# Patient Record
Sex: MALE | Race: WHITE | NOT HISPANIC OR LATINO | Employment: OTHER | URBAN - METROPOLITAN AREA
[De-identification: names, ages, dates, MRNs, and addresses within clinical notes are randomized per-mention and may not be internally consistent; named-entity substitution may affect disease eponyms.]

---

## 2017-01-03 ENCOUNTER — TELEPHONE (OUTPATIENT)
Dept: ENDOCRINOLOGY | Age: 72
End: 2017-01-03

## 2017-01-03 NOTE — TELEPHONE ENCOUNTER
Pharmacy is calling in regards to Actos RX. They are stating that dose is too high for patient's conditions. He is taking Plavix and also has liver issues. Do you want to ok the refill for this medication?     Actos 45mg once a day?

## 2017-01-03 NOTE — TELEPHONE ENCOUNTER
On 10/03/2016 his laboratory evaluation shows perfectly normal liver functions with some renal impairment.  He should continue taking it.  I also do not know of any drug to drug interaction between Actos and Plavix.  Incidentally he may have missed and a lab appointment in late December.

## 2017-01-04 ENCOUNTER — OFFICE VISIT (OUTPATIENT)
Dept: INTERNAL MEDICINE | Facility: CLINIC | Age: 72
End: 2017-01-04

## 2017-01-04 VITALS
HEART RATE: 83 BPM | WEIGHT: 239 LBS | HEIGHT: 73 IN | TEMPERATURE: 98.9 F | DIASTOLIC BLOOD PRESSURE: 86 MMHG | BODY MASS INDEX: 31.68 KG/M2 | SYSTOLIC BLOOD PRESSURE: 150 MMHG | OXYGEN SATURATION: 97 %

## 2017-01-04 DIAGNOSIS — J06.9 ACUTE URI: Primary | ICD-10-CM

## 2017-01-04 PROCEDURE — 99213 OFFICE O/P EST LOW 20 MIN: CPT | Performed by: NURSE PRACTITIONER

## 2017-01-04 RX ORDER — GUAIFENESIN 600 MG/1
1200 TABLET, EXTENDED RELEASE ORAL 2 TIMES DAILY
Qty: 14 TABLET
Start: 2017-01-04 | End: 2017-01-11

## 2017-01-04 RX ORDER — VIT C/HESPERIDIN/BIOFLAVONOIDS 500-100 MG
50 TABLET ORAL DAILY
COMMUNITY

## 2017-01-04 RX ORDER — AZITHROMYCIN 250 MG/1
TABLET, FILM COATED ORAL
Qty: 6 TABLET | Refills: 0 | Status: SHIPPED | OUTPATIENT
Start: 2017-01-04 | End: 2017-02-13

## 2017-01-04 RX ORDER — MULTIVIT WITH MINERALS/LUTEIN
1000 TABLET ORAL DAILY
COMMUNITY

## 2017-01-04 NOTE — PROGRESS NOTES
Subjective   Earl Marc is a 71 y.o. male who presents due to respiratory symptoms.    URI    This is a new problem. The current episode started in the past 7 days. The problem has been gradually worsening. There has been no fever. Associated symptoms include congestion, coughing, rhinorrhea, sinus pain, sneezing and a sore throat. Pertinent negatives include no abdominal pain, chest pain, diarrhea, dysuria, ear pain, nausea, vomiting or wheezing. He has tried antihistamine for the symptoms. The treatment provided mild relief.   Sore Throat    Associated symptoms include congestion and coughing. Pertinent negatives include no abdominal pain, diarrhea, drooling, ear pain, shortness of breath, trouble swallowing or vomiting.        The following portions of the patient's history were reviewed and updated as appropriate: allergies, current medications, past social history and problem list.    Past Medical History   Diagnosis Date   • Abnormal cardiovascular stress test    • Asthma    • Chronic kidney disease    • Coronary artery disease    • Diabetes mellitus    • Dyslipidemia    • Erectile dysfunction    • Fatty liver disease, nonalcoholic    • Hyperlipidemia    • Hypertension    • Hypogonadism male    • Type 2 diabetes mellitus          Current Outpatient Prescriptions:   •  Alcohol Swabs ([employer] PREP) 70 % pads, Place 1 application on the skin Daily., Disp: 400 each, Rfl: 0  •  allopurinol (ZYLOPRIM) 100 MG tablet, Take one tablet daily, Disp: 90 tablet, Rfl: 0  •  AMLACTIN 12 % lotion, , Disp: , Rfl:   •  ascorbic acid (VITAMIN C) 1000 MG tablet, Take 1,000 mg by mouth Daily., Disp: , Rfl:   •  B-D UF III MINI PEN NEEDLES 31G X 5 MM misc, Use with Lantus Pen for injection of insulin, Disp: 100 each, Rfl: 1  •  clopidogrel (PLAVIX) 75 MG tablet, Take 75 mg by mouth Daily., Disp: , Rfl:   •  cyanocobalamin 1000 MCG/ML injection, Cyanocobalamin 1000 MCG/ML Injection Solution; Patient Sig: Cyanocobalamin 1000 MCG/ML  Injection Solution INJECT 1ML INTRAMUSCULARLY EVERY OTHER WEEK; 0; 17-Feb-2015; Active, Disp: , Rfl:   •  DEPO-TESTOSTERONE 200 MG/ML injection, Inject 1 mL into the shoulder, thigh, or buttocks 1 (One) Time Per Week.  , Disp: , Rfl:   •  diclofenac (VOLTAREN) 1 % gel gel, Apply 4 g topically 4 (four) times a day as needed., Disp: , Rfl:   •  fluticasone (FLONASE) 50 MCG/ACT nasal spray, 1 spray into each nostril 2 (two) times a day as needed., Disp: , Rfl:   •  fluticasone-salmeterol (ADVAIR) 500-50 MCG/DOSE DISKUS, Inhale 1 puff 2 (two) times a day as needed. Advair Diskus 500-50 MCG/DOSE Inhalation Aerosol Powder Breath Activated; Patient Sig: Advair Diskus 500-50 MCG/DOSE Inhalation Aerosol Powder Breath Activated INHALE PUFFS  PRN; 5; 27-Aug-2015; Active, Disp: , Rfl:   •  folic acid (FOLVITE) 800 MCG tablet, Take  by mouth daily., Disp: , Rfl:   •  glucose blood test strip, Precision Xtra Blood Glucose In Vitro Strip; Patient Sig: Precision Xtra Blood Glucose In Vitro Strip TEST 2  TIMES DAILY.; 200; 4; 11-Jul-2013; Active, Disp: , Rfl:   •  hydrocortisone 2.5 % ointment, Apply  topically 2 (two) times a day., Disp: , Rfl:   •  Insulin Glargine (LANTUS SOLOSTAR) 100 UNIT/ML injection pen, Inject 26 Units under the skin Daily., Disp: 36 mL, Rfl: 1  •  ketoconazole (NIZORAL) 2 % shampoo, , Disp: , Rfl:   •  loratadine (CLARITIN) 10 MG tablet, Take 10 mg by mouth daily., Disp: , Rfl:   •  MONOLET LANCETS misc, Use to test BG 2 times daily, Disp: 100 each, Rfl: 1  •  Multiple Vitamins-Minerals (MULTIMINERAL PLUS) tablet, Take  by mouth daily., Disp: , Rfl:   •  nitroglycerin (NITROSTAT) 0.4 MG SL tablet, Place 1 tablet under the tongue every 5 (five) minutes as needed for chest pain. Indications: Acute Angina Pectoris, Disp: 30 tablet, Rfl: 3  •  pantoprazole (PROTONIX) 40 MG EC tablet, Take 40 mg by mouth daily., Disp: , Rfl:   •  pioglitazone (ACTOS) 45 MG tablet, Take 1 tablet by mouth Daily., Disp: 90 tablet,  Rfl: 1  •  pregabalin (LYRICA) 50 MG capsule, Take 50 mg by mouth 3 (three) times a day., Disp: , Rfl:   •  PREVIDENT 5000 PLUS 1.1 % cream, , Disp: , Rfl:   •  Soap & Cleansers (CETAKLENZ) liquid, , Disp: , Rfl:   •  tamsulosin (FLOMAX) 0.4 MG capsule 24 hr capsule, Take 1 capsule by mouth every night., Disp: , Rfl:   •  TRADJENTA 5 MG tablet tablet, TAKE 1 TABLET DAILY, Disp: 90 tablet, Rfl: 2  •  VASCEPA 1 G capsule capsule, TAKE 4 CAPSULES IN THE EVENING, Disp: 360 capsule, Rfl: 2  •  vitamin D (ERGOCALCIFEROL) 24810 UNITS capsule capsule, TAKE 2 CAPSULES ONCE WEEKLY, Disp: 24 capsule, Rfl: 2  •  Zinc 30 MG tablet, Take  by mouth., Disp: , Rfl:   •  zolpidem (AMBIEN) 5 MG tablet, Take 5 mg by mouth at night as needed., Disp: , Rfl:   •  azithromycin (ZITHROMAX Z-NEFTALI) 250 MG tablet, Take 2 tablets the first day, then 1 tablet daily for 4 days., Disp: 6 tablet, Rfl: 0  •  guaiFENesin (MUCINEX) 600 MG 12 hr tablet, Take 2 tablets by mouth 2 (Two) Times a Day for 7 days., Disp: 14 tablet, Rfl:     Allergies   Allergen Reactions   • Other      Lotions- anything with palm oil or lanolin per pt (verified 6/15/16 1910)   • Prazosin        Review of Systems   Constitutional: Positive for fatigue. Negative for activity change, appetite change, chills, fever and unexpected weight change.   HENT: Positive for congestion, postnasal drip, rhinorrhea, sinus pressure, sneezing and sore throat. Negative for drooling, ear pain, facial swelling, hearing loss, mouth sores, nosebleeds, trouble swallowing and voice change.    Eyes: Negative for pain, discharge, itching and visual disturbance.   Respiratory: Positive for cough and chest tightness. Negative for choking, shortness of breath and wheezing.    Cardiovascular: Negative for chest pain and palpitations.   Gastrointestinal: Negative for abdominal pain, constipation, diarrhea, nausea and vomiting.   Endocrine: Negative for polyuria.   Genitourinary: Negative for dysuria and  "frequency.   Musculoskeletal: Negative for back pain and joint swelling.       Objective   Vitals:    01/04/17 1442   BP: 150/86   BP Location: Left arm   Patient Position: Sitting   Cuff Size: Adult   Pulse: 83   Temp: 98.9 °F (37.2 °C)   TempSrc: Oral   SpO2: 97%   Weight: 239 lb (108 kg)   Height: 73\" (185.4 cm)     Physical Exam   Constitutional: He appears well-developed and well-nourished. He is cooperative. He does not have a sickly appearance. He does not appear ill.   HENT:   Head: Normocephalic.   Right Ear: Hearing, tympanic membrane and external ear normal. No drainage, swelling or tenderness. Tympanic membrane is not injected, not erythematous and not bulging. No middle ear effusion.   Left Ear: Hearing, tympanic membrane and external ear normal. No drainage, swelling or tenderness. Tympanic membrane is not injected, not erythematous and not bulging.  No middle ear effusion.   Nose: Nose normal. No mucosal edema, rhinorrhea or sinus tenderness. Right sinus exhibits no maxillary sinus tenderness and no frontal sinus tenderness. Left sinus exhibits no maxillary sinus tenderness and no frontal sinus tenderness.   Mouth/Throat: Mucous membranes are normal. Posterior oropharyngeal erythema present.   Eyes: Conjunctivae and lids are normal. Right eye exhibits no discharge and no exudate. Left eye exhibits no discharge and no exudate.   Neck: Trachea normal and normal range of motion. Edema present.   Cardiovascular: Regular rhythm, normal heart sounds and normal pulses.    No murmur heard.  Pulmonary/Chest: Breath sounds normal. No respiratory distress. He has no decreased breath sounds. He has no wheezes. He has no rhonchi. He has no rales.   Lymphadenopathy:     He has no cervical adenopathy.   Neurological: He is alert.   Skin: Skin is warm, dry and intact.   Nursing note and vitals reviewed.      Assessment/Plan   Earl was seen today for uri and sore throat.    Diagnoses and all orders for this " visit:    Acute URI  Comments:  restart Advair & Flonase daily, continue Claritin daily  Orders:  -     guaiFENesin (MUCINEX) 600 MG 12 hr tablet; Take 2 tablets by mouth 2 (Two) Times a Day for 7 days.  -     azithromycin (ZITHROMAX Z-NEFTALI) 250 MG tablet; Take 2 tablets the first day, then 1 tablet daily for 4 days.

## 2017-01-04 NOTE — MR AVS SNAPSHOT
Earl Marc   1/4/2017 2:15 PM   Office Visit    Dept Phone:  [phone]   Encounter #:  [id number]    Provider:  JL Akhtar   Department:  [facility] INTERNAL MEDICINE                Your Full Care Plan              Today's Medication Changes          These changes are accurate as of: 1/4/17  3:05 PM.  If you have any questions, ask your nurse or doctor.               New Medication(s)Ordered:     azithromycin 250 MG tablet   Commonly known as:  ZITHROMAX Z-NEFTALI   Take 2 tablets the first day, then 1 tablet daily for 4 days.   Started by:  JL Akhtar       guaiFENesin 600 MG 12 hr tablet   Commonly known as:  MUCINEX   Take 2 tablets by mouth 2 (Two) Times a Day for 7 days.   Started by:  JL Akhtar         Stop taking medication(s)listed here:     fexofenadine 180 MG tablet   Commonly known as:  ALLEGRA   Stopped by:  JL Akhtar                Where to Get Your Medications      These medications were sent to [address] AT [facility] & ([place] [facility] - [phone]  - [phone] [place] [address]     Phone:  [phone]     azithromycin 250 MG tablet         Information about where to get these medications is not yet available     ! Ask your nurse or doctor about these medications     guaiFENesin 600 MG 12 hr tablet                  Your Updated Medication List          This list is accurate as of: 1/4/17  3:05 PM.  Always use your most recent med list.                allopurinol 100 MG tablet   Commonly known as:  ZYLOPRIM   Take one tablet daily       AMLACTIN 12 % lotion   Generic drug:  ammonium lactate       ascorbic acid 1000 MG tablet   Commonly known as:  VITAMIN C       azithromycin 250 MG tablet   Commonly known as:  ZITHROMAX Z-NEFTALI   Take 2 tablets the first day, then 1 tablet daily for 4 days.       B-D UF III MINI PEN NEEDLES 31G  X 5 MM misc   Generic drug:  Insulin Pen Needle   Use with Lantus Pen for injection of insulin       Cetaklenz liquid       clopidogrel 75 MG tablet   Commonly known as:  PLAVIX       CVS PREP 70 % pads   Place 1 application on the skin Daily.       cyanocobalamin 1000 MCG/ML injection       DEPO-TESTOSTERONE 200 MG/ML injection   Generic drug:  Testosterone Cypionate       diclofenac 1 % gel gel   Commonly known as:  VOLTAREN       fluticasone 50 MCG/ACT nasal spray   Commonly known as:  FLONASE       fluticasone-salmeterol 500-50 MCG/DOSE DISKUS   Commonly known as:  ADVAIR       folic acid 800 MCG tablet   Commonly known as:  FOLVITE       glucose blood test strip       guaiFENesin 600 MG 12 hr tablet   Commonly known as:  MUCINEX   Take 2 tablets by mouth 2 (Two) Times a Day for 7 days.       hydrocortisone 2.5 % ointment       Insulin Glargine 100 UNIT/ML injection pen   Commonly known as:  LANTUS SOLOSTAR   Inject 26 Units under the skin Daily.       ketoconazole 2 % shampoo   Commonly known as:  NIZORAL       loratadine 10 MG tablet   Commonly known as:  CLARITIN       MONOLET LANCETS misc   Use to test BG 2 times daily       MULTIMINERAL PLUS tablet       nitroglycerin 0.4 MG SL tablet   Commonly known as:  NITROSTAT   Place 1 tablet under the tongue every 5 (five) minutes as needed for chest pain. Indications: Acute Angina Pectoris       pantoprazole 40 MG EC tablet   Commonly known as:  PROTONIX       pioglitazone 45 MG tablet   Commonly known as:  ACTOS   Take 1 tablet by mouth Daily.       pregabalin 50 MG capsule   Commonly known as:  LYRICA       PREVIDENT 5000 PLUS 1.1 % cream   Generic drug:  Sodium Fluoride       tamsulosin 0.4 MG capsule 24 hr capsule   Commonly known as:  FLOMAX       TRADJENTA 5 MG tablet tablet   Generic drug:  linagliptin   TAKE 1 TABLET DAILY       VASCEPA 1 G capsule capsule   Generic drug:  icosapent ethyl   TAKE 4 CAPSULES IN THE EVENING       vitamin D 42001 UNITS capsule  capsule   Commonly known as:  ERGOCALCIFEROL   TAKE 2 CAPSULES ONCE WEEKLY       Zinc 30 MG tablet       zolpidem 5 MG tablet   Commonly known as:  AMBIEN               You Were Diagnosed With        Codes Comments    Acute URI    -  Primary ICD-10-CM: J06.9  ICD-9-CM: 465.9 restart Advair & Flonase daily, continue Claritin daily      Instructions     None    Patient Instructions History      Upcoming Appointments     Visit Type Date Time Department    ACUTE           1/4/2017  2:15 PM [facility]    [facility] 1/30/2017  9:30 AM [facility]    OFFICE VISIT 2/13/2017 10:40 AM [facility]    OFFICE VISIT 4/4/2017  9:00 AM [facility]      [employer] Signup     Our records indicate that you have an active [facility] [employer] account.    You can view your After Visit Summary by going to [employer] and logging in with your Yatedo username and password.  If you don't have a [employer] username and password but a parent or guardian has access to your record, the parent or guardian should login with their own [employer]atedo username and password and access your record to view the After Visit Summary.    If you have questions, you can email [email or web address] or call [phone] to talk to our [employer]ated[employer] staff.  Remember, [employer] is NOT to be used for urgent needs.  For medical emergencies, dial 911.               Other Info from Your Visit           Your Appointments     Jan 30, 2017  9:30 AM EST   [facility] with [facility], [facility]   [facility] ENDOCRINOLOGY (--)    [address] [facility]. [address] [place] [id number] [phone]            Feb 13, 2017 10:40 AM EST   Office Visit with Rivka Kincaid MD   [facility] ENDOCRINOLOGY (--)    [address] [facility] [place]. [address] [place] [id number] [phone]           Arrive 15 minutes prior to appointment.            Apr 04, 2017  9:00 AM EDT   Office Visit with Darci Washburn Jr., MD   [facility]  "[facility] MEDICAL [facility] INTERNAL MEDICINE (--)    [address]  [facility] [address] [phone]           Arrive 15 minutes prior to appointment.              Allergies     Other      Lotions- anything with palm oil or lanolin per pt (verified 6/15/16 1910)    Prazosin        Reason for Visit     URI x 3 days    Sore Throat           Vital Signs     Blood Pressure Pulse Temperature Height Weight Oxygen Saturation    150/86 (BP Location: Left arm, Patient Position: Sitting, Cuff Size: Adult) 83 98.9 °F (37.2 °C) (Oral) 73\" (185.4 cm) 239 lb (108 kg) 97%    Body Mass Index Smoking Status                31.53 kg/m2 Never Smoker          Problems and Diagnoses Noted     Acute upper respiratory infection    -  Primary        "

## 2017-01-05 ENCOUNTER — TELEPHONE (OUTPATIENT)
Dept: ENDOCRINOLOGY | Age: 72
End: 2017-01-05

## 2017-01-05 NOTE — TELEPHONE ENCOUNTER
Tradjenta is approved until  and the  number is [id number].   The pt is aware of this information as well.

## 2017-01-30 ENCOUNTER — LAB (OUTPATIENT)
Dept: ENDOCRINOLOGY | Age: 72
End: 2017-01-30

## 2017-01-30 DIAGNOSIS — E55.9 VITAMIN D DEFICIENCY: ICD-10-CM

## 2017-01-30 DIAGNOSIS — E55.9 VITAMIN D DEFICIENCY: Primary | ICD-10-CM

## 2017-01-31 LAB — 25(OH)D3+25(OH)D2 SERPL-MCNC: 53.6 NG/ML (ref 30–100)

## 2017-02-13 ENCOUNTER — OFFICE VISIT (OUTPATIENT)
Dept: ENDOCRINOLOGY | Age: 72
End: 2017-02-13

## 2017-02-13 VITALS
SYSTOLIC BLOOD PRESSURE: 136 MMHG | DIASTOLIC BLOOD PRESSURE: 74 MMHG | HEIGHT: 73 IN | RESPIRATION RATE: 16 BRPM | BODY MASS INDEX: 31.41 KG/M2 | WEIGHT: 237 LBS

## 2017-02-13 DIAGNOSIS — E55.9 VITAMIN D DEFICIENCY: ICD-10-CM

## 2017-02-13 DIAGNOSIS — F52.21 ERECTILE DYSFUNCTION OF NONORGANIC ORIGIN: ICD-10-CM

## 2017-02-13 DIAGNOSIS — N52.1 TYPE II DIABETES CIRCULATORY DISORDER CAUSING ERECTILE DYSFUNCTION (HCC): ICD-10-CM

## 2017-02-13 DIAGNOSIS — I10 ESSENTIAL HYPERTENSION: ICD-10-CM

## 2017-02-13 DIAGNOSIS — E79.0 HYPERURICEMIA: ICD-10-CM

## 2017-02-13 DIAGNOSIS — E29.1 HYPOGONADISM IN MALE: Primary | ICD-10-CM

## 2017-02-13 DIAGNOSIS — K76.0 STEATOSIS OF LIVER: ICD-10-CM

## 2017-02-13 DIAGNOSIS — E78.5 DYSLIPIDEMIA: ICD-10-CM

## 2017-02-13 DIAGNOSIS — E11.59 TYPE II DIABETES CIRCULATORY DISORDER CAUSING ERECTILE DYSFUNCTION (HCC): ICD-10-CM

## 2017-02-13 PROCEDURE — 99214 OFFICE O/P EST MOD 30 MIN: CPT | Performed by: INTERNAL MEDICINE

## 2017-02-13 RX ORDER — ICOSAPENT ETHYL 1000 MG/1
CAPSULE ORAL
Qty: 360 CAPSULE | Refills: 2 | Status: SHIPPED | OUTPATIENT
Start: 2017-02-13 | End: 2017-11-06 | Stop reason: SDUPTHER

## 2017-02-13 RX ORDER — ASPIRIN 325 MG
325 TABLET ORAL DAILY
COMMUNITY
End: 2017-04-04 | Stop reason: SDUPTHER

## 2017-02-13 NOTE — PROGRESS NOTES
"Subjective   Earl Marc is a 71 y.o. male seen for follow up for DM2, hyperlipidemia, HTN, lab review. Patient is checking BG twice a day. He states that his BG is averaging 134. 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 He was told by his kidney doctor that he should not allow his BG to drop below 100 due to kidney damage.     History of Present Illness this is a 71-year-old gentleman known patient with type II diabetes hypertension dyslipidemia as well as hypogonadism and erectile dysfunction and vitamin D deficiency.  In the past 6 months he has had no significant health problems for which to go to emergency room or hospital.  Visit Vitals   • /74   • Resp 16   • Ht 73\" (185.4 cm)   • Wt 237 lb (108 kg)   • BMI 31.27 kg/m2       Current Outpatient Prescriptions:   •  Alcohol Swabs ([facility] PREP) 70 % pads, Place 1 application on the skin Daily., Disp: 400 each, Rfl: 0  •  allopurinol (ZYLOPRIM) 100 MG tablet, Take one tablet daily, Disp: 90 tablet, Rfl: 0  •  AMLACTIN 12 % lotion, , Disp: , Rfl:   •  ascorbic acid (VITAMIN C) 1000 MG tablet, Take 1,000 mg by mouth Daily., Disp: , Rfl:   •  aspirin 325 MG tablet, Take 325 mg by mouth Daily., Disp: , Rfl:   •  B-D UF III MINI PEN NEEDLES 31G X 5 MM misc, Use with Lantus Pen for injection of insulin, Disp: 100 each, Rfl: 1  •  clopidogrel (PLAVIX) 75 MG tablet, Take 75 mg by mouth Daily., Disp: , Rfl:   •  cyanocobalamin 1000 MCG/ML injection, Cyanocobalamin 1000 MCG/ML Injection Solution; Patient Sig: Cyanocobalamin 1000 MCG/ML Injection Solution INJECT 1ML INTRAMUSCULARLY EVERY OTHER WEEK; 0; 17-Feb-2015; Active, Disp: , Rfl:   •  DEPO-TESTOSTERONE 200 MG/ML injection, Inject 1 mL into the shoulder, thigh, or buttocks 1 (One) Time Per Week.  , Disp: , Rfl:   •  diclofenac (VOLTAREN) 1 % gel gel, Apply 4 g topically 4 (four) times a day as needed., Disp: , Rfl: "   •  fluticasone (FLONASE) 50 MCG/ACT nasal spray, 1 spray into each nostril 2 (two) times a day as needed., Disp: , Rfl:   •  fluticasone-salmeterol (ADVAIR) 500-50 MCG/DOSE DISKUS, Inhale 1 puff 2 (two) times a day as needed. Advair Diskus 500-50 MCG/DOSE Inhalation Aerosol Powder Breath Activated; Patient Sig: Advair Diskus 500-50 MCG/DOSE Inhalation Aerosol Powder Breath Activated INHALE PUFFS  PRN; 5; 27-Aug-2015; Active, Disp: , Rfl:   •  folic acid (FOLVITE) 800 MCG tablet, Take  by mouth daily., Disp: , Rfl:   •  glucose blood test strip, Precision Xtra Blood Glucose In Vitro Strip; Patient Sig: Precision Xtra Blood Glucose In Vitro Strip TEST 2  TIMES DAILY, Disp: 200 each, Rfl: 0  •  hydrocortisone 2.5 % ointment, Apply  topically 2 (two) times a day., Disp: , Rfl:   •  Insulin Glargine (LANTUS SOLOSTAR) 100 UNIT/ML injection pen, Inject 26 Units under the skin Daily., Disp: 36 mL, Rfl: 1  •  ketoconazole (NIZORAL) 2 % shampoo, , Disp: , Rfl:   •  loratadine (CLARITIN) 10 MG tablet, Take 10 mg by mouth daily., Disp: , Rfl:   •  METOPROLOL TARTRATE PO, Take 25 mg by mouth Daily., Disp: , Rfl:   •  MONOLET LANCETS misc, Use to test BG 2 times daily, Disp: 100 each, Rfl: 1  •  Multiple Vitamins-Minerals (MULTIMINERAL PLUS) tablet, Take  by mouth daily., Disp: , Rfl:   •  nitroglycerin (NITROSTAT) 0.4 MG SL tablet, Place 1 tablet under the tongue every 5 (five) minutes as needed for chest pain. Indications: Acute Angina Pectoris, Disp: 30 tablet, Rfl: 3  •  pantoprazole (PROTONIX) 40 MG EC tablet, Take 40 mg by mouth daily., Disp: , Rfl:   •  pioglitazone (ACTOS) 45 MG tablet, Take 1 tablet by mouth Daily., Disp: 90 tablet, Rfl: 1  •  pregabalin (LYRICA) 50 MG capsule, Take 50 mg by mouth 3 (three) times a day., Disp: , Rfl:   •  PREVIDENT 5000 PLUS 1.1 % cream, , Disp: , Rfl:   •  Soap & Cleansers (CETAKLENZ) liquid, , Disp: , Rfl:   •  tamsulosin (FLOMAX) 0.4 MG capsule 24 hr capsule, Take 1 capsule by mouth  every night., Disp: , Rfl:   •  TRADJENTA 5 MG tablet tablet, TAKE 1 TABLET DAILY, Disp: 90 tablet, Rfl: 2  •  VASCEPA 1 G capsule capsule, TAKE 4 CAPSULES IN THE EVENING, Disp: 360 capsule, Rfl: 2  •  vitamin D (ERGOCALCIFEROL) 85375 UNITS capsule capsule, TAKE 2 CAPSULES ONCE WEEKLY, Disp: 24 capsule, Rfl: 2  •  Zinc 30 MG tablet, Take  by mouth., Disp: , Rfl:   •  zolpidem (AMBIEN) 5 MG tablet, Take 5 mg by mouth at night as needed., Disp: , Rfl:     Allergies   Allergen Reactions   • Other      Lotions- anything with palm oil or lanolin per pt (verified 6/15/16 1910)   • Prazosin            The following portions of the patient's history were reviewed and updated as appropriate: allergies, current medications, past family history, past medical history, past social history, past surgical history and problem list.    Review of Systems   Constitutional: Negative.    HENT: Negative.    Eyes: Negative.    Respiratory: Negative.    Cardiovascular: Negative.    Gastrointestinal: Negative.    Endocrine: Negative.    Genitourinary: Negative.    Musculoskeletal: Negative.    Skin: Negative.    Allergic/Immunologic: Negative.    Neurological: Negative.    Hematological: Negative.    Psychiatric/Behavioral: Negative.        Objective   Physical Exam   Constitutional: He is oriented to person, place, and time. He appears well-developed and well-nourished. No distress.   HENT:   Head: Normocephalic and atraumatic.   Right Ear: External ear normal.   Left Ear: External ear normal.   Nose: Nose normal.   Mouth/Throat: Oropharynx is clear and moist. No oropharyngeal exudate.   Eyes: Conjunctivae and EOM are normal. Pupils are equal, round, and reactive to light. Right eye exhibits no discharge. Left eye exhibits no discharge. No scleral icterus.   Neck: Normal range of motion. Neck supple. No JVD present. No tracheal deviation present. No thyromegaly present.   Cardiovascular: Normal rate, regular rhythm, normal heart sounds and  intact distal pulses.  Exam reveals no gallop and no friction rub.    No murmur heard.  Pulmonary/Chest: Effort normal and breath sounds normal. No stridor. No respiratory distress. He has no wheezes. He has no rales. He exhibits no tenderness.   Abdominal: Soft. Bowel sounds are normal. He exhibits no distension and no mass. There is no tenderness. There is no rebound and no guarding. No hernia.   Musculoskeletal: Normal range of motion. He exhibits no edema, tenderness or deformity.   Lymphadenopathy:     He has no cervical adenopathy.   Neurological: He is alert and oriented to person, place, and time. He has normal reflexes. He displays normal reflexes. No cranial nerve deficit. He exhibits normal muscle tone. Coordination normal.   Skin: Skin is warm and dry. No rash noted. He is not diaphoretic. No erythema. No pallor.   Psychiatric: He has a normal mood and affect. His behavior is normal. Judgment and thought content normal.   Nursing note and vitals reviewed.     Results for orders placed or performed in visit on 01/30/17   Vitamin D 25 Hydroxy   Result Value Ref Range    25 Hydroxy, Vitamin D 53.6 30.0 - 100.0 ng/mL     Lab Results   Component Value Date    GLUCOSE 122 (H) 06/14/2016    BUN 23 01/30/2017    CREATININE 1.62 (H) 01/30/2017    EGFRIFNONA 42 (L) 01/30/2017    EGFRIFAFRI 51 (L) 01/30/2017    BCR 14.2 01/30/2017    CO2 23.9 01/30/2017    CALCIUM 9.9 01/30/2017    PROTENTOTREF 7.5 01/30/2017    ALBUMIN 4.70 01/30/2017    LABIL2 1.7 01/30/2017    AST 20 01/30/2017    ALT 21 01/30/2017     Lab Results   Component Value Date    TSH 2.660 01/30/2017     Lab Results   Component Value Date    CHOL 117 03/29/2016    CHOL 127 03/27/2016    CHOL 127 03/27/2016     Lab Results   Component Value Date    TRIG 160 (H) 01/30/2017    TRIG 102 10/03/2016    TRIG 164 (H) 05/17/2016     Lab Results   Component Value Date    HDL 49 01/30/2017    HDL 44 10/03/2016    HDL 49 05/17/2016     Lab Results   Component  Value Date    LDLCALC 50 03/29/2016    LDLCALC 49 03/27/2016    LDLCALC 47 03/27/2016     Lab Results   Component Value Date     (H) 01/30/2017    LDL 83 10/03/2016     (H) 05/17/2016     No results found for: HDLLDLRATIO  No components found for: CHOLHDL  Lab Results   Component Value Date    HGBA1C 6.49 (H) 01/30/2017               Assessment/Plan   Diagnoses and all orders for this visit:    Hypogonadism in male  -     T4 & TSH ([facility]); Future  -     TestT+TestF+SHBG; Future  -     Uric Acid; Future  -     Vitamin D 25 Hydroxy; Future  -     Comprehensive Metabolic Panel; Future  -     C-Peptide; Future  -     Hemoglobin A1c; Future  -     Lipid Panel; Future  -     MicroAlbumin, Urine, Random; Future    Vitamin D deficiency  -     T4 & TSH ([facility]); Future  -     TestT+TestF+SHBG; Future  -     Uric Acid; Future  -     Vitamin D 25 Hydroxy; Future  -     Comprehensive Metabolic Panel; Future  -     C-Peptide; Future  -     Hemoglobin A1c; Future  -     Lipid Panel; Future  -     MicroAlbumin, Urine, Random; Future    Steatosis of liver  -     T4 & TSH ([facility]); Future  -     TestT+TestF+SHBG; Future  -     Uric Acid; Future  -     Vitamin D 25 Hydroxy; Future  -     Comprehensive Metabolic Panel; Future  -     C-Peptide; Future  -     Hemoglobin A1c; Future  -     Lipid Panel; Future  -     MicroAlbumin, Urine, Random; Future    Essential hypertension  -     T4 & TSH ([facility]); Future  -     TestT+TestF+SHBG; Future  -     Uric Acid; Future  -     Vitamin D 25 Hydroxy; Future  -     Comprehensive Metabolic Panel; Future  -     C-Peptide; Future  -     Hemoglobin A1c; Future  -     Lipid Panel; Future  -     MicroAlbumin, Urine, Random; Future    Hyperuricemia  -     T4 & TSH ([facility]); Future  -     TestT+TestF+SHBG; Future  -     Uric Acid; Future  -     Vitamin D 25 Hydroxy; Future  -     Comprehensive Metabolic Panel; Future  -     C-Peptide; Future  -     Hemoglobin A1c; Future  -     Lipid  Panel; Future  -     MicroAlbumin, Urine, Random; Future    Dyslipidemia  -     T4 & TSH ([facility]); Future  -     TestT+TestF+SHBG; Future  -     Uric Acid; Future  -     Vitamin D 25 Hydroxy; Future  -     Comprehensive Metabolic Panel; Future  -     C-Peptide; Future  -     Hemoglobin A1c; Future  -     Lipid Panel; Future  -     MicroAlbumin, Urine, Random; Future    Erectile dysfunction of nonorganic origin  -     T4 & TSH ([facility]); Future  -     TestT+TestF+SHBG; Future  -     Uric Acid; Future  -     Vitamin D 25 Hydroxy; Future  -     Comprehensive Metabolic Panel; Future  -     C-Peptide; Future  -     Hemoglobin A1c; Future  -     Lipid Panel; Future  -     MicroAlbumin, Urine, Random; Future    Type II diabetes circulatory disorder causing erectile dysfunction  -     T4 & TSH ([facility]); Future  -     TestT+TestF+SHBG; Future  -     Uric Acid; Future  -     Vitamin D 25 Hydroxy; Future  -     Comprehensive Metabolic Panel; Future  -     C-Peptide; Future  -     Hemoglobin A1c; Future  -     Lipid Panel; Future  -     MicroAlbumin, Urine, Random; Future    Other orders  -     Insulin Glargine (LANTUS SOLOSTAR) 100 UNIT/ML injection pen; Inject 26 Units under the skin Daily.  -     linagliptin (TRADJENTA) 5 MG tablet tablet; Take 1 tablet by mouth Daily.  -     icosapent ethyl (VASCEPA) 1 G capsule capsule; Capsules twice daily by mouth              his summary I saw and examined this 71-year-old gentleman for above-mentioned problems.  I reviewed his laboratory evaluation of 01/30/2017 and divided him with a hard copy of it.  He is clinically and metabolically stable and therefore we will go ahead and continue all his current medications.  His testosterone is low however this is being managed by his urologist.  He will see Ms. [place] Walker in 4 months with laboratory evaluation prior to each office visit.

## 2017-02-16 RX ORDER — ALLOPURINOL 100 MG/1
TABLET ORAL
Qty: 90 TABLET | Refills: 0 | Status: SHIPPED | OUTPATIENT
Start: 2017-02-16 | End: 2017-02-17 | Stop reason: SDUPTHER

## 2017-02-17 RX ORDER — ALLOPURINOL 100 MG/1
TABLET ORAL
Qty: 90 TABLET | OUTPATIENT
Start: 2017-02-17

## 2017-02-17 RX ORDER — ALLOPURINOL 100 MG/1
TABLET ORAL
Qty: 90 TABLET | Refills: 3 | Status: SHIPPED | OUTPATIENT
Start: 2017-02-17 | End: 2018-03-09 | Stop reason: SDUPTHER

## 2017-04-04 ENCOUNTER — OFFICE VISIT (OUTPATIENT)
Dept: INTERNAL MEDICINE | Facility: CLINIC | Age: 72
End: 2017-04-04

## 2017-04-04 VITALS
WEIGHT: 243 LBS | DIASTOLIC BLOOD PRESSURE: 74 MMHG | SYSTOLIC BLOOD PRESSURE: 140 MMHG | HEIGHT: 73 IN | HEART RATE: 78 BPM | BODY MASS INDEX: 32.2 KG/M2

## 2017-04-04 DIAGNOSIS — I25.10 CORONARY ARTERY DISEASE INVOLVING NATIVE CORONARY ARTERY OF NATIVE HEART WITHOUT ANGINA PECTORIS: Primary | ICD-10-CM

## 2017-04-04 DIAGNOSIS — E11.42 DIABETIC PERIPHERAL NEUROPATHY (HCC): ICD-10-CM

## 2017-04-04 DIAGNOSIS — E11.40 TYPE 2 DIABETES MELLITUS WITH DIABETIC NEUROPATHY, WITH LONG-TERM CURRENT USE OF INSULIN (HCC): ICD-10-CM

## 2017-04-04 DIAGNOSIS — Z79.4 TYPE 2 DIABETES MELLITUS WITH DIABETIC NEUROPATHY, WITH LONG-TERM CURRENT USE OF INSULIN (HCC): ICD-10-CM

## 2017-04-04 DIAGNOSIS — I10 ESSENTIAL HYPERTENSION: ICD-10-CM

## 2017-04-04 DIAGNOSIS — E78.5 DYSLIPIDEMIA: ICD-10-CM

## 2017-04-04 DIAGNOSIS — N28.9 RENAL INSUFFICIENCY: ICD-10-CM

## 2017-04-04 DIAGNOSIS — K21.9 GASTROESOPHAGEAL REFLUX DISEASE WITHOUT ESOPHAGITIS: ICD-10-CM

## 2017-04-04 DIAGNOSIS — E11.49 TYPE 2 DIABETES MELLITUS WITH OTHER NEUROLOGIC COMPLICATION, WITHOUT LONG-TERM CURRENT USE OF INSULIN (HCC): ICD-10-CM

## 2017-04-04 PROBLEM — E11.9 TYPE 2 DIABETES MELLITUS: Status: ACTIVE | Noted: 2017-04-04

## 2017-04-04 PROBLEM — E11.9 TYPE 2 DIABETES MELLITUS: Status: RESOLVED | Noted: 2017-04-04 | Resolved: 2017-04-04

## 2017-04-04 PROBLEM — N52.1 TYPE II DIABETES CIRCULATORY DISORDER CAUSING ERECTILE DYSFUNCTION: Status: RESOLVED | Noted: 2017-02-13 | Resolved: 2017-04-04

## 2017-04-04 PROBLEM — E11.59 TYPE II DIABETES CIRCULATORY DISORDER CAUSING ERECTILE DYSFUNCTION: Status: RESOLVED | Noted: 2017-02-13 | Resolved: 2017-04-04

## 2017-04-04 PROCEDURE — 99214 OFFICE O/P EST MOD 30 MIN: CPT | Performed by: INTERNAL MEDICINE

## 2017-04-04 RX ORDER — PREGABALIN 50 MG/1
50 CAPSULE ORAL 3 TIMES DAILY
Qty: 90 CAPSULE | Refills: 0 | Status: SHIPPED | OUTPATIENT
Start: 2017-04-04 | End: 2018-02-07

## 2017-04-04 RX ORDER — LATANOPROST 50 UG/ML
1 SOLUTION/ DROPS OPHTHALMIC NIGHTLY
COMMUNITY
Start: 2017-04-01

## 2017-04-04 RX ORDER — ASPIRIN 325 MG
325 TABLET ORAL DAILY
Qty: 90 TABLET | Refills: 3 | Status: SHIPPED | OUTPATIENT
Start: 2017-04-04 | End: 2017-07-05

## 2017-04-04 RX ORDER — ZOLPIDEM TARTRATE 5 MG/1
5 TABLET ORAL NIGHTLY PRN
Qty: 30 TABLET | Refills: 0 | Status: SHIPPED | OUTPATIENT
Start: 2017-04-04 | End: 2017-11-14 | Stop reason: SDUPTHER

## 2017-04-04 NOTE — PROGRESS NOTES
Subjective   Earl Marc is a 71 y.o. male.     History of Present Illness he is here today for follow-up of hypertension and coronary artery disease as well as hypercholesterolemia.  Generally he has done well.  He exercises 2 or 3 days per week doing both treadmill and rowing.  He does notice some reflux when doing the treadmill which is relieved by an acid.  He denies any other chest discomfort.  He denies any dyspnea on exertion or PND.  He has not had any dizziness or focal neurologic symptoms.  He does have chronic peripheral paresthesias for which he uses Lyrica with some relief.  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  His review systems is otherwise negative with exception of chronic and stable one per night nocturia.        Review of Systems   Constitutional: Negative for activity change, appetite change, fatigue and unexpected weight change.   HENT: Negative for trouble swallowing.    Respiratory: Negative for cough, chest tightness, shortness of breath and wheezing.    Cardiovascular: Negative for chest pain, palpitations and leg swelling.   Gastrointestinal: Negative for abdominal pain, anal bleeding, blood in stool, constipation, diarrhea, nausea and vomiting.   Genitourinary: Negative for difficulty urinating, dysuria, flank pain, frequency and hematuria.   Musculoskeletal: Negative for arthralgias, back pain and gait problem.   Neurological: Positive for numbness. Negative for dizziness, syncope, facial asymmetry, speech difficulty, weakness and headaches.   Psychiatric/Behavioral: Negative for confusion and dysphoric mood. The patient is not nervous/anxious.        Objective   Physical Exam   Constitutional: He is oriented to person, place, and time. He appears well-developed and well-nourished. He is active. He does not appear ill.   Eyes: Conjunctivae are normal.   Fundoscopic exam:       The right eye shows no AV nicking, no  exudate and no hemorrhage.        The left eye shows no AV nicking, no exudate and no hemorrhage.   Neck: Carotid bruit is not present. No thyroid mass and no thyromegaly present.   Cardiovascular: Normal rate, regular rhythm, S1 normal and S2 normal.  Exam reveals no S3 and no S4.    No murmur heard.  Pulses:       Posterior tibial pulses are 2+ on the right side, and 2+ on the left side.   Pulmonary/Chest: No tachypnea. No respiratory distress. He has no decreased breath sounds. He has no wheezes. He has no rhonchi. He has no rales.   Abdominal: Soft. Normal appearance and bowel sounds are normal. He exhibits no abdominal bruit and no mass. There is no hepatosplenomegaly.       Vascular Status -  His exam exhibits no right foot edema. His exam exhibits no left foot edema.  Neurological: He is alert and oriented to person, place, and time. Gait normal.   Reflex Scores:       Bicep reflexes are 2+ on the right side.  Psychiatric: He has a normal mood and affect. His speech is normal and behavior is normal. Judgment and thought content normal. Cognition and memory are normal.       Assessment/Plan January lab showed a normal PSA and TSH.  BUN 23 creatinine 1.62.  Hemoglobin A1c 6.4.  Total cholesterol 184 triglycerides 160 HDL cholesterol 49 LDL cholesterol 103    Assessment #1 coronary artery disease-quiescent and he continues to exercise regularly.  #2 chronic renal insufficiency-moderate and stable #3 GERD-symptoms well-controlled and limited #4 hypercholesterolemia-reasonable control #5 diabetes mellitus-continued good control #5 hypertension-borderline systolic today    Plan #1 no change medication.  Routine follow-up with me in 6 months.

## 2017-04-04 NOTE — TELEPHONE ENCOUNTER
Last OV: Today     Next OV: --------    Last Fill:   Zolpidem - 03/14/2017      Lyrica -     01/13/2017

## 2017-04-27 RX ORDER — ERGOCALCIFEROL 1.25 MG/1
CAPSULE ORAL
Qty: 24 CAPSULE | Refills: 1 | Status: SHIPPED | OUTPATIENT
Start: 2017-04-27 | End: 2017-06-26 | Stop reason: SDUPTHER

## 2017-05-10 RX ORDER — ICOSAPENT ETHYL 1000 MG/1
CAPSULE ORAL
Qty: 360 CAPSULE | Refills: 1 | Status: SHIPPED | OUTPATIENT
Start: 2017-05-10 | End: 2017-06-26 | Stop reason: SDUPTHER

## 2017-05-15 DIAGNOSIS — I10 ESSENTIAL HYPERTENSION: ICD-10-CM

## 2017-05-15 DIAGNOSIS — E79.0 HYPERURICEMIA: ICD-10-CM

## 2017-05-15 DIAGNOSIS — E55.9 VITAMIN D DEFICIENCY: ICD-10-CM

## 2017-05-15 DIAGNOSIS — E78.5 DYSLIPIDEMIA: ICD-10-CM

## 2017-05-15 DIAGNOSIS — E29.1 HYPOGONADISM IN MALE: ICD-10-CM

## 2017-05-15 DIAGNOSIS — E11.9 TYPE 2 DIABETES MELLITUS WITHOUT COMPLICATION, WITHOUT LONG-TERM CURRENT USE OF INSULIN (HCC): ICD-10-CM

## 2017-05-15 DIAGNOSIS — E11.42 DIABETIC PERIPHERAL NEUROPATHY (HCC): ICD-10-CM

## 2017-05-15 RX ORDER — LANCETS
EACH MISCELLANEOUS
Qty: 200 EACH | Refills: 1 | Status: SHIPPED | OUTPATIENT
Start: 2017-05-15 | End: 2018-10-08 | Stop reason: SDUPTHER

## 2017-05-15 RX ORDER — LORATADINE 10 MG
1 TABLET ORAL DAILY
Qty: 400 EACH | Refills: 1 | Status: SHIPPED | OUTPATIENT
Start: 2017-05-15 | End: 2018-10-08 | Stop reason: SDUPTHER

## 2017-05-22 DIAGNOSIS — E55.9 VITAMIN D DEFICIENCY: ICD-10-CM

## 2017-05-22 DIAGNOSIS — Z79.4 TYPE 2 DIABETES MELLITUS WITH DIABETIC NEUROPATHY, WITH LONG-TERM CURRENT USE OF INSULIN (HCC): ICD-10-CM

## 2017-05-22 DIAGNOSIS — E11.40 TYPE 2 DIABETES MELLITUS WITH DIABETIC NEUROPATHY, WITH LONG-TERM CURRENT USE OF INSULIN (HCC): ICD-10-CM

## 2017-05-22 DIAGNOSIS — E79.0 HYPERURICEMIA: ICD-10-CM

## 2017-05-22 DIAGNOSIS — E29.1 HYPOGONADISM IN MALE: Primary | ICD-10-CM

## 2017-05-22 DIAGNOSIS — E78.5 DYSLIPIDEMIA: ICD-10-CM

## 2017-05-22 DIAGNOSIS — N40.0 ENLARGED PROSTATE WITHOUT LOWER URINARY TRACT SYMPTOMS (LUTS): ICD-10-CM

## 2017-05-23 RX ORDER — PANTOPRAZOLE SODIUM 40 MG/1
40 TABLET, DELAYED RELEASE ORAL DAILY
Qty: 90 TABLET | Refills: 3 | Status: SHIPPED | OUTPATIENT
Start: 2017-05-23 | End: 2018-06-11

## 2017-05-30 DIAGNOSIS — E78.5 DYSLIPIDEMIA: ICD-10-CM

## 2017-05-30 DIAGNOSIS — I10 ESSENTIAL HYPERTENSION: ICD-10-CM

## 2017-05-30 DIAGNOSIS — E29.1 HYPOGONADISM IN MALE: ICD-10-CM

## 2017-05-30 DIAGNOSIS — E11.42 DIABETIC PERIPHERAL NEUROPATHY (HCC): ICD-10-CM

## 2017-05-30 DIAGNOSIS — E79.0 HYPERURICEMIA: ICD-10-CM

## 2017-05-30 DIAGNOSIS — E11.9 TYPE 2 DIABETES MELLITUS WITHOUT COMPLICATION, WITHOUT LONG-TERM CURRENT USE OF INSULIN (HCC): ICD-10-CM

## 2017-05-30 DIAGNOSIS — E55.9 VITAMIN D DEFICIENCY: ICD-10-CM

## 2017-05-30 RX ORDER — FLURBIPROFEN SODIUM 0.3 MG/ML
SOLUTION/ DROPS OPHTHALMIC
Qty: 100 EACH | Refills: 1 | Status: SHIPPED | OUTPATIENT
Start: 2017-05-30 | End: 2017-05-31 | Stop reason: SDUPTHER

## 2017-05-30 RX ORDER — FLURBIPROFEN SODIUM 0.3 MG/ML
SOLUTION/ DROPS OPHTHALMIC
Qty: 100 EACH | Refills: 1 | Status: SHIPPED | OUTPATIENT
Start: 2017-05-30 | End: 2017-05-30 | Stop reason: SDUPTHER

## 2017-05-31 DIAGNOSIS — I10 ESSENTIAL HYPERTENSION: ICD-10-CM

## 2017-05-31 DIAGNOSIS — E79.0 HYPERURICEMIA: ICD-10-CM

## 2017-05-31 DIAGNOSIS — E78.5 DYSLIPIDEMIA: ICD-10-CM

## 2017-05-31 DIAGNOSIS — E55.9 VITAMIN D DEFICIENCY: ICD-10-CM

## 2017-05-31 DIAGNOSIS — E11.42 DIABETIC PERIPHERAL NEUROPATHY (HCC): ICD-10-CM

## 2017-05-31 DIAGNOSIS — E11.9 TYPE 2 DIABETES MELLITUS WITHOUT COMPLICATION, WITHOUT LONG-TERM CURRENT USE OF INSULIN (HCC): ICD-10-CM

## 2017-05-31 DIAGNOSIS — E29.1 HYPOGONADISM IN MALE: ICD-10-CM

## 2017-05-31 RX ORDER — FLURBIPROFEN SODIUM 0.3 MG/ML
SOLUTION/ DROPS OPHTHALMIC
Qty: 100 EACH | Refills: 1 | Status: SHIPPED | OUTPATIENT
Start: 2017-05-31 | End: 2017-06-01 | Stop reason: SDUPTHER

## 2017-06-01 DIAGNOSIS — E79.0 HYPERURICEMIA: ICD-10-CM

## 2017-06-01 DIAGNOSIS — E55.9 VITAMIN D DEFICIENCY: ICD-10-CM

## 2017-06-01 DIAGNOSIS — E11.42 DIABETIC PERIPHERAL NEUROPATHY (HCC): ICD-10-CM

## 2017-06-01 DIAGNOSIS — I10 ESSENTIAL HYPERTENSION: ICD-10-CM

## 2017-06-01 DIAGNOSIS — E78.5 DYSLIPIDEMIA: ICD-10-CM

## 2017-06-01 DIAGNOSIS — E11.9 TYPE 2 DIABETES MELLITUS WITHOUT COMPLICATION, WITHOUT LONG-TERM CURRENT USE OF INSULIN (HCC): ICD-10-CM

## 2017-06-01 DIAGNOSIS — E29.1 HYPOGONADISM IN MALE: ICD-10-CM

## 2017-06-01 RX ORDER — FLURBIPROFEN SODIUM 0.3 MG/ML
SOLUTION/ DROPS OPHTHALMIC
Qty: 100 EACH | Refills: 5 | Status: SHIPPED | OUTPATIENT
Start: 2017-06-01 | End: 2018-07-13 | Stop reason: SDUPTHER

## 2017-06-07 ENCOUNTER — RESULTS ENCOUNTER (OUTPATIENT)
Dept: ENDOCRINOLOGY | Age: 72
End: 2017-06-07

## 2017-06-07 DIAGNOSIS — E79.0 HYPERURICEMIA: ICD-10-CM

## 2017-06-07 DIAGNOSIS — I10 ESSENTIAL HYPERTENSION: ICD-10-CM

## 2017-06-07 DIAGNOSIS — E55.9 VITAMIN D DEFICIENCY: ICD-10-CM

## 2017-06-07 DIAGNOSIS — E11.59 TYPE II DIABETES CIRCULATORY DISORDER CAUSING ERECTILE DYSFUNCTION (HCC): ICD-10-CM

## 2017-06-07 DIAGNOSIS — F52.21 ERECTILE DYSFUNCTION OF NONORGANIC ORIGIN: ICD-10-CM

## 2017-06-07 DIAGNOSIS — K76.0 STEATOSIS OF LIVER: ICD-10-CM

## 2017-06-07 DIAGNOSIS — E29.1 HYPOGONADISM IN MALE: ICD-10-CM

## 2017-06-07 DIAGNOSIS — E78.5 DYSLIPIDEMIA: ICD-10-CM

## 2017-06-07 DIAGNOSIS — N52.1 TYPE II DIABETES CIRCULATORY DISORDER CAUSING ERECTILE DYSFUNCTION (HCC): ICD-10-CM

## 2017-06-12 ENCOUNTER — LAB (OUTPATIENT)
Dept: ENDOCRINOLOGY | Age: 72
End: 2017-06-12

## 2017-06-12 DIAGNOSIS — E79.0 HYPERURICEMIA: ICD-10-CM

## 2017-06-12 DIAGNOSIS — E11.40 TYPE 2 DIABETES MELLITUS WITH DIABETIC NEUROPATHY, WITH LONG-TERM CURRENT USE OF INSULIN (HCC): ICD-10-CM

## 2017-06-12 DIAGNOSIS — E78.5 DYSLIPIDEMIA: ICD-10-CM

## 2017-06-12 DIAGNOSIS — Z79.4 TYPE 2 DIABETES MELLITUS WITH DIABETIC NEUROPATHY, WITH LONG-TERM CURRENT USE OF INSULIN (HCC): ICD-10-CM

## 2017-06-12 DIAGNOSIS — E55.9 VITAMIN D DEFICIENCY: ICD-10-CM

## 2017-06-12 DIAGNOSIS — N40.0 ENLARGED PROSTATE WITHOUT LOWER URINARY TRACT SYMPTOMS (LUTS): ICD-10-CM

## 2017-06-12 DIAGNOSIS — E29.1 HYPOGONADISM IN MALE: ICD-10-CM

## 2017-06-14 LAB
25(OH)D3+25(OH)D2 SERPL-MCNC: 80 NG/ML (ref 30–100)
ALBUMIN SERPL-MCNC: 4.2 G/DL (ref 3.5–4.8)
ALBUMIN/GLOB SERPL: 1.4 {RATIO} (ref 1.2–2.2)
ALP SERPL-CCNC: 56 IU/L (ref 39–117)
ALT SERPL-CCNC: 15 IU/L (ref 0–44)
AST SERPL-CCNC: 16 IU/L (ref 0–40)
BILIRUB SERPL-MCNC: 0.5 MG/DL (ref 0–1.2)
BUN SERPL-MCNC: 23 MG/DL (ref 8–27)
BUN/CREAT SERPL: 12 (ref 10–24)
C PEPTIDE SERPL-MCNC: 2.8 NG/ML (ref 1.1–4.4)
CALCIUM SERPL-MCNC: 9.6 MG/DL (ref 8.6–10.2)
CHLORIDE SERPL-SCNC: 101 MMOL/L (ref 96–106)
CHOLEST SERPL-MCNC: 150 MG/DL (ref 100–199)
CO2 SERPL-SCNC: 25 MMOL/L (ref 18–29)
CONV COMMENT: ABNORMAL
CREAT SERPL-MCNC: 1.87 MG/DL (ref 0.76–1.27)
GLOBULIN SER CALC-MCNC: 3.1 G/DL (ref 1.5–4.5)
GLUCOSE SERPL-MCNC: 163 MG/DL (ref 65–99)
HBA1C MFR BLD: 7.1 % (ref 4.8–5.6)
HCT VFR BLD AUTO: 47.6 % (ref 37.5–51)
HDLC SERPL-MCNC: 39 MG/DL
HGB BLD-MCNC: 15.9 G/DL (ref 12.6–17.7)
LDLC SERPL CALC-MCNC: 74 MG/DL (ref 0–99)
MICROALBUMIN UR-MCNC: 56.2 UG/ML
POTASSIUM SERPL-SCNC: 5 MMOL/L (ref 3.5–5.2)
PROT SERPL-MCNC: 7.3 G/DL (ref 6–8.5)
PSA SERPL-MCNC: 2.4 NG/ML (ref 0–4)
SHBG SERPL-SCNC: 30.3 NMOL/L (ref 19.3–76.4)
SODIUM SERPL-SCNC: 143 MMOL/L (ref 134–144)
TESTOST FREE SERPL-MCNC: 39.6 PG/ML (ref 6.6–18.1)
TESTOST SERPL-MCNC: >1500 NG/DL (ref 348–1197)
TRIGL SERPL-MCNC: 185 MG/DL (ref 0–149)
URATE SERPL-MCNC: 5.5 MG/DL (ref 3.7–8.6)
VLDLC SERPL CALC-MCNC: 37 MG/DL (ref 5–40)

## 2017-06-26 ENCOUNTER — OFFICE VISIT (OUTPATIENT)
Dept: ENDOCRINOLOGY | Age: 72
End: 2017-06-26

## 2017-06-26 VITALS
DIASTOLIC BLOOD PRESSURE: 72 MMHG | SYSTOLIC BLOOD PRESSURE: 128 MMHG | BODY MASS INDEX: 31.3 KG/M2 | WEIGHT: 236.2 LBS | HEIGHT: 73 IN

## 2017-06-26 DIAGNOSIS — E55.9 VITAMIN D DEFICIENCY: ICD-10-CM

## 2017-06-26 DIAGNOSIS — N28.9 RENAL INSUFFICIENCY: ICD-10-CM

## 2017-06-26 DIAGNOSIS — E11.40 TYPE 2 DIABETES MELLITUS WITH DIABETIC NEUROPATHY, WITH LONG-TERM CURRENT USE OF INSULIN (HCC): Primary | ICD-10-CM

## 2017-06-26 DIAGNOSIS — I10 ESSENTIAL HYPERTENSION: ICD-10-CM

## 2017-06-26 DIAGNOSIS — Z79.4 TYPE 2 DIABETES MELLITUS WITH DIABETIC NEUROPATHY, WITH LONG-TERM CURRENT USE OF INSULIN (HCC): Primary | ICD-10-CM

## 2017-06-26 DIAGNOSIS — E29.1 HYPOGONADISM IN MALE: ICD-10-CM

## 2017-06-26 DIAGNOSIS — E11.42 DIABETIC PERIPHERAL NEUROPATHY (HCC): ICD-10-CM

## 2017-06-26 DIAGNOSIS — E78.5 DYSLIPIDEMIA: ICD-10-CM

## 2017-06-26 PROCEDURE — 99214 OFFICE O/P EST MOD 30 MIN: CPT | Performed by: NURSE PRACTITIONER

## 2017-06-26 RX ORDER — PIOGLITAZONEHYDROCHLORIDE 45 MG/1
45 TABLET ORAL DAILY
Qty: 90 TABLET | Refills: 2 | Status: SHIPPED | OUTPATIENT
Start: 2017-06-26 | End: 2018-02-07 | Stop reason: SDUPTHER

## 2017-06-26 RX ORDER — ERGOCALCIFEROL 1.25 MG/1
50000 CAPSULE ORAL
Qty: 12 CAPSULE | Refills: 1
Start: 2017-06-26 | End: 2018-01-12 | Stop reason: SDUPTHER

## 2017-06-26 NOTE — PROGRESS NOTES
"Emma Marc is a 72 y.o. male is here today for follow-up.  Chief Complaint   Patient presents with   • Diabetes     recent labs, testing BG 1 time daily, pt did not bring meter   • Hypogonadism   • hyperuricemia   • dyslipidemia   • Vitamin D Deficiency     /72  Ht 73\" (185.4 cm)  Wt 236 lb 3.2 oz (107 kg)  BMI 31.16 kg/m2  Current Outpatient Prescriptions on File Prior to Visit   Medication Sig   • Alcohol Swabs ([facility] PREP) 70 % pads Place 1 application on the skin Daily.   • allopurinol (ZYLOPRIM) 100 MG tablet Take one tablet daily   • AMLACTIN 12 % lotion    • ascorbic acid (VITAMIN C) 1000 MG tablet Take 1,000 mg by mouth Daily.   • aspirin 325 MG tablet Take 1 tablet by mouth Daily.   • B-D UF III MINI PEN NEEDLES 31G X 5 MM misc Use once daily with Lantus Pen for injection of insulin   • cyanocobalamin 1000 MCG/ML injection Cyanocobalamin 1000 MCG/ML Injection Solution; Patient Sig: Cyanocobalamin 1000 MCG/ML Injection Solution INJECT 1ML INTRAMUSCULARLY EVERY OTHER WEEK; 0; 17-Feb-2015; Active   • DEPO-TESTOSTERONE 200 MG/ML injection Inject 1 mL into the shoulder, thigh, or buttocks 1 (One) Time Per Week.     • diclofenac (VOLTAREN) 1 % gel gel Apply 4 g topically 4 (four) times a day as needed.   • fluticasone (FLONASE) 50 MCG/ACT nasal spray 1 spray into each nostril 2 (two) times a day as needed.   • fluticasone-salmeterol (ADVAIR) 500-50 MCG/DOSE DISKUS Inhale 1 puff 2 (two) times a day as needed. Advair Diskus 500-50 MCG/DOSE Inhalation Aerosol Powder Breath Activated; Patient Sig: Advair Diskus 500-50 MCG/DOSE Inhalation Aerosol Powder Breath Activated INHALE PUFFS  PRN; 5; 27-Aug-2015; Active   • folic acid (FOLVITE) 800 MCG tablet Take  by mouth daily.   • glucose blood test strip Precision Xtra Blood Glucose In Vitro Strip; Patient Sig: Precision Xtra Blood Glucose In Vitro Strip TEST 2  TIMES DAILY   • hydrocortisone 2.5 % ointment Apply  topically 2 (two) times a day.   • " icosapent ethyl (VASCEPA) 1 G capsule capsule Capsules twice daily by mouth   • Insulin Glargine (LANTUS SOLOSTAR) 100 UNIT/ML injection pen Inject 26 Units under the skin Daily.   • ketoconazole (NIZORAL) 2 % shampoo    • latanoprost (XALATAN) 0.005 % ophthalmic solution    • linagliptin (TRADJENTA) 5 MG tablet tablet Take 1 tablet by mouth Daily.   • loratadine (CLARITIN) 10 MG tablet Take 10 mg by mouth daily.   • METOPROLOL TARTRATE PO Take 25 mg by mouth Daily.   • MONOLET LANCETS misc Use to test BG 2 times daily   • Multiple Vitamins-Minerals (MULTIMINERAL PLUS) tablet Take  by mouth daily.   • nitroglycerin (NITROSTAT) 0.4 MG SL tablet Place 1 tablet under the tongue every 5 (five) minutes as needed for chest pain. Indications: Acute Angina Pectoris   • pantoprazole (PROTONIX) 40 MG EC tablet Take 1 tablet by mouth Daily.   • pioglitazone (ACTOS) 45 MG tablet Take 1 tablet by mouth Daily.   • pregabalin (LYRICA) 50 MG capsule Take 1 capsule by mouth 3 (Three) Times a Day.   • PREVIDENT 5000 PLUS 1.1 % cream    • Soap & Cleansers (CETAKLENZ) liquid    • tamsulosin (FLOMAX) 0.4 MG capsule 24 hr capsule Take 1 capsule by mouth every night.   • vitamin D (ERGOCALCIFEROL) 77342 UNITS capsule capsule TAKE 2 CAPSULES ONCE WEEKLY   • Zinc 30 MG tablet Take  by mouth.   • zolpidem (AMBIEN) 5 MG tablet Take 1 tablet by mouth At Night As Needed for Sleep.   • [DISCONTINUED] VASCEPA 1 G capsule capsule TAKE 4 CAPSULES IN THE EVENING     No current facility-administered medications on file prior to visit.      Family History   Problem Relation Age of Onset   • Breast cancer Daughter      Social History   Substance Use Topics   • Smoking status: Never Smoker   • Smokeless tobacco: None   • Alcohol use No     Allergies   Allergen Reactions   • Other      Lotions- anything with palm oil or lanolin per pt (verified 6/15/16 1910)   • Prazosin          History of Present Illness  Encounter Diagnoses   Name Primary?   • Type 2  diabetes mellitus with diabetic neuropathy, with long-term current use of insulin Yes   • Hypogonadism in male    • Diabetic peripheral neuropathy    • Vitamin D deficiency    • Essential hypertension    • Renal insufficiency    • Hyperuricemia    • Dyslipidemia      This is a 72-year-old male patient here today for routine follow-up visit.  He is being seen for the above-mentioned problems.  He had recent labs which were reviewed and he was provided a copy.  His hemoglobin A1c has gone up slightly.  He does see a nephrologist Dr. Benton madden for his renal insufficiency.  He states that Dr. madden has told him that he needs to keep his blood sugars go less than 100 up to 200.  He does receive testosterone injections from his urologist Dr. Jermaine Newman.  He complains of some dizziness and also some swollen lymph nodes behind his ears bilaterally.  I was not able to palpate any lymph nodes at today's visit.  He will need to follow-up with his primary care regarding his dizziness.  He does have complaints of fatigue but states he has been doing a lot of traveling.  He is not sure if he snores at this time does not want to have a referral to sleep medicine.  He has neuropathy but states if he takes his Lyrica more than once daily and Lanoxin out.  He does have an appointment to see his foot doctor in one month.  The following portions of the patient's history were reviewed and updated as appropriate: allergies, current medications, past family history, past medical history, past social history, past surgical history and problem list.    Review of Systems   Constitutional: Positive for fatigue.   HENT: Negative for trouble swallowing.    Eyes: Negative for visual disturbance.   Respiratory: Negative for shortness of breath.    Cardiovascular: Negative for leg swelling.   Endocrine: Negative for polyphagia.   Skin: Negative for wound.   Neurological: Negative for numbness.       Objective   Physical Exam   Constitutional:  He is oriented to person, place, and time. He appears well-developed and well-nourished. No distress.   HENT:   Head: Normocephalic and atraumatic.   Right Ear: External ear normal.   Left Ear: External ear normal.   Nose: Nose normal.   Mouth/Throat: Oropharynx is clear and moist. No oropharyngeal exudate.   Eyes: Conjunctivae and EOM are normal. Pupils are equal, round, and reactive to light. Right eye exhibits no discharge. Left eye exhibits no discharge. No scleral icterus.   Neck: Normal range of motion. Neck supple. No JVD present. No tracheal deviation present. No thyromegaly present.   States he has been having some lymph adenopathy for the past 3 weeks however none was palpable today   Cardiovascular: Normal rate, regular rhythm, normal heart sounds and intact distal pulses.  Exam reveals no gallop and no friction rub.    No murmur heard.  Pulmonary/Chest: Effort normal and breath sounds normal. No stridor. No respiratory distress. He has no wheezes. He has no rales. He exhibits no tenderness.   Abdominal: Soft. Bowel sounds are normal. He exhibits no distension and no mass. There is no tenderness. There is no rebound and no guarding. No hernia.   Musculoskeletal: Normal range of motion. He exhibits no edema, tenderness or deformity.   Lymphadenopathy:     He has no cervical adenopathy.   Neurological: He is alert and oriented to person, place, and time. He has normal reflexes. He displays normal reflexes. No cranial nerve deficit. He exhibits normal muscle tone. Coordination normal.   Skin: Skin is warm and dry. No rash noted. He is not diaphoretic. No erythema. No pallor.   Psychiatric: He has a normal mood and affect. His behavior is normal. Judgment and thought content normal.   Nursing note and vitals reviewed.      Results for orders placed or performed in visit on 06/12/17   Vitamin D 25 Hydroxy   Result Value Ref Range    25 Hydroxy, Vitamin D 80.0 30.0 - 100.0 ng/mL   Hemoglobin A1c   Result Value  Ref Range    Hemoglobin A1C 7.1 (H) 4.8 - 5.6 %   C-Peptide   Result Value Ref Range    C-Peptide 2.8 1.1 - 4.4 ng/mL   PSA   Result Value Ref Range    PSA 2.4 0.0 - 4.0 ng/mL   Hemoglobin & Hematocrit, Blood   Result Value Ref Range    Hemoglobin 15.9 12.6 - 17.7 g/dL    Hematocrit 47.6 37.5 - 51.0 %   TestT+TestF+SHBG   Result Value Ref Range    Testosterone, Total >1500 (H) 348 - 1197 ng/dL    Comment Comment     Testosterone, Free 39.6 (H) 6.6 - 18.1 pg/mL    Sex Hormone Binding Globulin 30.3 19.3 - 76.4 nmol/L   Uric Acid   Result Value Ref Range    Uric Acid 5.5 3.7 - 8.6 mg/dL   Comprehensive Metabolic Panel   Result Value Ref Range    Glucose 163 (H) 65 - 99 mg/dL    BUN 23 8 - 27 mg/dL    Creatinine 1.87 (H) 0.76 - 1.27 mg/dL    eGFR Non African Am 35 (L) >59 mL/min/1.73    eGFR  Am 41 (L) >59 mL/min/1.73    BUN/Creatinine Ratio 12 10 - 24    Sodium 143 134 - 144 mmol/L    Potassium 5.0 3.5 - 5.2 mmol/L    Chloride 101 96 - 106 mmol/L    Total CO2 25 18 - 29 mmol/L    Calcium 9.6 8.6 - 10.2 mg/dL    Total Protein 7.3 6.0 - 8.5 g/dL    Albumin 4.2 3.5 - 4.8 g/dL    Globulin 3.1 1.5 - 4.5 g/dL    A/G Ratio 1.4 1.2 - 2.2    Total Bilirubin 0.5 0.0 - 1.2 mg/dL    Alkaline Phosphatase 56 39 - 117 IU/L    AST (SGOT) 16 0 - 40 IU/L    ALT (SGPT) 15 0 - 44 IU/L   Lipid Panel   Result Value Ref Range    Total Cholesterol 150 100 - 199 mg/dL    Triglycerides 185 (H) 0 - 149 mg/dL    HDL Cholesterol 39 (L) >39 mg/dL    VLDL Cholesterol 37 5 - 40 mg/dL    LDL Cholesterol  74 0 - 99 mg/dL   MicroAlbumin, Urine, Random   Result Value Ref Range    Microalbumin, Urine 56.2 Not Estab. ug/mL       Assessment/Plan   Encounter Diagnoses   Name Primary?   • Type 2 diabetes mellitus with diabetic neuropathy, with long-term current use of insulin Yes   • Hypogonadism in male    • Diabetic peripheral neuropathy    • Vitamin D deficiency    • Essential hypertension    • Renal insufficiency    • Dyslipidemia      In summary,  patient was seen and examined.  I decreased his vitamin D level to 50,000 units once weekly.  His blood pressure is in good range.  He does have renal insufficiency and I have asked that his lab results be faxed to Dr. Choe.  I will also have his labs faxed to his urologist.  His testosterone level was greater than 1500 and his testosterone is not managed out of this office.  A Eddy was reviewed which does not indicate any testosterone use.  His hemoglobin A1c has gone up slightly to 7.1.  He has had no hypoglycemic events.  He will return in 6 months to see Dr. Kincaid with labs prior.  I've encouraged him to contact the office should he have any questions or concerns prior to then.  His medications were refilled per his request that he was provided a written prescription for his actos.

## 2017-06-28 ENCOUNTER — OFFICE VISIT (OUTPATIENT)
Dept: INTERNAL MEDICINE | Facility: CLINIC | Age: 72
End: 2017-06-28

## 2017-06-28 VITALS
DIASTOLIC BLOOD PRESSURE: 70 MMHG | HEIGHT: 73 IN | BODY MASS INDEX: 31.14 KG/M2 | SYSTOLIC BLOOD PRESSURE: 122 MMHG | WEIGHT: 235 LBS | HEART RATE: 74 BPM

## 2017-06-28 DIAGNOSIS — K11.20 SIALADENITIS: Primary | ICD-10-CM

## 2017-06-28 PROCEDURE — 99213 OFFICE O/P EST LOW 20 MIN: CPT | Performed by: INTERNAL MEDICINE

## 2017-07-02 ENCOUNTER — HOSPITAL ENCOUNTER (OUTPATIENT)
Dept: CT IMAGING | Facility: HOSPITAL | Age: 72
Discharge: HOME | End: 2017-07-02
Attending: INTERNAL MEDICINE | Admitting: INTERNAL MEDICINE

## 2017-07-02 DIAGNOSIS — K11.20 SIALADENITIS: ICD-10-CM

## 2017-07-02 PROCEDURE — 70490 CT SOFT TISSUE NECK W/O DYE: CPT

## 2017-07-05 ENCOUNTER — OFFICE VISIT (OUTPATIENT)
Dept: CARDIOLOGY | Facility: CLINIC | Age: 72
End: 2017-07-05

## 2017-07-05 ENCOUNTER — TELEPHONE (OUTPATIENT)
Dept: INTERNAL MEDICINE | Facility: CLINIC | Age: 72
End: 2017-07-05

## 2017-07-05 VITALS
HEIGHT: 73 IN | HEART RATE: 67 BPM | DIASTOLIC BLOOD PRESSURE: 78 MMHG | BODY MASS INDEX: 31.28 KG/M2 | WEIGHT: 236 LBS | SYSTOLIC BLOOD PRESSURE: 136 MMHG

## 2017-07-05 DIAGNOSIS — I10 ESSENTIAL HYPERTENSION: ICD-10-CM

## 2017-07-05 DIAGNOSIS — I25.10 CORONARY ARTERY DISEASE INVOLVING NATIVE CORONARY ARTERY OF NATIVE HEART WITHOUT ANGINA PECTORIS: Primary | ICD-10-CM

## 2017-07-05 DIAGNOSIS — Z95.5 S/P DRUG ELUTING CORONARY STENT PLACEMENT: ICD-10-CM

## 2017-07-05 PROCEDURE — 93000 ELECTROCARDIOGRAM COMPLETE: CPT | Performed by: INTERNAL MEDICINE

## 2017-07-05 PROCEDURE — 99213 OFFICE O/P EST LOW 20 MIN: CPT | Performed by: INTERNAL MEDICINE

## 2017-07-05 RX ORDER — ERYTHROMYCIN 5 MG/G
OINTMENT OPHTHALMIC
COMMUNITY
Start: 2017-07-02 | End: 2017-10-23

## 2017-07-05 RX ORDER — ASPIRIN 81 MG/1
81 TABLET ORAL DAILY
COMMUNITY
End: 2019-04-24 | Stop reason: SDUPTHER

## 2017-07-05 RX ORDER — NITROGLYCERIN 0.4 MG/1
TABLET SUBLINGUAL
Qty: 100 TABLET | Refills: 11 | Status: SHIPPED | OUTPATIENT
Start: 2017-07-05 | End: 2017-10-23

## 2017-07-05 RX ORDER — AMOXICILLIN AND CLAVULANATE POTASSIUM 875; 125 MG/1; MG/1
1 TABLET, FILM COATED ORAL 2 TIMES DAILY
Qty: 20 TABLET | Refills: 0 | Status: SHIPPED | OUTPATIENT
Start: 2017-07-05 | End: 2017-10-23

## 2017-07-05 NOTE — TELEPHONE ENCOUNTER
----- Message from Darci Washburn Jr., MD sent at 7/5/2017  1:12 PM EDT -----  CT scan of the neck shows mildly prominent salivary and parotid glands.  This is likely an infectious etiology.  It is not penicillin allergic I would suggest Augmentin 875 mg twice a day for 10 days.  If swelling persists let me know.

## 2017-07-05 NOTE — TELEPHONE ENCOUNTER
----- Message from Shefali Murphy sent at 7/5/2017  2:36 PM EDT -----  Pt came in today for his CT results. I printed out report and gave to Pt. Pt stated his R jaw/behind his R ear is more swollen than at his OV 6/28/17. Pt stated his is in severe pn and his wife has to drive due to his dizziness. Pt stated Dr. Washburn stated he would give him an antibiotic if the CT showed any reason to do so. Please advise      [phone] ()    [facility] [address] AT [facility] [address] - [phone]  - [phone] (Phone)  [phone] (Fax)

## 2017-07-05 NOTE — PROGRESS NOTES
Subjective:       Earl Marc is a 72 y.o. male who here for follow up    CC  Follow-up after the angioplasty and a stent  HPI  72-year-old male with known history of coronary artery disease along with a history of the dyslipidemia and diabetes underwent angioplasty and a stent last year here for the follow-up denies any chest pains or tightness in chest no heaviness with a pressure sensation no syncopal near-syncopal episode     Problem List Items Addressed This Visit        Cardiovascular and Mediastinum    Hypertension    S/P drug eluting coronary stent placement    Coronary artery disease involving native coronary artery of native heart - Primary    Relevant Medications    nitroglycerin (NITROSTAT) 0.4 MG SL tablet    Other Relevant Orders    ECG 12 Lead        .    The following portions of the patient's history were reviewed and updated as appropriate: allergies, current medications, past family history, past medical history, past social history, past surgical history and problem list.    Past Medical History:   Diagnosis Date   • Abnormal cardiovascular stress test    • Asthma    • Chronic kidney disease    • Coronary artery disease    • Diabetes mellitus    • Dyslipidemia    • Erectile dysfunction    • Fatty liver disease, nonalcoholic    • Glaucoma     both eyes   • Hyperlipidemia    • Hypertension    • Hypogonadism male    • Type 2 diabetes mellitus     reports that he has never smoked. He does not have any smokeless tobacco history on file. He reports that he does not drink alcohol or use illicit drugs.  Family History   Problem Relation Age of Onset   • Breast cancer Daughter    • Heart attack Mother    • Hypertension Mother    • Heart attack Brother        Review of Systems  Constitutional: No wt loss, fever, fatigue  Gastrointestinal: No nausea, abdominal pain  Behavioral/Psych: No insomnia or anxiety   Cardiovascular No chest pains or tightness in chest  Objective:       Physical Exam              "Physical Exam  /78  Pulse 67  Ht 73\" (185.4 cm)  Wt 236 lb (107 kg)  BMI 31.14 kg/m2    General appearance: NAD, conversant   Eyes: anicteric sclerae, moist conjunctivae; no lid-lag; PERRLA   HENT: Atraumatic; oropharynx clear with moist mucous membranes and no mucosal ulcerations;  normal hard and soft palate   Neck: Trachea midline; FROM, supple, no thyromegaly or lymphadenopathy   Lungs: CTA, with normal respiratory effort and no intercostal retractions   CV: S1-S2 regular, no murmurs, no rub, no gallop   Abdomen: Soft, non-tender; no masses or HSM   Extremities: No peripheral edema or extremity lymphadenopathy  Skin: Normal temperature, turgor and texture; no rash, ulcers or subcutaneous nodules   Psych: Appropriate affect, alert and oriented to person, place and time           Cardiographics  @  ECG 12 Lead  Date/Time: 7/5/2017 2:23 PM  Performed by: JUAN CORDERO  Authorized by: JUAN CORDERO   Comparison: compared with previous ECG   Similar to previous ECG  Rhythm: sinus rhythm  Q waves: II and III  Clinical impression: abnormal ECG            Echocardiogram:        Current Outpatient Prescriptions:   •  Alcohol Swabs ([facility]S PREP) 70 % pads, Place 1 application on the skin Daily., Disp: 400 each, Rfl: 1  •  allopurinol (ZYLOPRIM) 100 MG tablet, Take one tablet daily, Disp: 90 tablet, Rfl: 3  •  AMLACTIN 12 % lotion, , Disp: , Rfl:   •  ascorbic acid (VITAMIN C) 1000 MG tablet, Take 1,000 mg by mouth Daily., Disp: , Rfl:   •  aspirin 81 MG EC tablet, Take 81 mg by mouth Daily., Disp: , Rfl:   •  B-D UF III MINI PEN NEEDLES 31G X 5 MM misc, Use once daily with Lantus Pen for injection of insulin, Disp: 100 each, Rfl: 5  •  cyanocobalamin 1000 MCG/ML injection, Cyanocobalamin 1000 MCG/ML Injection Solution; Patient Sig: Cyanocobalamin 1000 MCG/ML Injection Solution INJECT 1ML INTRAMUSCULARLY EVERY OTHER WEEK; 0; 17-Feb-2015; Active, Disp: , Rfl:   •  diclofenac (VOLTAREN) 1 % gel gel, " Apply 4 g topically 4 (four) times a day as needed., Disp: , Rfl:   •  fluticasone (FLONASE) 50 MCG/ACT nasal spray, 1 spray into each nostril 2 (two) times a day as needed., Disp: , Rfl:   •  fluticasone-salmeterol (ADVAIR) 500-50 MCG/DOSE DISKUS, Inhale 1 puff 2 (two) times a day as needed. Advair Diskus 500-50 MCG/DOSE Inhalation Aerosol Powder Breath Activated; Patient Sig: Advair Diskus 500-50 MCG/DOSE Inhalation Aerosol Powder Breath Activated INHALE PUFFS  PRN; 5; 27-Aug-2015; Active, Disp: , Rfl:   •  folic acid (FOLVITE) 800 MCG tablet, Take  by mouth daily., Disp: , Rfl:   •  glucose blood test strip, Precision Xtra Blood Glucose In Vitro Strip; Patient Sig: Precision Xtra Blood Glucose In Vitro Strip TEST 2  TIMES DAILY, Disp: 200 each, Rfl: 0  •  hydrocortisone 2.5 % ointment, Apply  topically 2 (two) times a day., Disp: , Rfl:   •  icosapent ethyl (VASCEPA) 1 G capsule capsule, Capsules twice daily by mouth, Disp: 360 capsule, Rfl: 2  •  Insulin Glargine (LANTUS SOLOSTAR) 100 UNIT/ML injection pen, Inject 26 Units under the skin Daily., Disp: 8 pen, Rfl: 1  •  ketoconazole (NIZORAL) 2 % shampoo, , Disp: , Rfl:   •  latanoprost (XALATAN) 0.005 % ophthalmic solution, , Disp: , Rfl:   •  linagliptin (TRADJENTA) 5 MG tablet tablet, Take 1 tablet by mouth Daily., Disp: 90 tablet, Rfl: 2  •  loratadine (CLARITIN) 10 MG tablet, Take 10 mg by mouth daily., Disp: , Rfl:   •  METOPROLOL TARTRATE PO, Take 25 mg by mouth Daily., Disp: , Rfl:   •  MONOLET LANCETS misc, Use to test BG 2 times daily, Disp: 200 each, Rfl: 1  •  Multiple Vitamins-Minerals (MULTIMINERAL PLUS) tablet, Take  by mouth daily., Disp: , Rfl:   •  nitroglycerin (NITROSTAT) 0.4 MG SL tablet, Place 1 tablet under the tongue every 5 (five) minutes as needed for chest pain. Indications: Acute Angina Pectoris, Disp: 30 tablet, Rfl: 3  •  pantoprazole (PROTONIX) 40 MG EC tablet, Take 1 tablet by mouth Daily., Disp: 90 tablet, Rfl: 3  •  pioglitazone  (ACTOS) 45 MG tablet, Take 1 tablet by mouth Daily., Disp: 90 tablet, Rfl: 2  •  pregabalin (LYRICA) 50 MG capsule, Take 1 capsule by mouth 3 (Three) Times a Day., Disp: 90 capsule, Rfl: 0  •  PREVIDENT 5000 PLUS 1.1 % cream, , Disp: , Rfl:   •  Soap & Cleansers (CETAKLENZ) liquid, , Disp: , Rfl:   •  tamsulosin (FLOMAX) 0.4 MG capsule 24 hr capsule, Take 1 capsule by mouth every night., Disp: , Rfl:   •  vitamin D (ERGOCALCIFEROL) 64662 UNITS capsule capsule, Take 1 capsule by mouth Every 7 (Seven) Days., Disp: 12 capsule, Rfl: 1  •  Zinc 30 MG tablet, Take  by mouth., Disp: , Rfl:   •  zolpidem (AMBIEN) 5 MG tablet, Take 1 tablet by mouth At Night As Needed for Sleep., Disp: 30 tablet, Rfl: 0  •  DEPO-TESTOSTERONE 200 MG/ML injection, Inject 1 mL into the shoulder, thigh, or buttocks 1 (One) Time Per Week.  , Disp: , Rfl:   •  erythromycin (ROMYCIN) 5 MG/GM ophthalmic ointment, , Disp: , Rfl:    Assessment:        Patient Active Problem List   Diagnosis   • Bell's palsy   • Persistent insomnia   • Osteoarthritis of cervical spine   • Diabetic peripheral neuropathy   • Dyslipidemia   • Erectile dysfunction of nonorganic origin   • Steatosis of liver   • Fibromyalgia   • Gastroesophageal reflux disease without esophagitis   • Hypertension   • Hypogonadism in male   • Renal insufficiency   • Vitamin D deficiency   • Benign non-nodular prostatic hyperplasia with lower urinary tract symptoms   • S/P drug eluting coronary stent placement   • Coronary artery disease involving native coronary artery of native heart   • Malignant neoplasm of skin   • Diabetes mellitus   • Sialadenitis     Interpretation Summary   · Findings consistent with a normal ECG stress test.  · Findings consistent with a normal ECG stress test.  · Findings consistent with a normal ECG stress test.  · Left ventricular ejection fraction is normal (Calculated EF = 60%).  · Myocardial perfusion imaging indicates a normal myocardial perfusion study with  no evidence of ischemia.  · Impressions are consistent with a low risk study.               Plan:            ICD-10-CM ICD-9-CM   1. Coronary artery disease involving native coronary artery of native heart without angina pectoris I25.10 414.01   2. S/P drug eluting coronary stent placement Z95.5 V45.82   3. Essential hypertension I10 401.9     1. Coronary artery disease involving native coronary artery of native heart without angina pectoris  Earl Marc with coronary artery disease has no angina pectoris    Risk reduction for the coronary artery disease, controlling the blood pressure, blood sugar management, cholesterol management, exercise, stress management, and proper compliance with medications and follow-up has been discussed    - ECG 12 Lead    2. S/P drug eluting coronary stent placement  No angina pectoris    3. Essential hypertension  Blood pressure well-controlled     Post pci stable    See us 1 yr    COUNSELING:    Earl Hernández was given to patient for the following topics: diagnostic results, risk factor reductions, impressions, risks and benefits of treatment options and importance of treatment compliance .       SMOKING COUNSELING:    Counseling given: Not Answered      EMR Dragon/Transcription disclaimer:   Much of this encounter note is an electronic transcription/translation of spoken language to printed text. The electronic translation of spoken language may permit erroneous, or at times, nonsensical words or phrases to be inadvertently transcribed; Although I have reviewed the note for such errors, some may still exist.

## 2017-07-13 ENCOUNTER — PATIENT MESSAGE (OUTPATIENT)
Dept: CARDIOLOGY | Facility: CLINIC | Age: 72
End: 2017-07-13

## 2017-08-07 ENCOUNTER — TELEPHONE (OUTPATIENT)
Dept: INTERNAL MEDICINE | Facility: CLINIC | Age: 72
End: 2017-08-07

## 2017-08-07 NOTE — TELEPHONE ENCOUNTER
----- Message from Jesica Morales sent at 8/7/2017 11:07 AM EDT -----  Pt is requesting a refill on METOPROLOL TARTRATE PO. He takes 25mg once daily. He would like it sent to the [facility] pharmacy in [facility] that's in his chart. He will be there on Wednesday to  other meds. His call back is [phone]. Thank you!

## 2017-10-23 ENCOUNTER — OFFICE VISIT (OUTPATIENT)
Dept: INTERNAL MEDICINE | Facility: CLINIC | Age: 72
End: 2017-10-23

## 2017-10-23 VITALS
BODY MASS INDEX: 31.14 KG/M2 | HEIGHT: 73 IN | SYSTOLIC BLOOD PRESSURE: 126 MMHG | DIASTOLIC BLOOD PRESSURE: 70 MMHG | WEIGHT: 235 LBS | HEART RATE: 74 BPM

## 2017-10-23 DIAGNOSIS — N40.1 BENIGN NON-NODULAR PROSTATIC HYPERPLASIA WITH LOWER URINARY TRACT SYMPTOMS: Primary | ICD-10-CM

## 2017-10-23 DIAGNOSIS — Z79.4 TYPE 2 DIABETES MELLITUS WITH DIABETIC NEUROPATHY, WITH LONG-TERM CURRENT USE OF INSULIN (HCC): ICD-10-CM

## 2017-10-23 DIAGNOSIS — E11.40 TYPE 2 DIABETES MELLITUS WITH DIABETIC NEUROPATHY, WITH LONG-TERM CURRENT USE OF INSULIN (HCC): ICD-10-CM

## 2017-10-23 DIAGNOSIS — I10 ESSENTIAL HYPERTENSION: ICD-10-CM

## 2017-10-23 DIAGNOSIS — E78.5 DYSLIPIDEMIA: ICD-10-CM

## 2017-10-23 DIAGNOSIS — N28.9 RENAL INSUFFICIENCY: ICD-10-CM

## 2017-10-23 DIAGNOSIS — I25.10 CORONARY ARTERY DISEASE INVOLVING NATIVE CORONARY ARTERY OF NATIVE HEART WITHOUT ANGINA PECTORIS: ICD-10-CM

## 2017-10-23 PROBLEM — K11.20 SIALADENITIS: Status: RESOLVED | Noted: 2017-06-28 | Resolved: 2017-10-23

## 2017-10-23 PROCEDURE — 99214 OFFICE O/P EST MOD 30 MIN: CPT | Performed by: INTERNAL MEDICINE

## 2017-10-23 RX ORDER — FEXOFENADINE HCL 180 MG/1
180 TABLET ORAL DAILY PRN
COMMUNITY
Start: 2017-08-24 | End: 2021-01-11

## 2017-10-23 NOTE — PROGRESS NOTES
Subjective   Earl Marc is a 72 y.o. male.     History of Present Illness he is here today for his yearly visit which includes follow-up of hypertension, hypercholesterolemia, diabetes mellitus, BPH, and chronic renal insufficiency.  Overall he has done well since his last visit.  He recently had eye surgery on both eyes with stent placement for glaucoma.  His vision is blurred but improving.  He does relate reflux perhaps twice per week usually occurring when he is on the treadmill.  It is relieved with TUMS.  His nephrologist switched him from a PPI to Pepcid for GERD.  He has not had any dysphagia or abdominal pain.  He denies any exercise related angina.  He denies any PND, VANESSA, swelling in the ankles, dizziness, or focal neurologic symptoms.  His review systems is otherwise negative.        Review of Systems   Constitutional: Negative for activity change, appetite change and fatigue.   HENT: Negative for trouble swallowing.    Respiratory: Negative for cough, chest tightness, shortness of breath and wheezing.    Cardiovascular: Positive for chest pain. Negative for palpitations and leg swelling.   Gastrointestinal: Negative for abdominal pain, anal bleeding, blood in stool, constipation, diarrhea, nausea and vomiting.   Genitourinary: Negative for difficulty urinating, dysuria, flank pain, frequency and hematuria.   Musculoskeletal: Negative for arthralgias, back pain and gait problem.   Neurological: Positive for numbness. Negative for dizziness, syncope, facial asymmetry, speech difficulty, weakness and headaches.   Psychiatric/Behavioral: Negative for confusion and dysphoric mood. The patient is not nervous/anxious.        Objective   Physical Exam   Constitutional: He is oriented to person, place, and time. Vital signs are normal. He appears well-developed and well-nourished. He is active. He does not appear ill.   Eyes: Conjunctivae are normal.   Fundoscopic exam:       The right eye shows no AV  nicking, no exudate and no hemorrhage.        The left eye shows no AV nicking, no exudate and no hemorrhage.   Neck: Carotid bruit is not present. No thyroid mass and no thyromegaly present.   Cardiovascular: Normal rate, regular rhythm, S1 normal and S2 normal.  Exam reveals no S3 and no S4.    No murmur heard.  Pulses:       Posterior tibial pulses are 2+ on the right side, and 2+ on the left side.   Pulmonary/Chest: No tachypnea. No respiratory distress. He has no decreased breath sounds. He has no wheezes. He has no rhonchi. He has no rales.   Abdominal: Soft. Normal appearance and bowel sounds are normal. He exhibits no abdominal bruit and no mass. There is no hepatosplenomegaly. There is no tenderness.       Vascular Status -  His exam exhibits no right foot edema. His exam exhibits no left foot edema.  Neurological: He is alert and oriented to person, place, and time. Gait normal.   Reflex Scores:       Bicep reflexes are 2+ on the right side.  Psychiatric: He has a normal mood and affect. His speech is normal and behavior is normal. Judgment and thought content normal. Cognition and memory are normal.       Assessment/Plan June lab showed hemoglobin A1c 7.1.  BUN 22 creatinine 1.87.  PSA 2.4.  Total cholesterol 150 triglycerides 185 HDL cholesterol 39 LDL cholesterol 74    Assessment #1 diabetes mellitus-fair control #2 coronary artery disease-quiescent #3 chronic renal failure-moderate but fairly stable #4 hypercholesterolemia-at goal.  He has already received flu vaccination for this season.    Plan #1 no change medication #2 fresh prescription for nitroglycerin sublingually has been written.  Routine follow-up with me in 6 months.

## 2017-10-25 ENCOUNTER — TELEPHONE (OUTPATIENT)
Dept: INTERNAL MEDICINE | Facility: CLINIC | Age: 72
End: 2017-10-25

## 2017-10-25 NOTE — TELEPHONE ENCOUNTER
----- Message from Kathy Pedraza sent at 10/25/2017 10:57 AM EDT -----  Contact: Tanvi [place]  Calling for clearification for  nitroglycerin (NITROSTAT) 0.4 MG SL tablet    [facility] [place] [facility] -  [place], [place] - [address] - [phone]  - [phone]    Pt is completely out.

## 2017-11-06 RX ORDER — ICOSAPENT ETHYL 1000 MG/1
CAPSULE ORAL
Qty: 360 CAPSULE | Refills: 1 | Status: SHIPPED | OUTPATIENT
Start: 2017-11-06 | End: 2018-02-07 | Stop reason: SDUPTHER

## 2017-11-14 ENCOUNTER — OFFICE VISIT (OUTPATIENT)
Dept: INTERNAL MEDICINE | Facility: CLINIC | Age: 72
End: 2017-11-14

## 2017-11-14 VITALS
HEIGHT: 73 IN | WEIGHT: 236 LBS | DIASTOLIC BLOOD PRESSURE: 76 MMHG | TEMPERATURE: 98.2 F | HEART RATE: 78 BPM | BODY MASS INDEX: 31.28 KG/M2 | SYSTOLIC BLOOD PRESSURE: 96 MMHG

## 2017-11-14 DIAGNOSIS — J20.8 ACUTE BRONCHITIS DUE TO OTHER SPECIFIED ORGANISMS: Primary | ICD-10-CM

## 2017-11-14 PROCEDURE — 99212 OFFICE O/P EST SF 10 MIN: CPT | Performed by: INTERNAL MEDICINE

## 2017-11-14 RX ORDER — ZOLPIDEM TARTRATE 5 MG/1
5 TABLET ORAL NIGHTLY PRN
Qty: 90 TABLET | Refills: 1 | Status: SHIPPED | OUTPATIENT
Start: 2017-11-14 | End: 2018-07-09 | Stop reason: SDUPTHER

## 2017-11-14 RX ORDER — AMOXICILLIN AND CLAVULANATE POTASSIUM 875; 125 MG/1; MG/1
1 TABLET, FILM COATED ORAL 2 TIMES DAILY
Qty: 20 TABLET | Refills: 0 | Status: SHIPPED | OUTPATIENT
Start: 2017-11-14 | End: 2017-12-20

## 2017-11-14 NOTE — PROGRESS NOTES
Subjective   Earl Marc is a 72 y.o. male.     History of Present Illness he is here today with a 4 day history of cough with green sputum production as well as sore throat, sinus pressure, and clear rhinorrhea.  His chest has been burning and he has felt somewhat short of breath.  He denies any wheezing, fever, sweats, chills.        Review of Systems   Constitutional: Negative for activity change, appetite change, chills, diaphoresis, fatigue and fever.   HENT: Positive for rhinorrhea, sinus pressure and sore throat. Negative for ear pain, postnasal drip, sneezing, trouble swallowing and voice change.    Respiratory: Positive for cough and shortness of breath. Negative for wheezing.    Neurological: Negative for dizziness and weakness.   Psychiatric/Behavioral: Negative for confusion.       Objective   Physical Exam   Constitutional: He is oriented to person, place, and time. He appears well-developed and well-nourished. He is active. He does not appear ill.   HENT:   Right Ear: External ear and ear canal normal.   Left Ear: External ear and ear canal normal.   Ears:    Mouth/Throat: No oropharyngeal exudate, posterior oropharyngeal edema or posterior oropharyngeal erythema.   Eyes: Conjunctivae are normal.   Cardiovascular: Normal rate, regular rhythm and S1 normal.  Exam reveals no S3 and no S4.    No murmur heard.  Pulmonary/Chest: No tachypnea. No respiratory distress. He has no decreased breath sounds. He has no wheezes. He has no rhonchi. He has no rales.   Abdominal: Soft. Normal appearance and bowel sounds are normal. He exhibits no abdominal bruit and no mass. There is no hepatosplenomegaly. There is no tenderness.       Vascular Status -  His exam exhibits no right foot edema. His exam exhibits no left foot edema.  Lymphadenopathy:     He has no cervical adenopathy.   Neurological: He is alert and oriented to person, place, and time. Gait normal.   Psychiatric: He has a normal mood and affect. His  speech is normal and behavior is normal. Judgment and thought content normal. Cognition and memory are normal.       Assessment/Plan assessment #1 acute bronchitis    Plan #1 Augmentin 875 mg by mouth twice a day for 10 days #2 Tussienex 1 teaspoon by mouth twice a day when necessary cough.  He will call if symptoms do not resolve.

## 2017-12-18 ENCOUNTER — TELEPHONE (OUTPATIENT)
Dept: INTERNAL MEDICINE | Facility: CLINIC | Age: 72
End: 2017-12-18

## 2017-12-18 RX ORDER — AZITHROMYCIN 250 MG/1
TABLET, FILM COATED ORAL
Qty: 6 TABLET | Refills: 0 | Status: SHIPPED | OUTPATIENT
Start: 2017-12-18 | End: 2018-02-26

## 2017-12-18 NOTE — TELEPHONE ENCOUNTER
----- Message from Carmela Martinez sent at 12/18/2017  8:10 AM EST -----  Contact: pt - Dr Washburn's pt - RE: Appt  Pt calling and would like to be seen for cough, congestion, poss feverish - flu-like symptoms. Pt scheduled appt for Wed @ 3:40, not sure if pt can wait til then. Could you please call pt to discuss if pt can be worked in before then? Please advise. Thanks      Pt [phone]

## 2017-12-18 NOTE — TELEPHONE ENCOUNTER
I called Mr. Marc to let him know a zpak was sent in.      He will keep Wednesday appointment and if he is feeling better, he will call and cancel it early Wednesday morning.

## 2017-12-20 ENCOUNTER — OFFICE VISIT (OUTPATIENT)
Dept: INTERNAL MEDICINE | Facility: CLINIC | Age: 72
End: 2017-12-20

## 2017-12-20 VITALS
BODY MASS INDEX: 30.88 KG/M2 | HEIGHT: 73 IN | HEART RATE: 90 BPM | WEIGHT: 233 LBS | SYSTOLIC BLOOD PRESSURE: 96 MMHG | DIASTOLIC BLOOD PRESSURE: 50 MMHG

## 2017-12-20 DIAGNOSIS — J20.8 ACUTE BRONCHITIS DUE TO OTHER SPECIFIED ORGANISMS: Primary | ICD-10-CM

## 2017-12-20 PROBLEM — F51.04 CHRONIC INSOMNIA: Status: ACTIVE | Noted: 2017-12-20

## 2017-12-20 PROCEDURE — 99212 OFFICE O/P EST SF 10 MIN: CPT | Performed by: INTERNAL MEDICINE

## 2017-12-20 RX ORDER — ZOLPIDEM TARTRATE 10 MG/1
10 TABLET ORAL NIGHTLY PRN
Status: CANCELLED | OUTPATIENT
Start: 2017-12-20

## 2017-12-20 NOTE — PROGRESS NOTES
Subjective   Earl Marc is a 72 y.o. male.     History of Present Illness he is here today with a 3-4 day history of cough with yellow sputum production as well as associated wheezing and mild dyspnea on exertion.  He also has had some dizziness.  He was started on Z-Venancio 5 day 3 days ago and his symptoms are improving.  He has been coughing a great deal and he now has chest pain on that basis.        Review of Systems   Constitutional: Positive for appetite change and fever. Negative for activity change, chills, diaphoresis and fatigue.   HENT: Negative for ear pain, postnasal drip, rhinorrhea, sinus pressure, sneezing, sore throat, trouble swallowing and voice change.    Respiratory: Positive for cough, shortness of breath and wheezing. Negative for chest tightness.    Cardiovascular: Positive for chest pain. Negative for palpitations and leg swelling.   Gastrointestinal: Negative for abdominal pain, diarrhea, nausea and vomiting.   Musculoskeletal: Negative for arthralgias and myalgias.   Neurological: Positive for dizziness. Negative for syncope, speech difficulty, weakness, numbness and headaches.       Objective   Physical Exam   Constitutional: He is oriented to person, place, and time. Vital signs are normal. He appears well-developed and well-nourished. He is active. He does not appear ill.   HENT:   Right Ear: Tympanic membrane, external ear and ear canal normal.   Left Ear: Tympanic membrane, external ear and ear canal normal.   Mouth/Throat: No oropharyngeal exudate, posterior oropharyngeal edema or posterior oropharyngeal erythema.   Eyes: Conjunctivae are normal.   Cardiovascular: Normal rate, regular rhythm, S1 normal and S2 normal.  Exam reveals no S3 and no S4.    No murmur heard.  Pulmonary/Chest: No tachypnea. No respiratory distress. He has no decreased breath sounds. He has no wheezes. He has no rhonchi. He has no rales.   Abdominal: Soft. Normal appearance and bowel sounds are normal. He  exhibits no abdominal bruit and no mass. There is no hepatosplenomegaly. There is no tenderness.       Vascular Status -  His exam exhibits no right foot edema. His exam exhibits no left foot edema.  Lymphadenopathy:     He has no cervical adenopathy.   Neurological: He is alert and oriented to person, place, and time. Gait normal.   Psychiatric: He has a normal mood and affect. His speech is normal and behavior is normal. Judgment and thought content normal. Cognition and memory are normal.       Assessment/Plan pressure by me 140/68.  O2 sat 98% at rest and 99% post ambulation.    Assessment #1 recurrent acute asthmatic bronchitis    Plan #1 complete Z-Venancio 5 day #2 continue to drink 8 glasses of fluids per day #3 to see an  Teaspoon twice a day when necessary.  He will call if symptoms do not continue to improve and resolve.

## 2017-12-28 RX ORDER — INSULIN GLARGINE 100 [IU]/ML
INJECTION, SOLUTION SUBCUTANEOUS
Qty: 15 ML | Refills: 0 | Status: SHIPPED | OUTPATIENT
Start: 2017-12-28 | End: 2018-08-16 | Stop reason: SDUPTHER

## 2018-01-15 RX ORDER — ERGOCALCIFEROL 1.25 MG/1
CAPSULE ORAL
Qty: 24 CAPSULE | Refills: 0 | Status: SHIPPED | OUTPATIENT
Start: 2018-01-15 | End: 2018-02-07 | Stop reason: SDUPTHER

## 2018-02-07 ENCOUNTER — OFFICE VISIT (OUTPATIENT)
Dept: ENDOCRINOLOGY | Age: 73
End: 2018-02-07

## 2018-02-07 VITALS
RESPIRATION RATE: 16 BRPM | HEIGHT: 73 IN | BODY MASS INDEX: 31.14 KG/M2 | DIASTOLIC BLOOD PRESSURE: 72 MMHG | SYSTOLIC BLOOD PRESSURE: 146 MMHG | WEIGHT: 235 LBS

## 2018-02-07 DIAGNOSIS — E55.9 VITAMIN D DEFICIENCY: ICD-10-CM

## 2018-02-07 DIAGNOSIS — I25.10 CORONARY ARTERY DISEASE INVOLVING NATIVE CORONARY ARTERY OF NATIVE HEART WITHOUT ANGINA PECTORIS: ICD-10-CM

## 2018-02-07 DIAGNOSIS — E11.9 TYPE 2 DIABETES MELLITUS WITHOUT COMPLICATION, WITHOUT LONG-TERM CURRENT USE OF INSULIN (HCC): ICD-10-CM

## 2018-02-07 DIAGNOSIS — E11.42 DIABETIC PERIPHERAL NEUROPATHY (HCC): ICD-10-CM

## 2018-02-07 DIAGNOSIS — K76.0 STEATOSIS OF LIVER: ICD-10-CM

## 2018-02-07 DIAGNOSIS — E79.0 HYPERURICEMIA: ICD-10-CM

## 2018-02-07 DIAGNOSIS — N40.1 BENIGN NON-NODULAR PROSTATIC HYPERPLASIA WITH LOWER URINARY TRACT SYMPTOMS: ICD-10-CM

## 2018-02-07 DIAGNOSIS — E29.1 HYPOGONADISM IN MALE: ICD-10-CM

## 2018-02-07 DIAGNOSIS — E78.5 DYSLIPIDEMIA: ICD-10-CM

## 2018-02-07 DIAGNOSIS — I10 ESSENTIAL HYPERTENSION: ICD-10-CM

## 2018-02-07 DIAGNOSIS — E11.40 TYPE 2 DIABETES MELLITUS WITH DIABETIC NEUROPATHY, WITH LONG-TERM CURRENT USE OF INSULIN (HCC): Primary | ICD-10-CM

## 2018-02-07 DIAGNOSIS — Z79.4 TYPE 2 DIABETES MELLITUS WITH DIABETIC NEUROPATHY, WITH LONG-TERM CURRENT USE OF INSULIN (HCC): Primary | ICD-10-CM

## 2018-02-07 PROCEDURE — 99214 OFFICE O/P EST MOD 30 MIN: CPT | Performed by: INTERNAL MEDICINE

## 2018-02-07 RX ORDER — ERGOCALCIFEROL 1.25 MG/1
50000 CAPSULE ORAL 2 TIMES WEEKLY
Qty: 26 CAPSULE | Refills: 3 | Status: SHIPPED | OUTPATIENT
Start: 2018-02-08 | End: 2018-10-26

## 2018-02-07 RX ORDER — MECOBAL/LEVOMEFOLAT CA/B6 PHOS 2-3-35 MG
TABLET ORAL
Qty: 180 TABLET | Refills: 3 | Status: SHIPPED | OUTPATIENT
Start: 2018-02-07 | End: 2018-02-19 | Stop reason: SDUPTHER

## 2018-02-07 RX ORDER — TESTOSTERONE CYPIONATE 200 MG/ML
200 INJECTION, SOLUTION INTRAMUSCULAR WEEKLY
Qty: 10 ML | Refills: 1 | Status: SHIPPED | OUTPATIENT
Start: 2018-02-07 | End: 2018-08-16 | Stop reason: SDUPTHER

## 2018-02-07 RX ORDER — PIOGLITAZONEHYDROCHLORIDE 45 MG/1
45 TABLET ORAL DAILY
Qty: 90 TABLET | Refills: 3 | Status: SHIPPED | OUTPATIENT
Start: 2018-02-07 | End: 2018-03-28 | Stop reason: SDUPTHER

## 2018-02-07 RX ORDER — ICOSAPENT ETHYL 1000 MG/1
2 CAPSULE ORAL 2 TIMES DAILY WITH MEALS
Qty: 360 CAPSULE | Refills: 1 | Status: SHIPPED | OUTPATIENT
Start: 2018-02-07 | End: 2018-08-16 | Stop reason: SDUPTHER

## 2018-02-07 RX ORDER — LINAGLIPTIN 5 MG/1
5 TABLET, FILM COATED ORAL DAILY
Qty: 90 TABLET | Refills: 3 | Status: SHIPPED | OUTPATIENT
Start: 2018-02-07 | End: 2019-09-19 | Stop reason: SDUPTHER

## 2018-02-07 NOTE — PROGRESS NOTES
"Subjective   Earl Marc is a 72 y.o. male seen for follow up for DM2, hyperlipidemia, HTN. No lab review. Patient is checking BG once a day. He states that his feet are staying cold and numbness on the bottom of his feet.   History of Present Illness this is a 72-year-old gentleman known patient with type II diabetes hypertension and dyslipidemia as well as vitamin D deficiency and hypogonadism as well as coronary and peripheral arterial disease.  Over the course of last 6 months he has had no significant health problems for which to go to the emergency room or hospital.    /72  Resp 16  Ht 185.4 cm (73\")  Wt 107 kg (235 lb)  BMI 31 kg/m2     Allergies   Allergen Reactions   • Other      Lotions- anything with palm oil or lanolin per pt (verified 6/15/16 1910)   • Prazosin        Current Outpatient Prescriptions:   •  Alcohol Swabs ([facility] PREP) 70 % pads, Place 1 application on the skin Daily., Disp: 400 each, Rfl: 1  •  allopurinol (ZYLOPRIM) 100 MG tablet, Take one tablet daily, Disp: 90 tablet, Rfl: 3  •  AMLACTIN 12 % lotion, , Disp: , Rfl:   •  ascorbic acid (VITAMIN C) 1000 MG tablet, Take 1,000 mg by mouth Daily., Disp: , Rfl:   •  aspirin 81 MG EC tablet, Take 81 mg by mouth Daily., Disp: , Rfl:   •  B-D UF III MINI PEN NEEDLES 31G X 5 MM misc, Use once daily with Lantus Pen for injection of insulin, Disp: 100 each, Rfl: 5  •  cyanocobalamin 1000 MCG/ML injection, Cyanocobalamin 1000 MCG/ML Injection Solution; Patient Sig: Cyanocobalamin 1000 MCG/ML Injection Solution INJECT 1ML INTRAMUSCULARLY EVERY OTHER WEEK; 0; 17-Feb-2015; Active, Disp: , Rfl:   •  DEPO-TESTOSTERONE 200 MG/ML injection, Inject 1 mL into the shoulder, thigh, or buttocks 1 (One) Time Per Week.  , Disp: , Rfl:   •  diclofenac (VOLTAREN) 1 % gel gel, Apply 4 g topically 4 (four) times a day as needed., Disp: , Rfl:   •  fexofenadine (ALLEGRA) 180 MG tablet, , Disp: , Rfl:   •  fluticasone (FLONASE) 50 MCG/ACT nasal spray, 1 spray " into each nostril 2 (two) times a day as needed., Disp: , Rfl:   •  folic acid (FOLVITE) 800 MCG tablet, Take  by mouth daily., Disp: , Rfl:   •  glucose blood test strip, Precision Xtra Blood Glucose In Vitro Strip; Patient Sig: Precision Xtra Blood Glucose In Vitro Strip TEST 2  TIMES DAILY, Disp: 200 each, Rfl: 0  •  hydrocortisone 2.5 % ointment, Apply  topically 2 (two) times a day., Disp: , Rfl:   •  ketoconazole (NIZORAL) 2 % shampoo, , Disp: , Rfl:   •  LANTUS SOLOSTAR 100 UNIT/ML injection pen, INJECT 28 UNITS UNDER THE SKIN DAILY, Disp: 15 mL, Rfl: 0  •  latanoprost (XALATAN) 0.005 % ophthalmic solution, , Disp: , Rfl:   •  linagliptin (TRADJENTA) 5 MG tablet tablet, Take 1 tablet by mouth Daily., Disp: 90 tablet, Rfl: 2  •  metoprolol tartrate (LOPRESSOR) 25 MG tablet, Take 1 tablet by mouth Daily., Disp: 90 tablet, Rfl: 3  •  MONOLET LANCETS misc, Use to test BG 2 times daily, Disp: 200 each, Rfl: 1  •  Multiple Vitamins-Minerals (MULTIMINERAL PLUS) tablet, Take  by mouth daily., Disp: , Rfl:   •  nitroglycerin (NITROSTAT) 0.4 MG SL tablet, Place 1 tablet under the tongue every 5 (five) minutes as needed for chest pain. Indications: Acute Angina Pectoris, Disp: 30 tablet, Rfl: 3  •  pantoprazole (PROTONIX) 40 MG EC tablet, Take 1 tablet by mouth Daily., Disp: 90 tablet, Rfl: 3  •  pioglitazone (ACTOS) 45 MG tablet, Take 1 tablet by mouth Daily., Disp: 90 tablet, Rfl: 2  •  pregabalin (LYRICA) 50 MG capsule, Take 1 capsule by mouth 3 (Three) Times a Day., Disp: 90 capsule, Rfl: 0  •  PREVIDENT 5000 PLUS 1.1 % cream, , Disp: , Rfl:   •  Soap & Cleansers (CETAKLENZ) liquid, , Disp: , Rfl:   •  tamsulosin (FLOMAX) 0.4 MG capsule 24 hr capsule, Take 1 capsule by mouth every night., Disp: , Rfl:   •  VASCEPA 1 g capsule capsule, TAKE 4 CAPSULES IN THE EVENING, Disp: 360 capsule, Rfl: 1  •  vitamin D (ERGOCALCIFEROL) 94526 units capsule capsule, TAKE 2 CAPSULES ONCE WEEKLY, Disp: 24 capsule, Rfl: 0  •  Zinc 30  MG tablet, Take  by mouth., Disp: , Rfl:   •  zolpidem (AMBIEN) 5 MG tablet, Take 1 tablet by mouth At Night As Needed for Sleep., Disp: 90 tablet, Rfl: 1  •  azithromycin (ZITHROMAX) 250 MG tablet, Take 2 tablets the first day, then 1 tablet daily for 4 days., Disp: 6 tablet, Rfl: 0  •  Insulin Glargine (LANTUS SOLOSTAR) 100 UNIT/ML injection pen, Inject 28 Units under the skin Daily., Disp: 15 mL, Rfl: 0    The following portions of the patient's history were reviewed and updated as appropriate: allergies, current medications, past family history, past medical history, past social history, past surgical history and problem list.    Review of Systems   Constitutional: Negative.    HENT: Negative.    Eyes: Negative.    Respiratory: Negative.    Cardiovascular: Negative.    Gastrointestinal: Negative.    Endocrine: Positive for cold intolerance.   Genitourinary: Negative.    Musculoskeletal: Negative.    Skin: Negative.    Allergic/Immunologic: Negative.    Neurological: Positive for numbness.   Hematological: Negative.    Psychiatric/Behavioral: Negative.        Objective   Physical Exam   Constitutional: He is oriented to person, place, and time. He appears well-developed and well-nourished. No distress.   HENT:   Head: Normocephalic and atraumatic.   Right Ear: External ear normal.   Left Ear: External ear normal.   Nose: Nose normal.   Mouth/Throat: Oropharynx is clear and moist. No oropharyngeal exudate.   Eyes: Conjunctivae and EOM are normal. Pupils are equal, round, and reactive to light. Right eye exhibits no discharge. Left eye exhibits no discharge. No scleral icterus.   Neck: Normal range of motion. Neck supple. No JVD present. No tracheal deviation present. No thyromegaly present.   Cardiovascular: Normal rate, regular rhythm, normal heart sounds and intact distal pulses.  Exam reveals no gallop and no friction rub.    No murmur heard.  Pulmonary/Chest: Effort normal and breath sounds normal. No stridor.  No respiratory distress. He has no wheezes. He has no rales. He exhibits no tenderness.   Abdominal: Soft. Bowel sounds are normal. He exhibits no distension and no mass. There is no tenderness. There is no rebound and no guarding. No hernia.   Musculoskeletal: Normal range of motion. He exhibits no edema, tenderness or deformity.   Lymphadenopathy:     He has no cervical adenopathy.   Neurological: He is alert and oriented to person, place, and time. He has normal reflexes. He displays normal reflexes. No cranial nerve deficit. He exhibits normal muscle tone. Coordination normal.   Skin: Skin is warm and dry. No rash noted. He is not diaphoretic. No erythema. No pallor.   Psychiatric: He has a normal mood and affect. His behavior is normal. Judgment and thought content normal.   Nursing note and vitals reviewed.        Assessment/Plan   Diagnoses and all orders for this visit:    Type 2 diabetes mellitus with diabetic neuropathy, with long-term current use of insulin  -     T3, Free  -     T4 & TSH ([facility])  -     T4, Free  -     TestT+TestF+SHBG  -     Uric Acid  -     Vitamin D 25 Hydroxy  -     Comprehensive Metabolic Panel  -     C-Peptide  -     Follicle Stimulating Hormone  -     Hemoglobin A1c  -     Lipid Panel  -     Luteinizing Hormone  -     MicroAlbumin, Urine, Random - Urine, Clean Catch  -     PSA DIAGNOSTIC  -     Hemoglobin & Hematocrit, Blood    Hypogonadism in male  -     T3, Free  -     T4 & TSH ([facility])  -     T4, Free  -     TestT+TestF+SHBG  -     Uric Acid  -     Vitamin D 25 Hydroxy  -     Comprehensive Metabolic Panel  -     C-Peptide  -     Follicle Stimulating Hormone  -     Hemoglobin A1c  -     Lipid Panel  -     Luteinizing Hormone  -     MicroAlbumin, Urine, Random - Urine, Clean Catch  -     PSA DIAGNOSTIC  -     Hemoglobin & Hematocrit, Blood  -     DEPO-TESTOSTERONE 200 MG/ML injection; Inject 1 mL into the shoulder, thigh, or buttocks 1 (One) Time Per Week.    Diabetic  peripheral neuropathy  -     T3, Free  -     T4 & TSH ([facility])  -     T4, Free  -     TestT+TestF+SHBG  -     Uric Acid  -     Vitamin D 25 Hydroxy  -     Comprehensive Metabolic Panel  -     C-Peptide  -     Follicle Stimulating Hormone  -     Hemoglobin A1c  -     Lipid Panel  -     Luteinizing Hormone  -     MicroAlbumin, Urine, Random - Urine, Clean Catch  -     PSA DIAGNOSTIC  -     Hemoglobin & Hematocrit, Blood  -     DEPO-TESTOSTERONE 200 MG/ML injection; Inject 1 mL into the shoulder, thigh, or buttocks 1 (One) Time Per Week.    Steatosis of liver  -     T3, Free  -     T4 & TSH ([facility])  -     T4, Free  -     TestT+TestF+SHBG  -     Uric Acid  -     Vitamin D 25 Hydroxy  -     Comprehensive Metabolic Panel  -     C-Peptide  -     Follicle Stimulating Hormone  -     Hemoglobin A1c  -     Lipid Panel  -     Luteinizing Hormone  -     MicroAlbumin, Urine, Random - Urine, Clean Catch  -     PSA DIAGNOSTIC  -     Hemoglobin & Hematocrit, Blood    Vitamin D deficiency  -     T3, Free  -     T4 & TSH ([facility])  -     T4, Free  -     TestT+TestF+SHBG  -     Uric Acid  -     Vitamin D 25 Hydroxy  -     Comprehensive Metabolic Panel  -     C-Peptide  -     Follicle Stimulating Hormone  -     Hemoglobin A1c  -     Lipid Panel  -     Luteinizing Hormone  -     MicroAlbumin, Urine, Random - Urine, Clean Catch  -     PSA DIAGNOSTIC  -     Hemoglobin & Hematocrit, Blood  -     DEPO-TESTOSTERONE 200 MG/ML injection; Inject 1 mL into the shoulder, thigh, or buttocks 1 (One) Time Per Week.    Essential hypertension  -     T3, Free  -     T4 & TSH ([facility])  -     T4, Free  -     TestT+TestF+SHBG  -     Uric Acid  -     Vitamin D 25 Hydroxy  -     Comprehensive Metabolic Panel  -     C-Peptide  -     Follicle Stimulating Hormone  -     Hemoglobin A1c  -     Lipid Panel  -     Luteinizing Hormone  -     MicroAlbumin, Urine, Random - Urine, Clean Catch  -     PSA DIAGNOSTIC  -     Hemoglobin & Hematocrit, Blood  -      DEPO-TESTOSTERONE 200 MG/ML injection; Inject 1 mL into the shoulder, thigh, or buttocks 1 (One) Time Per Week.    Coronary artery disease involving native coronary artery of native heart without angina pectoris  -     T3, Free  -     T4 & TSH ([facility])  -     T4, Free  -     TestT+TestF+SHBG  -     Uric Acid  -     Vitamin D 25 Hydroxy  -     Comprehensive Metabolic Panel  -     C-Peptide  -     Follicle Stimulating Hormone  -     Hemoglobin A1c  -     Lipid Panel  -     Luteinizing Hormone  -     MicroAlbumin, Urine, Random - Urine, Clean Catch  -     PSA DIAGNOSTIC  -     Hemoglobin & Hematocrit, Blood    Dyslipidemia  -     T3, Free  -     T4 & TSH ([facility])  -     T4, Free  -     TestT+TestF+SHBG  -     Uric Acid  -     Vitamin D 25 Hydroxy  -     Comprehensive Metabolic Panel  -     C-Peptide  -     Follicle Stimulating Hormone  -     Hemoglobin A1c  -     Lipid Panel  -     Luteinizing Hormone  -     MicroAlbumin, Urine, Random - Urine, Clean Catch  -     PSA DIAGNOSTIC  -     Hemoglobin & Hematocrit, Blood  -     DEPO-TESTOSTERONE 200 MG/ML injection; Inject 1 mL into the shoulder, thigh, or buttocks 1 (One) Time Per Week.    Benign non-nodular prostatic hyperplasia with lower urinary tract symptoms  -     T3, Free  -     T4 & TSH ([facility])  -     T4, Free  -     TestT+TestF+SHBG  -     Uric Acid  -     Vitamin D 25 Hydroxy  -     Comprehensive Metabolic Panel  -     C-Peptide  -     Follicle Stimulating Hormone  -     Hemoglobin A1c  -     Lipid Panel  -     Luteinizing Hormone  -     MicroAlbumin, Urine, Random - Urine, Clean Catch  -     PSA DIAGNOSTIC  -     Hemoglobin & Hematocrit, Blood    Type 2 diabetes mellitus without complication, without long-term current use of insulin  -     DEPO-TESTOSTERONE 200 MG/ML injection; Inject 1 mL into the shoulder, thigh, or buttocks 1 (One) Time Per Week.    Hyperuricemia  -     DEPO-TESTOSTERONE 200 MG/ML injection; Inject 1 mL into the shoulder, thigh, or  buttocks 1 (One) Time Per Week.    Other orders  -     L-Methylfolate-B6-B12 3-35-2 MG tablet; 1 tablet twice daily.  -     vitamin D (ERGOCALCIFEROL) 09399 units capsule capsule; Take 1 capsule by mouth 2 (Two) Times a Week.  -     icosapent ethyl (VASCEPA) 1 g capsule capsule; Take 2 g by mouth 2 (Two) Times a Day With Meals.  -     pioglitazone (ACTOS) 45 MG tablet; Take 1 tablet by mouth Daily.  -     metoprolol tartrate (LOPRESSOR) 25 MG tablet; Take 1 tablet by mouth Daily.  -     TRADJENTA 5 MG tablet tablet; Take 1 tablet by mouth Daily.  -     Insulin Glargine (LANTUS SOLOSTAR) 100 UNIT/ML injection pen; Inject 28 Units under the skin Daily.  -     glucose blood test strip; Precision Xtra Blood Glucose In Vitro Strip; Patient Sig: Precision Xtra Blood Glucose In Vitro Strip TEST 2  TIMES DAILY  -     LYRICA 100 MG capsule; Take 1 capsule by mouth 2 (Two) Times a Day.             in summary I saw and examined this 72-year-old gentleman for above-mentioned problems.  I reviewed his laboratory evaluation of 06/14/2017 and at this time we will go ahead and order a new set of laboratory evaluation and once the results come back we will go ahead and call for additional modification or new medications.  He will see Ms. Martita Walker in 4 months or sooner if needed with laboratory evaluation prior to that office visit.

## 2018-02-08 LAB
25(OH)D3+25(OH)D2 SERPL-MCNC: 59.1 NG/ML (ref 30–100)
ALBUMIN SERPL-MCNC: 4.5 G/DL (ref 3.5–5.2)
ALBUMIN/GLOB SERPL: 1.4 G/DL
ALP SERPL-CCNC: 65 U/L (ref 39–117)
ALT SERPL-CCNC: 22 U/L (ref 1–41)
AST SERPL-CCNC: 21 U/L (ref 1–40)
BILIRUB SERPL-MCNC: 0.5 MG/DL (ref 0.1–1.2)
BUN SERPL-MCNC: 19 MG/DL (ref 8–23)
BUN/CREAT SERPL: 12.8 (ref 7–25)
C PEPTIDE SERPL-MCNC: 5 NG/ML (ref 1.1–4.4)
CALCIUM SERPL-MCNC: 10.8 MG/DL (ref 8.6–10.5)
CHLORIDE SERPL-SCNC: 96 MMOL/L (ref 98–107)
CHOLEST SERPL-MCNC: 176 MG/DL (ref 0–200)
CO2 SERPL-SCNC: 28 MMOL/L (ref 22–29)
CREAT SERPL-MCNC: 1.49 MG/DL (ref 0.76–1.27)
FSH SERPL-ACNC: <0.2 MIU/ML (ref 1.5–12.4)
GFR SERPLBLD CREATININE-BSD FMLA CKD-EPI: 46 ML/MIN/1.73
GFR SERPLBLD CREATININE-BSD FMLA CKD-EPI: 56 ML/MIN/1.73
GLOBULIN SER CALC-MCNC: 3.2 GM/DL
GLUCOSE SERPL-MCNC: 188 MG/DL (ref 65–99)
HBA1C MFR BLD: 7.18 % (ref 4.8–5.6)
HCT VFR BLD AUTO: 50.5 % (ref 40.4–52.2)
HDLC SERPL-MCNC: 42 MG/DL (ref 40–60)
HGB BLD-MCNC: 17.1 G/DL (ref 13.7–17.6)
INTERPRETATION: NORMAL
LDLC SERPL CALC-MCNC: 89 MG/DL (ref 0–100)
LH SERPL-ACNC: 0.1 MIU/ML (ref 1.7–8.6)
Lab: NORMAL
POTASSIUM SERPL-SCNC: 4.7 MMOL/L (ref 3.5–5.2)
PROT SERPL-MCNC: 7.7 G/DL (ref 6–8.5)
PSA SERPL-MCNC: 3.14 NG/ML (ref 0–4)
SHBG SERPL-SCNC: 33.2 NMOL/L (ref 19.3–76.4)
SODIUM SERPL-SCNC: 139 MMOL/L (ref 136–145)
T3FREE SERPL-MCNC: 3.1 PG/ML (ref 2–4.4)
T4 FREE SERPL-MCNC: 1.03 NG/DL (ref 0.93–1.7)
T4 SERPL-MCNC: 6.5 MCG/DL (ref 4.5–11.7)
TESTOST FREE SERPL-MCNC: 48.7 PG/ML (ref 6.6–18.1)
TESTOST SERPL-MCNC: >1500 NG/DL (ref 264–916)
TRIGL SERPL-MCNC: 225 MG/DL (ref 0–150)
TSH SERPL DL<=0.005 MIU/L-ACNC: 2.83 MIU/ML (ref 0.27–4.2)
URATE SERPL-MCNC: 5.3 MG/DL (ref 3.4–7)
VLDLC SERPL CALC-MCNC: 45 MG/DL (ref 5–40)

## 2018-02-19 RX ORDER — MECOBAL/LEVOMEFOLAT CA/B6 PHOS 2-3-35 MG
TABLET ORAL
Qty: 180 TABLET | Refills: 3 | Status: SHIPPED | OUTPATIENT
Start: 2018-02-19 | End: 2018-08-16 | Stop reason: SDUPTHER

## 2018-02-26 ENCOUNTER — OFFICE VISIT (OUTPATIENT)
Dept: INTERNAL MEDICINE | Facility: CLINIC | Age: 73
End: 2018-02-26

## 2018-02-26 VITALS
HEIGHT: 73 IN | HEART RATE: 72 BPM | DIASTOLIC BLOOD PRESSURE: 70 MMHG | SYSTOLIC BLOOD PRESSURE: 162 MMHG | BODY MASS INDEX: 31.81 KG/M2 | WEIGHT: 240 LBS | OXYGEN SATURATION: 95 % | TEMPERATURE: 98.2 F

## 2018-02-26 DIAGNOSIS — K21.9 GASTROESOPHAGEAL REFLUX DISEASE WITHOUT ESOPHAGITIS: ICD-10-CM

## 2018-02-26 DIAGNOSIS — R42 DIZZINESS: Primary | ICD-10-CM

## 2018-02-26 DIAGNOSIS — J01.90 ACUTE SINUSITIS, RECURRENCE NOT SPECIFIED, UNSPECIFIED LOCATION: ICD-10-CM

## 2018-02-26 DIAGNOSIS — R07.9 CHEST PAIN, UNSPECIFIED TYPE: ICD-10-CM

## 2018-02-26 PROBLEM — H40.89 OTHER SPECIFIED GLAUCOMA: Status: ACTIVE | Noted: 2017-04-01

## 2018-02-26 LAB
BILIRUB UR QL STRIP: NEGATIVE
BUN SERPL-MCNC: 16 MG/DL (ref 8–23)
BUN/CREAT SERPL: 9.8 (ref 7–25)
CALCIUM SERPL-MCNC: 10.5 MG/DL (ref 8.6–10.5)
CHLORIDE SERPL-SCNC: 97 MMOL/L (ref 98–107)
CLARITY UR: CLEAR
CO2 SERPL-SCNC: 30.5 MMOL/L (ref 22–29)
COLOR UR: YELLOW
CREAT SERPL-MCNC: 1.64 MG/DL (ref 0.76–1.27)
DEPRECATED RDW RBC AUTO: 50 FL (ref 37–54)
ERYTHROCYTE [DISTWIDTH] IN BLOOD BY AUTOMATED COUNT: 16.2 % (ref 11.5–15)
GLUCOSE SERPL-MCNC: 117 MG/DL (ref 65–99)
GLUCOSE UR STRIP-MCNC: NEGATIVE MG/DL
HCT VFR BLD AUTO: 47.8 % (ref 40.1–51)
HGB BLD-MCNC: 16.2 G/DL (ref 13.7–17.5)
HGB UR QL STRIP.AUTO: NEGATIVE
KETONES UR QL STRIP: ABNORMAL
LEUKOCYTE ESTERASE UR QL STRIP.AUTO: NEGATIVE
MCH RBC QN AUTO: 29 PG (ref 26–34)
MCHC RBC AUTO-ENTMCNC: 33.9 G/DL (ref 31–37)
MCV RBC AUTO: 85.7 FL (ref 80–100)
NITRITE UR QL STRIP: NEGATIVE
PH UR STRIP.AUTO: 6 [PH] (ref 5–8)
PLATELET # BLD AUTO: 200 10*3/MM3 (ref 150–450)
PMV BLD AUTO: 10.5 FL (ref 6–12)
POTASSIUM SERPL-SCNC: 4.9 MMOL/L (ref 3.5–5.2)
PROT UR QL STRIP: NEGATIVE
RBC # BLD AUTO: 5.58 10*6/MM3 (ref 4.63–6.08)
SODIUM SERPL-SCNC: 140 MMOL/L (ref 136–145)
SP GR UR STRIP: 1.02 (ref 1–1.03)
UROBILINOGEN UR QL STRIP: ABNORMAL
WBC NRBC COR # BLD: 8.07 10*3/MM3 (ref 5–10)

## 2018-02-26 PROCEDURE — 85027 COMPLETE CBC AUTOMATED: CPT | Performed by: NURSE PRACTITIONER

## 2018-02-26 PROCEDURE — 81003 URINALYSIS AUTO W/O SCOPE: CPT | Performed by: NURSE PRACTITIONER

## 2018-02-26 PROCEDURE — 99214 OFFICE O/P EST MOD 30 MIN: CPT | Performed by: NURSE PRACTITIONER

## 2018-02-26 PROCEDURE — 93000 ELECTROCARDIOGRAM COMPLETE: CPT | Performed by: NURSE PRACTITIONER

## 2018-02-26 RX ORDER — MECLIZINE HYDROCHLORIDE 25 MG/1
25 TABLET ORAL 3 TIMES DAILY PRN
Qty: 15 TABLET | Refills: 0 | Status: SHIPPED | OUTPATIENT
Start: 2018-02-26 | End: 2018-04-23

## 2018-02-26 RX ORDER — AZITHROMYCIN 250 MG/1
250 TABLET, FILM COATED ORAL DAILY
Qty: 6 TABLET | Refills: 0 | Status: SHIPPED | OUTPATIENT
Start: 2018-02-26 | End: 2018-03-03

## 2018-02-26 NOTE — PATIENT INSTRUCTIONS
Food Choices for Gastroesophageal Reflux Disease, Adult  When you have gastroesophageal reflux disease (GERD), the foods you eat and your eating habits are very important. Choosing the right foods can help ease your discomfort.  What guidelines do I need to follow?  · Choose fruits, vegetables, whole grains, and low-fat dairy products.  · Choose low-fat meat, fish, and poultry.  · Limit fats such as oils, salad dressings, butter, nuts, and avocado.  · Keep a food diary. This helps you identify foods that cause symptoms.  · Avoid foods that cause symptoms. These may be different for everyone.  · Eat small meals often instead of 3 large meals a day.  · Eat your meals slowly, in a place where you are relaxed.  · Limit fried foods.  · Cook foods using methods other than frying.  · Avoid drinking alcohol.  · Avoid drinking large amounts of liquids with your meals.  · Avoid bending over or lying down until 2-3 hours after eating.  What foods are not recommended?  These are some foods and drinks that may make your symptoms worse:  Vegetables   Tomatoes. Tomato juice. Tomato and spaghetti sauce. Chili peppers. Onion and garlic. Horseradish.  Fruits   Oranges, grapefruit, and lemon (fruit and juice).  Meats   High-fat meats, fish, and poultry. This includes hot dogs, ribs, ham, sausage, salami, and madrigal.  Dairy   Whole milk and chocolate milk. Sour cream. Cream. Butter. Ice cream. Cream cheese.  Drinks   Coffee and tea. Bubbly (carbonated) drinks or energy drinks.  Condiments   Hot sauce. Barbecue sauce.  Sweets/Desserts   Chocolate and cocoa. Donuts. Peppermint and spearmint.  Fats and Oils   High-fat foods. This includes French fries and potato chips.  Other   Vinegar. Strong spices. This includes black pepper, white pepper, red pepper, cayenne, evangelista powder, cloves, ginger, and chili powder.  The items listed above may not be a complete list of foods and drinks to avoid. Contact your dietitian for more information.    This information is not intended to replace advice given to you by your health care provider. Make sure you discuss any questions you have with your health care provider.  Document Released: 06/18/2013 Document Revised: 05/25/2017 Document Reviewed: 10/22/2014  Elsevier Interactive Patient Education © 2017 [employer]

## 2018-02-26 NOTE — PROGRESS NOTES
Subjective   Earl Marc is a 72 y.o. male.     HPI Comments: His last eye exam about 3 months ago.     Dizziness   This is a new problem. The current episode started 1 to 4 weeks ago. The problem occurs intermittently. The problem has been gradually worsening. Associated symptoms include chest pain, headaches (r/t sinus/frontal ), nausea (chronic ) and vertigo. Pertinent negatives include no abdominal pain, change in bowel habit, chills, coughing, fever, urinary symptoms, vomiting or weakness. Exacerbated by: movement,  He has tried nothing for the symptoms.   Chest Pain    This is a new problem. The pain is present in the epigastric region. The quality of the pain is described as burning. The pain radiates to the epigastrium. Associated symptoms include dizziness, headaches (r/t sinus/frontal ), lower extremity edema (very little in feet ) and nausea (chronic ). Pertinent negatives include no abdominal pain, back pain, cough, fever, shortness of breath, vomiting or weakness. The pain is aggravated by nothing. Treatments tried: tums, pepcid  The treatment provided moderate relief.   His past medical history is significant for CAD and MI.   Headache    This is a new problem. The problem has been resolved. The pain is located in the left unilateral region. The pain does not radiate. The pain quality is not similar to prior headaches. The quality of the pain is described as aching. The pain is at a severity of 0/10. The patient is experiencing no pain. Associated symptoms include dizziness, eye pain, a loss of balance, nausea (chronic ) and sinus pressure. Pertinent negatives include no abdominal pain, back pain, coughing, ear pain, fever, phonophobia, photophobia, vomiting or weakness. His past medical history is significant for sinus disease.        The following portions of the patient's history were reviewed and updated as appropriate: allergies, current medications, past family history, past medical history,  past social history, past surgical history and problem list.    Review of Systems   Constitutional: Negative for chills and fever.   HENT: Positive for sinus pressure. Negative for ear pain.    Eyes: Positive for pain. Negative for photophobia.   Respiratory: Negative for cough and shortness of breath.    Cardiovascular: Positive for chest pain.   Gastrointestinal: Positive for nausea (chronic ). Negative for abdominal pain, change in bowel habit and vomiting.   Musculoskeletal: Negative for back pain.   Neurological: Positive for dizziness, vertigo, light-headedness, headaches (r/t sinus/frontal ) and loss of balance. Negative for speech difficulty and weakness.       Objective   Physical Exam   Constitutional: He appears well-developed and well-nourished. He is cooperative. He does not have a sickly appearance. He does not appear ill.   HENT:   Head: Normocephalic.   Right Ear: Hearing and external ear normal. No drainage, swelling or tenderness. No mastoid tenderness. Tympanic membrane is bulging. Tympanic membrane is not injected, not scarred and not erythematous. Tympanic membrane mobility is normal. No middle ear effusion. No decreased hearing is noted.   Left Ear: Hearing and external ear normal. No drainage, swelling or tenderness. No mastoid tenderness. Tympanic membrane is bulging. Tympanic membrane is not injected, not scarred and not erythematous. Tympanic membrane mobility is normal.  No middle ear effusion. No decreased hearing is noted.   Nose: No mucosal edema, rhinorrhea, sinus tenderness or nasal deformity. Right sinus exhibits no maxillary sinus tenderness and no frontal sinus tenderness. Left sinus exhibits maxillary sinus tenderness and frontal sinus tenderness.   Mouth/Throat: Uvula is midline, oropharynx is clear and moist and mucous membranes are normal. Normal dentition. No tonsillar exudate.   Eyes: Conjunctivae and lids are normal. Pupils are equal, round, and reactive to light. Right eye  exhibits no discharge and no exudate. Left eye exhibits no discharge and no exudate.   Neck: Trachea normal and normal range of motion. Carotid bruit is not present. No edema present. No thyroid mass and no thyromegaly present.   Cardiovascular: Regular rhythm, normal heart sounds and normal pulses.    No murmur heard.  Orthostatic readings:  Lyin/72, HR 76  Sittin/71, HR 71  Standin/63, HR 80  No pedal edema    Pulmonary/Chest: Breath sounds normal. No respiratory distress. He has no decreased breath sounds. He has no wheezes. He has no rhonchi. He has no rales.   Lymphadenopathy:        Head (right side): No submental, no submandibular, no tonsillar, no preauricular, no posterior auricular and no occipital adenopathy present.        Head (left side): No submental, no submandibular, no tonsillar, no preauricular, no posterior auricular and no occipital adenopathy present.     He has no cervical adenopathy.   Neurological: He is alert. A sensory deficit is present. No cranial nerve deficit.   Reflex Scores:       Brachioradialis reflexes are 2+ on the right side and 2+ on the left side.       Patellar reflexes are 2+ on the right side and 2+ on the left side.  Skin: Skin is warm, dry and intact. No cyanosis. Nails show no clubbing.       Assessment/Plan   Earl was seen today for dizziness, chest pain and headache.    Diagnoses and all orders for this visit:    Dizziness  Comments:  ? r/t sinus  Orders:  -     CBC (No Diff); Future  -     Basic Metabolic Panel; Future  -     Urinalysis With / Microscopic If Indicated - Urine, Clean Catch; Future  -     meclizine (ANTIVERT) 25 MG tablet; Take 1 tablet by mouth 3 (Three) Times a Day As Needed for dizziness or Nausea.  -     CBC (No Diff)  -     Basic Metabolic Panel  -     Urinalysis With / Microscopic If Indicated - Urine, Clean Catch    Chest pain, unspecified type  Comments:  ?r/t indigestion   Orders:  -     ECG 12 Lead    Acute sinusitis,  recurrence not specified, unspecified location  Comments:  may add saline, continue with mucinex and flonase ; drink plenty of fluids   Orders:  -     azithromycin (ZITHROMAX Z-NEFTALI) 250 MG tablet; Take 1 tablet by mouth Daily for 5 days. Take 2 tablets the first day, then 1 tablet daily for 4 days.    Gastroesophageal reflux disease without esophagitis  Comments:  recent switch to pepcid from protonix secondary to kidney function; discussed diet in detail      ECG 12 Lead  Date/Time: 2/26/2018 2:30 PM  Performed by: KARINA CORNELL  Authorized by: KARINA CORNELL   Comparison: compared with previous ECG from 7/5/2017  Similar to previous ECG  Rhythm: sinus rhythm  Rate: normal  Conduction: conduction normal  ST Segments: ST segments normal  T Waves: T waves normal  QRS axis: normal  Clinical impression: normal ECG          Patient instructed if symptoms persist or worsen to go to ER or make appt with cardiologist.

## 2018-02-27 ENCOUNTER — TELEPHONE (OUTPATIENT)
Dept: INTERNAL MEDICINE | Facility: CLINIC | Age: 73
End: 2018-02-27

## 2018-02-27 NOTE — TELEPHONE ENCOUNTER
I called patient to follow up with patient and her reports he is feeling much better. He took one dose of meclizine and that has helped tremendously. He will check his blood pressure. He has been watching his foods (avoid tomatoes). I reiterated if any chest pain or worsening of symptoms to go to ER or go to cardiologist.

## 2018-03-12 RX ORDER — ALLOPURINOL 100 MG/1
TABLET ORAL
Qty: 90 TABLET | Refills: 3 | Status: SHIPPED | OUTPATIENT
Start: 2018-03-12 | End: 2019-02-19 | Stop reason: SDUPTHER

## 2018-03-14 ENCOUNTER — OFFICE VISIT (OUTPATIENT)
Dept: INTERNAL MEDICINE | Facility: CLINIC | Age: 73
End: 2018-03-14

## 2018-03-14 VITALS
OXYGEN SATURATION: 98 % | HEART RATE: 72 BPM | HEIGHT: 73 IN | DIASTOLIC BLOOD PRESSURE: 68 MMHG | SYSTOLIC BLOOD PRESSURE: 136 MMHG | BODY MASS INDEX: 31.54 KG/M2 | WEIGHT: 238 LBS

## 2018-03-14 DIAGNOSIS — R42 VERTIGO: Primary | ICD-10-CM

## 2018-03-14 PROBLEM — J20.8 ACUTE BRONCHITIS DUE TO OTHER SPECIFIED ORGANISMS: Status: RESOLVED | Noted: 2017-11-14 | Resolved: 2018-03-14

## 2018-03-14 PROCEDURE — 99213 OFFICE O/P EST LOW 20 MIN: CPT | Performed by: INTERNAL MEDICINE

## 2018-03-28 RX ORDER — PIOGLITAZONEHYDROCHLORIDE 45 MG/1
45 TABLET ORAL DAILY
Qty: 90 TABLET | Refills: 3 | Status: SHIPPED | OUTPATIENT
Start: 2018-03-28 | End: 2019-02-19 | Stop reason: SDUPTHER

## 2018-03-28 RX ORDER — LINAGLIPTIN 5 MG/1
TABLET, FILM COATED ORAL
Qty: 90 TABLET | Refills: 2 | Status: SHIPPED | OUTPATIENT
Start: 2018-03-28 | End: 2018-04-23 | Stop reason: SDUPTHER

## 2018-04-23 ENCOUNTER — OFFICE VISIT (OUTPATIENT)
Dept: INTERNAL MEDICINE | Facility: CLINIC | Age: 73
End: 2018-04-23

## 2018-04-23 VITALS
HEIGHT: 73 IN | DIASTOLIC BLOOD PRESSURE: 80 MMHG | SYSTOLIC BLOOD PRESSURE: 144 MMHG | WEIGHT: 237 LBS | BODY MASS INDEX: 31.41 KG/M2

## 2018-04-23 DIAGNOSIS — I25.10 CORONARY ARTERY DISEASE INVOLVING NATIVE CORONARY ARTERY OF NATIVE HEART WITHOUT ANGINA PECTORIS: Primary | ICD-10-CM

## 2018-04-23 DIAGNOSIS — Z79.4 TYPE 2 DIABETES MELLITUS WITH DIABETIC NEUROPATHY, WITH LONG-TERM CURRENT USE OF INSULIN (HCC): ICD-10-CM

## 2018-04-23 DIAGNOSIS — I10 ESSENTIAL HYPERTENSION: ICD-10-CM

## 2018-04-23 DIAGNOSIS — E11.40 TYPE 2 DIABETES MELLITUS WITH DIABETIC NEUROPATHY, WITH LONG-TERM CURRENT USE OF INSULIN (HCC): ICD-10-CM

## 2018-04-23 DIAGNOSIS — N28.9 RENAL INSUFFICIENCY: ICD-10-CM

## 2018-04-23 DIAGNOSIS — E78.5 DYSLIPIDEMIA: ICD-10-CM

## 2018-04-23 PROCEDURE — 99213 OFFICE O/P EST LOW 20 MIN: CPT | Performed by: INTERNAL MEDICINE

## 2018-04-23 NOTE — PROGRESS NOTES
Subjective   Earl Marc is a 72 y.o. male.     History of Present Illness he is here today for follow-up of hypertension, diabetes mellitus, hyperlipidemia, and coronary artery disease.  Overall he has done well.  He did slip going down the stairs last week injuring his left scapula area.  He denies any dizziness or recurrent vertigo.  He has not had any syncope or focal neurologic episodes.  He denies any VANESSA, PND, chest pain, or swelling in the ankles.        Review of Systems   Constitutional: Negative for activity change, appetite change and fatigue.   Eyes: Negative for blurred vision.   Respiratory: Negative for cough, chest tightness, shortness of breath and wheezing.    Cardiovascular: Negative for chest pain, palpitations and leg swelling.   Gastrointestinal: Negative for abdominal pain.   Genitourinary: Negative for flank pain and hematuria.   Neurological: Negative for dizziness, tremors, syncope, speech difficulty, weakness, numbness and headaches.   Psychiatric/Behavioral: Negative for depressed mood. The patient is not nervous/anxious.        Objective   Physical Exam   Constitutional: He is oriented to person, place, and time. He appears well-developed and well-nourished. He is active. He does not appear ill.   Eyes: Conjunctivae are normal.   Neck: Carotid bruit is not present.   Cardiovascular: Normal rate, regular rhythm, S1 normal and S2 normal.  Exam reveals no S3 and no S4.    No murmur heard.  Pulmonary/Chest: No tachypnea. No respiratory distress. He has no decreased breath sounds. He has no wheezes. He has no rhonchi. He has no rales.   Abdominal: Soft. Normal appearance. He exhibits no abdominal bruit and no mass. There is no hepatosplenomegaly. There is no tenderness.     Vascular Status -  His right foot exhibits no edema. His left foot exhibits no edema.  Neurological: He is alert and oriented to person, place, and time. Gait normal.   Psychiatric: He has a normal mood and affect. His  speech is normal and behavior is normal. Judgment and thought content normal. Cognition and memory are normal.         Assessment/Plan February lab shows a normal urinalysis.  CBC normal.  Hemoglobin A1c 7.1.  BUN 16 creatinine 1.64.  Total cholesterol 176 triglycerides 225 HDL cholesterol 42 LDL cholesterol 89    Assessment #1 diabetes mellitus-fair control #2 hypertension-systolic up mildly today #3 coronary artery disease-quiescent #4 hypercholesterolemia-good control #5 chronic renal insufficiency-moderate and stable    Plan #1 no change medication.  Routine follow-up in 6 months.

## 2018-06-11 ENCOUNTER — OFFICE VISIT (OUTPATIENT)
Dept: INTERNAL MEDICINE | Facility: CLINIC | Age: 73
End: 2018-06-11

## 2018-06-11 VITALS
SYSTOLIC BLOOD PRESSURE: 150 MMHG | BODY MASS INDEX: 31.14 KG/M2 | HEIGHT: 73 IN | DIASTOLIC BLOOD PRESSURE: 78 MMHG | WEIGHT: 235 LBS

## 2018-06-11 DIAGNOSIS — M25.512 CHRONIC LEFT SHOULDER PAIN: ICD-10-CM

## 2018-06-11 DIAGNOSIS — G89.29 CHRONIC LEFT SHOULDER PAIN: ICD-10-CM

## 2018-06-11 DIAGNOSIS — R10.13 EPIGASTRIC PAIN: Primary | ICD-10-CM

## 2018-06-11 PROCEDURE — 99213 OFFICE O/P EST LOW 20 MIN: CPT | Performed by: INTERNAL MEDICINE

## 2018-06-11 RX ORDER — FAMOTIDINE 40 MG/1
40 TABLET, FILM COATED ORAL 2 TIMES DAILY
COMMUNITY
End: 2018-10-31 | Stop reason: ALTCHOICE

## 2018-06-11 RX ORDER — UREA 10 %
800 LOTION (ML) TOPICAL DAILY
COMMUNITY
End: 2021-06-16 | Stop reason: SDUPTHER

## 2018-06-11 NOTE — PROGRESS NOTES
"Emma Marc is a 73 y.o. male.     History of Present Illness he is here with a one month history of epigastric pain on a daily basis.  It occurs primarily in the late afternoon and early evening but it does not awaken him from sleep.  It is described as a \"knot\".  It may last for several hours.  There is mild nausea without vomiting.  He denies any dysphagia, change in bowel habit, rectal bleeding, or melanotic stool.  He denies any significant change in appetite or weight loss.  The pain is not  associated with exertion.  He does have some burping which helps.  The pain is not associated with eating or lack of eating.  He was placed on Pepcid 3 months ago and his proton pump inhibitor was discontinued by his nephrologist.        Review of Systems   Constitutional: Negative for activity change, appetite change, fatigue and unexpected weight loss.   HENT: Negative for trouble swallowing.    Respiratory: Negative for cough, chest tightness, shortness of breath and wheezing.    Cardiovascular: Negative for chest pain and leg swelling.   Gastrointestinal: Positive for abdominal pain and nausea. Negative for anal bleeding, blood in stool, constipation, vomiting and GERD.   Genitourinary: Negative for flank pain and hematuria.   Neurological: Negative for dizziness, weakness and headache.       Objective   Physical Exam   Constitutional: He is oriented to person, place, and time. He appears well-developed and well-nourished. He is active. He does not appear ill. No distress.   Eyes: Conjunctivae are normal.   Cardiovascular: Normal rate, regular rhythm, S1 normal and S2 normal.  Exam reveals no S3 and no S4.    No murmur heard.  Pulmonary/Chest: No tachypnea. No respiratory distress. He has no decreased breath sounds. He has no wheezes. He has no rhonchi. He has no rales.   Abdominal: Soft. Normal appearance and bowel sounds are normal. He exhibits no abdominal bruit and no mass. There is no " hepatosplenomegaly. There is tenderness in the epigastric area.     Vascular Status -  His right foot exhibits no edema. His left foot exhibits no edema.  Neurological: He is alert and oriented to person, place, and time. Gait normal.   Psychiatric: He has a normal mood and affect. His speech is normal and behavior is normal. Judgment and thought content normal. Cognition and memory are normal.         Assessment/Plan assessment #1 epigastric pain-question peptic although gallbladder disease of course is always a possibility.  This was discussed with the patient.    Plan #1 change Pepcid to Protonix 40 mg by mouth daily number to EGD #3 gallbladder ultrasound.

## 2018-06-18 RX ORDER — ERGOCALCIFEROL 1.25 MG/1
CAPSULE ORAL
Qty: 24 CAPSULE | Refills: 0 | Status: SHIPPED | OUTPATIENT
Start: 2018-06-18 | End: 2018-08-16 | Stop reason: SDUPTHER

## 2018-06-19 ENCOUNTER — HOSPITAL ENCOUNTER (OUTPATIENT)
Dept: ULTRASOUND IMAGING | Facility: HOSPITAL | Age: 73
Discharge: HOME | End: 2018-06-19
Attending: INTERNAL MEDICINE | Admitting: INTERNAL MEDICINE

## 2018-06-19 PROCEDURE — 76700 US EXAM ABDOM COMPLETE: CPT

## 2018-06-22 ENCOUNTER — LAB (OUTPATIENT)
Dept: LAB | Facility: HOSPITAL | Age: 73
End: 2018-06-22
Attending: OPHTHALMOLOGY

## 2018-06-22 ENCOUNTER — TRANSCRIBE ORDERS (OUTPATIENT)
Dept: LAB | Facility: HOSPITAL | Age: 73
End: 2018-06-22

## 2018-06-22 DIAGNOSIS — H05.249 CONSTANT EXOPHTHALMOS, UNSPECIFIED LATERALITY: Primary | ICD-10-CM

## 2018-06-22 DIAGNOSIS — H05.249 CONSTANT EXOPHTHALMOS, UNSPECIFIED LATERALITY: ICD-10-CM

## 2018-06-22 LAB
T-UPTAKE NFR SERPL: 1.11 TBI (ref 0.8–1.3)
T4 SERPL-MCNC: 5.82 MCG/DL (ref 4.5–11.7)
TSH SERPL DL<=0.05 MIU/L-ACNC: 2.3 MIU/ML (ref 0.27–4.2)

## 2018-06-22 PROCEDURE — 84436 ASSAY OF TOTAL THYROXINE: CPT

## 2018-06-22 PROCEDURE — 84443 ASSAY THYROID STIM HORMONE: CPT

## 2018-06-22 PROCEDURE — 84479 ASSAY OF THYROID (T3 OR T4): CPT

## 2018-06-22 PROCEDURE — 36415 COLL VENOUS BLD VENIPUNCTURE: CPT

## 2018-07-06 ENCOUNTER — TELEPHONE (OUTPATIENT)
Dept: INTERNAL MEDICINE | Facility: CLINIC | Age: 73
End: 2018-07-06

## 2018-07-06 NOTE — TELEPHONE ENCOUNTER
----- Message from Joan King sent at 7/6/2018 12:04 PM EDT -----  Contact: Patient  Pharmacy calling to verify dosage on zolpidem (AMBIEN) 5 MG tablet. Please advise    Pharmacy:[facility] [place], [place] - [address] - [phone]  - [phone]       Physican line:[phone]

## 2018-07-06 NOTE — TELEPHONE ENCOUNTER
Called pharmacy they stated they didn't need verification on medication they stated that he last picked Ambien up on 06/02/2018.

## 2018-07-09 RX ORDER — ZOLPIDEM TARTRATE 5 MG/1
5 TABLET ORAL NIGHTLY PRN
Qty: 90 TABLET | Refills: 1 | Status: SHIPPED | OUTPATIENT
Start: 2018-07-11 | End: 2019-02-19

## 2018-07-09 NOTE — TELEPHONE ENCOUNTER
----- Message from Joan King sent at 7/6/2018  1:30 PM EDT -----  Contact: Patient  Patient calling to see if Madd[facility]una will call in the 12.5mg Ambien? Please advise      Patient:[phone]

## 2018-07-09 NOTE — TELEPHONE ENCOUNTER
Called patient he stated he would like to come  RX to take it to his pharmacy in [place]. RX placed at the .

## 2018-07-13 ENCOUNTER — TELEPHONE (OUTPATIENT)
Dept: INTERNAL MEDICINE | Facility: CLINIC | Age: 73
End: 2018-07-13

## 2018-07-13 DIAGNOSIS — E79.0 HYPERURICEMIA: ICD-10-CM

## 2018-07-13 DIAGNOSIS — E55.9 VITAMIN D DEFICIENCY: ICD-10-CM

## 2018-07-13 DIAGNOSIS — E29.1 HYPOGONADISM IN MALE: ICD-10-CM

## 2018-07-13 DIAGNOSIS — E11.42 DIABETIC PERIPHERAL NEUROPATHY (HCC): ICD-10-CM

## 2018-07-13 DIAGNOSIS — E11.9 TYPE 2 DIABETES MELLITUS WITHOUT COMPLICATION, WITHOUT LONG-TERM CURRENT USE OF INSULIN (HCC): ICD-10-CM

## 2018-07-13 DIAGNOSIS — E78.5 DYSLIPIDEMIA: ICD-10-CM

## 2018-07-13 DIAGNOSIS — I10 ESSENTIAL HYPERTENSION: ICD-10-CM

## 2018-07-13 RX ORDER — FLURBIPROFEN SODIUM 0.3 MG/ML
SOLUTION/ DROPS OPHTHALMIC
Qty: 100 EACH | Refills: 0 | Status: SHIPPED | OUTPATIENT
Start: 2018-07-13 | End: 2018-08-16 | Stop reason: SDUPTHER

## 2018-07-13 NOTE — TELEPHONE ENCOUNTER
Called and informed pharmacist that patient brought the RX for Ambien 5 mg back to the office and Dr Washburn wrote him a new one for the Ambien 12.5 mg.

## 2018-07-13 NOTE — TELEPHONE ENCOUNTER
----- Message from Kathy Pedraza sent at 7/13/2018  1:15 PM EDT -----  Contact: [facility]  Pharmacy is calling to get clarification on or to change    RX:zolpidem (AMBIEN) 5 MG tablet    Pt has been on 10 mg every since December. They wanted to make sure the 5 mg was not an error.      Caller#[phone]

## 2018-07-18 DIAGNOSIS — E78.5 DYSLIPIDEMIA: ICD-10-CM

## 2018-07-18 DIAGNOSIS — E55.9 VITAMIN D DEFICIENCY: Primary | ICD-10-CM

## 2018-07-18 DIAGNOSIS — E29.1 HYPOGONADISM IN MALE: ICD-10-CM

## 2018-07-18 DIAGNOSIS — Z79.4 TYPE 2 DIABETES MELLITUS WITH DIABETIC NEUROPATHY, WITH LONG-TERM CURRENT USE OF INSULIN (HCC): ICD-10-CM

## 2018-07-18 DIAGNOSIS — E11.40 TYPE 2 DIABETES MELLITUS WITH DIABETIC NEUROPATHY, WITH LONG-TERM CURRENT USE OF INSULIN (HCC): ICD-10-CM

## 2018-07-25 ENCOUNTER — OFFICE (AMBULATORY)
Dept: URBAN - METROPOLITAN AREA CLINIC 75 | Facility: CLINIC | Age: 73
End: 2018-07-25

## 2018-07-25 VITALS
HEART RATE: 80 BPM | WEIGHT: 237 LBS | SYSTOLIC BLOOD PRESSURE: 180 MMHG | HEIGHT: 73 IN | DIASTOLIC BLOOD PRESSURE: 84 MMHG

## 2018-07-25 DIAGNOSIS — R11.0 NAUSEA: ICD-10-CM

## 2018-07-25 DIAGNOSIS — K21.9 GASTRO-ESOPHAGEAL REFLUX DISEASE WITHOUT ESOPHAGITIS: ICD-10-CM

## 2018-07-25 DIAGNOSIS — R10.13 EPIGASTRIC PAIN: ICD-10-CM

## 2018-07-25 DIAGNOSIS — K76.0 FATTY (CHANGE OF) LIVER, NOT ELSEWHERE CLASSIFIED: ICD-10-CM

## 2018-07-25 PROCEDURE — 99214 OFFICE O/P EST MOD 30 MIN: CPT

## 2018-07-25 RX ORDER — SUCRALFATE 1 G/1
4 TABLET ORAL
Qty: 120 | Refills: 6 | Status: COMPLETED
Start: 2018-07-25 | End: 2020-01-07

## 2018-07-26 ENCOUNTER — ON CAMPUS - OUTPATIENT (AMBULATORY)
Dept: URBAN - METROPOLITAN AREA HOSPITAL 108 | Facility: HOSPITAL | Age: 73
End: 2018-07-26

## 2018-07-26 DIAGNOSIS — R10.9 UNSPECIFIED ABDOMINAL PAIN: ICD-10-CM

## 2018-07-26 DIAGNOSIS — K21.9 GASTRO-ESOPHAGEAL REFLUX DISEASE WITHOUT ESOPHAGITIS: ICD-10-CM

## 2018-07-26 DIAGNOSIS — R11.0 NAUSEA: ICD-10-CM

## 2018-07-26 DIAGNOSIS — K29.70 GASTRITIS, UNSPECIFIED, WITHOUT BLEEDING: ICD-10-CM

## 2018-07-26 PROCEDURE — 43450 DILATE ESOPHAGUS 1/MULT PASS: CPT

## 2018-07-26 PROCEDURE — 43239 EGD BIOPSY SINGLE/MULTIPLE: CPT

## 2018-08-02 ENCOUNTER — LAB (OUTPATIENT)
Dept: ENDOCRINOLOGY | Age: 73
End: 2018-08-02

## 2018-08-02 DIAGNOSIS — E79.0 HYPERURICEMIA: ICD-10-CM

## 2018-08-02 DIAGNOSIS — Z79.4 TYPE 2 DIABETES MELLITUS WITH DIABETIC NEUROPATHY, WITH LONG-TERM CURRENT USE OF INSULIN (HCC): ICD-10-CM

## 2018-08-02 DIAGNOSIS — E78.5 DYSLIPIDEMIA: ICD-10-CM

## 2018-08-02 DIAGNOSIS — E55.9 VITAMIN D DEFICIENCY: ICD-10-CM

## 2018-08-02 DIAGNOSIS — K76.0 STEATOSIS OF LIVER: ICD-10-CM

## 2018-08-02 DIAGNOSIS — E29.1 HYPOGONADISM IN MALE: ICD-10-CM

## 2018-08-02 DIAGNOSIS — E11.59 TYPE II DIABETES CIRCULATORY DISORDER CAUSING ERECTILE DYSFUNCTION (HCC): ICD-10-CM

## 2018-08-02 DIAGNOSIS — N52.1 TYPE II DIABETES CIRCULATORY DISORDER CAUSING ERECTILE DYSFUNCTION (HCC): ICD-10-CM

## 2018-08-02 DIAGNOSIS — I10 ESSENTIAL HYPERTENSION: ICD-10-CM

## 2018-08-02 DIAGNOSIS — E11.40 TYPE 2 DIABETES MELLITUS WITH DIABETIC NEUROPATHY, WITH LONG-TERM CURRENT USE OF INSULIN (HCC): ICD-10-CM

## 2018-08-02 DIAGNOSIS — F52.21 ERECTILE DYSFUNCTION OF NONORGANIC ORIGIN: ICD-10-CM

## 2018-08-04 LAB
25(OH)D3+25(OH)D2 SERPL-MCNC: 54.9 NG/ML (ref 30–100)
ALBUMIN SERPL-MCNC: 4.9 G/DL (ref 3.5–5.2)
ALBUMIN/GLOB SERPL: 1.7 G/DL
ALP SERPL-CCNC: 59 U/L (ref 39–117)
ALT SERPL-CCNC: 22 U/L (ref 1–41)
AST SERPL-CCNC: 14 U/L (ref 1–40)
BILIRUB SERPL-MCNC: 0.7 MG/DL (ref 0.1–1.2)
BUN SERPL-MCNC: 16 MG/DL (ref 8–23)
BUN/CREAT SERPL: 9.4 (ref 7–25)
C PEPTIDE SERPL-MCNC: 2.8 NG/ML (ref 1.1–4.4)
CALCIUM SERPL-MCNC: 9.6 MG/DL (ref 8.6–10.5)
CHLORIDE SERPL-SCNC: 98 MMOL/L (ref 98–107)
CHOLEST SERPL-MCNC: 153 MG/DL (ref 0–200)
CO2 SERPL-SCNC: 28.4 MMOL/L (ref 22–29)
CREAT SERPL-MCNC: 1.71 MG/DL (ref 0.76–1.27)
GLOBULIN SER CALC-MCNC: 2.9 GM/DL
GLUCOSE SERPL-MCNC: 180 MG/DL (ref 65–99)
HBA1C MFR BLD: 7 % (ref 4.8–5.6)
HCT VFR BLD AUTO: 50.9 % (ref 40.4–52.2)
HDLC SERPL-MCNC: 39 MG/DL (ref 40–60)
HGB BLD-MCNC: 17.2 G/DL (ref 13.7–17.6)
INTERPRETATION: NORMAL
LDLC SERPL CALC-MCNC: 74 MG/DL (ref 0–100)
Lab: NORMAL
MICROALBUMIN UR-MCNC: 116 UG/ML
POTASSIUM SERPL-SCNC: 4.6 MMOL/L (ref 3.5–5.2)
PROT SERPL-MCNC: 7.8 G/DL (ref 6–8.5)
SHBG SERPL-SCNC: 28.4 NMOL/L (ref 19.3–76.4)
SODIUM SERPL-SCNC: 140 MMOL/L (ref 136–145)
T4 SERPL-MCNC: 6.27 MCG/DL (ref 4.5–11.7)
TESTOST FREE SERPL-MCNC: 17.8 PG/ML (ref 6.6–18.1)
TESTOST SERPL-MCNC: 557 NG/DL (ref 264–916)
TRIGL SERPL-MCNC: 200 MG/DL (ref 0–150)
TSH SERPL DL<=0.005 MIU/L-ACNC: 2.25 MIU/ML (ref 0.27–4.2)
URATE SERPL-MCNC: 4.7 MG/DL (ref 3.4–7)
VLDLC SERPL CALC-MCNC: 40 MG/DL (ref 5–40)

## 2018-08-16 ENCOUNTER — OFFICE VISIT (OUTPATIENT)
Dept: ENDOCRINOLOGY | Age: 73
End: 2018-08-16

## 2018-08-16 VITALS
DIASTOLIC BLOOD PRESSURE: 72 MMHG | RESPIRATION RATE: 16 BRPM | BODY MASS INDEX: 31.36 KG/M2 | HEIGHT: 73 IN | SYSTOLIC BLOOD PRESSURE: 144 MMHG | WEIGHT: 236.6 LBS

## 2018-08-16 DIAGNOSIS — N40.1 BENIGN NON-NODULAR PROSTATIC HYPERPLASIA WITH LOWER URINARY TRACT SYMPTOMS: ICD-10-CM

## 2018-08-16 DIAGNOSIS — E29.1 HYPOGONADISM IN MALE: ICD-10-CM

## 2018-08-16 DIAGNOSIS — E78.5 DYSLIPIDEMIA: ICD-10-CM

## 2018-08-16 DIAGNOSIS — I10 ESSENTIAL HYPERTENSION: ICD-10-CM

## 2018-08-16 DIAGNOSIS — I25.10 CORONARY ARTERY DISEASE INVOLVING NATIVE CORONARY ARTERY OF NATIVE HEART WITHOUT ANGINA PECTORIS: ICD-10-CM

## 2018-08-16 DIAGNOSIS — E11.9 TYPE 2 DIABETES MELLITUS WITHOUT COMPLICATION, WITHOUT LONG-TERM CURRENT USE OF INSULIN (HCC): ICD-10-CM

## 2018-08-16 DIAGNOSIS — E11.42 DIABETIC PERIPHERAL NEUROPATHY (HCC): ICD-10-CM

## 2018-08-16 DIAGNOSIS — E55.9 VITAMIN D DEFICIENCY: ICD-10-CM

## 2018-08-16 DIAGNOSIS — K76.0 STEATOSIS OF LIVER: ICD-10-CM

## 2018-08-16 DIAGNOSIS — E11.40 TYPE 2 DIABETES MELLITUS WITH DIABETIC NEUROPATHY, WITH LONG-TERM CURRENT USE OF INSULIN (HCC): Primary | ICD-10-CM

## 2018-08-16 DIAGNOSIS — Z79.4 TYPE 2 DIABETES MELLITUS WITH DIABETIC NEUROPATHY, WITH LONG-TERM CURRENT USE OF INSULIN (HCC): Primary | ICD-10-CM

## 2018-08-16 DIAGNOSIS — E79.0 HYPERURICEMIA: ICD-10-CM

## 2018-08-16 PROCEDURE — 99214 OFFICE O/P EST MOD 30 MIN: CPT | Performed by: INTERNAL MEDICINE

## 2018-08-16 RX ORDER — FLURBIPROFEN SODIUM 0.3 MG/ML
SOLUTION/ DROPS OPHTHALMIC
Qty: 100 EACH | Refills: 3 | Status: SHIPPED | OUTPATIENT
Start: 2018-08-16 | End: 2019-09-19 | Stop reason: SDUPTHER

## 2018-08-16 RX ORDER — ICOSAPENT ETHYL 1000 MG/1
2 CAPSULE ORAL 2 TIMES DAILY WITH MEALS
Qty: 360 CAPSULE | Refills: 1 | Status: SHIPPED | OUTPATIENT
Start: 2018-08-16 | End: 2019-03-20 | Stop reason: SDUPTHER

## 2018-08-16 RX ORDER — PANTOPRAZOLE SODIUM 40 MG/1
40 TABLET, DELAYED RELEASE ORAL DAILY
COMMUNITY
End: 2018-09-13

## 2018-08-16 RX ORDER — TESTOSTERONE CYPIONATE 200 MG/ML
200 INJECTION, SOLUTION INTRAMUSCULAR WEEKLY
Qty: 14 ML | Refills: 1 | Status: SHIPPED | OUTPATIENT
Start: 2018-08-16 | End: 2019-09-19 | Stop reason: SDUPTHER

## 2018-08-16 NOTE — PROGRESS NOTES
"Subjective   Earl Marc is a 73 y.o. male seen for follow up for DM2, hyperlipidemia, HTN, lab review. Patient needs a diabetic foot exam. He is having diabetic neuropathy pain in his feet. He denies any other problems or concerns. He is checking BG 2 times a day. No BG readings.   History of Present Illness this is a 73-year-old gentleman known patient with type II diabetes hypertension and dyslipidemia as well as diabetic neuropathy and coronary and peripheral vascular disease with hypogonadism and vitamin D deficiency.  Over the course of last 6 months she has had no significant health problems for which to go to the emergency room or hospital.    /72   Resp 16   Ht 185.4 cm (73\")   Wt 107 kg (236 lb 9.6 oz)   BMI 31.22 kg/m²      Allergies   Allergen Reactions   • Other      Lotions- anything with palm oil or lanolin per pt (verified 6/15/16 1910)   • Prazosin        Current Outpatient Prescriptions:   •  Alcohol Swabs ([facility] PREP) 70 % pads, Place 1 application on the skin Daily., Disp: 400 each, Rfl: 1  •  allopurinol (ZYLOPRIM) 100 MG tablet, TAKE 1 TABLET DAILY, Disp: 90 tablet, Rfl: 3  •  AMLACTIN 12 % lotion, Apply  topically As Needed., Disp: , Rfl:   •  ascorbic acid (VITAMIN C) 1000 MG tablet, Take 500 mg by mouth Daily., Disp: , Rfl:   •  aspirin 81 MG EC tablet, Take 81 mg by mouth Daily., Disp: , Rfl:   •  B-D UF III MINI PEN NEEDLES 31G X 5 MM misc, Use once daily with Lantus Pen for injection of insulin, Disp: 100 each, Rfl: 0  •  butenafine (LOTRIMIN ULTRA) 1 % cream, Apply  topically 2 (Two) Times a Day., Disp: , Rfl:   •  cyanocobalamin 1000 MCG/ML injection, Cyanocobalamin 1000 MCG/ML Injection Solution; Patient Sig: Cyanocobalamin 1000 MCG/ML Injection Solution INJECT 1ML INTRAMUSCULARLY EVERY OTHER WEEK; 0; 17-Feb-2015; Active, Disp: , Rfl:   •  DEPO-TESTOSTERONE 200 MG/ML injection, Inject 1 mL into the shoulder, thigh, or buttocks 1 (One) Time Per Week., Disp: 10 mL, Rfl: 1  •  " famotidine (PEPCID) 40 MG tablet, Take 40 mg by mouth 2 (Two) Times a Day., Disp: , Rfl:   •  fexofenadine (ALLEGRA) 180 MG tablet, Take 180 mg by mouth Daily As Needed., Disp: , Rfl:   •  fluticasone (FLONASE) 50 MCG/ACT nasal spray, 1 spray into each nostril 2 (two) times a day as needed., Disp: , Rfl:   •  folic acid (FOLVITE) 800 MCG tablet, Take 800 mcg by mouth Daily., Disp: , Rfl:   •  glucose blood test strip, Precision Xtra Blood Glucose In Vitro Strip; Patient Sig: Precision Xtra Blood Glucose In Vitro Strip TEST 2  TIMES DAILY, Disp: 200 each, Rfl: 3  •  hydrocortisone 2.5 % ointment, Apply  topically 2 (two) times a day., Disp: , Rfl:   •  icosapent ethyl (VASCEPA) 1 g capsule capsule, Take 2 g by mouth 2 (Two) Times a Day With Meals., Disp: 360 capsule, Rfl: 1  •  ketoconazole (NIZORAL) 2 % shampoo, , Disp: , Rfl:   •  L-Methylfolate-Algae-B12-B6 (METANX PO), Take  by mouth 2 (Two) Times a Day., Disp: , Rfl:   •  LANTUS SOLOSTAR 100 UNIT/ML injection pen, INJECT 28 UNITS UNDER THE SKIN DAILY, Disp: 15 mL, Rfl: 0  •  latanoprost (XALATAN) 0.005 % ophthalmic solution, , Disp: , Rfl:   •  LYRICA 100 MG capsule, Take 1 capsule by mouth 2 (Two) Times a Day. (Patient taking differently: Take 50 mg by mouth 3 (Three) Times a Day As Needed.), Disp: 180 capsule, Rfl: 1  •  metoprolol tartrate (LOPRESSOR) 25 MG tablet, Take 1 tablet by mouth Daily., Disp: 90 tablet, Rfl: 3  •  MONOLET LANCETS misc, Use to test BG 2 times daily, Disp: 200 each, Rfl: 1  •  Multiple Vitamins-Minerals (MULTIMINERAL PLUS) tablet, Take  by mouth daily., Disp: , Rfl:   •  nitroglycerin (NITROSTAT) 0.4 MG SL tablet, Place 1 tablet under the tongue every 5 (five) minutes as needed for chest pain. Indications: Acute Angina Pectoris, Disp: 30 tablet, Rfl: 3  •  pantoprazole (PROTONIX) 40 MG EC tablet, Take 40 mg by mouth Daily., Disp: , Rfl:   •  pioglitazone (ACTOS) 45 MG tablet, Take 1 tablet by mouth Daily., Disp: 90 tablet, Rfl: 3  •   PREVIDENT 5000 PLUS 1.1 % cream, , Disp: , Rfl:   •  Soap & Cleansers (CETAKLENZ) liquid, , Disp: , Rfl:   •  tamsulosin (FLOMAX) 0.4 MG capsule 24 hr capsule, Take 1 capsule by mouth every night., Disp: , Rfl:   •  TRADJENTA 5 MG tablet tablet, Take 1 tablet by mouth Daily., Disp: 90 tablet, Rfl: 3  •  vitamin D (ERGOCALCIFEROL) 75385 units capsule capsule, Take 1 capsule by mouth 2 (Two) Times a Week., Disp: 26 capsule, Rfl: 3  •  Zinc 30 MG tablet, Take 50 mg by mouth Daily., Disp: , Rfl:   •  zolpidem (AMBIEN) 5 MG tablet, Take 1 tablet by mouth At Night As Needed for Sleep., Disp: 90 tablet, Rfl: 1    The following portions of the patient's history were reviewed and updated as appropriate: allergies, current medications, past family history, past medical history, past social history, past surgical history and problem list.    Review of Systems   Constitutional: Negative.    HENT: Negative.    Eyes: Negative.    Respiratory: Negative.    Cardiovascular: Negative.    Gastrointestinal: Negative.    Endocrine: Negative.    Genitourinary: Negative.    Musculoskeletal: Negative.    Skin: Negative.    Allergic/Immunologic: Negative.    Neurological: Negative.    Hematological: Negative.    Psychiatric/Behavioral: Negative.        Objective   Physical Exam   Constitutional: He is oriented to person, place, and time. He appears well-developed and well-nourished. No distress.   HENT:   Head: Normocephalic and atraumatic.   Right Ear: External ear normal.   Left Ear: External ear normal.   Nose: Nose normal.   Mouth/Throat: Oropharynx is clear and moist. No oropharyngeal exudate.   Eyes: Pupils are equal, round, and reactive to light. Conjunctivae and EOM are normal. Right eye exhibits no discharge. Left eye exhibits no discharge. No scleral icterus.   Neck: Normal range of motion. Neck supple. No JVD present. No tracheal deviation present. No thyromegaly present.   Cardiovascular: Normal rate, regular rhythm, normal  heart sounds and intact distal pulses.  Exam reveals no gallop and no friction rub.    No murmur heard.  Pulmonary/Chest: Effort normal and breath sounds normal. No stridor. No respiratory distress. He has no wheezes. He has no rales. He exhibits no tenderness.   Abdominal: Soft. Bowel sounds are normal. He exhibits no distension and no mass. There is no tenderness. There is no rebound and no guarding. No hernia.   Musculoskeletal: Normal range of motion. He exhibits no edema, tenderness or deformity.   Lymphadenopathy:     He has no cervical adenopathy.   Neurological: He is alert and oriented to person, place, and time. He has normal reflexes. He displays normal reflexes. No cranial nerve deficit or sensory deficit. He exhibits normal muscle tone. Coordination normal.   Skin: Skin is warm and dry. No rash noted. He is not diaphoretic. No erythema. No pallor.   Psychiatric: He has a normal mood and affect. His behavior is normal. Judgment and thought content normal.   Nursing note and vitals reviewed.       Results for orders placed or performed in visit on 08/02/18   Comprehensive Metabolic Panel   Result Value Ref Range    Glucose 180 (H) 65 - 99 mg/dL    BUN 16 8 - 23 mg/dL    Creatinine 1.71 (H) 0.76 - 1.27 mg/dL    eGFR Non African Am 39 (L) >60 mL/min/1.73    eGFR  Am 48 (L) >60 mL/min/1.73    BUN/Creatinine Ratio 9.4 7.0 - 25.0    Sodium 140 136 - 145 mmol/L    Potassium 4.6 3.5 - 5.2 mmol/L    Chloride 98 98 - 107 mmol/L    Total CO2 28.4 22.0 - 29.0 mmol/L    Calcium 9.6 8.6 - 10.5 mg/dL    Total Protein 7.8 6.0 - 8.5 g/dL    Albumin 4.90 3.50 - 5.20 g/dL    Globulin 2.9 gm/dL    A/G Ratio 1.7 g/dL    Total Bilirubin 0.7 0.1 - 1.2 mg/dL    Alkaline Phosphatase 59 39 - 117 U/L    AST (SGOT) 14 1 - 40 U/L    ALT (SGPT) 22 1 - 41 U/L   C-Peptide   Result Value Ref Range    C-Peptide 2.8 1.1 - 4.4 ng/mL   Hemoglobin A1c   Result Value Ref Range    Hemoglobin A1C 7.00 (H) 4.80 - 5.60 %   Lipid Panel    Result Value Ref Range    Total Cholesterol 153 0 - 200 mg/dL    Triglycerides 200 (H) 0 - 150 mg/dL    HDL Cholesterol 39 (L) 40 - 60 mg/dL    VLDL Cholesterol 40 5 - 40 mg/dL    LDL Cholesterol  74 0 - 100 mg/dL   Uric Acid   Result Value Ref Range    Uric Acid 4.7 3.4 - 7.0 mg/dL   Vitamin D 25 Hydroxy   Result Value Ref Range    25 Hydroxy, Vitamin D 54.9 30.0 - 100.0 ng/ml   TestT+TestF+SHBG   Result Value Ref Range    Testosterone, Total 557 264 - 916 ng/dL    Testosterone, Free 17.8 6.6 - 18.1 pg/mL    Sex Hormone Binding Globulin 28.4 19.3 - 76.4 nmol/L   Hemoglobin & Hematocrit, Blood   Result Value Ref Range    Hemoglobin 17.2 13.7 - 17.6 g/dL    Hematocrit 50.9 40.4 - 52.2 %   T4 & TSH ([facility])   Result Value Ref Range    TSH 2.250 0.270 - 4.200 mIU/mL    T4, Total 6.27 4.50 - 11.70 mcg/dL   MicroAlbumin, Urine, Random   Result Value Ref Range    Microalbumin, Urine 116.0 Not Estab. ug/mL   Cardiovascular Risk Assessment   Result Value Ref Range    Interpretation Note    Diabetes Patient Education   Result Value Ref Range    PDF Image Not applicable          Assessment/Plan   Diagnoses and all orders for this visit:    Type 2 diabetes mellitus with diabetic neuropathy, with long-term current use of insulin (CMS/formerly [facility])  -     T4 & TSH ([facility]); Future  -     TestT+TestF+SHBG; Future  -     Uric Acid; Future  -     Vitamin D 25 Hydroxy; Future  -     Comprehensive Metabolic Panel; Future  -     C-Peptide; Future  -     Hemoglobin A1c; Future  -     Lipid Panel; Future  -     MicroAlbumin, Urine, Random - Urine, Clean Catch; Future  -     PSA DIAGNOSTIC; Future  -     Hemoglobin & Hematocrit, Blood; Future    Essential hypertension  -     T4 & TSH ([facility]); Future  -     TestT+TestF+SHBG; Future  -     Uric Acid; Future  -     Vitamin D 25 Hydroxy; Future  -     Comprehensive Metabolic Panel; Future  -     C-Peptide; Future  -     Hemoglobin A1c; Future  -     Lipid Panel; Future  -     MicroAlbumin,  Urine, Random - Urine, Clean Catch; Future  -     PSA DIAGNOSTIC; Future  -     Hemoglobin & Hematocrit, Blood; Future  -     DEPO-TESTOSTERONE 200 MG/ML injection; Inject 1 mL into the appropriate muscle as directed by prescriber 1 (One) Time Per Week.  -     B-D UF III MINI PEN NEEDLES 31G X 5 MM misc; Use once daily with Lantus Pen for injection of insulin    Coronary artery disease involving native coronary artery of native heart without angina pectoris  -     T4 & TSH ([facility]); Future  -     TestT+TestF+SHBG; Future  -     Uric Acid; Future  -     Vitamin D 25 Hydroxy; Future  -     Comprehensive Metabolic Panel; Future  -     C-Peptide; Future  -     Hemoglobin A1c; Future  -     Lipid Panel; Future  -     MicroAlbumin, Urine, Random - Urine, Clean Catch; Future  -     PSA DIAGNOSTIC; Future  -     Hemoglobin & Hematocrit, Blood; Future    Steatosis of liver  -     T4 & TSH ([facility]); Future  -     TestT+TestF+SHBG; Future  -     Uric Acid; Future  -     Vitamin D 25 Hydroxy; Future  -     Comprehensive Metabolic Panel; Future  -     C-Peptide; Future  -     Hemoglobin A1c; Future  -     Lipid Panel; Future  -     MicroAlbumin, Urine, Random - Urine, Clean Catch; Future  -     PSA DIAGNOSTIC; Future  -     Hemoglobin & Hematocrit, Blood; Future    Vitamin D deficiency  -     T4 & TSH ([facility]); Future  -     TestT+TestF+SHBG; Future  -     Uric Acid; Future  -     Vitamin D 25 Hydroxy; Future  -     Comprehensive Metabolic Panel; Future  -     C-Peptide; Future  -     Hemoglobin A1c; Future  -     Lipid Panel; Future  -     MicroAlbumin, Urine, Random - Urine, Clean Catch; Future  -     PSA DIAGNOSTIC; Future  -     Hemoglobin & Hematocrit, Blood; Future  -     DEPO-TESTOSTERONE 200 MG/ML injection; Inject 1 mL into the appropriate muscle as directed by prescriber 1 (One) Time Per Week.  -     B-D UF III MINI PEN NEEDLES 31G X 5 MM misc; Use once daily with Lantus Pen for injection of insulin    Hypogonadism  in male  -     T4 & TSH ([facility]); Future  -     TestT+TestF+SHBG; Future  -     Uric Acid; Future  -     Vitamin D 25 Hydroxy; Future  -     Comprehensive Metabolic Panel; Future  -     C-Peptide; Future  -     Hemoglobin A1c; Future  -     Lipid Panel; Future  -     MicroAlbumin, Urine, Random - Urine, Clean Catch; Future  -     PSA DIAGNOSTIC; Future  -     Hemoglobin & Hematocrit, Blood; Future  -     DEPO-TESTOSTERONE 200 MG/ML injection; Inject 1 mL into the appropriate muscle as directed by prescriber 1 (One) Time Per Week.  -     B-D UF III MINI PEN NEEDLES 31G X 5 MM misc; Use once daily with Lantus Pen for injection of insulin    Benign non-nodular prostatic hyperplasia with lower urinary tract symptoms  -     T4 & TSH ([facility]); Future  -     TestT+TestF+SHBG; Future  -     Uric Acid; Future  -     Vitamin D 25 Hydroxy; Future  -     Comprehensive Metabolic Panel; Future  -     C-Peptide; Future  -     Hemoglobin A1c; Future  -     Lipid Panel; Future  -     MicroAlbumin, Urine, Random - Urine, Clean Catch; Future  -     PSA DIAGNOSTIC; Future  -     Hemoglobin & Hematocrit, Blood; Future    Diabetic peripheral neuropathy (CMS/HCC)  -     DEPO-TESTOSTERONE 200 MG/ML injection; Inject 1 mL into the appropriate muscle as directed by prescriber 1 (One) Time Per Week.  -     B-D UF III MINI PEN NEEDLES 31G X 5 MM misc; Use once daily with Lantus Pen for injection of insulin    Dyslipidemia  -     DEPO-TESTOSTERONE 200 MG/ML injection; Inject 1 mL into the appropriate muscle as directed by prescriber 1 (One) Time Per Week.  -     B-D UF III MINI PEN NEEDLES 31G X 5 MM misc; Use once daily with Lantus Pen for injection of insulin    Type 2 diabetes mellitus without complication, without long-term current use of insulin (CMS/HCC)  -     DEPO-TESTOSTERONE 200 MG/ML injection; Inject 1 mL into the appropriate muscle as directed by prescriber 1 (One) Time Per Week.  -     B-D UF III MINI PEN NEEDLES 31G X 5 MM  misc; Use once daily with Lantus Pen for injection of insulin    Hyperuricemia  -     DEPO-TESTOSTERONE 200 MG/ML injection; Inject 1 mL into the appropriate muscle as directed by prescriber 1 (One) Time Per Week.  -     B-D UF III MINI PEN NEEDLES 31G X 5 MM misc; Use once daily with Lantus Pen for injection of insulin    Other orders  -     Insulin Glargine (LANTUS SOLOSTAR) 100 UNIT/ML injection pen; 28 units subcutaneously daily  -     icosapent ethyl (VASCEPA) 1 g capsule capsule; Take 2 g by mouth 2 (Two) Times a Day With Meals.  -     LYRICA 100 MG capsule; Take 1 capsule by mouth 2 (Two) Times a Day.               His summary I saw and examined this 73-year-old gentleman for above-mentioned problems.  I reviewed his laboratory evaluation of 08/02/2018 and provided him with a hard copy of it.  Overall he is clinically and metabolically stable and therefore we will go ahead and continue all his current prescriptions.  He will see Ms. Martita Walker in 6 months or sooner if needed with laboratory evaluation prior to each office visit.

## 2018-09-13 ENCOUNTER — OFFICE VISIT (OUTPATIENT)
Dept: FAMILY MEDICINE CLINIC | Facility: CLINIC | Age: 73
End: 2018-09-13

## 2018-09-13 VITALS
WEIGHT: 233 LBS | DIASTOLIC BLOOD PRESSURE: 55 MMHG | TEMPERATURE: 98.3 F | BODY MASS INDEX: 30.88 KG/M2 | SYSTOLIC BLOOD PRESSURE: 140 MMHG | OXYGEN SATURATION: 99 % | HEART RATE: 75 BPM | HEIGHT: 73 IN

## 2018-09-13 DIAGNOSIS — R10.13 EPIGASTRIC PAIN: ICD-10-CM

## 2018-09-13 DIAGNOSIS — R07.9 CHEST PAIN, UNSPECIFIED TYPE: Primary | ICD-10-CM

## 2018-09-13 DIAGNOSIS — K21.9 GASTROESOPHAGEAL REFLUX DISEASE WITHOUT ESOPHAGITIS: ICD-10-CM

## 2018-09-13 LAB
AMYLASE SERPL-CCNC: 75 U/L (ref 28–100)
ERYTHROCYTE [SEDIMENTATION RATE] IN BLOOD BY WESTERGREN METHOD: 3 MM/HR (ref 0–20)
LIPASE SERPL-CCNC: 21 U/L (ref 13–60)

## 2018-09-13 PROCEDURE — 99214 OFFICE O/P EST MOD 30 MIN: CPT | Performed by: INTERNAL MEDICINE

## 2018-09-13 RX ORDER — DEXLANSOPRAZOLE 60 MG/1
60 CAPSULE, DELAYED RELEASE ORAL DAILY
Qty: 10 CAPSULE | Refills: 0 | COMMUNITY
Start: 2018-09-13 | End: 2018-09-21

## 2018-09-13 NOTE — PROGRESS NOTES
Subjective chief complaint is chest and abdominal pain  Earl Marc is a 73 y.o. male.     History of Present Illness   Earl is here today for some chest and abdominal pain.  This is been going on for several months.  It seemed to start after he was taken off his pantoprazole by his kidney doctor and put on Pepcid.  The Pepcid really didn't seem to control his symptoms.  His symptoms that initially were worse at night when he laid recumbent.  It is associated with a lot of belching.  He did see Dr. Washburn who performed an EKG.  This was unchanged.  He has had a recent EGD.  His esophagus actually looked okay.  He did have some gastritis.  Has not seen his cardiologist.  Past history is remarkable for non-insulin-dependent diabetes mellitus, [place] artery disease, history of osteoarthritis and prior chest wall pain and chronic renal insufficiency.  He also has fibromyalgia.  An abdominal ultrasound did not visualize the pancreas well.  The liver was fatty.  The following portions of the patient's history were reviewed and updated as appropriate: allergies, current medications, past medical history and problem list.    Review of Systems   Respiratory: Negative for chest tightness and shortness of breath.    Cardiovascular:        Oddly some of this discomfort was made worse by exercising in particular walking on a treadmill bit rowing did not cause any discomfort.   Gastrointestinal: Negative for blood in stool.   Musculoskeletal: Positive for back pain and neck pain.       Objective   Physical Exam   Constitutional: He appears well-developed and well-nourished.   Cardiovascular: Normal rate, regular rhythm and normal heart sounds.  Exam reveals no friction rub.    No murmur heard.  Pulmonary/Chest: Effort normal and breath sounds normal. No respiratory distress. He has no wheezes. He has no rales. He exhibits tenderness.   As have tenderness on the costal sternal joints as well as the xiphisternal joint.    Abdominal: Soft. Bowel sounds are normal. He exhibits no distension. There is tenderness.   He has diffuse upper quadrant tenderness.   Nursing note and vitals reviewed.        Assessment/Plan   Earl was seen today for chest pain.    Diagnoses and all orders for this visit:    Chest pain, unspecified type    Gastroesophageal reflux disease without esophagitis  -     Amylase  -     Lipase  -     Sedimentation Rate    Epigastric pain  -     Amylase  -     Lipase  -     Sedimentation Rate    Other orders  -     dexlansoprazole (DEXILANT) 60 MG capsule; Take 1 capsule by mouth Daily for 30 days.      Earl is here today for establishment of care but also because of some epigastric and chest discomfort.  I cannot see that any pancreatic enzymes were checked.  I am going to check this.  I'm also going to check a sedimentation rate.  In the interim I'm going to have him try deck salon 60 mg daily.  He is going to call me next week.  If this does not seem to help his discomfort then I do not think his symptoms are going to be gastric in nature.  I did encourage him to continue to take the fate.  He has not been letting it dissolve in water before taking it.  He has been swallowing the tablet whole which has been creating some degree of discomfort.

## 2018-09-21 ENCOUNTER — TELEPHONE (OUTPATIENT)
Dept: FAMILY MEDICINE CLINIC | Facility: CLINIC | Age: 73
End: 2018-09-21

## 2018-09-21 RX ORDER — DEXLANSOPRAZOLE 60 MG/1
60 CAPSULE, DELAYED RELEASE ORAL DAILY
Qty: 30 CAPSULE | Refills: 1 | Status: SHIPPED | OUTPATIENT
Start: 2018-09-21 | End: 2018-11-20

## 2018-09-21 NOTE — TELEPHONE ENCOUNTER
----- Message from Katie Mccauley sent at 9/21/2018 10:01 AM EDT -----  PT THINKS MED IS STARTING TO WORK AND NEEDS A NEW RX  #DEXELANT  HE IS STILL GETTING CHEST PAIN.  PLEASE CALL [phone]  He does think the Dexilant is helping some.  He still having some of the chest pain which may be more chest wall pain.  I'm going to send this to his local pharmacy.

## 2018-10-08 DIAGNOSIS — E11.9 TYPE 2 DIABETES MELLITUS WITHOUT COMPLICATION, WITHOUT LONG-TERM CURRENT USE OF INSULIN (HCC): ICD-10-CM

## 2018-10-08 DIAGNOSIS — E79.0 HYPERURICEMIA: ICD-10-CM

## 2018-10-08 DIAGNOSIS — E78.5 DYSLIPIDEMIA: ICD-10-CM

## 2018-10-08 DIAGNOSIS — E11.42 DIABETIC PERIPHERAL NEUROPATHY (HCC): ICD-10-CM

## 2018-10-08 DIAGNOSIS — E29.1 HYPOGONADISM IN MALE: ICD-10-CM

## 2018-10-08 DIAGNOSIS — I10 ESSENTIAL HYPERTENSION: ICD-10-CM

## 2018-10-08 DIAGNOSIS — E55.9 VITAMIN D DEFICIENCY: ICD-10-CM

## 2018-10-08 RX ORDER — LORATADINE 10 MG
1 TABLET ORAL DAILY
Qty: 400 EACH | Refills: 1 | Status: SHIPPED | OUTPATIENT
Start: 2018-10-08 | End: 2019-02-19 | Stop reason: SDUPTHER

## 2018-10-08 RX ORDER — LANCETS
EACH MISCELLANEOUS
Qty: 200 EACH | Refills: 1 | Status: SHIPPED | OUTPATIENT
Start: 2018-10-08 | End: 2019-07-18 | Stop reason: SDUPTHER

## 2018-10-16 ENCOUNTER — APPOINTMENT (OUTPATIENT)
Dept: GENERAL RADIOLOGY | Facility: HOSPITAL | Age: 73
End: 2018-10-16

## 2018-10-16 ENCOUNTER — APPOINTMENT (OUTPATIENT)
Dept: CARDIOLOGY | Facility: HOSPITAL | Age: 73
End: 2018-10-16

## 2018-10-16 ENCOUNTER — HOSPITAL ENCOUNTER (OUTPATIENT)
Facility: HOSPITAL | Age: 73
Discharge: HOME | End: 2018-10-19
Attending: EMERGENCY MEDICINE | Admitting: INTERNAL MEDICINE

## 2018-10-16 DIAGNOSIS — R07.9 CHEST PAIN WITH HIGH RISK FOR CARDIAC ETIOLOGY: ICD-10-CM

## 2018-10-16 DIAGNOSIS — I20.8 OTHER FORMS OF ANGINA PECTORIS (HCC): ICD-10-CM

## 2018-10-16 DIAGNOSIS — R07.9 CHEST PAIN, UNSPECIFIED TYPE: Primary | ICD-10-CM

## 2018-10-16 LAB
ALBUMIN SERPL-MCNC: 4.5 G/DL (ref 3.5–5.2)
ALBUMIN/GLOB SERPL: 1.4 G/DL
ALP SERPL-CCNC: 64 U/L (ref 39–117)
ALT SERPL W P-5'-P-CCNC: 21 U/L (ref 1–41)
ANION GAP SERPL CALCULATED.3IONS-SCNC: 13.3 MMOL/L
AST SERPL-CCNC: 17 U/L (ref 1–40)
BASOPHILS # BLD AUTO: 0.04 10*3/MM3 (ref 0–0.2)
BASOPHILS NFR BLD AUTO: 0.6 % (ref 0–1.5)
BILIRUB SERPL-MCNC: 0.8 MG/DL (ref 0.1–1.2)
BILIRUB UR QL STRIP: NEGATIVE
BUN BLD-MCNC: 14 MG/DL (ref 8–23)
BUN/CREAT SERPL: 9.1 (ref 7–25)
CALCIUM SPEC-SCNC: 10.2 MG/DL (ref 8.6–10.5)
CHLORIDE SERPL-SCNC: 99 MMOL/L (ref 98–107)
CLARITY UR: CLEAR
CO2 SERPL-SCNC: 25.7 MMOL/L (ref 22–29)
COLOR UR: YELLOW
CREAT BLD-MCNC: 1.54 MG/DL (ref 0.76–1.27)
DEPRECATED RDW RBC AUTO: 46.8 FL (ref 37–54)
EOSINOPHIL # BLD AUTO: 0.15 10*3/MM3 (ref 0–0.7)
EOSINOPHIL NFR BLD AUTO: 2.3 % (ref 0.3–6.2)
ERYTHROCYTE [DISTWIDTH] IN BLOOD BY AUTOMATED COUNT: 14.6 % (ref 11.5–14.5)
GFR SERPL CREATININE-BSD FRML MDRD: 45 ML/MIN/1.73
GLOBULIN UR ELPH-MCNC: 3.3 GM/DL
GLUCOSE BLD-MCNC: 163 MG/DL (ref 65–99)
GLUCOSE BLDC GLUCOMTR-MCNC: 188 MG/DL (ref 70–130)
GLUCOSE UR STRIP-MCNC: NEGATIVE MG/DL
HCT VFR BLD AUTO: 49.6 % (ref 40.4–52.2)
HGB BLD-MCNC: 17 G/DL (ref 13.7–17.6)
HGB UR QL STRIP.AUTO: NEGATIVE
HOLD SPECIMEN: NORMAL
HOLD SPECIMEN: NORMAL
IMM GRANULOCYTES # BLD: 0.02 10*3/MM3 (ref 0–0.03)
IMM GRANULOCYTES NFR BLD: 0.3 % (ref 0–0.5)
KETONES UR QL STRIP: ABNORMAL
LEUKOCYTE ESTERASE UR QL STRIP.AUTO: NEGATIVE
LYMPHOCYTES # BLD AUTO: 1.94 10*3/MM3 (ref 0.9–4.8)
LYMPHOCYTES NFR BLD AUTO: 29.5 % (ref 19.6–45.3)
MCH RBC QN AUTO: 29.9 PG (ref 27–32.7)
MCHC RBC AUTO-ENTMCNC: 34.3 G/DL (ref 32.6–36.4)
MCV RBC AUTO: 87.2 FL (ref 79.8–96.2)
MONOCYTES # BLD AUTO: 0.43 10*3/MM3 (ref 0.2–1.2)
MONOCYTES NFR BLD AUTO: 6.5 % (ref 5–12)
NEUTROPHILS # BLD AUTO: 4.01 10*3/MM3 (ref 1.9–8.1)
NEUTROPHILS NFR BLD AUTO: 61.1 % (ref 42.7–76)
NITRITE UR QL STRIP: NEGATIVE
PH UR STRIP.AUTO: 6 [PH] (ref 5–8)
PLATELET # BLD AUTO: 187 10*3/MM3 (ref 140–500)
PMV BLD AUTO: 10.9 FL (ref 6–12)
POTASSIUM BLD-SCNC: 4.3 MMOL/L (ref 3.5–5.2)
PROT SERPL-MCNC: 7.8 G/DL (ref 6–8.5)
PROT UR QL STRIP: NEGATIVE
RBC # BLD AUTO: 5.69 10*6/MM3 (ref 4.6–6)
SODIUM BLD-SCNC: 138 MMOL/L (ref 136–145)
SP GR UR STRIP: 1.02 (ref 1–1.03)
TROPONIN T SERPL-MCNC: <0.01 NG/ML (ref 0–0.03)
TROPONIN T SERPL-MCNC: <0.01 NG/ML (ref 0–0.03)
UROBILINOGEN UR QL STRIP: ABNORMAL
WBC NRBC COR # BLD: 6.57 10*3/MM3 (ref 4.5–10.7)
WHOLE BLOOD HOLD SPECIMEN: NORMAL
WHOLE BLOOD HOLD SPECIMEN: NORMAL

## 2018-10-16 PROCEDURE — 84484 ASSAY OF TROPONIN QUANT: CPT | Performed by: NURSE PRACTITIONER

## 2018-10-16 PROCEDURE — 99219 PR INITIAL OBSERVATION CARE/DAY 50 MINUTES: CPT | Performed by: INTERNAL MEDICINE

## 2018-10-16 PROCEDURE — 93306 TTE W/DOPPLER COMPLETE: CPT | Performed by: INTERNAL MEDICINE

## 2018-10-16 PROCEDURE — G0378 HOSPITAL OBSERVATION PER HR: HCPCS

## 2018-10-16 PROCEDURE — 82962 GLUCOSE BLOOD TEST: CPT

## 2018-10-16 PROCEDURE — 93005 ELECTROCARDIOGRAM TRACING: CPT

## 2018-10-16 PROCEDURE — 93306 TTE W/DOPPLER COMPLETE: CPT

## 2018-10-16 PROCEDURE — 80053 COMPREHEN METABOLIC PANEL: CPT | Performed by: PHYSICIAN ASSISTANT

## 2018-10-16 PROCEDURE — 84484 ASSAY OF TROPONIN QUANT: CPT | Performed by: PHYSICIAN ASSISTANT

## 2018-10-16 PROCEDURE — 63710000001 INSULIN ASPART PER 5 UNITS: Performed by: HOSPITALIST

## 2018-10-16 PROCEDURE — 99284 EMERGENCY DEPT VISIT MOD MDM: CPT

## 2018-10-16 PROCEDURE — 93005 ELECTROCARDIOGRAM TRACING: CPT | Performed by: EMERGENCY MEDICINE

## 2018-10-16 PROCEDURE — 93010 ELECTROCARDIOGRAM REPORT: CPT | Performed by: INTERNAL MEDICINE

## 2018-10-16 PROCEDURE — 81003 URINALYSIS AUTO W/O SCOPE: CPT | Performed by: INTERNAL MEDICINE

## 2018-10-16 PROCEDURE — 71045 X-RAY EXAM CHEST 1 VIEW: CPT

## 2018-10-16 PROCEDURE — 85025 COMPLETE CBC W/AUTO DIFF WBC: CPT | Performed by: PHYSICIAN ASSISTANT

## 2018-10-16 RX ORDER — ISOSORBIDE MONONITRATE 30 MG/1
30 TABLET, EXTENDED RELEASE ORAL
Status: DISCONTINUED | OUTPATIENT
Start: 2018-10-16 | End: 2018-10-19 | Stop reason: HOSPADM

## 2018-10-16 RX ORDER — FAMOTIDINE 40 MG/1
40 TABLET, FILM COATED ORAL 2 TIMES DAILY
Status: DISCONTINUED | OUTPATIENT
Start: 2018-10-16 | End: 2018-10-16

## 2018-10-16 RX ORDER — PREGABALIN 50 MG/1
50 CAPSULE ORAL 3 TIMES DAILY
Status: ON HOLD | COMMUNITY
End: 2018-10-19

## 2018-10-16 RX ORDER — MELATONIN
1000 DAILY
Status: DISCONTINUED | OUTPATIENT
Start: 2018-10-16 | End: 2018-10-19 | Stop reason: HOSPADM

## 2018-10-16 RX ORDER — PANTOPRAZOLE SODIUM 40 MG/1
40 TABLET, DELAYED RELEASE ORAL
Status: DISCONTINUED | OUTPATIENT
Start: 2018-10-17 | End: 2018-10-17 | Stop reason: ALTCHOICE

## 2018-10-16 RX ORDER — PREGABALIN 50 MG/1
50 CAPSULE ORAL 3 TIMES DAILY PRN
Status: DISCONTINUED | OUTPATIENT
Start: 2018-10-16 | End: 2018-10-19 | Stop reason: HOSPADM

## 2018-10-16 RX ORDER — TAMSULOSIN HYDROCHLORIDE 0.4 MG/1
0.4 CAPSULE ORAL NIGHTLY
Status: DISCONTINUED | OUTPATIENT
Start: 2018-10-16 | End: 2018-10-19 | Stop reason: HOSPADM

## 2018-10-16 RX ORDER — ZOLPIDEM TARTRATE 5 MG/1
5 TABLET ORAL NIGHTLY PRN
Status: DISCONTINUED | OUTPATIENT
Start: 2018-10-16 | End: 2018-10-19 | Stop reason: HOSPADM

## 2018-10-16 RX ORDER — NITROGLYCERIN 0.4 MG/1
0.4 TABLET SUBLINGUAL
Status: DISCONTINUED | OUTPATIENT
Start: 2018-10-16 | End: 2018-10-19 | Stop reason: HOSPADM

## 2018-10-16 RX ORDER — PROMETHAZINE HYDROCHLORIDE 25 MG/1
25 TABLET ORAL EVERY 6 HOURS PRN
COMMUNITY
End: 2019-02-19

## 2018-10-16 RX ORDER — ALLOPURINOL 100 MG/1
100 TABLET ORAL DAILY
Status: DISCONTINUED | OUTPATIENT
Start: 2018-10-16 | End: 2018-10-19 | Stop reason: HOSPADM

## 2018-10-16 RX ORDER — FAMOTIDINE 20 MG/1
20 TABLET, FILM COATED ORAL 2 TIMES DAILY
Status: DISCONTINUED | OUTPATIENT
Start: 2018-10-16 | End: 2018-10-16

## 2018-10-16 RX ORDER — SODIUM CHLORIDE 0.9 % (FLUSH) 0.9 %
3 SYRINGE (ML) INJECTION EVERY 12 HOURS SCHEDULED
Status: DISCONTINUED | OUTPATIENT
Start: 2018-10-16 | End: 2018-10-16

## 2018-10-16 RX ORDER — ASPIRIN 81 MG/1
81 TABLET ORAL DAILY
Status: DISCONTINUED | OUTPATIENT
Start: 2018-10-16 | End: 2018-10-19 | Stop reason: HOSPADM

## 2018-10-16 RX ORDER — SIMETHICONE 80 MG
80 TABLET,CHEWABLE ORAL 4 TIMES DAILY PRN
Status: DISCONTINUED | OUTPATIENT
Start: 2018-10-16 | End: 2018-10-19 | Stop reason: HOSPADM

## 2018-10-16 RX ORDER — LATANOPROST 50 UG/ML
1 SOLUTION/ DROPS OPHTHALMIC NIGHTLY
Status: DISCONTINUED | OUTPATIENT
Start: 2018-10-16 | End: 2018-10-19 | Stop reason: HOSPADM

## 2018-10-16 RX ORDER — ZOLPIDEM TARTRATE 12.5 MG/1
12.5 TABLET, FILM COATED, EXTENDED RELEASE ORAL NIGHTLY PRN
COMMUNITY
End: 2018-10-19 | Stop reason: HOSPADM

## 2018-10-16 RX ORDER — SODIUM CHLORIDE 0.9 % (FLUSH) 0.9 %
3-10 SYRINGE (ML) INJECTION AS NEEDED
Status: DISCONTINUED | OUTPATIENT
Start: 2018-10-16 | End: 2018-10-19 | Stop reason: HOSPADM

## 2018-10-16 RX ADMIN — INSULIN ASPART 2 UNITS: 100 INJECTION, SOLUTION INTRAVENOUS; SUBCUTANEOUS at 21:50

## 2018-10-16 RX ADMIN — LATANOPROST 1 DROP: 50 SOLUTION OPHTHALMIC at 21:53

## 2018-10-16 RX ADMIN — TAMSULOSIN HYDROCHLORIDE 0.4 MG: 0.4 CAPSULE ORAL at 18:32

## 2018-10-16 RX ADMIN — ISOSORBIDE MONONITRATE 30 MG: 30 TABLET ORAL at 18:32

## 2018-10-16 RX ADMIN — ZOLPIDEM TARTRATE 5 MG: 5 TABLET ORAL at 22:51

## 2018-10-16 RX ADMIN — ASPIRIN 81 MG: 81 TABLET, DELAYED RELEASE ORAL at 21:50

## 2018-10-16 RX ADMIN — PREGABALIN 50 MG: 50 CAPSULE ORAL at 22:51

## 2018-10-16 NOTE — CONSULTS
Internal medicine consult    Referring physician  Dr. MILLS    Primary care physician      Chief complaint  Chest pain    Reason for consult  Follow medical problems    History of present illness 73-year-old white male with known coronary artery disease status post multiple distended 2016 other medical problem diabetes mellitus hypertension hyperlipidemia asthma gastroesophageal reflux disease presented to [facility] emergency room with left-sided chest discomfort with shortness of breath nausea for last 3-4 months which is getting worst.  Patient denies any fever chills cough night sweats weight loss or weight gain.  Patient evaluated in ER found to have unstable angina admitted for management.  I'm asked to follow the patient for medical problems.  At the time of interview he still has some chest discomfort but no shortness of breath or associated symptoms.  Patient stated that pain became worse with exertion.    PAST MEDICAL HISTORY  • Abnormal cardiovascular stress test     • Acid reflux     • Arthritis     • Asthma     • Cancer (CMS/HCC)     • Chronic kidney disease     • Coronary artery disease     • Diabetes mellitus (CMS/HCC)     • Diabetic neuropathy associated with type 2 diabetes mellitus (CMS/HCC)     • Dyslipidemia     • Erectile dysfunction     • Fatty liver disease, nonalcoholic     • Gastritis     • Glaucoma     • Hyperlipidemia     • Hypertension     • Hypogonadism male     • Type 2 diabetes mellitus (CMS/HCC)        PAST SURGICAL HISTORY  Surgical History         Past Surgical History:   Procedure Laterality Date   • CARDIAC CATHETERIZATION N/A 3/25/2016     Procedure: Left Heart Cath;  Surgeon: Maria E Gilbert MD;  Location: [facility] INVASIVE LOCATION;  Service:    • CARDIAC CATHETERIZATION N/A 3/25/2016     Procedure: Coronary angiography;  Surgeon: Maria E Gilbert MD;  Location: [facility] INVASIVE LOCATION;  Service:    • CARDIAC CATHETERIZATION N/A 3/28/2016      Procedure: Coronary angiography;  Surgeon: Maria E Gilbert MD;  Location:  [facility] CATH INVASIVE LOCATION;  Service:    • CARDIAC CATHETERIZATION N/A 3/28/2016     Procedure: Stent MOISE coronary;  Surgeon: Maria E Gilbert MD;  Location:  [facility] CATH INVASIVE LOCATION;  Service:    • CAROTID STENT       • CORONARY ANGIOPLASTY       • NECK EXPLORATION N/A           FAMILY HISTORY        Family History   Problem Relation Age of Onset   • Breast cancer Daughter     • Heart attack Mother     • Hypertension Mother     • Heart disease Mother     • Thyroid disease Mother     • Lupus Mother     • Heart attack Brother     • Heart disease Brother     • Hyperlipidemia Brother     • Cancer Brother     • Depression Father     • Stroke Maternal Grandmother        SOCIAL HISTORY  Social History   Social History            Social History   • Marital status:        Spouse name: Ivette   • Number of children: 1   • Years of education: N/A           Occupational History   • Retired              Social History Main Topics   • Smoking status: Never Smoker   • Smokeless tobacco: Never Used   • Alcohol use No   • Drug use: No   • Sexual activity: Yes       Partners: Female           Other Topics Concern   • Not on file          Social History Narrative   • No narrative on file         ALLERGIES  Other and Prazosin   Home medications reviewed     REVIEW OF SYSTEMS  Review of Systems   Constitutional: Negative for chills and diaphoresis.   HENT: Negative for congestion, rhinorrhea and sore throat.    Eyes: Negative for pain.   Respiratory: Negative for cough and shortness of breath.    Cardiovascular: Positive for chest pain. Negative for palpitations and leg swelling.   Gastrointestinal: Negative for abdominal pain, diarrhea, nausea and vomiting.   Genitourinary: Negative for difficulty urinating, dysuria, flank pain and frequency.   Musculoskeletal: Negative for myalgias, neck pain and neck stiffness.   Skin: Negative  "for rash.   Neurological: Negative for dizziness, speech difficulty, weakness, light-headedness, numbness and headaches.   Psychiatric/Behavioral: Negative.    All other systems reviewed and are negative.     PHYSICAL EXAM  Blood pressure 173/86, pulse 72, temperature 98.7 °F (37.1 °C), resp. rate 16, height 185.4 cm (73\"), weight 99.8 kg (220 lb), SpO2 97 %.    Constitutional: He is oriented to person, place, and time. No distress.   Head: Normocephalic.   Mouth/Throat: Mucous membranes are normal.   Eyes: Pupils are equal, round, and reactive to light. EOM are normal.   Neck: Normal range of motion. Neck supple.   Cardiovascular: Normal rate, regular rhythm and normal heart sounds.    Pulmonary/Chest: Effort normal and breath sounds normal. No respiratory distress. He has no decreased breath sounds. He has no wheezes. He has no rhonchi. He has no rales. He exhibits tenderness (of the anterior chest wall).   Abdominal: Soft. There is no tenderness. There is no rebound and no guarding.   Musculoskeletal: Normal range of motion.   Neurological: He is alert and oriented to person, place, and time. He has normal sensation.   Skin: Skin is warm and dry.   Psychiatric: Mood and affect normal.     LAB RESULTS  Lab Results (last 24 hours)     Procedure Component Value Units Date/Time    Troponin [[id number]]  (Normal) Collected:  10/16/18 1600    Specimen:  Blood Updated:  10/16/18 1633     Troponin T <0.010 ng/mL     Narrative:       Troponin T Reference Ranges:  Less than 0.03 ng/mL:    Negative for AMI  0.03 to 0.09 ng/mL:      Indeterminant for AMI  Greater than 0.09 ng/mL: Positive for AMI    [place] Draw [[id number]] Collected:  10/16/18 1048    Specimen:  Blood Updated:  10/16/18 1201    Narrative:       The following orders were created for panel order [place] Draw.  Procedure                               Abnormality         Status                     ---------                               -----------         " ------                     Light Blue Top[[id number]]                                   Final result               Green Top (Gel)[[id number]]                                  Final result               Lavender Top[[id number]]                                     Final result               Gold Top - SST[[id number]]                                   Final result                 Please view results for these tests on the individual orders.    Light Blue Top [[id number]] Collected:  10/16/18 1048    Specimen:  Blood Updated:  10/16/18 1201     Extra Tube hold for add-on     Comment: Auto resulted       Green Top (Gel) [[id number]] Collected:  10/16/18 1048    Specimen:  Blood Updated:  10/16/18 1201     Extra Tube Hold for add-ons.     Comment: Auto resulted.       Lavender Top [[id number]] Collected:  10/16/18 1048    Specimen:  Blood Updated:  10/16/18 1201     Extra Tube hold for add-on     Comment: Auto resulted       Gold Top - SST [[id number]] Collected:  10/16/18 1048    Specimen:  Blood Updated:  10/16/18 1201     Extra Tube Hold for add-ons.     Comment: Auto resulted.       Troponin [[id number]]  (Normal) Collected:  10/16/18 1048    Specimen:  Blood Updated:  10/16/18 1132     Troponin T <0.010 ng/mL     Narrative:       Troponin T Reference Ranges:  Less than 0.03 ng/mL:    Negative for AMI  0.03 to 0.09 ng/mL:      Indeterminant for AMI  Greater than 0.09 ng/mL: Positive for AMI    Comprehensive Metabolic Panel [[id number]]  (Abnormal) Collected:  10/16/18 1048    Specimen:  Blood Updated:  10/16/18 1131     Glucose 163 (H) mg/dL      BUN 14 mg/dL      Creatinine 1.54 (H) mg/dL      Sodium 138 mmol/L      Potassium 4.3 mmol/L      Chloride 99 mmol/L      CO2 25.7 mmol/L      Calcium 10.2 mg/dL      Total Protein 7.8 g/dL      Albumin 4.50 g/dL      ALT (SGPT) 21 U/L      AST (SGOT) 17 U/L      Alkaline Phosphatase 64 U/L      Total Bilirubin 0.8 mg/dL      eGFR Non African Amer 45 (L) mL/min/1.73      Globulin 3.3  gm/dL      A/G Ratio 1.4 g/dL      BUN/Creatinine Ratio 9.1     Anion Gap 13.3 mmol/L     Narrative:       The MDRD GFR formula is only valid for adults with stable renal function between ages 18 and 70.    CBC & Differential [[id number]] Collected:  10/16/18 1048    Specimen:  Blood Updated:  10/16/18 1125    Narrative:       The following orders were created for panel order CBC & Differential.  Procedure                               Abnormality         Status                     ---------                               -----------         ------                     CBC Auto Differential[[id number]]        Abnormal            Final result                 Please view results for these tests on the individual orders.    CBC Auto Differential [[id number]]  (Abnormal) Collected:  10/16/18 1048    Specimen:  Blood Updated:  10/16/18 1125     WBC 6.57 10*3/mm3      RBC 5.69 10*6/mm3      Hemoglobin 17.0 g/dL      Hematocrit 49.6 %      MCV 87.2 fL      MCH 29.9 pg      MCHC 34.3 g/dL      RDW 14.6 (H) %      RDW-SD 46.8 fl      MPV 10.9 fL      Platelets 187 10*3/mm3      Neutrophil % 61.1 %      Lymphocyte % 29.5 %      Monocyte % 6.5 %      Eosinophil % 2.3 %      Basophil % 0.6 %      Immature Grans % 0.3 %      Neutrophils, Absolute 4.01 10*3/mm3      Lymphocytes, Absolute 1.94 10*3/mm3      Monocytes, Absolute 0.43 10*3/mm3      Eosinophils, Absolute 0.15 10*3/mm3      Basophils, Absolute 0.04 10*3/mm3      Immature Grans, Absolute 0.02 10*3/mm3         Imaging Results (last 24 hours)     Procedure Component Value Units Date/Time    XR Chest 1 View [[id number]] Collected:  10/16/18 1212     Updated:  10/16/18 1216    Narrative:       Portable chest radiograph     HISTORY: Chest pain     TECHNIQUE: Single AP portable radiograph of the chest     COMPARISON: Chest radiograph 6/14/2016     FINDINGS:  The lungs are clear. No pleural effusion or pneumothorax is seen. The  heart is normal in size.       Impression:       1.   No findings of acute cardiopulmonary pathology.     This report was finalized on 10/16/2018 12:13 PM by Dr. Yehuda Chapman M.D.         EKG:                             Rhythm/Rate: NSR rate 74  P waves and AL: Normal P waves  QRS, axis: Normal QRS   ST and T waves: T wave inversion in aVL       Current Facility-Administered Medications:   •  allopurinol (ZYLOPRIM) tablet 100 mg, 100 mg, Oral, Daily, Dhruv Hankins MD  •  aspirin EC tablet 81 mg, 81 mg, Oral, Daily, Lidia Thayer APRN  •  cholecalciferol (VITAMIN D3) tablet 1,000 Units, 1,000 Units, Oral, Daily, Dhruv Hankins MD  •  [START ON 10/17/2018] enoxaparin (LOVENOX) syringe 30 mg, 30 mg, Subcutaneous, Q24H, Dhruv Hankins MD  •  insulin aspart (novoLOG) injection 0-7 Units, 0-7 Units, Subcutaneous, 4x Daily With Meals & Nightly, Dhruv Hankins MD  •  latanoprost (XALATAN) 0.005 % ophthalmic solution 1 drop, 1 drop, Both Eyes, Nightly, Dhruv Hankins MD  •  [START ON 10/17/2018] metoprolol tartrate (LOPRESSOR) tablet 25 mg, 25 mg, Oral, Q12H, Dhruv Hankins MD  •  nitroglycerin (NITROSTAT) SL tablet 0.4 mg, 0.4 mg, Sublingual, Q5 Min PRN, Lidia Thayer APRN  •  [START ON 10/17/2018] pantoprazole (PROTONIX) EC tablet 40 mg, 40 mg, Oral, Q AM, Dhruv Hankins MD  •  sodium chloride 0.9 % flush 3-10 mL, 3-10 mL, Intravenous, PRN, Lidia Thayer APRN  •  tamsulosin (FLOMAX) 24 hr capsule 0.4 mg, 0.4 mg, Oral, Nightly, Dhruv Hankins MD     ASSESSMENT  Chest pain with known coronary artery disease consistent with unstable angina  Diabetes mellitus  Hypertension  Hyperlipidemia  Gastroesophageal reflux disease  Chronic kidney disease stage III    PLAN  Agree with current care  Serial cardiac enzymes and EKG  Check 2-D echo  Continue home medication and adjust the doses  Accu-Chek with sliding scale insulin  Stress ulcer DVT prophylaxis  Supportive care  Check hemoglobin A1c and TSH lipid profile  Repeat labs in the morning  Will follow with Dr. MILLS  further recommendation according to hospital course    CANDICE ENRIQUEZ MD

## 2018-10-16 NOTE — PLAN OF CARE
Problem: Patient Care Overview  Goal: Plan of Care Review  Outcome: Ongoing (interventions implemented as appropriate)   10/16/18 5860   Coping/Psychosocial   Plan of Care Reviewed With patient   Plan of Care Review   Progress improving   OTHER   Outcome Summary Pt admitted through the ED with complaints of chest pain. Pt with no complaints since arrival to floor. Plan is for a stress test in the AM, NPO at midnight. VSS. Will continue to monitor.        Problem: Cardiac: ACS (Acute Coronary Syndrome) (Adult)  Goal: Signs and Symptoms of Listed Potential Problems Will be Absent, Minimized or Managed (Cardiac: ACS)  Outcome: Ongoing (interventions implemented as appropriate)

## 2018-10-16 NOTE — H&P
"[facility]  History & Physical Note                                                                                    Patient Identification:  Earl Marc:   73 y.o.  male  1945  [id number]            Chief Complaint   Patient presents with   • Chest Pain       Date of Admission:10/16/18    Admitting Physician: Dr. Gilbert    Reason for Admission:Chest pain, unspecified type [R07.9], Chief Complaint   Patient presents with   • Chest Pain       History of Present Illness:     Earl Marc is a 74 yo male with h/o CAD, HTN, HLD, chronic renal insufficiency, hypercholesterolemia, and DM.   He had a cardiac catheterization in 2016 with stent placement    He for which pt underwent coronary angioplasty with stent placement in 2016. Pt also has h/o chronic renal disease for which pt is currently being followed by a nephrologist. Pt presents to the ED c/o intermittent episodes of \"pounding\" sternal chest pain onset about 3-4 months ago (\"like someone is pounding a sledgehammer to my chest\"). No radiation. Pt reports that his chest pain worsens with exertion and inspiration. Pt's chest pain improves with rest. Pt states that over the past several days, pt's episodes of chest pain have become more frequent and even minimal exertion will worsen his chest pain.     Pt has taken antacids and nitro without without any relief.       Pt denies nausea, vomiting, diaphoresis, dyspnea, palpitations, abdominal pain, BLE edema, bilateral/unilateral calf pain, and dizziness/lightheadedness. Pt was seen for this at his nephrologist's office earlier today and was referred to the ER for further evaluation. There are no other complaints at this time.         Pain Location: Sternum    Cardiac Risk Factors: DM, CAD, HTN    Past Medical History:  Past Medical History:   Diagnosis Date   • Abnormal cardiovascular stress test    • Acid reflux    • Arthritis    • Asthma    • Cancer (CMS/HCC)     skin    • Chronic " kidney disease    • Coronary artery disease    • Diabetes mellitus (CMS/HCC)    • Diabetic neuropathy associated with type 2 diabetes mellitus (CMS/HCC)    • Dyslipidemia    • Erectile dysfunction    • Fatty liver disease, nonalcoholic    • Gastritis    • Glaucoma     both eyes   • Hyperlipidemia    • Hypertension    • Hypogonadism male    • Type 2 diabetes mellitus (CMS/HCC)     at least 3 stable    Past Surgical History:  Past Surgical History:   Procedure Laterality Date   • CARDIAC CATHETERIZATION N/A 3/25/2016    Procedure: Left Heart Cath;  Surgeon: Maria E Gilbert MD;  Location[facility] CATH [facility];  Service:    • CARDIAC CATHETERIZATION N/A 3/25/2016    Procedure: Coronary angiography;  Surgeon: Maria E Gilbert MD;  Location:  [facility] CATH INVASIVE LOCATION;  Service:    • CARDIAC CATHETERIZATION N/A 3/28/2016    Procedure: Coronary angiography;  Surgeon: Maria E Gilbert MD;  Location:  [facility] CATH INVASIVE LOCATION;  Service:    • CARDIAC CATHETERIZATION N/A 3/28/2016    Procedure: Stent MOISE coronary;  Surgeon: Maria E Gilbert MD;  [facility];  Service:    • CAROTID STENT     • CORONARY ANGIOPLASTY     • NECK EXPLORATION N/A         Social History:   Social History   Substance Use Topics   • Smoking status: Never Smoker   • Smokeless tobacco: Never Used   • Alcohol use No        Family History:  Family History   Problem Relation Age of Onset   • Breast cancer Daughter    • Heart attack Mother    • Hypertension Mother    • Heart disease Mother    • Thyroid disease Mother    • Lupus Mother    • Heart attack Brother    • Heart disease Brother    • Hyperlipidemia Brother    • Cancer Brother    • Depression Father    • Stroke Maternal Grandmother           Allergies:  Allergies   Allergen Reactions   • Other      Lotions- anything with palm oil or lanolin per pt (verified 6/15/16 1910)   • Prazosin        Home Meds:  No current facility-administered  medications on file prior to encounter.      Current Outpatient Prescriptions on File Prior to Encounter   Medication Sig Dispense Refill   • Alcohol Swabs ([employer] PREP) 70 % pads Place 1 application on the skin as directed by provider Daily. 400 each 1   • allopurinol (ZYLOPRIM) 100 MG tablet TAKE 1 TABLET DAILY 90 tablet 3   • ascorbic acid (VITAMIN C) 1000 MG tablet Take 500 mg by mouth Daily.     • B-D UF III MINI PEN NEEDLES 31G X 5 MM misc Use once daily with Lantus Pen for injection of insulin 100 each 3   • butenafine (LOTRIMIN ULTRA) 1 % cream Apply  topically 2 (Two) Times a Day.     • cyanocobalamin 1000 MCG/ML injection Cyanocobalamin 1000 MCG/ML Injection Solution; Patient Sig: Cyanocobalamin 1000 MCG/ML Injection Solution INJECT 1ML INTRAMUSCULARLY EVERY OTHER WEEK; 0; 17-Feb-2015; Active     • DEPO-TESTOSTERONE 200 MG/ML injection Inject 1 mL into the appropriate muscle as directed by prescriber 1 (One) Time Per Week. 14 mL 1   • dexlansoprazole (DEXILANT) 60 MG capsule Take 1 capsule by mouth Daily for 60 doses. 30 capsule 1   • famotidine (PEPCID) 40 MG tablet Take 40 mg by mouth 2 (Two) Times a Day.     • fexofenadine (ALLEGRA) 180 MG tablet Take 180 mg by mouth Daily As Needed.     • fluticasone (FLONASE) 50 MCG/ACT nasal spray 1 spray into each nostril 2 (two) times a day as needed.     • folic acid (FOLVITE) 800 MCG tablet Take 800 mcg by mouth Daily.     • glucose blood test strip Precision Xtra Blood Glucose In Vitro Strip; Patient Sig: Precision Xtra Blood Glucose In Vitro Strip TEST 2  TIMES DAILY 200 each 3   • hydrocortisone 2.5 % ointment Apply  topically 2 (two) times a day.     • icosapent ethyl (VASCEPA) 1 g capsule capsule Take 2 g by mouth 2 (Two) Times a Day With Meals. 360 capsule 1   • Insulin Glargine (LANTUS SOLOSTAR) 100 UNIT/ML injection pen 28 units subcutaneously daily 15 pen 3   • ketoconazole (NIZORAL) 2 % shampoo      • L-Methylfolate-Algae-B12-B6 (METANX PO) Take  by mouth  2 (Two) Times a Day.     • latanoprost (XALATAN) 0.005 % ophthalmic solution      • LYRICA 100 MG capsule Take 1 capsule by mouth 2 (Two) Times a Day. 180 capsule 1   • metoprolol tartrate (LOPRESSOR) 25 MG tablet Take 1 tablet by mouth Daily. 90 tablet 3   • MONOLET LANCETS misc Use to test BG 2 times daily 200 each 1   • Multiple Vitamins-Minerals (MULTIMINERAL PLUS) tablet Take  by mouth daily.     • nitroglycerin (NITROSTAT) 0.4 MG SL tablet Place 1 tablet under the tongue every 5 (five) minutes as needed for chest pain. Indications: Acute Angina Pectoris 30 tablet 3   • pioglitazone (ACTOS) 45 MG tablet Take 1 tablet by mouth Daily. 90 tablet 3   • PREVIDENT 5000 PLUS 1.1 % cream      • Soap & Cleansers (CETAKLENZ) liquid      • tamsulosin (FLOMAX) 0.4 MG capsule 24 hr capsule Take 1 capsule by mouth every night.     • TRADJENTA 5 MG tablet tablet Take 1 tablet by mouth Daily. 90 tablet 3   • vitamin D (ERGOCALCIFEROL) 59561 units capsule capsule Take 1 capsule by mouth 2 (Two) Times a Week. 26 capsule 3   • Zinc 30 MG tablet Take 50 mg by mouth Daily.     • zolpidem (AMBIEN) 5 MG tablet Take 1 tablet by mouth At Night As Needed for Sleep. 90 tablet 1   • AMLACTIN 12 % lotion Apply  topically As Needed.     • aspirin 81 MG EC tablet Take 81 mg by mouth Daily.           Scheduled Meds:    aspirin 81 mg Daily   enoxaparin 40 mg Q24H   famotidine 40 mg BID   metoprolol tartrate 25 mg Daily   sodium chloride 3 mL Q12H           INTAKE AND OUTPUT:  No intake or output data in the 24 hours ending 10/16/18 1520        Review of Systems:  Constitutional: No weakness,fatigue, fever, chills   Eyes: No eye pain, vision changes   ENT/oropharynx: No difficulty swallowing, no epistaxis, no changes in hearing   Cardiovascular: Significant chest pain    Respiratory: No shortness of breath, dyspnea on exertion, cough, wheezing hemoptysis   Gastrointestinal: No abdominal pain, nausea, vomiting, diarrhea, bloody stools    Genitourinary: No hematuria, dysuria   Neurological: No headache, numbness, tingling, one-sided weakness    Musculoskeletal: No cramps, joint pain   Integument: No rash, edema       Constitutional:  Temp:  [98.7 °F (37.1 °C)] 98.7 °F (37.1 °C)  Heart Rate:  [67-85] 67  Resp:  [16] 16  BP: (168-175)/() 173/86    Physical Exam:  General:  Appears in no acute distress  Eyes: PERTL,  HEENT:  No JVD. Thyroid not visibly enlarged. No mucosal pallor or cyanosis  Respiratory: Respirations regular and unlabored at rest. BBS with good air entry in all fields. No crackles, rubs or wheezes auscultated  Cardiovascular: S1S2 Regular rate and rhythm. No murmur, rubs or gallop. No carotid bruits. DP/PT pulses. No lower extremity pitting edema  Gastrointestinal: Abdomen soft, flat, non tender. Bowel sounds present. No hepatosplenomegaly. No ascites  Musculoskeletal: RAJAN x4. No abnormal movements  Extremities: No digital clubbing or cyanosis  Skin: Color pink. Skin warm and dry to touch. No rashes  No xanthoma  Neuro: AAO x3 CN II-XII grossly intact                           Cardiographics    ECG: 10/16/18        Comparison EK18          Telemetry:      ECHO:        Stress test:      Interpretation Summary     · Findings consistent with a normal ECG stress test.  · Findings consistent with a normal ECG stress test.  · Findings consistent with a normal ECG stress test.  · Left ventricular ejection fraction is normal (Calculated EF = 60%).  · Myocardial perfusion imaging indicates a normal myocardial perfusion study with no evidence of ischemia.  · Impressions are consistent with a low risk study.            Lab Review:    Results from last 7 days  Lab Units 10/16/18  1048   TROPONIN T ng/mL <0.010           Results from last 7 days  Lab Units 10/16/18  1048   SODIUM mmol/L 138   POTASSIUM mmol/L 4.3   BUN mg/dL 14   CREATININE mg/dL 1.54*   CALCIUM mg/dL 10.2     @LABRCNTbnp@    Results from last 7 days  Lab Units  "10/16/18  1048   WBC 10*3/mm3 6.57   HEMOGLOBIN g/dL 17.0   HEMATOCRIT % 49.6   PLATELETS 10*3/mm3 187             CXR:       Assessment / Plan:  1.  Unstable angina       We'll repeat EKGs as well as cardiac enzymes           Echo, stress test    2.  GERD        May be the cause of chest pain also will continue Carafate    3.  DM    4.  Hypertension         Add Norvasc 5 mg one once a day    5.  Mild renal insufficiency          Nephrology consult          Maria E Gilbert MD  10/16/2018  3:20 PM    EMR Dragon/Transcription:   \"Dictated utilizing Dragon dictation\".     "

## 2018-10-16 NOTE — ED PROVIDER NOTES
" EMERGENCY DEPARTMENT ENCOUNTER    Room Number:  32/32  PCP: Avery Velazquez MD   Cardiologist: Dr. Gilbert  Historian: Patient, Family      HPI  Chief Complaint: Chest Pain  Context: Earl Marc is a 73 y.o. male with h/o CAD for which pt underwent coronary angioplasty with stent placement in 2016. Pt also has h/o chronic renal disease for which pt is currently being followed by a nephrologist. Pt presents to the ED c/o intermittent episodes of \"pounding\" sternal chest pain onset about 3-4 months ago (\"like someone is pounding a sledgehammer to my chest\"). No radiation. Pt reports that his chest pain worsens with exertion and inspiration. Pt's chest pain improves with rest. Pt states that over the past several days, pt's episodes of chest pain have become more frequent and even minimal exertion will worsen his chest pain. Pt has taken antacids without significant sx relief. Pt denies nausea, vomiting, diaphoresis, dyspnea, palpitations, abdominal pain, BLE edema, bilateral/unilateral calf pain, and dizziness/lightheadedness. Pt was seen for this at his nephrologist's office earlier today and was referred to the ER for further evaluation. There are no other complaints at this time.       Pain Location: Sternum  Radiation: None  Character: \"pounding\"  Duration: Onset about 3-4 months ago  Severity: Moderate  Progression: Worsening  Aggravating Factors: Exertion, inspiration  Alleviating Factors: Rest        MEDICAL RECORD REVIEW    Pt underwent a cardiac catheterization with stent placement in 2016 performed by Dr. Gilbert, cardiologist.     Pt's creatinine was 1.71 about 2 months ago.         PAST MEDICAL HISTORY  Active Ambulatory Problems     Diagnosis Date Noted   • Bell's palsy 03/10/2016   • Persistent insomnia 03/10/2016   • Osteoarthritis of cervical spine 03/10/2016   • Diabetic peripheral neuropathy (CMS/HCC) 03/10/2016   • Dyslipidemia 03/10/2016   • Steatosis of liver 03/10/2016   • " Fibromyalgia 03/10/2016   • Gastroesophageal reflux disease without esophagitis 03/10/2016   • Hypertension 03/10/2016   • Hypogonadism in male 03/10/2016   • Renal insufficiency 03/10/2016   • Vitamin D deficiency 03/10/2016   • Benign non-nodular prostatic hyperplasia with lower urinary tract symptoms 03/10/2016   • S/P drug eluting coronary stent placement 04/13/2016   • Coronary artery disease involving native coronary artery of native heart 06/21/2016   • Malignant neoplasm of skin 10/03/2016   • Diabetes mellitus (CMS/[facility]) 04/04/2017   • Chronic insomnia 12/20/2017   • Other specified glaucoma 04/01/2017   • Vertigo 03/14/2018   • Epigastric pain 06/11/2018     Resolved Ambulatory Problems     Diagnosis Date Noted   • Hyperuricemia 03/10/2016   • Erectile dysfunction of nonorganic origin 03/10/2016   • Type 2 diabetes mellitus without complication (CMS/[facility]) 03/10/2016   • Precordial pain 03/10/2016   • Abnormal nuclear stress test 03/25/2016   • Coronary artery disease involving native coronary artery with angina pectoris (CMS/HCC) 03/27/2016   • S/P coronary angioplasty 04/05/2016   • Chest pain 06/14/2016   • Dizziness 06/21/2016   • Lateral epicondylitis 12/19/2012   • Long-term insulin use in type 2 diabetes (CMS/HCC) 10/04/2016   • Type II diabetes circulatory disorder causing erectile dysfunction (CMS/HCC) 02/13/2017   • Type 2 diabetes mellitus (CMS/[facility]) 04/04/2017   • Sialadenitis 06/28/2017   • Acute bronchitis due to other specified organisms 11/14/2017     Past Medical History:   Diagnosis Date   • Abnormal cardiovascular stress test    • Acid reflux    • Arthritis    • Asthma    • Cancer (CMS/[facility])    • Chronic kidney disease    • Coronary artery disease    • Diabetes mellitus (CMS/[facility])    • Diabetic neuropathy associated with type 2 diabetes mellitus (CMS/[facility])    • Dyslipidemia    • Erectile dysfunction    • Fatty liver disease, nonalcoholic    • Gastritis    • Glaucoma    • Hyperlipidemia    •  Hypertension    • Hypogonadism male    • Type 2 diabetes mellitus (CMS/[facility])          PAST SURGICAL HISTORY  Past Surgical History:   Procedure Laterality Date   • CARDIAC CATHETERIZATION N/A 3/25/2016    Procedure: Left Heart Cath;  Surgeon: Maria E Gilbert MD;  Location: [facility] CATH INVASIVE LOCATION;  Service:    • CARDIAC CATHETERIZATION N/A 3/25/2016    Procedure: Coronary angiography;  Surgeon: Maria E Gilbert MD;  Location:  [facility] CATH INVASIVE LOCATION;  Service:    • CARDIAC CATHETERIZATION N/A 3/28/2016    Procedure: Coronary angiography;  Surgeon: aMria E Gilbert MD;  Location:  [facility] CATH INVASIVE LOCATION;  Service:    • CARDIAC CATHETERIZATION N/A 3/28/2016    Procedure: Stent MOISE coronary;  Surgeon: Maria E Gilbert MD;  [facility];  Service:    • CAROTID STENT     • CORONARY ANGIOPLASTY     • NECK EXPLORATION N/A          FAMILY HISTORY  Family History   Problem Relation Age of Onset   • Breast cancer Daughter    • Heart attack Mother    • Hypertension Mother    • Heart disease Mother    • Thyroid disease Mother    • Lupus Mother    • Heart attack Brother    • Heart disease Brother    • Hyperlipidemia Brother    • Cancer Brother    • Depression Father    • Stroke Maternal Grandmother          SOCIAL HISTORY  Social History     Social History   • Marital status:      Spouse name: Ivette   • Number of children: 1   • Years of education: N/A     Occupational History   • Retired      Social History Main Topics   • Smoking status: Never Smoker   • Smokeless tobacco: Never Used   • Alcohol use No   • Drug use: No   • Sexual activity: Yes     Partners: Female     Other Topics Concern   • Not on file     Social History Narrative   • No narrative on file         ALLERGIES  Other and Prazosin        REVIEW OF SYSTEMS  Review of Systems   Constitutional: Negative for chills and diaphoresis.   HENT: Negative for congestion, rhinorrhea and sore throat.     Eyes: Negative for pain.   Respiratory: Negative for cough and shortness of breath.    Cardiovascular: Positive for chest pain. Negative for palpitations and leg swelling.   Gastrointestinal: Negative for abdominal pain, diarrhea, nausea and vomiting.   Genitourinary: Negative for difficulty urinating, dysuria, flank pain and frequency.   Musculoskeletal: Negative for myalgias, neck pain and neck stiffness.   Skin: Negative for rash.   Neurological: Negative for dizziness, speech difficulty, weakness, light-headedness, numbness and headaches.   Psychiatric/Behavioral: Negative.    All other systems reviewed and are negative.           PHYSICAL EXAM  ED Triage Vitals   Temp Heart Rate Resp BP SpO2   10/16/18 1039 10/16/18 1038 10/16/18 1038 10/16/18 1046 10/16/18 1038   98.7 °F (37.1 °C) 85 16 (!) 175/107 97 %      Temp src Heart Rate Source Patient Position BP Location FiO2 (%)   -- -- 10/16/18 1046 -- --     Sitting         Physical Exam   Constitutional: He is oriented to person, place, and time. No distress.   HENT:   Head: Normocephalic.   Mouth/Throat: Mucous membranes are normal.   Eyes: Pupils are equal, round, and reactive to light. EOM are normal.   Neck: Normal range of motion. Neck supple.   Cardiovascular: Normal rate, regular rhythm and normal heart sounds.    Pulmonary/Chest: Effort normal and breath sounds normal. No respiratory distress. He has no decreased breath sounds. He has no wheezes. He has no rhonchi. He has no rales. He exhibits tenderness (of the anterior chest wall).   Abdominal: Soft. There is no tenderness. There is no rebound and no guarding.   Musculoskeletal: Normal range of motion.   Neurological: He is alert and oriented to person, place, and time. He has normal sensation.   Skin: Skin is warm and dry.   Psychiatric: Mood and affect normal.   Nursing note and vitals reviewed.          LAB RESULTS  Recent Results (from the past 24 hour(s))   Light Blue Top    Collection Time:  10/16/18 10:48 AM   Result Value Ref Range    Extra Tube hold for add-on    Green Top (Gel)    Collection Time: 10/16/18 10:48 AM   Result Value Ref Range    Extra Tube Hold for add-ons.    Lavender Top    Collection Time: 10/16/18 10:48 AM   Result Value Ref Range    Extra Tube hold for add-on    Gold Top - SST    Collection Time: 10/16/18 10:48 AM   Result Value Ref Range    Extra Tube Hold for add-ons.    Comprehensive Metabolic Panel    Collection Time: 10/16/18 10:48 AM   Result Value Ref Range    Glucose 163 (H) 65 - 99 mg/dL    BUN 14 8 - 23 mg/dL    Creatinine 1.54 (H) 0.76 - 1.27 mg/dL    Sodium 138 136 - 145 mmol/L    Potassium 4.3 3.5 - 5.2 mmol/L    Chloride 99 98 - 107 mmol/L    CO2 25.7 22.0 - 29.0 mmol/L    Calcium 10.2 8.6 - 10.5 mg/dL    Total Protein 7.8 6.0 - 8.5 g/dL    Albumin 4.50 3.50 - 5.20 g/dL    ALT (SGPT) 21 1 - 41 U/L    AST (SGOT) 17 1 - 40 U/L    Alkaline Phosphatase 64 39 - 117 U/L    Total Bilirubin 0.8 0.1 - 1.2 mg/dL    eGFR Non African Amer 45 (L) >60 mL/min/1.73    Globulin 3.3 gm/dL    A/G Ratio 1.4 g/dL    BUN/Creatinine Ratio 9.1 7.0 - 25.0    Anion Gap 13.3 mmol/L   Troponin    Collection Time: 10/16/18 10:48 AM   Result Value Ref Range    Troponin T <0.010 0.000 - 0.030 ng/mL   CBC Auto Differential    Collection Time: 10/16/18 10:48 AM   Result Value Ref Range    WBC 6.57 4.50 - 10.70 10*3/mm3    RBC 5.69 4.60 - 6.00 10*6/mm3    Hemoglobin 17.0 13.7 - 17.6 g/dL    Hematocrit 49.6 40.4 - 52.2 %    MCV 87.2 79.8 - 96.2 fL    MCH 29.9 27.0 - 32.7 pg    MCHC 34.3 32.6 - 36.4 g/dL    RDW 14.6 (H) 11.5 - 14.5 %    RDW-SD 46.8 37.0 - 54.0 fl    MPV 10.9 6.0 - 12.0 fL    Platelets 187 140 - 500 10*3/mm3    Neutrophil % 61.1 42.7 - 76.0 %    Lymphocyte % 29.5 19.6 - 45.3 %    Monocyte % 6.5 5.0 - 12.0 %    Eosinophil % 2.3 0.3 - 6.2 %    Basophil % 0.6 0.0 - 1.5 %    Immature Grans % 0.3 0.0 - 0.5 %    Neutrophils, Absolute 4.01 1.90 - 8.10 10*3/mm3    Lymphocytes, Absolute 1.94  0.90 - 4.80 10*3/mm3    Monocytes, Absolute 0.43 0.20 - 1.20 10*3/mm3    Eosinophils, Absolute 0.15 0.00 - 0.70 10*3/mm3    Basophils, Absolute 0.04 0.00 - 0.20 10*3/mm3    Immature Grans, Absolute 0.02 0.00 - 0.03 10*3/mm3       Ordered the above labs and reviewed the results.        RADIOLOGY  XR Chest 1 View   Final Result   1.  No findings of acute cardiopulmonary pathology.       This report was finalized on 10/16/2018 12:13 PM by Dr. Yehuda Chapman M.D.               Ordered the above noted radiological studies. Reviewed by me in PACS.        PROCEDURES  Procedures      EKG:          EKG time: 10:42 AM  Rhythm/Rate: NSR rate 74  P waves and MD: Normal P waves  QRS, axis: Normal QRS   ST and T waves: T wave inversion in aVL     Interpreted Contemporaneously by me, independently viewed  changed compared to prior 06/15/16 (T wave inversion is new)        MEDICATIONS GIVEN IN ER  Medications - No data to display          PROGRESS AND CONSULTS  ED Course as of Oct 16 1336   Tue Oct 16, 2018   1055 Intermittent CP  [EE]   1233 12:33 PM  Patient with chest pain.  Has been having this for a while.  Worse with exertion.  Better with rest.  Was seen by nephrologist who sent here.  Frequency and intensity is worsening.  Has tried GI treatments without relief.  ECG is different.  Discussed with Dr. Gilbert who will admit.  [SL]      ED Course User Index  [EE] Russel Sousa PA  [SL] Frank Jean MD     12:04 PM:  Placed call to Dr. Gilbert, cardiologist, to discuss further course of care.    12:07 PM:  Discussed case with Dr. Gilbert, cardiologist. He will admit pt to a telemetry bed for further cardiac evaluation/management. Decision time to admit: Now.      12:30 PM:  Rechecked pt. Informed pt that his troponin is negative. However, pt's EKG performed in the ER today has changed compared to an EKG performed in 06/2016. Pt's chest pain is exertional and has been progressively worsening.  Furthermore, pt has had stent placement in the past and has h/o diabetes, HTN, and hyperlipidemia. I have discussed the case with Dr. Gilbert, cardiologist, and he has admitted pt to the hospital for further cardiac evaluation and management. Pt agrees with plan.          MEDICAL DECISION MAKING      MDM  Number of Diagnoses or Management Options     Amount and/or Complexity of Data Reviewed  Clinical lab tests: reviewed and ordered (Troponin is negative.)  Tests in the radiology section of CPT®: ordered and reviewed (CXR:  No findings of acute cardiopulmonary pathology.)  Tests in the medicine section of CPT®: ordered and reviewed (EKG interpreted.   )  Decide to obtain previous medical records or to obtain history from someone other than the patient: yes  Discuss the patient with other providers: yes (Discussed the case with Dr. Gilbert, cardiologist.  )    Patient Progress  Patient progress: stable             DIAGNOSIS  Final diagnoses:   Chest pain, unspecified type           DISPOSITION  Pt admitted to telemetry.    ADMISSION    Discussed treatment plan and reason for admission with pt/family and admitting physician.  Pt/family voiced understanding of the plan for admission for further testing/treatment as needed.         Latest Documented Vital Signs:  1:36 PM  Latest vital signs   BP- 168/82 HR- 85 Temp- 98.7 °F (37.1 °C) O2 sat- 97%    --  Documentation assistance provided by harpreet José for Dr. Ted MD.  Information recorded by the scribe was done at my direction and has been verified and validated by me.         Eve José  10/16/18 8728       Frank Jean MD  10/16/18 0045

## 2018-10-16 NOTE — PROGRESS NOTES
Discharge Planning Assessment  [facility]     Patient Name: Earl Marc  MRN: [id number]  Today's Date: 10/16/2018    Admit Date: 10/16/2018          Discharge Needs Assessment     Row Name 10/16/18 1314       Living Environment    Lives With spouse    Name(s) of Who Lives With Patient Ivette    Current Living Arrangements home/apartment/condo    Duration at Residence 16 years    Potentially Unsafe Housing Conditions --   12 steps inside hoe; has fallen X2; considering selling home and moving to one-floor dwelling    Primary Care Provided by self    Provides Primary Care For no one    Family Caregiver if Needed child(tim), adult;spouse;grandchild(tim), adult    Quality of Family Relationships supportive    Able to Return to Prior Arrangements yes    Living Arrangement Comments No stept to get into home; 12 steps iside home-see comment above       Resource/Environmental Concerns    Resource/Environmental Concerns --   See comment above re steps    Transportation Concerns car, none       Transition Planning    Patient/Family Anticipates Transition to home with family    Patient/Family Anticipated Services at Transition none    Transportation Anticipated car, drives self       Discharge Needs Assessment    Readmission Within the Last 30 Days no previous admission in last 30 days    Concerns to be Addressed no discharge needs identified    Equipment Currently Used at Home grab bar    Anticipated Changes Related to Illness none    Equipment Needed After Discharge none    Offered/Gave Vendor List no            Discharge Plan     Row Name 10/16/18 1151       Plan    Plan Anticipates return home w/ wife after d/c- see comments re steps in home- no anticipated d/c needs at present time    Patient/Family in Agreement with Plan yes        Destination     No service coordination in this encounter.      Durable Medical Equipment     No service coordination in this encounter.      Dialysis/Infusion     No service  coordination in this encounter.      Home Medical Care     No service coordination in this encounter.      Social Care     No service coordination in this encounter.                Demographic Summary    No documentation.           Functional Status    No documentation.           Psychosocial    No documentation.           Abuse/Neglect    No documentation.           Legal    No documentation.           Substance Abuse    No documentation.           Patient Forms    No documentation.         Deanna Ledezma RN

## 2018-10-17 ENCOUNTER — APPOINTMENT (OUTPATIENT)
Dept: NUCLEAR MEDICINE | Facility: HOSPITAL | Age: 73
End: 2018-10-17

## 2018-10-17 PROBLEM — I20.89 OTHER FORMS OF ANGINA PECTORIS: Status: ACTIVE | Noted: 2018-10-16

## 2018-10-17 PROBLEM — I20.8 OTHER FORMS OF ANGINA PECTORIS (HCC): Status: ACTIVE | Noted: 2018-10-16

## 2018-10-17 LAB
ALBUMIN SERPL-MCNC: 3.9 G/DL (ref 3.5–5.2)
ALBUMIN/GLOB SERPL: 1.3 G/DL
ALP SERPL-CCNC: 54 U/L (ref 39–117)
ALT SERPL W P-5'-P-CCNC: 18 U/L (ref 1–41)
ANION GAP SERPL CALCULATED.3IONS-SCNC: 12 MMOL/L
AST SERPL-CCNC: 16 U/L (ref 1–40)
BASOPHILS # BLD AUTO: 0.04 10*3/MM3 (ref 0–0.2)
BASOPHILS NFR BLD AUTO: 0.5 % (ref 0–1.5)
BH CV ECHO MEAS - ACS: 1.9 CM
BH CV ECHO MEAS - AO MAX PG (FULL): 1.4 MMHG
BH CV ECHO MEAS - AO MAX PG: 7 MMHG
BH CV ECHO MEAS - AO MEAN PG (FULL): 1 MMHG
BH CV ECHO MEAS - AO MEAN PG: 4 MMHG
BH CV ECHO MEAS - AO ROOT AREA (BSA CORRECTED): 1.3
BH CV ECHO MEAS - AO ROOT AREA: 7.1 CM^2
BH CV ECHO MEAS - AO ROOT DIAM: 3 CM
BH CV ECHO MEAS - AO V2 MAX: 132 CM/SEC
BH CV ECHO MEAS - AO V2 MEAN: 93 CM/SEC
BH CV ECHO MEAS - AO V2 VTI: 30.8 CM
BH CV ECHO MEAS - AVA(I,A): 2.9 CM^2
BH CV ECHO MEAS - AVA(I,D): 2.9 CM^2
BH CV ECHO MEAS - AVA(V,A): 3.1 CM^2
BH CV ECHO MEAS - AVA(V,D): 3.1 CM^2
BH CV ECHO MEAS - BSA(HAYCOCK): 2.3 M^2
BH CV ECHO MEAS - BSA: 2.2 M^2
BH CV ECHO MEAS - BZI_BMI: 29 KILOGRAMS/M^2
BH CV ECHO MEAS - BZI_METRIC_HEIGHT: 185.4 CM
BH CV ECHO MEAS - BZI_METRIC_WEIGHT: 99.8 KG
BH CV ECHO MEAS - EDV(CUBED): 125 ML
BH CV ECHO MEAS - EDV(MOD-SP2): 85 ML
BH CV ECHO MEAS - EDV(MOD-SP4): 106 ML
BH CV ECHO MEAS - EDV(TEICH): 118.2 ML
BH CV ECHO MEAS - EF(CUBED): 80.5 %
BH CV ECHO MEAS - EF(MOD-BP): 67 %
BH CV ECHO MEAS - EF(MOD-SP2): 70.6 %
BH CV ECHO MEAS - EF(MOD-SP4): 65.1 %
BH CV ECHO MEAS - EF(TEICH): 72.8 %
BH CV ECHO MEAS - ESV(CUBED): 24.4 ML
BH CV ECHO MEAS - ESV(MOD-SP2): 25 ML
BH CV ECHO MEAS - ESV(MOD-SP4): 37 ML
BH CV ECHO MEAS - ESV(TEICH): 32.2 ML
BH CV ECHO MEAS - FS: 42 %
BH CV ECHO MEAS - IVS/LVPW: 1
BH CV ECHO MEAS - IVSD: 1 CM
BH CV ECHO MEAS - LAT PEAK E' VEL: 8.5 CM/SEC
BH CV ECHO MEAS - LV DIASTOLIC VOL/BSA (35-75): 47.3 ML/M^2
BH CV ECHO MEAS - LV MASS(C)D: 182 GRAMS
BH CV ECHO MEAS - LV MASS(C)DI: 81.2 GRAMS/M^2
BH CV ECHO MEAS - LV MAX PG: 5.6 MMHG
BH CV ECHO MEAS - LV MEAN PG: 3 MMHG
BH CV ECHO MEAS - LV SYSTOLIC VOL/BSA (12-30): 16.5 ML/M^2
BH CV ECHO MEAS - LV V1 MAX: 118 CM/SEC
BH CV ECHO MEAS - LV V1 MEAN: 88.9 CM/SEC
BH CV ECHO MEAS - LV V1 VTI: 25.7 CM
BH CV ECHO MEAS - LVIDD: 5 CM
BH CV ECHO MEAS - LVIDS: 2.9 CM
BH CV ECHO MEAS - LVLD AP2: 8.5 CM
BH CV ECHO MEAS - LVLD AP4: 9.1 CM
BH CV ECHO MEAS - LVLS AP2: 6.6 CM
BH CV ECHO MEAS - LVLS AP4: 7.7 CM
BH CV ECHO MEAS - LVOT AREA (M): 3.5 CM^2
BH CV ECHO MEAS - LVOT AREA: 3.5 CM^2
BH CV ECHO MEAS - LVOT DIAM: 2.1 CM
BH CV ECHO MEAS - LVPWD: 1 CM
BH CV ECHO MEAS - MED PEAK E' VEL: 5.9 CM/SEC
BH CV ECHO MEAS - MV A DUR: 0.19 SEC
BH CV ECHO MEAS - MV A MAX VEL: 111 CM/SEC
BH CV ECHO MEAS - MV DEC SLOPE: 215 CM/SEC^2
BH CV ECHO MEAS - MV DEC TIME: 0.28 SEC
BH CV ECHO MEAS - MV E MAX VEL: 73.7 CM/SEC
BH CV ECHO MEAS - MV E/A: 0.66
BH CV ECHO MEAS - MV MAX PG: 4.8 MMHG
BH CV ECHO MEAS - MV MEAN PG: 2 MMHG
BH CV ECHO MEAS - MV P1/2T MAX VEL: 90.9 CM/SEC
BH CV ECHO MEAS - MV P1/2T: 123.8 MSEC
BH CV ECHO MEAS - MV V2 MAX: 109 CM/SEC
BH CV ECHO MEAS - MV V2 MEAN: 66.5 CM/SEC
BH CV ECHO MEAS - MV V2 VTI: 34.6 CM
BH CV ECHO MEAS - MVA P1/2T LCG: 2.4 CM^2
BH CV ECHO MEAS - MVA(P1/2T): 1.8 CM^2
BH CV ECHO MEAS - MVA(VTI): 2.6 CM^2
BH CV ECHO MEAS - PA ACC TIME: 0.07 SEC
BH CV ECHO MEAS - PA MAX PG (FULL): 9.7 MMHG
BH CV ECHO MEAS - PA MAX PG: 13.7 MMHG
BH CV ECHO MEAS - PA PR(ACCEL): 47.5 MMHG
BH CV ECHO MEAS - PA V2 MAX: 185 CM/SEC
BH CV ECHO MEAS - PULM A REVS DUR: 0.14 SEC
BH CV ECHO MEAS - PULM A REVS VEL: 27.9 CM/SEC
BH CV ECHO MEAS - PULM DIAS VEL: 41.2 CM/SEC
BH CV ECHO MEAS - PULM S/D: 1.8
BH CV ECHO MEAS - PULM SYS VEL: 72.2 CM/SEC
BH CV ECHO MEAS - PVA(V,A): 1.9 CM^2
BH CV ECHO MEAS - PVA(V,D): 1.9 CM^2
BH CV ECHO MEAS - QP/QS: 0.76
BH CV ECHO MEAS - RV MAX PG: 4 MMHG
BH CV ECHO MEAS - RV MEAN PG: 2 MMHG
BH CV ECHO MEAS - RV V1 MAX: 99.8 CM/SEC
BH CV ECHO MEAS - RV V1 MEAN: 72.1 CM/SEC
BH CV ECHO MEAS - RV V1 VTI: 19.5 CM
BH CV ECHO MEAS - RVOT AREA: 3.5 CM^2
BH CV ECHO MEAS - RVOT DIAM: 2.1 CM
BH CV ECHO MEAS - SI(AO): 97.2 ML/M^2
BH CV ECHO MEAS - SI(CUBED): 44.9 ML/M^2
BH CV ECHO MEAS - SI(LVOT): 39.7 ML/M^2
BH CV ECHO MEAS - SI(MOD-SP2): 26.8 ML/M^2
BH CV ECHO MEAS - SI(MOD-SP4): 30.8 ML/M^2
BH CV ECHO MEAS - SI(TEICH): 38.4 ML/M^2
BH CV ECHO MEAS - SV(AO): 217.7 ML
BH CV ECHO MEAS - SV(CUBED): 100.6 ML
BH CV ECHO MEAS - SV(LVOT): 89 ML
BH CV ECHO MEAS - SV(MOD-SP2): 60 ML
BH CV ECHO MEAS - SV(MOD-SP4): 69 ML
BH CV ECHO MEAS - SV(RVOT): 67.5 ML
BH CV ECHO MEAS - SV(TEICH): 86 ML
BH CV ECHO MEAS - TAPSE (>1.6): 2.4 CM2
BH CV ECHO MEASUREMENTS AVERAGE E/E' RATIO: 10.24
BH CV STRESS COMMENTS STAGE 1: NORMAL
BH CV STRESS DOSE REGADENOSON STAGE 1: 0.4
BH CV STRESS DURATION MIN STAGE 1: 0
BH CV STRESS DURATION SEC STAGE 1: 10
BH CV STRESS PROTOCOL 1: NORMAL
BH CV STRESS RECOVERY BP: NORMAL MMHG
BH CV STRESS RECOVERY HR: 92 BPM
BH CV STRESS STAGE 1: 1
BH CV XLRA - RV BASE: 3.1 CM
BH CV XLRA - RV LENGTH: 5.9 CM
BH CV XLRA - RV MID: 2.4 CM
BH CV XLRA - TDI S': 10.1 CM/SEC
BILIRUB SERPL-MCNC: 0.7 MG/DL (ref 0.1–1.2)
BUN BLD-MCNC: 15 MG/DL (ref 8–23)
BUN/CREAT SERPL: 10.6 (ref 7–25)
CALCIUM SPEC-SCNC: 9.4 MG/DL (ref 8.6–10.5)
CHLORIDE SERPL-SCNC: 102 MMOL/L (ref 98–107)
CHOLEST SERPL-MCNC: 118 MG/DL (ref 0–200)
CO2 SERPL-SCNC: 25 MMOL/L (ref 22–29)
CREAT BLD-MCNC: 1.41 MG/DL (ref 0.76–1.27)
DEPRECATED RDW RBC AUTO: 46.8 FL (ref 37–54)
EOSINOPHIL # BLD AUTO: 0.2 10*3/MM3 (ref 0–0.7)
EOSINOPHIL NFR BLD AUTO: 2.6 % (ref 0.3–6.2)
ERYTHROCYTE [DISTWIDTH] IN BLOOD BY AUTOMATED COUNT: 14.4 % (ref 11.5–14.5)
GFR SERPL CREATININE-BSD FRML MDRD: 49 ML/MIN/1.73
GLOBULIN UR ELPH-MCNC: 2.9 GM/DL
GLUCOSE BLD-MCNC: 120 MG/DL (ref 65–99)
GLUCOSE BLDC GLUCOMTR-MCNC: 115 MG/DL (ref 70–130)
GLUCOSE BLDC GLUCOMTR-MCNC: 124 MG/DL (ref 70–130)
GLUCOSE BLDC GLUCOMTR-MCNC: 142 MG/DL (ref 70–130)
GLUCOSE BLDC GLUCOMTR-MCNC: 98 MG/DL (ref 70–130)
HBA1C MFR BLD: 6.4 % (ref 4.8–5.6)
HCT VFR BLD AUTO: 48.9 % (ref 40.4–52.2)
HDLC SERPL-MCNC: 33 MG/DL (ref 40–60)
HGB BLD-MCNC: 15.6 G/DL (ref 13.7–17.6)
IMM GRANULOCYTES # BLD: 0 10*3/MM3 (ref 0–0.03)
IMM GRANULOCYTES NFR BLD: 0 % (ref 0–0.5)
LDLC SERPL CALC-MCNC: 58 MG/DL (ref 0–100)
LDLC/HDLC SERPL: 1.76 {RATIO}
LEFT ATRIUM VOLUME INDEX: 24 ML/M2
LV EF NUC BP: 49 %
LYMPHOCYTES # BLD AUTO: 2.7 10*3/MM3 (ref 0.9–4.8)
LYMPHOCYTES NFR BLD AUTO: 34.7 % (ref 19.6–45.3)
MAXIMAL PREDICTED HEART RATE: 147 BPM
MAXIMAL PREDICTED HEART RATE: 147 BPM
MCH RBC QN AUTO: 28.5 PG (ref 27–32.7)
MCHC RBC AUTO-ENTMCNC: 31.9 G/DL (ref 32.6–36.4)
MCV RBC AUTO: 89.2 FL (ref 79.8–96.2)
MONOCYTES # BLD AUTO: 0.56 10*3/MM3 (ref 0.2–1.2)
MONOCYTES NFR BLD AUTO: 7.2 % (ref 5–12)
NEUTROPHILS # BLD AUTO: 4.28 10*3/MM3 (ref 1.9–8.1)
NEUTROPHILS NFR BLD AUTO: 55 % (ref 42.7–76)
NT-PROBNP SERPL-MCNC: 767.9 PG/ML (ref 5–900)
PERCENT MAX PREDICTED HR: 69.39 %
PLATELET # BLD AUTO: 174 10*3/MM3 (ref 140–500)
PMV BLD AUTO: 10.5 FL (ref 6–12)
POTASSIUM BLD-SCNC: 3.8 MMOL/L (ref 3.5–5.2)
PROT SERPL-MCNC: 6.8 G/DL (ref 6–8.5)
RBC # BLD AUTO: 5.48 10*6/MM3 (ref 4.6–6)
SODIUM BLD-SCNC: 139 MMOL/L (ref 136–145)
STRESS BASELINE BP: NORMAL MMHG
STRESS BASELINE HR: 69 BPM
STRESS PERCENT HR: 82 %
STRESS POST PEAK BP: NORMAL MMHG
STRESS POST PEAK HR: 102 BPM
STRESS TARGET HR: 125 BPM
STRESS TARGET HR: 125 BPM
TRIGL SERPL-MCNC: 135 MG/DL (ref 0–150)
TROPONIN T SERPL-MCNC: <0.01 NG/ML (ref 0–0.03)
TSH SERPL DL<=0.05 MIU/L-ACNC: 3.26 MIU/ML (ref 0.27–4.2)
VLDLC SERPL-MCNC: 27 MG/DL (ref 5–40)
WBC NRBC COR # BLD: 7.78 10*3/MM3 (ref 4.5–10.7)

## 2018-10-17 PROCEDURE — 82962 GLUCOSE BLOOD TEST: CPT

## 2018-10-17 PROCEDURE — 93017 CV STRESS TEST TRACING ONLY: CPT

## 2018-10-17 PROCEDURE — A9500 TC99M SESTAMIBI: HCPCS | Performed by: INTERNAL MEDICINE

## 2018-10-17 PROCEDURE — 93018 CV STRESS TEST I&R ONLY: CPT | Performed by: INTERNAL MEDICINE

## 2018-10-17 PROCEDURE — 80061 LIPID PANEL: CPT | Performed by: HOSPITALIST

## 2018-10-17 PROCEDURE — 25010000002 ENOXAPARIN PER 10 MG: Performed by: HOSPITALIST

## 2018-10-17 PROCEDURE — 85025 COMPLETE CBC W/AUTO DIFF WBC: CPT | Performed by: HOSPITALIST

## 2018-10-17 PROCEDURE — 83036 HEMOGLOBIN GLYCOSYLATED A1C: CPT | Performed by: HOSPITALIST

## 2018-10-17 PROCEDURE — 83880 ASSAY OF NATRIURETIC PEPTIDE: CPT | Performed by: HOSPITALIST

## 2018-10-17 PROCEDURE — 25010000002 REGADENOSON 0.4 MG/5ML SOLUTION: Performed by: INTERNAL MEDICINE

## 2018-10-17 PROCEDURE — 84484 ASSAY OF TROPONIN QUANT: CPT | Performed by: INTERNAL MEDICINE

## 2018-10-17 PROCEDURE — 80053 COMPREHEN METABOLIC PANEL: CPT | Performed by: HOSPITALIST

## 2018-10-17 PROCEDURE — 0 TECHNETIUM SESTAMIBI: Performed by: INTERNAL MEDICINE

## 2018-10-17 PROCEDURE — G0378 HOSPITAL OBSERVATION PER HR: HCPCS

## 2018-10-17 PROCEDURE — 78452 HT MUSCLE IMAGE SPECT MULT: CPT | Performed by: INTERNAL MEDICINE

## 2018-10-17 PROCEDURE — 96372 THER/PROPH/DIAG INJ SC/IM: CPT

## 2018-10-17 PROCEDURE — 99225 PR SBSQ OBSERVATION CARE/DAY 25 MINUTES: CPT | Performed by: INTERNAL MEDICINE

## 2018-10-17 PROCEDURE — 84443 ASSAY THYROID STIM HORMONE: CPT | Performed by: HOSPITALIST

## 2018-10-17 PROCEDURE — 78452 HT MUSCLE IMAGE SPECT MULT: CPT

## 2018-10-17 RX ORDER — AMLODIPINE BESYLATE 2.5 MG/1
2.5 TABLET ORAL
Status: DISCONTINUED | OUTPATIENT
Start: 2018-10-17 | End: 2018-10-19 | Stop reason: HOSPADM

## 2018-10-17 RX ORDER — DEXLANSOPRAZOLE 60 MG/1
60 CAPSULE, DELAYED RELEASE ORAL
Status: DISCONTINUED | OUTPATIENT
Start: 2018-10-18 | End: 2018-10-19 | Stop reason: HOSPADM

## 2018-10-17 RX ORDER — SODIUM CHLORIDE 9 MG/ML
75 INJECTION, SOLUTION INTRAVENOUS CONTINUOUS
Status: DISCONTINUED | OUTPATIENT
Start: 2018-10-18 | End: 2018-10-19 | Stop reason: HOSPADM

## 2018-10-17 RX ORDER — ACETYLCYSTEINE 200 MG/ML
600 SOLUTION ORAL; RESPIRATORY (INHALATION) EVERY 12 HOURS SCHEDULED
Status: COMPLETED | OUTPATIENT
Start: 2018-10-17 | End: 2018-10-19

## 2018-10-17 RX ADMIN — LATANOPROST 1 DROP: 50 SOLUTION OPHTHALMIC at 20:58

## 2018-10-17 RX ADMIN — METOPROLOL TARTRATE 25 MG: 25 TABLET ORAL at 11:59

## 2018-10-17 RX ADMIN — ALLOPURINOL 100 MG: 100 TABLET ORAL at 11:58

## 2018-10-17 RX ADMIN — VITAMIN D, TAB 1000IU (100/BT) 1000 UNITS: 25 TAB at 11:58

## 2018-10-17 RX ADMIN — TECHNETIUM TC 99M SESTAMIBI 1 DOSE: 1 INJECTION INTRAVENOUS at 10:15

## 2018-10-17 RX ADMIN — REGADENOSON 0.4 MG: 0.08 INJECTION, SOLUTION INTRAVENOUS at 10:15

## 2018-10-17 RX ADMIN — AMLODIPINE BESYLATE 2.5 MG: 2.5 TABLET ORAL at 11:59

## 2018-10-17 RX ADMIN — ASPIRIN 81 MG: 81 TABLET, DELAYED RELEASE ORAL at 11:58

## 2018-10-17 RX ADMIN — ACETYLCYSTEINE 600 MG: 200 SOLUTION ORAL; RESPIRATORY (INHALATION) at 21:00

## 2018-10-17 RX ADMIN — ISOSORBIDE MONONITRATE 30 MG: 30 TABLET ORAL at 11:58

## 2018-10-17 RX ADMIN — TECHNETIUM TC 99M SESTAMIBI 1 DOSE: 1 INJECTION INTRAVENOUS at 08:30

## 2018-10-17 RX ADMIN — ZOLPIDEM TARTRATE 5 MG: 5 TABLET ORAL at 22:06

## 2018-10-17 RX ADMIN — SIMETHICONE CHEW TAB 80 MG 80 MG: 80 TABLET ORAL at 21:17

## 2018-10-17 RX ADMIN — PREGABALIN 50 MG: 50 CAPSULE ORAL at 21:17

## 2018-10-17 RX ADMIN — METOPROLOL TARTRATE 25 MG: 25 TABLET ORAL at 21:17

## 2018-10-17 RX ADMIN — ENOXAPARIN SODIUM 30 MG: 30 INJECTION SUBCUTANEOUS at 16:23

## 2018-10-17 RX ADMIN — TAMSULOSIN HYDROCHLORIDE 0.4 MG: 0.4 CAPSULE ORAL at 21:17

## 2018-10-17 RX ADMIN — PANTOPRAZOLE SODIUM 40 MG: 40 TABLET, DELAYED RELEASE ORAL at 06:58

## 2018-10-17 NOTE — CONSULTS
Patient Care Team:  Avery Velazquez MD as PCP - General (Internal Medicine)  Benton Choe MD as PCP - Claims Attributed    Chief complaint:   Chief Complaint   Patient presents with   • Chest Pain       Subjective     History of Present Illness   Mr. Marc is a very pleasant 73 year old white male with past medical history significant for CKD3, hypertension, hyperlipidemia and coronary artery disease currently followed by Dr. Gilmore who was admitted overnight with chest pain. He reports that for the last few days he has had intermittent chest pain with exertion that resolves with rest. Unrelieved with nitroglycerin. He does have a history of CAD and is s/p stent in 2016.  We are consulted to manage patient's CKD.    Review of Systems     · Constitutional: denies change in weight, change in appettite, malaise, fatigue, or fever  · Heent: denies difficulty hearing, vision changes, dysphagia, rhinorrhea, nasal congestion, or sore throat  · Pulmonary: denies dyspnea, cough, wheezing  · Cardiovascular: denies lower extremity edema, chest pain, VANESSA, heart palpitations, orthopnea, or PND  · Gastrointestinal: denies nausea, vomiting, abdominal pain, melena, hematochezia, constipation, or diarrhea  · Genitourinary: denies dysura, hematuria, urinary urgency, urinary frequency, or noctura  · Musculoskeletal: denies arthralgias, denies myalgias  · Neuro: denies headache, LOC, dizziness, tremor  · Endocrine: denies cold intolerance, denies heat intolerance  · Heme: denies bruising, denies bleeding, denies history of blood clots  · Psych: denies anxiety, denies depression, denies suicidal ideation  · Skin: denies wounds, lesions, or rashes      Past Medical History:   Diagnosis Date   • Abnormal cardiovascular stress test    • Acid reflux    • Arthritis    • Asthma    • Cancer (CMS/[facility])     skin    • Chronic kidney disease    • Coronary artery disease    • Diabetes mellitus (CMS/[facility])    • Diabetic neuropathy  associated with type 2 diabetes mellitus (CMS/HCC)    • Dyslipidemia    • Erectile dysfunction    • Fatty liver disease, nonalcoholic    • Gastritis    • Glaucoma     both eyes   • Hyperlipidemia    • Hypertension    • Hypogonadism male    • Type 2 diabetes mellitus (CMS/HCC)    ,   Past Surgical History:   Procedure Laterality Date   • CARDIAC CATHETERIZATION N/A 3/25/2016    Procedure: Left Heart Cath;  Surgeon: Maria E Gilbert MD;  Location:  [facility] CATH INVASIVE LOCATION;  Service:    • CARDIAC CATHETERIZATION N/A 3/25/2016    Procedure: Coronary angiography;  Surgeon: Maria E Gilbert MD;  Location:  [facility] CATH INVASIVE LOCATION;  Service:    • CARDIAC CATHETERIZATION N/A 3/28/2016    Procedure: Coronary angiography;  Surgeon: Maria E Gilbert MD;  Location:  [facility] CATH INVASIVE LOCATION;  Service:    • CARDIAC CATHETERIZATION N/A 3/28/2016    Procedure: Stent MOISE coronary;  Surgeon: Maria E Gilbert MD;  Location:  [facility] CATH INVASIVE LOCATION;  Service:    • CAROTID STENT     • CORONARY ANGIOPLASTY     • NECK EXPLORATION N/A    ,   Family History   Problem Relation Age of Onset   • Breast cancer Daughter    • Heart attack Mother    • Hypertension Mother    • Heart disease Mother    • Thyroid disease Mother    • Lupus Mother    • Heart attack Brother    • Heart disease Brother    • Hyperlipidemia Brother    • Cancer Brother    • Depression Father    • Stroke Maternal Grandmother    ,   Social History   Substance Use Topics   • Smoking status: Never Smoker   • Smokeless tobacco: Never Used   • Alcohol use No   , Scheduled Meds:    allopurinol 100 mg Oral Daily   amLODIPine 2.5 mg Oral Q24H   aspirin 81 mg Oral Daily   cholecalciferol 1,000 Units Oral Daily   enoxaparin 30 mg Subcutaneous Q24H   insulin aspart 0-7 Units Subcutaneous 4x Daily With Meals & Nightly   isosorbide mononitrate 30 mg Oral Q24H   latanoprost 1 drop Both Eyes Nightly   metoprolol tartrate 25 mg Oral Q12H   pantoprazole  40 mg Oral Q AM   tamsulosin 0.4 mg Oral Nightly   , Continuous Infusions:   , PRN Meds:  nitroglycerin  •  pregabalin  •  simethicone  •  sodium chloride  •  zolpidem and Allergies:  Other and Prazosin    Objective     Vital Signs  Temp:  [97.6 °F (36.4 °C)-98.2 °F (36.8 °C)] 97.6 °F (36.4 °C)  Heart Rate:  [64-79] 64  Resp:  [14-18] 14  BP: (120-173)/(61-86) 141/67    No intake/output data recorded.  I/O last 3 completed shifts:  In: 330 [P.O.:330]  Out: 150 [Urine:150]    Physical Exam     GEN:NAD, well nourished  HEENT:NCAT, PERRL, EOMI, OP clear, MMM  NECK:supple, no JVD, no carotid bruits  CVA:RRR s1, s2 no m/r/g  CHEST:CTA B no wheezes or rhonchi  ABD:soft, nontender, nondistended +bs  EXT:no c/c/e 2+PP B  NEURO:CN II-XII grossly nonfocal, no evidence of asterixes or tremor  PSYCH: appropriate mood and affect  :deferred  SKIN: warm, dry, intact, no lesions or rashes    Results Review:    I reviewed the patient's new clinical results.    WBC WBC   Date Value Ref Range Status   10/17/2018 7.78 4.50 - 10.70 10*3/mm3 Final   10/16/2018 6.57 4.50 - 10.70 10*3/mm3 Final      HGB Hemoglobin   Date Value Ref Range Status   10/17/2018 15.6 13.7 - 17.6 g/dL Final   10/16/2018 17.0 13.7 - 17.6 g/dL Final      HCT Hematocrit   Date Value Ref Range Status   10/17/2018 48.9 40.4 - 52.2 % Final   10/16/2018 49.6 40.4 - 52.2 % Final      Platlets No results found for: LABPLAT   MCV MCV   Date Value Ref Range Status   10/17/2018 89.2 79.8 - 96.2 fL Final   10/16/2018 87.2 79.8 - 96.2 fL Final          Sodium Sodium   Date Value Ref Range Status   10/17/2018 139 136 - 145 mmol/L Final   10/16/2018 138 136 - 145 mmol/L Final      Potassium Potassium   Date Value Ref Range Status   10/17/2018 3.8 3.5 - 5.2 mmol/L Final   10/16/2018 4.3 3.5 - 5.2 mmol/L Final      Chloride Chloride   Date Value Ref Range Status   10/17/2018 102 98 - 107 mmol/L Final   10/16/2018 99 98 - 107 mmol/L Final      CO2 CO2   Date Value Ref Range  Status   10/17/2018 25.0 22.0 - 29.0 mmol/L Final   10/16/2018 25.7 22.0 - 29.0 mmol/L Final      BUN BUN   Date Value Ref Range Status   10/17/2018 15 8 - 23 mg/dL Final   10/16/2018 14 8 - 23 mg/dL Final      Creatinine Creatinine   Date Value Ref Range Status   10/17/2018 1.41 (H) 0.76 - 1.27 mg/dL Final   10/16/2018 1.54 (H) 0.76 - 1.27 mg/dL Final      Calcium Calcium   Date Value Ref Range Status   10/17/2018 9.4 8.6 - 10.5 mg/dL Final   10/16/2018 10.2 8.6 - 10.5 mg/dL Final      PO4 No results found for: CAPO4   Albumin Albumin   Date Value Ref Range Status   10/17/2018 3.90 3.50 - 5.20 g/dL Final   10/16/2018 4.50 3.50 - 5.20 g/dL Final      Magnesium No results found for: MG   Uric Acid No results found for: URICACID         Assessment/Plan       Chest pain      Assessment & Plan     CKD3--renal function at baseline. Secondary to DM. D/w patient the risk of contrast induced nephropathy should he require cardiac catheterization.  He is aware of the risks and is willing to proceed if warranted per cardiology.  Would recommend hydration with IVF if cardiac cath is indicated.  Unstable angina--Chest pain--concern for unstable angina. Nuclear stress done earlier today. Per cardiology. Also on carafate for possible GI involvement. Hypertension--bp controlled. Amlodipine noted. Continue current management  CAD-s/p stent 2016  DM II--w/ complications.      Thank you for allowing us to participate in Mr. Marc's care.  We will follow along with you.              I discussed the patients findings and my recommendations with patient    Tonie Barrios MD  10/17/18  11:10 AM    Time: More than 50% of time spent in counseling and coordination of care:  Total face-to-face/floor time 50 min.  Time spent in counseling 15 min. Counseling included the following topics: treatment plan

## 2018-10-17 NOTE — PROGRESS NOTES
Discharge Planning Assessment  [facility]     Patient Name: Earl Marc  MRN: [id number]  Today's Date: 10/17/2018    Admit Date: 10/16/2018          Discharge Needs Assessment     Row Name 10/17/18 1510       Living Environment    Lives With spouse    Name(s) of Who Lives With Patient --   Ivette Marc [phone]    Current Living Arrangements home/apartment/condo    Primary Care Provided by self    Provides Primary Care For no one    Family Caregiver if Needed spouse    Quality of Family Relationships helpful;involved    Able to Return to Prior Arrangements yes       Resource/Environmental Concerns    Resource/Environmental Concerns none    Transportation Concerns car, none       Transition Planning    Patient/Family Anticipates Transition to home with family    Patient/Family Anticipated Services at Transition none    Transportation Anticipated family or friend will provide       Discharge Needs Assessment    Concerns to be Addressed no discharge needs identified    Equipment Currently Used at Home glucometer    Anticipated Changes Related to Illness none    Equipment Needed After Discharge none    Offered/Gave Vendor List yes    Discharge Coordination/Progress Home with spouse, no needs. -IZABELLA Greer            Discharge Plan     Row Name 10/17/18 3442       Plan    Plan Home with spouse, no needs.     Patient/Family in Agreement with Plan yes    Plan Comments CCP met with patient and spouse Ivette Marc [phone] at bedside. Explained role of CCP, verified face sheet, and discussed d/c planning needs. Verified [facility] Notice. Left HH/SNF list. Patient lives in a 2 story home with no steps to enter. Master bath equipped with hand rails and grab bars. DME only includes glucometer. No previous SNF or HH services. No issues with medication management or transportation. Spouse to provide transportation upon d/c. At this time, patient and spouse deny need for any services or equipment from CCP.  -IZABELLA Gtz        Destination     No service coordination in this encounter.      Durable Medical Equipment     No service coordination in this encounter.      Dialysis/Infusion     No service coordination in this encounter.      Home Medical Care     No service coordination in this encounter.      Social Care     No service coordination in this encounter.                Demographic Summary     Row Name 10/17/18 1509       General Information    Admission Type observation    Required Notices Provided Observation Status Notice    Referral Source physician    Reason for Consult discharge planning    Preferred Language English     Used During This Interaction no            Functional Status     Row Name 10/17/18 1510       Functional Status    Usual Activity Tolerance good    Current Activity Tolerance good       Functional Status, IADL    Medications independent    Meal Preparation independent    Housekeeping independent    Laundry independent    Shopping independent       Mental Status    General Appearance WDL WDL       Mental Status Summary    Recent Changes in Mental Status/Cognitive Functioning no changes            Psychosocial    No documentation.           Abuse/Neglect    No documentation.           Legal    No documentation.           Substance Abuse    No documentation.           Patient Forms    No documentation.         IZABELLA Greer

## 2018-10-17 NOTE — PLAN OF CARE
Problem: Patient Care Overview  Goal: Plan of Care Review  Outcome: Ongoing (interventions implemented as appropriate)   10/17/18 0126   Coping/Psychosocial   Plan of Care Reviewed With patient   Plan of Care Review   Progress no change   OTHER   Outcome Summary VSS. No c/o CP this shift. NPO for ST and echo in the AM. Will continue to monitor.

## 2018-10-17 NOTE — PROGRESS NOTES
"[facility]  Cardiology Progress Note    Patient Identification:  Name: Earl Marc  Age: 73 y.o.  Sex: male  :  1945  MRN: [id number]                 Follow Up / Chief Complaint: chest pain    Interval History: Earl Marc is a 74 yo male with h/o CAD, HTN, HLD, chronic renal insufficiency, hypercholesterolemia, and DM.   He had a cardiac catheterization in  with stent placement.     Cardiac Risk Factors: DM, CAD, HTN     Subjective: on and off again epigastric and LSB pain during the night.  No associated with any other symptoms.  Family at bedside.  LSB pain feels like \" hammer\".  Currently LSB pain 1/10.   Pain at home was very predictable, would come on with moderate intensity exercise/ activity and he would stop and rest and would improve.  Was associated with increased fatigue.       Objective:    Temp:  [97.6 °F (36.4 °C)-98.7 °F (37.1 °C)] 97.6 °F (36.4 °C)  Heart Rate:  [64-85] 64  Resp:  [14-18] 14  BP: (120-175)/() 141/67   SpO2:  [94 %-97 %] 94 %    trops neg x 3    Past Medical History:  Past Medical History:   Diagnosis Date   • Abnormal cardiovascular stress test    • Acid reflux    • Arthritis    • Asthma    • Cancer (CMS/HCC)     skin    • Chronic kidney disease    • Coronary artery disease    • Diabetes mellitus (CMS/HCC)    • Diabetic neuropathy associated with type 2 diabetes mellitus (CMS/HCC)    • Dyslipidemia    • Erectile dysfunction    • Fatty liver disease, nonalcoholic    • Gastritis    • Glaucoma     both eyes   • Hyperlipidemia    • Hypertension    • Hypogonadism male    • Type 2 diabetes mellitus (CMS/HCC)      Past Surgical History:  Past Surgical History:   Procedure Laterality Date   • CARDIAC CATHETERIZATION N/A 3/25/2016    Procedure: Left Heart Cath;  Surgeon: Maria E Gilbert MD;  Location: [facility] INVASIVE LOCATION;  Service:    • CARDIAC CATHETERIZATION N/A 3/25/2016    Procedure: Coronary angiography;  Surgeon: Maria E" MD Vladimir;  Location:  [facility];  Service:    • CARDIAC CATHETERIZATION N/A 3/28/2016    Procedure: Coronary angiography;  Surgeon: Maria E Gilbert MD;  Location:  [facility] INV[facility] LOCATION;  Service:    • CARDIAC CATHETERIZATION N/A 3/28/2016    Procedure: Stent MOISE coronary;  Surgeon: Maria E Gilbert MD;  Location:  [facility] IN[facility] LOCATION;  Service:    • CAROTID STENT     • CORONARY ANGIOPLASTY     • NECK EXPLORATION N/A         Social History:   Social History   Substance Use Topics   • Smoking status: Never Smoker   • Smokeless tobacco: Never Used   • Alcohol use No      Family History:  Family History   Problem Relation Age of Onset   • Breast cancer Daughter    • Heart attack Mother    • Hypertension Mother    • Heart disease Mother    • Thyroid disease Mother    • Lupus Mother    • Heart attack Brother    • Heart disease Brother    • Hyperlipidemia Brother    • Cancer Brother    • Depression Father    • Stroke Maternal Grandmother           Allergies:  Allergies   Allergen Reactions   • Other      Lotions- anything with palm oil or lanolin per pt (verified 6/15/16 1910)   • Prazosin      Scheduled Meds:    allopurinol 100 mg Daily   amLODIPine 2.5 mg Q24H   aspirin 81 mg Daily   cholecalciferol 1,000 Units Daily   enoxaparin 30 mg Q24H   insulin aspart 0-7 Units 4x Daily With Meals & Nightly   isosorbide mononitrate 30 mg Q24H   latanoprost 1 drop Nightly   metoprolol tartrate 25 mg Q12H   pantoprazole 40 mg Q AM   tamsulosin 0.4 mg Nightly           INTAKE AND OUTPUT:    Intake/Output Summary (Last 24 hours) at 10/17/18 0836  Last data filed at 10/16/18 2013   Gross per 24 hour   Intake              330 ml   Output              150 ml   Net              180 ml       Review of Systems:   GI: denies n/v; + epigastric pain  Cardiac: chest pain as per HPI  Pulmonary:    Constitutional:  Temp:  [97.6 °F (36.4 °C)-98.7 °F (37.1 °C)] 97.6 °F (36.4 °C)  Heart Rate:   [64-85] 64  Resp:  [14-18] 14  BP: (120-175)/() 141/67   SpO2:  [94 %-97 %] 94 %      Physical Exam:  General:  Appears in no acute distress  Eyes: PERTL,  HEENT:  No JVD. Thyroid not visibly enlarged. No mucosal pallor or cyanosis  Respiratory: Respirations regular and unlabored at rest. BBS with good air entry in all fields. No crackles, rubs or wheezes auscultated  Cardiovascular: S1S2 Regular rate and rhythm. No murmur, rub or gallop. DP/PT pulses 2+    . No pretibial pitting edema  Gastrointestinal: Abdomen soft, flat, tender to Epigastric area. Bowel sounds present. No hepatosplenomegaly. No ascites  Musculoskeletal: RAJAN x4. No abnormal movements  Extremities: No digital clubbing or cyanosis  Skin: Color pink. Skin warm and dry to touch. No rashes    Neuro: AAO x3 CN II-XII grossly intact  Psych: Mood and affect normal, pleasant and cooperative    Cardiographics  Telemetry:     SR with 1st degree AVB          ECG:     10/16/18           Comparison EK18               ECHO:  Pending echo 10/16/18    Interpretation Summary 6/15/18    · SALINE TEST NEG FOR PFO  · All left ventricular wall segments contract normally.  · Left ventricular diastolic dysfunction (grade I) consistent with impaired relaxation.  · Mild mitral valve regurgitation is present  · Left Ventricle: Calculated EF = 57%  · There is no evidence of pericardial effusion.           Stress test:       Interpretation Summary 6/15/16     · Findings consistent with a normal ECG stress test.  · Findings consistent with a normal ECG stress test.  · Findings consistent with a normal ECG stress test.  · Left ventricular ejection fraction is normal (Calculated EF = 60%).  · Myocardial perfusion imaging indicates a normal myocardial perfusion study with no evidence of ischemia.  · Impressions are consistent with a low risk study.            Study Result 18    COMPLETE ABDOMINAL SONOGRAM     HISTORY: 73-year-old male with a history of  "epigastric pain.      IMPRESSION:  1. Findings suggestive of mild hepatic steatosis.  2. Mild atheromatous calcification throughout the abdominal aorta.  3. 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           Lab Review     Results from last 7 days  Lab Units 10/17/18  0333 10/16/18  1600 10/16/18  1048   TROPONIN T ng/mL <0.010 <0.010 <0.010           Results from last 7 days  Lab Units 10/17/18  0333   SODIUM mmol/L 139   POTASSIUM mmol/L 3.8   BUN mg/dL 15   CREATININE mg/dL 1.41*   CALCIUM mg/dL 9.4       10/17/18 767 proBNP      Results from last 7 days  Lab Units 10/17/18  0333 10/16/18  1048   WBC 10*3/mm3 7.78 6.57   HEMOGLOBIN g/dL 15.6 17.0   HEMATOCRIT % 48.9 49.6   PLATELETS 10*3/mm3 174 187                 Estimated Creatinine Clearance: 58 mL/min (A) (by C-G formula based on SCr of 1.41 mg/dL (H)).    Assessment / Plan:  1.  Unstable angina: Typical and atypical features.  Trop neg x 3.  12 lead EKG showing new inferior and anterior Q waves.  Echo performed 10/16.  Pending finalized report.   Continue asa,  Imdur,  and metoprolol.  Amlodipine added.   Pain is somewhat reproducible on exam but he was having pain predicatable pain with moderate exertion as outpt.   Nuc stress test planned for today.  Multiple risk factors for CAD        2. GERD:          May be the cause of chest pain; is pepcid and pantoprazole.      3. DM: per Internal Medicine. a1c 6.40 (7.00)     4. Hypertension: amlodipine added.              5.  Mild renal insufficiency:  10/17 Creatinine 1.41 (1.54)            Nephrology consulted 10/16. Await their recommendations.         Await echo report  and nuc stress testing.      )10/17/2018  Maria E Gilbert MD      EMR Dragon/Transcription:   \"Dictated utilizing Dragon dictation\".   "

## 2018-10-17 NOTE — PROGRESS NOTES
"[facility]  Cardiology Progress Note    Patient Identification:  Name: Earl Marc  Age: 73 y.o.  Sex: male  :  1945  MRN: [id number]                 Follow Up / Chief Complaint: chest pain    Interval History: Earl Marc is a 74 yo male with h/o CAD, HTN, HLD, chronic renal insufficiency, hypercholesterolemia, and DM.   He had a cardiac catheterization in  with stent placement.     Cardiac Risk Factors: DM, CAD, HTN     Subjective: on and off again epigastric and LSB pain during the night.  No associated with any other symptoms.  Family at bedside.  LSB pain feels like \" hammer\".  Currently LSB pain 1/10.   Pain at home was very predictable, would come on with moderate intensity exercise/ activity and he would stop and rest and would improve.  Was associated with increased fatigue.       Objective:    Temp:  [97.6 °F (36.4 °C)-98.7 °F (37.1 °C)] 97.6 °F (36.4 °C)  Heart Rate:  [64-85] 64  Resp:  [14-18] 14  BP: (120-175)/() 141/67   SpO2:  [94 %-97 %] 94 %    trops neg x 3    Past Medical History:  Past Medical History:   Diagnosis Date   • Abnormal cardiovascular stress test    • Acid reflux    • Arthritis    • Asthma    • Cancer (CMS/HCC)     skin    • Chronic kidney disease    • Coronary artery disease    • Diabetes mellitus (CMS/HCC)    • Diabetic neuropathy associated with type 2 diabetes mellitus (CMS/HCC)    • Dyslipidemia    • Erectile dysfunction    • Fatty liver disease, nonalcoholic    • Gastritis    • Glaucoma     both eyes   • Hyperlipidemia    • Hypertension    • Hypogonadism male    • Type 2 diabetes mellitus (CMS/HCC)      Past Surgical History:  Past Surgical History:   Procedure Laterality Date   • CARDIAC CATHETERIZATION N/A 3/25/2016    Procedure: Left Heart Cath;  Surgeon: Maria E Gilbert MD;  Location: [facility] INVASIVE LOCATION;  Service:    • CARDIAC CATHETERIZATION N/A 3/25/2016    Procedure: Coronary angiography;  Surgeon: Maria E" MD Vladimir;  Location:  [facility] INV[facility] LOCATION;  Service:    • CARDIAC CATHETERIZATION N/A 3/28/2016    Procedure: Coronary angiography;  Surgeon: Maria E Gilbert MD;  Location:  [facility] INVASIVE LOCATION;  Service:    • CARDIAC CATHETERIZATION N/A 3/28/2016    Procedure: Stent MOISE coronary;  Surgeon: Maria E Gilbert MD;  Location:  [facility] INVASIVE LOCATION;  Service:    • CAROTID STENT     • CORONARY ANGIOPLASTY     • NECK EXPLORATION N/A         Social History:   Social History   Substance Use Topics   • Smoking status: Never Smoker   • Smokeless tobacco: Never Used   • Alcohol use No      Family History:  Family History   Problem Relation Age of Onset   • Breast cancer Daughter    • Heart attack Mother    • Hypertension Mother    • Heart disease Mother    • Thyroid disease Mother    • Lupus Mother    • Heart attack Brother    • Heart disease Brother    • Hyperlipidemia Brother    • Cancer Brother    • Depression Father    • Stroke Maternal Grandmother           Allergies:  Allergies   Allergen Reactions   • Other      Lotions- anything with palm oil or lanolin per pt (verified 6/15/16 1910)   • Prazosin      Scheduled Meds:    allopurinol 100 mg Daily   aspirin 81 mg Daily   cholecalciferol 1,000 Units Daily   enoxaparin 30 mg Q24H   insulin aspart 0-7 Units 4x Daily With Meals & Nightly   isosorbide mononitrate 30 mg Q24H   latanoprost 1 drop Nightly   metoprolol tartrate 25 mg Q12H   pantoprazole 40 mg Q AM   tamsulosin 0.4 mg Nightly   [COMPLETED] technetium sestamibi 1 dose Once in imaging           INTAKE AND OUTPUT:    Intake/Output Summary (Last 24 hours) at 10/17/18 0806  Last data filed at 10/16/18 2013   Gross per 24 hour   Intake              330 ml   Output              150 ml   Net              180 ml       Review of Systems:   GI: denies n/v; + epigastric pain  Cardiac: chest pain as per HPI  Pulmonary:    Constitutional:  Temp:  [97.6 °F (36.4 °C)-98.7 °F (37.1 °C)]  97.6 °F (36.4 °C)  Heart Rate:  [64-85] 64  Resp:  [14-18] 14  BP: (120-175)/() 141/67   SpO2:  [94 %-97 %] 94 %      Physical Exam:  General:  Appears in no acute distress  Eyes: PERTL,  HEENT:  No JVD. Thyroid not visibly enlarged. No mucosal pallor or cyanosis  Respiratory: Respirations regular and unlabored at rest. BBS with good air entry in all fields. No crackles, rubs or wheezes auscultated  Cardiovascular: S1S2 Regular rate and rhythm. No murmur, rub or gallop. DP/PT pulses 2+    . No pretibial pitting edema  Gastrointestinal: Abdomen soft, flat, tender to Epigastric area. Bowel sounds present. No hepatosplenomegaly. No ascites  Musculoskeletal: RAJAN x4. No abnormal movements  Extremities: No digital clubbing or cyanosis  Skin: Color pink. Skin warm and dry to touch. No rashes    Neuro: AAO x3 CN II-XII grossly intact  Psych: Mood and affect normal, pleasant and cooperative    Cardiographics  Telemetry:     SR with 1st degree AVB          ECG:     10/16/18           Comparison EK18               ECHO:  Pending echo 10/16/18    Interpretation Summary 6/15/18    · SALINE TEST NEG FOR PFO  · All left ventricular wall segments contract normally.  · Left ventricular diastolic dysfunction (grade I) consistent with impaired relaxation.  · Mild mitral valve regurgitation is present  · Left Ventricle: Calculated EF = 57%  · There is no evidence of pericardial effusion.           Stress test:       Interpretation Summary 6/15/16     · Findings consistent with a normal ECG stress test.  · Findings consistent with a normal ECG stress test.  · Findings consistent with a normal ECG stress test.  · Left ventricular ejection fraction is normal (Calculated EF = 60%).  · Myocardial perfusion imaging indicates a normal myocardial perfusion study with no evidence of ischemia.  · Impressions are consistent with a low risk study.            Study Result 18    COMPLETE ABDOMINAL SONOGRAM     HISTORY:  "73-year-old male with a history of epigastric pain.      IMPRESSION:  1. Findings suggestive of mild hepatic steatosis.  2. Mild atheromatous calcification throughout the abdominal aorta.  3. 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           Lab Review     Results from last 7 days  Lab Units 10/17/18  0333 10/16/18  1600 10/16/18  1048   TROPONIN T ng/mL <0.010 <0.010 <0.010           Results from last 7 days  Lab Units 10/17/18  0333   SODIUM mmol/L 139   POTASSIUM mmol/L 3.8   BUN mg/dL 15   CREATININE mg/dL 1.41*   CALCIUM mg/dL 9.4       10/17/18 767 proBNP      Results from last 7 days  Lab Units 10/17/18  0333 10/16/18  1048   WBC 10*3/mm3 7.78 6.57   HEMOGLOBIN g/dL 15.6 17.0   HEMATOCRIT % 48.9 49.6   PLATELETS 10*3/mm3 174 187                 Estimated Creatinine Clearance: 58 mL/min (A) (by C-G formula based on SCr of 1.41 mg/dL (H)).    Assessment / Plan:  1.  Unstable angina: Typical and atypical features.  Trop neg x 3.  12 lead EKG showing new inferior and anterior Q waves.  Echo performed 10/16.  Pending finalized report.   Continue asa,  Imdur,  and metoprolol.  Amlodipine added.   Pain is somewhat reproducible on exam but he was having pain predicatable pain with moderate exertion as outpt.   Nuc stress test planned for today.  Multiple risk factors for CAD        2. GERD:          May be the cause of chest pain; is pepcid and pantoprazole.      3. DM: per Internal Medicine. a1c 6.40 (7.00)     4. Hypertension: amlodipine added.              5.  Mild renal insufficiency:  10/17 Creatinine 1.41 (1.54)            Nephrology consulted 10/16. Await their recommendations.         Await echo report  and nuc stress testing.      )10/17/2018  JL Echavarria/Transcription:   \"Dictated utilizing Dragon dictation\".   "

## 2018-10-17 NOTE — PROGRESS NOTES
"Daily progress note    Chief complaint  Doing better  No more chest pain    History of present illness 73-year-old white male with known coronary artery disease status post multiple distended 2016 other medical problem diabetes mellitus hypertension hyperlipidemia asthma gastroesophageal reflux disease presented to [facility] emergency room with left-sided chest discomfort with shortness of breath nausea for last 3-4 months which is getting worst.  Patient denies any fever chills cough night sweats weight loss or weight gain.  Patient evaluated in ER found to have unstable angina admitted for management.  I'm asked to follow the patient for medical problems.  At the time of interview he still has some chest discomfort but no shortness of breath or associated symptoms.  Patient stated that pain became worse with exertion.     REVIEW OF SYSTEMS  Review of Systems   Constitutional: Negative for chills and diaphoresis.   HENT: Negative for congestion, rhinorrhea and sore throat.    Eyes: Negative for pain.   Respiratory: Negative for cough and shortness of breath.    Cardiovascular: Positive for chest pain. Negative for palpitations and leg swelling.   Gastrointestinal: Negative for abdominal pain, diarrhea, nausea and vomiting.   Genitourinary: Negative for difficulty urinating, dysuria, flank pain and frequency.   Musculoskeletal: Negative for myalgias, neck pain and neck stiffness.   Skin: Negative for rash.   Neurological: Negative for dizziness, speech difficulty, weakness, light-headedness, numbness and headaches.   Psychiatric/Behavioral: Negative.    All other systems reviewed and are negative.     PHYSICAL EXAM  Blood pressure 128/60, pulse 64, temperature 97.5 °F (36.4 °C), temperature source Oral, resp. rate 16, height 185.4 cm (73\"), weight 99.8 kg (220 lb), SpO2 94 %.    Constitutional: He is oriented to person, place, and time. No distress.   Head: Normocephalic.   Mouth/Throat: Mucous membranes " are normal.   Eyes: Pupils are equal, round, and reactive to light. EOM are normal.   Neck: Normal range of motion. Neck supple.   Cardiovascular: Normal rate, regular rhythm and normal heart sounds.    Pulmonary/Chest: Effort normal and breath sounds normal. No respiratory distress. He has no decreased breath sounds. He has no wheezes. He has no rhonchi. He has no rales. He exhibits tenderness (of the anterior chest wall).   Abdominal: Soft. There is no tenderness. There is no rebound and no guarding.   Musculoskeletal: Normal range of motion.   Neurological: He is alert and oriented to person, place, and time. He has normal sensation.   Skin: Skin is warm and dry.   Psychiatric: Mood and affect normal.     LAB RESULTS  Lab Results (last 24 hours)     Procedure Component Value Units Date/Time    POC Glucose Once [[id number]]  (Normal) Collected:  10/17/18 1107    Specimen:  Blood Updated:  10/17/18 1109     Glucose 124 mg/dL     Narrative:       Meter: [id number] : [id number] Naga MCKEON    POC Glucose Once [[id number]]  (Normal) Collected:  10/17/18 0648    Specimen:  Blood Updated:  10/17/18 0650     Glucose 98 mg/dL     Narrative:       Meter: [id number] : [id number] Waldo MCKEON    Troponin [[id number]]  (Normal) Collected:  10/17/18 0333    Specimen:  Blood Updated:  10/17/18 0635     Troponin T <0.010 ng/mL     Narrative:       Troponin T Reference Ranges:  Less than 0.03 ng/mL:    Negative for AMI  0.03 to 0.09 ng/mL:      Indeterminant for AMI  Greater than 0.09 ng/mL: Positive for AMI    BNP [[id number]]  (Normal) Collected:  10/17/18 0333    Specimen:  Blood Updated:  10/17/18 0457     proBNP 767.9 pg/mL     Narrative:       Among patients with dyspnea, NT-proBNP is highly sensitive for the detection of acute congestive heart failure. In addition NT-proBNP of <300 pg/ml effectively rules out acute congestive heart failure with 99% negative predictive value.    TSH [[id number]]  (Normal)  Collected:  10/17/18 0333    Specimen:  Blood Updated:  10/17/18 0457     TSH 3.260 mIU/mL     Comprehensive Metabolic Panel [[id number]]  (Abnormal) Collected:  10/17/18 0333    Specimen:  Blood Updated:  10/17/18 0451     Glucose 120 (H) mg/dL      BUN 15 mg/dL      Creatinine 1.41 (H) mg/dL      Sodium 139 mmol/L      Potassium 3.8 mmol/L      Chloride 102 mmol/L      CO2 25.0 mmol/L      Calcium 9.4 mg/dL      Total Protein 6.8 g/dL      Albumin 3.90 g/dL      ALT (SGPT) 18 U/L      AST (SGOT) 16 U/L      Alkaline Phosphatase 54 U/L      Total Bilirubin 0.7 mg/dL      eGFR Non African Amer 49 (L) mL/min/1.73      Globulin 2.9 gm/dL      A/G Ratio 1.3 g/dL      BUN/Creatinine Ratio 10.6     Anion Gap 12.0 mmol/L     Narrative:       The MDRD GFR formula is only valid for adults with stable renal function between ages 18 and 70.    Lipid Panel [[id number]]  (Abnormal) Collected:  10/17/18 0333    Specimen:  Blood Updated:  10/17/18 0451     Total Cholesterol 118 mg/dL      Triglycerides 135 mg/dL      HDL Cholesterol 33 (L) mg/dL      LDL Cholesterol  58 mg/dL      VLDL Cholesterol 27 mg/dL      LDL/HDL Ratio 1.76    Narrative:       Cholesterol Reference Ranges  (U.S. Department of Health and Human Services ATP III Classifications)    Desirable          <200 mg/dL  Borderline High    200-239 mg/dL  High Risk          >240 mg/dL      Triglyceride Reference Ranges  (U.S. Department of Health and Human Services ATP III Classifications)    Normal           <150 mg/dL  Borderline High  150-199 mg/dL  High             200-499 mg/dL  Very High        >500 mg/dL    HDL Reference Ranges  (U.S. Department of Health and Human Services ATP III Classifcations)    Low     <40 mg/dl (major risk factor for CHD)  High    >60 mg/dl ('negative' risk factor for CHD)        LDL Reference Ranges  (U.S. Department of Health and Human Services ATP III Classifcations)    Optimal          <100 mg/dL  Near Optimal     100-129  mg/dL  Borderline High  130-159 mg/dL  High             160-189 mg/dL  Very High        >189 mg/dL    Hemoglobin A1c [[id number]]  (Abnormal) Collected:  10/17/18 0333    Specimen:  Blood Updated:  10/17/18 0427     Hemoglobin A1C 6.40 (H) %     Narrative:       Hemoglobin A1C Ranges:    Increased Risk for Diabetes  5.7% to 6.4%  Diabetes                     >= 6.5%  Diabetic Goal                < 7.0%    CBC & Differential [[id number]] Collected:  10/17/18 0333    Specimen:  Blood Updated:  10/17/18 0418    Narrative:       The following orders were created for panel order CBC & Differential.  Procedure                               Abnormality         Status                     ---------                               -----------         ------                     CBC Auto Differential[[id number]]        Abnormal            Final result                 Please view results for these tests on the individual orders.    CBC Auto Differential [[id number]]  (Abnormal) Collected:  10/17/18 0333    Specimen:  Blood Updated:  10/17/18 0418     WBC 7.78 10*3/mm3      RBC 5.48 10*6/mm3      Hemoglobin 15.6 g/dL      Hematocrit 48.9 %      MCV 89.2 fL      MCH 28.5 pg      MCHC 31.9 (L) g/dL      RDW 14.4 %      RDW-SD 46.8 fl      MPV 10.5 fL      Platelets 174 10*3/mm3      Neutrophil % 55.0 %      Lymphocyte % 34.7 %      Monocyte % 7.2 %      Eosinophil % 2.6 %      Basophil % 0.5 %      Immature Grans % 0.0 %      Neutrophils, Absolute 4.28 10*3/mm3      Lymphocytes, Absolute 2.70 10*3/mm3      Monocytes, Absolute 0.56 10*3/mm3      Eosinophils, Absolute 0.20 10*3/mm3      Basophils, Absolute 0.04 10*3/mm3      Immature Grans, Absolute 0.00 10*3/mm3     Urinalysis With Culture If Indicated - Urine, Clean Catch [[id number]]  (Abnormal) Collected:  10/16/18 2013    Specimen:  Urine from Urine, Clean Catch Updated:  10/16/18 2032     Color, UA Yellow     Appearance, UA Clear     pH, UA 6.0     Specific Ruth, UA 1.018      Glucose, UA Negative     Ketones, UA Trace (A)     Bilirubin, UA Negative     Blood, UA Negative     Protein, UA Negative     Leuk Esterase, UA Negative     Nitrite, UA Negative     Urobilinogen, UA 0.2 E.U./dL    Narrative:       Urine microscopic not indicated.    POC Glucose Once [[id number]]  (Abnormal) Collected:  10/16/18 1951    Specimen:  Blood Updated:  10/16/18 1953     Glucose 188 (H) mg/dL     Narrative:       Meter: [id number] : [id number] Peak Starr tech    Troponin [[id number]]  (Normal) Collected:  10/16/18 1600    Specimen:  Blood Updated:  10/16/18 1633     Troponin T <0.010 ng/mL     Narrative:       Troponin T Reference Ranges:  Less than 0.03 ng/mL:    Negative for AMI  0.03 to 0.09 ng/mL:      Indeterminant for AMI  Greater than 0.09 ng/mL: Positive for AMI        Imaging Results (last 24 hours)     ** No results found for the last 24 hours. **      EKG:                             Rhythm/Rate: NSR rate 74  P waves and OK: Normal P waves  QRS, axis: Normal QRS   ST and T waves: T wave inversion in aVL       Current Facility-Administered Medications:   •  allopurinol (ZYLOPRIM) tablet 100 mg, 100 mg, Oral, Daily, Dhruv Hankins MD, 100 mg at 10/17/18 1158  •  amLODIPine (NORVASC) tablet 2.5 mg, 2.5 mg, Oral, Q24H, Antonette Murillo APRN, 2.5 mg at 10/17/18 1159  •  aspirin EC tablet 81 mg, 81 mg, Oral, Daily, Lidia Thayer APRN, 81 mg at 10/17/18 1158  •  cholecalciferol (VITAMIN D3) tablet 1,000 Units, 1,000 Units, Oral, Daily, Dhruv Hankins MD, 1,000 Units at 10/17/18 1158  •  [START ON 10/18/2018] dexlansoprazole (DEXILANT) capsule 60 mg, 60 mg, Oral, Q AM, Antonette Murillo APRN  •  enoxaparin (LOVENOX) syringe 30 mg, 30 mg, Subcutaneous, Q24H, Dhruv Hankins MD  •  insulin aspart (novoLOG) injection 0-7 Units, 0-7 Units, Subcutaneous, 4x Daily With Meals & Nightly, Dhruv Hankins MD, 2 Units at 10/16/18 2150  •  isosorbide mononitrate (IMDUR) 24 hr tablet 30 mg, 30 mg, Oral, Q24H,  Candice Hankins MD, 30 mg at 10/17/18 1158  •  latanoprost (XALATAN) 0.005 % ophthalmic solution 1 drop, 1 drop, Both Eyes, Nightly, Candice Hankins MD, 1 drop at 10/16/18 2153  •  metoprolol tartrate (LOPRESSOR) tablet 25 mg, 25 mg, Oral, Q12H, Candice Hankins MD, 25 mg at 10/17/18 1159  •  nitroglycerin (NITROSTAT) SL tablet 0.4 mg, 0.4 mg, Sublingual, Q5 Min PRN, Lidia Thayer APRN  •  pregabalin (LYRICA) capsule 50 mg, 50 mg, Oral, TID PRN, Maria E Gilbert MD, 50 mg at 10/16/18 2251  •  simethicone (MYLICON) chewable tablet 80 mg, 80 mg, Oral, 4x Daily PRN, Maria E Gilbert MD  •  sodium chloride 0.9 % flush 3-10 mL, 3-10 mL, Intravenous, PRN, Lidia Thayer APRN  •  tamsulosin (FLOMAX) 24 hr capsule 0.4 mg, 0.4 mg, Oral, Nightly, Candice Hankins MD, 0.4 mg at 10/16/18 1832  •  zolpidem (AMBIEN) tablet 5 mg, 5 mg, Oral, Nightly PRN, Maria E Gilbert MD, 5 mg at 10/16/18 2251     ASSESSMENT  Chest pain with known coronary artery disease consistent with unstable angina  Diabetes mellitus  Hypertension  Hyperlipidemia  Gastroesophageal reflux disease  Chronic kidney disease stage III    PLAN  CPM  Serial cardiac enzymes and EKG  Check 2-D echo and stress Cardiolite  Continue home medication and adjust the doses  Accu-Chek with sliding scale insulin  Stress ulcer DVT prophylaxis  Supportive care  Will follow with Dr. MILLS further recommendation according to hospital course    CANDICE HANKINS MD

## 2018-10-18 LAB
ANION GAP SERPL CALCULATED.3IONS-SCNC: 12.8 MMOL/L
BASOPHILS # BLD AUTO: 0.03 10*3/MM3 (ref 0–0.2)
BASOPHILS NFR BLD AUTO: 0.3 % (ref 0–1.5)
BUN BLD-MCNC: 17 MG/DL (ref 8–23)
BUN/CREAT SERPL: 11 (ref 7–25)
CALCIUM SPEC-SCNC: 9.3 MG/DL (ref 8.6–10.5)
CHLORIDE SERPL-SCNC: 100 MMOL/L (ref 98–107)
CHOLEST SERPL-MCNC: 118 MG/DL (ref 0–200)
CO2 SERPL-SCNC: 26.2 MMOL/L (ref 22–29)
CREAT BLD-MCNC: 1.54 MG/DL (ref 0.76–1.27)
DEPRECATED RDW RBC AUTO: 47 FL (ref 37–54)
EOSINOPHIL # BLD AUTO: 0.22 10*3/MM3 (ref 0–0.7)
EOSINOPHIL NFR BLD AUTO: 2.4 % (ref 0.3–6.2)
ERYTHROCYTE [DISTWIDTH] IN BLOOD BY AUTOMATED COUNT: 14.5 % (ref 11.5–14.5)
GFR SERPL CREATININE-BSD FRML MDRD: 45 ML/MIN/1.73
GLUCOSE BLD-MCNC: 122 MG/DL (ref 65–99)
GLUCOSE BLDC GLUCOMTR-MCNC: 116 MG/DL (ref 70–130)
GLUCOSE BLDC GLUCOMTR-MCNC: 119 MG/DL (ref 70–130)
GLUCOSE BLDC GLUCOMTR-MCNC: 138 MG/DL (ref 70–130)
GLUCOSE BLDC GLUCOMTR-MCNC: 201 MG/DL (ref 70–130)
HCT VFR BLD AUTO: 48.8 % (ref 40.4–52.2)
HDLC SERPL-MCNC: 32 MG/DL (ref 40–60)
HGB BLD-MCNC: 15.4 G/DL (ref 13.7–17.6)
IMM GRANULOCYTES # BLD: 0.02 10*3/MM3 (ref 0–0.03)
IMM GRANULOCYTES NFR BLD: 0.2 % (ref 0–0.5)
INR PPP: 1.12 (ref 0.9–1.1)
LDLC SERPL CALC-MCNC: 34 MG/DL (ref 0–100)
LDLC/HDLC SERPL: 1.06 {RATIO}
LYMPHOCYTES # BLD AUTO: 2.46 10*3/MM3 (ref 0.9–4.8)
LYMPHOCYTES NFR BLD AUTO: 27 % (ref 19.6–45.3)
MCH RBC QN AUTO: 28.4 PG (ref 27–32.7)
MCHC RBC AUTO-ENTMCNC: 31.6 G/DL (ref 32.6–36.4)
MCV RBC AUTO: 89.9 FL (ref 79.8–96.2)
MONOCYTES # BLD AUTO: 0.68 10*3/MM3 (ref 0.2–1.2)
MONOCYTES NFR BLD AUTO: 7.5 % (ref 5–12)
NEUTROPHILS # BLD AUTO: 5.69 10*3/MM3 (ref 1.9–8.1)
NEUTROPHILS NFR BLD AUTO: 62.6 % (ref 42.7–76)
PLATELET # BLD AUTO: 166 10*3/MM3 (ref 140–500)
PMV BLD AUTO: 10.8 FL (ref 6–12)
POTASSIUM BLD-SCNC: 4.4 MMOL/L (ref 3.5–5.2)
PROTHROMBIN TIME: 14.2 SECONDS (ref 11.7–14.2)
RBC # BLD AUTO: 5.43 10*6/MM3 (ref 4.6–6)
SODIUM BLD-SCNC: 139 MMOL/L (ref 136–145)
TRIGL SERPL-MCNC: 260 MG/DL (ref 0–150)
VLDLC SERPL-MCNC: 52 MG/DL (ref 5–40)
WBC NRBC COR # BLD: 9.1 10*3/MM3 (ref 4.5–10.7)

## 2018-10-18 PROCEDURE — 25010000002 HEPARIN (PORCINE) PER 1000 UNITS: Performed by: INTERNAL MEDICINE

## 2018-10-18 PROCEDURE — 93010 ELECTROCARDIOGRAM REPORT: CPT | Performed by: INTERNAL MEDICINE

## 2018-10-18 PROCEDURE — C1769 GUIDE WIRE: HCPCS | Performed by: INTERNAL MEDICINE

## 2018-10-18 PROCEDURE — C9600 PERC DRUG-EL COR STENT SING: HCPCS | Performed by: INTERNAL MEDICINE

## 2018-10-18 PROCEDURE — C1874 STENT, COATED/COV W/DEL SYS: HCPCS | Performed by: INTERNAL MEDICINE

## 2018-10-18 PROCEDURE — C1894 INTRO/SHEATH, NON-LASER: HCPCS | Performed by: INTERNAL MEDICINE

## 2018-10-18 PROCEDURE — 93005 ELECTROCARDIOGRAM TRACING: CPT | Performed by: NURSE PRACTITIONER

## 2018-10-18 PROCEDURE — A9270 NON-COVERED ITEM OR SERVICE: HCPCS | Performed by: INTERNAL MEDICINE

## 2018-10-18 PROCEDURE — 92921 PR PRQ TRLUML CORONARY ANGIOPLASTY ADDL BRANCH: CPT | Performed by: INTERNAL MEDICINE

## 2018-10-18 PROCEDURE — 92928 PRQ TCAT PLMT NTRAC ST 1 LES: CPT | Performed by: INTERNAL MEDICINE

## 2018-10-18 PROCEDURE — 63710000001 ACETAMINOPHEN 325 MG TABLET: Performed by: INTERNAL MEDICINE

## 2018-10-18 PROCEDURE — 99153 MOD SED SAME PHYS/QHP EA: CPT | Performed by: INTERNAL MEDICINE

## 2018-10-18 PROCEDURE — A9270 NON-COVERED ITEM OR SERVICE: HCPCS | Performed by: HOSPITALIST

## 2018-10-18 PROCEDURE — 96361 HYDRATE IV INFUSION ADD-ON: CPT

## 2018-10-18 PROCEDURE — G0378 HOSPITAL OBSERVATION PER HR: HCPCS

## 2018-10-18 PROCEDURE — 85610 PROTHROMBIN TIME: CPT | Performed by: NURSE PRACTITIONER

## 2018-10-18 PROCEDURE — 85025 COMPLETE CBC W/AUTO DIFF WBC: CPT | Performed by: HOSPITALIST

## 2018-10-18 PROCEDURE — 93458 L HRT ARTERY/VENTRICLE ANGIO: CPT | Performed by: INTERNAL MEDICINE

## 2018-10-18 PROCEDURE — 63710000001 ASPIRIN 81 MG CHEWABLE TABLET: Performed by: INTERNAL MEDICINE

## 2018-10-18 PROCEDURE — 99225 PR SBSQ OBSERVATION CARE/DAY 25 MINUTES: CPT | Performed by: INTERNAL MEDICINE

## 2018-10-18 PROCEDURE — 63710000001 TAMSULOSIN 0.4 MG CAPSULE: Performed by: HOSPITALIST

## 2018-10-18 PROCEDURE — 63710000001 TICAGRELOR 90 MG TABLET: Performed by: INTERNAL MEDICINE

## 2018-10-18 PROCEDURE — 0 IOPAMIDOL PER 1 ML: Performed by: INTERNAL MEDICINE

## 2018-10-18 PROCEDURE — 25010000002 FENTANYL CITRATE (PF) 100 MCG/2ML SOLUTION: Performed by: INTERNAL MEDICINE

## 2018-10-18 PROCEDURE — 63710000001 ZOLPIDEM 5 MG TABLET: Performed by: INTERNAL MEDICINE

## 2018-10-18 PROCEDURE — 80048 BASIC METABOLIC PNL TOTAL CA: CPT | Performed by: NURSE PRACTITIONER

## 2018-10-18 PROCEDURE — 82962 GLUCOSE BLOOD TEST: CPT

## 2018-10-18 PROCEDURE — 96360 HYDRATION IV INFUSION INIT: CPT

## 2018-10-18 PROCEDURE — 25010000002 MIDAZOLAM PER 1 MG: Performed by: INTERNAL MEDICINE

## 2018-10-18 PROCEDURE — 80061 LIPID PANEL: CPT | Performed by: NURSE PRACTITIONER

## 2018-10-18 PROCEDURE — C1887 CATHETER, GUIDING: HCPCS | Performed by: INTERNAL MEDICINE

## 2018-10-18 PROCEDURE — 63710000001 METOPROLOL TARTRATE 25 MG TABLET: Performed by: HOSPITALIST

## 2018-10-18 PROCEDURE — C1725 CATH, TRANSLUMIN NON-LASER: HCPCS | Performed by: INTERNAL MEDICINE

## 2018-10-18 PROCEDURE — 99152 MOD SED SAME PHYS/QHP 5/>YRS: CPT | Performed by: INTERNAL MEDICINE

## 2018-10-18 PROCEDURE — 63710000001 HYDROCODONE-ACETAMINOPHEN 5-325 MG TABLET: Performed by: INTERNAL MEDICINE

## 2018-10-18 PROCEDURE — 85347 COAGULATION TIME ACTIVATED: CPT

## 2018-10-18 PROCEDURE — 92921: CPT | Performed by: INTERNAL MEDICINE

## 2018-10-18 DEVICE — XIENCE SIERRA™ EVEROLIMUS ELUTING CORONARY STENT SYSTEM 3.00 MM X 23 MM / RAPID-EXCHANGE
Type: IMPLANTABLE DEVICE | Status: FUNCTIONAL
Brand: XIENCE SIERRA™

## 2018-10-18 RX ORDER — FENTANYL CITRATE 50 UG/ML
INJECTION, SOLUTION INTRAMUSCULAR; INTRAVENOUS AS NEEDED
Status: DISCONTINUED | OUTPATIENT
Start: 2018-10-18 | End: 2018-10-18 | Stop reason: HOSPADM

## 2018-10-18 RX ORDER — HEPARIN SODIUM 1000 [USP'U]/ML
INJECTION, SOLUTION INTRAVENOUS; SUBCUTANEOUS AS NEEDED
Status: DISCONTINUED | OUTPATIENT
Start: 2018-10-18 | End: 2018-10-18 | Stop reason: HOSPADM

## 2018-10-18 RX ORDER — SODIUM CHLORIDE 9 MG/ML
INJECTION, SOLUTION INTRAVENOUS CONTINUOUS PRN
Status: COMPLETED | OUTPATIENT
Start: 2018-10-18 | End: 2018-10-18

## 2018-10-18 RX ORDER — HYDROCODONE BITARTRATE AND ACETAMINOPHEN 5; 325 MG/1; MG/1
1 TABLET ORAL EVERY 4 HOURS PRN
Status: DISCONTINUED | OUTPATIENT
Start: 2018-10-18 | End: 2018-10-19 | Stop reason: HOSPADM

## 2018-10-18 RX ORDER — ASPIRIN 81 MG/1
TABLET, CHEWABLE ORAL AS NEEDED
Status: DISCONTINUED | OUTPATIENT
Start: 2018-10-18 | End: 2018-10-18 | Stop reason: HOSPADM

## 2018-10-18 RX ORDER — LIDOCAINE HYDROCHLORIDE 20 MG/ML
INJECTION, SOLUTION INFILTRATION; PERINEURAL AS NEEDED
Status: DISCONTINUED | OUTPATIENT
Start: 2018-10-18 | End: 2018-10-18 | Stop reason: HOSPADM

## 2018-10-18 RX ORDER — ACETAMINOPHEN 325 MG/1
650 TABLET ORAL EVERY 6 HOURS PRN
Status: DISCONTINUED | OUTPATIENT
Start: 2018-10-18 | End: 2018-10-19 | Stop reason: HOSPADM

## 2018-10-18 RX ORDER — SODIUM CHLORIDE 9 MG/ML
100 INJECTION, SOLUTION INTRAVENOUS CONTINUOUS
Status: DISCONTINUED | OUTPATIENT
Start: 2018-10-18 | End: 2018-10-19 | Stop reason: HOSPADM

## 2018-10-18 RX ORDER — MIDAZOLAM HYDROCHLORIDE 1 MG/ML
INJECTION INTRAMUSCULAR; INTRAVENOUS AS NEEDED
Status: DISCONTINUED | OUTPATIENT
Start: 2018-10-18 | End: 2018-10-18 | Stop reason: HOSPADM

## 2018-10-18 RX ADMIN — ACETYLCYSTEINE 600 MG: 200 SOLUTION ORAL; RESPIRATORY (INHALATION) at 21:24

## 2018-10-18 RX ADMIN — TAMSULOSIN HYDROCHLORIDE 0.4 MG: 0.4 CAPSULE ORAL at 21:24

## 2018-10-18 RX ADMIN — ISOSORBIDE MONONITRATE 30 MG: 30 TABLET ORAL at 08:28

## 2018-10-18 RX ADMIN — SODIUM CHLORIDE 100 ML/HR: 9 INJECTION, SOLUTION INTRAVENOUS at 14:14

## 2018-10-18 RX ADMIN — METOPROLOL TARTRATE 25 MG: 25 TABLET ORAL at 08:28

## 2018-10-18 RX ADMIN — VITAMIN D, TAB 1000IU (100/BT) 1000 UNITS: 25 TAB at 08:28

## 2018-10-18 RX ADMIN — AMLODIPINE BESYLATE 2.5 MG: 2.5 TABLET ORAL at 08:28

## 2018-10-18 RX ADMIN — ZOLPIDEM TARTRATE 5 MG: 5 TABLET ORAL at 21:33

## 2018-10-18 RX ADMIN — ASPIRIN 81 MG: 81 TABLET, DELAYED RELEASE ORAL at 08:28

## 2018-10-18 RX ADMIN — SODIUM CHLORIDE 75 ML/HR: 9 INJECTION, SOLUTION INTRAVENOUS at 02:31

## 2018-10-18 RX ADMIN — ACETYLCYSTEINE 600 MG: 200 SOLUTION ORAL; RESPIRATORY (INHALATION) at 08:28

## 2018-10-18 RX ADMIN — DEXLANSOPRAZOLE 60 MG: 60 CAPSULE, DELAYED RELEASE ORAL at 05:25

## 2018-10-18 RX ADMIN — HYDROCODONE BITARTRATE AND ACETAMINOPHEN 1 TABLET: 5; 325 TABLET ORAL at 21:23

## 2018-10-18 RX ADMIN — TICAGRELOR 90 MG: 90 TABLET ORAL at 21:24

## 2018-10-18 RX ADMIN — METOPROLOL TARTRATE 25 MG: 25 TABLET ORAL at 21:24

## 2018-10-18 RX ADMIN — ACETAMINOPHEN 650 MG: 325 TABLET, FILM COATED ORAL at 18:48

## 2018-10-18 RX ADMIN — ALLOPURINOL 100 MG: 100 TABLET ORAL at 08:28

## 2018-10-18 NOTE — PROGRESS NOTES
[facility]  Cardiology Progress Note    Patient Identification:  Name: Earl Marc  Age: 73 y.o.  Sex: male  :  1945  MRN: [id number]                 Follow Up / Chief Complaint: chest pain    Interval History: Earl Marc is a 72 yo male with h/o CAD, HTN, HLD, chronic renal insufficiency, hypercholesterolemia, and DM.   He had a cardiac catheterization in  with stent placement.  Nuc results reviewed with pt 10/17 showing ischemia perfusion defect.  Reviewed echo from 10/17 with pt showing new RWMA HK in apical septal and mid inferoseptal walls.  EF 67%.  Plans for LHC and possible PCI today at 1400. IV fluids and mucomyst have been ordered by nephrology.     Cardiac Risk Factors: DM, CAD, HTN     Subjective: on and off again epigastric and LSB pain during the night.  No associated with any other symptoms.     Objective:    Temp:  [97.5 °F (36.4 °C)-97.9 °F (36.6 °C)] 97.9 °F (36.6 °C)  Heart Rate:  [56-69] 69  Resp:  [16-20] 20  BP: (111-136)/(53-67) 134/67   SpO2:  [94 %-98 %] 97 %        trops neg x 3    Past Medical History:  Past Medical History:   Diagnosis Date   • Abnormal cardiovascular stress test    • Acid reflux    • Arthritis    • Asthma    • Cancer (CMS/HCC)     skin    • Chronic kidney disease    • Coronary artery disease    • Diabetes mellitus (CMS/HCC)    • Diabetic neuropathy associated with type 2 diabetes mellitus (CMS/HCC)    • Dyslipidemia    • Erectile dysfunction    • Fatty liver disease, nonalcoholic    • Gastritis    • Glaucoma     both eyes   • Hyperlipidemia    • Hypertension    • Hypogonadism male    • Type 2 diabetes mellitus (CMS/HCC)      Past Surgical History:  Past Surgical History:   Procedure Laterality Date   • CARDIAC CATHETERIZATION N/A 3/25/2016    Procedure: Left Heart Cath;  Surgeon: Maria E Gilbert MD;  Location: [facility] INVASIVE LOCATION;  Service:    • CARDIAC CATHETERIZATION N/A 3/25/2016    Procedure: Coronary angiography;   Surgeon: Maria E Gilbert MD;  Location:  [facility] CATH INVASIVE LOCATION;  Service:    • CARDIAC CATHETERIZATION N/A 3/28/2016    Procedure: Coronary angiography;  Surgeon: Maria E Gilbert MD;  Location:  [facility]ATH INVASIVE LOCATION;  Service:    • CARDIAC CATHETERIZATION N/A 3/28/2016    Procedure: Stent MOISE coronary;  Surgeon: Maria E Gilbert MD;  Location:  [facility] INVASIVE LOCATION;  Service:    • CAROTID STENT     • CORONARY ANGIOPLASTY     • NECK EXPLORATION N/A         Social History:   Social History   Substance Use Topics   • Smoking status: Never Smoker   • Smokeless tobacco: Never Used   • Alcohol use No      Family History:  Family History   Problem Relation Age of Onset   • Breast cancer Daughter    • Heart attack Mother    • Hypertension Mother    • Heart disease Mother    • Thyroid disease Mother    • Lupus Mother    • Heart attack Brother    • Heart disease Brother    • Hyperlipidemia Brother    • Cancer Brother    • Depression Father    • Stroke Maternal Grandmother           Allergies:  Allergies   Allergen Reactions   • Other      Lotions- anything with palm oil or lanolin per pt (verified 6/15/16 1910)   • Prazosin      Scheduled Meds:    acetylcysteine 600 mg Q12H   allopurinol 100 mg Daily   amLODIPine 2.5 mg Q24H   aspirin 81 mg Daily   cholecalciferol 1,000 Units Daily   dexlansoprazole 60 mg Q AM   enoxaparin 30 mg Q24H   insulin aspart 0-7 Units 4x Daily With Meals & Nightly   isosorbide mononitrate 30 mg Q24H   latanoprost 1 drop Nightly   metoprolol tartrate 25 mg Q12H   tamsulosin 0.4 mg Nightly           INTAKE AND OUTPUT:    Intake/Output Summary (Last 24 hours) at 10/18/18 1022  Last data filed at 10/18/18 0900   Gross per 24 hour   Intake             1080 ml   Output                0 ml   Net             1080 ml       Review of Systems:   GI: denies n/v; + epigastric pain  Cardiac: chest pain as per HPI  Pulmonary:    Constitutional:  Temp:  [97.5 °F (36.4 °C)-97.9  °F (36.6 °C)] 97.9 °F (36.6 °C)  Heart Rate:  [56-69] 69  Resp:  [16-20] 20  BP: (111-136)/(53-67) 134/67   SpO2:  [94 %-98 %] 97 %      Physical Exam:  General:  Appears in no acute distress  Eyes: PERTL,  HEENT:  No JVD. Thyroid not visibly enlarged. No mucosal pallor or cyanosis  Respiratory: Respirations regular and unlabored at rest. BBS with good air entry in all fields. No crackles, rubs or wheezes auscultated  Cardiovascular: S1S2 Regular rate and rhythm. No murmur, rub or gallop. DP/PT pulses 2+    . No pretibial pitting edema  Gastrointestinal: Abdomen soft, flat, tender to Epigastric area. Bowel sounds present. No hepatosplenomegaly. No ascites  Musculoskeletal: RAJAN x4. No abnormal movements  Extremities: No digital clubbing or cyanosis  Skin: Color pink. Skin warm and dry to touch. No rashes    Neuro: AAO x3 CN II-XII grossly intact  Psych: Mood and affect normal, pleasant and cooperative    Cardiographics  Telemetry:     SR with 1st degree AVB          ECG:     10/18/18          10/16/18           Comparison EK18               ECHO:      Interpretation Summary 10/16/18    · The left ventricular cavity is borderline dilated.  · The following left ventricular wall segments are hypokinetic: apical septal and mid inferoseptal.  · Left atrial cavity size is borderline dilated.  · Calculated EF = 67%  · There is no evidence of pericardial effusion.          Interpretation Summary 6/15/18    · SALINE TEST NEG FOR PFO  · All left ventricular wall segments contract normally.  · Left ventricular diastolic dysfunction (grade I) consistent with impaired relaxation.  · Mild mitral valve regurgitation is present  · Left Ventricle: Calculated EF = 57%  · There is no evidence of pericardial effusion.           Stress test:        Interpretation Summary 10/17/18    · Findings consistent with a normal ECG stress test.  · Left ventricular ejection fraction is borderline normal (Calculated EF =  49%).  · Myocardial perfusion imaging indicates a medium-sized, moderately severe area of ischemia located in the anterior wall and septal wall.  · Impressions are consistent with a high risk study.             Interpretation Summary 6/15/16     · Findings consistent with a normal ECG stress test.  · Findings consistent with a normal ECG stress test.  · Findings consistent with a normal ECG stress test.  · Left ventricular ejection fraction is normal (Calculated EF = 60%).  · Myocardial perfusion imaging indicates a normal myocardial perfusion study with no evidence of ischemia.  · Impressions are consistent with a low risk study.            Study Result 6/19/18    COMPLETE ABDOMINAL SONOGRAM     HISTORY: 73-year-old male with a history of epigastric pain.      IMPRESSION:  1. Findings suggestive of mild hepatic steatosis.  2. Mild atheromatous calcification throughout the abdominal aorta.  3. 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           Lab Review     Results from last 7 days  Lab Units 10/17/18  0333 10/16/18  1600 10/16/18  1048   TROPONIN T ng/mL <0.010 <0.010 <0.010           Results from last 7 days  Lab Units 10/18/18  0420   SODIUM mmol/L 139   POTASSIUM mmol/L 4.4   BUN mg/dL 17   CREATININE mg/dL 1.54*   CALCIUM mg/dL 9.3       10/17/18 767 proBNP      Results from last 7 days  Lab Units 10/18/18  0420 10/17/18  0333 10/16/18  1048   WBC 10*3/mm3 9.10 7.78 6.57   HEMOGLOBIN g/dL 15.4 15.6 17.0   HEMATOCRIT % 48.8 48.9 49.6   PLATELETS 10*3/mm3 166 174 187       Results from last 7 days  Lab Units 10/18/18  0420   INR  1.12*             Estimated Creatinine Clearance: 53.1 mL/min (A) (by C-G formula based on SCr of 1.54 mg/dL (H)).    Assessment / Plan:  1.  Unstable angina: Typical and atypical features.  Trop neg x 3.  12 lead EKG showing new inferior and anterior Q waves.  Echo performed 10/16 - showing new RWMA and normal EF.  Nuc stress showing perfusion  "defect - medium sized moderately severe area of ischemia in anterior and septal wall        This was reviewed with Pt and wife 10/17 with recommendations for LHC and possible PCI.  Diagnostic heart catheterization/PCI/stent procedure risks (including but not limited to: allergy,arrhythmia,bleeding,CVA,MI,renal dysfunction,emergent CABG and death) and options have been explained to patient and spouse. All questions were answered. Patient and wife voices understanding and agree to proceed with treatment plan.      Continue asa,  Imdur,  and metoprolol.  Amlodipine added 10/17.         2. GERD:    is pepcid and pantoprazole.      3. DM: per Internal Medicine. a1c 6.40 (7.00)     4. Hypertension: amlodipine added on 10/17            5.  Mild renal insufficiency:  10/18 Creatinine 1.54 (1.41 <--1.54)            Nephrology consulted 10/16 appreciate their assistance.  He is getting pre cath renal prep.             )10/18/2018  Maria E Gilbert MD      EMR Dragon/Transcription:   \"Dictated utilizing Dragon dictation\".   "

## 2018-10-18 NOTE — CONSULTS
"Met with patient, discussed benefits of cardiac rehab. Provided phase II information packet, which includes; general information about cardiac rehab, [facility] handout and [facility] letter article entitled “Cardiac Rehab is often the Best Medicine for Recovery\", stresses the importance of cardiac rehab after a heart event.   I provided the contact information for cardiac rehab here at [facility] and encouraged him to call when discharged.  Stated attending the program here before and expressed interest in attending again.     "

## 2018-10-18 NOTE — PROGRESS NOTES
" LOS: 0 days   Patient Care Team:  Avery Velazquez MD as PCP - General (Internal Medicine)  Benton Choe MD as PCP - Claims Attributed    Chief Complaint: chest pain    Subjective     History of Present Illness    Subjective  Doing well this afternoon.  S/p cardiac cath w/ angioplasty and stent to LAD. No urinary complaints. Denies cp or soa.  History taken from: patient    Objective     Vital Sign Min/Max for last 24 hours  Temp  Min: 97.5 °F (36.4 °C)  Max: 97.9 °F (36.6 °C)   BP  Min: 111/53  Max: 136/58   Pulse  Min: 56  Max: 69   Resp  Min: 16  Max: 20   SpO2  Min: 94 %  Max: 98 %   Flow (L/min)  Min: 3  Max: 3   No Data Recorded     Flowsheet Rows      First Filed Value   Admission Height  185.4 cm (73\") Documented at 10/16/2018 1045   Admission Weight  101 kg (223 lb) Documented at 10/16/2018 1154          I/O this shift:  In: 800 [P.O.:600; I.V.:200]  Out: -   I/O last 3 completed shifts:  In: 480 [P.O.:480]  Out: 150 [Urine:150]    Objective     Physical Exam      GEN:NAD, well nourished  HEENT:NCAT, PERRL, EOMI, OP clear, MMM  NECK:supple, no JVD, no carotid bruits  CVA:RRR s1, s2 no m/r/g  CHEST:CTA B no wheezes or rhonchi  ABD:soft, nontender, nondistended +bs  EXT:no c/c/e 2+PP B  NEURO:CN II-XII grossly nonfocal, no evidence of asterixes or tremor  PSYCH: appropriate mood and affect  :deferred  SKIN: warm, dry, intact, no lesions or rashes      Results Review:     I reviewed the patient's new clinical results.    WBC WBC   Date Value Ref Range Status   10/18/2018 9.10 4.50 - 10.70 10*3/mm3 Final   10/17/2018 7.78 4.50 - 10.70 10*3/mm3 Final   10/16/2018 6.57 4.50 - 10.70 10*3/mm3 Final      HGB Hemoglobin   Date Value Ref Range Status   10/18/2018 15.4 13.7 - 17.6 g/dL Final   10/17/2018 15.6 13.7 - 17.6 g/dL Final   10/16/2018 17.0 13.7 - 17.6 g/dL Final      HCT Hematocrit   Date Value Ref Range Status   10/18/2018 48.8 40.4 - 52.2 % Final   10/17/2018 48.9 40.4 - 52.2 % Final "   10/16/2018 49.6 40.4 - 52.2 % Final      Platlets No results found for: LABPLAT   MCV MCV   Date Value Ref Range Status   10/18/2018 89.9 79.8 - 96.2 fL Final   10/17/2018 89.2 79.8 - 96.2 fL Final   10/16/2018 87.2 79.8 - 96.2 fL Final          Sodium Sodium   Date Value Ref Range Status   10/18/2018 139 136 - 145 mmol/L Final   10/17/2018 139 136 - 145 mmol/L Final   10/16/2018 138 136 - 145 mmol/L Final      Potassium Potassium   Date Value Ref Range Status   10/18/2018 4.4 3.5 - 5.2 mmol/L Final   10/17/2018 3.8 3.5 - 5.2 mmol/L Final   10/16/2018 4.3 3.5 - 5.2 mmol/L Final      Chloride Chloride   Date Value Ref Range Status   10/18/2018 100 98 - 107 mmol/L Final   10/17/2018 102 98 - 107 mmol/L Final   10/16/2018 99 98 - 107 mmol/L Final      CO2 CO2   Date Value Ref Range Status   10/18/2018 26.2 22.0 - 29.0 mmol/L Final   10/17/2018 25.0 22.0 - 29.0 mmol/L Final   10/16/2018 25.7 22.0 - 29.0 mmol/L Final      BUN BUN   Date Value Ref Range Status   10/18/2018 17 8 - 23 mg/dL Final   10/17/2018 15 8 - 23 mg/dL Final   10/16/2018 14 8 - 23 mg/dL Final      Creatinine Creatinine   Date Value Ref Range Status   10/18/2018 1.54 (H) 0.76 - 1.27 mg/dL Final   10/17/2018 1.41 (H) 0.76 - 1.27 mg/dL Final   10/16/2018 1.54 (H) 0.76 - 1.27 mg/dL Final      Calcium Calcium   Date Value Ref Range Status   10/18/2018 9.3 8.6 - 10.5 mg/dL Final   10/17/2018 9.4 8.6 - 10.5 mg/dL Final   10/16/2018 10.2 8.6 - 10.5 mg/dL Final      PO4 No results found for: CAPO4   Albumin Albumin   Date Value Ref Range Status   10/17/2018 3.90 3.50 - 5.20 g/dL Final   10/16/2018 4.50 3.50 - 5.20 g/dL Final      Magnesium No results found for: MG   Uric Acid No results found for: URICACID     Medication Review:     acetylcysteine 600 mg Oral Q12H   allopurinol 100 mg Oral Daily   amLODIPine 2.5 mg Oral Q24H   aspirin 81 mg Oral Daily   cholecalciferol 1,000 Units Oral Daily   dexlansoprazole 60 mg Oral Q AM   enoxaparin 30 mg Subcutaneous  Q24H   insulin aspart 0-7 Units Subcutaneous 4x Daily With Meals & Nightly   isosorbide mononitrate 30 mg Oral Q24H   latanoprost 1 drop Both Eyes Nightly   metoprolol tartrate 25 mg Oral Q12H   tamsulosin 0.4 mg Oral Nightly   ticagrelor 90 mg Oral BID     Assessment/Plan       Chest pain    Other forms of angina pectoris (CMS/HCC)      Assessment & Plan   CKD3--renal function at baseline. Mucomyst x4 doses. Continue NS IVF @100cc/hr until current liter finished then discontinue. BMP in am to monitor for SANCHEZ.  Unstable angina--s/p cardiac cath w/ stent to LAD. CAD-s/p stent 2016  DM II--w/ complications. SSI.  HTN--bp at goal. Continue current treatment.  BPH-on flomax.      Tonie Barrios MD  10/18/18  2:31 PM

## 2018-10-18 NOTE — PLAN OF CARE
Problem: Patient Care Overview  Goal: Plan of Care Review  Outcome: Ongoing (interventions implemented as appropriate)   10/18/18 3550   Coping/Psychosocial   Plan of Care Reviewed With patient   Plan of Care Review   Progress no change   OTHER   Outcome Summary VSS. No c/o CP this shift. NPO for heart cath at 1400 today. IVF's and Mucomyst therapy started. Consents on chart for cath. Will continue to monitor.

## 2018-10-18 NOTE — PROGRESS NOTES
"Daily progress note    Chief complaint  Doing better  No new complaints    History of present illness 73-year-old white male with known coronary artery disease status post multiple distended 2016 other medical problem diabetes mellitus hypertension hyperlipidemia asthma gastroesophageal reflux disease presented to [facility] emergency room with left-sided chest discomfort with shortness of breath nausea for last 3-4 months which is getting worst.  Patient denies any fever chills cough night sweats weight loss or weight gain.  Patient evaluated in ER found to have unstable angina admitted for management.  I'm asked to follow the patient for medical problems.  At the time of interview he still has some chest discomfort but no shortness of breath or associated symptoms.  Patient stated that pain became worse with exertion.     REVIEW OF SYSTEMS  Review of Systems   Constitutional: Negative for chills and diaphoresis.   HENT: Negative for congestion, rhinorrhea and sore throat.    Eyes: Negative for pain.   Respiratory: Negative for cough and shortness of breath.    Cardiovascular: Positive for chest pain. Negative for palpitations and leg swelling.   Gastrointestinal: Negative for abdominal pain, diarrhea, nausea and vomiting.   Genitourinary: Negative for difficulty urinating, dysuria, flank pain and frequency.   Musculoskeletal: Negative for myalgias, neck pain and neck stiffness.   Skin: Negative for rash.   Neurological: Negative for dizziness, speech difficulty, weakness, light-headedness, numbness and headaches.   Psychiatric/Behavioral: Negative.    All other systems reviewed and are negative.     PHYSICAL EXAM  Blood pressure 134/67, pulse 69, temperature 97.9 °F (36.6 °C), temperature source Oral, resp. rate 16, height 185.4 cm (73\"), weight 99.8 kg (220 lb), SpO2 97 %.    Constitutional: He is oriented to person, place, and time. No distress.   Head: Normocephalic.   Mouth/Throat: Mucous membranes " are normal.   Eyes: Pupils are equal, round, and reactive to light. EOM are normal.   Neck: Normal range of motion. Neck supple.   Cardiovascular: Normal rate, regular rhythm and normal heart sounds.    Pulmonary/Chest: Effort normal and breath sounds normal. No respiratory distress. He has no decreased breath sounds. He has no wheezes. He has no rhonchi. He has no rales. He exhibits tenderness (of the anterior chest wall).   Abdominal: Soft. There is no tenderness. There is no rebound and no guarding.   Musculoskeletal: Normal range of motion.   Neurological: He is alert and oriented to person, place, and time. He has normal sensation.   Skin: Skin is warm and dry.   Psychiatric: Mood and affect normal.     LAB RESULTS  Lab Results (last 24 hours)     Procedure Component Value Units Date/Time    POC Glucose Once [[id number]]  (Abnormal) Collected:  10/18/18 1011    Specimen:  Blood Updated:  10/18/18 1015     Glucose 138 (H) mg/dL     Narrative:       Meter: [id number] : [id number] [facility]    POC Glucose Once [[id number]]  (Normal) Collected:  10/18/18 0623    Specimen:  Blood Updated:  10/18/18 0624     Glucose 119 mg/dL     Narrative:       Meter: [id number] : [id number] [facility]    Basic Metabolic Panel [[id number]]  (Abnormal) Collected:  10/18/18 0420    Specimen:  Blood Updated:  10/18/18 0602     Glucose 122 (H) mg/dL      BUN 17 mg/dL      Creatinine 1.54 (H) mg/dL      Sodium 139 mmol/L      Potassium 4.4 mmol/L      Chloride 100 mmol/L      CO2 26.2 mmol/L      Calcium 9.3 mg/dL      eGFR Non African Amer 45 (L) mL/min/1.73      BUN/Creatinine Ratio 11.0     Anion Gap 12.8 mmol/L     Narrative:       The MDRD GFR formula is only valid for adults with stable renal function between ages 18 and 70.    Lipid Panel [[id number]]  (Abnormal) Collected:  10/18/18 0420    Specimen:  Blood Updated:  10/18/18 0602     Total Cholesterol 118 mg/dL      Triglycerides 260 (H) mg/dL      HDL  Cholesterol 32 (L) mg/dL      LDL Cholesterol  34 mg/dL      VLDL Cholesterol 52 (H) mg/dL      LDL/HDL Ratio 1.06    Narrative:       Cholesterol Reference Ranges  (U.S. Department of Health and Human Services ATP III Classifications)    Desirable          <200 mg/dL  Borderline High    200-239 mg/dL  High Risk          >240 mg/dL      Triglyceride Reference Ranges  (U.S. Department of Health and Human Services ATP III Classifications)    Normal           <150 mg/dL  Borderline High  150-199 mg/dL  High             200-499 mg/dL  Very High        >500 mg/dL    HDL Reference Ranges  (U.S. Department of Health and Human Services ATP III Classifcations)    Low     <40 mg/dl (major risk factor for CHD)  High    >60 mg/dl ('negative' risk factor for CHD)        LDL Reference Ranges  (U.S. Department of Health and Human Services ATP III Classifcations)    Optimal          <100 mg/dL  Near Optimal     100-129 mg/dL  Borderline High  130-159 mg/dL  High             160-189 mg/dL  Very High        >189 mg/dL    CBC & Differential [[id number]] Collected:  10/18/18 0420    Specimen:  Blood Updated:  10/18/18 0532    Narrative:       The following orders were created for panel order CBC & Differential.  Procedure                               Abnormality         Status                     ---------                               -----------         ------                     CBC Auto Differential[[id number]]        Abnormal            Final result                 Please view results for these tests on the individual orders.    CBC Auto Differential [[id number]]  (Abnormal) Collected:  10/18/18 0420    Specimen:  Blood Updated:  10/18/18 0532     WBC 9.10 10*3/mm3      RBC 5.43 10*6/mm3      Hemoglobin 15.4 g/dL      Hematocrit 48.8 %      MCV 89.9 fL      MCH 28.4 pg      MCHC 31.6 (L) g/dL      RDW 14.5 %      RDW-SD 47.0 fl      MPV 10.8 fL      Platelets 166 10*3/mm3      Neutrophil % 62.6 %      Lymphocyte % 27.0 %       Monocyte % 7.5 %      Eosinophil % 2.4 %      Basophil % 0.3 %      Immature Grans % 0.2 %      Neutrophils, Absolute 5.69 10*3/mm3      Lymphocytes, Absolute 2.46 10*3/mm3      Monocytes, Absolute 0.68 10*3/mm3      Eosinophils, Absolute 0.22 10*3/mm3      Basophils, Absolute 0.03 10*3/mm3      Immature Grans, Absolute 0.02 10*3/mm3     Protime-INR [[id number]]  (Abnormal) Collected:  10/18/18 0420    Specimen:  Blood Updated:  10/18/18 0530     Protime 14.2 Seconds      INR 1.12 (H)    POC Glucose Once [[id number]]  (Normal) Collected:  10/17/18 1937    Specimen:  Blood Updated:  10/17/18 1939     Glucose 115 mg/dL     Narrative:       Meter: [id number] : [id number] [email or web address]    POC Glucose Once [[id number]]  (Abnormal) Collected:  10/17/18 1615    Specimen:  Blood Updated:  10/17/18 1618     Glucose 142 (H) mg/dL     Narrative:       Meter: [id number] : [id number] [employer]        Imaging Results (last 24 hours)     ** No results found for the last 24 hours. **      EKG:                             Rhythm/Rate: NSR rate 74  P waves and [place]: Normal P waves  QRS, axis: Normal QRS   ST and T waves: T wave inversion in aVL       Current Facility-Administered Medications:   •  acetylcysteine (MUCOMYST) 20 % solution 600 mg, 600 mg, Oral, Q12H, Tonie Barrios MD, 600 mg at 10/18/18 0828  •  allopurinol (ZYLOPRIM) tablet 100 mg, 100 mg, Oral, Daily, Dhruv Hankins MD, 100 mg at 10/18/18 0828  •  amLODIPine (NORVASC) tablet 2.5 mg, 2.5 mg, Oral, Q24H, Antonette Murillo APRN, 2.5 mg at 10/18/18 0828  •  aspirin EC tablet 81 mg, 81 mg, Oral, Daily, Lidia Thayer APRN, 81 mg at 10/18/18 0828  •  cholecalciferol (VITAMIN D3) tablet 1,000 Units, 1,000 Units, Oral, Daily, Dhruv Hankins MD, 1,000 Units at 10/18/18 0828  •  dexlansoprazole (DEXILANT) capsule 60 mg, 60 mg, Oral, Q AM, Antonette Murillo APRN, 60 mg at 10/18/18 0525  •  enoxaparin (LOVENOX) syringe 30 mg, 30 mg, Subcutaneous,  Q24H, Dhruv Hankins MD, 30 mg at 10/17/18 1623  •  insulin aspart (novoLOG) injection 0-7 Units, 0-7 Units, Subcutaneous, 4x Daily With Meals & Nightly, Dhruv Hankins MD, 2 Units at 10/16/18 2150  •  isosorbide mononitrate (IMDUR) 24 hr tablet 30 mg, 30 mg, Oral, Q24H, Dhruv Hankins MD, 30 mg at 10/18/18 0828  •  latanoprost (XALATAN) 0.005 % ophthalmic solution 1 drop, 1 drop, Both Eyes, Nightly, Dhruv Hankins MD, 1 drop at 10/17/18 2058  •  metoprolol tartrate (LOPRESSOR) tablet 25 mg, 25 mg, Oral, Q12H, Dhruv Hankins MD, 25 mg at 10/18/18 0828  •  nitroglycerin (NITROSTAT) SL tablet 0.4 mg, 0.4 mg, Sublingual, Q5 Min PRN, Lidia Thayer APRN  •  pregabalin (LYRICA) capsule 50 mg, 50 mg, Oral, TID PRN, Maria E Gilbert MD, 50 mg at 10/17/18 2117  •  simethicone (MYLICON) chewable tablet 80 mg, 80 mg, Oral, 4x Daily PRN, Maria E Gilbert MD, 80 mg at 10/17/18 2117  •  sodium chloride 0.9 % flush 3-10 mL, 3-10 mL, Intravenous, PRN, Lidia Thayer APRN  •  sodium chloride 0.9 % infusion, 75 mL/hr, Intravenous, Continuous, Tonie Barrios MD, Last Rate: 75 mL/hr at 10/18/18 1033, 75 mL/hr at 10/18/18 1033  •  sodium chloride 0.9 % infusion, 100 mL/hr, Intravenous, Continuous, Maria E Gilbert MD, Last Rate: 100 mL/hr at 10/18/18 1414, 100 mL/hr at 10/18/18 1414  •  tamsulosin (FLOMAX) 24 hr capsule 0.4 mg, 0.4 mg, Oral, Nightly, Dhruv Hankins MD, 0.4 mg at 10/17/18 2117  •  ticagrelor (BRILINTA) tablet 90 mg, 90 mg, Oral, BID, Maria E Gilbert MD  •  zolpidem (AMBIEN) tablet 5 mg, 5 mg, Oral, Nightly PRN, Maria E Gilbert MD, 5 mg at 10/17/18 2206     ASSESSMENT  Chest pain with known coronary artery disease and abnormal stress Cardiolite  Diabetes mellitus  Hypertension  Hyperlipidemia  Gastroesophageal reflux disease  Chronic kidney disease stage III    PLAN  CPM  Cardiac catheterization today  Continue home medication and adjust the doses  Accu-Chek with sliding  scale insulin  Stress ulcer DVT prophylaxis  Supportive care  Will follow with Dr. MILLS further recommendation according to hospital course    CANDICE ENRIQUEZ MD

## 2018-10-19 VITALS
WEIGHT: 220 LBS | OXYGEN SATURATION: 97 % | SYSTOLIC BLOOD PRESSURE: 140 MMHG | DIASTOLIC BLOOD PRESSURE: 70 MMHG | RESPIRATION RATE: 16 BRPM | BODY MASS INDEX: 29.16 KG/M2 | TEMPERATURE: 98 F | HEART RATE: 67 BPM | HEIGHT: 73 IN

## 2018-10-19 PROBLEM — I20.8 OTHER FORMS OF ANGINA PECTORIS: Status: RESOLVED | Noted: 2018-10-16 | Resolved: 2018-10-19

## 2018-10-19 PROBLEM — R07.9 CHEST PAIN: Status: RESOLVED | Noted: 2018-10-16 | Resolved: 2018-10-19

## 2018-10-19 PROBLEM — I20.89 OTHER FORMS OF ANGINA PECTORIS: Status: RESOLVED | Noted: 2018-10-16 | Resolved: 2018-10-19

## 2018-10-19 LAB
ACT BLD: 483 SECONDS (ref 82–152)
ANION GAP SERPL CALCULATED.3IONS-SCNC: 13.5 MMOL/L
BUN BLD-MCNC: 15 MG/DL (ref 8–23)
BUN/CREAT SERPL: 11 (ref 7–25)
CALCIUM SPEC-SCNC: 9 MG/DL (ref 8.6–10.5)
CHLORIDE SERPL-SCNC: 102 MMOL/L (ref 98–107)
CHOLEST SERPL-MCNC: 111 MG/DL (ref 0–200)
CO2 SERPL-SCNC: 22.5 MMOL/L (ref 22–29)
CREAT BLD-MCNC: 1.36 MG/DL (ref 0.76–1.27)
DEPRECATED RDW RBC AUTO: 46.3 FL (ref 37–54)
ERYTHROCYTE [DISTWIDTH] IN BLOOD BY AUTOMATED COUNT: 14.4 % (ref 11.5–14.5)
GFR SERPL CREATININE-BSD FRML MDRD: 51 ML/MIN/1.73
GLUCOSE BLD-MCNC: 126 MG/DL (ref 65–99)
GLUCOSE BLDC GLUCOMTR-MCNC: 127 MG/DL (ref 70–130)
HCT VFR BLD AUTO: 49.5 % (ref 40.4–52.2)
HDLC SERPL-MCNC: 38 MG/DL (ref 40–60)
HGB BLD-MCNC: 15.8 G/DL (ref 13.7–17.6)
LDLC SERPL CALC-MCNC: 46 MG/DL (ref 0–100)
LDLC/HDLC SERPL: 1.2 {RATIO}
MCH RBC QN AUTO: 28.4 PG (ref 27–32.7)
MCHC RBC AUTO-ENTMCNC: 31.9 G/DL (ref 32.6–36.4)
MCV RBC AUTO: 89 FL (ref 79.8–96.2)
PLATELET # BLD AUTO: 154 10*3/MM3 (ref 140–500)
PMV BLD AUTO: 10.3 FL (ref 6–12)
POTASSIUM BLD-SCNC: 3.8 MMOL/L (ref 3.5–5.2)
RBC # BLD AUTO: 5.56 10*6/MM3 (ref 4.6–6)
SODIUM BLD-SCNC: 138 MMOL/L (ref 136–145)
TRIGL SERPL-MCNC: 137 MG/DL (ref 0–150)
VLDLC SERPL-MCNC: 27.4 MG/DL (ref 5–40)
WBC NRBC COR # BLD: 7.61 10*3/MM3 (ref 4.5–10.7)

## 2018-10-19 PROCEDURE — G0378 HOSPITAL OBSERVATION PER HR: HCPCS

## 2018-10-19 PROCEDURE — 93010 ELECTROCARDIOGRAM REPORT: CPT | Performed by: INTERNAL MEDICINE

## 2018-10-19 PROCEDURE — 80061 LIPID PANEL: CPT | Performed by: INTERNAL MEDICINE

## 2018-10-19 PROCEDURE — 63710000001 ALLOPURINOL 100 MG TABLET: Performed by: HOSPITALIST

## 2018-10-19 PROCEDURE — A9270 NON-COVERED ITEM OR SERVICE: HCPCS | Performed by: HOSPITALIST

## 2018-10-19 PROCEDURE — A9270 NON-COVERED ITEM OR SERVICE: HCPCS | Performed by: NURSE PRACTITIONER

## 2018-10-19 PROCEDURE — A9270 NON-COVERED ITEM OR SERVICE: HCPCS | Performed by: INTERNAL MEDICINE

## 2018-10-19 PROCEDURE — 63710000001 TICAGRELOR 90 MG TABLET: Performed by: INTERNAL MEDICINE

## 2018-10-19 PROCEDURE — 63710000001 ISOSORBIDE MONONITRATE 30 MG TABLET SUSTAINED-RELEASE 24 HOUR: Performed by: HOSPITALIST

## 2018-10-19 PROCEDURE — 85027 COMPLETE CBC AUTOMATED: CPT | Performed by: INTERNAL MEDICINE

## 2018-10-19 PROCEDURE — 82962 GLUCOSE BLOOD TEST: CPT

## 2018-10-19 PROCEDURE — 99217 PR OBSERVATION CARE DISCHARGE MANAGEMENT: CPT | Performed by: INTERNAL MEDICINE

## 2018-10-19 PROCEDURE — 63710000001 CHOLECALCIFEROL 1000 UNITS TABLET: Performed by: HOSPITALIST

## 2018-10-19 PROCEDURE — 80048 BASIC METABOLIC PNL TOTAL CA: CPT | Performed by: INTERNAL MEDICINE

## 2018-10-19 PROCEDURE — 63710000001 METOPROLOL TARTRATE 25 MG TABLET: Performed by: HOSPITALIST

## 2018-10-19 PROCEDURE — 63710000001 ASPIRIN 81 MG TABLET DELAYED-RELEASE: Performed by: NURSE PRACTITIONER

## 2018-10-19 PROCEDURE — 63710000001 AMLODIPINE 2.5 MG TABLET: Performed by: NURSE PRACTITIONER

## 2018-10-19 PROCEDURE — 93005 ELECTROCARDIOGRAM TRACING: CPT | Performed by: INTERNAL MEDICINE

## 2018-10-19 RX ORDER — ACETAMINOPHEN 325 MG/1
650 TABLET ORAL EVERY 6 HOURS PRN
Qty: 30 TABLET | Refills: 0
Start: 2018-10-19 | End: 2022-09-30

## 2018-10-19 RX ORDER — ATORVASTATIN CALCIUM 20 MG/1
20 TABLET, FILM COATED ORAL NIGHTLY
Status: DISCONTINUED | OUTPATIENT
Start: 2018-10-19 | End: 2018-10-19 | Stop reason: HOSPADM

## 2018-10-19 RX ORDER — ATORVASTATIN CALCIUM 20 MG/1
20 TABLET, FILM COATED ORAL NIGHTLY
Qty: 90 TABLET | Refills: 1 | Status: SHIPPED | OUTPATIENT
Start: 2018-10-19 | End: 2018-10-31 | Stop reason: SDUPTHER

## 2018-10-19 RX ORDER — PREGABALIN 50 MG/1
50 CAPSULE ORAL 3 TIMES DAILY PRN
Qty: 90 CAPSULE | Refills: 1
Start: 2018-10-19 | End: 2019-03-27 | Stop reason: SDUPTHER

## 2018-10-19 RX ORDER — NITROGLYCERIN 0.4 MG/1
0.4 TABLET SUBLINGUAL
Qty: 30 TABLET | Refills: 3 | Status: SHIPPED | OUTPATIENT
Start: 2018-10-19 | End: 2021-06-16 | Stop reason: SDUPTHER

## 2018-10-19 RX ORDER — ISOSORBIDE MONONITRATE 30 MG/1
30 TABLET, EXTENDED RELEASE ORAL
Qty: 90 TABLET | Refills: 1 | Status: SHIPPED | OUTPATIENT
Start: 2018-10-20 | End: 2018-10-31 | Stop reason: ALTCHOICE

## 2018-10-19 RX ORDER — AMLODIPINE BESYLATE 2.5 MG/1
2.5 TABLET ORAL
Qty: 30 TABLET | Refills: 1 | Status: SHIPPED | OUTPATIENT
Start: 2018-10-20 | End: 2018-10-31 | Stop reason: SDUPTHER

## 2018-10-19 RX ADMIN — AMLODIPINE BESYLATE 2.5 MG: 2.5 TABLET ORAL at 08:42

## 2018-10-19 RX ADMIN — ISOSORBIDE MONONITRATE 30 MG: 30 TABLET ORAL at 08:42

## 2018-10-19 RX ADMIN — ALLOPURINOL 100 MG: 100 TABLET ORAL at 08:42

## 2018-10-19 RX ADMIN — VITAMIN D, TAB 1000IU (100/BT) 1000 UNITS: 25 TAB at 08:42

## 2018-10-19 RX ADMIN — ACETYLCYSTEINE 600 MG: 200 SOLUTION ORAL; RESPIRATORY (INHALATION) at 08:42

## 2018-10-19 RX ADMIN — METOPROLOL TARTRATE 25 MG: 25 TABLET ORAL at 08:42

## 2018-10-19 RX ADMIN — TICAGRELOR 90 MG: 90 TABLET ORAL at 08:42

## 2018-10-19 RX ADMIN — ASPIRIN 81 MG: 81 TABLET, DELAYED RELEASE ORAL at 08:42

## 2018-10-19 NOTE — PLAN OF CARE
Problem: Patient Care Overview  Goal: Plan of Care Review  Outcome: Ongoing (interventions implemented as appropriate)   10/19/18 8672   Plan of Care Review   Progress improving   OTHER   Outcome Summary Pt is S/P cath with stent via right radial, site soft, good pulses, pt c/o severe pain to site last night, improved with ice placement and 1 time dose of pain medication, denies pain, discomfort or soa this morning, VSS     Goal: Discharge Needs Assessment  Outcome: Ongoing (interventions implemented as appropriate)      Problem: Cardiac: ACS (Acute Coronary Syndrome) (Adult)  Goal: Signs and Symptoms of Listed Potential Problems Will be Absent, Minimized or Managed (Cardiac: ACS)  Outcome: Ongoing (interventions implemented as appropriate)

## 2018-10-19 NOTE — PROGRESS NOTES
"Daily progress note    Chief complaint  Doing better  No new complaints  Status post cardiac catheterization with angioplasty and stent placement    History of present illness 73-year-old white male with known coronary artery disease status post multiple distended 2016 other medical problem diabetes mellitus hypertension hyperlipidemia asthma gastroesophageal reflux disease presented to [facility] emergency room with left-sided chest discomfort with shortness of breath nausea for last 3-4 months which is getting worst.  Patient denies any fever chills cough night sweats weight loss or weight gain.  Patient evaluated in ER found to have unstable angina admitted for management.  I'm asked to follow the patient for medical problems.  At the time of interview he still has some chest discomfort but no shortness of breath or associated symptoms.  Patient stated that pain became worse with exertion.     REVIEW OF SYSTEMS  Review of Systems   Constitutional: Negative for chills and diaphoresis.   HENT: Negative for congestion, rhinorrhea and sore throat.    Eyes: Negative for pain.   Respiratory: Negative for cough and shortness of breath.    Cardiovascular: Positive for chest pain. Negative for palpitations and leg swelling.   Gastrointestinal: Negative for abdominal pain, diarrhea, nausea and vomiting.   Genitourinary: Negative for difficulty urinating, dysuria, flank pain and frequency.   Musculoskeletal: Negative for myalgias, neck pain and neck stiffness.   Skin: Negative for rash.   Neurological: Negative for dizziness, speech difficulty, weakness, light-headedness, numbness and headaches.   Psychiatric/Behavioral: Negative.    All other systems reviewed and are negative.     PHYSICAL EXAM  Blood pressure 140/70, pulse 67, temperature 98 °F (36.7 °C), temperature source Oral, resp. rate 16, height 185.4 cm (73\"), weight 99.8 kg (220 lb), SpO2 97 %.    Constitutional: He is oriented to person, place, and " time. No distress.   Head: Normocephalic.   Mouth/Throat: Mucous membranes are normal.   Eyes: Pupils are equal, round, and reactive to light. EOM are normal.   Neck: Normal range of motion. Neck supple.   Cardiovascular: Normal rate, regular rhythm and normal heart sounds.    Pulmonary/Chest: Effort normal and breath sounds normal. No respiratory distress. He has no decreased breath sounds. He has no wheezes. He has no rhonchi. He has no rales. He exhibits tenderness (of the anterior chest wall).   Abdominal: Soft. There is no tenderness. There is no rebound and no guarding.   Musculoskeletal: Normal range of motion.   Neurological: He is alert and oriented to person, place, and time. He has normal sensation.   Skin: Skin is warm and dry.   Psychiatric: Mood and affect normal.     LAB RESULTS  Lab Results (last 24 hours)     Procedure Component Value Units Date/Time    POC Activated Clotting Time [[id number]]  (Abnormal) Collected:  10/18/18 1354    Specimen:  Blood Updated:  10/19/18 0646     Activated Clotting Time  483 (H) Seconds      Comment: Serial Number: [id number]:  [id number]       POC Glucose Once [[id number]]  (Normal) Collected:  10/19/18 0533    Specimen:  Blood Updated:  10/19/18 0535     Glucose 127 mg/dL     Narrative:       Meter: [id number] : [id number] Genny MCKEON    Basic Metabolic Panel [[id number]]  (Abnormal) Collected:  10/19/18 0408    Specimen:  Blood Updated:  10/19/18 0515     Glucose 126 (H) mg/dL      BUN 15 mg/dL      Creatinine 1.36 (H) mg/dL      Sodium 138 mmol/L      Potassium 3.8 mmol/L      Chloride 102 mmol/L      CO2 22.5 mmol/L      Calcium 9.0 mg/dL      eGFR Non African Amer 51 (L) mL/min/1.73      BUN/Creatinine Ratio 11.0     Anion Gap 13.5 mmol/L     Narrative:       The MDRD GFR formula is only valid for adults with stable renal function between ages 18 and 70.    Lipid Panel [[id number]]  (Abnormal) Collected:  10/19/18 0408    Specimen:  Blood Updated:  10/19/18  0515     Total Cholesterol 111 mg/dL      Triglycerides 137 mg/dL      HDL Cholesterol 38 (L) mg/dL      LDL Cholesterol  46 mg/dL      VLDL Cholesterol 27.4 mg/dL      LDL/HDL Ratio 1.20    Narrative:       Cholesterol Reference Ranges  (U.S. Department of Health and Human Services ATP III Classifications)    Desirable          <200 mg/dL  Borderline High    200-239 mg/dL  High Risk          >240 mg/dL      Triglyceride Reference Ranges  (U.S. Department of Health and Human Services ATP III Classifications)    Normal           <150 mg/dL  Borderline High  150-199 mg/dL  High             200-499 mg/dL  Very High        >500 mg/dL    HDL Reference Ranges  (U.S. Department of Health and Human Services ATP III Classifcations)    Low     <40 mg/dl (major risk factor for CHD)  High    >60 mg/dl ('negative' risk factor for CHD)        LDL Reference Ranges  (U.S. Department of Health and Human Services ATP III Classifcations)    Optimal          <100 mg/dL  Near Optimal     100-129 mg/dL  Borderline High  130-159 mg/dL  High             160-189 mg/dL  Very High        >189 mg/dL    CBC (No Diff) [[id number]]  (Abnormal) Collected:  10/19/18 0408    Specimen:  Blood Updated:  10/19/18 0453     WBC 7.61 10*3/mm3      RBC 5.56 10*6/mm3      Hemoglobin 15.8 g/dL      Hematocrit 49.5 %      MCV 89.0 fL      MCH 28.4 pg      MCHC 31.9 (L) g/dL      RDW 14.4 %      RDW-SD 46.3 fl      MPV 10.3 fL      Platelets 154 10*3/mm3     POC Glucose Once [[id number]]  (Normal) Collected:  10/18/18 2021    Specimen:  Blood Updated:  10/18/18 2022     Glucose 116 mg/dL     Narrative:       Meter: [id number] : [id number] Genny MCKEON    POC Glucose Once [[id number]]  (Abnormal) Collected:  10/18/18 1623    Specimen:  Blood Updated:  10/18/18 1626     Glucose 201 (H) mg/dL     Narrative:       Meter: [id number] : [id number] Naga MCKEON        Imaging Results (last 24 hours)     ** No results found for the last 24 hours. **      EKG:                              Rhythm/Rate: NSR rate 74  P waves and UT: Normal P waves  QRS, axis: Normal QRS   ST and T waves: T wave inversion in aVL       Current Facility-Administered Medications:   •  acetaminophen (TYLENOL) tablet 650 mg, 650 mg, Oral, Q6H PRN, Maria E Gilbert MD, 650 mg at 10/18/18 1848  •  allopurinol (ZYLOPRIM) tablet 100 mg, 100 mg, Oral, Daily, Dhruv Hankins MD, 100 mg at 10/19/18 0842  •  amLODIPine (NORVASC) tablet 2.5 mg, 2.5 mg, Oral, Q24H, Antonette Murillo APRN, 2.5 mg at 10/19/18 0842  •  aspirin EC tablet 81 mg, 81 mg, Oral, Daily, Lidia Thayer APRN, 81 mg at 10/19/18 0842  •  atorvastatin (LIPITOR) tablet 20 mg, 20 mg, Oral, Nightly, Antonette Murillo APRN  •  cholecalciferol (VITAMIN D3) tablet 1,000 Units, 1,000 Units, Oral, Daily, Dhruv Hankins MD, 1,000 Units at 10/19/18 0842  •  dexlansoprazole (DEXILANT) capsule 60 mg, 60 mg, Oral, Q AM, Antonette Murillo APRN, 60 mg at 10/18/18 0525  •  enoxaparin (LOVENOX) syringe 30 mg, 30 mg, Subcutaneous, Q24H, Dhruv Hankins MD, 30 mg at 10/17/18 1623  •  HYDROcodone-acetaminophen (NORCO) 5-325 MG per tablet 1 tablet, 1 tablet, Oral, Q4H PRN, Maria E Gilbert MD, 1 tablet at 10/18/18 2123  •  insulin aspart (novoLOG) injection 0-7 Units, 0-7 Units, Subcutaneous, 4x Daily With Meals & Nightly, Dhruv Hankins MD, 2 Units at 10/16/18 2150  •  isosorbide mononitrate (IMDUR) 24 hr tablet 30 mg, 30 mg, Oral, Q24H, Dhruv Hankins MD, 30 mg at 10/19/18 0842  •  latanoprost (XALATAN) 0.005 % ophthalmic solution 1 drop, 1 drop, Both Eyes, Nightly, Dhruv Hankins MD, 1 drop at 10/17/18 2058  •  metoprolol tartrate (LOPRESSOR) tablet 25 mg, 25 mg, Oral, Q12H, Dhruv Hankins MD, 25 mg at 10/19/18 0842  •  nitroglycerin (NITROSTAT) SL tablet 0.4 mg, 0.4 mg, Sublingual, Q5 Min PRN, Lidia Thayer APRN  •  pregabalin (LYRICA) capsule 50 mg, 50 mg, Oral, TID PRN, Maria E Gilbert MD, 50 mg at 10/17/18 6977  •  simethicone  (MYLICON) chewable tablet 80 mg, 80 mg, Oral, 4x Daily PRN, Maria E Gilbert MD, 80 mg at 10/17/18 2117  •  sodium chloride 0.9 % flush 3-10 mL, 3-10 mL, Intravenous, PRN, Lidia Thayer APRN  •  sodium chloride 0.9 % infusion, 75 mL/hr, Intravenous, Continuous, Tonie Barrios MD, Stopped at 10/18/18 1418  •  sodium chloride 0.9 % infusion, 100 mL/hr, Intravenous, Continuous, Maria E Gilbert MD, Stopped at 10/18/18 2030  •  tamsulosin (FLOMAX) 24 hr capsule 0.4 mg, 0.4 mg, Oral, Nightly, Dhruv Hankins MD, 0.4 mg at 10/18/18 2124  •  ticagrelor (BRILINTA) tablet 90 mg, 90 mg, Oral, BID, Maria E Gilbert MD, 90 mg at 10/19/18 0842  •  zolpidem (AMBIEN) tablet 5 mg, 5 mg, Oral, Nightly PRN, Maria E Gilbert MD, 5 mg at 10/18/18 2133    Current Outpatient Prescriptions:   •  Alcohol Swabs ([employer] PREP) 70 % pads, Place 1 application on the skin as directed by provider Daily., Disp: 400 each, Rfl: 1  •  allopurinol (ZYLOPRIM) 100 MG tablet, TAKE 1 TABLET DAILY, Disp: 90 tablet, Rfl: 3  •  ascorbic acid (VITAMIN C) 1000 MG tablet, Take 500 mg by mouth Daily., Disp: , Rfl:   •  B-D UF III MINI PEN NEEDLES 31G X 5 MM misc, Use once daily with Lantus Pen for injection of insulin, Disp: 100 each, Rfl: 3  •  butenafine (LOTRIMIN ULTRA) 1 % cream, Apply  topically 2 (Two) Times a Day., Disp: , Rfl:   •  cyanocobalamin 1000 MCG/ML injection, Cyanocobalamin 1000 MCG/ML Injection Solution; Patient Sig: Cyanocobalamin 1000 MCG/ML Injection Solution INJECT 1ML INTRAMUSCULARLY EVERY OTHER WEEK; 0; 17-Feb-2015; Active, Disp: , Rfl:   •  DEPO-TESTOSTERONE 200 MG/ML injection, Inject 1 mL into the appropriate muscle as directed by prescriber 1 (One) Time Per Week., Disp: 14 mL, Rfl: 1  •  dexlansoprazole (DEXILANT) 60 MG capsule, Take 1 capsule by mouth Daily for 60 doses., Disp: 30 capsule, Rfl: 1  •  famotidine (PEPCID) 40 MG tablet, Take 40 mg by mouth 2 (Two) Times a Day., Disp: , Rfl:   •   fexofenadine (ALLEGRA) 180 MG tablet, Take 180 mg by mouth Daily As Needed., Disp: , Rfl:   •  fluticasone (FLONASE) 50 MCG/ACT nasal spray, 1 spray into each nostril 2 (two) times a day as needed., Disp: , Rfl:   •  folic acid (FOLVITE) 800 MCG tablet, Take 800 mcg by mouth Daily., Disp: , Rfl:   •  glucose blood test strip, Precision Xtra Blood Glucose In Vitro Strip; Patient Sig: Precision Xtra Blood Glucose In Vitro Strip TEST 2  TIMES DAILY, Disp: 200 each, Rfl: 3  •  hydrocortisone 2.5 % ointment, Apply  topically 2 (two) times a day., Disp: , Rfl:   •  icosapent ethyl (VASCEPA) 1 g capsule capsule, Take 2 g by mouth 2 (Two) Times a Day With Meals., Disp: 360 capsule, Rfl: 1  •  Insulin Glargine (LANTUS SOLOSTAR) 100 UNIT/ML injection pen, 28 units subcutaneously daily, Disp: 15 pen, Rfl: 3  •  ketoconazole (NIZORAL) 2 % shampoo, , Disp: , Rfl:   •  L-Methylfolate-Algae-B12-B6 (METANX PO), Take  by mouth 2 (Two) Times a Day., Disp: , Rfl:   •  latanoprost (XALATAN) 0.005 % ophthalmic solution, , Disp: , Rfl:   •  metoprolol tartrate (LOPRESSOR) 25 MG tablet, Take 1 tablet by mouth Daily., Disp: 90 tablet, Rfl: 3  •  MONOLET LANCETS misc, Use to test BG 2 times daily, Disp: 200 each, Rfl: 1  •  Multiple Vitamins-Minerals (MULTIMINERAL PLUS) tablet, Take  by mouth daily., Disp: , Rfl:   •  pioglitazone (ACTOS) 45 MG tablet, Take 1 tablet by mouth Daily., Disp: 90 tablet, Rfl: 3  •  PREVIDENT 5000 PLUS 1.1 % cream, , Disp: , Rfl:   •  promethazine (PHENERGAN) 25 MG tablet, Take 25 mg by mouth Every 6 (Six) Hours As Needed for Nausea or Vomiting., Disp: , Rfl:   •  Soap & Cleansers (CETAKLENZ) liquid, , Disp: , Rfl:   •  tamsulosin (FLOMAX) 0.4 MG capsule 24 hr capsule, Take 1 capsule by mouth every night., Disp: , Rfl:   •  TRADJENTA 5 MG tablet tablet, Take 1 tablet by mouth Daily., Disp: 90 tablet, Rfl: 3  •  vitamin D (ERGOCALCIFEROL) 76055 units capsule capsule, Take 1 capsule by mouth 2 (Two) Times a Week.,  Disp: 26 capsule, Rfl: 3  •  Zinc 30 MG tablet, Take 50 mg by mouth Daily., Disp: , Rfl:   •  zolpidem (AMBIEN) 5 MG tablet, Take 1 tablet by mouth At Night As Needed for Sleep., Disp: 90 tablet, Rfl: 1  •  acetaminophen (TYLENOL) 325 MG tablet, Take 2 tablets by mouth Every 6 (Six) Hours As Needed for Mild Pain ., Disp: 30 tablet, Rfl: 0  •  AMLACTIN 12 % lotion, Apply  topically As Needed., Disp: , Rfl:   •  [START ON 10/20/2018] amLODIPine (NORVASC) 2.5 MG tablet, Take 1 tablet by mouth Daily., Disp: 30 tablet, Rfl: 1  •  aspirin 81 MG EC tablet, Take 81 mg by mouth Daily., Disp: , Rfl:   •  atorvastatin (LIPITOR) 20 MG tablet, Take 1 tablet by mouth Every Night., Disp: 90 tablet, Rfl: 1  •  [START ON 10/20/2018] isosorbide mononitrate (IMDUR) 30 MG 24 hr tablet, Take 1 tablet by mouth Daily., Disp: 90 tablet, Rfl: 1  •  nitroglycerin (NITROSTAT) 0.4 MG SL tablet, Place 1 tablet under the tongue Every 5 (Five) Minutes As Needed for Chest Pain., Disp: 30 tablet, Rfl: 3  •  pregabalin (LYRICA) 50 MG capsule, Take 1 capsule by mouth 3 (Three) Times a Day As Needed (neuropathic pain)., Disp: 90 capsule, Rfl: 1  •  ticagrelor (BRILINTA) 90 MG tablet tablet, Take 1 tablet by mouth 2 (Two) Times a Day., Disp: 60 tablet, Rfl: 11     ASSESSMENT  CAD status post cardiac catheterization with angioplasty and stent placement  Diabetes mellitus  Hypertension  Hyperlipidemia  Gastroesophageal reflux disease  Chronic kidney disease stage III    PLAN  Okay to discharge on current medications  Follow-up with primary doctor in 1 week    CANDICE ENRIQUEZ MD

## 2018-10-19 NOTE — PROGRESS NOTES
" LOS: 0 days   Patient Care Team:  Avery Velazquez MD as PCP - General (Internal Medicine)  Benton Choe MD as PCP - Claims Attributed    Chief Complaint: chest pain    Subjective     History of Present Illness    Subjective:  Symptoms:  He reports chest pain.      Doing well this afternoon.  No urinary complaints. Ambulating. Plans to d/c.  History taken from: patient    Objective     Vital Sign Min/Max for last 24 hours  Temp  Min: 97.7 °F (36.5 °C)  Max: 98.2 °F (36.8 °C)   BP  Min: 126/55  Max: 167/79   Pulse  Min: 62  Max: 76   Resp  Min: 16  Max: 20   SpO2  Min: 94 %  Max: 97 %   Flow (L/min)  Min: 3  Max: 3   No Data Recorded     Flowsheet Rows      First Filed Value   Admission Height  185.4 cm (73\") Documented at 10/16/2018 1045   Admission Weight  101 kg (223 lb) Documented at 10/16/2018 1154          I/O this shift:  In: 120 [P.O.:120]  Out: -   I/O last 3 completed shifts:  In: 1280 [P.O.:1080; I.V.:200]  Out: -     Objective:  Vital signs: (most recent): Blood pressure 140/70, pulse 67, temperature 98 °F (36.7 °C), temperature source Oral, resp. rate 16, height 185.4 cm (73\"), weight 99.8 kg (220 lb), SpO2 97 %.               Physical Exam      GEN:NAD, well nourished  HEENT:NCAT, PERRL, EOMI, OP clear, MMM  NECK:supple, no JVD, no carotid bruits  CVA:RRR s1, s2 no m/r/g  CHEST:CTA B no wheezes or rhonchi  ABD:soft, nontender, nondistended +bs  EXT:no c/c/e 2+PP B  NEURO:CN II-XII grossly nonfocal, no evidence of asterixes or tremor  PSYCH: appropriate mood and affect  :deferred  SKIN: warm, dry, intact, no lesions or rashes      Results Review:     I reviewed the patient's new clinical results.    WBC WBC   Date Value Ref Range Status   10/19/2018 7.61 4.50 - 10.70 10*3/mm3 Final   10/18/2018 9.10 4.50 - 10.70 10*3/mm3 Final   10/17/2018 7.78 4.50 - 10.70 10*3/mm3 Final      HGB Hemoglobin   Date Value Ref Range Status   10/19/2018 15.8 13.7 - 17.6 g/dL Final   10/18/2018 15.4 13.7 - 17.6 " g/dL Final   10/17/2018 15.6 13.7 - 17.6 g/dL Final      HCT Hematocrit   Date Value Ref Range Status   10/19/2018 49.5 40.4 - 52.2 % Final   10/18/2018 48.8 40.4 - 52.2 % Final   10/17/2018 48.9 40.4 - 52.2 % Final      Platlets No results found for: LABPLAT   MCV MCV   Date Value Ref Range Status   10/19/2018 89.0 79.8 - 96.2 fL Final   10/18/2018 89.9 79.8 - 96.2 fL Final   10/17/2018 89.2 79.8 - 96.2 fL Final          Sodium Sodium   Date Value Ref Range Status   10/19/2018 138 136 - 145 mmol/L Final   10/18/2018 139 136 - 145 mmol/L Final   10/17/2018 139 136 - 145 mmol/L Final      Potassium Potassium   Date Value Ref Range Status   10/19/2018 3.8 3.5 - 5.2 mmol/L Final   10/18/2018 4.4 3.5 - 5.2 mmol/L Final   10/17/2018 3.8 3.5 - 5.2 mmol/L Final      Chloride Chloride   Date Value Ref Range Status   10/19/2018 102 98 - 107 mmol/L Final   10/18/2018 100 98 - 107 mmol/L Final   10/17/2018 102 98 - 107 mmol/L Final      CO2 CO2   Date Value Ref Range Status   10/19/2018 22.5 22.0 - 29.0 mmol/L Final   10/18/2018 26.2 22.0 - 29.0 mmol/L Final   10/17/2018 25.0 22.0 - 29.0 mmol/L Final      BUN BUN   Date Value Ref Range Status   10/19/2018 15 8 - 23 mg/dL Final   10/18/2018 17 8 - 23 mg/dL Final   10/17/2018 15 8 - 23 mg/dL Final      Creatinine Creatinine   Date Value Ref Range Status   10/19/2018 1.36 (H) 0.76 - 1.27 mg/dL Final   10/18/2018 1.54 (H) 0.76 - 1.27 mg/dL Final   10/17/2018 1.41 (H) 0.76 - 1.27 mg/dL Final      Calcium Calcium   Date Value Ref Range Status   10/19/2018 9.0 8.6 - 10.5 mg/dL Final   10/18/2018 9.3 8.6 - 10.5 mg/dL Final   10/17/2018 9.4 8.6 - 10.5 mg/dL Final      PO4 No results found for: CAPO4   Albumin Albumin   Date Value Ref Range Status   10/17/2018 3.90 3.50 - 5.20 g/dL Final      Magnesium No results found for: MG   Uric Acid No results found for: URICACID     Medication Review:     allopurinol 100 mg Oral Daily   amLODIPine 2.5 mg Oral Q24H   aspirin 81 mg Oral Daily    atorvastatin 20 mg Oral Nightly   cholecalciferol 1,000 Units Oral Daily   dexlansoprazole 60 mg Oral Q AM   enoxaparin 30 mg Subcutaneous Q24H   insulin aspart 0-7 Units Subcutaneous 4x Daily With Meals & Nightly   isosorbide mononitrate 30 mg Oral Q24H   latanoprost 1 drop Both Eyes Nightly   metoprolol tartrate 25 mg Oral Q12H   tamsulosin 0.4 mg Oral Nightly   ticagrelor 90 mg Oral BID     Assessment/Plan       * No active hospital problems. *      Assessment & Plan   CKD3--renal function at baseline. Stable post cardiac cath.  Unstable angina--s/p cardiac cath w/ stent to LAD. CAD-s/p stent 2016  DM II--w/ complications. SSI.  HTN--bp at goal. Continue current treatment.  BPH-on flomax.    Plan  Okay w/ d/c from renal standpoint on home meds        Tonie Barrios MD  10/19/18  11:28 AM

## 2018-10-19 NOTE — DISCHARGE SUMMARY
"[facility]  Physician Discharge Summary    Patient Identification:  Name: Earl Marc  Age: 73 y.o.  Sex: male  :  1945  MRN: [id number]    Admit date: 10/16/2018    Discharge date and time:  No discharge date for patient encounter.       Admitting Physician: Maria E Gilbert MD     Discharge Provider: JL De Dios    Discharge Diagnoses:   Patient Active Problem List   Diagnosis   • Bell's palsy   • Persistent insomnia   • Osteoarthritis of cervical spine   • Diabetic peripheral neuropathy (CMS/HCC)   • Dyslipidemia   • Steatosis of liver   • Fibromyalgia   • Gastroesophageal reflux disease without esophagitis   • Hypertension   • Hypogonadism in male   • Renal insufficiency   • Vitamin D deficiency   • Benign non-nodular prostatic hyperplasia with lower urinary tract symptoms   • S/P drug eluting coronary stent placement   • Coronary artery disease involving native coronary artery of native heart   • Malignant neoplasm of skin   • Diabetes mellitus (CMS/HCC)   • Chronic insomnia   • Other specified glaucoma   • Vertigo   • Epigastric pain       Discharged Condition: good    Hospital Course:     Earl Marc is a 72 yo male with h/o CAD, HTN, HLD, chronic renal insufficiency, hypercholesterolemia, and DM.   He had a cardiac catheterization in 2016 with stent placement.  Pt also has h/o chronic renal disease for which pt is currently being followed by a nephrologist Dr. Choe and was sent over to [facility] after describing intermittent chest pain while in Dr. Choe's office.  He was referred to ER. Pt presented to [facility] ED 10/16/18 with c/o intermittent episodes of \"pounding\" sternal chest pain onset about 3-4 months ago (\"like someone is pounding a sledgehammer to my chest\"). No radiation. Pt reported that his chest pain worsened with exertion and inspiration. Pt's chest pain worsened with moderate activity and improved with rest. Pt stated that over the past several days, pt's episodes " of chest pain have become more frequent and even minimal exertion will worsen his chest pain. He had been taking antacids and nitro without any relief.      Initial work up in the ER revealed revealed no no acute ST or T wave changes noted.  SR with inferior and anterior Infarct old.  The anterior infarct pattern was new from previous EKG.  Troponins were negative x 2.   Myocardial perfusion testing was performed on 10/17/18 showing medium sized, moderately severe area of ischemia located in the anterior and septal walls.  Echo showed new regional wall motion changes in the with hypokinesis apical septal and mid inferoseptal with EF 67%.  Coronary angiogram was recommended and after risk and benefits were discussed patient was agreeable and underwent cardiac cath on 10/18/18.   Creatine was elevated during stay and nephrology was consulted for pre cath renal clearance.  Nephrology initiated renal prep on 10/17 prior to cath on 10/18.   Coronary angiogram via right radial approach showed proximal LAD 99% stenosis which was reduced to 0% with a 3 x 23 mm xience stent and the diagonal branch was angioplastied.  The Circumflex and OM stents as well as the RCA stents were widely patent.        Patient did well post cath hemodynamically.  Creatinine was improved following cath at 1.36.  He had been up walking around without any exertional chest pain.  He still reported some musculoskeletal pain to left sternal border reproducible with palpation or deep inspiration but the severe pain he was having was resolved.         It was felt he was stable from CV standpoint for discharge and both Dr. Hankins and Nephrology agreed on discharge from there standpoint. Please see discharge teaching provided to him and his wife as outlined below.  All questions and concerns were addressed.  He was initiated on imdur and amlodipine to help maximize antianginals as well improve blood pressure controll.  He tolerated these well so will remain  in place at discharge.  We are unable to us ACE or ARBs for BP control due to chronic kidney disease.             Consults:   IP CONSULT TO CARDIOLOGY  IP CONSULT TO INTERNAL MEDICINE  IP CONSULT TO NEPHROLOGY  CARDIAC REHAB EVALUATION AND ENROLLMENT    Discharge Exam:  General: No acute distress   Skin: Warm and dry, no diaphoresis noted   EYES: PERTL   HEENT: external ear and nose normal; oral mucosa moist   Neck: Supple; no carotid bruits; no JVD   Heart: S1S2 regular rate and rhythm; no murmurs; no gallop or rub appreciated   Pulmonary: Respirations regular, unlabored at rest, bilateral breath sounds have good air entry throughout all lung fields; no crackles, rubs or wheezes auscultated.     GI: Soft, non-tender, non-distended, positive bowel sounds  No hepatosplenomegaly   Extremities: Bilateral lower extremities have no pre-tibial pitting edema; DP/PT pulses are palpable 2+; Right radial cath site opsite dressing D/I. Removed to reveal mild ecchymosis and soft tissue swelling proximal to cath site. No bruit or thrill. Radial and ulnar pulses 2+, cap refill normal, hands warm   Neurological: Alert and oriented x 3; no neuro deficits                      LABS:    Results from last 7 days  Lab Units 10/17/18  0333 10/16/18  1600 10/16/18  1048   TROPONIN T ng/mL <0.010 <0.010 <0.010           Results from last 7 days  Lab Units 10/19/18  0408   SODIUM mmol/L 138   POTASSIUM mmol/L 3.8   BUN mg/dL 15   CREATININE mg/dL 1.36*   CALCIUM mg/dL 9.0     proBNP 767.9 on 10/17/18      Results from last 7 days  Lab Units 10/19/18  0408 10/18/18  0420 10/17/18  0333   WBC 10*3/mm3 7.61 9.10 7.78   HEMOGLOBIN g/dL 15.8 15.4 15.6   HEMATOCRIT % 49.5 48.8 48.9   PLATELETS 10*3/mm3 154 166 174       Results from last 7 days  Lab Units 10/18/18  0420   INR  1.12*       Results from last 7 days  Lab Units 10/19/18  0408   CHOLESTEROL mg/dL 111   TRIGLYCERIDES mg/dL 137   HDL CHOL mg/dL 38*   LDL CHOL mg/dL 46       Echo  Interpretation Summary 10/16/18    · The left ventricular cavity is borderline dilated.  · The following left ventricular wall segments are hypokinetic: apical septal and mid inferoseptal.  · Left atrial cavity size is borderline dilated.  · Calculated EF = 67%  · There is no evidence of pericardial effusion.       Myocardial perfusion scan 10/17/18    · Findings consistent with a normal ECG stress test.  · Left ventricular ejection fraction is borderline normal (Calculated EF = 49%).  · Myocardial perfusion imaging indicates a medium-sized, moderately severe area of ischemia located in the anterior wall and septal wall.  · Impressions are consistent with a high risk study.           Cardiac Cath summary 10/18/18       · Successful right and left coronary angiogram and LV pressures  · Successful angioplasty and a stent to the bifurcation lesion of the proximal LAD 99% reduced to 0% with 3.0/23 xience stent dilated to 3.2 and the diagonal branch dilated with a 2.5/15 trek balloon  · Successful and plasty of the ostial diagonal branch  · Normal left main  · Proximal LAD 99% at the level of the first diagonal branch bifurcation lesion reduced to 0% with 3.0/23, xience dilated to 3.2, diagonal branch dilated with 2.5/15 trek balloon  · Circumflex artery codominant previous stent and OM looks widely patent proximal circumflex 30-40% stenosis  · Right coronary artery codominant with a stent widely patent with minimum irregularity  · Normal LVEDP         Disposition:  home    Discharge Medications:      Discharge Medications      New Medications      Instructions Start Date   acetaminophen 325 MG tablet  Commonly known as:  TYLENOL   650 mg, Oral, Every 6 Hours PRN      amLODIPine 2.5 MG tablet  Commonly known as:  NORVASC   2.5 mg, Oral, Every 24 Hours Scheduled      atorvastatin 20 MG tablet  Commonly known as:  LIPITOR   20 mg, Oral, Nightly      isosorbide mononitrate 30 MG 24 hr tablet  Commonly known as:  IMDUR   30  mg, Oral, Every 24 Hours Scheduled      ticagrelor 90 MG tablet tablet  Commonly known as:  BRILINTA   90 mg, Oral, 2 Times Daily         Changes to Medications      Instructions Start Date   pregabalin 50 MG capsule  Commonly known as:  LYRICA  What changed:  · when to take this  · reasons to take this  · Another medication with the same name was removed. Continue taking this medication, and follow the directions you see here.   50 mg, Oral, 3 Times Daily PRN      zolpidem 5 MG tablet  Commonly known as:  AMBIEN  What changed:  Another medication with the same name was removed. Continue taking this medication, and follow the directions you see here.   5 mg, Oral, Nightly PRN         Continue These Medications      Instructions Start Date   allopurinol 100 MG tablet  Commonly known as:  ZYLOPRIM   TAKE 1 TABLET DAILY      AMLACTIN 12 % lotion  Generic drug:  ammonium lactate   Topical, As Needed      ascorbic acid 1000 MG tablet  Commonly known as:  VITAMIN C   500 mg, Oral, Daily      aspirin 81 MG EC tablet   81 mg, Oral, Daily      B-D UF III MINI PEN NEEDLES 31G X 5 MM misc  Generic drug:  Insulin Pen Needle   Use once daily with Lantus Pen for injection of insulin      butenafine 1 % cream  Commonly known as:  LOTRIMIN ULTRA   Topical, 2 Times Daily      Cetaklenz liquid   No dose, route, or frequency recorded.      [employer] PREP 70 % pads   1 application, Transdermal, Daily      cyanocobalamin 1000 MCG/ML injection   Cyanocobalamin 1000 MCG/ML Injection Solution; Patient Sig: Cyanocobalamin 1000 MCG/ML Injection Solution INJECT 1ML INTRAMUSCULARLY EVERY OTHER WEEK; 0; 17-Feb-2015; Active      DEPO-TESTOSTERONE 200 MG/ML injection  Generic drug:  Testosterone Cypionate   200 mg, Intramuscular, Weekly      dexlansoprazole 60 MG capsule  Commonly known as:  DEXILANT   60 mg, Oral, Daily      famotidine 40 MG tablet  Commonly known as:  PEPCID   40 mg, Oral, 2 Times Daily      fexofenadine 180 MG tablet  Commonly known  as:  ALLEGRA   180 mg, Oral, Daily PRN      fluticasone 50 MCG/ACT nasal spray  Commonly known as:  FLONASE   1 spray, Nasal, 2 Times Daily PRN      folic acid 800 MCG tablet  Commonly known as:  FOLVITE   800 mcg, Oral, Daily      glucose blood test strip   Precision Xtra Blood Glucose In Vitro Strip; Patient Sig: Precision Xtra Blood Glucose In Vitro Strip TEST 2  TIMES DAILY      hydrocortisone 2.5 % ointment   Topical, 2 Times Daily      icosapent ethyl 1 g capsule capsule  Commonly known as:  VASCEPA   2 g, Oral, 2 Times Daily With Meals      Insulin Glargine 100 UNIT/ML injection pen  Commonly known as:  LANTUS SOLOSTAR   28 units subcutaneously daily      ketoconazole 2 % shampoo  Commonly known as:  NIZORAL   No dose, route, or frequency recorded.      latanoprost 0.005 % ophthalmic solution  Commonly known as:  XALATAN   No dose, route, or frequency recorded.      METANX PO   Oral, 2 Times Daily      metoprolol tartrate 25 MG tablet  Commonly known as:  LOPRESSOR   25 mg, Oral, Daily      MONOLET LANCETS misc   Use to test BG 2 times daily      MULTIMINERAL PLUS tablet   Oral, Daily      nitroglycerin 0.4 MG SL tablet  Commonly known as:  NITROSTAT   0.4 mg, Sublingual, Every 5 Minutes PRN      pioglitazone 45 MG tablet  Commonly known as:  ACTOS   45 mg, Oral, Daily      PREVIDENT 5000 PLUS 1.1 % cream  Generic drug:  Sodium Fluoride   No dose, route, or frequency recorded.      promethazine 25 MG tablet  Commonly known as:  PHENERGAN   25 mg, Oral, Every 6 Hours PRN      tamsulosin 0.4 MG capsule 24 hr capsule  Commonly known as:  FLOMAX   1 capsule, Oral, Nightly      TRADJENTA 5 MG tablet tablet  Generic drug:  linagliptin   5 mg, Oral, Daily      vitamin D 23817 units capsule capsule  Commonly known as:  ERGOCALCIFEROL   50,000 Units, Oral, 2 Times Weekly      Zinc 30 MG tablet   50 mg, Oral, Daily         Stop These Medications    diclofenac 3 % gel gel  Commonly known as:  VOLTAREN           Your  Care at Home     No Dressing Needed       Your medications have changed     START taking:    acetaminophen (TYLENOL)    amLODIPine (NORVASC)    atorvastatin (LIPITOR)    isosorbide mononitrate (IMDUR)    ticagrelor (BRILINTA)      CHANGE how you take:    pregabalin (LYRICA)    zolpidem (AMBIEN)      STOP taking:    diclofenac 3 % gel gel (VOLTAREN)   Review your updated medication list below.        Activity instructions     Discharge Activity Restrictions    1) No driving for 3 days and no longer taking narcotics.   2) May shower / sponge bathe today.  Do not submerge right hand in water for 7- 10 days.  3) Do not lift / push / pull more then 1 lbs for 3 days.      Diet instructions     Diet: Consistent Carbohydrate, Cardiac; Thin    Discharge Diet:     Consistent Carbohydrate  Cardiac   Fluid Consistency:     Thin      Other instructions     Call MD With Problems / Concerns    Instructions:     If you have chest pain unrelieved by up to 3 nitros under the tongue call 911.     If you have any bleeding from right wrist cath site hold pressure for 30 minutes without interruption.  If bleeding does not stop hold pressure and go to ER.     If you have any issues with right arm swelling, decreased sensation, coolness in hand, or decreased function of right hand go to nearest ER.  Mild bruising is normal.   Discharge Instructions    Routine post cardiac catheterization/PCI discharge home care instructions:    1. No submerging procedure site below water for 7-10 days.  2. No lifting objects greater than 1 lbs for 3 days.  3. If groin site used, avoid climbing several flights of stairs or sitting for longer than 2 hours at a time for the next 24 hours.   4. Monitor puncture site for bleeding and/or knots;. If bleeding should occur at the groin site: lie flat, apply pressure and return to the ER. If bleeding should occur at the wrist site, apply pressure and return to the ER.  5.  You may apply a DRY Band-Aid over the  puncture site if needed. Do not apply any lotions, salves or ointments to site.  6. No driving for 3 days.  7. Return to ER for recurrent symptoms.  8. No smoking.  9. Take all medications as prescribed.    DO not stop your aspirin 81 mg daily and your brilinta (ticagrelor) twice daily.  You will be instructed on when or if you may should change how your take these medications by Dr. Gilbert in follow up.   If you stop these two antiplatelets without cardiology approval this could lead to stent closure, heart attack, and death.   If you have any invasive surgeries/procedure upcoming you must have clearance from Dr. Gilbert to stop your aspirin or brilinta.      DO not take more than 100 mg of aspirin daily while on brilinta (tritagrelor) as this may decrease the effectiveness of the brilinta and lead to stent closure.  Many over the counter medications have aspirin in them so please discuss with you pharmacist before starting any new over the counter medications.                Follow up with Avery Velazquez MD in 1 week(s)  [address] [facility] [address]   [phone]    Oct31 [facility] Follow Up with Maria E Gilbert MD   Wednesday Oct 31, 2018 2:45 PM  [facility]   [address]  [place] [address]   [phone]           Follow up with Maria E Gilbert MD   Wednesday Oct 31, 2018   [address] [place] [address] [address] for follow up appointment suite at 245 pm.      [address]   [place] [id number] [phone]    [id number]   Tuesday Feb 5, 2019 9:50 AM  [facility]   [address]  [facility] [address] [phone]    [id number] Office Visit with JL Darling   Tuesday Feb 19, 2019 11:00 AM   Arrive 15 minutes prior to appointment.  If you are in need of a language or hearing  please call the Department.           Signed:  Maria E Gilbert  "MD  10/19/2018  12:08 PM      EMR Dragon/Transcription:   \"Dictated utilizing Dragon dictation\".   "

## 2018-10-20 ENCOUNTER — READMISSION MANAGEMENT (OUTPATIENT)
Dept: CALL CENTER | Facility: HOSPITAL | Age: 73
End: 2018-10-20

## 2018-10-20 NOTE — OUTREACH NOTE
Prep Survey      Responses   Facility patient discharged from?  [facility]   Is patient eligible?  Yes   Discharge diagnosis  chest pain,   cardiac cath with stent placement   Does the patient have one of the following disease processes/diagnoses(primary or secondary)?  Other   Does the patient have Home health ordered?  No   Is there a DME ordered?  No   Prep survey completed?  Yes          Charla Lima RN

## 2018-10-21 ENCOUNTER — APPOINTMENT (OUTPATIENT)
Dept: GENERAL RADIOLOGY | Facility: HOSPITAL | Age: 73
End: 2018-10-21

## 2018-10-21 ENCOUNTER — HOSPITAL ENCOUNTER (EMERGENCY)
Facility: HOSPITAL | Age: 73
Discharge: HOME | End: 2018-10-21
Attending: EMERGENCY MEDICINE | Admitting: EMERGENCY MEDICINE

## 2018-10-21 VITALS
OXYGEN SATURATION: 96 % | HEIGHT: 74 IN | SYSTOLIC BLOOD PRESSURE: 161 MMHG | BODY MASS INDEX: 29.03 KG/M2 | HEART RATE: 66 BPM | RESPIRATION RATE: 16 BRPM | DIASTOLIC BLOOD PRESSURE: 83 MMHG | WEIGHT: 226.2 LBS | TEMPERATURE: 98.2 F

## 2018-10-21 DIAGNOSIS — R07.9 CHRONIC CHEST PAIN: ICD-10-CM

## 2018-10-21 DIAGNOSIS — R06.02 SHORTNESS OF BREATH: ICD-10-CM

## 2018-10-21 DIAGNOSIS — G89.29 CHRONIC CHEST PAIN: ICD-10-CM

## 2018-10-21 DIAGNOSIS — R20.2 PARESTHESIA OF LEFT ARM: Primary | ICD-10-CM

## 2018-10-21 LAB
ALBUMIN SERPL-MCNC: 4.4 G/DL (ref 3.5–5.2)
ALBUMIN/GLOB SERPL: 1.4 G/DL
ALP SERPL-CCNC: 66 U/L (ref 39–117)
ALT SERPL W P-5'-P-CCNC: 21 U/L (ref 1–41)
ANION GAP SERPL CALCULATED.3IONS-SCNC: 11.4 MMOL/L
AST SERPL-CCNC: 18 U/L (ref 1–40)
BASOPHILS # BLD AUTO: 0.02 10*3/MM3 (ref 0–0.2)
BASOPHILS NFR BLD AUTO: 0.3 % (ref 0–1.5)
BILIRUB SERPL-MCNC: 0.6 MG/DL (ref 0.1–1.2)
BUN BLD-MCNC: 13 MG/DL (ref 8–23)
BUN/CREAT SERPL: 7.7 (ref 7–25)
CALCIUM SPEC-SCNC: 9.7 MG/DL (ref 8.6–10.5)
CHLORIDE SERPL-SCNC: 98 MMOL/L (ref 98–107)
CO2 SERPL-SCNC: 26.6 MMOL/L (ref 22–29)
CREAT BLD-MCNC: 1.68 MG/DL (ref 0.76–1.27)
DEPRECATED RDW RBC AUTO: 46.3 FL (ref 37–54)
EOSINOPHIL # BLD AUTO: 0.2 10*3/MM3 (ref 0–0.7)
EOSINOPHIL NFR BLD AUTO: 2.7 % (ref 0.3–6.2)
ERYTHROCYTE [DISTWIDTH] IN BLOOD BY AUTOMATED COUNT: 14.2 % (ref 11.5–14.5)
GFR SERPL CREATININE-BSD FRML MDRD: 40 ML/MIN/1.73
GLOBULIN UR ELPH-MCNC: 3.2 GM/DL
GLUCOSE BLD-MCNC: 169 MG/DL (ref 65–99)
HCT VFR BLD AUTO: 49 % (ref 40.4–52.2)
HGB BLD-MCNC: 15.9 G/DL (ref 13.7–17.6)
IMM GRANULOCYTES # BLD: 0.02 10*3/MM3 (ref 0–0.03)
IMM GRANULOCYTES NFR BLD: 0.3 % (ref 0–0.5)
LYMPHOCYTES # BLD AUTO: 2.16 10*3/MM3 (ref 0.9–4.8)
LYMPHOCYTES NFR BLD AUTO: 29.1 % (ref 19.6–45.3)
MCH RBC QN AUTO: 28.9 PG (ref 27–32.7)
MCHC RBC AUTO-ENTMCNC: 32.4 G/DL (ref 32.6–36.4)
MCV RBC AUTO: 88.9 FL (ref 79.8–96.2)
MONOCYTES # BLD AUTO: 0.61 10*3/MM3 (ref 0.2–1.2)
MONOCYTES NFR BLD AUTO: 8.2 % (ref 5–12)
NEUTROPHILS # BLD AUTO: 4.42 10*3/MM3 (ref 1.9–8.1)
NEUTROPHILS NFR BLD AUTO: 59.4 % (ref 42.7–76)
NT-PROBNP SERPL-MCNC: 631.5 PG/ML (ref 5–900)
PLATELET # BLD AUTO: 192 10*3/MM3 (ref 140–500)
PMV BLD AUTO: 10.2 FL (ref 6–12)
POTASSIUM BLD-SCNC: 4.3 MMOL/L (ref 3.5–5.2)
PROT SERPL-MCNC: 7.6 G/DL (ref 6–8.5)
RBC # BLD AUTO: 5.51 10*6/MM3 (ref 4.6–6)
SODIUM BLD-SCNC: 136 MMOL/L (ref 136–145)
TROPONIN T SERPL-MCNC: <0.01 NG/ML (ref 0–0.03)
WBC NRBC COR # BLD: 7.43 10*3/MM3 (ref 4.5–10.7)

## 2018-10-21 PROCEDURE — 71046 X-RAY EXAM CHEST 2 VIEWS: CPT

## 2018-10-21 PROCEDURE — 84484 ASSAY OF TROPONIN QUANT: CPT | Performed by: EMERGENCY MEDICINE

## 2018-10-21 PROCEDURE — 99284 EMERGENCY DEPT VISIT MOD MDM: CPT

## 2018-10-21 PROCEDURE — 93010 ELECTROCARDIOGRAM REPORT: CPT | Performed by: INTERNAL MEDICINE

## 2018-10-21 PROCEDURE — 83880 ASSAY OF NATRIURETIC PEPTIDE: CPT | Performed by: EMERGENCY MEDICINE

## 2018-10-21 PROCEDURE — 93005 ELECTROCARDIOGRAM TRACING: CPT

## 2018-10-21 PROCEDURE — 85025 COMPLETE CBC W/AUTO DIFF WBC: CPT | Performed by: EMERGENCY MEDICINE

## 2018-10-21 PROCEDURE — 80053 COMPREHEN METABOLIC PANEL: CPT | Performed by: EMERGENCY MEDICINE

## 2018-10-21 PROCEDURE — 93005 ELECTROCARDIOGRAM TRACING: CPT | Performed by: EMERGENCY MEDICINE

## 2018-10-21 RX ORDER — SODIUM CHLORIDE 0.9 % (FLUSH) 0.9 %
10 SYRINGE (ML) INJECTION AS NEEDED
Status: DISCONTINUED | OUTPATIENT
Start: 2018-10-21 | End: 2018-10-22 | Stop reason: HOSPADM

## 2018-10-22 NOTE — ED NOTES
Patient provided discharge instructions at this time.  Respirations are even and unlabored, chest rise and fall is equal in expansion.  All patients questions answered prior to departure from the ED.  Pt ambulated from ED with steady gait, capillary refill within normal limit, speech normal.       Michelle Simmons RN  10/21/18 [id number]

## 2018-10-22 NOTE — ED NOTES
Pt c/o L sided numbness in his neck and arm that started around 2130 today while watching TV. Also c/o CP and SOB at the time.  still has some numbness in his hands and neck now.  was just discharged on Friday after having a cardiac stent placed. No facial droop, slurred speech, weakness, or other neuro deficits noted. States SOB improving.    VSS, NAD, A&O x4. Respirations even and unlabored. Skin pink, warm, and dry. Denies any other acute complaints. Call light in reach. Will continue to monitor. MD to treat.    Pt reports he fell on his L shoulder twice 2 mo ago and has had some tingling in his shoulder since then but today was worse.  was seen after the fall and  gave him cortisone shots and a cream.     Nohemi Shell, RN  10/21/18 [id number]       Nohemi Shell, RN  10/21/18 [id number]

## 2018-10-22 NOTE — ED PROVIDER NOTES
EMERGENCY DEPARTMENT ENCOUNTER    CHIEF COMPLAINT  Chief Complaint: Numbness and Tingling in L arm   History given by: Pt  History limited by: none  Room Number: 16/16  PMD: Avery Velazquez MD      HPI:  Pt is a 73 y.o. male who presents complaining of numbness and tingling in L arm into fingertips that began about 45 minutes ago while pt was watching TV. Pt states all sxs gone except tingling in fingertips. Pt confirms SOA, intermittent CP going on for months. Pt denies palpitations, leg pain. Pt has hx of stents placed and recently had one placed. Pt states he takes Aspirin everyday.     Duration:  45 minutes ago   Onset: sudden  Timing: constant   Location: L arm and hand  Radiation: into fingertips  Quality: numbness  Intensity/Severity: moderate  Progression: unchanged  Associated Symptoms: SOA  Aggravating Factors: none  Alleviating Factors: none  Previous Episodes: none stated  Treatment before arrival: none stated     PAST MEDICAL HISTORY  Active Ambulatory Problems     Diagnosis Date Noted   • Bell's palsy 03/10/2016   • Persistent insomnia 03/10/2016   • Osteoarthritis of cervical spine 03/10/2016   • Diabetic peripheral neuropathy (CMS/HCC) 03/10/2016   • Dyslipidemia 03/10/2016   • Steatosis of liver 03/10/2016   • Fibromyalgia 03/10/2016   • Gastroesophageal reflux disease without esophagitis 03/10/2016   • Hypertension 03/10/2016   • Hypogonadism in male 03/10/2016   • Renal insufficiency 03/10/2016   • Vitamin D deficiency 03/10/2016   • Benign non-nodular prostatic hyperplasia with lower urinary tract symptoms 03/10/2016   • S/P drug eluting coronary stent placement 04/13/2016   • Coronary artery disease involving native coronary artery of native heart 06/21/2016   • Malignant neoplasm of skin 10/03/2016   • Diabetes mellitus (CMS/HCC) 04/04/2017   • Chronic insomnia 12/20/2017   • Other specified glaucoma 04/01/2017   • Vertigo 03/14/2018   • Epigastric pain 06/11/2018     Resolved  Ambulatory Problems     Diagnosis Date Noted   • Hyperuricemia 03/10/2016   • Erectile dysfunction of nonorganic origin 03/10/2016   • Type 2 diabetes mellitus without complication (CMS/[facility]) 03/10/2016   • Precordial pain 03/10/2016   • Abnormal nuclear stress test 03/25/2016   • Coronary artery disease involving native coronary artery with angina pectoris (CMS/[facility]) 03/27/2016   • S/P coronary angioplasty 04/05/2016   • Chest pain 06/14/2016   • Dizziness 06/21/2016   • Lateral epicondylitis 12/19/2012   • Long-term insulin use in type 2 diabetes (CMS/[facility]) 10/04/2016   • Type II diabetes circulatory disorder causing erectile dysfunction (CMS/[facility]) 02/13/2017   • Type 2 diabetes mellitus (CMS/[facility]) 04/04/2017   • Sialadenitis 06/28/2017   • Acute bronchitis due to other specified organisms 11/14/2017   • Chest pain 10/16/2018   • Other forms of angina pectoris (CMS/[facility]) 10/16/2018     Past Medical History:   Diagnosis Date   • Abnormal cardiovascular stress test    • Acid reflux    • Arthritis    • Asthma    • Cancer (CMS/[facility])    • Chronic kidney disease    • Coronary artery disease    • Diabetes mellitus (CMS/[facility])    • Diabetic neuropathy associated with type 2 diabetes mellitus (CMS/[facility])    • Dyslipidemia    • Erectile dysfunction    • Fatty liver disease, nonalcoholic    • Gastritis    • Glaucoma    • Hyperlipidemia    • Hypertension    • Hypogonadism male    • Type 2 diabetes mellitus (CMS/[facility])        PAST SURGICAL HISTORY  Past Surgical History:   Procedure Laterality Date   • CARDIAC CATHETERIZATION N/A 3/25/2016    Procedure: Left Heart Cath;  Surgeon: Maria E Gilbert MD;  Location: [facility] INVASIVE LOCATION;  Service:    • CARDIAC CATHETERIZATION N/A 3/25/2016    Procedure: Coronary angiography;  Surgeon: Maria E Gilbert MD;  Location: [facility] CATH INVASIVE LOCATION;  Service:    • CARDIAC CATHETERIZATION N/A 3/28/2016    Procedure: Coronary angiography;  Surgeon: Maria E Gilbert MD;   Location:  [facility] CATH INVASIVE LOCATION;  Service:    • CARDIAC CATHETERIZATION N/A 3/28/2016    Procedure: Stent MOISE coronary;  Surgeon: Maria E Gilbert MD;  [facility];  Service:    • CARDIAC CATHETERIZATION N/A 10/18/2018    Procedure: Coronary angiography  no LV gram d/t CKD;  Surgeon: Maria E Gilbert MD;  [facility];  Service: Cardiology   • CARDIAC CATHETERIZATION N/A 10/18/2018    Procedure: Left Heart Cath;  Surgeon: Maria E Gilbert MD;  [facility];  Service: Cardiology   • CARDIAC CATHETERIZATION N/A 10/18/2018    Procedure: Stent MOISE coronary;  Surgeon: Maria E Gilbert MD;  [facility];  Service: Cardiology   • CAROTID STENT     • CORONARY ANGIOPLASTY     • NECK EXPLORATION N/A    • NY RT/LT HEART CATHETERS N/A 10/18/2018    Procedure: Percutaneous Coronary Intervention;  Surgeon: Maria E Gilbert MD;  Location: [facility] CATH INVASIVE LOCATION;  Service: Cardiology       FAMILY HISTORY  Family History   Problem Relation Age of Onset   • Breast cancer Daughter    • Heart attack Mother    • Hypertension Mother    • Heart disease Mother    • Thyroid disease Mother    • Lupus Mother    • Heart attack Brother    • Heart disease Brother    • Hyperlipidemia Brother    • Cancer Brother    • Depression Father    • Stroke Maternal Grandmother        SOCIAL HISTORY  Social History     Social History   • Marital status:      Spouse name: Ivette   • Number of children: 1   • Years of education: N/A     Occupational History   • Retired      Social History Main Topics   • Smoking status: Never Smoker   • Smokeless tobacco: Never Used   • Alcohol use No   • Drug use: No   • Sexual activity: Yes     Partners: Female     Other Topics Concern   • Not on file     Social History Narrative   • No narrative on file       ALLERGIES  Other and Prazosin    REVIEW OF  SYSTEMS  Review of Systems   Constitutional: Negative for activity change, appetite change and fever.   HENT: Negative for congestion and sore throat.    Eyes: Negative.    Respiratory: Positive for shortness of breath. Negative for cough.    Cardiovascular: Positive for chest pain (intermittent going on for months). Negative for palpitations and leg swelling (or leg pain).   Gastrointestinal: Negative for abdominal pain, diarrhea and vomiting.   Endocrine: Negative.    Genitourinary: Negative for decreased urine volume and dysuria.   Musculoskeletal: Negative for neck pain.   Skin: Negative for rash and wound.   Allergic/Immunologic: Negative.    Neurological: Positive for numbness (and tingling in L arm into L hand fingertipc). Negative for weakness and headaches.   Hematological: Negative.    Psychiatric/Behavioral: Negative.    All other systems reviewed and are negative.      PHYSICAL EXAM  ED Triage Vitals   Temp Heart Rate Resp BP SpO2   10/21/18 2135 10/21/18 2135 10/21/18 2135 10/21/18 2135 10/21/18 2136   98.2 °F (36.8 °C) 70 18 170/68 100 %      Temp src Heart Rate Source Patient Position BP Location FiO2 (%)   10/21/18 2135 10/21/18 2135 -- -- --   Tympanic Monitor          Physical Exam   Constitutional: He is oriented to person, place, and time. No distress.       HENT:   Head: Normocephalic and atraumatic.   Eyes: Pupils are equal, round, and reactive to light. EOM are normal.   Neck: Normal range of motion. Neck supple.   Cardiovascular: Normal rate, regular rhythm and normal heart sounds.    Pulses:       Radial pulses are 2+ on the right side, and 2+ on the left side.   Pulmonary/Chest: Effort normal and breath sounds normal. No respiratory distress.   Abdominal: Soft. There is no tenderness. There is no rebound and no guarding.   Musculoskeletal: Normal range of motion. He exhibits no edema (no pedal edema) or tenderness (no calf ).   Neurological: He is alert and oriented to person, place, and  time. He has normal sensation and normal strength.   NL strength and sensation in all extremities, NL speech.    Skin: Skin is warm and dry.   Psychiatric: Mood and affect normal.   Nursing note and vitals reviewed.      LAB RESULTS  Lab Results (last 24 hours)     Procedure Component Value Units Date/Time    CBC & Differential [[id number]] Collected:  10/21/18 2201    Specimen:  Blood Updated:  10/21/18 2216    Narrative:       The following orders were created for panel order CBC & Differential.  Procedure                               Abnormality         Status                     ---------                               -----------         ------                     CBC Auto Differential[[id number]]        Abnormal            Final result                 Please view results for these tests on the individual orders.    Comprehensive Metabolic Panel [[id number]]  (Abnormal) Collected:  10/21/18 2201    Specimen:  Blood Updated:  10/21/18 2239     Glucose 169 (H) mg/dL      BUN 13 mg/dL      Creatinine 1.68 (H) mg/dL      Sodium 136 mmol/L      Potassium 4.3 mmol/L      Chloride 98 mmol/L      CO2 26.6 mmol/L      Calcium 9.7 mg/dL      Total Protein 7.6 g/dL      Albumin 4.40 g/dL      ALT (SGPT) 21 U/L      AST (SGOT) 18 U/L      Alkaline Phosphatase 66 U/L      Total Bilirubin 0.6 mg/dL      eGFR Non African Amer 40 (L) mL/min/1.73      Globulin 3.2 gm/dL      A/G Ratio 1.4 g/dL      BUN/Creatinine Ratio 7.7     Anion Gap 11.4 mmol/L     Narrative:       The MDRD GFR formula is only valid for adults with stable renal function between ages 18 and 70.    BNP [[id number]]  (Normal) Collected:  10/21/18 2201    Specimen:  Blood Updated:  10/21/18 2237     proBNP 631.5 pg/mL     Narrative:       Among patients with dyspnea, NT-proBNP is highly sensitive for the detection of acute congestive heart failure. In addition NT-proBNP of <300 pg/ml effectively rules out acute congestive heart failure with 99% negative predictive  value.    Troponin [[id number]]  (Normal) Collected:  10/21/18 2201    Specimen:  Blood Updated:  10/21/18 2239     Troponin T <0.010 ng/mL     Narrative:       Troponin T Reference Ranges:  Less than 0.03 ng/mL:    Negative for AMI  0.03 to 0.09 ng/mL:      Indeterminant for AMI  Greater than 0.09 ng/mL: Positive for AMI    CBC Auto Differential [[id number]]  (Abnormal) Collected:  10/21/18 2201    Specimen:  Blood Updated:  10/21/18 2216     WBC 7.43 10*3/mm3      RBC 5.51 10*6/mm3      Hemoglobin 15.9 g/dL      Hematocrit 49.0 %      MCV 88.9 fL      MCH 28.9 pg      MCHC 32.4 (L) g/dL      RDW 14.2 %      RDW-SD 46.3 fl      MPV 10.2 fL      Platelets 192 10*3/mm3      Neutrophil % 59.4 %      Lymphocyte % 29.1 %      Monocyte % 8.2 %      Eosinophil % 2.7 %      Basophil % 0.3 %      Immature Grans % 0.3 %      Neutrophils, Absolute 4.42 10*3/mm3      Lymphocytes, Absolute 2.16 10*3/mm3      Monocytes, Absolute 0.61 10*3/mm3      Eosinophils, Absolute 0.20 10*3/mm3      Basophils, Absolute 0.02 10*3/mm3      Immature Grans, Absolute 0.02 10*3/mm3           I ordered the above labs and reviewed the results    RADIOLOGY  XR Chest 2 View   Final Result   Stable mild cardiomegaly. No acute findings.       This report was finalized on 10/21/2018 10:31 PM by Dr. Eleonora Schaffer M.D.               I ordered the above noted radiological studies. Interpreted by radiologist. Reviewed by me in PACS.       PROCEDURES  Procedures    EKG          EKG time: 2143  Rhythm/Rate: Atrial flutter (artifact present), 68  P waves and MD: Irregular, varying TOYA  QRS, axis: LAD, anterior Q waves   ST and T waves: minimal ST elevation anteriorly, no ST depression      Interpreted Contemporaneously by me, independently viewed and is changed compared to prior 10/19/18.       EKG          EKG time: 2200  Rhythm/Rate: NSR, 67  P waves and MD: NL, 1st degree AV block  QRS, axis: LAD, anterior Q waves   ST and T waves:  minimal ST elevation  anteriorly, no ST depression    Interpreted Contemporaneously by me, independently viewed and is changed compared to prior to one done earlier today at 2143.         PROGRESS AND CONSULTS  ED Course as of Oct 21 2325   Sun Oct 21, 2018   2245 1.36 two days ago Creatinine: (!) 1.68 [WH]      ED Course User Index  [WH] Alfredito Scanlon MD       2137  EKG ordered.     2156  CXR, BNP, Troponin, EKG, and labs ordered.     2246  Call placed to Cardiology.     2254  Rechecked pt. Pt is resting comfortably. Notified pt of unremarkable Troponin level. Pt states that he had a fall on his L shoulder a couple months ago and has occasional numbness in that area but never tingling until today. Pt states he has had surgery on neck in the past. Discussed the plan place call to Cardiology for consult. Pt understands and agrees with the plan, all questions answered.    2311  Discussed pt's case with Dr. Gilbert (Cardiology) who stated pt can be discharged.    2312  Rechecked pt. Pt is resting comfortably. Notified pt of discussion with Dr. Gilbert and that this has nothing to do with his heart. Discussed the plan to discharge pt home. Pt understands and agrees with the plan, all questions answered.          MEDICAL DECISION MAKING  Results were reviewed/discussed with the patient and they were also made aware of online access. Pt also made aware that some labs, such as cultures, will not be resulted during ER visit and follow up with PMD is necessary.     MDM  Number of Diagnoses or Management Options  Chronic chest pain:   Paresthesia of left arm:   Shortness of breath:   Diagnosis management comments: Patient had a normal neuro exam.  Initial EKG appeared to be atrial flutter but artifact was present in the EKG was performed on the patient was shaking.  Repeat EKG done a few minutes later showed a normal sinus rhythm.  Troponin was negative.  Patient had a cardiac stent placed 3 days ago.  Patient states he has chronic  intermittent chest pain but the pain tonight was no different than what he has had previously.  Patient appeared comfortable in the ER.  Case was discussed with Dr. Gilbert and the patient will be discharged.       Amount and/or Complexity of Data Reviewed  Clinical lab tests: ordered and reviewed (Troponin= <0.010, Creatinine= 1.68 )  Tests in the radiology section of CPT®: ordered and reviewed (CXR- shows stable mild cardiomegaly, but nothing acute. )  Tests in the medicine section of CPT®: ordered and reviewed (See EKG Procedure note. )  Decide to obtain previous medical records or to obtain history from someone other than the patient: yes  Review and summarize past medical records: yes (Pt had cardiac cath done 3 days ago with stent placed in proximal LAD by Dr. Gilbert. Also, showed patent stent in RCA. )  Discuss the patient with other providers: yes (Dr. Gilbert (Cardiology))  Independent visualization of images, tracings, or specimens: yes           DIAGNOSIS  Final diagnoses:   Paresthesia of left arm   Shortness of breath   Chronic chest pain       DISPOSITION  DISCHARGE    Patient discharged in stable condition.    Reviewed implications of results, diagnosis, meds, responsibility to follow up, warning signs and symptoms of possible worsening, potential complications and reasons to return to ER.    Patient/Family voiced understanding of above instructions.    Discussed plan for discharge, as there is no emergent indication for admission. Patient referred to primary care provider for BP management due to today's BP. Pt/family is agreeable and understands need for follow up and repeat testing.  Pt is aware that discharge does not mean that nothing is wrong but it indicates no emergency is present that requires admission and they must continue care with follow-up as given below or physician of their choice.     FOLLOW-UP  Maria E Gilbert MD  [address] [facility]  [address]  [phone]    Go to   As scheduled         Medication List      No changes were made to your prescriptions during this visit.             Latest Documented Vital Signs:  As of 11:25 PM  BP- 161/83 HR- 66 Temp- 98.2 °F (36.8 °C) (Tympanic) O2 sat- 96%    --  Documentation assistance provided by harpreet Williamson for Dr. Scanlon. Information recorded by the scribe was done at my direction and has been verified and validated by me.     Claudia Michaud  10/21/18 3385       Alfredito Scanlon MD  10/21/18 [id number]

## 2018-10-22 NOTE — DISCHARGE INSTRUCTIONS
Return to emergency department for difficulty breathing, worsening pain, weakness in your arms, or other concern.  Follow-up with your cardiologist as scheduled.

## 2018-10-23 ENCOUNTER — READMISSION MANAGEMENT (OUTPATIENT)
Dept: CALL CENTER | Facility: HOSPITAL | Age: 73
End: 2018-10-23

## 2018-10-23 NOTE — OUTREACH NOTE
Medical Week 1 Survey      Responses   Facility patient discharged from?  [place]   Does the patient have one of the following disease processes/diagnoses(primary or secondary)?  Other   Is there a successful TCM telephone encounter documented?  No   Week 1 attempt successful?  Yes   Call start time  1349   Call end time  1355   Discharge diagnosis  chest pain,   cardiac cath with stent placement   Meds reviewed with patient/caregiver?  Yes   Is the patient having any side effects they believe may be caused by any medication additions or changes?  No   Does the patient have all medications ordered at discharge?  Yes   Is the patient taking all medications as directed (includes completed medication regime)?  Yes   Does the patient have a primary care provider?   Yes   Does the patient have an appointment with their PCP within 7 days of discharge?  Yes   Has the patient kept scheduled appointments due by today?  Yes   Has home health visited the patient within 72 hours of discharge?  N/A   Psychosocial issues?  No   Did the patient receive a copy of their discharge instructions?  Yes   Nursing interventions  Reviewed instructions with patient   What is the patient's perception of their health status since discharge?  Improving   Is the patient/caregiver able to teach back signs and symptoms related to disease process for when to call PCP?  Yes   Is the patient/caregiver able to teach back signs and symptoms related to disease process for when to call 911?  Yes   Is the patient/caregiver able to teach back the hierarchy of who to call/visit for symptoms/problems? PCP, Specialist, Home health nurse, Urgent Care, ED, 911  Yes   Additional teach back comments  He is still tired but he expects and is staying active.   Week 1 call completed?  Yes          Ana Huerta RN

## 2018-10-26 ENCOUNTER — OFFICE VISIT (OUTPATIENT)
Dept: FAMILY MEDICINE CLINIC | Facility: CLINIC | Age: 73
End: 2018-10-26

## 2018-10-26 VITALS
BODY MASS INDEX: 28.62 KG/M2 | TEMPERATURE: 97.9 F | HEIGHT: 74 IN | WEIGHT: 223 LBS | OXYGEN SATURATION: 99 % | HEART RATE: 66 BPM | SYSTOLIC BLOOD PRESSURE: 120 MMHG | DIASTOLIC BLOOD PRESSURE: 64 MMHG

## 2018-10-26 DIAGNOSIS — R20.0 LEFT ARM NUMBNESS: ICD-10-CM

## 2018-10-26 DIAGNOSIS — I25.10 CORONARY ARTERY DISEASE INVOLVING NATIVE CORONARY ARTERY OF NATIVE HEART WITHOUT ANGINA PECTORIS: Primary | ICD-10-CM

## 2018-10-26 DIAGNOSIS — M50.90 CERVICAL DISC DISEASE: ICD-10-CM

## 2018-10-26 PROCEDURE — 99213 OFFICE O/P EST LOW 20 MIN: CPT | Performed by: INTERNAL MEDICINE

## 2018-10-26 RX ORDER — FINASTERIDE 5 MG/1
5 TABLET, FILM COATED ORAL DAILY
COMMUNITY
End: 2023-03-28

## 2018-10-26 NOTE — PROGRESS NOTES
Subjective chief complaint is follow-up after a stent  Earl Marc is a 73 y.o. male.     History of Present Illness   Is here today for follow-up.  He had a stent placed on the 16th after an emergency room visit.  He was basically doing well for several days.  He had a return visit to the emergency room on the 21st for left arm numbness.  His workup was unremarkable at that time.  He does have some known cervical disc disease.  He has not had an MRI scan recently.  Since his last visit his reflux symptoms have resolved.  Current outpatient and discharge medications have been reconciled for the patient.  Reviewed by: Avery Velazquez MD    The following portions of the patient's history were reviewed and updated as appropriate: allergies, current medications, past medical history and problem list.    Review of Systems   Respiratory: Negative for chest tightness and shortness of breath.    Gastrointestinal: Positive for abdominal pain.   Neurological: Positive for numbness. Negative for weakness.       Objective   Physical Exam   Neck:   He has some limited lateral rotation as well as abduction of the neck.   Cardiovascular: Normal rate, regular rhythm and normal heart sounds.    Pulmonary/Chest: Effort normal and breath sounds normal. No respiratory distress. He has no wheezes. He has no rales.   Abdominal: Soft. Bowel sounds are normal. He exhibits no distension. There is tenderness.   He has some fairly diffuse tenderness in the mid epigastrium and across the lower abdomen.   Musculoskeletal: He exhibits no edema.   Left shoulder has good range of motion.  There is no significant weakness of the arm.   Nursing note and vitals reviewed.        Assessment/Plan   Earl was seen today for follow-up.    Diagnoses and all orders for this visit:    Coronary artery disease involving native coronary artery of native heart without angina pectoris    Left arm numbness  -     MRI Cervical Spine Without Contrast;  Future    Cervical disc disease  -     MRI Cervical Spine Without Contrast; Future      Earl is here today for follow-up.  He has been experiencing some left arm numbness.  I do not think this is cardiac it certainly did not appear to be a stroke when he went to the emergency room.  I suspect this is going to be cervical disc disease.  We are going to order an MRI scan of the cervical spine.

## 2018-10-31 ENCOUNTER — OFFICE VISIT (OUTPATIENT)
Dept: CARDIOLOGY | Age: 73
End: 2018-10-31

## 2018-10-31 VITALS
HEIGHT: 74 IN | HEART RATE: 65 BPM | DIASTOLIC BLOOD PRESSURE: 71 MMHG | WEIGHT: 224 LBS | SYSTOLIC BLOOD PRESSURE: 155 MMHG | BODY MASS INDEX: 28.75 KG/M2

## 2018-10-31 DIAGNOSIS — I25.10 CORONARY ARTERY DISEASE INVOLVING NATIVE CORONARY ARTERY OF NATIVE HEART WITHOUT ANGINA PECTORIS: ICD-10-CM

## 2018-10-31 DIAGNOSIS — I10 PRIMARY HYPERTENSION: Primary | ICD-10-CM

## 2018-10-31 DIAGNOSIS — I10 ESSENTIAL HYPERTENSION: ICD-10-CM

## 2018-10-31 DIAGNOSIS — E78.5 DYSLIPIDEMIA: ICD-10-CM

## 2018-10-31 PROCEDURE — 99213 OFFICE O/P EST LOW 20 MIN: CPT | Performed by: INTERNAL MEDICINE

## 2018-10-31 RX ORDER — AMLODIPINE BESYLATE 2.5 MG/1
2.5 TABLET ORAL
Qty: 90 TABLET | Refills: 3 | OUTPATIENT
Start: 2018-10-31 | End: 2018-10-31 | Stop reason: SDUPTHER

## 2018-10-31 RX ORDER — AMLODIPINE BESYLATE 2.5 MG/1
2.5 TABLET ORAL
Qty: 90 TABLET | Refills: 3 | OUTPATIENT
Start: 2018-10-31 | End: 2018-11-01 | Stop reason: SDUPTHER

## 2018-10-31 RX ORDER — ATORVASTATIN CALCIUM 20 MG/1
20 TABLET, FILM COATED ORAL NIGHTLY
Qty: 90 TABLET | Refills: 3 | OUTPATIENT
Start: 2018-10-31 | End: 2018-10-31 | Stop reason: SDUPTHER

## 2018-10-31 RX ORDER — ATORVASTATIN CALCIUM 20 MG/1
20 TABLET, FILM COATED ORAL NIGHTLY
Qty: 90 TABLET | Refills: 3 | OUTPATIENT
Start: 2018-10-31 | End: 2018-11-01 | Stop reason: SDUPTHER

## 2018-11-01 ENCOUNTER — TELEPHONE (OUTPATIENT)
Dept: CARDIAC REHAB | Facility: HOSPITAL | Age: 73
End: 2018-11-01

## 2018-11-01 ENCOUNTER — READMISSION MANAGEMENT (OUTPATIENT)
Dept: CALL CENTER | Facility: HOSPITAL | Age: 73
End: 2018-11-01

## 2018-11-01 RX ORDER — AMLODIPINE BESYLATE 2.5 MG/1
2.5 TABLET ORAL
Qty: 90 TABLET | Refills: 3 | OUTPATIENT
Start: 2018-11-01 | End: 2018-11-01 | Stop reason: SDUPTHER

## 2018-11-01 RX ORDER — ATORVASTATIN CALCIUM 20 MG/1
20 TABLET, FILM COATED ORAL NIGHTLY
Qty: 90 TABLET | Refills: 3 | OUTPATIENT
Start: 2018-11-01 | End: 2018-11-01 | Stop reason: SDUPTHER

## 2018-11-01 RX ORDER — ATORVASTATIN CALCIUM 20 MG/1
20 TABLET, FILM COATED ORAL NIGHTLY
Qty: 90 TABLET | Refills: 3 | Status: SHIPPED | OUTPATIENT
Start: 2018-11-01 | End: 2020-01-10 | Stop reason: SDUPTHER

## 2018-11-01 RX ORDER — AMLODIPINE BESYLATE 2.5 MG/1
2.5 TABLET ORAL
Qty: 90 TABLET | Refills: 3 | Status: SHIPPED | OUTPATIENT
Start: 2018-11-01 | End: 2019-09-19 | Stop reason: SDUPTHER

## 2018-11-01 NOTE — OUTREACH NOTE
Medical Week 2 Survey      Responses   Facility patient discharged from?  [place]   Does the patient have one of the following disease processes/diagnoses(primary or secondary)?  Other   Week 2 attempt successful?  No   Unsuccessful attempts  Attempt 1 [Reached spouse. She states that she wants us to try calling his number again. Advised her we will try tomorrow.]          Carlos Perry RN

## 2018-11-01 NOTE — TELEPHONE ENCOUNTER
Amlodipine, atorvastatin, and brilinta printed and faxed to [facility]

## 2018-11-02 ENCOUNTER — READMISSION MANAGEMENT (OUTPATIENT)
Dept: CALL CENTER | Facility: HOSPITAL | Age: 73
End: 2018-11-02

## 2018-11-02 NOTE — OUTREACH NOTE
Medical Week 2 Survey      Responses   Facility patient discharged from?  [place]   Does the patient have one of the following disease processes/diagnoses(primary or secondary)?  Other   Week 2 attempt successful?  Yes   Call start time  1617   Discharge diagnosis  chest pain,   cardiac cath with stent placement   Call end time  1620   Is patient permission given to speak with other caregiver?  Yes   List who call center can speak with  [facility] reviewed with patient/caregiver?  Yes   Is the patient having any side effects they believe may be caused by any medication additions or changes?  No   Does the patient have all medications ordered at discharge?  Yes   Is the patient taking all medications as directed (includes completed medication regime)?  Yes   Does the patient have a primary care provider?   Yes   Does the patient have an appointment with their PCP within 7 days of discharge?  Yes   Has the patient kept scheduled appointments due by today?  Yes   Did the patient receive a copy of their discharge instructions?  Yes   Nursing interventions  Referred to HIM, Educated on MyChart   What is the patient's perception of their health status since discharge?  Improving   Is the patient/caregiver able to teach back signs and symptoms related to disease process for when to call 911?  Yes   Is the patient/caregiver able to teach back the hierarchy of who to call/visit for symptoms/problems? PCP, Specialist, Home health nurse, Urgent Care, ED, 911  Yes   Additional teach back comments  He is still tired but he expects and is staying active.   Week 2 Call Completed?  Yes   Graduated  Yes [He feels he has everything he needs,Advised that if he needs assist to call.]   Did the patient feel the follow up calls were helpful during their recovery period?  Yes   Was the number of calls appropriate?  Yes   Does the patient have an Advance Directive or Living Will?  Yes   Is the patient/caregiver familiar with Advance  Care Planning?  No   Would the patient like more information on Advance Care Planning?  No          Heidi Cornelius RN

## 2018-11-05 ENCOUNTER — TRANSCRIBE ORDERS (OUTPATIENT)
Dept: CARDIAC REHAB | Facility: HOSPITAL | Age: 73
End: 2018-11-05

## 2018-11-05 ENCOUNTER — OFFICE VISIT (OUTPATIENT)
Dept: CARDIAC REHAB | Facility: HOSPITAL | Age: 73
End: 2018-11-05

## 2018-11-05 VITALS — WEIGHT: 221.8 LBS | HEIGHT: 73 IN | BODY MASS INDEX: 29.4 KG/M2

## 2018-11-05 DIAGNOSIS — Z95.5 STATUS POST INSERTION OF DRUG ELUTING CORONARY ARTERY STENT: Primary | ICD-10-CM

## 2018-11-05 PROCEDURE — 93797 PHYS/QHP OP CAR RHAB WO ECG: CPT

## 2018-11-05 NOTE — PROGRESS NOTES
Cardiac Rehab Initial Assessment      Name: Earl Marc  :1945 Allergies:Other and Prazosin   MRN: [id number] 73 y.o. Physician: Avery Velazquez MD   Primary Diagnosis:    Diagnosis Plan   1. Status post insertion of drug eluting coronary artery stent      Event Date: 10/18/18 Specialist: Dr. Gilbert   Secondary Diagnosis:  Risk Stratification:Moderate Risk Note Author: Yareli Deluna RN     Cardiovascular History: Comments had 2 previous stents 2016 attended [facility] after that event     EXERCISE AT HOME since stent   no  na  N/A    EF: 67%      Source: 10/16/18 ECHO          Ambulatory Status:Independent  Ambulatory Fall Risk Assessed on Initial Visit: yes 6 Minute Walk Pre- Cardiac Rehab:  Distance:1150ft      RPE:3  Max. HR: 90       SPO2:96    MET: 2.7  MPH: 2.1             RPD: 3  Resting BP: 136/62 LA, 132/62 RA    Peak BP: 154/60  Recovery BP: 124/60  Comments: Tolerated well.  Was getting tired toward end of the walk.      NUTRITION  Lipids:yes If yes, labs as follows;  Total: No components found for: CHOLESTEROL  HDL:   HDL Cholesterol   Date Value Ref Range Status   10/19/2018 38 (L) 40 - 60 mg/dL Final    Lipids continued:  LDL:  LDL Cholesterol    Date Value Ref Range Status   10/19/2018 46 0 - 100 mg/dL Final     Triglyceride: No components found for: TRIGLYCERIDE   Weight Management:                 Weight: 221.8  Height: 73                      BMI: Body mass index is 29.26 kg/m².  Waist Circumference: 43.5  inches   Alcohol Use: defer Diabetes:Yes,  Monitors BS at home- yes, Frequency: 1 time daily, Random BS: 157    Last HGBA1C with date if applicable:No components found for: A1C         SOCIAL HISTORY  Social History     Social History   • Marital status:      Spouse name: Ivette   • Number of children: 1     Occupational History   • Retired      Social History Main Topics   • Smoking status: Never Smoker   • Smokeless tobacco: Never Used   • Alcohol use No   •  Drug use: No   • Sexual activity: Yes     Partners: Female     Other Topics Concern   • Not on file       Educational Level (choose one that applies) post college graduate work or degree Learning Barriers:Ready to Learn    Family Support:yes    Living Arrangement: lives with their spouse    Risk Factors: Stress  Yes, Clinical Depression  No, Heredity  Yes If Yes mother fatal MI at 55 brother has had 2 MI's first at 69 paternal grandmothe CVA's a second brother had MI , Hyperlipidemia  No, Diabetes  Yes If Yes: Do you check blood glucose daily  Yes Today's glucose level 157, Exercise prior to event  Yes If Yes: Activity going to gym TM 25-30 mins, rowing machine 12 mins rest of time upper body and legs using weights, Minutes per day60, Days per week 2-3, Obesity  No and 0     Tobacco Adjunct: No  never      Comorbidities: Liver disease, Pulmonary disease, Renal disease, Diabetes Mellitus and fatty liver, Asthma, Stage III kidney disease, CAD   Glaucoma  GERD     PSYCHOSOCIAL  Clinical Depression: no    Stress: yes     Assess presence or absence of depression using a valid screening tool: yes      PHYSICAL ASSESSMENT  Influenza vaccine: yes  Pneumococcal vaccine: yes          Angina: yes    Describe angina scale of 0 - 4: 1 = light pain only     Today are you having incisional pain? N/A. If, Yes, Scale: na    Today are you having any other pain? No. If, Yes, Scale: none    States has pain in left eye that is constant. Vision is reduced in left eye Diagnosed with Hypertension:yes    Heart Sounds: regular rhythm without murmur     Lung Sounds: normal air entry, lungs clear to auscultation         Assessment: has had some ankle edema at the end of the day.    Alert and oriented.  Orthopedic Problems:osteoarthritis of cervical spine  Peripheral neuropathy  Fibromyalgia    Are you being hurt, hit, or frightened by anyone at home or in your life? no    Are you being neglected by a caregiver? N/A Shoulder flexibility/Range  of motion: Below average left shoulder injury and needs MRI.  Dr. Gilbert recommended waiting 90 days will have study done 1/21/19.  Can lift overhead but shoulder is sore    Recommended arm activity: Any    Chair sit and reach within: 16 inches   Leg flexibility: Below average    Leg Strength/Balance/Five times sit to stand: 10 seconds.     Chose one: Average    Recommended stretching: Standing    Assessment: Good balance demonstrated.     Family attends [place]: no Time of arrival: 1325  Time of departure:      Patient Goals: Plans to go back to exercise routine.  Treadmill, weights,  Rowing machine.  Wants to go back to flower gardening.  Planting weeding.         11/5/2018  2:42 PM  Yareli Deluna RN

## 2018-11-06 ENCOUNTER — APPOINTMENT (OUTPATIENT)
Dept: MRI IMAGING | Facility: HOSPITAL | Age: 73
End: 2018-11-06
Attending: INTERNAL MEDICINE

## 2018-11-07 ENCOUNTER — TREATMENT (OUTPATIENT)
Dept: CARDIAC REHAB | Facility: HOSPITAL | Age: 73
End: 2018-11-07

## 2018-11-07 DIAGNOSIS — Z95.5 STATUS POST INSERTION OF DRUG ELUTING CORONARY ARTERY STENT: Primary | ICD-10-CM

## 2018-11-07 PROCEDURE — 93798 PHYS/QHP OP CAR RHAB W/ECG: CPT

## 2018-11-08 ENCOUNTER — HOSPITAL ENCOUNTER (OUTPATIENT)
Dept: CARDIOLOGY | Facility: HOSPITAL | Age: 73
Discharge: HOME | End: 2018-11-08
Attending: INTERNAL MEDICINE | Admitting: INTERNAL MEDICINE

## 2018-11-08 VITALS
WEIGHT: 221 LBS | HEART RATE: 67 BPM | BODY MASS INDEX: 29.29 KG/M2 | DIASTOLIC BLOOD PRESSURE: 62 MMHG | HEIGHT: 73 IN | SYSTOLIC BLOOD PRESSURE: 146 MMHG

## 2018-11-08 PROCEDURE — 93016 CV STRESS TEST SUPVJ ONLY: CPT | Performed by: INTERNAL MEDICINE

## 2018-11-08 PROCEDURE — 93018 CV STRESS TEST I&R ONLY: CPT | Performed by: INTERNAL MEDICINE

## 2018-11-08 PROCEDURE — 93017 CV STRESS TEST TRACING ONLY: CPT

## 2018-11-09 ENCOUNTER — TREATMENT (OUTPATIENT)
Dept: CARDIAC REHAB | Facility: HOSPITAL | Age: 73
End: 2018-11-09

## 2018-11-09 DIAGNOSIS — Z95.5 STATUS POST INSERTION OF DRUG ELUTING CORONARY ARTERY STENT: Primary | ICD-10-CM

## 2018-11-09 PROCEDURE — 93798 PHYS/QHP OP CAR RHAB W/ECG: CPT

## 2018-11-12 ENCOUNTER — TREATMENT (OUTPATIENT)
Dept: CARDIAC REHAB | Facility: HOSPITAL | Age: 73
End: 2018-11-12

## 2018-11-12 DIAGNOSIS — Z95.5 STATUS POST INSERTION OF DRUG ELUTING CORONARY ARTERY STENT: Primary | ICD-10-CM

## 2018-11-12 LAB
BH CV STRESS BP STAGE 1: NORMAL
BH CV STRESS BP STAGE 2: NORMAL
BH CV STRESS DURATION MIN STAGE 1: 3
BH CV STRESS DURATION MIN STAGE 2: 3
BH CV STRESS DURATION MIN STAGE 3: 1
BH CV STRESS DURATION SEC STAGE 1: 0
BH CV STRESS DURATION SEC STAGE 2: 0
BH CV STRESS DURATION SEC STAGE 3: 33
BH CV STRESS GRADE STAGE 1: 10
BH CV STRESS GRADE STAGE 2: 12
BH CV STRESS GRADE STAGE 3: 14
BH CV STRESS HR STAGE 1: 90
BH CV STRESS HR STAGE 2: 107
BH CV STRESS HR STAGE 3: 111
BH CV STRESS METS STAGE 1: 4.6
BH CV STRESS METS STAGE 2: 7.1
BH CV STRESS METS STAGE 3: 8.4
BH CV STRESS PROTOCOL 1: NORMAL
BH CV STRESS RECOVERY BP: NORMAL MMHG
BH CV STRESS RECOVERY HR: 86 BPM
BH CV STRESS SPEED STAGE 1: 1.7
BH CV STRESS SPEED STAGE 2: 2.5
BH CV STRESS SPEED STAGE 3: 3.4
BH CV STRESS STAGE 1: 1
BH CV STRESS STAGE 2: 2
BH CV STRESS STAGE 3: 3
MAXIMAL PREDICTED HEART RATE: 147 BPM
PERCENT MAX PREDICTED HR: 76.87 %
STRESS BASELINE BP: NORMAL MMHG
STRESS BASELINE HR: 67 BPM
STRESS PERCENT HR: 90 %
STRESS POST ESTIMATED WORKLOAD: 8.6 METS
STRESS POST EXERCISE DUR MIN: 7 MIN
STRESS POST EXERCISE DUR SEC: 33 SEC
STRESS POST PEAK BP: NORMAL MMHG
STRESS POST PEAK HR: 113 BPM
STRESS TARGET HR: 125 BPM

## 2018-11-12 PROCEDURE — 93798 PHYS/QHP OP CAR RHAB W/ECG: CPT

## 2018-11-14 ENCOUNTER — TREATMENT (OUTPATIENT)
Dept: CARDIAC REHAB | Facility: HOSPITAL | Age: 73
End: 2018-11-14

## 2018-11-14 DIAGNOSIS — Z95.5 STATUS POST INSERTION OF DRUG ELUTING CORONARY ARTERY STENT: Primary | ICD-10-CM

## 2018-11-14 PROCEDURE — 93798 PHYS/QHP OP CAR RHAB W/ECG: CPT

## 2018-11-16 ENCOUNTER — TREATMENT (OUTPATIENT)
Dept: CARDIAC REHAB | Facility: HOSPITAL | Age: 73
End: 2018-11-16

## 2018-11-16 DIAGNOSIS — Z95.5 STATUS POST INSERTION OF DRUG ELUTING CORONARY ARTERY STENT: Primary | ICD-10-CM

## 2018-11-16 PROCEDURE — 93798 PHYS/QHP OP CAR RHAB W/ECG: CPT

## 2018-11-19 ENCOUNTER — TREATMENT (OUTPATIENT)
Dept: CARDIAC REHAB | Facility: HOSPITAL | Age: 73
End: 2018-11-19

## 2018-11-19 DIAGNOSIS — Z95.5 STATUS POST INSERTION OF DRUG ELUTING CORONARY ARTERY STENT: Primary | ICD-10-CM

## 2018-11-19 PROCEDURE — 93798 PHYS/QHP OP CAR RHAB W/ECG: CPT

## 2018-11-21 ENCOUNTER — TREATMENT (OUTPATIENT)
Dept: CARDIAC REHAB | Facility: HOSPITAL | Age: 73
End: 2018-11-21

## 2018-11-21 DIAGNOSIS — Z95.5 STATUS POST INSERTION OF DRUG ELUTING CORONARY ARTERY STENT: Primary | ICD-10-CM

## 2018-11-21 PROCEDURE — 93798 PHYS/QHP OP CAR RHAB W/ECG: CPT

## 2018-11-26 ENCOUNTER — TREATMENT (OUTPATIENT)
Dept: CARDIAC REHAB | Facility: HOSPITAL | Age: 73
End: 2018-11-26

## 2018-11-26 DIAGNOSIS — Z95.5 STATUS POST INSERTION OF DRUG ELUTING CORONARY ARTERY STENT: Primary | ICD-10-CM

## 2018-11-26 PROCEDURE — 93798 PHYS/QHP OP CAR RHAB W/ECG: CPT

## 2018-11-28 ENCOUNTER — TREATMENT (OUTPATIENT)
Dept: CARDIAC REHAB | Facility: HOSPITAL | Age: 73
End: 2018-11-28

## 2018-11-28 DIAGNOSIS — Z95.5 STATUS POST INSERTION OF DRUG ELUTING CORONARY ARTERY STENT: Primary | ICD-10-CM

## 2018-11-28 PROCEDURE — 93798 PHYS/QHP OP CAR RHAB W/ECG: CPT

## 2018-11-30 ENCOUNTER — TREATMENT (OUTPATIENT)
Dept: CARDIAC REHAB | Facility: HOSPITAL | Age: 73
End: 2018-11-30

## 2018-11-30 DIAGNOSIS — Z95.5 STATUS POST INSERTION OF DRUG ELUTING CORONARY ARTERY STENT: Primary | ICD-10-CM

## 2018-11-30 PROCEDURE — 93798 PHYS/QHP OP CAR RHAB W/ECG: CPT

## 2018-12-03 ENCOUNTER — TREATMENT (OUTPATIENT)
Dept: CARDIAC REHAB | Facility: HOSPITAL | Age: 73
End: 2018-12-03

## 2018-12-03 DIAGNOSIS — Z95.5 STATUS POST INSERTION OF DRUG ELUTING CORONARY ARTERY STENT: Primary | ICD-10-CM

## 2018-12-03 PROCEDURE — 93798 PHYS/QHP OP CAR RHAB W/ECG: CPT

## 2018-12-05 ENCOUNTER — TREATMENT (OUTPATIENT)
Dept: CARDIAC REHAB | Facility: HOSPITAL | Age: 73
End: 2018-12-05

## 2018-12-05 DIAGNOSIS — Z95.5 STATUS POST INSERTION OF DRUG ELUTING CORONARY ARTERY STENT: Primary | ICD-10-CM

## 2018-12-05 PROCEDURE — 93798 PHYS/QHP OP CAR RHAB W/ECG: CPT

## 2018-12-07 ENCOUNTER — TREATMENT (OUTPATIENT)
Dept: CARDIAC REHAB | Facility: HOSPITAL | Age: 73
End: 2018-12-07

## 2018-12-07 ENCOUNTER — TELEPHONE (OUTPATIENT)
Dept: ENDOCRINOLOGY | Age: 73
End: 2018-12-07

## 2018-12-07 DIAGNOSIS — Z95.5 STATUS POST INSERTION OF DRUG ELUTING CORONARY ARTERY STENT: Primary | ICD-10-CM

## 2018-12-07 PROCEDURE — 93798 PHYS/QHP OP CAR RHAB W/ECG: CPT

## 2018-12-07 NOTE — TELEPHONE ENCOUNTER
----- Message from JL Darling sent at 12/5/2018  1:50 PM EST -----  Go ahead and decrease to 25 units   ----- Message -----  From: Maribell Pagan MA  Sent: 12/5/2018   1:05 PM  To: JL Darling    I spoke to the patient and he stated that both of his kidney doctors said that with the stage 3 kidney disease, his BG shouldn't be below 100. He asked if he should decrease the amount of insulin he is taking. He stated that this morning his BG was 107 so he did 26 units. Please advise.     ----- Message -----  From: Martita Walker APRN  Sent: 12/4/2018   5:16 PM  To: Maribell Pagan MA    I have never heard this. Is he talking about blood pressure and not blood sugars. As long as his bs is not going too low he is fine from an endocrine standpoint.   ----- Message -----  From: Maribell Pagan MA  Sent: 12/4/2018   4:51 PM  To: JL Darling        ----- Message -----  From: Shefali Lima MA  Sent: 12/3/2018  10:02 AM  To: Maribell Pagan MA    Pt's going to cardiac rehab three times weekly and his BG has been ranging from 79 to 90 during that time. He states that with having stage 3 kidney failure he is not supposed to go below 100. He is currently taking 28 units of toujeo and he is wondering if that is too much. Please call back [phone]        Spoke to the patient and informed him that Martita said it would be okay for him to decrease insulin to 25 units daily. The patient expressed understanding.

## 2018-12-10 ENCOUNTER — TREATMENT (OUTPATIENT)
Dept: CARDIAC REHAB | Facility: HOSPITAL | Age: 73
End: 2018-12-10

## 2018-12-10 DIAGNOSIS — Z95.5 STATUS POST INSERTION OF DRUG ELUTING CORONARY ARTERY STENT: Primary | ICD-10-CM

## 2018-12-10 PROCEDURE — 93798 PHYS/QHP OP CAR RHAB W/ECG: CPT

## 2018-12-12 ENCOUNTER — TREATMENT (OUTPATIENT)
Dept: CARDIAC REHAB | Facility: HOSPITAL | Age: 73
End: 2018-12-12

## 2018-12-12 DIAGNOSIS — Z95.5 STATUS POST INSERTION OF DRUG ELUTING CORONARY ARTERY STENT: Primary | ICD-10-CM

## 2018-12-12 PROCEDURE — 93798 PHYS/QHP OP CAR RHAB W/ECG: CPT

## 2018-12-14 ENCOUNTER — TREATMENT (OUTPATIENT)
Dept: CARDIAC REHAB | Facility: HOSPITAL | Age: 73
End: 2018-12-14

## 2018-12-14 ENCOUNTER — OFFICE (AMBULATORY)
Dept: URBAN - METROPOLITAN AREA CLINIC 75 | Facility: CLINIC | Age: 73
End: 2018-12-14

## 2018-12-14 VITALS
WEIGHT: 219 LBS | SYSTOLIC BLOOD PRESSURE: 170 MMHG | HEIGHT: 73 IN | RESPIRATION RATE: 15 BRPM | DIASTOLIC BLOOD PRESSURE: 90 MMHG | HEART RATE: 66 BPM

## 2018-12-14 DIAGNOSIS — K21.9 GASTRO-ESOPHAGEAL REFLUX DISEASE WITHOUT ESOPHAGITIS: ICD-10-CM

## 2018-12-14 DIAGNOSIS — D12.3 BENIGN NEOPLASM OF TRANSVERSE COLON: ICD-10-CM

## 2018-12-14 DIAGNOSIS — K76.0 FATTY (CHANGE OF) LIVER, NOT ELSEWHERE CLASSIFIED: ICD-10-CM

## 2018-12-14 DIAGNOSIS — D12.2 BENIGN NEOPLASM OF ASCENDING COLON: ICD-10-CM

## 2018-12-14 DIAGNOSIS — Z86.010 PERSONAL HISTORY OF COLONIC POLYPS: ICD-10-CM

## 2018-12-14 DIAGNOSIS — Z95.5 STATUS POST INSERTION OF DRUG ELUTING CORONARY ARTERY STENT: Primary | ICD-10-CM

## 2018-12-14 DIAGNOSIS — R10.13 EPIGASTRIC PAIN: ICD-10-CM

## 2018-12-14 PROCEDURE — 99214 OFFICE O/P EST MOD 30 MIN: CPT

## 2018-12-14 PROCEDURE — 93798 PHYS/QHP OP CAR RHAB W/ECG: CPT

## 2018-12-17 ENCOUNTER — TREATMENT (OUTPATIENT)
Dept: CARDIAC REHAB | Facility: HOSPITAL | Age: 73
End: 2018-12-17

## 2018-12-17 DIAGNOSIS — Z95.5 STATUS POST INSERTION OF DRUG ELUTING CORONARY ARTERY STENT: Primary | ICD-10-CM

## 2018-12-17 PROCEDURE — 93798 PHYS/QHP OP CAR RHAB W/ECG: CPT

## 2018-12-20 ENCOUNTER — OFFICE VISIT (OUTPATIENT)
Dept: FAMILY MEDICINE CLINIC | Facility: CLINIC | Age: 73
End: 2018-12-20

## 2018-12-20 VITALS
WEIGHT: 220 LBS | HEART RATE: 61 BPM | DIASTOLIC BLOOD PRESSURE: 70 MMHG | BODY MASS INDEX: 29.16 KG/M2 | OXYGEN SATURATION: 99 % | SYSTOLIC BLOOD PRESSURE: 120 MMHG | HEIGHT: 73 IN | TEMPERATURE: 97.9 F

## 2018-12-20 DIAGNOSIS — R09.81 NASAL CONGESTION: ICD-10-CM

## 2018-12-20 DIAGNOSIS — J40 BRONCHITIS: Primary | ICD-10-CM

## 2018-12-20 PROCEDURE — 99214 OFFICE O/P EST MOD 30 MIN: CPT | Performed by: INTERNAL MEDICINE

## 2018-12-20 RX ORDER — DOXYCYCLINE HYCLATE 100 MG/1
100 CAPSULE ORAL 2 TIMES DAILY
Qty: 14 CAPSULE | Refills: 0 | Status: SHIPPED | OUTPATIENT
Start: 2018-12-20 | End: 2018-12-20 | Stop reason: SDUPTHER

## 2018-12-20 RX ORDER — BENZONATATE 100 MG/1
100 CAPSULE ORAL 3 TIMES DAILY PRN
Qty: 30 CAPSULE | Refills: 1 | Status: SHIPPED | OUTPATIENT
Start: 2018-12-20 | End: 2018-12-20 | Stop reason: SDUPTHER

## 2018-12-20 RX ORDER — DOXYCYCLINE HYCLATE 100 MG/1
100 CAPSULE ORAL 2 TIMES DAILY
Qty: 14 CAPSULE | Refills: 0 | Status: SHIPPED | OUTPATIENT
Start: 2018-12-20 | End: 2019-02-19

## 2018-12-20 RX ORDER — BENZONATATE 100 MG/1
100 CAPSULE ORAL 3 TIMES DAILY PRN
Qty: 30 CAPSULE | Refills: 1 | Status: SHIPPED | OUTPATIENT
Start: 2018-12-20 | End: 2019-04-24

## 2018-12-20 NOTE — PROGRESS NOTES
Subjective chief complaint is cough and congestion  Earl Marc is a 73 y.o. male.     History of Present Illness   Is here today for complaints of moderate cough and congestion.  This originally began as a sore throat approximately 3 days ago.  He would wake up with a sore throat and then would get better throughout the day.  However he then developed some sinus and nasal congestion.  Following that it seemed to descend into his chest.  He now has a significant cough productive of colored phlegm.  He also feels like there is a burning sensation in his chest.  He is not having fever or chills.  He has had some myalgias and arthralgias.  He is already taking some Flonase and Allegra and it is not helping.  He also use some Coricidin which did not help.Past medical history is remarkable for diabetes, coronary artery disease, and renal insufficiency.      The following portions of the patient's history were reviewed and updated as appropriate: allergies, current medications, past medical history and problem list.    Review of Systems   Constitutional: Negative for chills and fever.   HENT: Positive for congestion and sore throat.    Respiratory: Positive for cough and chest tightness.         He is not having any pleuritic chest pain       Objective   Physical Exam   Constitutional: He appears well-developed and well-nourished.   HENT:   Panic membranes are normal.  There is bilateral nasal congestion and erythema.  Oral pharynx is clear.   Cardiovascular: Normal rate, regular rhythm and normal heart sounds.   Pulmonary/Chest: Effort normal and breath sounds normal. No stridor. No respiratory distress. He has no wheezes.   Lymphadenopathy:     He has no cervical adenopathy.   Nursing note and vitals reviewed.        Assessment/Plan   Earl was seen today for cough and nasal congestion.    Diagnoses and all orders for this visit:    Bronchitis    Nasal congestion    Other orders  -     Discontinue: benzonatate  (TESSALON PERLES) 100 MG capsule; Take 1 capsule by mouth 3 (Three) Times a Day As Needed for Cough.  -     Discontinue: doxycycline (VIBRAMYCIN) 100 MG capsule; Take 1 capsule by mouth 2 (Two) Times a Day.  -     doxycycline (VIBRAMYCIN) 100 MG capsule; Take 1 capsule by mouth 2 (Two) Times a Day.  -     benzonatate (TESSALON PERLES) 100 MG capsule; Take 1 capsule by mouth 3 (Three) Times a Day As Needed for Cough.      Earl is here today for respiratory symptoms which I believe is going to be a tracheal bronchitis.  I am going to have him continue using his Flonase nasal spray and Allegra.  I have prescribed some Tessalon Perles and some doxycycline.

## 2018-12-31 ENCOUNTER — TREATMENT (OUTPATIENT)
Dept: CARDIAC REHAB | Facility: HOSPITAL | Age: 73
End: 2018-12-31

## 2018-12-31 DIAGNOSIS — Z95.5 STATUS POST INSERTION OF DRUG ELUTING CORONARY ARTERY STENT: Primary | ICD-10-CM

## 2018-12-31 PROCEDURE — 93798 PHYS/QHP OP CAR RHAB W/ECG: CPT

## 2019-01-02 ENCOUNTER — TREATMENT (OUTPATIENT)
Dept: CARDIAC REHAB | Facility: HOSPITAL | Age: 74
End: 2019-01-02

## 2019-01-02 DIAGNOSIS — Z95.5 STATUS POST INSERTION OF DRUG ELUTING CORONARY ARTERY STENT: Primary | ICD-10-CM

## 2019-01-02 PROCEDURE — 93798 PHYS/QHP OP CAR RHAB W/ECG: CPT

## 2019-01-04 ENCOUNTER — TREATMENT (OUTPATIENT)
Dept: CARDIAC REHAB | Facility: HOSPITAL | Age: 74
End: 2019-01-04

## 2019-01-04 DIAGNOSIS — Z95.5 STATUS POST INSERTION OF DRUG ELUTING CORONARY ARTERY STENT: Primary | ICD-10-CM

## 2019-01-04 PROCEDURE — 93798 PHYS/QHP OP CAR RHAB W/ECG: CPT

## 2019-01-04 RX ORDER — LINAGLIPTIN 5 MG/1
TABLET, FILM COATED ORAL
Qty: 90 TABLET | Refills: 2 | Status: SHIPPED | OUTPATIENT
Start: 2019-01-04 | End: 2019-02-19 | Stop reason: SDUPTHER

## 2019-01-07 ENCOUNTER — TREATMENT (OUTPATIENT)
Dept: CARDIAC REHAB | Facility: HOSPITAL | Age: 74
End: 2019-01-07

## 2019-01-07 DIAGNOSIS — Z95.5 STATUS POST INSERTION OF DRUG ELUTING CORONARY ARTERY STENT: Primary | ICD-10-CM

## 2019-01-07 PROCEDURE — 93798 PHYS/QHP OP CAR RHAB W/ECG: CPT

## 2019-01-09 ENCOUNTER — TREATMENT (OUTPATIENT)
Dept: CARDIAC REHAB | Facility: HOSPITAL | Age: 74
End: 2019-01-09

## 2019-01-09 DIAGNOSIS — Z95.5 STATUS POST INSERTION OF DRUG ELUTING CORONARY ARTERY STENT: Primary | ICD-10-CM

## 2019-01-09 PROCEDURE — 93798 PHYS/QHP OP CAR RHAB W/ECG: CPT

## 2019-01-11 ENCOUNTER — TELEPHONE (OUTPATIENT)
Dept: CARDIAC REHAB | Facility: HOSPITAL | Age: 74
End: 2019-01-11

## 2019-01-11 ENCOUNTER — TREATMENT (OUTPATIENT)
Dept: CARDIAC REHAB | Facility: HOSPITAL | Age: 74
End: 2019-01-11

## 2019-01-11 DIAGNOSIS — Z95.5 STATUS POST INSERTION OF DRUG ELUTING CORONARY ARTERY STENT: Primary | ICD-10-CM

## 2019-01-11 PROCEDURE — 93798 PHYS/QHP OP CAR RHAB W/ECG: CPT

## 2019-01-14 ENCOUNTER — TREATMENT (OUTPATIENT)
Dept: CARDIAC REHAB | Facility: HOSPITAL | Age: 74
End: 2019-01-14

## 2019-01-14 DIAGNOSIS — Z95.5 STATUS POST INSERTION OF DRUG ELUTING CORONARY ARTERY STENT: Primary | ICD-10-CM

## 2019-01-14 PROCEDURE — 93798 PHYS/QHP OP CAR RHAB W/ECG: CPT

## 2019-01-16 ENCOUNTER — APPOINTMENT (OUTPATIENT)
Dept: CARDIAC REHAB | Facility: HOSPITAL | Age: 74
End: 2019-01-16

## 2019-01-18 ENCOUNTER — APPOINTMENT (OUTPATIENT)
Dept: CARDIAC REHAB | Facility: HOSPITAL | Age: 74
End: 2019-01-18

## 2019-01-21 ENCOUNTER — APPOINTMENT (OUTPATIENT)
Dept: CARDIAC REHAB | Facility: HOSPITAL | Age: 74
End: 2019-01-21

## 2019-01-21 ENCOUNTER — HOSPITAL ENCOUNTER (OUTPATIENT)
Dept: MRI IMAGING | Facility: HOSPITAL | Age: 74
Discharge: HOME | End: 2019-01-21
Attending: INTERNAL MEDICINE | Admitting: INTERNAL MEDICINE

## 2019-01-21 DIAGNOSIS — M50.90 CERVICAL DISC DISEASE: ICD-10-CM

## 2019-01-21 DIAGNOSIS — R20.0 LEFT ARM NUMBNESS: ICD-10-CM

## 2019-01-21 PROCEDURE — 72141 MRI NECK SPINE W/O DYE: CPT

## 2019-01-23 ENCOUNTER — APPOINTMENT (OUTPATIENT)
Dept: CARDIAC REHAB | Facility: HOSPITAL | Age: 74
End: 2019-01-23

## 2019-01-25 ENCOUNTER — APPOINTMENT (OUTPATIENT)
Dept: CARDIAC REHAB | Facility: HOSPITAL | Age: 74
End: 2019-01-25

## 2019-01-28 ENCOUNTER — APPOINTMENT (OUTPATIENT)
Dept: CARDIAC REHAB | Facility: HOSPITAL | Age: 74
End: 2019-01-28

## 2019-01-29 DIAGNOSIS — E55.9 VITAMIN D DEFICIENCY: Primary | ICD-10-CM

## 2019-01-29 DIAGNOSIS — Z79.4 TYPE 2 DIABETES MELLITUS WITH DIABETIC NEUROPATHY, WITH LONG-TERM CURRENT USE OF INSULIN (HCC): ICD-10-CM

## 2019-01-29 DIAGNOSIS — E29.1 HYPOGONADISM IN MALE: ICD-10-CM

## 2019-01-29 DIAGNOSIS — E78.5 DYSLIPIDEMIA: ICD-10-CM

## 2019-01-29 DIAGNOSIS — E11.40 TYPE 2 DIABETES MELLITUS WITH DIABETIC NEUROPATHY, WITH LONG-TERM CURRENT USE OF INSULIN (HCC): ICD-10-CM

## 2019-01-29 DIAGNOSIS — N40.1 BENIGN NON-NODULAR PROSTATIC HYPERPLASIA WITH LOWER URINARY TRACT SYMPTOMS: ICD-10-CM

## 2019-01-30 ENCOUNTER — APPOINTMENT (OUTPATIENT)
Dept: CARDIAC REHAB | Facility: HOSPITAL | Age: 74
End: 2019-01-30

## 2019-02-01 ENCOUNTER — APPOINTMENT (OUTPATIENT)
Dept: CARDIAC REHAB | Facility: HOSPITAL | Age: 74
End: 2019-02-01

## 2019-02-05 ENCOUNTER — LAB (OUTPATIENT)
Dept: ENDOCRINOLOGY | Age: 74
End: 2019-02-05

## 2019-02-05 DIAGNOSIS — E11.40 TYPE 2 DIABETES MELLITUS WITH DIABETIC NEUROPATHY, WITH LONG-TERM CURRENT USE OF INSULIN (HCC): ICD-10-CM

## 2019-02-05 DIAGNOSIS — E29.1 HYPOGONADISM IN MALE: ICD-10-CM

## 2019-02-05 DIAGNOSIS — E55.9 VITAMIN D DEFICIENCY: ICD-10-CM

## 2019-02-05 DIAGNOSIS — N40.1 BENIGN NON-NODULAR PROSTATIC HYPERPLASIA WITH LOWER URINARY TRACT SYMPTOMS: ICD-10-CM

## 2019-02-05 DIAGNOSIS — E78.5 DYSLIPIDEMIA: ICD-10-CM

## 2019-02-05 DIAGNOSIS — Z79.4 TYPE 2 DIABETES MELLITUS WITH DIABETIC NEUROPATHY, WITH LONG-TERM CURRENT USE OF INSULIN (HCC): ICD-10-CM

## 2019-02-06 ENCOUNTER — RESULTS ENCOUNTER (OUTPATIENT)
Dept: ENDOCRINOLOGY | Age: 74
End: 2019-02-06

## 2019-02-06 DIAGNOSIS — K76.0 STEATOSIS OF LIVER: ICD-10-CM

## 2019-02-06 DIAGNOSIS — I10 ESSENTIAL HYPERTENSION: ICD-10-CM

## 2019-02-06 DIAGNOSIS — I25.10 CORONARY ARTERY DISEASE INVOLVING NATIVE CORONARY ARTERY OF NATIVE HEART WITHOUT ANGINA PECTORIS: ICD-10-CM

## 2019-02-06 DIAGNOSIS — Z79.4 TYPE 2 DIABETES MELLITUS WITH DIABETIC NEUROPATHY, WITH LONG-TERM CURRENT USE OF INSULIN (HCC): ICD-10-CM

## 2019-02-06 DIAGNOSIS — N40.1 BENIGN NON-NODULAR PROSTATIC HYPERPLASIA WITH LOWER URINARY TRACT SYMPTOMS: ICD-10-CM

## 2019-02-06 DIAGNOSIS — E11.40 TYPE 2 DIABETES MELLITUS WITH DIABETIC NEUROPATHY, WITH LONG-TERM CURRENT USE OF INSULIN (HCC): ICD-10-CM

## 2019-02-06 DIAGNOSIS — E29.1 HYPOGONADISM IN MALE: ICD-10-CM

## 2019-02-06 DIAGNOSIS — E55.9 VITAMIN D DEFICIENCY: ICD-10-CM

## 2019-02-07 LAB
25(OH)D3+25(OH)D2 SERPL-MCNC: 43.6 NG/ML (ref 30–100)
ALBUMIN SERPL-MCNC: 4.3 G/DL (ref 3.5–5.2)
ALBUMIN/GLOB SERPL: 1.7 G/DL
ALP SERPL-CCNC: 45 U/L (ref 39–117)
ALT SERPL-CCNC: 15 U/L (ref 1–41)
AST SERPL-CCNC: 15 U/L (ref 1–40)
BILIRUB SERPL-MCNC: 0.7 MG/DL (ref 0.1–1.2)
BUN SERPL-MCNC: 17 MG/DL (ref 8–23)
BUN/CREAT SERPL: 10.9 (ref 7–25)
C PEPTIDE SERPL-MCNC: 5.3 NG/ML (ref 1.1–4.4)
CALCIUM SERPL-MCNC: 9.7 MG/DL (ref 8.6–10.5)
CHLORIDE SERPL-SCNC: 102 MMOL/L (ref 98–107)
CHOLEST SERPL-MCNC: 77 MG/DL (ref 0–200)
CO2 SERPL-SCNC: 27.2 MMOL/L (ref 22–29)
CREAT SERPL-MCNC: 1.56 MG/DL (ref 0.76–1.27)
GLOBULIN SER CALC-MCNC: 2.6 GM/DL
GLUCOSE SERPL-MCNC: 141 MG/DL (ref 65–99)
HBA1C MFR BLD: 5.9 % (ref 4.8–5.6)
HCT VFR BLD AUTO: 47 % (ref 40.4–52.2)
HDLC SERPL-MCNC: 45 MG/DL (ref 40–60)
HGB BLD-MCNC: 15.6 G/DL (ref 13.7–17.6)
INTERPRETATION: NORMAL
LDLC SERPL CALC-MCNC: 17 MG/DL (ref 0–100)
Lab: NORMAL
POTASSIUM SERPL-SCNC: 4.7 MMOL/L (ref 3.5–5.2)
PROT SERPL-MCNC: 6.9 G/DL (ref 6–8.5)
PSA SERPL-MCNC: 2.27 NG/ML (ref 0–4)
SHBG SERPL-SCNC: 43.3 NMOL/L (ref 19.3–76.4)
SODIUM SERPL-SCNC: 140 MMOL/L (ref 136–145)
TESTOST FREE SERPL-MCNC: 12.1 PG/ML (ref 6.6–18.1)
TESTOST SERPL-MCNC: 729 NG/DL (ref 264–916)
TRIGL SERPL-MCNC: 76 MG/DL (ref 0–150)
VLDLC SERPL CALC-MCNC: 15.2 MG/DL (ref 5–40)

## 2019-02-19 ENCOUNTER — OFFICE VISIT (OUTPATIENT)
Dept: ENDOCRINOLOGY | Age: 74
End: 2019-02-19

## 2019-02-19 VITALS
WEIGHT: 215 LBS | DIASTOLIC BLOOD PRESSURE: 82 MMHG | SYSTOLIC BLOOD PRESSURE: 142 MMHG | BODY MASS INDEX: 28.49 KG/M2 | HEIGHT: 73 IN

## 2019-02-19 DIAGNOSIS — E79.0 HYPERURICEMIA: ICD-10-CM

## 2019-02-19 DIAGNOSIS — E29.1 HYPOGONADISM IN MALE: ICD-10-CM

## 2019-02-19 DIAGNOSIS — E78.5 DYSLIPIDEMIA: ICD-10-CM

## 2019-02-19 DIAGNOSIS — E11.42 DIABETIC PERIPHERAL NEUROPATHY (HCC): ICD-10-CM

## 2019-02-19 DIAGNOSIS — E11.9 TYPE 2 DIABETES MELLITUS WITHOUT COMPLICATION, WITHOUT LONG-TERM CURRENT USE OF INSULIN (HCC): ICD-10-CM

## 2019-02-19 DIAGNOSIS — Z79.4 TYPE 2 DIABETES MELLITUS WITH DIABETIC NEUROPATHY, WITH LONG-TERM CURRENT USE OF INSULIN (HCC): Primary | ICD-10-CM

## 2019-02-19 DIAGNOSIS — I10 ESSENTIAL HYPERTENSION: ICD-10-CM

## 2019-02-19 DIAGNOSIS — E55.9 VITAMIN D DEFICIENCY: ICD-10-CM

## 2019-02-19 DIAGNOSIS — E11.40 TYPE 2 DIABETES MELLITUS WITH DIABETIC NEUROPATHY, WITH LONG-TERM CURRENT USE OF INSULIN (HCC): Primary | ICD-10-CM

## 2019-02-19 PROCEDURE — 99214 OFFICE O/P EST MOD 30 MIN: CPT | Performed by: NURSE PRACTITIONER

## 2019-02-19 RX ORDER — LORATADINE 10 MG
1 TABLET ORAL DAILY
Qty: 400 EACH | Refills: 1 | Status: SHIPPED | OUTPATIENT
Start: 2019-02-19 | End: 2020-04-17 | Stop reason: SDUPTHER

## 2019-02-19 RX ORDER — PIOGLITAZONEHYDROCHLORIDE 45 MG/1
45 TABLET ORAL DAILY
Qty: 90 TABLET | Refills: 3 | Status: SHIPPED | OUTPATIENT
Start: 2019-02-19 | End: 2019-09-19 | Stop reason: SDUPTHER

## 2019-02-19 RX ORDER — ALLOPURINOL 100 MG/1
100 TABLET ORAL DAILY
Qty: 90 TABLET | Refills: 3 | Status: SHIPPED | OUTPATIENT
Start: 2019-02-19 | End: 2019-09-19 | Stop reason: SDUPTHER

## 2019-02-19 RX ORDER — ICOSAPENT ETHYL 1000 MG/1
CAPSULE ORAL
Qty: 360 CAPSULE | Refills: 1 | OUTPATIENT
Start: 2019-02-19

## 2019-02-19 NOTE — PROGRESS NOTES
"Subjective   Earl Marc is a 73 y.o. male is here today for follow-up.  Chief Complaint   Patient presents with   • Diabetes     recent labs, testing BG 1 time daily, pt brought meter   • Hyperlipidemia     pt needs written rx's    • Hypertension   • Hypogonadism   • Vitamin D Deficiency     /82   Ht 185.4 cm (73\")   Wt 97.5 kg (215 lb)   BMI 28.37 kg/m²   Current Outpatient Medications on File Prior to Visit   Medication Sig   • acetaminophen (TYLENOL) 325 MG tablet Take 2 tablets by mouth Every 6 (Six) Hours As Needed for Mild Pain .   • Alcohol Swabs ([facility] PREP) 70 % pads Place 1 application on the skin as directed by provider Daily.   • allopurinol (ZYLOPRIM) 100 MG tablet TAKE 1 TABLET DAILY   • AMLACTIN 12 % lotion Apply  topically As Needed.   • amLODIPine (NORVASC) 2.5 MG tablet Take 1 tablet by mouth Daily.   • ascorbic acid (VITAMIN C) 1000 MG tablet Take 500 mg by mouth Daily.   • aspirin 81 MG EC tablet Take 81 mg by mouth Daily.   • atorvastatin (LIPITOR) 20 MG tablet Take 1 tablet by mouth Every Night.   • B-D UF III MINI PEN NEEDLES 31G X 5 MM misc Use once daily with Lantus Pen for injection of insulin   • benzonatate (TESSALON PERLES) 100 MG capsule Take 1 capsule by mouth 3 (Three) Times a Day As Needed for Cough.   • butenafine (LOTRIMIN ULTRA) 1 % cream Apply  topically 2 (Two) Times a Day.   • cyanocobalamin 1000 MCG/ML injection Cyanocobalamin 1000 MCG/ML Injection Solution; Patient Sig: Cyanocobalamin 1000 MCG/ML Injection Solution INJECT 1ML INTRAMUSCULARLY EVERY OTHER WEEK; 0; 17-Feb-2015; Active   • DEPO-TESTOSTERONE 200 MG/ML injection Inject 1 mL into the appropriate muscle as directed by prescriber 1 (One) Time Per Week.   • fexofenadine (ALLEGRA) 180 MG tablet Take 180 mg by mouth Daily As Needed.   • finasteride (PROSCAR) 5 MG tablet Take 5 mg by mouth Daily.   • fluticasone (FLONASE) 50 MCG/ACT nasal spray 1 spray into each nostril 2 (two) times a day as needed.   • folic " acid (FOLVITE) 800 MCG tablet Take 800 mcg by mouth Daily.   • glucose blood test strip Precision Xtra Blood Glucose In Vitro Strip; Patient Sig: Precision Xtra Blood Glucose In Vitro Strip TEST 2  TIMES DAILY   • hydrocortisone 2.5 % ointment Apply  topically 2 (two) times a day.   • icosapent ethyl (VASCEPA) 1 g capsule capsule Take 2 g by mouth 2 (Two) Times a Day With Meals.   • Insulin Glargine (LANTUS SOLOSTAR) 100 UNIT/ML injection pen 28 units subcutaneously daily (Patient taking differently: Inject 20 Units under the skin into the appropriate area as directed Daily. 28 units subcutaneously daily)   • ketoconazole (NIZORAL) 2 % shampoo    • latanoprost (XALATAN) 0.005 % ophthalmic solution    • metoprolol tartrate (LOPRESSOR) 25 MG tablet Take 1 tablet by mouth Daily.   • MONOLET LANCETS misc Use to test BG 2 times daily   • nitroglycerin (NITROSTAT) 0.4 MG SL tablet Place 1 tablet under the tongue Every 5 (Five) Minutes As Needed for Chest Pain.   • pioglitazone (ACTOS) 45 MG tablet Take 1 tablet by mouth Daily.   • pregabalin (LYRICA) 50 MG capsule Take 1 capsule by mouth 3 (Three) Times a Day As Needed (neuropathic pain).   • PREVIDENT 5000 PLUS 1.1 % cream    • Soap & Cleansers (CETAKLENZ) liquid    • tamsulosin (FLOMAX) 0.4 MG capsule 24 hr capsule Take 1 capsule by mouth every night.   • ticagrelor (BRILINTA) 90 MG tablet tablet Take 1 tablet by mouth 2 (Two) Times a Day.   • TRADJENTA 5 MG tablet tablet Take 1 tablet by mouth Daily.   • Zinc 30 MG tablet Take 50 mg by mouth Daily.   • L-Methylfolate-Algae-B12-B6 (METANX PO) Take  by mouth 2 (Two) Times a Day.   • magnesium oxide (MAGOX) 400 (241.3 Mg) MG tablet tablet Take 400 mg by mouth Daily.   • [DISCONTINUED] doxycycline (VIBRAMYCIN) 100 MG capsule Take 1 capsule by mouth 2 (Two) Times a Day.   • [DISCONTINUED] promethazine (PHENERGAN) 25 MG tablet Take 25 mg by mouth Every 6 (Six) Hours As Needed for Nausea or Vomiting.   • [DISCONTINUED]  TRADJENTA 5 MG tablet tablet TAKE 1 TABLET DAILY   • [DISCONTINUED] zolpidem (AMBIEN) 5 MG tablet Take 1 tablet by mouth At Night As Needed for Sleep.     No current facility-administered medications on file prior to visit.      Family History   Problem Relation Age of Onset   • Breast cancer Daughter    • Heart attack Mother    • Hypertension Mother    • Heart disease Mother    • Thyroid disease Mother    • Lupus Mother    • Heart attack Brother    • Heart disease Brother    • Hyperlipidemia Brother    • Cancer Brother    • Depression Father    • Stroke Maternal Grandmother      Social History     Tobacco Use   • Smoking status: Never Smoker   • Smokeless tobacco: Never Used   Substance Use Topics   • Alcohol use: No   • Drug use: No     Allergies   Allergen Reactions   • Other      Lotions- anything with palm oil or lanolin per pt (verified 6/15/16 1910)   • Prazosin          History of Present Illness  Encounter Diagnoses   Name Primary?   • Essential hypertension    • Vitamin D deficiency    • Hypogonadism in male    • Dyslipidemia    • Type 2 diabetes mellitus with diabetic neuropathy, with long-term current use of insulin (CMS/[facility]) Yes     73-year-old male patient here today for routine follow-up visit.  He has been seen for the above-mentioned problems.  He had recent labs which were reviewed he was provided a copy.  He has a history of renal insufficiency  and is followed by a nephrologist.  He had a heart attack in October and is recovering slowly.  He is in cardiac rehab.  He is exercising more and as result his hemoglobin A1c has come down to 5.8.  His nephrologist is concerned of his blood sugars drop below 100.  His insulin was decreased from 28-20.  We discussed coming off insulin if needed due to his activity level and improve glycemic control.  His medication list was reviewed and updated.  He is requesting written prescriptions on some of his medications.  The following portions of the patient's  history were reviewed and updated as appropriate: allergies, current medications, past family history, past medical history, past social history, past surgical history and problem list.    Review of Systems   Constitutional: Negative for fatigue.   HENT: Negative for trouble swallowing.    Eyes: Negative for visual disturbance.   Cardiovascular: Negative for leg swelling.   Endocrine: Negative for polyuria.   Skin: Negative for wound.   Neurological: Negative for numbness.       Objective   Physical Exam   Constitutional: He is oriented to person, place, and time. He appears well-developed and well-nourished. No distress.   HENT:   Head: Normocephalic and atraumatic.   Right Ear: External ear normal.   Left Ear: External ear normal.   Nose: Nose normal.   Mouth/Throat: Oropharynx is clear and moist. No oropharyngeal exudate.   Eyes: Conjunctivae and EOM are normal. Pupils are equal, round, and reactive to light. Right eye exhibits no discharge. Left eye exhibits no discharge. No scleral icterus.   Neck: Normal range of motion. Neck supple. No JVD present. No tracheal deviation present. No thyromegaly present.   Cardiovascular: Normal rate, regular rhythm, normal heart sounds and intact distal pulses. Exam reveals no gallop and no friction rub.   No murmur heard.  Pulmonary/Chest: Effort normal and breath sounds normal. No stridor. No respiratory distress. He has no wheezes. He has no rales. He exhibits no tenderness.   Abdominal: Soft. Bowel sounds are normal. He exhibits no distension and no mass. There is no tenderness. There is no rebound and no guarding. No hernia.   Musculoskeletal: Normal range of motion. He exhibits no edema, tenderness or deformity.   Lymphadenopathy:     He has no cervical adenopathy.   Neurological: He is alert and oriented to person, place, and time. He has normal reflexes. He displays normal reflexes. No cranial nerve deficit. He exhibits normal muscle tone. Coordination normal.   Skin:  Skin is warm and dry. No rash noted. He is not diaphoretic. No erythema. No pallor.   Psychiatric: He has a normal mood and affect. His behavior is normal. Judgment and thought content normal.   Nursing note and vitals reviewed.    Results for orders placed or performed in visit on 02/05/19   Hemoglobin & Hematocrit, Blood   Result Value Ref Range    Hemoglobin 15.6 13.7 - 17.6 g/dL    Hematocrit 47.0 40.4 - 52.2 %   TestT+TestF+SHBG   Result Value Ref Range    Testosterone, Total 729 264 - 916 ng/dL    Testosterone, Free 12.1 6.6 - 18.1 pg/mL    Sex Hormone Binding Globulin 43.3 19.3 - 76.4 nmol/L   PSA DIAGNOSTIC   Result Value Ref Range    PSA 2.270 0.000 - 4.000 ng/mL   C-Peptide   Result Value Ref Range    C-Peptide 5.3 (H) 1.1 - 4.4 ng/mL   Hemoglobin A1c   Result Value Ref Range    Hemoglobin A1C 5.90 (H) 4.80 - 5.60 %   Vitamin D 25 Hydroxy   Result Value Ref Range    25 Hydroxy, Vitamin D 43.6 30.0 - 100.0 ng/ml   Lipid Panel   Result Value Ref Range    Total Cholesterol 77 0 - 200 mg/dL    Triglycerides 76 0 - 150 mg/dL    HDL Cholesterol 45 40 - 60 mg/dL    VLDL Cholesterol 15.2 5 - 40 mg/dL    LDL Cholesterol  17 0 - 100 mg/dL   Comprehensive Metabolic Panel   Result Value Ref Range    Glucose 141 (H) 65 - 99 mg/dL    BUN 17 8 - 23 mg/dL    Creatinine 1.56 (H) 0.76 - 1.27 mg/dL    eGFR Non African Am 44 (L) >60 mL/min/1.73    eGFR African Am 53 (L) >60 mL/min/1.73    BUN/Creatinine Ratio 10.9 7.0 - 25.0    Sodium 140 136 - 145 mmol/L    Potassium 4.7 3.5 - 5.2 mmol/L    Chloride 102 98 - 107 mmol/L    Total CO2 27.2 22.0 - 29.0 mmol/L    Calcium 9.7 8.6 - 10.5 mg/dL    Total Protein 6.9 6.0 - 8.5 g/dL    Albumin 4.30 3.50 - 5.20 g/dL    Globulin 2.6 gm/dL    A/G Ratio 1.7 g/dL    Total Bilirubin 0.7 0.1 - 1.2 mg/dL    Alkaline Phosphatase 45 39 - 117 U/L    AST (SGOT) 15 1 - 40 U/L    ALT (SGPT) 15 1 - 41 U/L   Cardiovascular Risk Assessment   Result Value Ref Range    Interpretation Note    Diabetes  Patient Education   Result Value Ref Range    PDF Image Not applicable        Assessment/Plan   Problems Addressed this Visit        Cardiovascular and Mediastinum    Hypertension       Digestive    Vitamin D deficiency       Endocrine    Hypogonadism in male    Diabetes mellitus (CMS/HCC) - Primary       Other    Dyslipidemia        In summary, patient was seen and examined.  His blood sugars are in satisfactory range.  He has had no hypoglycemic events.  He has had blood sugar in the 90 range and has been informed by his nephrologist he needs to keep his blood sugars greater than 100.  Metabolically he is stable.  His labs were reviewed and he was provided a copy.  His thyroid hormones were in good range and his cholesterol is well controlled.  He is in cardiac rehab following a heart attack.  He has been more active while attending rehab.  We discussed decreasing or taking him off his insulin if needed based on blood sugars.  His hemoglobin A1c is 5.8.  He will follow-up in 6 months with Dr. Kincaid with labs prior.  I have encouraged him to reach out to the office should any questions or concerns prior to then

## 2019-03-20 RX ORDER — ICOSAPENT ETHYL 1000 MG/1
2 CAPSULE ORAL 2 TIMES DAILY WITH MEALS
Qty: 360 CAPSULE | Refills: 1 | Status: SHIPPED | OUTPATIENT
Start: 2019-03-20 | End: 2019-09-19 | Stop reason: SDUPTHER

## 2019-03-27 RX ORDER — PREGABALIN 50 MG/1
50 CAPSULE ORAL 3 TIMES DAILY PRN
Qty: 90 CAPSULE | Refills: 0 | Status: SHIPPED | OUTPATIENT
Start: 2019-03-27 | End: 2019-05-17 | Stop reason: SDUPTHER

## 2019-04-24 ENCOUNTER — OFFICE VISIT (OUTPATIENT)
Dept: CARDIOLOGY | Age: 74
End: 2019-04-24

## 2019-04-24 VITALS
HEART RATE: 67 BPM | SYSTOLIC BLOOD PRESSURE: 139 MMHG | BODY MASS INDEX: 27.7 KG/M2 | DIASTOLIC BLOOD PRESSURE: 70 MMHG | WEIGHT: 209 LBS | HEIGHT: 73 IN

## 2019-04-24 DIAGNOSIS — I25.10 CORONARY ARTERY DISEASE INVOLVING NATIVE CORONARY ARTERY OF NATIVE HEART WITHOUT ANGINA PECTORIS: Primary | ICD-10-CM

## 2019-04-24 DIAGNOSIS — I10 ESSENTIAL HYPERTENSION: ICD-10-CM

## 2019-04-24 PROCEDURE — 99213 OFFICE O/P EST LOW 20 MIN: CPT | Performed by: INTERNAL MEDICINE

## 2019-04-24 RX ORDER — ASPIRIN 81 MG/1
81 TABLET ORAL DAILY
Qty: 90 TABLET | Refills: 3 | Status: SHIPPED | OUTPATIENT
Start: 2019-04-24 | End: 2022-08-08 | Stop reason: SDUPTHER

## 2019-05-17 RX ORDER — PREGABALIN 50 MG/1
50 CAPSULE ORAL 3 TIMES DAILY PRN
Qty: 90 CAPSULE | Refills: 0 | Status: SHIPPED | OUTPATIENT
Start: 2019-05-17 | End: 2019-06-19 | Stop reason: SDUPTHER

## 2019-06-19 RX ORDER — PREGABALIN 50 MG/1
50 CAPSULE ORAL 3 TIMES DAILY PRN
Qty: 90 CAPSULE | Refills: 0 | Status: SHIPPED | OUTPATIENT
Start: 2019-06-19 | End: 2019-06-25 | Stop reason: SDUPTHER

## 2019-06-25 RX ORDER — PREGABALIN 50 MG/1
50 CAPSULE ORAL 3 TIMES DAILY PRN
Qty: 90 CAPSULE | Refills: 2 | Status: SHIPPED | OUTPATIENT
Start: 2019-06-25 | End: 2019-09-19 | Stop reason: SDUPTHER

## 2019-06-26 ENCOUNTER — TELEPHONE (OUTPATIENT)
Dept: ENDOCRINOLOGY | Age: 74
End: 2019-06-26

## 2019-06-26 NOTE — TELEPHONE ENCOUNTER
lvm for pt letting him know that I had an rx ready for him.     ----- Message from Jackie S King sent at 6/24/2019  3:22 PM EDT -----  Contact: patient  Patient said he has faxed a letter twice to our office requesting Lyrica script, 50mg, 1 tab and said he can take up to 3 tabs a day,   and he  called [facility] today  to see if they have received a script and they  told him no they have not received.      He said he has neuropathy in his feet and he has been taking 2 a day. He said he had been taking 1 a day.    [facility] told him Lyrica is a controlled substance and asked for a paper script. He said [facility] likes to give him 90 day supply on everything but this being a controlled substance it will be 30 day supply.     He asked if he can come to our office tomorrow or Wednesday and  the script. He said he is going to [place] on Thursday.    Pt - [phone]

## 2019-07-18 RX ORDER — LANCETS
EACH MISCELLANEOUS
Qty: 200 EACH | Refills: 1 | Status: SHIPPED | OUTPATIENT
Start: 2019-07-18 | End: 2019-09-19 | Stop reason: SDUPTHER

## 2019-08-08 ENCOUNTER — OFFICE VISIT (OUTPATIENT)
Dept: FAMILY MEDICINE CLINIC | Facility: CLINIC | Age: 74
End: 2019-08-08

## 2019-08-08 VITALS
TEMPERATURE: 97.8 F | WEIGHT: 213.4 LBS | HEIGHT: 73 IN | HEART RATE: 51 BPM | DIASTOLIC BLOOD PRESSURE: 80 MMHG | OXYGEN SATURATION: 97 % | BODY MASS INDEX: 28.28 KG/M2 | SYSTOLIC BLOOD PRESSURE: 130 MMHG

## 2019-08-08 DIAGNOSIS — R20.2 NUMBNESS AND TINGLING SENSATION OF SKIN: Primary | ICD-10-CM

## 2019-08-08 DIAGNOSIS — R20.0 NUMBNESS AND TINGLING SENSATION OF SKIN: Primary | ICD-10-CM

## 2019-08-08 DIAGNOSIS — M26.623 BILATERAL TEMPOROMANDIBULAR JOINT PAIN: ICD-10-CM

## 2019-08-08 PROCEDURE — 99213 OFFICE O/P EST LOW 20 MIN: CPT | Performed by: INTERNAL MEDICINE

## 2019-08-08 NOTE — PROGRESS NOTES
Subjective Complaint is numbness and discomfort on the left side of his head  Earl Marc is a 74 y.o. male.     History of Present Illness   Earl is here today for complaints of a numb somewhat painful feeling on the left side of his head.  This seems worse when he shampoos his head or rubs his hand across his scalp in this area.  He is not having any other focal neurologic signs.  This is been going on for several weeks.  He is not having any recent visual disturbance.  He does have chronic problems with his neck.  He has had prior cervical fusions.  The following portions of the patient's history were reviewed and updated as appropriate: allergies, current medications, past family history, past medical history, past social history, past surgical history and problem list.    Review of Systems   Neurological: Positive for numbness. Negative for facial asymmetry, speech difficulty, weakness and confusion.       Objective   Physical Exam   Constitutional: He is oriented to person, place, and time. He appears well-developed and well-nourished.   HENT:   Tympanic membranes are normal.  There is minimal nasal congestion.  Temporomandibular joints are tender.   Cardiovascular: Normal rate and regular rhythm.   Pulmonary/Chest: Effort normal.   Neurological: He is alert and oriented to person, place, and time. No cranial nerve deficit.   Nursing note and vitals reviewed.        Assessment/Plan   Earl was seen today for headache and med refill.    Diagnoses and all orders for this visit:    Numbness and tingling sensation of skin    Bilateral temporomandibular joint pain    Other orders  -     metoprolol tartrate (LOPRESSOR) 25 MG tablet; Take 1 tablet by mouth Daily.    Earl is here today for some numbness and tingling on the left side of his scalp.  I suspect this is going to be a focal compressive neuropathy rather than anything central.  He is already taking some Lyrica and therefore I really do not have much  to offer for this.

## 2019-09-07 LAB
25(OH)D3+25(OH)D2 SERPL-MCNC: 48.7 NG/ML (ref 30–100)
ALBUMIN SERPL-MCNC: 4.4 G/DL (ref 3.5–5.2)
ALBUMIN/GLOB SERPL: 1.6 G/DL
ALP SERPL-CCNC: 51 U/L (ref 39–117)
ALT SERPL-CCNC: 16 U/L (ref 1–41)
AST SERPL-CCNC: 19 U/L (ref 1–40)
BILIRUB SERPL-MCNC: 1 MG/DL (ref 0.2–1.2)
BUN SERPL-MCNC: 16 MG/DL (ref 8–23)
BUN/CREAT SERPL: 12 (ref 7–25)
C PEPTIDE SERPL-MCNC: 2 NG/ML (ref 1.1–4.4)
CALCIUM SERPL-MCNC: 9.7 MG/DL (ref 8.6–10.5)
CHLORIDE SERPL-SCNC: 102 MMOL/L (ref 98–107)
CHOLEST SERPL-MCNC: 81 MG/DL (ref 0–200)
CO2 SERPL-SCNC: 26.3 MMOL/L (ref 22–29)
CREAT SERPL-MCNC: 1.33 MG/DL (ref 0.76–1.27)
GLOBULIN SER CALC-MCNC: 2.7 GM/DL
GLUCOSE SERPL-MCNC: 155 MG/DL (ref 65–99)
HBA1C MFR BLD: 6.9 % (ref 4.8–5.6)
HCT VFR BLD AUTO: 50.6 % (ref 37.5–51)
HDLC SERPL-MCNC: 51 MG/DL (ref 40–60)
HGB BLD-MCNC: 16.5 G/DL (ref 13–17.7)
INTERPRETATION: NORMAL
LDLC SERPL CALC-MCNC: 9 MG/DL (ref 0–100)
Lab: NORMAL
MICROALBUMIN UR-MCNC: 53.8 UG/ML
POTASSIUM SERPL-SCNC: 4.1 MMOL/L (ref 3.5–5.2)
PROT SERPL-MCNC: 7.1 G/DL (ref 6–8.5)
PSA SERPL-MCNC: 3.04 NG/ML (ref 0–4)
SHBG SERPL-SCNC: 41 NMOL/L (ref 19.3–76.4)
SODIUM SERPL-SCNC: 142 MMOL/L (ref 136–145)
T4 SERPL-MCNC: 5.54 MCG/DL (ref 4.5–11.7)
TESTOST FREE SERPL-MCNC: 14.6 PG/ML (ref 6.6–18.1)
TESTOST SERPL-MCNC: 926 NG/DL (ref 264–916)
TRIGL SERPL-MCNC: 106 MG/DL (ref 0–150)
TSH SERPL DL<=0.005 MIU/L-ACNC: 2.66 UIU/ML (ref 0.27–4.2)
URATE SERPL-MCNC: 5 MG/DL (ref 3.4–7)
VLDLC SERPL CALC-MCNC: 21.2 MG/DL

## 2019-09-19 ENCOUNTER — OFFICE VISIT (OUTPATIENT)
Dept: ENDOCRINOLOGY | Age: 74
End: 2019-09-19

## 2019-09-19 VITALS
HEIGHT: 73 IN | WEIGHT: 216.6 LBS | HEART RATE: 66 BPM | BODY MASS INDEX: 28.71 KG/M2 | SYSTOLIC BLOOD PRESSURE: 134 MMHG | DIASTOLIC BLOOD PRESSURE: 62 MMHG

## 2019-09-19 DIAGNOSIS — K76.0 STEATOSIS OF LIVER: ICD-10-CM

## 2019-09-19 DIAGNOSIS — E11.9 TYPE 2 DIABETES MELLITUS WITHOUT COMPLICATION, WITHOUT LONG-TERM CURRENT USE OF INSULIN (HCC): ICD-10-CM

## 2019-09-19 DIAGNOSIS — E78.5 DYSLIPIDEMIA: ICD-10-CM

## 2019-09-19 DIAGNOSIS — N40.1 BENIGN NON-NODULAR PROSTATIC HYPERPLASIA WITH LOWER URINARY TRACT SYMPTOMS: ICD-10-CM

## 2019-09-19 DIAGNOSIS — E11.42 DIABETIC PERIPHERAL NEUROPATHY (HCC): ICD-10-CM

## 2019-09-19 DIAGNOSIS — E55.9 VITAMIN D DEFICIENCY: ICD-10-CM

## 2019-09-19 DIAGNOSIS — E11.40 TYPE 2 DIABETES MELLITUS WITH DIABETIC NEUROPATHY, WITH LONG-TERM CURRENT USE OF INSULIN (HCC): Primary | ICD-10-CM

## 2019-09-19 DIAGNOSIS — E79.0 HYPERURICEMIA: ICD-10-CM

## 2019-09-19 DIAGNOSIS — E29.1 HYPOGONADISM IN MALE: ICD-10-CM

## 2019-09-19 DIAGNOSIS — I25.10 CORONARY ARTERY DISEASE INVOLVING NATIVE CORONARY ARTERY OF NATIVE HEART WITHOUT ANGINA PECTORIS: ICD-10-CM

## 2019-09-19 DIAGNOSIS — Z79.4 TYPE 2 DIABETES MELLITUS WITH DIABETIC NEUROPATHY, WITH LONG-TERM CURRENT USE OF INSULIN (HCC): Primary | ICD-10-CM

## 2019-09-19 DIAGNOSIS — I10 ESSENTIAL HYPERTENSION: ICD-10-CM

## 2019-09-19 PROCEDURE — 99214 OFFICE O/P EST MOD 30 MIN: CPT | Performed by: INTERNAL MEDICINE

## 2019-09-19 RX ORDER — INSULIN GLARGINE 100 [IU]/ML
INJECTION, SOLUTION SUBCUTANEOUS
Qty: 15 PEN | Refills: 3 | Status: SHIPPED | OUTPATIENT
Start: 2019-09-19 | End: 2020-08-31 | Stop reason: SDUPTHER

## 2019-09-19 RX ORDER — TICAGRELOR 90 MG/1
TABLET ORAL
COMMUNITY
Start: 2019-08-07 | End: 2021-05-25

## 2019-09-19 RX ORDER — PREGABALIN 50 MG/1
50 CAPSULE ORAL 3 TIMES DAILY PRN
Qty: 90 CAPSULE | Refills: 2 | Status: SHIPPED | OUTPATIENT
Start: 2019-09-19 | End: 2020-03-05 | Stop reason: SDUPTHER

## 2019-09-19 RX ORDER — LINAGLIPTIN 5 MG/1
5 TABLET, FILM COATED ORAL DAILY
Qty: 90 TABLET | Refills: 3 | Status: SHIPPED | OUTPATIENT
Start: 2019-09-19 | End: 2019-09-26 | Stop reason: SDUPTHER

## 2019-09-19 RX ORDER — SILDENAFIL CITRATE 100 MG
TABLET ORAL
COMMUNITY
Start: 2019-08-07 | End: 2019-09-19 | Stop reason: SDUPTHER

## 2019-09-19 RX ORDER — AMLODIPINE BESYLATE 2.5 MG/1
2.5 TABLET ORAL
Qty: 90 TABLET | Refills: 3 | Status: SHIPPED | OUTPATIENT
Start: 2019-09-19 | End: 2021-07-22

## 2019-09-19 RX ORDER — SILDENAFIL CITRATE 100 MG
100 TABLET ORAL AS NEEDED
Qty: 24 TABLET | Refills: 3 | Status: SHIPPED | OUTPATIENT
Start: 2019-09-19

## 2019-09-19 RX ORDER — ICOSAPENT ETHYL 1000 MG/1
2 CAPSULE ORAL 2 TIMES DAILY WITH MEALS
Qty: 360 CAPSULE | Refills: 3 | Status: SHIPPED | OUTPATIENT
Start: 2019-09-19 | End: 2020-08-05 | Stop reason: SDUPTHER

## 2019-09-19 RX ORDER — FLURBIPROFEN SODIUM 0.3 MG/ML
SOLUTION/ DROPS OPHTHALMIC
Qty: 100 EACH | Refills: 3 | Status: SHIPPED | OUTPATIENT
Start: 2019-09-19 | End: 2019-12-06 | Stop reason: SDUPTHER

## 2019-09-19 RX ORDER — PIOGLITAZONEHYDROCHLORIDE 45 MG/1
45 TABLET ORAL DAILY
Qty: 90 TABLET | Refills: 3 | Status: SHIPPED | OUTPATIENT
Start: 2019-09-19 | End: 2020-04-15 | Stop reason: SDUPTHER

## 2019-09-19 RX ORDER — ALLOPURINOL 100 MG/1
100 TABLET ORAL DAILY
Qty: 90 TABLET | Refills: 3 | Status: SHIPPED | OUTPATIENT
Start: 2019-09-19 | End: 2020-03-05 | Stop reason: SDUPTHER

## 2019-09-19 RX ORDER — LANCETS
EACH MISCELLANEOUS
Qty: 200 EACH | Refills: 3 | Status: SHIPPED | OUTPATIENT
Start: 2019-09-19 | End: 2020-08-05 | Stop reason: SDUPTHER

## 2019-09-19 RX ORDER — TESTOSTERONE CYPIONATE 200 MG/ML
200 INJECTION, SOLUTION INTRAMUSCULAR WEEKLY
Qty: 14 ML | Refills: 1 | Status: SHIPPED | OUTPATIENT
Start: 2019-09-19 | End: 2020-08-05 | Stop reason: SDUPTHER

## 2019-09-27 RX ORDER — LINAGLIPTIN 5 MG/1
TABLET, FILM COATED ORAL
Qty: 90 TABLET | Refills: 4 | Status: SHIPPED | OUTPATIENT
Start: 2019-09-27 | End: 2020-08-05 | Stop reason: SDUPTHER

## 2019-10-09 ENCOUNTER — TELEPHONE (OUTPATIENT)
Dept: CARDIOLOGY | Facility: CLINIC | Age: 74
End: 2019-10-09

## 2019-10-09 NOTE — TELEPHONE ENCOUNTER
----- Message from Lillie William sent at 10/9/2019 10:08 AM EDT -----  Regarding: Cardiac Clearance  Contact: [phone]  Needs Cardiac Clearance for Left Shoulder Surgery.  Stops Brilinta on 10/18        Will discuss with Dr Gilbert.      Pt will need plain stress test prior to clear      S/w pt he will call me Monday and schedule do to he hurt his hip and will try see how it feels before scheduling

## 2019-10-14 DIAGNOSIS — Z01.818 PREOPERATIVE EXAMINATION, UNSPECIFIED: ICD-10-CM

## 2019-10-14 DIAGNOSIS — I10 ESSENTIAL HYPERTENSION: ICD-10-CM

## 2019-10-14 DIAGNOSIS — I25.10 CORONARY ARTERY DISEASE INVOLVING NATIVE CORONARY ARTERY OF NATIVE HEART WITHOUT ANGINA PECTORIS: Primary | ICD-10-CM

## 2019-10-17 ENCOUNTER — HOSPITAL ENCOUNTER (OUTPATIENT)
Dept: CARDIOLOGY | Facility: HOSPITAL | Age: 74
Discharge: HOME | End: 2019-10-17
Admitting: INTERNAL MEDICINE

## 2019-10-17 VITALS — HEART RATE: 71 BPM | DIASTOLIC BLOOD PRESSURE: 84 MMHG | SYSTOLIC BLOOD PRESSURE: 164 MMHG

## 2019-10-17 LAB
BH CV STRESS BP STAGE 1: NORMAL
BH CV STRESS BP STAGE 2: NORMAL
BH CV STRESS BP STAGE 3: NORMAL
BH CV STRESS DURATION MIN STAGE 1: 3
BH CV STRESS DURATION MIN STAGE 2: 3
BH CV STRESS DURATION MIN STAGE 3: 3
BH CV STRESS DURATION SEC STAGE 1: 0
BH CV STRESS DURATION SEC STAGE 2: 0
BH CV STRESS DURATION SEC STAGE 3: 0
BH CV STRESS GRADE STAGE 1: 10
BH CV STRESS GRADE STAGE 2: 12
BH CV STRESS GRADE STAGE 3: 14
BH CV STRESS HR STAGE 1: 91
BH CV STRESS HR STAGE 2: 109
BH CV STRESS HR STAGE 3: 128
BH CV STRESS METS STAGE 1: 4.6
BH CV STRESS METS STAGE 2: 7.1
BH CV STRESS METS STAGE 3: 10.2
BH CV STRESS PROTOCOL 1: NORMAL
BH CV STRESS RECOVERY BP: NORMAL MMHG
BH CV STRESS RECOVERY HR: 90 BPM
BH CV STRESS SPEED STAGE 1: 1.7
BH CV STRESS SPEED STAGE 2: 2.5
BH CV STRESS SPEED STAGE 3: 3.4
BH CV STRESS STAGE 1: 1
BH CV STRESS STAGE 2: 2
BH CV STRESS STAGE 3: 3
MAXIMAL PREDICTED HEART RATE: 146 BPM
PERCENT MAX PREDICTED HR: 89.04 %
STRESS BASELINE BP: NORMAL MMHG
STRESS BASELINE HR: 69 BPM
STRESS PERCENT HR: 105 %
STRESS POST ESTIMATED WORKLOAD: 10.3 METS
STRESS POST EXERCISE DUR MIN: 9 MIN
STRESS POST EXERCISE DUR SEC: 0 SEC
STRESS POST PEAK BP: NORMAL MMHG
STRESS POST PEAK HR: 130 BPM
STRESS TARGET HR: 124 BPM

## 2019-10-17 PROCEDURE — 93018 CV STRESS TEST I&R ONLY: CPT | Performed by: INTERNAL MEDICINE

## 2019-10-17 PROCEDURE — 93016 CV STRESS TEST SUPVJ ONLY: CPT | Performed by: INTERNAL MEDICINE

## 2019-10-17 PROCEDURE — 93017 CV STRESS TEST TRACING ONLY: CPT

## 2019-10-30 ENCOUNTER — OFFICE VISIT (OUTPATIENT)
Dept: FAMILY MEDICINE CLINIC | Facility: CLINIC | Age: 74
End: 2019-10-30

## 2019-10-30 VITALS
TEMPERATURE: 98.1 F | DIASTOLIC BLOOD PRESSURE: 64 MMHG | WEIGHT: 222.2 LBS | HEART RATE: 63 BPM | BODY MASS INDEX: 29.45 KG/M2 | OXYGEN SATURATION: 98 % | SYSTOLIC BLOOD PRESSURE: 136 MMHG | HEIGHT: 73 IN

## 2019-10-30 DIAGNOSIS — I10 ESSENTIAL HYPERTENSION: Primary | ICD-10-CM

## 2019-10-30 DIAGNOSIS — K21.9 GASTROESOPHAGEAL REFLUX DISEASE, ESOPHAGITIS PRESENCE NOT SPECIFIED: ICD-10-CM

## 2019-10-30 DIAGNOSIS — E11.40 TYPE 2 DIABETES MELLITUS WITH DIABETIC NEUROPATHY, WITH LONG-TERM CURRENT USE OF INSULIN (HCC): ICD-10-CM

## 2019-10-30 DIAGNOSIS — Z79.4 TYPE 2 DIABETES MELLITUS WITH DIABETIC NEUROPATHY, WITH LONG-TERM CURRENT USE OF INSULIN (HCC): ICD-10-CM

## 2019-10-30 PROCEDURE — 99213 OFFICE O/P EST LOW 20 MIN: CPT | Performed by: INTERNAL MEDICINE

## 2019-10-30 RX ORDER — DEXLANSOPRAZOLE 60 MG/1
60 CAPSULE, DELAYED RELEASE ORAL DAILY
Qty: 90 CAPSULE | Refills: 1 | Status: SHIPPED | OUTPATIENT
Start: 2019-10-30 | End: 2019-11-29

## 2019-10-30 NOTE — PROGRESS NOTES
Subjective Plane is medication refill  Earl Marc is a 74 y.o. male.     History of Present Illness   Earl is here today for medication refills.  He does have hypertension.  He is on both amlodipine and metoprolol.  His blood pressure seems to be doing well with this.  He also has some gastroesophageal reflux disease.  Previously was on some Dexilant.  He has been off of it for some time and his reflux is worsening.  Actually seems to be worse with exercise.  However he did have an exercise treadmill test which was unremarkable.  The following portions of the patient's history were reviewed and updated as appropriate: allergies, current medications, past family history, past medical history, past social history, past surgical history and problem list.    Review of Systems   Respiratory: Negative for chest tightness and shortness of breath.    Cardiovascular: Negative for chest pain.   Gastrointestinal: Positive for GERD and indigestion.       Objective   Physical Exam   Constitutional: He appears well-developed and well-nourished.   Eyes: No scleral icterus.   Neck: Neck supple. No JVD present. No tracheal deviation present. No thyromegaly present.   Cardiovascular: Normal rate, regular rhythm, normal heart sounds and intact distal pulses. Exam reveals no gallop and no friction rub.   No murmur heard.  Pulmonary/Chest: Effort normal and breath sounds normal. No respiratory distress. He has no wheezes. He has no rales.   Abdominal: Soft. Bowel sounds are normal. He exhibits no distension and no mass. There is no tenderness. There is no rebound and no guarding.   Lymphadenopathy:     He has no cervical adenopathy.   Neurological: He has normal reflexes.   Nursing note and vitals reviewed.        Assessment/Plan   Earl was seen today for med refill.    Diagnoses and all orders for this visit:    Essential hypertension    Type 2 diabetes mellitus with diabetic neuropathy, with long-term current use of insulin  (CMS/[facility])    Gastroesophageal reflux disease, esophagitis presence not specified    Other orders  -     dexlansoprazole (DEXILANT) 60 MG capsule; Take 1 capsule by mouth Daily for 30 days.  -     hydrocortisone 2.5 % ointment; Apply  topically to the appropriate area as directed 2 (Two) Times a Day.    Earl is here today for follow-up.  His blood pressure is doing fine.  I did review laboratories from his endocrinologist regarding his diabetes and his A1c is below 7.  We are going to get him back on the Dexilant for recurrent gastroesophageal reflux symptoms.

## 2019-12-06 DIAGNOSIS — E55.9 VITAMIN D DEFICIENCY: ICD-10-CM

## 2019-12-06 DIAGNOSIS — E11.9 TYPE 2 DIABETES MELLITUS WITHOUT COMPLICATION, WITHOUT LONG-TERM CURRENT USE OF INSULIN (HCC): ICD-10-CM

## 2019-12-06 DIAGNOSIS — E78.5 DYSLIPIDEMIA: ICD-10-CM

## 2019-12-06 DIAGNOSIS — E11.42 DIABETIC PERIPHERAL NEUROPATHY (HCC): ICD-10-CM

## 2019-12-06 DIAGNOSIS — E79.0 HYPERURICEMIA: ICD-10-CM

## 2019-12-06 DIAGNOSIS — E29.1 HYPOGONADISM IN MALE: ICD-10-CM

## 2019-12-06 DIAGNOSIS — I10 ESSENTIAL HYPERTENSION: ICD-10-CM

## 2019-12-06 RX ORDER — FLURBIPROFEN SODIUM 0.3 MG/ML
SOLUTION/ DROPS OPHTHALMIC
Qty: 100 EACH | Refills: 3 | Status: SHIPPED | OUTPATIENT
Start: 2019-12-06 | End: 2019-12-23 | Stop reason: SDUPTHER

## 2019-12-23 ENCOUNTER — TELEPHONE (OUTPATIENT)
Dept: ENDOCRINOLOGY | Age: 74
End: 2019-12-23

## 2019-12-23 DIAGNOSIS — E11.42 DIABETIC PERIPHERAL NEUROPATHY (HCC): ICD-10-CM

## 2019-12-23 DIAGNOSIS — E78.5 DYSLIPIDEMIA: ICD-10-CM

## 2019-12-23 DIAGNOSIS — E79.0 HYPERURICEMIA: ICD-10-CM

## 2019-12-23 DIAGNOSIS — E55.9 VITAMIN D DEFICIENCY: ICD-10-CM

## 2019-12-23 DIAGNOSIS — E29.1 HYPOGONADISM IN MALE: ICD-10-CM

## 2019-12-23 DIAGNOSIS — E11.9 TYPE 2 DIABETES MELLITUS WITHOUT COMPLICATION, WITHOUT LONG-TERM CURRENT USE OF INSULIN (HCC): ICD-10-CM

## 2019-12-23 DIAGNOSIS — I10 ESSENTIAL HYPERTENSION: ICD-10-CM

## 2019-12-23 RX ORDER — FLURBIPROFEN SODIUM 0.3 MG/ML
SOLUTION/ DROPS OPHTHALMIC
Qty: 100 EACH | Refills: 3 | Status: SHIPPED | OUTPATIENT
Start: 2019-12-23 | End: 2019-12-23 | Stop reason: SDUPTHER

## 2019-12-23 RX ORDER — FLURBIPROFEN SODIUM 0.3 MG/ML
SOLUTION/ DROPS OPHTHALMIC
Qty: 100 EACH | Refills: 3 | Status: SHIPPED | OUTPATIENT
Start: 2019-12-23 | End: 2021-01-04 | Stop reason: SDUPTHER

## 2019-12-23 NOTE — TELEPHONE ENCOUNTER
PT NEEDSA REFILL ON PEN NEEDLES 31 GX3 5MM 100 USE ONCE A DAY WITH LANTUS PEN INJECTION.       SEND TO [facility] ON [address] [id number]

## 2020-01-10 VITALS
SYSTOLIC BLOOD PRESSURE: 138 MMHG | OXYGEN SATURATION: 100 % | SYSTOLIC BLOOD PRESSURE: 131 MMHG | OXYGEN SATURATION: 98 % | SYSTOLIC BLOOD PRESSURE: 180 MMHG | SYSTOLIC BLOOD PRESSURE: 144 MMHG | RESPIRATION RATE: 10 BRPM | OXYGEN SATURATION: 92 % | DIASTOLIC BLOOD PRESSURE: 56 MMHG | HEART RATE: 86 BPM | SYSTOLIC BLOOD PRESSURE: 154 MMHG | HEIGHT: 73 IN | WEIGHT: 217 LBS | HEART RATE: 77 BPM | DIASTOLIC BLOOD PRESSURE: 59 MMHG | DIASTOLIC BLOOD PRESSURE: 66 MMHG | DIASTOLIC BLOOD PRESSURE: 72 MMHG | RESPIRATION RATE: 13 BRPM | SYSTOLIC BLOOD PRESSURE: 189 MMHG | DIASTOLIC BLOOD PRESSURE: 86 MMHG | HEART RATE: 73 BPM | HEART RATE: 84 BPM | TEMPERATURE: 96.6 F | DIASTOLIC BLOOD PRESSURE: 54 MMHG | RESPIRATION RATE: 11 BRPM | DIASTOLIC BLOOD PRESSURE: 74 MMHG | HEART RATE: 69 BPM | DIASTOLIC BLOOD PRESSURE: 73 MMHG | SYSTOLIC BLOOD PRESSURE: 117 MMHG | SYSTOLIC BLOOD PRESSURE: 140 MMHG | TEMPERATURE: 96 F | HEART RATE: 68 BPM | RESPIRATION RATE: 18 BRPM | OXYGEN SATURATION: 97 % | OXYGEN SATURATION: 99 % | SYSTOLIC BLOOD PRESSURE: 146 MMHG | DIASTOLIC BLOOD PRESSURE: 53 MMHG | SYSTOLIC BLOOD PRESSURE: 108 MMHG | DIASTOLIC BLOOD PRESSURE: 92 MMHG | DIASTOLIC BLOOD PRESSURE: 52 MMHG | HEART RATE: 67 BPM | RESPIRATION RATE: 16 BRPM | DIASTOLIC BLOOD PRESSURE: 62 MMHG | HEART RATE: 80 BPM | SYSTOLIC BLOOD PRESSURE: 130 MMHG

## 2020-01-10 RX ORDER — ATORVASTATIN CALCIUM 20 MG/1
20 TABLET, FILM COATED ORAL NIGHTLY
Qty: 90 TABLET | Refills: 3 | Status: SHIPPED | OUTPATIENT
Start: 2020-01-10 | End: 2020-01-20 | Stop reason: SDUPTHER

## 2020-01-10 RX ORDER — ATORVASTATIN CALCIUM 20 MG/1
20 TABLET, FILM COATED ORAL NIGHTLY
Qty: 90 TABLET | Refills: 3 | Status: SHIPPED | OUTPATIENT
Start: 2020-01-10 | End: 2020-01-10 | Stop reason: SDUPTHER

## 2020-01-14 ENCOUNTER — OFFICE (AMBULATORY)
Dept: URBAN - METROPOLITAN AREA PATHOLOGY 4 | Facility: PATHOLOGY | Age: 75
End: 2020-01-14

## 2020-01-14 ENCOUNTER — AMBULATORY SURGICAL CENTER (AMBULATORY)
Dept: URBAN - METROPOLITAN AREA SURGERY 17 | Facility: SURGERY | Age: 75
End: 2020-01-14

## 2020-01-14 DIAGNOSIS — Z86.010 PERSONAL HISTORY OF COLONIC POLYPS: ICD-10-CM

## 2020-01-14 DIAGNOSIS — D12.4 BENIGN NEOPLASM OF DESCENDING COLON: ICD-10-CM

## 2020-01-14 DIAGNOSIS — D12.3 BENIGN NEOPLASM OF TRANSVERSE COLON: ICD-10-CM

## 2020-01-14 DIAGNOSIS — K63.5 POLYP OF COLON: ICD-10-CM

## 2020-01-14 DIAGNOSIS — D12.0 BENIGN NEOPLASM OF CECUM: ICD-10-CM

## 2020-01-14 DIAGNOSIS — D12.2 BENIGN NEOPLASM OF ASCENDING COLON: ICD-10-CM

## 2020-01-14 LAB
GI HISTOLOGY: A. UNSPECIFIED: (no result)
GI HISTOLOGY: B. UNSPECIFIED: (no result)
GI HISTOLOGY: C. UNSPECIFIED: (no result)
GI HISTOLOGY: D. UNSPECIFIED: (no result)
GI HISTOLOGY: PDF REPORT: (no result)

## 2020-01-14 PROCEDURE — 45385 COLONOSCOPY W/LESION REMOVAL: CPT | Mod: PT | Performed by: INTERNAL MEDICINE

## 2020-01-14 PROCEDURE — 88305 TISSUE EXAM BY PATHOLOGIST: CPT | Performed by: INTERNAL MEDICINE

## 2020-01-14 NOTE — SERVICEHPINOTES
Dov indicates at the present time that overall he is doing well. In October apparently suffered a mild heart attack. He had stents placed previously and had a couple more stents. Seems to be doing well. He denies shortness of breath with exertion. He is in rehab. He says he has been told that he should not get a followup colonoscopy for at least a year. He does have a history of adenomatous polyps. We have cancel his recent colonoscopy. He is not really having any significant problems except for occasionally a little bit of constipation. He does try to eat a lot of fruit. Apparently he has not been vomiting. He's been a diabetic for about 15 years. Denies any current chest pain. No shortness of breath. Does complain of a lot of intestinal gas. He presents today for a colonoscopy due to hx. of polyps.

## 2020-01-20 RX ORDER — ATORVASTATIN CALCIUM 20 MG/1
20 TABLET, FILM COATED ORAL NIGHTLY
Qty: 90 TABLET | Refills: 3 | Status: SHIPPED | OUTPATIENT
Start: 2020-01-20 | End: 2020-08-05 | Stop reason: SDUPTHER

## 2020-03-03 NOTE — PROGRESS NOTES
Assessment/Plan:  As per our discussion today your sugar is improving however A1c is still above goal at 7 4%  You confirm you have been making additional dietary changes including avoidance of sweet juices and drinks  You also report you are now scheduled with [facility] weight management program to start the process for healthy meaningful weight loss  You are aware that the assistance in weight loss will help your blood pressure, diabetes, as well as your breathing and swelling in your legs  As per review you will be due to see your cardiologist at [facility] in April, please contact his office to schedule your 6 month follow-up  Please continue your lisinopril, carvedilol and torsemide as well as your atorvastatin for cholesterol daily  Continue CPAP every night as noted you have improvement in breathing and sleep since you have gotten your machine back  You confirm you are scheduled for the hearing test on March 18th, you are aware that the ear nose and throat doctor will contact you with your results and advise on follow-up  Script provided for follow-up labs as dated in 6 months  Referral for GI to schedule your colonoscopy also provided today please call the office to schedule this appointment  As reviewed this referral was provided previously but secondary to complications with leg wounds was not scheduled  Diabetic foot and eye exams completed in the office today and we will contact you with the eye exam results  For your dry feet I would recommend Eucerin every day to your legs and feet, in the summer time you may switch to Aquaphor as this is lighter during the warmer months  As reviewed the wounds in your legs have resolved after treatment with wound management and swelling is significantly improved with wearing your compression socks on a daily basis  Addendum:  Results of diabetic eye exam made available  Bilateral negative for diabetic retinopathy    Patient Subjective   Earl Marc is a 72 y.o. male.     History of Present Illness he is here today with a 2 or 3 week history of vertigo.  It has improved somewhat.  He had the onset of periorbital pressure and nasal and sinus congestion about a month ago.  Subsequently he developed vertigo which was accentuated by head position.  There has been some associated nausea without vomiting.  He denies any diaphoresis.  Some headache but that has resolved.  He has chronic tinnitus.  He denies any sudden change in his hearing.  There has been no diaphoresis.  He denies any syncope although he did fall initially when getting out of bed at presentation.  He denies any diplopia, blurred vision, dysphagia, dysarthria, paresthesias, or focal paresis.  He has been using meclizine at night but not in the daytime due to sedation.  He is on Allegra chronically for allergies.        Review of Systems   Constitutional: Negative for activity change, appetite change, chills, diaphoresis, fatigue and fever.   HENT: Negative for trouble swallowing.    Respiratory: Negative for cough, chest tightness, shortness of breath and wheezing.    Cardiovascular: Negative for chest pain, palpitations and leg swelling.   Gastrointestinal: Negative for abdominal pain, diarrhea, nausea and vomiting.   Genitourinary: Negative for flank pain and hematuria.   Neurological: Positive for dizziness. Negative for syncope, facial asymmetry, speech difficulty, weakness, numbness and headaches.       Objective   Physical Exam   Constitutional: He is oriented to person, place, and time. Vital signs are normal. He appears well-developed and well-nourished. He is active. He does not appear ill. No distress.   HENT:   Right Ear: Tympanic membrane, external ear and ear canal normal.   Left Ear: Tympanic membrane, external ear and ear canal normal.   Mouth/Throat: No oropharyngeal exudate, posterior oropharyngeal edema or posterior oropharyngeal erythema.   Eyes:  Conjunctivae and EOM are normal. Pupils are equal, round, and reactive to light.   Fundoscopic exam:       The right eye shows no AV nicking, no exudate and no hemorrhage.        The left eye shows no AV nicking, no exudate and no hemorrhage.   Neck: Carotid bruit is not present.   Cardiovascular: Normal rate, regular rhythm, S1 normal and S2 normal.  Exam reveals no S3 and no S4.    Pulmonary/Chest: No tachypnea. No respiratory distress. He has no decreased breath sounds. He has no wheezes. He has no rhonchi. He has no rales.   Abdominal: Soft. Normal appearance and bowel sounds are normal. He exhibits no abdominal bruit and no mass. There is no hepatosplenomegaly. There is no tenderness.     Vascular Status -  His right foot exhibits normal right foot edema. His left foot exhibits normal left foot edema.  Neurological: He is alert and oriented to person, place, and time. He has normal strength. No cranial nerve deficit. He displays a negative Romberg sign. Gait normal.   Reflex Scores:       Bicep reflexes are 2+ on the right side and 2+ on the left side.  Psychiatric: He has a normal mood and affect. His speech is normal and behavior is normal. Judgment and thought content normal. Cognition and memory are normal.         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    Plan #1 he is to change position slowly #2 continue Allegra 1 daily #3 use meclizine 12.5 mg twice a day 25 mg daily at bedtime.  If after 10 days symptoms have not resolved he will call and ENT referral will be made.              contacted with results and advised on diabetic eye exam yearly  No problem-specific Assessment & Plan notes found for this encounter  Diagnoses and all orders for this visit:    Type 2 diabetes mellitus without complication, without long-term current use of insulin (HCC)  -     gabapentin (NEURONTIN) 300 mg capsule; Take 1 capsule (300 mg total) by mouth 3 (three) times a day  -     Comprehensive metabolic panel; Future  -     Hemoglobin A1C; Future  -     Microalbumin / creatinine urine ratio; Future  -     IRIS Diabetic eye exam    Benign essential hypertension    Obstructive sleep apnea    Hypercholesterolemia  -     Lipid Panel with Direct LDL reflex; Future    Class 3 severe obesity due to excess calories with serious comorbidity and body mass index (BMI) of 45 0 to 49 9 in [facility])    Bilateral lower extremity edema    Periodic limb movement sleep disorder  -     gabapentin (NEURONTIN) 300 mg capsule; Take 1 capsule (300 mg total) by mouth 3 (three) times a day    Colon cancer screening  -     Ambulatory referral to Gastroenterology; Future          Subjective:      Patient ID: Rupinder Henriquez  is a 62 y o  male  Pt here for follow up chronic medical conditions    Confirms has been back on c-pap for past 3 months and notes significant improvement in sleep and energy  States saw foot Dr 2-3 months ago however this was through the wound center for his lower extremity edema  All ulcers and wounds are now healed  Last foot evaluation November 2019 when his nails were trimmed  Nails are starting to get a bit longer but do not need to be trimmed yet  They continue to be  thickened and brittle  Patient however not a candidate for antifungal tablets as he needs to remain on his cholesterol medication      States wounds on legs are much better no ulcers    States hearing test is sched for 3/18 after seeing ENT for tinnitus R ear on/ off only    Continues with his [facility] cardiologist [place] 10/2019 to sched 6 months    Is also scheduled with weight management through [facility]     As noted previously patient had presented to the office in 2018 with significant bilateral lower extremity edema, concern for heart failure and therefore patient was evaluated in hospital   Patient by choice went to [facility] and has since followed up with cardiologist   It was determined after comprehensive testing that patient did not have any heart failure, echocardiogram and ejection fraction normal   It was determine his bilateral lower extremity swelling and edema was secondary to sedation as well as his morbid obesity  Since that time patient has been maintained on torsemide through his cardiologist   Patient also has been encouraged by his cardiologist in myself to follow-up with weight management to assist him in healthy meaningful weight loss  Patient to date had not schedule this appointment but reports to me today that he is now scheduled  Secondary to the significant edema patient developed chronic ulcers bilateral lower extremities  Patient attended wound management and after treatment with compression and Unna boots and now wearing compression socks wounds have completely healed and edema is significantly improved  Lab review shows that A1c has also improved but not back at goal yet  Previously 8 4% labs this time 7 4%  Patient states he has been making additional dietary changes and aware at previous testing he had been drinking a lot of sweet in fruit juice  Patient remains motivated to continue to improve diet and as noted will get assistance through weight management  Patient is due for diabetic foot and eye exams today  While patient's foot exam is intact for sensation and vibratory he does admit sometimes he has a burning pain on the bottom of his feet but does not have this on a daily basis  Patient has noted some relief with gabapentin    Gabapentin has currently been titrated up to 300 mg 3 times daily  Patient is aware this can be increased and may review other medications if no improvement  Also reviewed with patient that he was referred previously but has yet to schedule his appointment with the gastroenterologist for his colon cancer screening  New order provided today and patient advised on importance of scheduling this screening test             The following portions of the patient's history were reviewed and updated as appropriate: allergies, current medications, past family history, past medical history, past social history, past surgical history and problem list     Review of Systems   Constitutional: Negative for appetite change, chills, fatigue and fever  HENT: Positive for tinnitus  Negative for congestion and hearing loss  Eyes: Negative  Respiratory: Positive for shortness of breath (Patient denies shortness of breath at rest, overall improvement since using CPAP but does get shortness of breath going stairs however denies chest pain  )  Negative for cough and chest tightness  Cardiovascular: Positive for leg swelling  Negative for chest pain and palpitations  Gastrointestinal: Negative  Negative for abdominal distention, abdominal pain and constipation  Genitourinary: Positive for frequency (Patient on diuretic)  Negative for dysuria  Musculoskeletal: Positive for back pain (Patient has had on off chronic low back pain, at this time minimal and not prevent activities of daily living )  Skin: Positive for color change  Negative for rash and wound  Neurological: Positive for numbness (Patient still gets some numbness and tingling in his feet however as noted sensation to touch and vibration remains intact  )  Psychiatric/Behavioral: Negative            Objective:      /74 (BP Location: Left arm, Patient Position: Sitting, Cuff Size: Large)   Pulse 78   Temp 97 7 °F (36 5 °C) (Oral)   Ht 5' 5" (1 651 m)   Wt 130 kg (286 lb 2 5 oz) SpO2 95%   BMI 47 62 kg/m²          Physical Exam   Constitutional: He appears well-nourished  No distress  HENT:   Head: Normocephalic  Eyes: Pupils are equal, round, and reactive to light  Conjunctivae are normal    Neck: No JVD present  No thyromegaly present  Short, thick neck  Cardiovascular: Normal rate, regular rhythm and normal heart sounds  Pulses are no weak pulses  No murmur heard  Pulses:       Dorsalis pedis pulses are 2+ on the right side, and 2+ on the left side  Pulmonary/Chest: Effort normal and breath sounds normal  He has no wheezes  Abdominal: Soft  Bowel sounds are normal  There is no tenderness  Exam is limited by body habitus  Musculoskeletal: He exhibits edema  Feet:    Patient continues to have bilateral lower extremity edema 1+ however as noted today significant improvement  Patient has been wearing his compression socks on a daily basis  Feet:   Right Foot:   Skin Integrity: Positive for dry skin  Negative for ulcer, skin breakdown or erythema  Left Foot:   Skin Integrity: Positive for dry skin  Negative for ulcer, skin breakdown or erythema  Neurological: He is alert  Skin: Skin is warm and dry  Patient continues to have hyper pigmentation bilateral lower extremities shin and calf secondary to venous stasis as well as previous open wounds secondary to his severe edema  Patient completed full course of therapy with wound management and no longer has open or draining wounds on lower extremities  Psychiatric: He has a normal mood and affect  His behavior is normal  Thought content normal    Nursing note and vitals reviewed  PHQ-9 Depression Screening    PHQ-9:    Frequency of the following problems over the past two weeks:       Little interest or pleasure in doing things:  0 - not at all  Feeling down, depressed, or hopeless:  0 - not at all  PHQ-2 Score:  0         Patient's shoes and socks removed  Right Foot/Ankle   Right Foot Inspection  Skin Exam: skin intact and dry skin no erythema, no maceration and no ulcer                            Sensory   Vibration: intact    Monofilament testing: intact  Vascular    The right DP pulse is 2+  Left Foot/Ankle  Left Foot Inspection  Skin Exam: skin intact and dry skinno erythema, no maceration and no ulcer                                         Sensory   Vibration: intact    Monofilament: intact  Vascular    The left DP pulse is 2+  Assign Risk Category:  No deformity present; No loss of protective sensation;  No weak pulses       Risk: 0

## 2020-03-05 ENCOUNTER — RESULTS ENCOUNTER (OUTPATIENT)
Dept: ENDOCRINOLOGY | Age: 75
End: 2020-03-05

## 2020-03-05 DIAGNOSIS — E11.42 DIABETIC PERIPHERAL NEUROPATHY (HCC): ICD-10-CM

## 2020-03-05 DIAGNOSIS — Z79.4 TYPE 2 DIABETES MELLITUS WITH DIABETIC NEUROPATHY, WITH LONG-TERM CURRENT USE OF INSULIN (HCC): ICD-10-CM

## 2020-03-05 DIAGNOSIS — E79.0 HYPERURICEMIA: ICD-10-CM

## 2020-03-05 DIAGNOSIS — N40.1 BENIGN NON-NODULAR PROSTATIC HYPERPLASIA WITH LOWER URINARY TRACT SYMPTOMS: ICD-10-CM

## 2020-03-05 DIAGNOSIS — E78.5 DYSLIPIDEMIA: ICD-10-CM

## 2020-03-05 DIAGNOSIS — K76.0 STEATOSIS OF LIVER: ICD-10-CM

## 2020-03-05 DIAGNOSIS — I25.10 CORONARY ARTERY DISEASE INVOLVING NATIVE CORONARY ARTERY OF NATIVE HEART WITHOUT ANGINA PECTORIS: ICD-10-CM

## 2020-03-05 DIAGNOSIS — I10 ESSENTIAL HYPERTENSION: ICD-10-CM

## 2020-03-05 DIAGNOSIS — E11.40 TYPE 2 DIABETES MELLITUS WITH DIABETIC NEUROPATHY, WITH LONG-TERM CURRENT USE OF INSULIN (HCC): ICD-10-CM

## 2020-03-05 DIAGNOSIS — E11.9 TYPE 2 DIABETES MELLITUS WITHOUT COMPLICATION, WITHOUT LONG-TERM CURRENT USE OF INSULIN (HCC): ICD-10-CM

## 2020-03-05 DIAGNOSIS — E55.9 VITAMIN D DEFICIENCY: ICD-10-CM

## 2020-03-05 DIAGNOSIS — E29.1 HYPOGONADISM IN MALE: ICD-10-CM

## 2020-03-05 RX ORDER — PREGABALIN 50 MG/1
50 CAPSULE ORAL 3 TIMES DAILY PRN
Qty: 90 CAPSULE | Refills: 0 | Status: SHIPPED | OUTPATIENT
Start: 2020-03-05 | End: 2020-04-17 | Stop reason: SDUPTHER

## 2020-03-05 RX ORDER — ALLOPURINOL 100 MG/1
100 TABLET ORAL DAILY
Qty: 90 TABLET | Refills: 3 | Status: SHIPPED | OUTPATIENT
Start: 2020-03-05 | End: 2020-08-05 | Stop reason: SDUPTHER

## 2020-03-07 LAB
25(OH)D3+25(OH)D2 SERPL-MCNC: 36.2 NG/ML (ref 30–100)
ALBUMIN SERPL-MCNC: 4.4 G/DL (ref 3.5–5.2)
ALBUMIN/GLOB SERPL: 1.6 G/DL
ALP SERPL-CCNC: 58 U/L (ref 39–117)
ALT SERPL-CCNC: 14 U/L (ref 1–41)
AST SERPL-CCNC: 15 U/L (ref 1–40)
BILIRUB SERPL-MCNC: 0.6 MG/DL (ref 0.2–1.2)
BUN SERPL-MCNC: 20 MG/DL (ref 8–23)
BUN/CREAT SERPL: 11.8 (ref 7–25)
C PEPTIDE SERPL-MCNC: 3.7 NG/ML (ref 1.1–4.4)
CALCIUM SERPL-MCNC: 9.4 MG/DL (ref 8.6–10.5)
CHLORIDE SERPL-SCNC: 100 MMOL/L (ref 98–107)
CHOLEST SERPL-MCNC: 90 MG/DL (ref 100–199)
CO2 SERPL-SCNC: 25.9 MMOL/L (ref 22–29)
CREAT SERPL-MCNC: 1.69 MG/DL (ref 0.76–1.27)
GLOBULIN SER CALC-MCNC: 2.8 GM/DL
GLUCOSE SERPL-MCNC: 237 MG/DL (ref 65–99)
HBA1C MFR BLD: 6.8 % (ref 4.8–5.6)
HCT VFR BLD AUTO: 46.7 % (ref 37.5–51)
HDL SERPL-SCNC: 27.4 UMOL/L
HDLC SERPL-MCNC: 34 MG/DL
HGB BLD-MCNC: 15.3 G/DL (ref 13–17.7)
LDL SERPL QN: 20 NM
LDL SERPL-SCNC: <300 NMOL/L
LDL SMALL SERPL-SCNC: 172 NMOL/L
LDLC SERPL CALC-MCNC: 29 MG/DL (ref 0–99)
MICROALBUMIN UR-MCNC: 26 UG/ML
POTASSIUM SERPL-SCNC: 4.9 MMOL/L (ref 3.5–5.2)
PROT SERPL-MCNC: 7.2 G/DL (ref 6–8.5)
PSA SERPL-MCNC: 2.12 NG/ML (ref 0–4)
SHBG SERPL-SCNC: 39 NMOL/L (ref 19.3–76.4)
SODIUM SERPL-SCNC: 138 MMOL/L (ref 136–145)
T4 SERPL-MCNC: 6.17 MCG/DL (ref 4.5–11.7)
TESTOST FREE SERPL-MCNC: 33.4 PG/ML (ref 6.6–18.1)
TESTOST SERPL-MCNC: >1500 NG/DL (ref 264–916)
TRIGL SERPL-MCNC: 136 MG/DL (ref 0–149)
TSH SERPL DL<=0.005 MIU/L-ACNC: 2.74 UIU/ML (ref 0.27–4.2)
URATE SERPL-MCNC: 5.3 MG/DL (ref 3.4–7)

## 2020-04-15 RX ORDER — PIOGLITAZONEHYDROCHLORIDE 45 MG/1
45 TABLET ORAL DAILY
Qty: 90 TABLET | Refills: 3 | Status: SHIPPED | OUTPATIENT
Start: 2020-04-15 | End: 2020-04-17 | Stop reason: SDUPTHER

## 2020-04-17 DIAGNOSIS — I10 ESSENTIAL HYPERTENSION: ICD-10-CM

## 2020-04-17 DIAGNOSIS — E79.0 HYPERURICEMIA: ICD-10-CM

## 2020-04-17 DIAGNOSIS — E29.1 HYPOGONADISM IN MALE: ICD-10-CM

## 2020-04-17 DIAGNOSIS — E55.9 VITAMIN D DEFICIENCY: ICD-10-CM

## 2020-04-17 DIAGNOSIS — E11.9 TYPE 2 DIABETES MELLITUS WITHOUT COMPLICATION, WITHOUT LONG-TERM CURRENT USE OF INSULIN (HCC): ICD-10-CM

## 2020-04-17 DIAGNOSIS — E11.42 DIABETIC PERIPHERAL NEUROPATHY (HCC): ICD-10-CM

## 2020-04-17 DIAGNOSIS — E78.5 DYSLIPIDEMIA: ICD-10-CM

## 2020-04-17 RX ORDER — PIOGLITAZONEHYDROCHLORIDE 45 MG/1
45 TABLET ORAL DAILY
Qty: 90 TABLET | Refills: 3 | Status: SHIPPED | OUTPATIENT
Start: 2020-04-17 | End: 2020-08-05 | Stop reason: SDUPTHER

## 2020-04-17 RX ORDER — PREGABALIN 50 MG/1
50 CAPSULE ORAL 3 TIMES DAILY PRN
Qty: 90 CAPSULE | Refills: 0 | Status: SHIPPED | OUTPATIENT
Start: 2020-04-17 | End: 2020-04-27 | Stop reason: SDUPTHER

## 2020-04-17 RX ORDER — LORATADINE 10 MG
1 TABLET ORAL DAILY
Qty: 400 EACH | Refills: 1 | Status: SHIPPED | OUTPATIENT
Start: 2020-04-17 | End: 2020-04-17 | Stop reason: SDUPTHER

## 2020-04-17 RX ORDER — PIOGLITAZONEHYDROCHLORIDE 45 MG/1
45 TABLET ORAL DAILY
Qty: 90 TABLET | Refills: 3 | Status: SHIPPED | OUTPATIENT
Start: 2020-04-17 | End: 2020-04-17 | Stop reason: SDUPTHER

## 2020-04-17 RX ORDER — LORATADINE 10 MG
1 TABLET ORAL DAILY
Qty: 400 EACH | Refills: 3 | Status: SHIPPED | OUTPATIENT
Start: 2020-04-17 | End: 2021-04-07 | Stop reason: SDUPTHER

## 2020-04-27 DIAGNOSIS — E11.42 DIABETIC PERIPHERAL NEUROPATHY (HCC): ICD-10-CM

## 2020-04-27 RX ORDER — PREGABALIN 50 MG/1
50 CAPSULE ORAL 3 TIMES DAILY PRN
Qty: 270 CAPSULE | Refills: 0 | Status: SHIPPED | OUTPATIENT
Start: 2020-04-27 | End: 2020-08-24 | Stop reason: SDUPTHER

## 2020-04-27 NOTE — TELEPHONE ENCOUNTER
Last ov appt 9/19/19  3/19/20 cancelled due to epidemic )  No future appts      Last mariann ran 4/27/20  Last filled 3/6/20 for 30 day supply

## 2020-04-29 ENCOUNTER — TELEMEDICINE (OUTPATIENT)
Dept: FAMILY MEDICINE CLINIC | Facility: CLINIC | Age: 75
End: 2020-04-29

## 2020-04-29 VITALS — SYSTOLIC BLOOD PRESSURE: 158 MMHG | DIASTOLIC BLOOD PRESSURE: 81 MMHG | HEART RATE: 65 BPM

## 2020-04-29 DIAGNOSIS — I10 ESSENTIAL HYPERTENSION: Primary | ICD-10-CM

## 2020-04-29 DIAGNOSIS — E11.40 TYPE 2 DIABETES MELLITUS WITH DIABETIC NEUROPATHY, WITH LONG-TERM CURRENT USE OF INSULIN (HCC): ICD-10-CM

## 2020-04-29 DIAGNOSIS — Z79.4 TYPE 2 DIABETES MELLITUS WITH DIABETIC NEUROPATHY, WITH LONG-TERM CURRENT USE OF INSULIN (HCC): ICD-10-CM

## 2020-04-29 DIAGNOSIS — E78.5 DYSLIPIDEMIA: ICD-10-CM

## 2020-04-29 PROCEDURE — 99214 OFFICE O/P EST MOD 30 MIN: CPT | Performed by: INTERNAL MEDICINE

## 2020-04-29 NOTE — PROGRESS NOTES
Subjective Chief complaint is follow-up on blood pressure  Earl Marc is a 74 y.o. male. This was an audio and video enabled telemedicine encounter.    History of Present Illness   15 minutes were spent on this encounter.  Earl is following up on his blood pressure.  He had some elevation of his blood pressure when he was at his endocrinologist office.  The endocrinologist increased his amlodipine to 5 mg twice daily.  Despite this his blood pressure still seems to be running high.  The patient had faxed over a list of blood pressures totaling a number of 79 or 80 readings.  Of those only 28 readings were within a normal range.  Also appears that his heart rate was a little bit more elevated than it usually is.  He is only on metoprolol once a day.  This is a short acting metoprolol and really probably should be a twice a day medicine.  He is not having any headaches.  He always is somewhat dizzy.  He is not having any chest pain or shortness of breath.  He does have some swelling in his feet by the end of the day but this goes down overnight.  I did review some lab work from March 5.  His hemoglobin A1c was 6.8.  His diabetes appears to be well controlled.  His lipid panel also looked okay.  The following portions of the patient's history were reviewed and updated as appropriate: allergies, current medications, past family history, past medical history, past social history, past surgical history and problem list.    Review of Systems   Respiratory: Negative for chest tightness and shortness of breath.    Cardiovascular: Positive for leg swelling. Negative for chest pain and palpitations.   Neurological: Positive for light-headedness.       Objective   Physical Exam   Constitutional: He is oriented to person, place, and time. He appears well-developed and well-nourished.   Pulmonary/Chest: Effort normal.   Respirations seem normal over the phone   Neurological: He is alert and oriented to person, place, and  time.   There is no facial asymmetry   Psychiatric: He has a normal mood and affect.   Nursing note and vitals reviewed.        Assessment/Plan   Earl was seen today for hypertension.    Diagnoses and all orders for this visit:    Essential hypertension    Type 2 diabetes mellitus with diabetic neuropathy, with long-term current use of insulin (CMS/[facility])    Dyslipidemia    Other orders  -     metoprolol tartrate (LOPRESSOR) 25 MG tablet; Take 1 tablet by mouth 2 (Two) Times a Day.      Earl is following up on his blood pressure.  I did review a number of readings.  It appears that he is having more bad readings than good.  I am going to increase his metoprolol to 25 mg twice daily and a new prescription has been sent.  He is going to call me in 2 or 3 weeks and let me know how his pressure is doing.

## 2020-05-18 ENCOUNTER — OFFICE VISIT (OUTPATIENT)
Dept: FAMILY MEDICINE CLINIC | Facility: CLINIC | Age: 75
End: 2020-05-18

## 2020-05-18 VITALS
HEART RATE: 59 BPM | WEIGHT: 234 LBS | TEMPERATURE: 98.7 F | BODY MASS INDEX: 31.01 KG/M2 | SYSTOLIC BLOOD PRESSURE: 126 MMHG | HEIGHT: 73 IN | OXYGEN SATURATION: 98 % | DIASTOLIC BLOOD PRESSURE: 66 MMHG

## 2020-05-18 DIAGNOSIS — R07.89 CHEST WALL PAIN: ICD-10-CM

## 2020-05-18 DIAGNOSIS — R10.11 RIGHT UPPER QUADRANT PAIN: Primary | ICD-10-CM

## 2020-05-18 PROCEDURE — 99214 OFFICE O/P EST MOD 30 MIN: CPT | Performed by: INTERNAL MEDICINE

## 2020-05-18 NOTE — PROGRESS NOTES
Subjective  Answers for HPI/ROS submitted by the patient on 5/18/2020   Abdominal pain  What is the primary reason for your visit?: Abdominal Pain  Complaint is pain in the right side  Earl Marc is a 75 y.o. male.  t.    History of Present Illness Earl is here today complaining of some pain in the right side.  This is been present for approximately 6 or 7 days.  The pain seems to be worse with certain movements.  Sometimes it is worse after the evening meal.  It does radiate to his right flank and back.  He is not having any blood in the urine.  Is not having any blood in the bowel movements.  He did have an ultrasound of the abdomen in 2018 which showed a normal gallbladder and fatty liver.  Additionally reports having some sort of culture and it grew Neisseria meningitides as well as Proteus mirabilis.  This is an unusual combination for something from oral secretions or the lungs.  This was done at his allergist office.  He is not sure whether his allergist reported the Neisseria to the health departmen    The following portions of the patient's history were reviewed and updated as appropriate: allergies, current medications, past family history, past medical history, past social history, past surgical history and problem list.    Review of Systems   Constitutional: Negative for fever and unexpected weight loss.   Gastrointestinal: Positive for abdominal pain. Negative for constipation, diarrhea, nausea and vomiting.   Genitourinary: Negative for dysuria, frequency and hematuria.   Musculoskeletal: Positive for myalgias. Negative for arthralgias.       Objective   Physical Exam   Constitutional: He appears well-developed and well-nourished.   Cardiovascular: Normal rate, regular rhythm and normal heart sounds.   Pulmonary/Chest: Effort normal and breath sounds normal. He has no wheezes. He has no rales. He exhibits tenderness.   He has tenderness to the right costochondral cartilage as well as the right  lower free rib.  There is some tenderness very superficially over the abdominal musculature.   Abdominal: Soft. Bowel sounds are normal. He exhibits no distension. There is tenderness.   Naif the patient's tenderness appears to be at the abdominal musculature.   Nursing note and vitals reviewed.        Assessment/Plan   Earl was seen today for pain.    Diagnoses and all orders for this visit:    Right upper quadrant pain  -     CBC & Differential  -     Hepatic Function Panel  -     Amylase  -     Lipase  -     US Gallbladder; Future  -     NM HIDA Scan With Pharmacological Intervention; Future    Chest wall pain  -     US Gallbladder; Future  -     NM HIDA Scan With Pharmacological Intervention; Future      Earl is complaining of some right-sided pain.  I suspect this is more mechanical or musculoskeletal.  He had a normal abdominal ultrasound other than a fatty liver several years ago.  I am going to repeat some lab work and also get a HIDA scan and ultrasound of his gallbladder.  I am going to look into exactly where this culture came from and whether or not the Neisseria was reported.

## 2020-05-19 LAB
ALBUMIN SERPL-MCNC: 4.5 G/DL (ref 3.5–5.2)
ALP SERPL-CCNC: 64 U/L (ref 39–117)
ALT SERPL-CCNC: 19 U/L (ref 1–41)
AMYLASE SERPL-CCNC: 74 U/L (ref 28–100)
AST SERPL-CCNC: 18 U/L (ref 1–40)
BASOPHILS # BLD AUTO: 0.04 10*3/MM3 (ref 0–0.2)
BASOPHILS NFR BLD AUTO: 0.6 % (ref 0–1.5)
BILIRUB DIRECT SERPL-MCNC: 0.2 MG/DL (ref 0.2–0.3)
BILIRUB SERPL-MCNC: 0.6 MG/DL (ref 0.2–1.2)
EOSINOPHIL # BLD AUTO: 0.26 10*3/MM3 (ref 0–0.4)
EOSINOPHIL NFR BLD AUTO: 4.2 % (ref 0.3–6.2)
ERYTHROCYTE [DISTWIDTH] IN BLOOD BY AUTOMATED COUNT: 13 % (ref 12.3–15.4)
HCT VFR BLD AUTO: 43.6 % (ref 37.5–51)
HGB BLD-MCNC: 14.5 G/DL (ref 13–17.7)
IMM GRANULOCYTES # BLD AUTO: 0.02 10*3/MM3 (ref 0–0.05)
IMM GRANULOCYTES NFR BLD AUTO: 0.3 % (ref 0–0.5)
LIPASE SERPL-CCNC: 25 U/L (ref 13–60)
LYMPHOCYTES # BLD AUTO: 2.07 10*3/MM3 (ref 0.7–3.1)
LYMPHOCYTES NFR BLD AUTO: 33.3 % (ref 19.6–45.3)
MCH RBC QN AUTO: 28.2 PG (ref 26.6–33)
MCHC RBC AUTO-ENTMCNC: 33.3 G/DL (ref 31.5–35.7)
MCV RBC AUTO: 84.8 FL (ref 79–97)
MONOCYTES # BLD AUTO: 0.52 10*3/MM3 (ref 0.1–0.9)
MONOCYTES NFR BLD AUTO: 8.4 % (ref 5–12)
NEUTROPHILS # BLD AUTO: 3.31 10*3/MM3 (ref 1.7–7)
NEUTROPHILS NFR BLD AUTO: 53.2 % (ref 42.7–76)
NRBC BLD AUTO-RTO: 0 /100 WBC (ref 0–0.2)
PLATELET # BLD AUTO: 146 10*3/MM3 (ref 140–450)
PROT SERPL-MCNC: 7.6 G/DL (ref 6–8.5)
RBC # BLD AUTO: 5.14 10*6/MM3 (ref 4.14–5.8)
WBC # BLD AUTO: 6.22 10*3/MM3 (ref 3.4–10.8)

## 2020-05-20 ENCOUNTER — TELEPHONE (OUTPATIENT)
Dept: FAMILY MEDICINE CLINIC | Facility: CLINIC | Age: 75
End: 2020-05-20

## 2020-05-20 NOTE — TELEPHONE ENCOUNTER
PT CALLED REQUESTING HIS LAB RESULTS COMPLETE ON 05/18.     PLEASE ADVISE     PT CALL BACK   [phone]

## 2020-05-21 ENCOUNTER — HOSPITAL ENCOUNTER (OUTPATIENT)
Dept: ULTRASOUND IMAGING | Facility: HOSPITAL | Age: 75
Discharge: HOME | End: 2020-05-21
Admitting: INTERNAL MEDICINE

## 2020-05-21 ENCOUNTER — HOSPITAL ENCOUNTER (OUTPATIENT)
Dept: NUCLEAR MEDICINE | Facility: HOSPITAL | Age: 75
Discharge: HOME | End: 2020-05-21

## 2020-05-21 DIAGNOSIS — R10.11 RIGHT UPPER QUADRANT PAIN: ICD-10-CM

## 2020-05-21 DIAGNOSIS — R07.89 CHEST WALL PAIN: ICD-10-CM

## 2020-05-21 PROCEDURE — 0 TECHNETIUM TC 99M MEBROFENIN KIT: Performed by: INTERNAL MEDICINE

## 2020-05-21 PROCEDURE — 78227 HEPATOBIL SYST IMAGE W/DRUG: CPT

## 2020-05-21 PROCEDURE — A9537 TC99M MEBROFENIN: HCPCS | Performed by: INTERNAL MEDICINE

## 2020-05-21 PROCEDURE — 25010000002 SINCALIDE PER 5 MCG: Performed by: INTERNAL MEDICINE

## 2020-05-21 PROCEDURE — 76705 ECHO EXAM OF ABDOMEN: CPT

## 2020-05-21 RX ORDER — KIT FOR THE PREPARATION OF TECHNETIUM TC 99M MEBROFENIN 45 MG/10ML
1 INJECTION, POWDER, LYOPHILIZED, FOR SOLUTION INTRAVENOUS
Status: COMPLETED | OUTPATIENT
Start: 2020-05-21 | End: 2020-05-21

## 2020-05-21 RX ADMIN — MEBROFENIN 1 DOSE: 45 INJECTION, POWDER, LYOPHILIZED, FOR SOLUTION INTRAVENOUS at 08:25

## 2020-05-21 RX ADMIN — SINCALIDE 2.1 MCG: 5 INJECTION, POWDER, LYOPHILIZED, FOR SOLUTION INTRAVENOUS at 09:35

## 2020-07-10 ENCOUNTER — TELEPHONE (OUTPATIENT)
Dept: FAMILY MEDICINE CLINIC | Facility: CLINIC | Age: 75
End: 2020-07-10

## 2020-07-10 DIAGNOSIS — E11.9 ENCOUNTER FOR DIABETIC FOOT EXAM (HCC): Primary | ICD-10-CM

## 2020-07-10 NOTE — TELEPHONE ENCOUNTER
PT SEES DR SHAHNAZ WOODSON FOR PODIATRY. THEY ARE NOW WITH [facility] AND THEY ARE REQUIRING A REFERRAL FOR HIM TO BE SEEN FOR HIS ANNUAL DIABETIC FOOT EXAM. CAN YOU PUT IN THE ORDER?    PHONE [phone]  [phone]

## 2020-07-29 ENCOUNTER — LAB (OUTPATIENT)
Dept: ENDOCRINOLOGY | Age: 75
End: 2020-07-29

## 2020-07-29 DIAGNOSIS — I10 ESSENTIAL HYPERTENSION: ICD-10-CM

## 2020-07-29 DIAGNOSIS — E29.1 HYPOGONADISM IN MALE: ICD-10-CM

## 2020-07-29 DIAGNOSIS — E11.9 TYPE 2 DIABETES MELLITUS WITHOUT COMPLICATION, WITHOUT LONG-TERM CURRENT USE OF INSULIN (HCC): ICD-10-CM

## 2020-07-29 DIAGNOSIS — E55.9 VITAMIN D DEFICIENCY: ICD-10-CM

## 2020-07-29 DIAGNOSIS — E78.5 DYSLIPIDEMIA: ICD-10-CM

## 2020-07-29 DIAGNOSIS — I10 ESSENTIAL HYPERTENSION: Primary | ICD-10-CM

## 2020-07-31 LAB
25(OH)D3+25(OH)D2 SERPL-MCNC: 40.5 NG/ML (ref 30–100)
ALBUMIN SERPL-MCNC: 4.8 G/DL (ref 3.5–5.2)
ALBUMIN/CREAT UR: 29 MG/G CREAT (ref 0–29)
ALBUMIN/GLOB SERPL: 1.8 G/DL
ALP SERPL-CCNC: 58 U/L (ref 39–117)
ALT SERPL-CCNC: 17 U/L (ref 1–41)
AST SERPL-CCNC: 20 U/L (ref 1–40)
BILIRUB SERPL-MCNC: 0.7 MG/DL (ref 0–1.2)
BUN SERPL-MCNC: 19 MG/DL (ref 8–23)
BUN/CREAT SERPL: 11.8 (ref 7–25)
C PEPTIDE SERPL-MCNC: 3.5 NG/ML (ref 1.1–4.4)
CALCIUM SERPL-MCNC: 9.7 MG/DL (ref 8.6–10.5)
CHLORIDE SERPL-SCNC: 100 MMOL/L (ref 98–107)
CHOLEST SERPL-MCNC: 79 MG/DL (ref 100–199)
CO2 SERPL-SCNC: 29.6 MMOL/L (ref 22–29)
CREAT SERPL-MCNC: 1.61 MG/DL (ref 0.76–1.27)
CREAT UR-MCNC: 30.6 MG/DL
GLOBULIN SER CALC-MCNC: 2.6 GM/DL
GLUCOSE SERPL-MCNC: 156 MG/DL (ref 65–99)
HBA1C MFR BLD: 6.7 % (ref 4.8–5.6)
HCT VFR BLD AUTO: 46.1 % (ref 37.5–51)
HDL SERPL-SCNC: 28.8 UMOL/L
HDLC SERPL-MCNC: 39 MG/DL
HGB BLD-MCNC: 15.6 G/DL (ref 13–17.7)
LDL SERPL QN: ABNORMAL NM
LDL SERPL-SCNC: <300 NMOL/L
LDL SMALL SERPL-SCNC: <90 NMOL/L
LDLC SERPL CALC-MCNC: 16 MG/DL (ref 0–99)
MICROALBUMIN UR-MCNC: 9 UG/ML
POTASSIUM SERPL-SCNC: 4.4 MMOL/L (ref 3.5–5.2)
PROT SERPL-MCNC: 7.4 G/DL (ref 6–8.5)
SHBG SERPL-SCNC: 35.8 NMOL/L (ref 19.3–76.4)
SODIUM SERPL-SCNC: 138 MMOL/L (ref 136–145)
T4 SERPL-MCNC: 6.24 MCG/DL (ref 4.5–11.7)
TESTOST FREE SERPL-MCNC: 21.9 PG/ML (ref 6.6–18.1)
TESTOST SERPL-MCNC: 1266 NG/DL (ref 264–916)
TRIGL SERPL-MCNC: 119 MG/DL (ref 0–149)
TSH SERPL DL<=0.005 MIU/L-ACNC: 3.52 UIU/ML (ref 0.27–4.2)
URATE SERPL-MCNC: 4.6 MG/DL (ref 3.4–7)

## 2020-08-05 ENCOUNTER — OFFICE VISIT (OUTPATIENT)
Dept: ENDOCRINOLOGY | Age: 75
End: 2020-08-05

## 2020-08-05 VITALS
WEIGHT: 238 LBS | SYSTOLIC BLOOD PRESSURE: 120 MMHG | BODY MASS INDEX: 31.54 KG/M2 | DIASTOLIC BLOOD PRESSURE: 80 MMHG | HEIGHT: 73 IN

## 2020-08-05 DIAGNOSIS — N40.1 BENIGN NON-NODULAR PROSTATIC HYPERPLASIA WITH LOWER URINARY TRACT SYMPTOMS: ICD-10-CM

## 2020-08-05 DIAGNOSIS — E79.0 HYPERURICEMIA: ICD-10-CM

## 2020-08-05 DIAGNOSIS — I25.10 CORONARY ARTERY DISEASE INVOLVING NATIVE CORONARY ARTERY OF NATIVE HEART WITHOUT ANGINA PECTORIS: ICD-10-CM

## 2020-08-05 DIAGNOSIS — K76.0 STEATOSIS OF LIVER: ICD-10-CM

## 2020-08-05 DIAGNOSIS — E11.42 DIABETIC PERIPHERAL NEUROPATHY (HCC): ICD-10-CM

## 2020-08-05 DIAGNOSIS — E29.1 HYPOGONADISM IN MALE: ICD-10-CM

## 2020-08-05 DIAGNOSIS — E78.5 DYSLIPIDEMIA: ICD-10-CM

## 2020-08-05 DIAGNOSIS — E11.40 TYPE 2 DIABETES MELLITUS WITH DIABETIC NEUROPATHY, WITH LONG-TERM CURRENT USE OF INSULIN (HCC): Primary | ICD-10-CM

## 2020-08-05 DIAGNOSIS — E55.9 VITAMIN D DEFICIENCY: ICD-10-CM

## 2020-08-05 DIAGNOSIS — E11.9 TYPE 2 DIABETES MELLITUS WITHOUT COMPLICATION, WITHOUT LONG-TERM CURRENT USE OF INSULIN (HCC): ICD-10-CM

## 2020-08-05 DIAGNOSIS — Z79.4 TYPE 2 DIABETES MELLITUS WITH DIABETIC NEUROPATHY, WITH LONG-TERM CURRENT USE OF INSULIN (HCC): Primary | ICD-10-CM

## 2020-08-05 DIAGNOSIS — I10 ESSENTIAL HYPERTENSION: ICD-10-CM

## 2020-08-05 PROCEDURE — 99214 OFFICE O/P EST MOD 30 MIN: CPT | Performed by: INTERNAL MEDICINE

## 2020-08-05 RX ORDER — ICOSAPENT ETHYL 1000 MG/1
2 CAPSULE ORAL 2 TIMES DAILY WITH MEALS
Qty: 360 CAPSULE | Refills: 3 | Status: SHIPPED | OUTPATIENT
Start: 2020-08-05 | End: 2021-10-22 | Stop reason: SDUPTHER

## 2020-08-05 RX ORDER — ALLOPURINOL 100 MG/1
100 TABLET ORAL DAILY
Qty: 90 TABLET | Refills: 3 | Status: SHIPPED | OUTPATIENT
Start: 2020-08-05 | End: 2021-10-25

## 2020-08-05 RX ORDER — ATORVASTATIN CALCIUM 20 MG/1
20 TABLET, FILM COATED ORAL NIGHTLY
Qty: 90 TABLET | Refills: 3 | Status: SHIPPED | OUTPATIENT
Start: 2020-08-05 | End: 2021-04-07 | Stop reason: SDUPTHER

## 2020-08-05 RX ORDER — AZITHROMYCIN 250 MG/1
TABLET, FILM COATED ORAL
COMMUNITY
Start: 2020-05-05 | End: 2020-09-14

## 2020-08-05 RX ORDER — PIOGLITAZONEHYDROCHLORIDE 45 MG/1
45 TABLET ORAL DAILY
Qty: 90 TABLET | Refills: 3 | Status: SHIPPED | OUTPATIENT
Start: 2020-08-05 | End: 2021-04-07 | Stop reason: SDUPTHER

## 2020-08-05 RX ORDER — LANCETS
EACH MISCELLANEOUS
Qty: 200 EACH | Refills: 3 | Status: SHIPPED | OUTPATIENT
Start: 2020-08-05 | End: 2021-10-25 | Stop reason: SDUPTHER

## 2020-08-05 RX ORDER — CIPROFLOXACIN 500 MG/1
500 TABLET, FILM COATED ORAL 2 TIMES DAILY WITH MEALS
COMMUNITY
Start: 2020-05-12 | End: 2020-09-14

## 2020-08-05 RX ORDER — ONDANSETRON 4 MG/1
4 TABLET, FILM COATED ORAL
COMMUNITY
Start: 2019-11-04 | End: 2021-05-25

## 2020-08-05 RX ORDER — PANTOPRAZOLE SODIUM 20 MG/1
TABLET, DELAYED RELEASE ORAL
COMMUNITY
Start: 2020-05-12 | End: 2020-09-14 | Stop reason: SDUPTHER

## 2020-08-05 RX ORDER — DEXLANSOPRAZOLE 60 MG/1
60 CAPSULE, DELAYED RELEASE ORAL
COMMUNITY
Start: 2019-10-30 | End: 2020-09-14

## 2020-08-05 RX ORDER — ACETAMINOPHEN AND CODEINE PHOSPHATE 300; 30 MG/1; MG/1
1-2 TABLET ORAL
COMMUNITY
Start: 2019-11-19 | End: 2021-05-25

## 2020-08-05 RX ORDER — AMLODIPINE BESYLATE AND ATORVASTATIN CALCIUM 5; 10 MG/1; MG/1
TABLET, FILM COATED ORAL
COMMUNITY
Start: 2017-11-10 | End: 2021-05-25

## 2020-08-05 RX ORDER — CIPROFLOXACIN 500 MG/5ML
KIT ORAL
COMMUNITY
Start: 2020-05-12 | End: 2020-09-14

## 2020-08-05 RX ORDER — TESTOSTERONE CYPIONATE 200 MG/ML
200 INJECTION, SOLUTION INTRAMUSCULAR WEEKLY
Qty: 14 ML | Refills: 1 | Status: SHIPPED | OUTPATIENT
Start: 2020-08-05

## 2020-08-05 RX ORDER — LINAGLIPTIN 5 MG/1
5 TABLET, FILM COATED ORAL DAILY
Qty: 90 TABLET | Refills: 3 | Status: SHIPPED | OUTPATIENT
Start: 2020-08-05 | End: 2021-10-19 | Stop reason: SDUPTHER

## 2020-08-05 RX ORDER — ERGOCALCIFEROL 1.25 MG/1
1 CAPSULE ORAL DAILY
COMMUNITY
End: 2021-05-25

## 2020-08-05 NOTE — PROGRESS NOTES
"Emma Marc is a 75 y.o. male is being seen for consultation today at the request of No ref. provider found    History of Present Illness this is a 75-year-old gentleman known patient with type 2 diabetes hypogonadism and hypertension and dyslipidemia with vitamin D deficiency.  Over the course of last 6 months he has had no significant health problem for which to go to the emergency room or hospital.  /80   Ht 185.4 cm (72.99\")   Wt 108 kg (238 lb)   BMI 31.41 kg/m²    Allergies   Allergen Reactions   • Other      Lotions- anything with palm oil or lanolin per pt (verified 6/15/16 1910)   • Prazosin    • Nsaids Other (See Comments)     CKD Stage 3       Scheduled Meds:  Continuous Infusions:  No current facility-administered medications for this visit.   PRN Meds:.    The following portions of the patient's history were reviewed and updated as appropriate: allergies, current medications, past family history, past medical history, past social history, past surgical history and problem list.    Review of Systems  The system review was reviewed, corroborated and accepted.  Objective   Physical Exam   Constitutional: He is oriented to person, place, and time. He appears well-developed and well-nourished. No distress.   HENT:   Head: Normocephalic and atraumatic.   Right Ear: External ear normal.   Left Ear: External ear normal.   Nose: Nose normal.   Mouth/Throat: Oropharynx is clear and moist. No oropharyngeal exudate.   Eyes: Pupils are equal, round, and reactive to light. Conjunctivae and EOM are normal. Right eye exhibits no discharge. Left eye exhibits no discharge. No scleral icterus.   Neck: Normal range of motion. Neck supple. No JVD present. No tracheal deviation present. No thyromegaly present.   Cardiovascular: Normal rate, regular rhythm, normal heart sounds and intact distal pulses. Exam reveals no gallop and no friction rub.   No murmur heard.  Pulmonary/Chest: Effort normal and " breath sounds normal. No stridor. No respiratory distress. He has no wheezes. He has no rales. He exhibits no tenderness.   Abdominal: Soft. Bowel sounds are normal. He exhibits no distension and no mass. There is no tenderness. There is no rebound and no guarding. No hernia.   Musculoskeletal: Normal range of motion. He exhibits no edema, tenderness or deformity.   Lymphadenopathy:     He has no cervical adenopathy.   Neurological: He is alert and oriented to person, place, and time. He has normal reflexes. He displays normal reflexes. No cranial nerve deficit or sensory deficit. He exhibits normal muscle tone. Coordination normal.   Skin: Skin is warm and dry. No rash noted. He is not diaphoretic. No erythema. No pallor.   Psychiatric: He has a normal mood and affect. His behavior is normal. Judgment and thought content normal.   Nursing note and vitals reviewed.  Significant change since 9/19/2019 office visit.    Results for orders placed or performed in visit on 07/29/20   TSH   Result Value Ref Range    TSH 3.520 0.270 - 4.200 uIU/mL   T4   Result Value Ref Range    T4, Total 6.24 4.50 - 11.70 mcg/dL   Uric Acid   Result Value Ref Range    Uric Acid 4.6 3.4 - 7.0 mg/dL   Vitamin D 25 Hydroxy   Result Value Ref Range    25 Hydroxy, Vitamin D 40.5 30.0 - 100.0 ng/ml   TestT+TestF+SHBG   Result Value Ref Range    Testosterone, Total 1,266 (H) 264 - 916 ng/dL    Testosterone, Free 21.9 (H) 6.6 - 18.1 pg/mL    Sex Hormone Binding Globulin 35.8 19.3 - 76.4 nmol/L   Comprehensive Metabolic Panel   Result Value Ref Range    Glucose 156 (H) 65 - 99 mg/dL    BUN 19 8 - 23 mg/dL    Creatinine 1.61 (H) 0.76 - 1.27 mg/dL    eGFR Non African Am 42 (L) >60 mL/min/1.73    eGFR African Am 51 (L) >60 mL/min/1.73    BUN/Creatinine Ratio 11.8 7.0 - 25.0    Sodium 138 136 - 145 mmol/L    Potassium 4.4 3.5 - 5.2 mmol/L    Chloride 100 98 - 107 mmol/L    Total CO2 29.6 (H) 22.0 - 29.0 mmol/L    Calcium 9.7 8.6 - 10.5 mg/dL    Total  Protein 7.4 6.0 - 8.5 g/dL    Albumin 4.80 3.50 - 5.20 g/dL    Globulin 2.6 gm/dL    A/G Ratio 1.8 g/dL    Total Bilirubin 0.7 0.0 - 1.2 mg/dL    Alkaline Phosphatase 58 39 - 117 U/L    AST (SGOT) 20 1 - 40 U/L    ALT (SGPT) 17 1 - 41 U/L   Hemoglobin A1c   Result Value Ref Range    Hemoglobin A1C 6.70 (H) 4.80 - 5.60 %   NMR LipoProfile   Result Value Ref Range    LDL-P <300 <1000 nmol/L    LDL-C 16 0 - 99 mg/dL    HDL-C 39 (L) >39 mg/dL    Triglycerides 119 0 - 149 mg/dL    Total Cholesterol 79 (L) 100 - 199 mg/dL    HDL-P (Total) 28.8 (L) >=30.5 umol/L    Small LDL-P <90 <=527 nmol/L    LDL Size CANCELED nm   Microalbumin / Creatinine Urine Ratio - Urine, Clean Catch   Result Value Ref Range    Creatinine, Urine 30.6 Not Estab. mg/dL    Microalbumin, Urine 9.0 Not Estab. ug/mL    Microalbumin/Creatinine Ratio 29 0 - 29 mg/g creat   C-Peptide   Result Value Ref Range    C-Peptide 3.5 1.1 - 4.4 ng/mL   Hemoglobin & Hematocrit, Blood   Result Value Ref Range    Hemoglobin 15.6 13.0 - 17.7 g/dL    Hematocrit 46.1 37.5 - 51.0 %    b  Assessment/Plan   Earl was seen today for diabetes.    Diagnoses and all orders for this visit:    Type 2 diabetes mellitus with diabetic neuropathy, with long-term current use of insulin (CMS/[facility])  -     T4 & TSH ([facility]); Future  -     T3, Free; Future  -     T4, Free; Future  -     Vitamin D 25 Hydroxy; Future  -     Uric Acid; Future  -     Comprehensive Metabolic Panel; Future  -     C-Peptide; Future  -     Hemoglobin A1c; Future  -     PSA DIAGNOSTIC; Future  -     NMR LipoProfile; Future  -     MicroAlbumin, Urine, Random - Urine, Clean Catch; Future    Hypogonadism in male  -     DEPO-TESTOSTERONE 200 MG/ML injection; Inject 1 mL into the appropriate muscle as directed by prescriber 1 (One) Time Per Week.  -     T4 & TSH ([facility]); Future  -     T3, Free; Future  -     T4, Free; Future  -     Vitamin D 25 Hydroxy; Future  -     Uric Acid; Future  -     Comprehensive Metabolic  Panel; Future  -     C-Peptide; Future  -     Hemoglobin A1c; Future  -     PSA DIAGNOSTIC; Future  -     NMR LipoProfile; Future  -     MicroAlbumin, Urine, Random - Urine, Clean Catch; Future    Vitamin D deficiency  -     DEPO-TESTOSTERONE 200 MG/ML injection; Inject 1 mL into the appropriate muscle as directed by prescriber 1 (One) Time Per Week.  -     T4 & TSH ([facility]); Future  -     T3, Free; Future  -     T4, Free; Future  -     Vitamin D 25 Hydroxy; Future  -     Uric Acid; Future  -     Comprehensive Metabolic Panel; Future  -     C-Peptide; Future  -     Hemoglobin A1c; Future  -     PSA DIAGNOSTIC; Future  -     NMR LipoProfile; Future  -     MicroAlbumin, Urine, Random - Urine, Clean Catch; Future    Steatosis of liver  -     T4 & TSH ([facility]); Future  -     T3, Free; Future  -     T4, Free; Future  -     Vitamin D 25 Hydroxy; Future  -     Uric Acid; Future  -     Comprehensive Metabolic Panel; Future  -     C-Peptide; Future  -     Hemoglobin A1c; Future  -     PSA DIAGNOSTIC; Future  -     NMR LipoProfile; Future  -     MicroAlbumin, Urine, Random - Urine, Clean Catch; Future    Essential hypertension  -     DEPO-TESTOSTERONE 200 MG/ML injection; Inject 1 mL into the appropriate muscle as directed by prescriber 1 (One) Time Per Week.  -     T4 & TSH ([facility]); Future  -     T3, Free; Future  -     T4, Free; Future  -     Vitamin D 25 Hydroxy; Future  -     Uric Acid; Future  -     Comprehensive Metabolic Panel; Future  -     C-Peptide; Future  -     Hemoglobin A1c; Future  -     PSA DIAGNOSTIC; Future  -     NMR LipoProfile; Future  -     MicroAlbumin, Urine, Random - Urine, Clean Catch; Future    Coronary artery disease involving native coronary artery of native heart without angina pectoris  -     T4 & TSH ([facility]); Future  -     T3, Free; Future  -     T4, Free; Future  -     Vitamin D 25 Hydroxy; Future  -     Uric Acid; Future  -     Comprehensive Metabolic Panel; Future  -     C-Peptide;  Future  -     Hemoglobin A1c; Future  -     PSA DIAGNOSTIC; Future  -     NMR LipoProfile; Future  -     MicroAlbumin, Urine, Random - Urine, Clean Catch; Future    Benign non-nodular prostatic hyperplasia with lower urinary tract symptoms  -     T4 & TSH ([facility]); Future  -     T3, Free; Future  -     T4, Free; Future  -     Vitamin D 25 Hydroxy; Future  -     Uric Acid; Future  -     Comprehensive Metabolic Panel; Future  -     C-Peptide; Future  -     Hemoglobin A1c; Future  -     PSA DIAGNOSTIC; Future  -     NMR LipoProfile; Future  -     MicroAlbumin, Urine, Random - Urine, Clean Catch; Future    Dyslipidemia  -     DEPO-TESTOSTERONE 200 MG/ML injection; Inject 1 mL into the appropriate muscle as directed by prescriber 1 (One) Time Per Week.  -     T4 & TSH ([facility]); Future  -     T3, Free; Future  -     T4, Free; Future  -     Vitamin D 25 Hydroxy; Future  -     Uric Acid; Future  -     Comprehensive Metabolic Panel; Future  -     C-Peptide; Future  -     Hemoglobin A1c; Future  -     PSA DIAGNOSTIC; Future  -     NMR LipoProfile; Future  -     MicroAlbumin, Urine, Random - Urine, Clean Catch; Future    Diabetic peripheral neuropathy (CMS/HCC)  -     DEPO-TESTOSTERONE 200 MG/ML injection; Inject 1 mL into the appropriate muscle as directed by prescriber 1 (One) Time Per Week.  -     T4 & TSH ([facility]); Future  -     T3, Free; Future  -     T4, Free; Future  -     Vitamin D 25 Hydroxy; Future  -     Uric Acid; Future  -     Comprehensive Metabolic Panel; Future  -     C-Peptide; Future  -     Hemoglobin A1c; Future  -     PSA DIAGNOSTIC; Future  -     NMR LipoProfile; Future  -     MicroAlbumin, Urine, Random - Urine, Clean Catch; Future    Type 2 diabetes mellitus without complication, without long-term current use of insulin (CMS/HCC)  -     DEPO-TESTOSTERONE 200 MG/ML injection; Inject 1 mL into the appropriate muscle as directed by prescriber 1 (One) Time Per Week.  -     T4 & TSH ([facility]); Future  -      T3, Free; Future  -     T4, Free; Future  -     Vitamin D 25 Hydroxy; Future  -     Uric Acid; Future  -     Comprehensive Metabolic Panel; Future  -     C-Peptide; Future  -     Hemoglobin A1c; Future  -     PSA DIAGNOSTIC; Future  -     NMR LipoProfile; Future  -     MicroAlbumin, Urine, Random - Urine, Clean Catch; Future    Hyperuricemia  -     DEPO-TESTOSTERONE 200 MG/ML injection; Inject 1 mL into the appropriate muscle as directed by prescriber 1 (One) Time Per Week.  -     T4 & TSH ([facility]); Future  -     T3, Free; Future  -     T4, Free; Future  -     Vitamin D 25 Hydroxy; Future  -     Uric Acid; Future  -     Comprehensive Metabolic Panel; Future  -     C-Peptide; Future  -     Hemoglobin A1c; Future  -     PSA DIAGNOSTIC; Future  -     NMR LipoProfile; Future  -     MicroAlbumin, Urine, Random - Urine, Clean Catch; Future    Other orders  -     allopurinol (ZYLOPRIM) 100 MG tablet; Take 1 tablet by mouth Daily.  -     atorvastatin (LIPITOR) 20 MG tablet; Take 1 tablet by mouth Every Night.  -     pioglitazone (ACTOS) 45 MG tablet; Take 1 tablet by mouth Daily.  -     TRADJENTA 5 MG tablet tablet; Take 1 tablet by mouth Daily.  -     VASCEPA 1 g capsule capsule; Take 2 g by mouth 2 (Two) Times a Day With Meals.  -     glucose blood test strip; Precision Xtra Blood Glucose In Vitro Strip; Patient Sig: Precision Xtra Blood Glucose In Vitro Strip TEST 2  TIMES DAILY  -     Monolet Lancets misc; Use to test BG 2 times daily      In summary I saw and examined this 75-year-old gentleman for above-mentioned problems.  I reviewed his laboratory evaluations 7/29/2020 and provided him with a hard copy of it.  Overall he is clinically and metabolically stable and therefore we will go ahead and continue his current prescriptions.  I will see him in 6 months or sooner if needed with laboratory evaluation prior to each office visit.

## 2020-08-24 DIAGNOSIS — E11.42 DIABETIC PERIPHERAL NEUROPATHY (HCC): ICD-10-CM

## 2020-08-24 RX ORDER — PREGABALIN 50 MG/1
50 CAPSULE ORAL 3 TIMES DAILY PRN
Qty: 90 CAPSULE | Refills: 0 | Status: SHIPPED | OUTPATIENT
Start: 2020-08-24 | End: 2020-10-02

## 2020-08-31 RX ORDER — INSULIN GLARGINE 100 [IU]/ML
INJECTION, SOLUTION SUBCUTANEOUS
Qty: 15 PEN | Refills: 3 | Status: SHIPPED | OUTPATIENT
Start: 2020-08-31 | End: 2021-10-25 | Stop reason: SDUPTHER

## 2020-09-14 ENCOUNTER — OFFICE VISIT (OUTPATIENT)
Dept: FAMILY MEDICINE CLINIC | Facility: CLINIC | Age: 75
End: 2020-09-14

## 2020-09-14 VITALS
SYSTOLIC BLOOD PRESSURE: 130 MMHG | TEMPERATURE: 98.7 F | BODY MASS INDEX: 31.81 KG/M2 | OXYGEN SATURATION: 99 % | WEIGHT: 240 LBS | DIASTOLIC BLOOD PRESSURE: 80 MMHG | HEART RATE: 55 BPM | HEIGHT: 73 IN

## 2020-09-14 DIAGNOSIS — R10.816 EPIGASTRIC ABDOMINAL TENDERNESS WITHOUT REBOUND TENDERNESS: ICD-10-CM

## 2020-09-14 DIAGNOSIS — R11.0 NAUSEA: ICD-10-CM

## 2020-09-14 DIAGNOSIS — R42 DIZZINESS: Primary | ICD-10-CM

## 2020-09-14 DIAGNOSIS — M79.89 RIGHT LEG SWELLING: ICD-10-CM

## 2020-09-14 PROCEDURE — 99214 OFFICE O/P EST MOD 30 MIN: CPT | Performed by: INTERNAL MEDICINE

## 2020-09-14 RX ORDER — MECLIZINE HYDROCHLORIDE 25 MG/1
25 TABLET ORAL 3 TIMES DAILY PRN
Qty: 30 TABLET | Refills: 0 | Status: SHIPPED | OUTPATIENT
Start: 2020-09-14 | End: 2021-05-25

## 2020-09-14 RX ORDER — PANTOPRAZOLE SODIUM 20 MG/1
20 TABLET, DELAYED RELEASE ORAL DAILY
Qty: 90 TABLET | Refills: 1 | Status: SHIPPED | OUTPATIENT
Start: 2020-09-14 | End: 2021-10-06 | Stop reason: SDUPTHER

## 2020-09-14 NOTE — PROGRESS NOTES
Subjective  Answers for HPI/ROS submitted by the patient on 9/14/2020   What is the primary reason for your visit?: Other  Please describe your symptoms.: Dizziness, swollen right leg and foot, upset stomach when I eat and  Have you had these symptoms before?: Yes  How long have you been having these symptoms?: 1-4 days  Please list any medications you are currently taking for this condition.: None  Please describe any probable cause for these symptoms. : Tiredness and loss of energy  and stress.    Earl Marc is a 75 y.o. male.  Chief complaint is dizziness and nausea    History of Present Illness Earl is here today for complaints of moderate Intermittent dizziness and nausea.  This began approximately 3 to 4 days ago.  The dizziness is described as spinning.  It is worse with certain position changes but also worse when he goes from sitting to standing.  There is an associated feeling of being off balance as well.  He also has been experiencing some nausea and upset stomach when he eats.  He has not actually had any vomiting.  He does complain of some right foot and ankle swelling.  He has noticed some prominence on the veins of his right leg.  He has no prior history of blood clots.    The following portions of the patient's history were reviewed and updated as appropriate: allergies, current medications, past family history, past medical history, past social history, past surgical history and problem list.    Review of Systems   Constitutional: Negative for chills and fever.   Respiratory: Negative for cough.    Gastrointestinal: Positive for nausea, GERD and indigestion. Negative for abdominal pain.   Neurological: Positive for dizziness, tremors and light-headedness.       Objective   Physical Exam  HENT:      Right Ear: Tympanic membrane normal.      Left Ear: Tympanic membrane normal.   Eyes:      General: No scleral icterus.     Extraocular Movements: Extraocular movements intact.       Conjunctiva/sclera: Conjunctivae normal.      Pupils: Pupils are equal, round, and reactive to light.   Neck:      Vascular: No carotid bruit.   Cardiovascular:      Rate and Rhythm: Normal rate and regular rhythm.      Pulses: Normal pulses.      Comments: There are prominent varicose veins of the right lower extremity.  I do not palpate any superficial clots.  Pulmonary:      Effort: Pulmonary effort is normal.      Breath sounds: No wheezing or rales.   Abdominal:      General: Bowel sounds are normal. There is no distension.      Palpations: Abdomen is soft.      Tenderness: There is abdominal tenderness.      Comments: There is some tenderness in the midepigastrium but no rebound or guarding   Musculoskeletal:      Right lower leg: Edema present.      Left lower leg: No edema.   Neurological:      Mental Status: He is alert.           Assessment/Plan   Earl was seen today for nausea, dizziness, med refill and foot swelling.    Diagnoses and all orders for this visit:    Dizziness  -     CBC & Differential  -     Comprehensive Metabolic Panel    Nausea  -     CBC & Differential  -     Comprehensive Metabolic Panel    Epigastric abdominal tenderness without rebound tenderness  -     Comprehensive Metabolic Panel  -     Amylase  -     Lipase    Right leg swelling  -     Comprehensive Metabolic Panel  -     Duplex Venous Lower Extremity - Right CAR; Future    Other orders  -     pantoprazole (PROTONIX) 20 MG EC tablet; Take 1 tablet by mouth Daily.  -     meclizine (ANTIVERT) 25 MG tablet; Take 1 tablet by mouth 3 (Three) Times a Day As Needed for Dizziness or Nausea.      Earl is here today for several reasons.  I suspect he had some sort of viral labyrinthian problem.  I am going to check a CBC and chemistry panel for his dizziness.  I am going to prescribe some meclizine that may help with the dizziness and nausea.  We are going to rule out the possibility of a DVT of his right leg.

## 2020-09-15 LAB
ALBUMIN SERPL-MCNC: 4.8 G/DL (ref 3.5–5.2)
ALBUMIN/GLOB SERPL: 1.8 G/DL
ALP SERPL-CCNC: 65 U/L (ref 39–117)
ALT SERPL-CCNC: 23 U/L (ref 1–41)
AMYLASE SERPL-CCNC: 79 U/L (ref 28–100)
AST SERPL-CCNC: 24 U/L (ref 1–40)
BASOPHILS # BLD AUTO: 0.04 10*3/MM3 (ref 0–0.2)
BASOPHILS NFR BLD AUTO: 0.6 % (ref 0–1.5)
BILIRUB SERPL-MCNC: 0.6 MG/DL (ref 0–1.2)
BUN SERPL-MCNC: 18 MG/DL (ref 8–23)
BUN/CREAT SERPL: 10.7 (ref 7–25)
CALCIUM SERPL-MCNC: 9.8 MG/DL (ref 8.6–10.5)
CHLORIDE SERPL-SCNC: 99 MMOL/L (ref 98–107)
CO2 SERPL-SCNC: 28.9 MMOL/L (ref 22–29)
CREAT SERPL-MCNC: 1.68 MG/DL (ref 0.76–1.27)
EOSINOPHIL # BLD AUTO: 0.13 10*3/MM3 (ref 0–0.4)
EOSINOPHIL NFR BLD AUTO: 1.9 % (ref 0.3–6.2)
ERYTHROCYTE [DISTWIDTH] IN BLOOD BY AUTOMATED COUNT: 13.7 % (ref 12.3–15.4)
GLOBULIN SER CALC-MCNC: 2.7 GM/DL
GLUCOSE SERPL-MCNC: 95 MG/DL (ref 65–99)
HCT VFR BLD AUTO: 47.5 % (ref 37.5–51)
HGB BLD-MCNC: 15.5 G/DL (ref 13–17.7)
IMM GRANULOCYTES # BLD AUTO: 0.01 10*3/MM3 (ref 0–0.05)
IMM GRANULOCYTES NFR BLD AUTO: 0.1 % (ref 0–0.5)
LIPASE SERPL-CCNC: 30 U/L (ref 13–60)
LYMPHOCYTES # BLD AUTO: 1.98 10*3/MM3 (ref 0.7–3.1)
LYMPHOCYTES NFR BLD AUTO: 29.7 % (ref 19.6–45.3)
MCH RBC QN AUTO: 28.8 PG (ref 26.6–33)
MCHC RBC AUTO-ENTMCNC: 32.6 G/DL (ref 31.5–35.7)
MCV RBC AUTO: 88.1 FL (ref 79–97)
MONOCYTES # BLD AUTO: 0.59 10*3/MM3 (ref 0.1–0.9)
MONOCYTES NFR BLD AUTO: 8.8 % (ref 5–12)
NEUTROPHILS # BLD AUTO: 3.92 10*3/MM3 (ref 1.7–7)
NEUTROPHILS NFR BLD AUTO: 58.9 % (ref 42.7–76)
NRBC BLD AUTO-RTO: 0 /100 WBC (ref 0–0.2)
PLATELET # BLD AUTO: 194 10*3/MM3 (ref 140–450)
POTASSIUM SERPL-SCNC: 4.8 MMOL/L (ref 3.5–5.2)
PROT SERPL-MCNC: 7.5 G/DL (ref 6–8.5)
RBC # BLD AUTO: 5.39 10*6/MM3 (ref 4.14–5.8)
SODIUM SERPL-SCNC: 139 MMOL/L (ref 136–145)
WBC # BLD AUTO: 6.67 10*3/MM3 (ref 3.4–10.8)

## 2020-09-22 ENCOUNTER — HOSPITAL ENCOUNTER (OUTPATIENT)
Dept: CARDIOLOGY | Facility: HOSPITAL | Age: 75
Discharge: HOME | End: 2020-09-22
Admitting: INTERNAL MEDICINE

## 2020-09-22 DIAGNOSIS — M79.89 RIGHT LEG SWELLING: ICD-10-CM

## 2020-09-22 LAB
BH CV LOWER VASCULAR LEFT COMMON FEMORAL AUGMENT: NORMAL
BH CV LOWER VASCULAR LEFT COMMON FEMORAL COMPETENT: NORMAL
BH CV LOWER VASCULAR LEFT COMMON FEMORAL COMPRESS: NORMAL
BH CV LOWER VASCULAR LEFT COMMON FEMORAL PHASIC: NORMAL
BH CV LOWER VASCULAR LEFT COMMON FEMORAL SPONT: NORMAL
BH CV LOWER VASCULAR RIGHT COMMON FEMORAL AUGMENT: NORMAL
BH CV LOWER VASCULAR RIGHT COMMON FEMORAL COMPETENT: NORMAL
BH CV LOWER VASCULAR RIGHT COMMON FEMORAL COMPRESS: NORMAL
BH CV LOWER VASCULAR RIGHT COMMON FEMORAL PHASIC: NORMAL
BH CV LOWER VASCULAR RIGHT COMMON FEMORAL SPONT: NORMAL
BH CV LOWER VASCULAR RIGHT DISTAL FEMORAL COMPRESS: NORMAL
BH CV LOWER VASCULAR RIGHT GASTRONEMIUS COMPRESS: NORMAL
BH CV LOWER VASCULAR RIGHT GREATER SAPH AK COMPRESS: NORMAL
BH CV LOWER VASCULAR RIGHT GREATER SAPH BK COMPRESS: NORMAL
BH CV LOWER VASCULAR RIGHT LESSER SAPH COMPRESS: NORMAL
BH CV LOWER VASCULAR RIGHT MID FEMORAL AUGMENT: NORMAL
BH CV LOWER VASCULAR RIGHT MID FEMORAL COMPETENT: NORMAL
BH CV LOWER VASCULAR RIGHT MID FEMORAL COMPRESS: NORMAL
BH CV LOWER VASCULAR RIGHT MID FEMORAL PHASIC: NORMAL
BH CV LOWER VASCULAR RIGHT MID FEMORAL SPONT: NORMAL
BH CV LOWER VASCULAR RIGHT PERONEAL COMPRESS: NORMAL
BH CV LOWER VASCULAR RIGHT POPLITEAL AUGMENT: NORMAL
BH CV LOWER VASCULAR RIGHT POPLITEAL COMPETENT: NORMAL
BH CV LOWER VASCULAR RIGHT POPLITEAL COMPRESS: NORMAL
BH CV LOWER VASCULAR RIGHT POPLITEAL PHASIC: NORMAL
BH CV LOWER VASCULAR RIGHT POPLITEAL SPONT: NORMAL
BH CV LOWER VASCULAR RIGHT POSTERIOR TIBIAL COMPRESS: NORMAL
BH CV LOWER VASCULAR RIGHT PROFUNDA FEMORAL COMPRESS: NORMAL
BH CV LOWER VASCULAR RIGHT PROXIMAL FEMORAL COMPRESS: NORMAL
BH CV LOWER VASCULAR RIGHT SAPHENOFEMORAL JUNCTION COMPRESS: NORMAL

## 2020-09-22 PROCEDURE — 93971 EXTREMITY STUDY: CPT

## 2020-10-02 DIAGNOSIS — E11.42 DIABETIC PERIPHERAL NEUROPATHY (HCC): ICD-10-CM

## 2020-10-02 RX ORDER — PREGABALIN 50 MG/1
50 CAPSULE ORAL 3 TIMES DAILY PRN
Qty: 90 CAPSULE | Refills: 0 | Status: CANCELLED | OUTPATIENT
Start: 2020-10-02

## 2020-12-30 DIAGNOSIS — E11.42 DIABETIC PERIPHERAL NEUROPATHY (HCC): ICD-10-CM

## 2020-12-31 RX ORDER — PREGABALIN 100 MG/1
CAPSULE ORAL
Qty: 180 CAPSULE | Refills: 0 | Status: SHIPPED | OUTPATIENT
Start: 2020-12-31 | End: 2021-04-07

## 2021-01-04 DIAGNOSIS — E11.9 TYPE 2 DIABETES MELLITUS WITHOUT COMPLICATION, WITHOUT LONG-TERM CURRENT USE OF INSULIN (HCC): ICD-10-CM

## 2021-01-04 DIAGNOSIS — E55.9 VITAMIN D DEFICIENCY: ICD-10-CM

## 2021-01-04 DIAGNOSIS — E79.0 HYPERURICEMIA: ICD-10-CM

## 2021-01-04 DIAGNOSIS — E11.42 DIABETIC PERIPHERAL NEUROPATHY (HCC): ICD-10-CM

## 2021-01-04 DIAGNOSIS — I10 ESSENTIAL HYPERTENSION: ICD-10-CM

## 2021-01-04 DIAGNOSIS — E29.1 HYPOGONADISM IN MALE: ICD-10-CM

## 2021-01-04 DIAGNOSIS — E78.5 DYSLIPIDEMIA: ICD-10-CM

## 2021-01-04 RX ORDER — FLURBIPROFEN SODIUM 0.3 MG/ML
SOLUTION/ DROPS OPHTHALMIC
Qty: 100 EACH | Refills: 3 | Status: SHIPPED | OUTPATIENT
Start: 2021-01-04 | End: 2021-04-07 | Stop reason: SDUPTHER

## 2021-01-04 RX ORDER — FLURBIPROFEN SODIUM 0.3 MG/ML
SOLUTION/ DROPS OPHTHALMIC
Qty: 100 EACH | Refills: 3 | Status: SHIPPED | OUTPATIENT
Start: 2021-01-04 | End: 2021-01-04 | Stop reason: SDUPTHER

## 2021-01-11 ENCOUNTER — TELEPHONE (OUTPATIENT)
Dept: FAMILY MEDICINE CLINIC | Facility: CLINIC | Age: 76
End: 2021-01-11

## 2021-01-11 RX ORDER — FEXOFENADINE HCL AND PSEUDOEPHEDRINE HCI 60; 120 MG/1; MG/1
1 TABLET, EXTENDED RELEASE ORAL 2 TIMES DAILY PRN
Qty: 20 TABLET | Refills: 0 | Status: SHIPPED | OUTPATIENT
Start: 2021-01-11 | End: 2021-05-29 | Stop reason: HOSPADM

## 2021-01-11 RX ORDER — AZITHROMYCIN 250 MG/1
TABLET, FILM COATED ORAL
Qty: 6 TABLET | Refills: 0 | Status: SHIPPED | OUTPATIENT
Start: 2021-01-11 | End: 2021-05-25

## 2021-01-11 NOTE — TELEPHONE ENCOUNTER
Caller: Earl Marc    Relationship: Self    Best call back number: [phone]    What medication are you requesting: Z-pack    What are your current symptoms: L eye is sore, clear discharge mixed with blood, congestion    How long have you been experiencing symptoms: a couple weeks    Have you had these symptoms before: [x] Yes  [] No    Have you been treated for these symptoms before: [x] Yes  [] No    If a prescription is needed, what is your preferred pharmacy and phone number: [address] [facility] [phone] [facility] [phone]      The patient has had symptoms for approximately 3 weeks.  At the onset of symptoms he did get tested for Covid.  This was negative.  I am going to prescribe a Z-Venancio and some Allegra-D.

## 2021-03-04 DIAGNOSIS — Z23 IMMUNIZATION DUE: ICD-10-CM

## 2021-03-31 ENCOUNTER — LAB (OUTPATIENT)
Dept: ENDOCRINOLOGY | Age: 76
End: 2021-03-31

## 2021-03-31 DIAGNOSIS — E78.5 DYSLIPIDEMIA: ICD-10-CM

## 2021-03-31 DIAGNOSIS — Z79.4 TYPE 2 DIABETES MELLITUS WITH DIABETIC NEUROPATHY, WITH LONG-TERM CURRENT USE OF INSULIN (HCC): ICD-10-CM

## 2021-03-31 DIAGNOSIS — I10 ESSENTIAL HYPERTENSION: ICD-10-CM

## 2021-03-31 DIAGNOSIS — E55.9 VITAMIN D DEFICIENCY: ICD-10-CM

## 2021-03-31 DIAGNOSIS — E78.5 DYSLIPIDEMIA: Primary | ICD-10-CM

## 2021-03-31 DIAGNOSIS — E29.1 HYPOGONADISM IN MALE: ICD-10-CM

## 2021-03-31 DIAGNOSIS — E11.40 TYPE 2 DIABETES MELLITUS WITH DIABETIC NEUROPATHY, WITH LONG-TERM CURRENT USE OF INSULIN (HCC): ICD-10-CM

## 2021-04-03 LAB
25(OH)D3+25(OH)D2 SERPL-MCNC: 39 NG/ML (ref 30–100)
ALBUMIN SERPL-MCNC: 4.5 G/DL (ref 3.5–5.2)
ALBUMIN/CREAT UR: 51 MG/G CREAT (ref 0–29)
ALBUMIN/GLOB SERPL: 1.7 G/DL
ALP SERPL-CCNC: 63 U/L (ref 39–117)
ALT SERPL-CCNC: 17 U/L (ref 1–41)
AST SERPL-CCNC: 22 U/L (ref 1–40)
BILIRUB SERPL-MCNC: 0.7 MG/DL (ref 0–1.2)
BUN SERPL-MCNC: 21 MG/DL (ref 8–23)
BUN/CREAT SERPL: 12.4 (ref 7–25)
C PEPTIDE SERPL-MCNC: 3.5 NG/ML (ref 1.1–4.4)
CALCIUM SERPL-MCNC: 9.9 MG/DL (ref 8.6–10.5)
CHLORIDE SERPL-SCNC: 102 MMOL/L (ref 98–107)
CHOLEST SERPL-MCNC: 68 MG/DL (ref 0–200)
CO2 SERPL-SCNC: 29.2 MMOL/L (ref 22–29)
CREAT SERPL-MCNC: 1.7 MG/DL (ref 0.76–1.27)
CREAT UR-MCNC: 22.6 MG/DL
GLOBULIN SER CALC-MCNC: 2.7 GM/DL
GLUCOSE SERPL-MCNC: 202 MG/DL (ref 65–99)
HBA1C MFR BLD: 7.3 % (ref 4.8–5.6)
HCT VFR BLD AUTO: 46.4 % (ref 37.5–51)
HDLC SERPL-MCNC: 37 MG/DL (ref 40–60)
HGB BLD-MCNC: 15.2 G/DL (ref 13–17.7)
IMP & REVIEW OF LAB RESULTS: NORMAL
LDLC SERPL CALC-MCNC: 15 MG/DL (ref 0–100)
Lab: NORMAL
MICROALBUMIN UR-MCNC: 11.6 UG/ML
POTASSIUM SERPL-SCNC: 5.1 MMOL/L (ref 3.5–5.2)
PROT SERPL-MCNC: 7.2 G/DL (ref 6–8.5)
SODIUM SERPL-SCNC: 139 MMOL/L (ref 136–145)
TESTOST FREE SERPL-MCNC: 13.6 PG/ML (ref 6.6–18.1)
TESTOST SERPL-MCNC: 900 NG/DL (ref 264–916)
TRIGL SERPL-MCNC: 76 MG/DL (ref 0–150)
VLDLC SERPL CALC-MCNC: 16 MG/DL (ref 5–40)

## 2021-04-07 ENCOUNTER — OFFICE VISIT (OUTPATIENT)
Dept: ENDOCRINOLOGY | Age: 76
End: 2021-04-07

## 2021-04-07 VITALS
HEIGHT: 73 IN | SYSTOLIC BLOOD PRESSURE: 116 MMHG | WEIGHT: 249 LBS | DIASTOLIC BLOOD PRESSURE: 62 MMHG | BODY MASS INDEX: 33 KG/M2

## 2021-04-07 DIAGNOSIS — E11.9 TYPE 2 DIABETES MELLITUS WITHOUT COMPLICATION, WITHOUT LONG-TERM CURRENT USE OF INSULIN (HCC): Primary | ICD-10-CM

## 2021-04-07 DIAGNOSIS — E11.42 DIABETIC PERIPHERAL NEUROPATHY (HCC): ICD-10-CM

## 2021-04-07 DIAGNOSIS — I10 ESSENTIAL HYPERTENSION: ICD-10-CM

## 2021-04-07 DIAGNOSIS — E55.9 VITAMIN D DEFICIENCY: ICD-10-CM

## 2021-04-07 DIAGNOSIS — E29.1 HYPOGONADISM IN MALE: ICD-10-CM

## 2021-04-07 DIAGNOSIS — E78.5 DYSLIPIDEMIA: ICD-10-CM

## 2021-04-07 PROCEDURE — 99214 OFFICE O/P EST MOD 30 MIN: CPT | Performed by: NURSE PRACTITIONER

## 2021-04-07 RX ORDER — PIOGLITAZONEHYDROCHLORIDE 45 MG/1
45 TABLET ORAL DAILY
Qty: 90 TABLET | Refills: 1 | Status: SHIPPED | OUTPATIENT
Start: 2021-04-07 | End: 2021-10-25 | Stop reason: SDUPTHER

## 2021-04-07 RX ORDER — FLURBIPROFEN SODIUM 0.3 MG/ML
SOLUTION/ DROPS OPHTHALMIC
Qty: 100 EACH | Refills: 3 | Status: SHIPPED | OUTPATIENT
Start: 2021-04-07 | End: 2021-10-25 | Stop reason: SDUPTHER

## 2021-04-07 RX ORDER — LORATADINE 10 MG
1 TABLET ORAL DAILY
Qty: 400 EACH | Refills: 3 | Status: SHIPPED | OUTPATIENT
Start: 2021-04-07 | End: 2021-10-25 | Stop reason: SDUPTHER

## 2021-04-07 RX ORDER — PREGABALIN 150 MG/1
150 CAPSULE ORAL 2 TIMES DAILY
Qty: 180 CAPSULE | Refills: 0 | Status: SHIPPED | OUTPATIENT
Start: 2021-04-07 | End: 2021-09-17 | Stop reason: SDUPTHER

## 2021-04-07 RX ORDER — ATORVASTATIN CALCIUM 20 MG/1
20 TABLET, FILM COATED ORAL NIGHTLY
Qty: 90 TABLET | Refills: 1 | Status: SHIPPED | OUTPATIENT
Start: 2021-04-07 | End: 2021-10-25 | Stop reason: SDUPTHER

## 2021-04-07 RX ORDER — FUROSEMIDE 20 MG/1
20 TABLET ORAL DAILY
Status: ON HOLD | COMMUNITY
Start: 2021-03-23 | End: 2021-05-29 | Stop reason: SDUPTHER

## 2021-04-07 NOTE — PROGRESS NOTES
"  Subjective    Earl Marc is a 75 y.o. male. he is here today for follow-up.  Chief Complaint   Patient presents with   • Diabetes     /62   Ht 185.4 cm (72.99\")   Wt 113 kg (249 lb)   BMI 32.86 kg/m²       History of Present Illness   Encounter Diagnoses   Name Primary?   • Diabetic peripheral neuropathy (CMS/HCC)    • Hypogonadism in male    • Dyslipidemia    • Vitamin D deficiency    • Essential hypertension    • Type 2 diabetes mellitus without complication, without long-term current use of insulin (CMS/HCC) Yes     75-year-old male patient here today for follow-up visit.  He has been seen for the above-mentioned problems.  He is complaining of uncontrolled neuropathy in his feet.  We discussed increasing his Lyrica to 150 mg twice daily.  He is requesting this prescription be sent to his mail order pharmacy.  A Eddy was reviewed.  He is requesting printed prescriptions of the rest of his medications needing refills.  He sees a nephrologist routinely.  He is complaining of problems with back pain and hip pain and is hoping the Lyrica will also help alleviate some of that pain.  He has not had any hypoglycemic events.  His medications were reviewed and updated for accuracy.  He is on testosterone therapy that is not prescribed by this office.  He follows with urology.  He checks his blood sugars once daily and monitors his blood pressure and pulse.    The following portions of the patient's history were reviewed and updated as appropriate:   Past Medical History:   Diagnosis Date   • Abnormal cardiovascular stress test    • Acid reflux    • Arthritis    • Asthma    • Cancer (CMS/HCC)     skin    • Chronic kidney disease    • Coronary artery disease    • Diabetes mellitus (CMS/HCC)    • Diabetic neuropathy associated with type 2 diabetes mellitus (CMS/HCC)    • Dyslipidemia    • Erectile dysfunction    • Fatty liver disease, nonalcoholic    • Gastritis    • Glaucoma     both eyes   • " Hyperlipidemia    • Hypertension    • Hypogonadism male    • Type 2 diabetes mellitus (CMS/[facility])      Past Surgical History:   Procedure Laterality Date   • CARDIAC CATHETERIZATION N/A 3/25/2016    Procedure: Left Heart Cath;  Surgeon: Maria E Gilbert MD;  Location[facility] C[facility]ASIVE LOC[facility];  Service:    • CARDIAC CATHETERIZATION N/A 3/25/2016    Procedure: Coronary angiography;  Surgeon: Maria E Gilbert MD;  [facility];  Service:    • CARDIAC CATHETERIZATION N/A 3/28/2016    Procedure: Coronary angiography;  Surgeon: Maria E Gilbert MD;  [facility];  Service:    • CARDIAC CATHETERIZATION N/A 3/28/2016    Procedure: Stent MOISE coronary;  Surgeon: Maria E Gilbert MD;  [facility];  Service:    • CARDIAC CATHETERIZATION N/A 10/18/2018    Procedure: Coronary angiography  no LV gram d/t CKD;  Surgeon: Maria E Gilbert MD;  [facility];  Service: Cardiology   • CARDIAC CATHETERIZATION N/A 10/18/2018    Procedure: Left Heart Cath;  Surgeon: Maria E Gilbert MD;  Location: [facility] CATH INVASIVE LOCATION;  Service: Cardiology   • CARDIAC CATHETERIZATION N/A 10/18/2018    Procedure: Stent MOISE coronary;  Surgeon: Maria E Gilbert MD;  Location: [facility] CATH INVASIVE LOCATION;  Service: Cardiology   • CAROTID STENT     • CORONARY ANGIOPLASTY     • NECK EXPLORATION N/A    • NC RT/LT HEART CATHETERS N/A 10/18/2018    Procedure: Percutaneous Coronary Intervention;  Surgeon: Maria E Gilbert MD;  Location: [facility] CATH INVASIVE LOCATION;  Service: Cardiology     Family History   Problem Relation Age of Onset   • Breast cancer Daughter    • Heart attack Mother    • Hypertension Mother    • Heart disease Mother    • Thyroid disease Mother    • Lupus Mother    • Heart attack Brother    • Heart disease Brother    • Hyperlipidemia Brother    • Cancer Brother    • Depression  Father    • Stroke Maternal Grandmother        Current Outpatient Medications   Medication Sig Dispense Refill   • acetaminophen (TYLENOL) 325 MG tablet Take 2 tablets by mouth Every 6 (Six) Hours As Needed for Mild Pain . 30 tablet 0   • acetaminophen-codeine (TYLENOL #3) 300-30 MG per tablet Take 1-2 tablets by mouth.     • Alcohol Swabs ([facility] Prep) 70 % pads Place 1 application on the skin as directed by provider Daily. 400 each 3   • allopurinol (ZYLOPRIM) 100 MG tablet Take 1 tablet by mouth Daily. 90 tablet 3   • AMLACTIN 12 % lotion Apply  topically As Needed.     • amLODIPine (NORVASC) 2.5 MG tablet Take 1 tablet by mouth Daily. 90 tablet 3   • amLODIPine-atorvastatin (CADUET) 5-10 MG per tablet      • ascorbic acid (VITAMIN C) 1000 MG tablet Take 500 mg by mouth Daily.     • aspirin 81 MG EC tablet Take 1 tablet by mouth Daily. 90 tablet 3   • atorvastatin (LIPITOR) 20 MG tablet Take 1 tablet by mouth Every Night. 90 tablet 1   • azithromycin (ZITHROMAX) 250 MG tablet Take 2 tablets the first day, then 1 tablet daily for 4 days. 6 tablet 0   • B-D UF III MINI PEN NEEDLES 31G X 5 MM misc Use once daily with Lantus Pen for injection of insulin 100 each 3   • BRILINTA 90 MG tablet tablet      • butenafine (LOTRIMIN ULTRA) 1 % cream Apply  topically 2 (Two) Times a Day.     • Carboxymeth-Glycerin-Polysorb 0.5-1-0.5 % solution Apply  to eye(s) as directed by provider Daily.     • cyanocobalamin 1000 MCG/ML injection Cyanocobalamin 1000 MCG/ML Injection Solution; Patient Sig: Cyanocobalamin 1000 MCG/ML Injection Solution INJECT 1ML INTRAMUSCULARLY EVERY OTHER WEEK; 0; 17-Feb-2015; Active     • DEPO-TESTOSTERONE 200 MG/ML injection Inject 1 mL into the appropriate muscle as directed by prescriber 1 (One) Time Per Week. 14 mL 1   • ergocalciferol (ERGOCALCIFEROL) 1.25 MG (87694 UT) capsule Take 1 tablet by mouth Daily.     • fexofenadine-pseudoephedrine (Allegra-D Allergy & Congestion)  MG per 12 hr tablet  Take 1 tablet by mouth 2 (Two) Times a Day As Needed for Allergies (congestion and nasal pressure). 20 tablet 0   • finasteride (PROSCAR) 5 MG tablet Take 5 mg by mouth Daily.     • fluticasone (FLONASE) 50 MCG/ACT nasal spray 1 spray into each nostril 2 (two) times a day as needed.     • folic acid (FOLVITE) 800 MCG tablet Take 800 mcg by mouth Daily.     • furosemide (LASIX) 20 MG tablet      • glucose blood test strip Precision Xtra Blood Glucose In Vitro Strip; Patient Sig: Precision Xtra Blood Glucose In Vitro Strip TEST 2  TIMES DAILY 200 each 3   • hydrocortisone 2.5 % ointment Apply  topically to the appropriate area as directed 2 (Two) Times a Day. 20 g 5   • ketoconazole (NIZORAL) 2 % shampoo      • LANTUS SOLOSTAR 100 UNIT/ML injection pen 28 units subcutaneously daily 15 pen 3   • latanoprost (XALATAN) 0.005 % ophthalmic solution      • magnesium oxide (MAGOX) 400 (241.3 Mg) MG tablet tablet Take 400 mg by mouth Daily.     • meclizine (ANTIVERT) 25 MG tablet Take 1 tablet by mouth 3 (Three) Times a Day As Needed for Dizziness or Nausea. 30 tablet 0   • metoprolol tartrate (LOPRESSOR) 25 MG tablet Take 1 tablet by mouth 2 (Two) Times a Day. 180 tablet 1   • Monolet Lancets misc Use to test BG 2 times daily 200 each 3   • nitroglycerin (NITROSTAT) 0.4 MG SL tablet Place 1 tablet under the tongue Every 5 (Five) Minutes As Needed for Chest Pain. 30 tablet 3   • ondansetron (ZOFRAN) 4 MG tablet Take 4 mg by mouth.     • pantoprazole (PROTONIX) 20 MG EC tablet Take 1 tablet by mouth Daily. 90 tablet 1   • pioglitazone (ACTOS) 45 MG tablet Take 1 tablet by mouth Daily. 90 tablet 1   • PREVIDENT 5000 PLUS 1.1 % cream      • Soap & Cleansers (CETAKLENZ) liquid      • tamsulosin (FLOMAX) 0.4 MG capsule 24 hr capsule Take 1 capsule by mouth every night.     • TRADJENTA 5 MG tablet tablet Take 1 tablet by mouth Daily. 90 tablet 3   • VASCEPA 1 g capsule capsule Take 2 g by mouth 2 (Two) Times a Day With Meals. 360  "capsule 3   • VIAGRA 100 MG tablet Take 1 tablet by mouth As Needed for erectile dysfunction (1 tablet prior to planned intercourse.). 24 tablet 3   • Zinc 30 MG tablet Take 50 mg by mouth Daily.     • pregabalin (Lyrica) 150 MG capsule Take 1 capsule by mouth 2 (Two) Times a Day. 180 capsule 0     No current facility-administered medications for this visit.     Allergies   Allergen Reactions   • Other      Lotions- anything with palm oil or lanolin per pt (verified 6/15/16 1910)   • Prazosin    • Nsaids Other (See Comments)     CKD Stage 3     Social History     Socioeconomic History   • Marital status:      Spouse name: Ivette   • Number of children: 1   • Years of education: Not on file   • Highest education level: Not on file   Tobacco Use   • Smoking status: Never Smoker   • Smokeless tobacco: Never Used   Substance and Sexual Activity   • Alcohol use: No   • Drug use: No   • Sexual activity: Yes     Partners: Female       Review of Systems  Review of Systems   Constitutional: Negative for appetite change and fatigue.   Eyes: Negative for visual disturbance.   Respiratory: Negative for cough.    Cardiovascular: Negative for leg swelling.   Gastrointestinal: Negative for constipation and diarrhea.   Endocrine: Negative for cold intolerance, heat intolerance, polydipsia, polyphagia and polyuria.   Genitourinary: Negative for frequency.   Neurological: Negative for numbness.        Objective    /62   Ht 185.4 cm (72.99\")   Wt 113 kg (249 lb)   BMI 32.86 kg/m²   Physical Exam  Vitals and nursing note reviewed.   Constitutional:       General: He is not in acute distress.     Appearance: Normal appearance. He is well-developed. He is obese. He is not diaphoretic.   HENT:      Head: Normocephalic and atraumatic.      Right Ear: External ear normal.      Left Ear: External ear normal.      Nose: Nose normal.   Eyes:      General:         Right eye: No discharge.         Left eye: No discharge.      " Pupils: Pupils are equal, round, and reactive to light.   Neck:      Thyroid: No thyromegaly.      Vascular: No carotid bruit.      Trachea: No tracheal deviation.   Cardiovascular:      Rate and Rhythm: Normal rate and regular rhythm.      Heart sounds: Normal heart sounds. No murmur heard.   No friction rub. No gallop.    Pulmonary:      Effort: Pulmonary effort is normal. No respiratory distress.      Breath sounds: Normal breath sounds. No wheezing or rales.   Abdominal:      General: Bowel sounds are normal. There is no distension.      Palpations: Abdomen is soft.      Tenderness: There is no abdominal tenderness.   Musculoskeletal:         General: No deformity. Normal range of motion.      Cervical back: Normal range of motion and neck supple. No edema or erythema.   Lymphadenopathy:      Cervical: No cervical adenopathy.   Skin:     General: Skin is warm and dry.      Coloration: Skin is not pale.      Findings: No erythema or rash.   Neurological:      Mental Status: He is alert and oriented to person, place, and time.      Coordination: Coordination normal.   Psychiatric:         Behavior: Behavior normal.         Thought Content: Thought content normal.         Judgment: Judgment normal.         Lab Review  Glucose (mg/dL)   Date Value   10/21/2018 169 (H)   10/19/2018 126 (H)   10/18/2018 122 (H)     Sodium (mmol/L)   Date Value   03/31/2021 139   09/14/2020 139   07/29/2020 138   10/23/2019 140   10/21/2018 136   10/19/2018 138   10/18/2018 139     Potassium (mmol/L)   Date Value   03/31/2021 5.1   09/14/2020 4.8   07/29/2020 4.4   10/23/2019 4.7   10/21/2018 4.3   10/19/2018 3.8   10/18/2018 4.4     Chloride (mmol/L)   Date Value   03/31/2021 102   09/14/2020 99   07/29/2020 100   10/23/2019 103   10/21/2018 98   10/19/2018 102   10/18/2018 100     CO2 (mmol/L)   Date Value   10/21/2018 26.6   10/19/2018 22.5   10/18/2018 26.2     Total CO2 (mmol/L)   Date Value   03/31/2021 29.2 (H)   09/14/2020 28.9    07/29/2020 29.6 (H)   10/23/2019 27     BUN (mg/dL)   Date Value   03/31/2021 21   09/14/2020 18   07/29/2020 19   10/23/2019 17   10/21/2018 13   10/19/2018 15   10/18/2018 17     Creatinine (mg/dL)   Date Value   03/31/2021 1.70 (H)   09/14/2020 1.68 (H)   07/29/2020 1.61 (H)   10/23/2019 1.4   10/21/2018 1.68 (H)   10/19/2018 1.36 (H)   10/18/2018 1.54 (H)     Hemoglobin A1C (%)   Date Value   03/31/2021 7.30 (H)   07/29/2020 6.70 (H)   03/05/2020 6.80 (H)   10/17/2018 6.40 (H)   03/26/2016 6.6 (H)     Triglycerides (mg/dL)   Date Value   03/31/2021 76   07/29/2020 119   03/05/2020 136   10/19/2018 137   10/18/2018 260 (H)   10/17/2018 135     LDL Cholesterol  (mg/dL)   Date Value   09/05/2019 9   02/05/2019 17   10/19/2018 46   10/18/2018 34   10/17/2018 58     LDL Chol Calc (NIH) (mg/dL)   Date Value   03/31/2021 15       Assessment/Plan      1. Type 2 diabetes mellitus without complication, without long-term current use of insulin (CMS/[facility])    2. Diabetic peripheral neuropathy (CMS/[facility])    3. Hypogonadism in male    4. Dyslipidemia    5. Vitamin D deficiency    6. Essential hypertension    .    Medications prescribed:  Outpatient Encounter Medications as of 4/7/2021   Medication Sig Dispense Refill   • acetaminophen (TYLENOL) 325 MG tablet Take 2 tablets by mouth Every 6 (Six) Hours As Needed for Mild Pain . 30 tablet 0   • acetaminophen-codeine (TYLENOL #3) 300-30 MG per tablet Take 1-2 tablets by mouth.     • Alcohol Swabs ([facility] Prep) 70 % pads Place 1 application on the skin as directed by provider Daily. 400 each 3   • allopurinol (ZYLOPRIM) 100 MG tablet Take 1 tablet by mouth Daily. 90 tablet 3   • AMLACTIN 12 % lotion Apply  topically As Needed.     • amLODIPine (NORVASC) 2.5 MG tablet Take 1 tablet by mouth Daily. 90 tablet 3   • amLODIPine-atorvastatin (CADUET) 5-10 MG per tablet      • ascorbic acid (VITAMIN C) 1000 MG tablet Take 500 mg by mouth Daily.     • aspirin 81 MG EC tablet Take 1 tablet by  mouth Daily. 90 tablet 3   • atorvastatin (LIPITOR) 20 MG tablet Take 1 tablet by mouth Every Night. 90 tablet 1   • azithromycin (ZITHROMAX) 250 MG tablet Take 2 tablets the first day, then 1 tablet daily for 4 days. 6 tablet 0   • B-D UF III MINI PEN NEEDLES 31G X 5 MM misc Use once daily with Lantus Pen for injection of insulin 100 each 3   • BRILINTA 90 MG tablet tablet      • butenafine (LOTRIMIN ULTRA) 1 % cream Apply  topically 2 (Two) Times a Day.     • Carboxymeth-Glycerin-Polysorb 0.5-1-0.5 % solution Apply  to eye(s) as directed by provider Daily.     • cyanocobalamin 1000 MCG/ML injection Cyanocobalamin 1000 MCG/ML Injection Solution; Patient Sig: Cyanocobalamin 1000 MCG/ML Injection Solution INJECT 1ML INTRAMUSCULARLY EVERY OTHER WEEK; 0; 17-Feb-2015; Active     • DEPO-TESTOSTERONE 200 MG/ML injection Inject 1 mL into the appropriate muscle as directed by prescriber 1 (One) Time Per Week. 14 mL 1   • ergocalciferol (ERGOCALCIFEROL) 1.25 MG (45100 UT) capsule Take 1 tablet by mouth Daily.     • fexofenadine-pseudoephedrine (Allegra-D Allergy & Congestion)  MG per 12 hr tablet Take 1 tablet by mouth 2 (Two) Times a Day As Needed for Allergies (congestion and nasal pressure). 20 tablet 0   • finasteride (PROSCAR) 5 MG tablet Take 5 mg by mouth Daily.     • fluticasone (FLONASE) 50 MCG/ACT nasal spray 1 spray into each nostril 2 (two) times a day as needed.     • folic acid (FOLVITE) 800 MCG tablet Take 800 mcg by mouth Daily.     • furosemide (LASIX) 20 MG tablet      • glucose blood test strip Precision Xtra Blood Glucose In Vitro Strip; Patient Sig: Precision Xtra Blood Glucose In Vitro Strip TEST 2  TIMES DAILY 200 each 3   • hydrocortisone 2.5 % ointment Apply  topically to the appropriate area as directed 2 (Two) Times a Day. 20 g 5   • ketoconazole (NIZORAL) 2 % shampoo      • LANTUS SOLOSTAR 100 UNIT/ML injection pen 28 units subcutaneously daily 15 pen 3   • latanoprost (XALATAN) 0.005 %  ophthalmic solution      • magnesium oxide (MAGOX) 400 (241.3 Mg) MG tablet tablet Take 400 mg by mouth Daily.     • meclizine (ANTIVERT) 25 MG tablet Take 1 tablet by mouth 3 (Three) Times a Day As Needed for Dizziness or Nausea. 30 tablet 0   • metoprolol tartrate (LOPRESSOR) 25 MG tablet Take 1 tablet by mouth 2 (Two) Times a Day. 180 tablet 1   • Monolet Lancets misc Use to test BG 2 times daily 200 each 3   • nitroglycerin (NITROSTAT) 0.4 MG SL tablet Place 1 tablet under the tongue Every 5 (Five) Minutes As Needed for Chest Pain. 30 tablet 3   • ondansetron (ZOFRAN) 4 MG tablet Take 4 mg by mouth.     • pantoprazole (PROTONIX) 20 MG EC tablet Take 1 tablet by mouth Daily. 90 tablet 1   • pioglitazone (ACTOS) 45 MG tablet Take 1 tablet by mouth Daily. 90 tablet 1   • PREVIDENT 5000 PLUS 1.1 % cream      • Soap & Cleansers (CETAKLENZ) liquid      • tamsulosin (FLOMAX) 0.4 MG capsule 24 hr capsule Take 1 capsule by mouth every night.     • TRADJENTA 5 MG tablet tablet Take 1 tablet by mouth Daily. 90 tablet 3   • VASCEPA 1 g capsule capsule Take 2 g by mouth 2 (Two) Times a Day With Meals. 360 capsule 3   • VIAGRA 100 MG tablet Take 1 tablet by mouth As Needed for erectile dysfunction (1 tablet prior to planned intercourse.). 24 tablet 3   • Zinc 30 MG tablet Take 50 mg by mouth Daily.     • [DISCONTINUED] Alcohol Swabs ([facility] PREP) 70 % pads Place 1 application on the skin as directed by provider Daily. 400 each 3   • [DISCONTINUED] atorvastatin (LIPITOR) 20 MG tablet Take 1 tablet by mouth Every Night. 90 tablet 3   • [DISCONTINUED] B-D UF III MINI PEN NEEDLES 31G X 5 MM misc Use once daily with Lantus Pen for injection of insulin 100 each 3   • [DISCONTINUED] pioglitazone (ACTOS) 45 MG tablet Take 1 tablet by mouth Daily. 90 tablet 3   • [DISCONTINUED] pregabalin (LYRICA) 100 MG capsule TAKE 1 CAPSULE TWICE A  capsule 0   • pregabalin (Lyrica) 150 MG capsule Take 1 capsule by mouth 2 (Two) Times a Day.  180 capsule 0     No facility-administered encounter medications on file as of 4/7/2021.       Orders placed during this encounter include:  No orders of the defined types were placed in this encounter.  In summary patient was seen and examined.  His Lyrica will be increased to 150 mg twice daily.  Eddy was reviewed.  He will continue all his current therapies as prescribed.  His hemoglobin A1c is slightly elevated at 7.3.  He has not had any hypoglycemic events.  Currently he is checking his blood sugars once daily.  His cholesterol is controlled on statin therapy.  Blood pressures in satisfactory range and he monitors it daily.  He is at risk for hypoglycemia however he has not had any occur.  He follows with a nephrologist routinely.  He sees an eye doctor every 4 months for glaucoma.  Metabolically and clinically he is stable.  He will follow-up in 6 months with a new provider.  Has been encouraged to reach out to the office should he have any questions or concerns prior to his next visit

## 2021-04-07 NOTE — PATIENT INSTRUCTIONS
Increase lyrica to 150 mg twice daily     Pregabalin capsules  What is this medicine?  PREGABALIN (pre DEANNA a joaquin) is used to treat nerve pain from diabetes, shingles, spinal cord injury, and fibromyalgia. It is also used to control seizures in epilepsy.  This medicine may be used for other purposes; ask your health care provider or pharmacist if you have questions.  COMMON BRAND NAME(S): Lyrica  What should I tell my health care provider before I take this medicine?  They need to know if you have any of these conditions:  · heart disease  · history of drug abuse or alcohol abuse problem  · kidney disease  · lung or breathing disease  · suicidal thoughts, plans, or attempt; a previous suicide attempt by you or a family member  · an unusual or allergic reaction to pregabalin, gabapentin, other medicines, foods, dyes, or preservatives  · pregnant or trying to get pregnant  · breast-feeding  How should I use this medicine?  Take this medicine by mouth with a glass of water. Follow the directions on the prescription label. You can take it with or without food. If it upsets your stomach, take it with food. Take your medicine at regular intervals. Do not take it more often than directed. Do not stop taking except on your doctor's advice.  A special MedGuide will be given to you by the pharmacist with each prescription and refill. Be sure to read this information carefully each time.  Talk to your pediatrician regarding the use of this medicine in children. While this drug may be prescribed for children as young as 1 month for selected conditions, precautions do apply.  Overdosage: If you think you have taken too much of this medicine contact a poison control center or emergency room at once.  NOTE: This medicine is only for you. Do not share this medicine with others.  What if I miss a dose?  If you miss a dose, take it as soon as you can. If it is almost time for your next dose, take only that dose. Do not take double or  extra doses.  What may interact with this medicine?  This medicine may interact with the following medications:  · alcohol  · antihistamines for allergy, cough, and cold  · certain medicines for anxiety or sleep  · certain medicines for depression like amitriptyline, fluoxetine, sertraline  · certain medicines for diabetes  · certain medicines for seizures like phenobarbital, primidone  · general anesthetics like halothane, isoflurane, methoxyflurane, propofol  · local anesthetics like lidocaine, pramoxine, tetracaine  · medicines that relax muscles for surgery  · narcotic medicines for pain  · phenothiazines like chlorpromazine, mesoridazine, prochlorperazine, thioridazine  This list may not describe all possible interactions. Give your health care provider a list of all the medicines, herbs, non-prescription drugs, or dietary supplements you use. Also tell them if you smoke, drink alcohol, or use illegal drugs. Some items may interact with your medicine.  What should I watch for while using this medicine?  Tell your doctor or healthcare professional if your symptoms do not start to get better or if they get worse. Visit your doctor or health care professional for regular checks on your progress. Do not stop taking except on your doctor's advice. You may develop a severe reaction. Your doctor will tell you how much medicine to take.  Wear a medical identification bracelet or chain if you are taking this medicine for seizures, and carry a card that describes your disease and details of your medicine and dosage times.  You may get drowsy or dizzy. Do not drive, use machinery, or do anything that needs mental alertness until you know how this medicine affects you. Do not stand or sit up quickly, especially if you are an older patient. This reduces the risk of dizzy or fainting spells. Alcohol may interfere with the effect of this medicine. Avoid alcoholic drinks.  If you have a heart condition, like congestive heart  failure, and notice that you are retaining water and have swelling in your hands or feet, contact your health care provider immediately.  The use of this medicine may increase the chance of suicidal thoughts or actions. Pay special attention to how you are responding while on this medicine. Any worsening of mood, or thoughts of suicide or dying should be reported to your health care professional right away.  This medicine has caused reduced sperm counts in some men. This may interfere with the ability to father a child. You should talk to your doctor or health care professional if you are concerned about your fertility.  Women who become pregnant while using this medicine for seizures may enroll in the [place] Antiepileptic Drug Pregnancy Registry by calling 1-118.180.5391. This registry collects information about the safety of antiepileptic drug use during pregnancy.  What side effects may I notice from receiving this medicine?  Side effects that you should report to your doctor or health care professional as soon as possible:  · allergic reactions like skin rash, itching or hives, swelling of the face, lips, or tongue  · breathing problems  · changes in vision  · chest pain  · confusion  · jerking or unusual movements of any part of your body  · loss of memory  · muscle pain, tenderness, or weakness  · suicidal thoughts or other mood changes  · swelling of the ankles, feet, hands  · unusual bruising or bleeding  Side effects that usually do not require medical attention (report to your doctor or health care professional if they continue or are bothersome):  · dizziness  · drowsiness  · dry mouth  · headache  · nausea  · tremors  · trouble sleeping  · weight gain  This list may not describe all possible side effects. Call your doctor for medical advice about side effects. You may report side effects to FDA at 6-853-NCO-4066.  Where should I keep my medicine?  Keep out of the reach of children. This medicine  can be abused. Keep your medicine in a safe place to protect it from theft. Do not share this medicine with anyone. Selling or giving away this medicine is dangerous and against the law.  This medicine may cause accidental overdose and death if it taken by other adults, children, or pets. Mix any unused medicine with a substance like cat litter or coffee grounds. Then throw the medicine away in a sealed container like a sealed bag or a coffee can with a lid. Do not use the medicine after the expiration date.  Store at room temperature between 15 and 30 degrees C (59 and 86 degrees F).  NOTE: This sheet is a summary. It may not cover all possible information. If you have questions about this medicine, talk to your doctor, pharmacist, or health care provider.  © 2021 [employer]/Gold Standard (2019-12-20 13:15:55)

## 2021-05-25 ENCOUNTER — APPOINTMENT (OUTPATIENT)
Dept: GENERAL RADIOLOGY | Facility: HOSPITAL | Age: 76
End: 2021-05-25

## 2021-05-25 ENCOUNTER — TELEPHONE (OUTPATIENT)
Dept: FAMILY MEDICINE CLINIC | Facility: CLINIC | Age: 76
End: 2021-05-25

## 2021-05-25 ENCOUNTER — OFFICE VISIT (OUTPATIENT)
Dept: CARDIOLOGY | Facility: CLINIC | Age: 76
End: 2021-05-25

## 2021-05-25 ENCOUNTER — HOSPITAL ENCOUNTER (INPATIENT)
Facility: HOSPITAL | Age: 76
LOS: 2 days | Discharge: HOME | End: 2021-05-29
Attending: INTERNAL MEDICINE | Admitting: INTERNAL MEDICINE

## 2021-05-25 VITALS
HEIGHT: 73 IN | SYSTOLIC BLOOD PRESSURE: 157 MMHG | WEIGHT: 247 LBS | HEART RATE: 56 BPM | DIASTOLIC BLOOD PRESSURE: 77 MMHG | BODY MASS INDEX: 32.74 KG/M2

## 2021-05-25 DIAGNOSIS — F51.04 CHRONIC INSOMNIA: ICD-10-CM

## 2021-05-25 DIAGNOSIS — I20.0 UNSTABLE ANGINA (HCC): ICD-10-CM

## 2021-05-25 DIAGNOSIS — N28.9 RENAL INSUFFICIENCY: ICD-10-CM

## 2021-05-25 DIAGNOSIS — R07.2 PRECORDIAL PAIN: Primary | ICD-10-CM

## 2021-05-25 DIAGNOSIS — R10.13 EPIGASTRIC PAIN: ICD-10-CM

## 2021-05-25 DIAGNOSIS — G47.00 PERSISTENT INSOMNIA: ICD-10-CM

## 2021-05-25 DIAGNOSIS — E78.5 DYSLIPIDEMIA: ICD-10-CM

## 2021-05-25 DIAGNOSIS — G51.0 BELL'S PALSY: ICD-10-CM

## 2021-05-25 DIAGNOSIS — K76.0 STEATOSIS OF LIVER: ICD-10-CM

## 2021-05-25 DIAGNOSIS — E11.42 DIABETIC PERIPHERAL NEUROPATHY (HCC): ICD-10-CM

## 2021-05-25 DIAGNOSIS — E55.9 VITAMIN D DEFICIENCY: ICD-10-CM

## 2021-05-25 DIAGNOSIS — Z95.5 S/P DRUG ELUTING CORONARY STENT PLACEMENT: ICD-10-CM

## 2021-05-25 DIAGNOSIS — E29.1 HYPOGONADISM IN MALE: ICD-10-CM

## 2021-05-25 DIAGNOSIS — R42 VERTIGO: ICD-10-CM

## 2021-05-25 DIAGNOSIS — M79.7 FIBROMYALGIA: ICD-10-CM

## 2021-05-25 DIAGNOSIS — I25.10 CORONARY ARTERY DISEASE INVOLVING NATIVE CORONARY ARTERY OF NATIVE HEART WITHOUT ANGINA PECTORIS: Primary | ICD-10-CM

## 2021-05-25 DIAGNOSIS — N40.1 BENIGN NON-NODULAR PROSTATIC HYPERPLASIA WITH LOWER URINARY TRACT SYMPTOMS: ICD-10-CM

## 2021-05-25 DIAGNOSIS — I10 ESSENTIAL HYPERTENSION: ICD-10-CM

## 2021-05-25 DIAGNOSIS — K21.9 GASTROESOPHAGEAL REFLUX DISEASE WITHOUT ESOPHAGITIS: ICD-10-CM

## 2021-05-25 DIAGNOSIS — C44.90 MALIGNANT NEOPLASM OF SKIN: ICD-10-CM

## 2021-05-25 LAB
ALBUMIN SERPL-MCNC: 4.2 G/DL (ref 3.5–5.2)
ALBUMIN/GLOB SERPL: 1.6 G/DL
ALP SERPL-CCNC: 55 U/L (ref 39–117)
ALT SERPL W P-5'-P-CCNC: 19 U/L (ref 1–41)
ANION GAP SERPL CALCULATED.3IONS-SCNC: 5.6 MMOL/L (ref 5–15)
AST SERPL-CCNC: 21 U/L (ref 1–40)
BASOPHILS # BLD AUTO: 0.06 10*3/MM3 (ref 0–0.2)
BASOPHILS NFR BLD AUTO: 1 % (ref 0–1.5)
BILIRUB SERPL-MCNC: 0.6 MG/DL (ref 0–1.2)
BUN SERPL-MCNC: 15 MG/DL (ref 8–23)
BUN/CREAT SERPL: 9.4 (ref 7–25)
CALCIUM SPEC-SCNC: 9.6 MG/DL (ref 8.6–10.5)
CHLORIDE SERPL-SCNC: 101 MMOL/L (ref 98–107)
CO2 SERPL-SCNC: 32.4 MMOL/L (ref 22–29)
CREAT SERPL-MCNC: 1.59 MG/DL (ref 0.76–1.27)
DEPRECATED RDW RBC AUTO: 44.2 FL (ref 37–54)
EOSINOPHIL # BLD AUTO: 0.15 10*3/MM3 (ref 0–0.4)
EOSINOPHIL NFR BLD AUTO: 2.5 % (ref 0.3–6.2)
ERYTHROCYTE [DISTWIDTH] IN BLOOD BY AUTOMATED COUNT: 14.4 % (ref 12.3–15.4)
GFR SERPL CREATININE-BSD FRML MDRD: 43 ML/MIN/1.73
GLOBULIN UR ELPH-MCNC: 2.7 GM/DL
GLUCOSE BLDC GLUCOMTR-MCNC: 167 MG/DL (ref 70–130)
GLUCOSE SERPL-MCNC: 109 MG/DL (ref 65–99)
HCT VFR BLD AUTO: 43.4 % (ref 37.5–51)
HGB BLD-MCNC: 14.3 G/DL (ref 13–17.7)
LYMPHOCYTES # BLD AUTO: 1.88 10*3/MM3 (ref 0.7–3.1)
LYMPHOCYTES NFR BLD AUTO: 31.1 % (ref 19.6–45.3)
MAGNESIUM SERPL-MCNC: 1.8 MG/DL (ref 1.6–2.4)
MCH RBC QN AUTO: 28.2 PG (ref 26.6–33)
MCHC RBC AUTO-ENTMCNC: 32.9 G/DL (ref 31.5–35.7)
MCV RBC AUTO: 85.6 FL (ref 79–97)
MONOCYTES # BLD AUTO: 0.59 10*3/MM3 (ref 0.1–0.9)
MONOCYTES NFR BLD AUTO: 9.8 % (ref 5–12)
NEUTROPHILS NFR BLD AUTO: 3.35 10*3/MM3 (ref 1.7–7)
NEUTROPHILS NFR BLD AUTO: 55.3 % (ref 42.7–76)
NT-PROBNP SERPL-MCNC: 116 PG/ML (ref 0–1800)
PLATELET # BLD AUTO: 182 10*3/MM3 (ref 140–450)
PMV BLD AUTO: 10.1 FL (ref 6–12)
POTASSIUM SERPL-SCNC: 4.8 MMOL/L (ref 3.5–5.2)
PROT SERPL-MCNC: 6.9 G/DL (ref 6–8.5)
RBC # BLD AUTO: 5.07 10*6/MM3 (ref 4.14–5.8)
SODIUM SERPL-SCNC: 139 MMOL/L (ref 136–145)
WBC # BLD AUTO: 6.05 10*3/MM3 (ref 3.4–10.8)

## 2021-05-25 PROCEDURE — 99220 PR INITIAL OBSERVATION CARE/DAY 70 MINUTES: CPT | Performed by: NURSE PRACTITIONER

## 2021-05-25 PROCEDURE — U0004 COV-19 TEST NON-CDC HGH THRU: HCPCS | Performed by: INTERNAL MEDICINE

## 2021-05-25 PROCEDURE — 71045 X-RAY EXAM CHEST 1 VIEW: CPT

## 2021-05-25 PROCEDURE — U0005 INFEC AGEN DETEC AMPLI PROBE: HCPCS | Performed by: INTERNAL MEDICINE

## 2021-05-25 PROCEDURE — 85027 COMPLETE CBC AUTOMATED: CPT | Performed by: NURSE PRACTITIONER

## 2021-05-25 PROCEDURE — 82962 GLUCOSE BLOOD TEST: CPT

## 2021-05-25 PROCEDURE — 93010 ELECTROCARDIOGRAM REPORT: CPT | Performed by: INTERNAL MEDICINE

## 2021-05-25 PROCEDURE — 80053 COMPREHEN METABOLIC PANEL: CPT | Performed by: NURSE PRACTITIONER

## 2021-05-25 PROCEDURE — 63710000001 INSULIN GLARGINE PER 5 UNITS: Performed by: INTERNAL MEDICINE

## 2021-05-25 PROCEDURE — G0378 HOSPITAL OBSERVATION PER HR: HCPCS

## 2021-05-25 PROCEDURE — 93005 ELECTROCARDIOGRAM TRACING: CPT | Performed by: NURSE PRACTITIONER

## 2021-05-25 PROCEDURE — 63710000001 INSULIN GLARGINE PER 5 UNITS: Performed by: HOSPITALIST

## 2021-05-25 PROCEDURE — 83880 ASSAY OF NATRIURETIC PEPTIDE: CPT | Performed by: NURSE PRACTITIONER

## 2021-05-25 PROCEDURE — 83735 ASSAY OF MAGNESIUM: CPT | Performed by: NURSE PRACTITIONER

## 2021-05-25 PROCEDURE — 93000 ELECTROCARDIOGRAM COMPLETE: CPT | Performed by: NURSE PRACTITIONER

## 2021-05-25 RX ORDER — MELATONIN
1000 DAILY
Status: DISCONTINUED | OUTPATIENT
Start: 2021-05-25 | End: 2021-05-29 | Stop reason: HOSPADM

## 2021-05-25 RX ORDER — ATORVASTATIN CALCIUM 20 MG/1
20 TABLET, FILM COATED ORAL NIGHTLY
Status: DISCONTINUED | OUTPATIENT
Start: 2021-05-25 | End: 2021-05-25

## 2021-05-25 RX ORDER — LANOLIN ALCOHOL/MO/W.PET/CERES
800 CREAM (GRAM) TOPICAL DAILY
Status: DISCONTINUED | OUTPATIENT
Start: 2021-05-25 | End: 2021-05-25

## 2021-05-25 RX ORDER — ACETAMINOPHEN 325 MG/1
650 TABLET ORAL EVERY 6 HOURS PRN
Status: DISCONTINUED | OUTPATIENT
Start: 2021-05-25 | End: 2021-05-28 | Stop reason: SDUPTHER

## 2021-05-25 RX ORDER — ASPIRIN 81 MG/1
81 TABLET ORAL DAILY
Status: DISCONTINUED | OUTPATIENT
Start: 2021-05-26 | End: 2021-05-29 | Stop reason: HOSPADM

## 2021-05-25 RX ORDER — LATANOPROST 50 UG/ML
1 SOLUTION/ DROPS OPHTHALMIC NIGHTLY
Status: DISCONTINUED | OUTPATIENT
Start: 2021-05-25 | End: 2021-05-29 | Stop reason: HOSPADM

## 2021-05-25 RX ORDER — SODIUM CHLORIDE 0.9 % (FLUSH) 0.9 %
10 SYRINGE (ML) INJECTION AS NEEDED
Status: DISCONTINUED | OUTPATIENT
Start: 2021-05-25 | End: 2021-05-29

## 2021-05-25 RX ORDER — ZINC SULFATE 50(220)MG
220 CAPSULE ORAL DAILY
Status: DISCONTINUED | OUTPATIENT
Start: 2021-05-25 | End: 2021-05-25

## 2021-05-25 RX ORDER — FUROSEMIDE 20 MG/1
20 TABLET ORAL DAILY
Status: DISCONTINUED | OUTPATIENT
Start: 2021-05-26 | End: 2021-05-29 | Stop reason: HOSPADM

## 2021-05-25 RX ORDER — INSULIN GLARGINE 100 [IU]/ML
20 INJECTION, SOLUTION SUBCUTANEOUS DAILY
Status: DISCONTINUED | OUTPATIENT
Start: 2021-05-25 | End: 2021-05-25

## 2021-05-25 RX ORDER — TAMSULOSIN HYDROCHLORIDE 0.4 MG/1
0.4 CAPSULE ORAL
Status: DISCONTINUED | OUTPATIENT
Start: 2021-05-25 | End: 2021-05-29 | Stop reason: HOSPADM

## 2021-05-25 RX ORDER — INSULIN LISPRO 100 [IU]/ML
0-7 INJECTION, SOLUTION INTRAVENOUS; SUBCUTANEOUS
Status: DISCONTINUED | OUTPATIENT
Start: 2021-05-25 | End: 2021-05-29 | Stop reason: HOSPADM

## 2021-05-25 RX ORDER — PREGABALIN 75 MG/1
150 CAPSULE ORAL 2 TIMES DAILY
Status: DISCONTINUED | OUTPATIENT
Start: 2021-05-25 | End: 2021-05-29 | Stop reason: HOSPADM

## 2021-05-25 RX ORDER — ALLOPURINOL 100 MG/1
100 TABLET ORAL DAILY
Status: DISCONTINUED | OUTPATIENT
Start: 2021-05-25 | End: 2021-05-29 | Stop reason: HOSPADM

## 2021-05-25 RX ORDER — INSULIN GLARGINE 100 [IU]/ML
10 INJECTION, SOLUTION SUBCUTANEOUS NIGHTLY
Status: DISCONTINUED | OUTPATIENT
Start: 2021-05-25 | End: 2021-05-28

## 2021-05-25 RX ORDER — ICOSAPENT ETHYL 1000 MG/1
2 CAPSULE ORAL 2 TIMES DAILY WITH MEALS
Status: DISCONTINUED | OUTPATIENT
Start: 2021-05-25 | End: 2021-05-26

## 2021-05-25 RX ORDER — DIPHENHYDRAMINE HCL 50 MG
50 CAPSULE ORAL NIGHTLY PRN
COMMUNITY

## 2021-05-25 RX ORDER — PANTOPRAZOLE SODIUM 40 MG/1
40 TABLET, DELAYED RELEASE ORAL
Status: DISCONTINUED | OUTPATIENT
Start: 2021-05-25 | End: 2021-05-29 | Stop reason: HOSPADM

## 2021-05-25 RX ORDER — SODIUM CHLORIDE 0.9 % (FLUSH) 0.9 %
10 SYRINGE (ML) INJECTION EVERY 12 HOURS SCHEDULED
Status: DISCONTINUED | OUTPATIENT
Start: 2021-05-25 | End: 2021-05-25

## 2021-05-25 RX ORDER — MELATONIN
1000 DAILY
COMMUNITY

## 2021-05-25 RX ORDER — PANTOPRAZOLE SODIUM 20 MG/1
20 TABLET, DELAYED RELEASE ORAL DAILY
Status: DISCONTINUED | OUTPATIENT
Start: 2021-05-25 | End: 2021-05-25

## 2021-05-25 RX ORDER — AMLODIPINE BESYLATE 2.5 MG/1
2.5 TABLET ORAL
Status: DISCONTINUED | OUTPATIENT
Start: 2021-05-26 | End: 2021-05-25

## 2021-05-25 RX ORDER — ATORVASTATIN CALCIUM 20 MG/1
10 TABLET, FILM COATED ORAL NIGHTLY
Status: DISCONTINUED | OUTPATIENT
Start: 2021-05-25 | End: 2021-05-29 | Stop reason: HOSPADM

## 2021-05-25 RX ADMIN — LATANOPROST 1 DROP: 50 SOLUTION/ DROPS OPHTHALMIC at 21:24

## 2021-05-25 RX ADMIN — INSULIN GLARGINE 10 UNITS: 100 INJECTION, SOLUTION SUBCUTANEOUS at 20:27

## 2021-05-25 RX ADMIN — ATORVASTATIN CALCIUM 10 MG: 20 TABLET, FILM COATED ORAL at 20:25

## 2021-05-25 RX ADMIN — MAGNESIUM OXIDE 400 MG (241.3 MG MAGNESIUM) TABLET 400 MG: TABLET at 20:26

## 2021-05-25 RX ADMIN — PREGABALIN 150 MG: 75 CAPSULE ORAL at 20:26

## 2021-05-25 RX ADMIN — TAMSULOSIN HYDROCHLORIDE 0.4 MG: 0.4 CAPSULE ORAL at 19:10

## 2021-05-25 RX ADMIN — METOPROLOL TARTRATE 25 MG: 25 TABLET, FILM COATED ORAL at 20:26

## 2021-05-25 RX ADMIN — PANTOPRAZOLE SODIUM 40 MG: 40 TABLET, DELAYED RELEASE ORAL at 19:10

## 2021-05-25 NOTE — PROGRESS NOTES
Subjective:        Earl Marc is a 76 y.o. male who here for follow up    Chief Complaint   Patient presents with   • Follow-up     cp       HPI      Earl Marc is a 76-year-old male, who is new to me.  He has a history to include arthritis, asthma, CKD, CAD, diabetes mellitus, dyslipidemia, type 2 diabetes mellitus, gonad is him in male and hypertension.  Recent lipid panel on 3/31/2021 revealed TC 68, HDL 37, LDL 15, and triglycerides 76 previous ischemic work-up was a stress test in 2019 which revealed negative clinical evidence of myocardial ischemia.  It was a normal study.  Echo in 2018 revealed EF 67%, LAC size is borderline dilated, LV C is borderline dilated, LV wall segments are hypokinetic: Apical septal, and mid inferior septal, no evidence of pericardial effusion.      The following portions of the patient's history were reviewed and updated as appropriate: allergies, current medications, past family history, past medical history, past social history, past surgical history and problem list.    Past Medical History:   Diagnosis Date   • Abnormal cardiovascular stress test    • Acid reflux    • Arthritis    • Asthma    • Cancer (CMS/HCC)     skin    • Chronic kidney disease    • Coronary artery disease    • Diabetes mellitus (CMS/HCC)    • Diabetic neuropathy associated with type 2 diabetes mellitus (CMS/HCC)    • Dyslipidemia    • Erectile dysfunction    • Fatty liver disease, nonalcoholic    • Gastritis    • Glaucoma     both eyes   • Hyperlipidemia    • Hypertension    • Hypogonadism male    • Type 2 diabetes mellitus (CMS/HCC)          reports that he has never smoked. He has never used smokeless tobacco. He reports that he does not drink alcohol and does not use drugs.     Family History   Problem Relation Age of Onset   • Breast cancer Daughter    • Heart attack Mother    • Hypertension Mother    • Heart disease Mother    • Thyroid disease Mother    • Lupus Mother    • Heart attack Brother     • Heart disease Brother    • Hyperlipidemia Brother    • Cancer Brother    • Depression Father    • Stroke Maternal Grandmother        ROS     Review of Systems  Constitutional: no wt loss, fever, fatigue  Gastrointestinal: no nausea, abdominal pain  Behavioral/Psych: no insomnia or anxiety  Cardiovascular: denies chest pain and shortness of breath      Objective:           Vitals and nursing note reviewed.   Constitutional:       Appearance: Well-developed.   HENT:      Head: Normocephalic.      Right Ear: External ear normal.      Left Ear: External ear normal.   Neck:      Vascular: No JVD.   Pulmonary:      Effort: Pulmonary effort is normal. No respiratory distress.      Breath sounds: Normal breath sounds. No stridor. No rales.   Cardiovascular:      Normal rate. Regular rhythm.      No gallop.   Pulses:     Intact distal pulses.   Abdominal:      General: Bowel sounds are normal. There is no distension.      Palpations: Abdomen is soft.      Tenderness: There is no abdominal tenderness. There is no guarding.   Musculoskeletal: Normal range of motion.         General: No tenderness.      Cervical back: Normal range of motion. Skin:     General: Skin is warm.   Neurological:      Mental Status: Alert and oriented to person, place, and time.      Deep Tendon Reflexes: Reflexes are normal and symmetric.   Psychiatric:         Judgment: Judgment normal.           ECG 12 Lead    Date/Time: 5/25/2021 11:23 AM  Performed by: Lidia Thayer APRN  Authorized by: Lidia Thayer APRN   Comparison: compared with previous ECG from 10/21/2018  Similar to previous ECG  Rhythm: sinus bradycardia  BPM: 57    Clinical impression: abnormal EKG            Interpretation Summary       · No ECG evidence of myocardial ischemia.Negative clinical evidence of myocardial ischemia. Findings consistent with a normal ECG stress test.     Asymptomatic for chest pain. ECG is negative for ischemia.   Ectopy: None  B/P is  appropriate. Exercise tolerance is good.   Supervised by: Dr. Gilbert        Interpretation Summary    · The left ventricular cavity is borderline dilated.  · The following left ventricular wall segments are hypokinetic: apical septal and mid inferoseptal.  · Left atrial cavity size is borderline dilated.  · Calculated EF = 67%  · There is no evidence of pericardial effusion.              Current Outpatient Medications:   •  acetaminophen (TYLENOL) 325 MG tablet, Take 2 tablets by mouth Every 6 (Six) Hours As Needed for Mild Pain ., Disp: 30 tablet, Rfl: 0  •  Alcohol Swabs ([employer] Prep) 70 % pads, Place 1 application on the skin as directed by provider Daily., Disp: 400 each, Rfl: 3  •  allopurinol (ZYLOPRIM) 100 MG tablet, Take 1 tablet by mouth Daily., Disp: 90 tablet, Rfl: 3  •  AMLACTIN 12 % lotion, Apply  topically As Needed., Disp: , Rfl:   •  amLODIPine (NORVASC) 2.5 MG tablet, Take 1 tablet by mouth Daily. (Patient taking differently: Take 5 mg by mouth Daily.), Disp: 90 tablet, Rfl: 3  •  ascorbic acid (VITAMIN C) 1000 MG tablet, Take 500 mg by mouth Daily., Disp: , Rfl:   •  aspirin 81 MG EC tablet, Take 1 tablet by mouth Daily., Disp: 90 tablet, Rfl: 3  •  atorvastatin (LIPITOR) 20 MG tablet, Take 1 tablet by mouth Every Night., Disp: 90 tablet, Rfl: 1  •  B-D UF III MINI PEN NEEDLES 31G X 5 MM misc, Use once daily with Lantus Pen for injection of insulin, Disp: 100 each, Rfl: 3  •  Carboxymeth-Glycerin-Polysorb 0.5-1-0.5 % solution, Apply  to eye(s) as directed by provider Daily., Disp: , Rfl:   •  cyanocobalamin 1000 MCG/ML injection, Cyanocobalamin 1000 MCG/ML Injection Solution; Patient Sig: Cyanocobalamin 1000 MCG/ML Injection Solution INJECT 1ML INTRAMUSCULARLY EVERY OTHER WEEK; 0; 17-Feb-2015; Active, Disp: , Rfl:   •  DEPO-TESTOSTERONE 200 MG/ML injection, Inject 1 mL into the appropriate muscle as directed by prescriber 1 (One) Time Per Week., Disp: 14 mL, Rfl: 1  •   fexofenadine-pseudoephedrine (Allegra-D Allergy & Congestion)  MG per 12 hr tablet, Take 1 tablet by mouth 2 (Two) Times a Day As Needed for Allergies (congestion and nasal pressure)., Disp: 20 tablet, Rfl: 0  •  finasteride (PROSCAR) 5 MG tablet, Take 5 mg by mouth Daily., Disp: , Rfl:   •  fluticasone (FLONASE) 50 MCG/ACT nasal spray, 1 spray into each nostril 2 (two) times a day as needed., Disp: , Rfl:   •  folic acid (FOLVITE) 800 MCG tablet, Take 800 mcg by mouth Daily., Disp: , Rfl:   •  furosemide (LASIX) 20 MG tablet, Mon, wed, fri, Disp: , Rfl:   •  glucose blood test strip, Precision Xtra Blood Glucose In Vitro Strip; Patient Sig: Precision Xtra Blood Glucose In Vitro Strip TEST 2  TIMES DAILY, Disp: 200 each, Rfl: 3  •  hydrocortisone 2.5 % ointment, Apply  topically to the appropriate area as directed 2 (Two) Times a Day., Disp: 20 g, Rfl: 5  •  LANTUS SOLOSTAR 100 UNIT/ML injection pen, 28 units subcutaneously daily, Disp: 15 pen, Rfl: 3  •  latanoprost (XALATAN) 0.005 % ophthalmic solution, , Disp: , Rfl:   •  magnesium oxide (MAGOX) 400 (241.3 Mg) MG tablet tablet, Take 400 mg by mouth Daily., Disp: , Rfl:   •  metoprolol tartrate (LOPRESSOR) 25 MG tablet, Take 1 tablet by mouth 2 (Two) Times a Day., Disp: 180 tablet, Rfl: 1  •  Monolet Lancets misc, Use to test BG 2 times daily, Disp: 200 each, Rfl: 3  •  nitroglycerin (NITROSTAT) 0.4 MG SL tablet, Place 1 tablet under the tongue Every 5 (Five) Minutes As Needed for Chest Pain., Disp: 30 tablet, Rfl: 3  •  pantoprazole (PROTONIX) 20 MG EC tablet, Take 1 tablet by mouth Daily., Disp: 90 tablet, Rfl: 1  •  pioglitazone (ACTOS) 45 MG tablet, Take 1 tablet by mouth Daily., Disp: 90 tablet, Rfl: 1  •  pregabalin (Lyrica) 150 MG capsule, Take 1 capsule by mouth 2 (Two) Times a Day., Disp: 180 capsule, Rfl: 0  •  PREVIDENT 5000 PLUS 1.1 % cream, , Disp: , Rfl:   •  tamsulosin (FLOMAX) 0.4 MG capsule 24 hr capsule, Take 1 capsule by mouth every  night., Disp: , Rfl:   •  TRADJENTA 5 MG tablet tablet, Take 1 tablet by mouth Daily., Disp: 90 tablet, Rfl: 3  •  VASCEPA 1 g capsule capsule, Take 2 g by mouth 2 (Two) Times a Day With Meals., Disp: 360 capsule, Rfl: 3  •  VIAGRA 100 MG tablet, Take 1 tablet by mouth As Needed for erectile dysfunction (1 tablet prior to planned intercourse.)., Disp: 24 tablet, Rfl: 3  •  Zinc 30 MG tablet, Take 50 mg by mouth Daily., Disp: , Rfl:      Assessment:        Patient Active Problem List   Diagnosis   • Bell's palsy   • Persistent insomnia   • Osteoarthritis of cervical spine   • Diabetic peripheral neuropathy (CMS/HCC)   • Dyslipidemia   • Steatosis of liver   • Fibromyalgia   • Gastroesophageal reflux disease without esophagitis   • Hypertension   • Hypogonadism in male   • Renal insufficiency   • Vitamin D deficiency   • Benign non-nodular prostatic hyperplasia with lower urinary tract symptoms   • S/P drug eluting coronary stent placement   • Coronary artery disease involving native coronary artery of native heart   • Malignant neoplasm of skin   • Diabetes mellitus (CMS/HCC)   • Chronic insomnia   • Other specified glaucoma   • Vertigo   • Epigastric pain               Plan:   1. Chest pain history of CAD with history of stents: He states it has been going on since Friday.  He has been under some stress his chest pressure/pain radiates to his shoulder and his back.  He will be admitted for stress testing and echo.      It was explained to the patient that stress testing carries 85% specificity/sensitivity, and does not rule out future cardiac event.  Risks of the procedure were explained to the patient including shortness of breath, induction of myocardial infarction, and dizziness.  Patient is agreeable to proceeding with stress testing.     2. Essential hypertension: Today in the office his blood pressure stable.  Continue current medications.     Educated patient on exercising for at least 30 minutes a day for 2  to 3 days a week. Importance of controlling hypertension and blood pressure checkup on the regular basis has been explained. Hypertension as a silent killer has been discussed. Risk reduction of the weight and regular exercises to control the hypertension has been explained.               No diagnosis found.    There are no diagnoses linked to this encounter.    COUNSELING: shaila Hernández was given to patient for the following topics: diagnostic results, risk factor reductions, impressions, risks and benefits of treatment options and importance of treatment compliance .       SMOKING COUNSELING:  denies  He will be admitted for unstable angina radiating to the shoulder and back with known history of CAD.  He will have a stress test and echo.    Return to ER per EMS for any recurrent symptoms including, but not limited to: chest pain/pressure/tightness, weakness, Shortness of breath, dizziness, palpitations, near syncope or syncope          Sincerely,   JL Oconnor  [facility]  05/25/21  10:46 EDT     EMR Dragon/Transcription disclaimer:   Much of this encounter note is an electronic transcription/translation of spoken language to printed text. The electronic translation of spoken language may permit erroneous, or at times, nonsensical words or phrases to be inadvertently transcribed; Although I have reviewed the note for such errors, some may still exist.        EMR Dragon/Transcription disclaimer:   Much of this encounter note is an electronic transcription/translation of spoken language to printed text. The electronic translation of spoken language may permit erroneous, or at times, nonsensical words or phrases to be inadvertently transcribed; Although I have reviewed the note for such errors, some may still exist.

## 2021-05-26 ENCOUNTER — APPOINTMENT (OUTPATIENT)
Dept: NUCLEAR MEDICINE | Facility: HOSPITAL | Age: 76
End: 2021-05-26

## 2021-05-26 ENCOUNTER — APPOINTMENT (OUTPATIENT)
Dept: CARDIOLOGY | Facility: HOSPITAL | Age: 76
End: 2021-05-26

## 2021-05-26 DIAGNOSIS — R07.9 CHEST PAIN, UNSPECIFIED TYPE: Primary | ICD-10-CM

## 2021-05-26 PROBLEM — R07.2 PRECORDIAL PAIN: Status: ACTIVE | Noted: 2021-05-25

## 2021-05-26 LAB
ALBUMIN SERPL-MCNC: 3.9 G/DL (ref 3.5–5.2)
ALBUMIN/GLOB SERPL: 1.4 G/DL
ALP SERPL-CCNC: 53 U/L (ref 39–117)
ALT SERPL W P-5'-P-CCNC: 18 U/L (ref 1–41)
ANION GAP SERPL CALCULATED.3IONS-SCNC: 7.7 MMOL/L (ref 5–15)
AORTIC DIMENSIONLESS INDEX: 0.8 (DI)
AST SERPL-CCNC: 21 U/L (ref 1–40)
BASOPHILS # BLD AUTO: 0.06 10*3/MM3 (ref 0–0.2)
BASOPHILS NFR BLD AUTO: 0.9 % (ref 0–1.5)
BH CV ECHO MEAS - AO MAX PG (FULL): 2.2 MMHG
BH CV ECHO MEAS - AO MAX PG: 8.4 MMHG
BH CV ECHO MEAS - AO MEAN PG (FULL): 2 MMHG
BH CV ECHO MEAS - AO MEAN PG: 5 MMHG
BH CV ECHO MEAS - AO V2 MAX: 145 CM/SEC
BH CV ECHO MEAS - AO V2 MEAN: 102 CM/SEC
BH CV ECHO MEAS - AO V2 VTI: 33.2 CM
BH CV ECHO MEAS - AVA(I,A): 2.1 CM^2
BH CV ECHO MEAS - AVA(I,D): 2.1 CM^2
BH CV ECHO MEAS - AVA(V,A): 2.2 CM^2
BH CV ECHO MEAS - AVA(V,D): 2.2 CM^2
BH CV ECHO MEAS - BSA(HAYCOCK): 2.4 M^2
BH CV ECHO MEAS - BSA: 2.4 M^2
BH CV ECHO MEAS - BZI_BMI: 32.9 KILOGRAMS/M^2
BH CV ECHO MEAS - BZI_METRIC_HEIGHT: 185.4 CM
BH CV ECHO MEAS - BZI_METRIC_WEIGHT: 112.9 KG
BH CV ECHO MEAS - EDV(CUBED): 110.6 ML
BH CV ECHO MEAS - EDV(MOD-SP2): 79 ML
BH CV ECHO MEAS - EDV(MOD-SP4): 84 ML
BH CV ECHO MEAS - EDV(TEICH): 107.5 ML
BH CV ECHO MEAS - EF(CUBED): 73.1 %
BH CV ECHO MEAS - EF(MOD-BP): 58.9 %
BH CV ECHO MEAS - EF(MOD-SP2): 59.5 %
BH CV ECHO MEAS - EF(MOD-SP4): 59.5 %
BH CV ECHO MEAS - EF(TEICH): 64.7 %
BH CV ECHO MEAS - ESV(CUBED): 29.8 ML
BH CV ECHO MEAS - ESV(MOD-SP2): 32 ML
BH CV ECHO MEAS - ESV(MOD-SP4): 34 ML
BH CV ECHO MEAS - ESV(TEICH): 37.9 ML
BH CV ECHO MEAS - FS: 35.4 %
BH CV ECHO MEAS - IVS/LVPW: 0.77
BH CV ECHO MEAS - IVSD: 1 CM
BH CV ECHO MEAS - LAT PEAK E' VEL: 10.8 CM/SEC
BH CV ECHO MEAS - LV DIASTOLIC VOL/BSA (35-75): 35.6 ML/M^2
BH CV ECHO MEAS - LV MASS(C)D: 206.4 GRAMS
BH CV ECHO MEAS - LV MASS(C)DI: 87.4 GRAMS/M^2
BH CV ECHO MEAS - LV MAX PG: 6.3 MMHG
BH CV ECHO MEAS - LV MEAN PG: 3 MMHG
BH CV ECHO MEAS - LV SYSTOLIC VOL/BSA (12-30): 14.4 ML/M^2
BH CV ECHO MEAS - LV V1 MAX: 125 CM/SEC
BH CV ECHO MEAS - LV V1 MEAN: 84.2 CM/SEC
BH CV ECHO MEAS - LV V1 VTI: 27.4 CM
BH CV ECHO MEAS - LVIDD: 4.8 CM
BH CV ECHO MEAS - LVIDS: 3.1 CM
BH CV ECHO MEAS - LVLD AP2: 7.9 CM
BH CV ECHO MEAS - LVLD AP4: 8 CM
BH CV ECHO MEAS - LVLS AP2: 6.4 CM
BH CV ECHO MEAS - LVLS AP4: 6.7 CM
BH CV ECHO MEAS - LVOT AREA (M): 2.5 CM^2
BH CV ECHO MEAS - LVOT AREA: 2.5 CM^2
BH CV ECHO MEAS - LVOT DIAM: 1.8 CM
BH CV ECHO MEAS - LVPWD: 1.3 CM
BH CV ECHO MEAS - MED PEAK E' VEL: 10.1 CM/SEC
BH CV ECHO MEAS - MV A DUR: 0.13 SEC
BH CV ECHO MEAS - MV A MAX VEL: 115 CM/SEC
BH CV ECHO MEAS - MV DEC SLOPE: 606 CM/SEC^2
BH CV ECHO MEAS - MV DEC TIME: 197 SEC
BH CV ECHO MEAS - MV E MAX VEL: 106 CM/SEC
BH CV ECHO MEAS - MV E/A: 0.92
BH CV ECHO MEAS - MV MAX PG: 6.4 MMHG
BH CV ECHO MEAS - MV MEAN PG: 3 MMHG
BH CV ECHO MEAS - MV P1/2T MAX VEL: 145 CM/SEC
BH CV ECHO MEAS - MV P1/2T: 70.1 MSEC
BH CV ECHO MEAS - MV V2 MAX: 126 CM/SEC
BH CV ECHO MEAS - MV V2 MEAN: 82.8 CM/SEC
BH CV ECHO MEAS - MV V2 VTI: 27.8 CM
BH CV ECHO MEAS - MVA P1/2T LCG: 1.5 CM^2
BH CV ECHO MEAS - MVA(P1/2T): 3.1 CM^2
BH CV ECHO MEAS - MVA(VTI): 2.5 CM^2
BH CV ECHO MEAS - PA ACC TIME: 0.05 SEC
BH CV ECHO MEAS - PA MAX PG (FULL): 7 MMHG
BH CV ECHO MEAS - PA MAX PG: 11.3 MMHG
BH CV ECHO MEAS - PA PR(ACCEL): 57.6 MMHG
BH CV ECHO MEAS - PA V2 MAX: 168 CM/SEC
BH CV ECHO MEAS - PULM A REVS DUR: 0.1 SEC
BH CV ECHO MEAS - PULM A REVS VEL: 31.7 CM/SEC
BH CV ECHO MEAS - PULM DIAS VEL: 60.8 CM/SEC
BH CV ECHO MEAS - PULM S/D: 1.6
BH CV ECHO MEAS - PULM SYS VEL: 99 CM/SEC
BH CV ECHO MEAS - PVA(V,A): 2.1 CM^2
BH CV ECHO MEAS - PVA(V,D): 2.1 CM^2
BH CV ECHO MEAS - QP/QS: 1.2
BH CV ECHO MEAS - RV MAX PG: 4.2 MMHG
BH CV ECHO MEAS - RV MEAN PG: 2 MMHG
BH CV ECHO MEAS - RV V1 MAX: 103 CM/SEC
BH CV ECHO MEAS - RV V1 MEAN: 71.3 CM/SEC
BH CV ECHO MEAS - RV V1 VTI: 24.5 CM
BH CV ECHO MEAS - RVOT AREA: 3.5 CM^2
BH CV ECHO MEAS - RVOT DIAM: 2.1 CM
BH CV ECHO MEAS - SI(CUBED): 34.2 ML/M^2
BH CV ECHO MEAS - SI(LVOT): 29.5 ML/M^2
BH CV ECHO MEAS - SI(MOD-SP2): 19.9 ML/M^2
BH CV ECHO MEAS - SI(MOD-SP4): 21.2 ML/M^2
BH CV ECHO MEAS - SI(TEICH): 29.5 ML/M^2
BH CV ECHO MEAS - SV(CUBED): 80.8 ML
BH CV ECHO MEAS - SV(LVOT): 69.7 ML
BH CV ECHO MEAS - SV(MOD-SP2): 47 ML
BH CV ECHO MEAS - SV(MOD-SP4): 50 ML
BH CV ECHO MEAS - SV(RVOT): 84.9 ML
BH CV ECHO MEAS - SV(TEICH): 69.6 ML
BH CV ECHO MEAS - TAPSE (>1.6): 2.5 CM
BH CV ECHO MEASUREMENTS AVERAGE E/E' RATIO: 10.14
BH CV REST NUCLEAR ISOTOPE DOSE: 11.8 MCI
BH CV STRESS COMMENTS STAGE 1: NORMAL
BH CV STRESS DOSE REGADENOSON STAGE 1: 0.4
BH CV STRESS DURATION MIN STAGE 1: 0
BH CV STRESS DURATION SEC STAGE 1: 10
BH CV STRESS NUCLEAR ISOTOPE DOSE: 36 MCI
BH CV STRESS PROTOCOL 1: NORMAL
BH CV STRESS RECOVERY BP: NORMAL MMHG
BH CV STRESS RECOVERY HR: 75 BPM
BH CV STRESS STAGE 1: 1
BH CV XLRA - TDI S': 15.3 CM/SEC
BILIRUB SERPL-MCNC: 0.7 MG/DL (ref 0–1.2)
BUN SERPL-MCNC: 13 MG/DL (ref 8–23)
BUN/CREAT SERPL: 9.4 (ref 7–25)
CALCIUM SPEC-SCNC: 9.1 MG/DL (ref 8.6–10.5)
CHLORIDE SERPL-SCNC: 104 MMOL/L (ref 98–107)
CHOLEST SERPL-MCNC: 75 MG/DL (ref 0–200)
CO2 SERPL-SCNC: 27.3 MMOL/L (ref 22–29)
CREAT SERPL-MCNC: 1.39 MG/DL (ref 0.76–1.27)
DEPRECATED RDW RBC AUTO: 45.7 FL (ref 37–54)
EOSINOPHIL # BLD AUTO: 0.28 10*3/MM3 (ref 0–0.4)
EOSINOPHIL NFR BLD AUTO: 4.2 % (ref 0.3–6.2)
ERYTHROCYTE [DISTWIDTH] IN BLOOD BY AUTOMATED COUNT: 14.4 % (ref 12.3–15.4)
GFR SERPL CREATININE-BSD FRML MDRD: 50 ML/MIN/1.73
GLOBULIN UR ELPH-MCNC: 2.8 GM/DL
GLUCOSE BLDC GLUCOMTR-MCNC: 106 MG/DL (ref 70–130)
GLUCOSE BLDC GLUCOMTR-MCNC: 107 MG/DL (ref 70–130)
GLUCOSE BLDC GLUCOMTR-MCNC: 141 MG/DL (ref 70–130)
GLUCOSE BLDC GLUCOMTR-MCNC: 226 MG/DL (ref 70–130)
GLUCOSE SERPL-MCNC: 108 MG/DL (ref 65–99)
HBA1C MFR BLD: 6.6 % (ref 4.8–5.6)
HCT VFR BLD AUTO: 44.3 % (ref 37.5–51)
HDLC SERPL-MCNC: 34 MG/DL (ref 40–60)
HGB BLD-MCNC: 14.5 G/DL (ref 13–17.7)
IMM GRANULOCYTES # BLD AUTO: 0.02 10*3/MM3 (ref 0–0.05)
IMM GRANULOCYTES NFR BLD AUTO: 0.3 % (ref 0–0.5)
LDLC SERPL CALC-MCNC: 21 MG/DL (ref 0–100)
LDLC/HDLC SERPL: 0.56 {RATIO}
LEFT ATRIUM VOLUME INDEX: 31.6 ML/M2
LV EF NUC BP: 60 %
LYMPHOCYTES # BLD AUTO: 2.01 10*3/MM3 (ref 0.7–3.1)
LYMPHOCYTES NFR BLD AUTO: 30 % (ref 19.6–45.3)
MAGNESIUM SERPL-MCNC: 1.9 MG/DL (ref 1.6–2.4)
MAXIMAL PREDICTED HEART RATE: 144 BPM
MAXIMAL PREDICTED HEART RATE: 144 BPM
MCH RBC QN AUTO: 28.3 PG (ref 26.6–33)
MCHC RBC AUTO-ENTMCNC: 32.7 G/DL (ref 31.5–35.7)
MCV RBC AUTO: 86.4 FL (ref 79–97)
MONOCYTES # BLD AUTO: 0.69 10*3/MM3 (ref 0.1–0.9)
MONOCYTES NFR BLD AUTO: 10.3 % (ref 5–12)
NEUTROPHILS NFR BLD AUTO: 3.65 10*3/MM3 (ref 1.7–7)
NEUTROPHILS NFR BLD AUTO: 54.3 % (ref 42.7–76)
NRBC BLD AUTO-RTO: 0 /100 WBC (ref 0–0.2)
NT-PROBNP SERPL-MCNC: 107.8 PG/ML (ref 0–1800)
PERCENT MAX PREDICTED HR: 59.03 %
PLATELET # BLD AUTO: 182 10*3/MM3 (ref 140–450)
PMV BLD AUTO: 10.5 FL (ref 6–12)
POTASSIUM SERPL-SCNC: 3.8 MMOL/L (ref 3.5–5.2)
PROT SERPL-MCNC: 6.7 G/DL (ref 6–8.5)
RBC # BLD AUTO: 5.13 10*6/MM3 (ref 4.14–5.8)
SARS-COV-2 ORF1AB RESP QL NAA+PROBE: NOT DETECTED
SODIUM SERPL-SCNC: 139 MMOL/L (ref 136–145)
STRESS BASELINE BP: NORMAL MMHG
STRESS BASELINE HR: 63 BPM
STRESS PERCENT HR: 69 %
STRESS POST PEAK BP: NORMAL MMHG
STRESS POST PEAK HR: 85 BPM
STRESS TARGET HR: 122 BPM
STRESS TARGET HR: 122 BPM
TRIGL SERPL-MCNC: 109 MG/DL (ref 0–150)
TSH SERPL DL<=0.05 MIU/L-ACNC: 2.31 UIU/ML (ref 0.27–4.2)
VLDLC SERPL-MCNC: 20 MG/DL (ref 5–40)
WBC # BLD AUTO: 6.71 10*3/MM3 (ref 3.4–10.8)

## 2021-05-26 PROCEDURE — 25010000002 REGADENOSON 0.4 MG/5ML SOLUTION: Performed by: INTERNAL MEDICINE

## 2021-05-26 PROCEDURE — 99214 OFFICE O/P EST MOD 30 MIN: CPT | Performed by: INTERNAL MEDICINE

## 2021-05-26 PROCEDURE — 93010 ELECTROCARDIOGRAM REPORT: CPT | Performed by: INTERNAL MEDICINE

## 2021-05-26 PROCEDURE — 93005 ELECTROCARDIOGRAM TRACING: CPT | Performed by: NURSE PRACTITIONER

## 2021-05-26 PROCEDURE — G0378 HOSPITAL OBSERVATION PER HR: HCPCS

## 2021-05-26 PROCEDURE — A9500 TC99M SESTAMIBI: HCPCS | Performed by: INTERNAL MEDICINE

## 2021-05-26 PROCEDURE — 85025 COMPLETE CBC W/AUTO DIFF WBC: CPT | Performed by: NURSE PRACTITIONER

## 2021-05-26 PROCEDURE — 84443 ASSAY THYROID STIM HORMONE: CPT | Performed by: HOSPITALIST

## 2021-05-26 PROCEDURE — 78452 HT MUSCLE IMAGE SPECT MULT: CPT | Performed by: INTERNAL MEDICINE

## 2021-05-26 PROCEDURE — 93306 TTE W/DOPPLER COMPLETE: CPT

## 2021-05-26 PROCEDURE — 93016 CV STRESS TEST SUPVJ ONLY: CPT | Performed by: INTERNAL MEDICINE

## 2021-05-26 PROCEDURE — 93017 CV STRESS TEST TRACING ONLY: CPT

## 2021-05-26 PROCEDURE — 93018 CV STRESS TEST I&R ONLY: CPT | Performed by: INTERNAL MEDICINE

## 2021-05-26 PROCEDURE — 63710000001 INSULIN GLARGINE PER 5 UNITS: Performed by: HOSPITALIST

## 2021-05-26 PROCEDURE — 78452 HT MUSCLE IMAGE SPECT MULT: CPT

## 2021-05-26 PROCEDURE — 0 TECHNETIUM SESTAMIBI: Performed by: INTERNAL MEDICINE

## 2021-05-26 PROCEDURE — 83036 HEMOGLOBIN GLYCOSYLATED A1C: CPT | Performed by: HOSPITALIST

## 2021-05-26 PROCEDURE — 83735 ASSAY OF MAGNESIUM: CPT | Performed by: NURSE PRACTITIONER

## 2021-05-26 PROCEDURE — 82962 GLUCOSE BLOOD TEST: CPT

## 2021-05-26 PROCEDURE — 80061 LIPID PANEL: CPT | Performed by: HOSPITALIST

## 2021-05-26 PROCEDURE — 93306 TTE W/DOPPLER COMPLETE: CPT | Performed by: INTERNAL MEDICINE

## 2021-05-26 PROCEDURE — 83880 ASSAY OF NATRIURETIC PEPTIDE: CPT | Performed by: HOSPITALIST

## 2021-05-26 PROCEDURE — 80053 COMPREHEN METABOLIC PANEL: CPT | Performed by: HOSPITALIST

## 2021-05-26 PROCEDURE — 63710000001 INSULIN LISPRO (HUMAN) PER 5 UNITS: Performed by: HOSPITALIST

## 2021-05-26 RX ORDER — AMLODIPINE BESYLATE 5 MG/1
5 TABLET ORAL
Status: DISCONTINUED | OUTPATIENT
Start: 2021-05-26 | End: 2021-05-26

## 2021-05-26 RX ORDER — ICOSAPENT ETHYL 1000 MG/1
2 CAPSULE ORAL 2 TIMES DAILY WITH MEALS
Status: DISCONTINUED | OUTPATIENT
Start: 2021-05-26 | End: 2021-05-29 | Stop reason: HOSPADM

## 2021-05-26 RX ORDER — AMLODIPINE BESYLATE 10 MG/1
10 TABLET ORAL
Status: DISCONTINUED | OUTPATIENT
Start: 2021-05-27 | End: 2021-05-29 | Stop reason: HOSPADM

## 2021-05-26 RX ADMIN — TECHNETIUM TC 99M SESTAMIBI 1 DOSE: 1 INJECTION INTRAVENOUS at 06:52

## 2021-05-26 RX ADMIN — TECHNETIUM TC 99M SESTAMIBI 1 DOSE: 1 INJECTION INTRAVENOUS at 08:35

## 2021-05-26 RX ADMIN — INSULIN LISPRO 3 UNITS: 100 INJECTION, SOLUTION INTRAVENOUS; SUBCUTANEOUS at 18:32

## 2021-05-26 RX ADMIN — PREGABALIN 150 MG: 75 CAPSULE ORAL at 20:36

## 2021-05-26 RX ADMIN — ACETAMINOPHEN 650 MG: 325 TABLET, FILM COATED ORAL at 15:47

## 2021-05-26 RX ADMIN — MAGNESIUM OXIDE 400 MG (241.3 MG MAGNESIUM) TABLET 400 MG: TABLET at 20:35

## 2021-05-26 RX ADMIN — TAMSULOSIN HYDROCHLORIDE 0.4 MG: 0.4 CAPSULE ORAL at 18:32

## 2021-05-26 RX ADMIN — INSULIN GLARGINE 10 UNITS: 100 INJECTION, SOLUTION SUBCUTANEOUS at 20:32

## 2021-05-26 RX ADMIN — REGADENOSON 0.4 MG: 0.08 INJECTION, SOLUTION INTRAVENOUS at 08:35

## 2021-05-26 RX ADMIN — LATANOPROST 1 DROP: 50 SOLUTION/ DROPS OPHTHALMIC at 20:40

## 2021-05-26 RX ADMIN — ATORVASTATIN CALCIUM 10 MG: 20 TABLET, FILM COATED ORAL at 20:36

## 2021-05-26 RX ADMIN — METOPROLOL TARTRATE 25 MG: 25 TABLET, FILM COATED ORAL at 20:36

## 2021-05-26 RX ADMIN — PANTOPRAZOLE SODIUM 40 MG: 40 TABLET, DELAYED RELEASE ORAL at 18:32

## 2021-05-27 ENCOUNTER — APPOINTMENT (OUTPATIENT)
Dept: CT IMAGING | Facility: HOSPITAL | Age: 76
End: 2021-05-27

## 2021-05-27 LAB
ANION GAP SERPL CALCULATED.3IONS-SCNC: 8.2 MMOL/L (ref 5–15)
BASOPHILS # BLD AUTO: 0.06 10*3/MM3 (ref 0–0.2)
BASOPHILS NFR BLD AUTO: 0.9 % (ref 0–1.5)
BUN SERPL-MCNC: 14 MG/DL (ref 8–23)
BUN/CREAT SERPL: 11 (ref 7–25)
CALCIUM SPEC-SCNC: 9.4 MG/DL (ref 8.6–10.5)
CHLORIDE SERPL-SCNC: 105 MMOL/L (ref 98–107)
CO2 SERPL-SCNC: 26.8 MMOL/L (ref 22–29)
CREAT SERPL-MCNC: 1.27 MG/DL (ref 0.76–1.27)
DEPRECATED RDW RBC AUTO: 44.8 FL (ref 37–54)
EOSINOPHIL # BLD AUTO: 0.24 10*3/MM3 (ref 0–0.4)
EOSINOPHIL NFR BLD AUTO: 3.5 % (ref 0.3–6.2)
ERYTHROCYTE [DISTWIDTH] IN BLOOD BY AUTOMATED COUNT: 14.2 % (ref 12.3–15.4)
GFR SERPL CREATININE-BSD FRML MDRD: 55 ML/MIN/1.73
GLUCOSE BLDC GLUCOMTR-MCNC: 105 MG/DL (ref 70–130)
GLUCOSE BLDC GLUCOMTR-MCNC: 127 MG/DL (ref 70–130)
GLUCOSE BLDC GLUCOMTR-MCNC: 160 MG/DL (ref 70–130)
GLUCOSE BLDC GLUCOMTR-MCNC: 193 MG/DL (ref 70–130)
GLUCOSE SERPL-MCNC: 108 MG/DL (ref 65–99)
HCT VFR BLD AUTO: 46.9 % (ref 37.5–51)
HGB BLD-MCNC: 15.6 G/DL (ref 13–17.7)
IMM GRANULOCYTES # BLD AUTO: 0.02 10*3/MM3 (ref 0–0.05)
IMM GRANULOCYTES NFR BLD AUTO: 0.3 % (ref 0–0.5)
LYMPHOCYTES # BLD AUTO: 2.25 10*3/MM3 (ref 0.7–3.1)
LYMPHOCYTES NFR BLD AUTO: 33.1 % (ref 19.6–45.3)
MAGNESIUM SERPL-MCNC: 1.9 MG/DL (ref 1.6–2.4)
MAGNESIUM SERPL-MCNC: 1.9 MG/DL (ref 1.6–2.4)
MCH RBC QN AUTO: 29 PG (ref 26.6–33)
MCHC RBC AUTO-ENTMCNC: 33.3 G/DL (ref 31.5–35.7)
MCV RBC AUTO: 87.2 FL (ref 79–97)
MONOCYTES # BLD AUTO: 0.71 10*3/MM3 (ref 0.1–0.9)
MONOCYTES NFR BLD AUTO: 10.4 % (ref 5–12)
NEUTROPHILS NFR BLD AUTO: 3.52 10*3/MM3 (ref 1.7–7)
NEUTROPHILS NFR BLD AUTO: 51.8 % (ref 42.7–76)
NRBC BLD AUTO-RTO: 0 /100 WBC (ref 0–0.2)
PLATELET # BLD AUTO: 207 10*3/MM3 (ref 140–450)
PMV BLD AUTO: 10 FL (ref 6–12)
POTASSIUM SERPL-SCNC: 4.3 MMOL/L (ref 3.5–5.2)
POTASSIUM SERPL-SCNC: 4.3 MMOL/L (ref 3.5–5.2)
RBC # BLD AUTO: 5.38 10*6/MM3 (ref 4.14–5.8)
SODIUM SERPL-SCNC: 140 MMOL/L (ref 136–145)
TROPONIN T SERPL-MCNC: <0.01 NG/ML (ref 0–0.03)
WBC # BLD AUTO: 6.8 10*3/MM3 (ref 3.4–10.8)

## 2021-05-27 PROCEDURE — 71275 CT ANGIOGRAPHY CHEST: CPT

## 2021-05-27 PROCEDURE — 85025 COMPLETE CBC W/AUTO DIFF WBC: CPT | Performed by: NURSE PRACTITIONER

## 2021-05-27 PROCEDURE — 82962 GLUCOSE BLOOD TEST: CPT

## 2021-05-27 PROCEDURE — 25010000002 MORPHINE PER 10 MG: Performed by: INTERNAL MEDICINE

## 2021-05-27 PROCEDURE — 63710000001 INSULIN LISPRO (HUMAN) PER 5 UNITS: Performed by: HOSPITALIST

## 2021-05-27 PROCEDURE — 80048 BASIC METABOLIC PNL TOTAL CA: CPT | Performed by: NURSE PRACTITIONER

## 2021-05-27 PROCEDURE — 93010 ELECTROCARDIOGRAM REPORT: CPT | Performed by: INTERNAL MEDICINE

## 2021-05-27 PROCEDURE — 99232 SBSQ HOSP IP/OBS MODERATE 35: CPT | Performed by: NURSE PRACTITIONER

## 2021-05-27 PROCEDURE — 83735 ASSAY OF MAGNESIUM: CPT | Performed by: NURSE PRACTITIONER

## 2021-05-27 PROCEDURE — 84484 ASSAY OF TROPONIN QUANT: CPT | Performed by: INTERNAL MEDICINE

## 2021-05-27 PROCEDURE — 83735 ASSAY OF MAGNESIUM: CPT | Performed by: INTERNAL MEDICINE

## 2021-05-27 PROCEDURE — 84132 ASSAY OF SERUM POTASSIUM: CPT | Performed by: INTERNAL MEDICINE

## 2021-05-27 PROCEDURE — 93005 ELECTROCARDIOGRAM TRACING: CPT | Performed by: INTERNAL MEDICINE

## 2021-05-27 RX ORDER — NITROGLYCERIN 0.4 MG/1
TABLET SUBLINGUAL
Status: COMPLETED
Start: 2021-05-27 | End: 2021-05-27

## 2021-05-27 RX ORDER — MORPHINE SULFATE 2 MG/ML
2 INJECTION, SOLUTION INTRAMUSCULAR; INTRAVENOUS EVERY 4 HOURS PRN
Status: DISCONTINUED | OUTPATIENT
Start: 2021-05-27 | End: 2021-05-29

## 2021-05-27 RX ORDER — NITROGLYCERIN 0.4 MG/1
0.4 TABLET SUBLINGUAL
Status: DISCONTINUED | OUTPATIENT
Start: 2021-05-27 | End: 2021-05-29

## 2021-05-27 RX ADMIN — MAGNESIUM OXIDE 400 MG (241.3 MG MAGNESIUM) TABLET 400 MG: TABLET at 20:22

## 2021-05-27 RX ADMIN — METOPROLOL TARTRATE 25 MG: 25 TABLET, FILM COATED ORAL at 20:21

## 2021-05-27 RX ADMIN — NITROGLYCERIN: 0.4 TABLET SUBLINGUAL at 15:17

## 2021-05-27 RX ADMIN — ALLOPURINOL 100 MG: 100 TABLET ORAL at 08:14

## 2021-05-27 RX ADMIN — METOPROLOL TARTRATE 25 MG: 25 TABLET, FILM COATED ORAL at 08:14

## 2021-05-27 RX ADMIN — IOPAMIDOL 95 ML: 755 INJECTION, SOLUTION INTRAVENOUS at 23:59

## 2021-05-27 RX ADMIN — INSULIN LISPRO 2 UNITS: 100 INJECTION, SOLUTION INTRAVENOUS; SUBCUTANEOUS at 11:20

## 2021-05-27 RX ADMIN — FUROSEMIDE 20 MG: 20 TABLET ORAL at 08:14

## 2021-05-27 RX ADMIN — ICOSAPENT ETHYL 2 G: 1000 CAPSULE ORAL at 18:21

## 2021-05-27 RX ADMIN — PANTOPRAZOLE SODIUM 40 MG: 40 TABLET, DELAYED RELEASE ORAL at 06:35

## 2021-05-27 RX ADMIN — PANTOPRAZOLE SODIUM 40 MG: 40 TABLET, DELAYED RELEASE ORAL at 18:21

## 2021-05-27 RX ADMIN — PREGABALIN 150 MG: 75 CAPSULE ORAL at 20:22

## 2021-05-27 RX ADMIN — ATORVASTATIN CALCIUM 10 MG: 20 TABLET, FILM COATED ORAL at 20:22

## 2021-05-27 RX ADMIN — ASPIRIN 81 MG: 81 TABLET, COATED ORAL at 08:14

## 2021-05-27 RX ADMIN — Medication 1000 UNITS: at 08:14

## 2021-05-27 RX ADMIN — TAMSULOSIN HYDROCHLORIDE 0.4 MG: 0.4 CAPSULE ORAL at 18:21

## 2021-05-27 RX ADMIN — PREGABALIN 150 MG: 75 CAPSULE ORAL at 08:14

## 2021-05-27 RX ADMIN — LATANOPROST 1 DROP: 50 SOLUTION/ DROPS OPHTHALMIC at 22:07

## 2021-05-27 RX ADMIN — NITROGLYCERIN 0.4 MG: 0.4 TABLET SUBLINGUAL at 15:23

## 2021-05-27 RX ADMIN — AMLODIPINE BESYLATE 10 MG: 10 TABLET ORAL at 08:14

## 2021-05-27 RX ADMIN — MORPHINE SULFATE 2 MG: 2 INJECTION, SOLUTION INTRAMUSCULAR; INTRAVENOUS at 19:11

## 2021-05-28 LAB
ACT BLD: 345 SECONDS (ref 82–152)
ANION GAP SERPL CALCULATED.3IONS-SCNC: 7.4 MMOL/L (ref 5–15)
BASOPHILS # BLD AUTO: 0.05 10*3/MM3 (ref 0–0.2)
BASOPHILS NFR BLD AUTO: 0.7 % (ref 0–1.5)
BUN SERPL-MCNC: 17 MG/DL (ref 8–23)
BUN/CREAT SERPL: 11.3 (ref 7–25)
CALCIUM SPEC-SCNC: 9 MG/DL (ref 8.6–10.5)
CHLORIDE SERPL-SCNC: 102 MMOL/L (ref 98–107)
CO2 SERPL-SCNC: 26.6 MMOL/L (ref 22–29)
CREAT SERPL-MCNC: 1.51 MG/DL (ref 0.76–1.27)
DEPRECATED RDW RBC AUTO: 45.7 FL (ref 37–54)
EOSINOPHIL # BLD AUTO: 0.22 10*3/MM3 (ref 0–0.4)
EOSINOPHIL NFR BLD AUTO: 3.2 % (ref 0.3–6.2)
ERYTHROCYTE [DISTWIDTH] IN BLOOD BY AUTOMATED COUNT: 14.3 % (ref 12.3–15.4)
GFR SERPL CREATININE-BSD FRML MDRD: 45 ML/MIN/1.73
GLUCOSE BLDC GLUCOMTR-MCNC: 101 MG/DL (ref 70–130)
GLUCOSE BLDC GLUCOMTR-MCNC: 147 MG/DL (ref 70–130)
GLUCOSE BLDC GLUCOMTR-MCNC: 157 MG/DL (ref 70–130)
GLUCOSE BLDC GLUCOMTR-MCNC: 190 MG/DL (ref 70–130)
GLUCOSE SERPL-MCNC: 155 MG/DL (ref 65–99)
HCT VFR BLD AUTO: 47.1 % (ref 37.5–51)
HGB BLD-MCNC: 15.5 G/DL (ref 13–17.7)
IMM GRANULOCYTES # BLD AUTO: 0.01 10*3/MM3 (ref 0–0.05)
IMM GRANULOCYTES NFR BLD AUTO: 0.1 % (ref 0–0.5)
INR PPP: 1.09 (ref 0.9–1.1)
LYMPHOCYTES # BLD AUTO: 2.16 10*3/MM3 (ref 0.7–3.1)
LYMPHOCYTES NFR BLD AUTO: 31.6 % (ref 19.6–45.3)
MCH RBC QN AUTO: 28.9 PG (ref 26.6–33)
MCHC RBC AUTO-ENTMCNC: 32.9 G/DL (ref 31.5–35.7)
MCV RBC AUTO: 87.9 FL (ref 79–97)
MONOCYTES # BLD AUTO: 0.68 10*3/MM3 (ref 0.1–0.9)
MONOCYTES NFR BLD AUTO: 9.9 % (ref 5–12)
NEUTROPHILS NFR BLD AUTO: 3.72 10*3/MM3 (ref 1.7–7)
NEUTROPHILS NFR BLD AUTO: 54.5 % (ref 42.7–76)
NRBC BLD AUTO-RTO: 0 /100 WBC (ref 0–0.2)
PLATELET # BLD AUTO: 178 10*3/MM3 (ref 140–450)
PMV BLD AUTO: 9.7 FL (ref 6–12)
POTASSIUM SERPL-SCNC: 4 MMOL/L (ref 3.5–5.2)
PROTHROMBIN TIME: 13.9 SECONDS (ref 11.7–14.2)
RBC # BLD AUTO: 5.36 10*6/MM3 (ref 4.14–5.8)
SODIUM SERPL-SCNC: 136 MMOL/L (ref 136–145)
WBC # BLD AUTO: 6.84 10*3/MM3 (ref 3.4–10.8)

## 2021-05-28 PROCEDURE — B2151ZZ FLUOROSCOPY OF LEFT HEART USING LOW OSMOLAR CONTRAST: ICD-10-PCS | Performed by: INTERNAL MEDICINE

## 2021-05-28 PROCEDURE — B2111ZZ FLUOROSCOPY OF MULTIPLE CORONARY ARTERIES USING LOW OSMOLAR CONTRAST: ICD-10-PCS | Performed by: INTERNAL MEDICINE

## 2021-05-28 PROCEDURE — 0 IOPAMIDOL PER 1 ML: Performed by: INTERNAL MEDICINE

## 2021-05-28 PROCEDURE — C9600 PERC DRUG-EL COR STENT SING: HCPCS | Performed by: INTERNAL MEDICINE

## 2021-05-28 PROCEDURE — 63710000001 INSULIN LISPRO (HUMAN) PER 5 UNITS: Performed by: INTERNAL MEDICINE

## 2021-05-28 PROCEDURE — 93458 L HRT ARTERY/VENTRICLE ANGIO: CPT | Performed by: INTERNAL MEDICINE

## 2021-05-28 PROCEDURE — 85610 PROTHROMBIN TIME: CPT | Performed by: NURSE PRACTITIONER

## 2021-05-28 PROCEDURE — C1894 INTRO/SHEATH, NON-LASER: HCPCS | Performed by: INTERNAL MEDICINE

## 2021-05-28 PROCEDURE — 80048 BASIC METABOLIC PNL TOTAL CA: CPT | Performed by: NURSE PRACTITIONER

## 2021-05-28 PROCEDURE — C1769 GUIDE WIRE: HCPCS | Performed by: INTERNAL MEDICINE

## 2021-05-28 PROCEDURE — 85025 COMPLETE CBC W/AUTO DIFF WBC: CPT | Performed by: NURSE PRACTITIONER

## 2021-05-28 PROCEDURE — 92928 PRQ TCAT PLMT NTRAC ST 1 LES: CPT | Performed by: INTERNAL MEDICINE

## 2021-05-28 PROCEDURE — 85347 COAGULATION TIME ACTIVATED: CPT

## 2021-05-28 PROCEDURE — 82962 GLUCOSE BLOOD TEST: CPT

## 2021-05-28 PROCEDURE — C1887 CATHETER, GUIDING: HCPCS | Performed by: INTERNAL MEDICINE

## 2021-05-28 PROCEDURE — C1874 STENT, COATED/COV W/DEL SYS: HCPCS | Performed by: INTERNAL MEDICINE

## 2021-05-28 PROCEDURE — 25010000002 HEPARIN (PORCINE) PER 1000 UNITS: Performed by: INTERNAL MEDICINE

## 2021-05-28 PROCEDURE — 4A023N7 MEASUREMENT OF CARDIAC SAMPLING AND PRESSURE, LEFT HEART, PERCUTANEOUS APPROACH: ICD-10-PCS | Performed by: INTERNAL MEDICINE

## 2021-05-28 PROCEDURE — 25010000002 MIDAZOLAM PER 1 MG: Performed by: INTERNAL MEDICINE

## 2021-05-28 PROCEDURE — 25010000002 FENTANYL CITRATE (PF) 50 MCG/ML SOLUTION: Performed by: INTERNAL MEDICINE

## 2021-05-28 PROCEDURE — 027034Z DILATION OF CORONARY ARTERY, ONE ARTERY WITH DRUG-ELUTING INTRALUMINAL DEVICE, PERCUTANEOUS APPROACH: ICD-10-PCS | Performed by: INTERNAL MEDICINE

## 2021-05-28 DEVICE — XIENCE SIERRA™ EVEROLIMUS ELUTING CORONARY STENT SYSTEM 3.00 MM X 12 MM / RAPID-EXCHANGE
Type: IMPLANTABLE DEVICE | Status: FUNCTIONAL
Brand: XIENCE SIERRA™

## 2021-05-28 RX ORDER — INSULIN GLARGINE 100 [IU]/ML
10 INJECTION, SOLUTION SUBCUTANEOUS NIGHTLY
Status: DISCONTINUED | OUTPATIENT
Start: 2021-05-28 | End: 2021-05-29 | Stop reason: HOSPADM

## 2021-05-28 RX ORDER — HEPARIN SODIUM 1000 [USP'U]/ML
INJECTION, SOLUTION INTRAVENOUS; SUBCUTANEOUS AS NEEDED
Status: DISCONTINUED | OUTPATIENT
Start: 2021-05-28 | End: 2021-05-28 | Stop reason: HOSPADM

## 2021-05-28 RX ORDER — ACETAMINOPHEN 325 MG/1
650 TABLET ORAL EVERY 4 HOURS PRN
Status: DISCONTINUED | OUTPATIENT
Start: 2021-05-28 | End: 2021-05-28 | Stop reason: SDUPTHER

## 2021-05-28 RX ORDER — INSULIN GLARGINE 100 [IU]/ML
15 INJECTION, SOLUTION SUBCUTANEOUS NIGHTLY
Status: DISCONTINUED | OUTPATIENT
Start: 2021-05-28 | End: 2021-05-28

## 2021-05-28 RX ORDER — MIDAZOLAM HYDROCHLORIDE 1 MG/ML
INJECTION INTRAMUSCULAR; INTRAVENOUS AS NEEDED
Status: DISCONTINUED | OUTPATIENT
Start: 2021-05-28 | End: 2021-05-28 | Stop reason: HOSPADM

## 2021-05-28 RX ORDER — SODIUM CHLORIDE 9 MG/ML
INJECTION, SOLUTION INTRAVENOUS CONTINUOUS PRN
Status: COMPLETED | OUTPATIENT
Start: 2021-05-28 | End: 2021-05-28

## 2021-05-28 RX ORDER — ACETAMINOPHEN 325 MG/1
650 TABLET ORAL EVERY 4 HOURS PRN
Status: DISCONTINUED | OUTPATIENT
Start: 2021-05-28 | End: 2021-05-29 | Stop reason: HOSPADM

## 2021-05-28 RX ORDER — LIDOCAINE HYDROCHLORIDE 20 MG/ML
INJECTION, SOLUTION INFILTRATION; PERINEURAL AS NEEDED
Status: DISCONTINUED | OUTPATIENT
Start: 2021-05-28 | End: 2021-05-28 | Stop reason: HOSPADM

## 2021-05-28 RX ORDER — FENTANYL CITRATE 50 UG/ML
INJECTION, SOLUTION INTRAMUSCULAR; INTRAVENOUS AS NEEDED
Status: DISCONTINUED | OUTPATIENT
Start: 2021-05-28 | End: 2021-05-28 | Stop reason: HOSPADM

## 2021-05-28 RX ADMIN — PANTOPRAZOLE SODIUM 40 MG: 40 TABLET, DELAYED RELEASE ORAL at 16:52

## 2021-05-28 RX ADMIN — FUROSEMIDE 20 MG: 20 TABLET ORAL at 08:16

## 2021-05-28 RX ADMIN — INSULIN LISPRO 2 UNITS: 100 INJECTION, SOLUTION INTRAVENOUS; SUBCUTANEOUS at 16:52

## 2021-05-28 RX ADMIN — ALLOPURINOL 100 MG: 100 TABLET ORAL at 08:16

## 2021-05-28 RX ADMIN — TICAGRELOR 90 MG: 90 TABLET ORAL at 21:12

## 2021-05-28 RX ADMIN — TAMSULOSIN HYDROCHLORIDE 0.4 MG: 0.4 CAPSULE ORAL at 21:26

## 2021-05-28 RX ADMIN — AMLODIPINE BESYLATE 10 MG: 10 TABLET ORAL at 08:16

## 2021-05-28 RX ADMIN — PANTOPRAZOLE SODIUM 40 MG: 40 TABLET, DELAYED RELEASE ORAL at 07:39

## 2021-05-28 RX ADMIN — ATORVASTATIN CALCIUM 10 MG: 20 TABLET, FILM COATED ORAL at 21:12

## 2021-05-28 RX ADMIN — LATANOPROST 1 DROP: 50 SOLUTION/ DROPS OPHTHALMIC at 21:14

## 2021-05-28 RX ADMIN — METOPROLOL TARTRATE 25 MG: 25 TABLET, FILM COATED ORAL at 21:12

## 2021-05-28 RX ADMIN — PREGABALIN 150 MG: 75 CAPSULE ORAL at 08:16

## 2021-05-28 RX ADMIN — Medication 1000 UNITS: at 08:16

## 2021-05-28 RX ADMIN — ASPIRIN 81 MG: 81 TABLET, COATED ORAL at 08:17

## 2021-05-28 RX ADMIN — PREGABALIN 150 MG: 75 CAPSULE ORAL at 21:12

## 2021-05-28 RX ADMIN — METOPROLOL TARTRATE 25 MG: 25 TABLET, FILM COATED ORAL at 08:16

## 2021-05-28 RX ADMIN — ICOSAPENT ETHYL 2 G: 1000 CAPSULE ORAL at 21:13

## 2021-05-29 ENCOUNTER — READMISSION MANAGEMENT (OUTPATIENT)
Dept: CALL CENTER | Facility: HOSPITAL | Age: 76
End: 2021-05-29

## 2021-05-29 VITALS
OXYGEN SATURATION: 95 % | TEMPERATURE: 98.5 F | WEIGHT: 249 LBS | HEIGHT: 73 IN | RESPIRATION RATE: 18 BRPM | DIASTOLIC BLOOD PRESSURE: 68 MMHG | SYSTOLIC BLOOD PRESSURE: 150 MMHG | HEART RATE: 66 BPM | BODY MASS INDEX: 33 KG/M2

## 2021-05-29 LAB
ANION GAP SERPL CALCULATED.3IONS-SCNC: 10 MMOL/L (ref 5–15)
BUN SERPL-MCNC: 17 MG/DL (ref 8–23)
BUN/CREAT SERPL: 12.2 (ref 7–25)
CALCIUM SPEC-SCNC: 9.2 MG/DL (ref 8.6–10.5)
CHLORIDE SERPL-SCNC: 103 MMOL/L (ref 98–107)
CO2 SERPL-SCNC: 25 MMOL/L (ref 22–29)
CREAT SERPL-MCNC: 1.39 MG/DL (ref 0.76–1.27)
DEPRECATED RDW RBC AUTO: 44.6 FL (ref 37–54)
ERYTHROCYTE [DISTWIDTH] IN BLOOD BY AUTOMATED COUNT: 14.2 % (ref 12.3–15.4)
GFR SERPL CREATININE-BSD FRML MDRD: 50 ML/MIN/1.73
GLUCOSE BLDC GLUCOMTR-MCNC: 133 MG/DL (ref 70–130)
GLUCOSE BLDC GLUCOMTR-MCNC: 214 MG/DL (ref 70–130)
GLUCOSE SERPL-MCNC: 139 MG/DL (ref 65–99)
HCT VFR BLD AUTO: 46.8 % (ref 37.5–51)
HGB BLD-MCNC: 15.8 G/DL (ref 13–17.7)
MCH RBC QN AUTO: 29.2 PG (ref 26.6–33)
MCHC RBC AUTO-ENTMCNC: 33.8 G/DL (ref 31.5–35.7)
MCV RBC AUTO: 86.5 FL (ref 79–97)
PLATELET # BLD AUTO: 172 10*3/MM3 (ref 140–450)
PMV BLD AUTO: 10.4 FL (ref 6–12)
POTASSIUM SERPL-SCNC: 4 MMOL/L (ref 3.5–5.2)
QT INTERVAL: 375 MS
QT INTERVAL: 377 MS
QT INTERVAL: 380 MS
QT INTERVAL: 386 MS
RBC # BLD AUTO: 5.41 10*6/MM3 (ref 4.14–5.8)
SODIUM SERPL-SCNC: 138 MMOL/L (ref 136–145)
WBC # BLD AUTO: 8.99 10*3/MM3 (ref 3.4–10.8)

## 2021-05-29 PROCEDURE — 93010 ELECTROCARDIOGRAM REPORT: CPT | Performed by: INTERNAL MEDICINE

## 2021-05-29 PROCEDURE — 99238 HOSP IP/OBS DSCHRG MGMT 30/<: CPT | Performed by: NURSE PRACTITIONER

## 2021-05-29 PROCEDURE — 63710000001 INSULIN LISPRO (HUMAN) PER 5 UNITS: Performed by: INTERNAL MEDICINE

## 2021-05-29 PROCEDURE — 85027 COMPLETE CBC AUTOMATED: CPT | Performed by: INTERNAL MEDICINE

## 2021-05-29 PROCEDURE — 82962 GLUCOSE BLOOD TEST: CPT

## 2021-05-29 PROCEDURE — 80048 BASIC METABOLIC PNL TOTAL CA: CPT | Performed by: INTERNAL MEDICINE

## 2021-05-29 PROCEDURE — 63710000001 INSULIN GLARGINE PER 5 UNITS: Performed by: HOSPITALIST

## 2021-05-29 PROCEDURE — 93005 ELECTROCARDIOGRAM TRACING: CPT | Performed by: INTERNAL MEDICINE

## 2021-05-29 RX ORDER — ISOSORBIDE MONONITRATE 30 MG/1
30 TABLET, EXTENDED RELEASE ORAL
Status: DISCONTINUED | OUTPATIENT
Start: 2021-05-29 | End: 2021-05-29 | Stop reason: HOSPADM

## 2021-05-29 RX ORDER — PANTOPRAZOLE SODIUM 40 MG/1
40 TABLET, DELAYED RELEASE ORAL DAILY
Qty: 30 TABLET | Refills: 0 | Status: CANCELLED | OUTPATIENT
Start: 2021-05-29

## 2021-05-29 RX ORDER — FUROSEMIDE 20 MG/1
20 TABLET ORAL DAILY
Qty: 30 TABLET | Refills: 0 | Status: SHIPPED | OUTPATIENT
Start: 2021-05-29

## 2021-05-29 RX ADMIN — AMLODIPINE BESYLATE 10 MG: 10 TABLET ORAL at 08:40

## 2021-05-29 RX ADMIN — INSULIN GLARGINE 10 UNITS: 100 INJECTION, SOLUTION SUBCUTANEOUS at 08:41

## 2021-05-29 RX ADMIN — ASPIRIN 81 MG: 81 TABLET, COATED ORAL at 08:40

## 2021-05-29 RX ADMIN — INSULIN LISPRO 2 UNITS: 100 INJECTION, SOLUTION INTRAVENOUS; SUBCUTANEOUS at 11:53

## 2021-05-29 RX ADMIN — Medication 1000 UNITS: at 08:40

## 2021-05-29 RX ADMIN — PANTOPRAZOLE SODIUM 40 MG: 40 TABLET, DELAYED RELEASE ORAL at 06:28

## 2021-05-29 RX ADMIN — METOPROLOL TARTRATE 25 MG: 25 TABLET, FILM COATED ORAL at 08:40

## 2021-05-29 RX ADMIN — PREGABALIN 150 MG: 75 CAPSULE ORAL at 08:40

## 2021-05-29 RX ADMIN — TICAGRELOR 90 MG: 90 TABLET ORAL at 08:40

## 2021-05-29 RX ADMIN — ISOSORBIDE MONONITRATE 30 MG: 30 TABLET ORAL at 11:53

## 2021-05-29 RX ADMIN — FUROSEMIDE 20 MG: 20 TABLET ORAL at 08:41

## 2021-05-29 RX ADMIN — ALLOPURINOL 100 MG: 100 TABLET ORAL at 08:41

## 2021-05-29 NOTE — OUTREACH NOTE
Prep Survey      Responses   [facility] patient discharged from?  [place]   Is LACE score < 7 ?  No   Emergency Room discharge w/ pulse ox?  No   Eligibility  [facility]   Date of Admission  05/25/21   Date of Discharge  05/29/21   Discharge Disposition  Home or Self Care   Discharge diagnosis  chest pain,   cardiac cath   Does the patient have one of the following disease processes/diagnoses(primary or secondary)?  Other   Does the patient have Home health ordered?  No   Is there a DME ordered?  No   Prep survey completed?  Yes          Charla Lima RN

## 2021-06-01 ENCOUNTER — TRANSITIONAL CARE MANAGEMENT TELEPHONE ENCOUNTER (OUTPATIENT)
Dept: CALL CENTER | Facility: HOSPITAL | Age: 76
End: 2021-06-01

## 2021-06-01 NOTE — OUTREACH NOTE
Call Center TCM Note      Responses   [facility] patient discharged from?  [place]   Does the patient have one of the following disease processes/diagnoses(primary or secondary)?  Other   TCM attempt successful?  Yes   Call start time  [id number]   Call end time  1603   Discharge diagnosis  chest pain,   cardiac cath   Is patient permission given to speak with other caregiver?  No   Meds reviewed with patient/caregiver?  Yes   Is the patient having any side effects they believe may be caused by any medication additions or changes?  No   Does the patient have all medications ordered at discharge?  Yes   Is the patient taking all medications as directed (includes completed medication regime)?  Yes   Comments regarding appointments  Cardiology f/u 6/16/21.    Does the patient have a primary care provider?   Yes   Does the patient have an appointment with their PCP within 7 days of discharge?  No   Comments regarding PCP  PCP Dr Earl Marc. Patient currently has hospital follow up appt in place for 6/14/21  3pm.    What is preventing the patient from scheduling follow up appointments within 7 days of discharge?  Earlier appointment not available   Nursing Interventions  Verified appointment date/time/provider   Has the patient kept scheduled appointments due by today?  N/A   Has home health visited the patient within 72 hours of discharge?  N/A   Psychosocial issues?  No   Did the patient receive a copy of their discharge instructions?  Yes   Nursing interventions  Reviewed instructions with patient   What is the patient's perception of their health status since discharge?  Improving   Is the patient/caregiver able to teach back signs and symptoms related to disease process for when to call PCP?  Yes   Is the patient/caregiver able to teach back signs and symptoms related to disease process for when to call 911?  Yes   Is the patient/caregiver able to teach back the hierarchy of who to call/visit for  symptoms/problems? PCP, Specialist, Home health nurse, Urgent Care, ED, 911  Yes   If the patient is a current smoker, are they able to teach back resources for cessation?  Not a smoker   TCM call completed?  Yes          Enedina Zimmerman RN    6/1/2021, 16:03 EDT

## 2021-06-03 ENCOUNTER — TRANSCRIBE ORDERS (OUTPATIENT)
Dept: CARDIAC REHAB | Facility: HOSPITAL | Age: 76
End: 2021-06-03

## 2021-06-03 DIAGNOSIS — Z95.5 STATUS POST INSERTION OF DRUG-ELUTING STENT INTO LEFT ANTERIOR DESCENDING (LAD) ARTERY: Primary | ICD-10-CM

## 2021-06-11 ENCOUNTER — OFFICE VISIT (OUTPATIENT)
Dept: CARDIAC REHAB | Facility: HOSPITAL | Age: 76
End: 2021-06-11

## 2021-06-11 DIAGNOSIS — Z95.5 STATUS POST INSERTION OF DRUG-ELUTING STENT INTO LEFT ANTERIOR DESCENDING (LAD) ARTERY: Primary | ICD-10-CM

## 2021-06-11 PROCEDURE — 93797 PHYS/QHP OP CAR RHAB WO ECG: CPT

## 2021-06-14 ENCOUNTER — TREATMENT (OUTPATIENT)
Dept: CARDIAC REHAB | Facility: HOSPITAL | Age: 76
End: 2021-06-14

## 2021-06-14 ENCOUNTER — OFFICE VISIT (OUTPATIENT)
Dept: FAMILY MEDICINE CLINIC | Facility: CLINIC | Age: 76
End: 2021-06-14

## 2021-06-14 VITALS
TEMPERATURE: 98.6 F | HEART RATE: 54 BPM | SYSTOLIC BLOOD PRESSURE: 128 MMHG | BODY MASS INDEX: 31.6 KG/M2 | OXYGEN SATURATION: 98 % | WEIGHT: 238.4 LBS | DIASTOLIC BLOOD PRESSURE: 70 MMHG | RESPIRATION RATE: 18 BRPM | HEIGHT: 73 IN

## 2021-06-14 DIAGNOSIS — I25.10 CORONARY ARTERY DISEASE INVOLVING NATIVE HEART WITHOUT ANGINA PECTORIS, UNSPECIFIED VESSEL OR LESION TYPE: ICD-10-CM

## 2021-06-14 DIAGNOSIS — E11.9 TYPE 2 DIABETES MELLITUS WITHOUT COMPLICATION, WITHOUT LONG-TERM CURRENT USE OF INSULIN (HCC): ICD-10-CM

## 2021-06-14 DIAGNOSIS — E55.9 VITAMIN D DEFICIENCY: ICD-10-CM

## 2021-06-14 DIAGNOSIS — E11.42 DIABETIC PERIPHERAL NEUROPATHY (HCC): ICD-10-CM

## 2021-06-14 DIAGNOSIS — E29.1 HYPOGONADISM IN MALE: ICD-10-CM

## 2021-06-14 DIAGNOSIS — E78.5 DYSLIPIDEMIA: ICD-10-CM

## 2021-06-14 DIAGNOSIS — Z79.4 TYPE 2 DIABETES MELLITUS WITH DIABETIC NEUROPATHY, WITH LONG-TERM CURRENT USE OF INSULIN (HCC): ICD-10-CM

## 2021-06-14 DIAGNOSIS — Z95.5 STATUS POST INSERTION OF DRUG-ELUTING STENT INTO LEFT ANTERIOR DESCENDING (LAD) ARTERY: Primary | ICD-10-CM

## 2021-06-14 DIAGNOSIS — E79.0 HYPERURICEMIA: ICD-10-CM

## 2021-06-14 DIAGNOSIS — E11.40 TYPE 2 DIABETES MELLITUS WITH DIABETIC NEUROPATHY, WITH LONG-TERM CURRENT USE OF INSULIN (HCC): ICD-10-CM

## 2021-06-14 DIAGNOSIS — R07.89 CHEST WALL PAIN: Primary | ICD-10-CM

## 2021-06-14 DIAGNOSIS — I10 ESSENTIAL HYPERTENSION: ICD-10-CM

## 2021-06-14 PROBLEM — Z98.890 S/P ARTHROSCOPY OF SHOULDER: Status: ACTIVE | Noted: 2019-11-19

## 2021-06-14 PROCEDURE — 99213 OFFICE O/P EST LOW 20 MIN: CPT | Performed by: INTERNAL MEDICINE

## 2021-06-14 PROCEDURE — 93798 PHYS/QHP OP CAR RHAB W/ECG: CPT

## 2021-06-14 RX ORDER — AMMONIUM LACTATE 12 %
LOTION (GRAM) TOPICAL AS NEEDED
Qty: 500 G | Refills: 3 | Status: SHIPPED | OUTPATIENT
Start: 2021-06-14 | End: 2022-08-08 | Stop reason: SDUPTHER

## 2021-06-14 NOTE — PROGRESS NOTES
Subjective Chief complaint is hospital follow-up  Earl Marc is a 76 y.o. male.     History of Present Illness Earl is here today for his hospital follow-up.  Unfortunately he is outside the.  For a transitional care visit.  He was admitted to [facility] on May 25 with chest pain.  He ruled out for MI with EKG and cardiac enzymes.  He had a stress test which was normal.  He had a normal chest x-ray.  His CT of the chest showed no pulmonary emboli.  He reports that the hospitalist told him he had emphysema based on this but I do not see any thing in the report mentioning that.  His sugars remained fairly stable throughout the hospitalization.  His A1c test was 6.6.  He eventually had a heart catheterization.  There was a 70% mid LAD lesion that was stented.  Unfortunately is continuing to have chest pain which sounds more musculoskeletal.  This is nonexertional.  It is worse with recumbency.  It is in the region of the left pectoral muscle near the shoulder and also in the left periscapular area.    The following portions of the patient's history were reviewed and updated as appropriate: allergies, current medications, past family history, past medical history, past social history, past surgical history and problem list.    Review of Systems   Constitutional: Negative for chills and fever.   Respiratory: Negative for chest tightness and shortness of breath.    Cardiovascular: Positive for chest pain.       Objective   Physical Exam  Cardiovascular:      Rate and Rhythm: Normal rate and regular rhythm.      Heart sounds: No murmur heard.   No friction rub. No gallop.    Pulmonary:      Breath sounds: No wheezing, rhonchi or rales.   Chest:      Chest wall: Tenderness present.           Assessment/Plan   Diagnoses and all orders for this visit:    1. Chest wall pain (Primary)    2. Coronary artery disease involving native heart without angina pectoris, unspecified vessel or lesion type    3. Type 2 diabetes  mellitus with diabetic neuropathy, with long-term current use of insulin (CMS/[facility])    4. Essential hypertension  -     AmLactin 12 % lotion; Apply  topically to the appropriate area as directed As Needed for Dry Skin.  Dispense: 500 g; Refill: 3    5. Diabetic peripheral neuropathy (CMS/HCC)  -     AmLactin 12 % lotion; Apply  topically to the appropriate area as directed As Needed for Dry Skin.  Dispense: 500 g; Refill: 3    6. Dyslipidemia  -     AmLactin 12 % lotion; Apply  topically to the appropriate area as directed As Needed for Dry Skin.  Dispense: 500 g; Refill: 3    7. Vitamin D deficiency  -     AmLactin 12 % lotion; Apply  topically to the appropriate area as directed As Needed for Dry Skin.  Dispense: 500 g; Refill: 3    8. Hypogonadism in male  -     AmLactin 12 % lotion; Apply  topically to the appropriate area as directed As Needed for Dry Skin.  Dispense: 500 g; Refill: 3    9. Type 2 diabetes mellitus without complication, without long-term current use of insulin (CMS/[facility])  -     AmLactin 12 % lotion; Apply  topically to the appropriate area as directed As Needed for Dry Skin.  Dispense: 500 g; Refill: 3    10. Hyperuricemia  -     AmLactin 12 % lotion; Apply  topically to the appropriate area as directed As Needed for Dry Skin.  Dispense: 500 g; Refill: 3    Earl is here today for follow-up.  He seems to be doing fairly well other than some atypical nonexertional chest pain which is worse with recumbency.  This seems like it is more mechanical.  I did advise him to use Tylenol and heat.

## 2021-06-16 ENCOUNTER — OFFICE VISIT (OUTPATIENT)
Dept: CARDIOLOGY | Facility: CLINIC | Age: 76
End: 2021-06-16

## 2021-06-16 ENCOUNTER — TREATMENT (OUTPATIENT)
Dept: CARDIAC REHAB | Facility: HOSPITAL | Age: 76
End: 2021-06-16

## 2021-06-16 VITALS
WEIGHT: 232 LBS | BODY MASS INDEX: 30.75 KG/M2 | DIASTOLIC BLOOD PRESSURE: 81 MMHG | SYSTOLIC BLOOD PRESSURE: 136 MMHG | HEIGHT: 73 IN | HEART RATE: 49 BPM

## 2021-06-16 DIAGNOSIS — Z95.5 STATUS POST INSERTION OF DRUG-ELUTING STENT INTO LEFT ANTERIOR DESCENDING (LAD) ARTERY: Primary | ICD-10-CM

## 2021-06-16 DIAGNOSIS — Z09 HOSPITAL DISCHARGE FOLLOW-UP: ICD-10-CM

## 2021-06-16 DIAGNOSIS — R07.9 CHEST PAIN WITH HIGH RISK FOR CARDIAC ETIOLOGY: ICD-10-CM

## 2021-06-16 DIAGNOSIS — I10 ESSENTIAL HYPERTENSION: Primary | ICD-10-CM

## 2021-06-16 DIAGNOSIS — I25.10 CORONARY ARTERY DISEASE INVOLVING NATIVE CORONARY ARTERY OF NATIVE HEART WITHOUT ANGINA PECTORIS: ICD-10-CM

## 2021-06-16 PROCEDURE — 93798 PHYS/QHP OP CAR RHAB W/ECG: CPT

## 2021-06-16 PROCEDURE — 99213 OFFICE O/P EST LOW 20 MIN: CPT | Performed by: NURSE PRACTITIONER

## 2021-06-16 RX ORDER — LORATADINE 10 MG/1
TABLET ORAL
COMMUNITY
Start: 2021-06-04 | End: 2022-12-08

## 2021-06-16 RX ORDER — NITROGLYCERIN 0.4 MG/1
0.4 TABLET SUBLINGUAL
Qty: 25 TABLET | Refills: 3 | Status: SHIPPED | OUTPATIENT
Start: 2021-06-16 | End: 2022-12-08 | Stop reason: SDUPTHER

## 2021-06-16 RX ORDER — METOPROLOL SUCCINATE 50 MG/1
50 TABLET, EXTENDED RELEASE ORAL DAILY
Qty: 90 TABLET | Refills: 3 | Status: SHIPPED | OUTPATIENT
Start: 2021-06-16 | End: 2021-07-23 | Stop reason: ALTCHOICE

## 2021-06-16 RX ORDER — UREA 10 %
800 LOTION (ML) TOPICAL DAILY
Qty: 90 TABLET | Refills: 3 | Status: SHIPPED | OUTPATIENT
Start: 2021-06-16 | End: 2022-08-08 | Stop reason: SDUPTHER

## 2021-06-16 NOTE — PROGRESS NOTES
Subjective:        Earl Marc is a 76 y.o. male who here for follow up    Chief Complaint   Patient presents with   • Follow-up     Hospital/Cath        HPI      Earl Marc is a 86-year-old male, who is current with me.  He has a history of arthritis, asthma, CKD, CAD, diabetes mellitus, dyslipidemia, type 2 diabetes mellitus, hypertension.  Lipid panel on 3/31/2021 revealed TC 68, HDL 37, LDL 15, and triglycerides 76.  Cardiac cath on 6/1/2021 LAD had a proximal LAD stent widely patent with midportion 70% with haziness was reduced to 0%.  Minimal distal irregularity including the diagonal and  branches.  Left circumflex is nondominant and normal.  RCA is codominant with early atherosclerotic plaque.  Successful angioplasty and stent to the mid LAD 70% with haziness reduced to 0%.  Antiplatelet therapy discussed.  Echo on 5/26/2021 revealed EF 58.9%, LV diastolic function was normal and no evidence of pericardial effusion.        The following portions of the patient's history were reviewed and updated as appropriate: allergies, current medications, past family history, past medical history, past social history, past surgical history and problem list.    Past Medical History:   Diagnosis Date   • Abnormal cardiovascular stress test    • Acid reflux    • Arthritis    • Asthma    • Cancer (CMS/HCC)     skin    • Chronic kidney disease    • Coronary artery disease    • Diabetes mellitus (CMS/HCC)    • Diabetic neuropathy associated with type 2 diabetes mellitus (CMS/HCC)    • Dyslipidemia    • Erectile dysfunction    • Fatty liver disease, nonalcoholic    • Gastritis    • Glaucoma     both eyes   • Hyperlipidemia    • Hypertension    • Hypogonadism male    • Type 2 diabetes mellitus (CMS/HCC)          reports that he has never smoked. He has never used smokeless tobacco. He reports that he does not drink alcohol and does not use drugs.     Family History   Problem Relation Age of Onset   • Breast  cancer Daughter    • Heart attack Mother    • Hypertension Mother    • Heart disease Mother    • Thyroid disease Mother    • Lupus Mother    • Heart attack Brother    • Heart disease Brother    • Hyperlipidemia Brother    • Cancer Brother    • Depression Father    • Stroke Maternal Grandmother        ROS     Review of Systems  Constitutional: no wt loss, fever, fatigue  Gastrointestinal: No nausea, abdominal pain  Behavioral/Psych: No insomnia or anxiety  Cardiovascular: Denies chest pain and shortness of breath.        Objective:           Vitals and nursing note reviewed.   Constitutional:       Appearance: Well-developed.   HENT:      Head: Normocephalic.      Right Ear: External ear normal.      Left Ear: External ear normal.   Neck:      Vascular: No JVD.   Pulmonary:      Effort: Pulmonary effort is normal. No respiratory distress.      Breath sounds: Normal breath sounds. No stridor. No rales.   Cardiovascular:      Normal rate. Regular rhythm.      No gallop.      Comments: Cath site shows no signs or symptoms of infection or hematoma noted.  Pulses:     Intact distal pulses.   Abdominal:      General: Bowel sounds are normal. There is no distension.      Palpations: Abdomen is soft.      Tenderness: There is no abdominal tenderness. There is no guarding.   Musculoskeletal: Normal range of motion.         General: No tenderness.      Cervical back: Normal range of motion. Skin:     General: Skin is warm.   Neurological:      Mental Status: Alert and oriented to person, place, and time.      Deep Tendon Reflexes: Reflexes are normal and symmetric.   Psychiatric:         Judgment: Judgment normal.         Procedures     Interpretation Summary    · Calculated left ventricular EF = 58.9% Estimated left ventricular EF was in agreement with the calculated left ventricular EF.  · Left ventricular diastolic function was normal.  · There is no evidence of pericardial effusion. .            HEMODYNAMIC / ANGIOGRAPHIC  DATA:    1. Left ventricular end diastolic pressure was 10 mmHg.    2. The left main is normal left main.  3. The left anterior descending artery is *.Left anterior descending had a proximal LAD stent widely patent midportion had 70% with haziness was reduced to 0% with 3.0/12 Xience stent, minimal distal irregularity including the diagonal and  branches  4. The left circumflex is nondominant and normal.  5. The right coronary artery is codominant with early atherosclerotic plaque.  6. Successful angioplasty and stent to the mid LAD 70% with haziness reduced to 0% with 3.0/12 Xience stent     KELVIN FLOW    PRE....3...       POST....3..     TYPE OF LESION........B.....     RECOMMENDATIONS:  Post-procedure care will focus on prevention of any ischemic events and congestive complications.  Aggressive risk factor modification will be carried out.  Importance of taking dual antiplatelets for one year  has been explained, risk of stent thrombosis leading to the acute MI, which carries high morbidity and mortality has been explained     Discontinuation or interruptions of these medications should be under the strict guidance of appropriate health professional     Interpretation Summary       · No ECG evidence of myocardial ischemia.Negative clinical evidence of myocardial ischemia. Findings consistent with a normal ECG stress test.     Asymptomatic for chest pain. ECG is negative for ischemia.   Ectopy: None  B/P is appropriate. Exercise tolerance is good.   Supervised by: Dr. Gilbert        Interpretation Summary    · The left ventricular cavity is borderline dilated.  · The following left ventricular wall segments are hypokinetic: apical septal and mid inferoseptal.  · Left atrial cavity size is borderline dilated.  · Calculated EF = 67%  · There is no evidence of pericardial effusion.              Current Outpatient Medications:   •  acetaminophen (TYLENOL) 325 MG tablet, Take 2 tablets by mouth Every 6  (Six) Hours As Needed for Mild Pain . (Patient taking differently: Take 650 mg by mouth Every 4 (Four) Hours As Needed for Mild Pain .), Disp: 30 tablet, Rfl: 0  •  Alcohol Swabs ([employer] Prep) 70 % pads, Place 1 application on the skin as directed by provider Daily., Disp: 400 each, Rfl: 3  •  allopurinol (ZYLOPRIM) 100 MG tablet, Take 1 tablet by mouth Daily., Disp: 90 tablet, Rfl: 3  •  AmLactin 12 % lotion, Apply  topically to the appropriate area as directed As Needed for Dry Skin., Disp: 500 g, Rfl: 3  •  amLODIPine (NORVASC) 2.5 MG tablet, Take 1 tablet by mouth Daily. (Patient taking differently: Take 5 mg by mouth 2 (two) times a day.), Disp: 90 tablet, Rfl: 3  •  ascorbic acid (VITAMIN C) 1000 MG tablet, Take 1,000 mg by mouth Daily., Disp: , Rfl:   •  aspirin 81 MG EC tablet, Take 1 tablet by mouth Daily., Disp: 90 tablet, Rfl: 3  •  atorvastatin (LIPITOR) 20 MG tablet, Take 1 tablet by mouth Every Night., Disp: 90 tablet, Rfl: 1  •  B-D UF III MINI PEN NEEDLES 31G X 5 MM misc, Use once daily with Lantus Pen for injection of insulin, Disp: 100 each, Rfl: 3  •  Carboxymeth-Glycerin-Polysorb 0.5-1-0.5 % solution, Apply  to eye(s) as directed by provider Daily., Disp: , Rfl:   •  cholecalciferol (VITAMIN D3) 25 MCG (1000 UT) tablet, Take 1,000 Units by mouth Daily., Disp: , Rfl:   •  cyanocobalamin 1000 MCG/ML injection, Cyanocobalamin 1000 MCG/ML Injection Solution; Patient Sig: Cyanocobalamin 1000 MCG/ML Injection Solution INJECT 1ML INTRAMUSCULARLY EVERY OTHER WEEK; 0; 17-Feb-2015; Active, Disp: , Rfl:   •  DEPO-TESTOSTERONE 200 MG/ML injection, Inject 1 mL into the appropriate muscle as directed by prescriber 1 (One) Time Per Week., Disp: 14 mL, Rfl: 1  •  diphenhydrAMINE (BENADRYL) 50 MG capsule, Take 50 mg by mouth At Night As Needed for Sleep., Disp: , Rfl:   •  finasteride (PROSCAR) 5 MG tablet, Take 5 mg by mouth Daily., Disp: , Rfl:   •  fluticasone (FLONASE) 50 MCG/ACT nasal spray, 1 spray into the  nostril(s) as directed by provider Daily As Needed for Allergies., Disp: , Rfl:   •  folic acid (FOLVITE) 800 MCG tablet, Take 800 mcg by mouth Daily., Disp: , Rfl:   •  furosemide (LASIX) 20 MG tablet, Take 1 tablet by mouth Daily., Disp: 30 tablet, Rfl: 0  •  glucose blood test strip, Precision Xtra Blood Glucose In Vitro Strip; Patient Sig: Precision Xtra Blood Glucose In Vitro Strip TEST 2  TIMES DAILY, Disp: 200 each, Rfl: 3  •  hydrocortisone 2.5 % ointment, Apply  topically to the appropriate area as directed 2 (Two) Times a Day., Disp: 20 g, Rfl: 5  •  LANTUS SOLOSTAR 100 UNIT/ML injection pen, 28 units subcutaneously daily (Patient taking differently: Inject 20 Units under the skin into the appropriate area as directed Daily. 28 units subcutaneously daily), Disp: 15 pen, Rfl: 3  •  latanoprost (XALATAN) 0.005 % ophthalmic solution, Administer 1 drop to both eyes Every Night., Disp: , Rfl:   •  loratadine (CLARITIN) 10 MG tablet, , Disp: , Rfl:   •  magnesium oxide (MAGOX) 400 (241.3 Mg) MG tablet tablet, Take 250 mg by mouth Every Night., Disp: , Rfl:   •  metoprolol tartrate (LOPRESSOR) 25 MG tablet, Take 1 tablet by mouth 2 (Two) Times a Day., Disp: 180 tablet, Rfl: 1  •  Monolet Lancets misc, Use to test BG 2 times daily, Disp: 200 each, Rfl: 3  •  nitroglycerin (NITROSTAT) 0.4 MG SL tablet, Place 1 tablet under the tongue Every 5 (Five) Minutes As Needed for Chest Pain., Disp: 30 tablet, Rfl: 3  •  pantoprazole (PROTONIX) 20 MG EC tablet, Take 1 tablet by mouth Daily., Disp: 90 tablet, Rfl: 1  •  pioglitazone (ACTOS) 45 MG tablet, Take 1 tablet by mouth Daily., Disp: 90 tablet, Rfl: 1  •  pregabalin (Lyrica) 150 MG capsule, Take 1 capsule by mouth 2 (Two) Times a Day. (Patient taking differently: Take 150 mg by mouth 2 (Two) Times a Day As Needed.), Disp: 180 capsule, Rfl: 0  •  PREVIDENT 5000 PLUS 1.1 % cream, , Disp: , Rfl:   •  tamsulosin (FLOMAX) 0.4 MG capsule 24 hr capsule, Take 1 capsule by mouth  Daily With Dinner., Disp: , Rfl:   •  ticagrelor (BRILINTA) 90 MG tablet tablet, Take 1 tablet by mouth 2 (Two) Times a Day., Disp: 60 tablet, Rfl: 3  •  TRADJENTA 5 MG tablet tablet, Take 1 tablet by mouth Daily., Disp: 90 tablet, Rfl: 3  •  VASCEPA 1 g capsule capsule, Take 2 g by mouth 2 (Two) Times a Day With Meals. (Patient taking differently: Take 4 g by mouth Every Evening.), Disp: 360 capsule, Rfl: 3  •  VIAGRA 100 MG tablet, Take 1 tablet by mouth As Needed for erectile dysfunction (1 tablet prior to planned intercourse.)., Disp: 24 tablet, Rfl: 3  •  Zinc 30 MG tablet, Take 50 mg by mouth Daily., Disp: , Rfl:      Assessment:        Patient Active Problem List   Diagnosis   • Bell's palsy   • Persistent insomnia   • Osteoarthritis of cervical spine   • Diabetic peripheral neuropathy (CMS/HCC)   • Dyslipidemia   • Steatosis of liver   • Fibromyalgia   • Gastroesophageal reflux disease without esophagitis   • Hypertension   • Hypogonadism in male   • Renal insufficiency   • Vitamin D deficiency   • Benign non-nodular prostatic hyperplasia with lower urinary tract symptoms   • S/P drug eluting coronary stent placement   • Coronary artery disease involving native coronary artery of native heart   • Malignant neoplasm of skin   • Diabetes mellitus (CMS/HCC)   • Chronic insomnia   • Other specified glaucoma   • Vertigo   • Epigastric pain   • Chest pain   • Precordial pain   • S/P arthroscopy of shoulder               Plan:   1. Hospital follow up for CAD s/p cardiac cath: Ischemic work up as above.  Cath site shows no signs or symptoms of infection or hematoma noted.  He denies chest pain.     Risk reduction for the coronary artery disease, controlling the blood pressure, blood sugar management, cholesterol management, exercise, stress management, and proper compliance with medications and follow-up has been discussed     2. Essential hypertension: Today in the office his blood pressures controlled on current  medications.  Continue current management.       Educated patient on exercising for at least 30 minutes a day for 2 to 3 days a week. Importance of controlling hypertension and blood pressure checkup on the regular basis has been explained. Hypertension as a silent killer has been discussed. Risk reduction of the weight and regular exercises to control the hypertension has been explained.               No diagnosis found.    There are no diagnoses linked to this encounter.    COUNSELING: shaila Hernández was given to patient for the following topics: diagnostic results, risk factor reductions, impressions, risks and benefits of treatment options and importance of treatment compliance .       SMOKING COUNSELING:      Switch BB to succinate.  Low up in 6 months unless he needs to be seen sooner.    Sincerely,   JL Oconnor  [facility]  06/16/21  11:48 EDT     EMR Dragon/Transcription disclaimer:   Much of this encounter note is an electronic transcription/translation of spoken language to printed text. The electronic translation of spoken language may permit erroneous, or at times, nonsensical words or phrases to be inadvertently transcribed; Although I have reviewed the note for such errors, some may still exist.

## 2021-06-18 ENCOUNTER — TREATMENT (OUTPATIENT)
Dept: CARDIAC REHAB | Facility: HOSPITAL | Age: 76
End: 2021-06-18

## 2021-06-18 DIAGNOSIS — Z95.5 STATUS POST INSERTION OF DRUG-ELUTING STENT INTO LEFT ANTERIOR DESCENDING (LAD) ARTERY: Primary | ICD-10-CM

## 2021-06-18 PROCEDURE — 93798 PHYS/QHP OP CAR RHAB W/ECG: CPT

## 2021-06-21 ENCOUNTER — TREATMENT (OUTPATIENT)
Dept: CARDIAC REHAB | Facility: HOSPITAL | Age: 76
End: 2021-06-21

## 2021-06-21 DIAGNOSIS — Z95.5 STATUS POST INSERTION OF DRUG-ELUTING STENT INTO LEFT ANTERIOR DESCENDING (LAD) ARTERY: Primary | ICD-10-CM

## 2021-06-21 PROCEDURE — 93798 PHYS/QHP OP CAR RHAB W/ECG: CPT

## 2021-06-23 ENCOUNTER — TREATMENT (OUTPATIENT)
Dept: CARDIAC REHAB | Facility: HOSPITAL | Age: 76
End: 2021-06-23

## 2021-06-23 DIAGNOSIS — Z95.5 STATUS POST INSERTION OF DRUG-ELUTING STENT INTO LEFT ANTERIOR DESCENDING (LAD) ARTERY: Primary | ICD-10-CM

## 2021-06-23 PROCEDURE — 93798 PHYS/QHP OP CAR RHAB W/ECG: CPT

## 2021-06-25 ENCOUNTER — TREATMENT (OUTPATIENT)
Dept: CARDIAC REHAB | Facility: HOSPITAL | Age: 76
End: 2021-06-25

## 2021-06-25 DIAGNOSIS — Z95.5 STATUS POST INSERTION OF DRUG-ELUTING STENT INTO LEFT ANTERIOR DESCENDING (LAD) ARTERY: Primary | ICD-10-CM

## 2021-06-25 PROCEDURE — 93798 PHYS/QHP OP CAR RHAB W/ECG: CPT

## 2021-06-28 ENCOUNTER — TREATMENT (OUTPATIENT)
Dept: CARDIAC REHAB | Facility: HOSPITAL | Age: 76
End: 2021-06-28

## 2021-06-28 DIAGNOSIS — Z95.5 STATUS POST INSERTION OF DRUG-ELUTING STENT INTO LEFT ANTERIOR DESCENDING (LAD) ARTERY: Primary | ICD-10-CM

## 2021-06-28 PROCEDURE — 93798 PHYS/QHP OP CAR RHAB W/ECG: CPT

## 2021-07-02 ENCOUNTER — APPOINTMENT (OUTPATIENT)
Dept: CARDIAC REHAB | Facility: HOSPITAL | Age: 76
End: 2021-07-02

## 2021-07-07 ENCOUNTER — APPOINTMENT (OUTPATIENT)
Dept: CARDIAC REHAB | Facility: HOSPITAL | Age: 76
End: 2021-07-07

## 2021-07-09 ENCOUNTER — TREATMENT (OUTPATIENT)
Dept: CARDIAC REHAB | Facility: HOSPITAL | Age: 76
End: 2021-07-09

## 2021-07-09 DIAGNOSIS — Z95.5 STATUS POST INSERTION OF DRUG-ELUTING STENT INTO LEFT ANTERIOR DESCENDING (LAD) ARTERY: Primary | ICD-10-CM

## 2021-07-09 PROCEDURE — 93798 PHYS/QHP OP CAR RHAB W/ECG: CPT

## 2021-07-12 ENCOUNTER — TREATMENT (OUTPATIENT)
Dept: CARDIAC REHAB | Facility: HOSPITAL | Age: 76
End: 2021-07-12

## 2021-07-12 DIAGNOSIS — Z95.5 STATUS POST INSERTION OF DRUG-ELUTING STENT INTO LEFT ANTERIOR DESCENDING (LAD) ARTERY: Primary | ICD-10-CM

## 2021-07-12 PROCEDURE — 93798 PHYS/QHP OP CAR RHAB W/ECG: CPT

## 2021-07-14 ENCOUNTER — TREATMENT (OUTPATIENT)
Dept: CARDIAC REHAB | Facility: HOSPITAL | Age: 76
End: 2021-07-14

## 2021-07-14 DIAGNOSIS — Z95.5 STATUS POST INSERTION OF DRUG-ELUTING STENT INTO LEFT ANTERIOR DESCENDING (LAD) ARTERY: Primary | ICD-10-CM

## 2021-07-14 PROCEDURE — 93798 PHYS/QHP OP CAR RHAB W/ECG: CPT

## 2021-07-16 ENCOUNTER — TREATMENT (OUTPATIENT)
Dept: CARDIAC REHAB | Facility: HOSPITAL | Age: 76
End: 2021-07-16

## 2021-07-16 DIAGNOSIS — Z95.5 STATUS POST INSERTION OF DRUG-ELUTING STENT INTO LEFT ANTERIOR DESCENDING (LAD) ARTERY: Primary | ICD-10-CM

## 2021-07-16 PROCEDURE — 93798 PHYS/QHP OP CAR RHAB W/ECG: CPT

## 2021-07-19 ENCOUNTER — TREATMENT (OUTPATIENT)
Dept: CARDIAC REHAB | Facility: HOSPITAL | Age: 76
End: 2021-07-19

## 2021-07-19 DIAGNOSIS — Z95.5 STATUS POST INSERTION OF DRUG-ELUTING STENT INTO LEFT ANTERIOR DESCENDING (LAD) ARTERY: Primary | ICD-10-CM

## 2021-07-19 PROCEDURE — 93798 PHYS/QHP OP CAR RHAB W/ECG: CPT

## 2021-07-20 ENCOUNTER — TELEPHONE (OUTPATIENT)
Dept: FAMILY MEDICINE CLINIC | Facility: CLINIC | Age: 76
End: 2021-07-20

## 2021-07-20 NOTE — TELEPHONE ENCOUNTER
Caller: Earl Marc    Relationship: Self    Best call back number: [phone]    What is the best time to reach you: ANY     Who are you requesting to speak with (clinical staff, provider,  specific staff member): CLINICAL STAFF     What was the call regarding: PATIENT STATES BLOOD PRESSURE IS DROPING LOW AND GETTING DIZZY AND LIGHT HEADED AND WANTS TO KNOW SHOULD HE STOP TAKING HIS MEDICATION UNTIL HIS APPOINTMENT ON Thursday 7/22.   8 AM- 97/59 ,8:47AM- 92/50,  9:45 AM- 105/52    Do you require a callback: YES

## 2021-07-21 ENCOUNTER — TREATMENT (OUTPATIENT)
Dept: CARDIAC REHAB | Facility: HOSPITAL | Age: 76
End: 2021-07-21

## 2021-07-21 DIAGNOSIS — Z95.5 STATUS POST INSERTION OF DRUG-ELUTING STENT INTO LEFT ANTERIOR DESCENDING (LAD) ARTERY: Primary | ICD-10-CM

## 2021-07-21 PROCEDURE — 93798 PHYS/QHP OP CAR RHAB W/ECG: CPT

## 2021-07-22 ENCOUNTER — OFFICE VISIT (OUTPATIENT)
Dept: FAMILY MEDICINE CLINIC | Facility: CLINIC | Age: 76
End: 2021-07-22

## 2021-07-22 VITALS
SYSTOLIC BLOOD PRESSURE: 98 MMHG | OXYGEN SATURATION: 97 % | HEART RATE: 66 BPM | BODY MASS INDEX: 32.05 KG/M2 | WEIGHT: 241.8 LBS | DIASTOLIC BLOOD PRESSURE: 56 MMHG | HEIGHT: 73 IN | RESPIRATION RATE: 20 BRPM | TEMPERATURE: 97.3 F

## 2021-07-22 DIAGNOSIS — E11.40 TYPE 2 DIABETES MELLITUS WITH DIABETIC NEUROPATHY, WITH LONG-TERM CURRENT USE OF INSULIN (HCC): ICD-10-CM

## 2021-07-22 DIAGNOSIS — M54.9 MECHANICAL BACK PAIN: Primary | ICD-10-CM

## 2021-07-22 DIAGNOSIS — K62.5 RECTAL BLEEDING: ICD-10-CM

## 2021-07-22 DIAGNOSIS — Z79.4 TYPE 2 DIABETES MELLITUS WITH DIABETIC NEUROPATHY, WITH LONG-TERM CURRENT USE OF INSULIN (HCC): ICD-10-CM

## 2021-07-22 DIAGNOSIS — E11.42 DIABETIC PERIPHERAL NEUROPATHY (HCC): ICD-10-CM

## 2021-07-22 DIAGNOSIS — I95.9 HYPOTENSION, UNSPECIFIED HYPOTENSION TYPE: ICD-10-CM

## 2021-07-22 PROCEDURE — 99214 OFFICE O/P EST MOD 30 MIN: CPT | Performed by: INTERNAL MEDICINE

## 2021-07-22 RX ORDER — AMLODIPINE BESYLATE 5 MG/1
TABLET ORAL
COMMUNITY
Start: 2021-07-15 | End: 2021-07-23

## 2021-07-22 RX ORDER — TIZANIDINE 4 MG/1
4 TABLET ORAL EVERY 8 HOURS PRN
Qty: 20 TABLET | Refills: 0 | Status: SHIPPED | OUTPATIENT
Start: 2021-07-22 | End: 2021-07-30 | Stop reason: SDUPTHER

## 2021-07-22 NOTE — PROGRESS NOTES
Subjective Chief complaint is lower back pain  Earl Marc is a 76 y.o. male.     History of Present Illness Earl is here today for lower back pain.  This is located in the region of the left flank more in the region of the posterior superior iliac crest.  It is been present for several months.  It is fairly severe at times.  He has been taking some Tylenol.  He has not had any blood in his urine.  He is not having any dysuria.  He has no prior history of kidney stones.  He is also complaining of low blood pressures.  He spoke to Dr. Perkins the other day who advised him to stop taking his amlodipine.  He was hospitalized in May of this year.  He was started on some Brilinta for his coronary artery disease.  He has been having significant constipation despite using prune juice and other medicines.  His bowel movements are large and firm and and create rectal bleeding.  He is unable to quantify how much blood he actually has had in his bowel movements.  He also reports that his sugars are all over the place.  He does have non-insulin-dependent diabetes.  His A1c in the hospital in May was 6.6.  He does have diabetic peripheral neuropathy but no known autonomic neuropathy that might contribute to his low blood pressure.  At his hospitalization he also has his furosemide increased to 20 mg daily from 3 times weekly.  The following portions of the patient's history were reviewed and updated as appropriate: allergies, current medications, past family history, past medical history, past social history, past surgical history and problem list.    Review of Systems   Constitutional: Negative for chills and fever.   Respiratory: Positive for shortness of breath. Negative for chest tightness.    Cardiovascular: Negative for chest pain and leg swelling.   Gastrointestinal: Positive for anal bleeding and constipation.   Genitourinary: Negative for dysuria and hematuria.   Neurological: Positive for light-headedness.        Objective   Physical Exam  Constitutional:       Appearance: Normal appearance.   Cardiovascular:      Rate and Rhythm: Normal rate.      Pulses: Normal pulses.   Pulmonary:      Effort: Pulmonary effort is normal.      Breath sounds: No wheezing, rhonchi or rales.   Abdominal:      General: Bowel sounds are normal.      Palpations: Abdomen is soft.      Tenderness: There is abdominal tenderness.      Comments: He has fairly diffuse abdominal tenderness but no rebound or guarding   Musculoskeletal:      Right lower leg: No edema.      Left lower leg: No edema.      Comments: He has tenderness to palpation of the oblique muscles as they extend to the posterior superior iliac crest on the left-hand side   Neurological:      Mental Status: He is alert.           Assessment/Plan   Diagnoses and all orders for this visit:    1. Mechanical back pain (Primary)  -     Urinalysis With Microscopic - Urine, Clean Catch  -     Urine Culture - Urine, Urine, Clean Catch    2. Hypotension, unspecified hypotension type  -     CBC & Differential  -     Comprehensive Metabolic Panel    3. Type 2 diabetes mellitus with diabetic neuropathy, with long-term current use of insulin (CMS/[facility])  -     Comprehensive Metabolic Panel    4. Diabetic peripheral neuropathy (CMS/[facility])    5. Rectal bleeding  -     CBC & Differential    Other orders  -     tiZANidine (ZANAFLEX) 4 MG tablet; Take 1 tablet by mouth Every 8 (Eight) Hours As Needed for Muscle Spasms (back pain).  Dispense: 20 tablet; Refill: 0    Earl is here today for some back pain which I believe is more mechanical in nature.  However I am going to check a urinalysis and culture.  His blood pressure is low here today despite stopping the amlodipine.  I am going to check a CBC and chemistry panel.  The rectal bleeding could have contributed to some anemia.  The added Lasix may have dehydrated him some.  He also could have some degree of autonomic neuropathy that may be  contributing to this as well as his constipation.  Going to let him try muscle relaxant but if it makes him more lightheaded or lowers his blood pressure he should stop it.  We may end up backing off on his furosemide or possibly splitting up his metoprolol.

## 2021-07-23 ENCOUNTER — TELEPHONE (OUTPATIENT)
Dept: FAMILY MEDICINE CLINIC | Facility: CLINIC | Age: 76
End: 2021-07-23

## 2021-07-23 ENCOUNTER — APPOINTMENT (OUTPATIENT)
Dept: CARDIAC REHAB | Facility: HOSPITAL | Age: 76
End: 2021-07-23

## 2021-07-23 NOTE — TELEPHONE ENCOUNTER
PT CALLED IN TO STATE HE PASSED OUT LAST NIGHT DUE TO HIS BP BEING VERY LOW. THIS MORNING HE HAS A BP OF 86/58 AND 82/51. PT STATES HE IS RESTING AND HAVING BREAKFAST BUT WAS ADVISED TO CALL AND TELL THE DOCTOR ABOUT LAST NIGHT. PT WANTED TO KNOW IF HE NEEDED TO STOP TAKING HIS MUSCLE RELAXER MED OR NOT DUE TO THE LOW BP.    PT WOULD LIKE TO TALK WITH DR. HAMPTON.    JANESSA DORIS- [phone]    I advised the patient to leave off the muscle relaxant.  He should also leave off the water pill through the weekend.  I am going to put him back on regular Lopressor that he can take twice daily and that we hold a dose of his blood pressure drops down.

## 2021-07-24 LAB
ALBUMIN SERPL-MCNC: 4.1 G/DL (ref 3.5–5.2)
ALBUMIN/GLOB SERPL: 1.5 G/DL
ALP SERPL-CCNC: 69 U/L (ref 39–117)
ALT SERPL-CCNC: 20 U/L (ref 1–41)
APPEARANCE UR: CLEAR
AST SERPL-CCNC: 19 U/L (ref 1–40)
BACTERIA #/AREA URNS HPF: NORMAL /HPF
BACTERIA UR CULT: NORMAL
BACTERIA UR CULT: NORMAL
BASOPHILS # BLD AUTO: 0.05 10*3/MM3 (ref 0–0.2)
BASOPHILS NFR BLD AUTO: 0.9 % (ref 0–1.5)
BILIRUB SERPL-MCNC: 0.4 MG/DL (ref 0–1.2)
BILIRUB UR QL STRIP: NEGATIVE
BUN SERPL-MCNC: 16 MG/DL (ref 8–23)
BUN/CREAT SERPL: 12.2 (ref 7–25)
CALCIUM SERPL-MCNC: 9.7 MG/DL (ref 8.6–10.5)
CASTS URNS MICRO: NORMAL
CHLORIDE SERPL-SCNC: 105 MMOL/L (ref 98–107)
CO2 SERPL-SCNC: 27.7 MMOL/L (ref 22–29)
COLOR UR: YELLOW
CREAT SERPL-MCNC: 1.31 MG/DL (ref 0.76–1.27)
EOSINOPHIL # BLD AUTO: 0.24 10*3/MM3 (ref 0–0.4)
EOSINOPHIL NFR BLD AUTO: 4.3 % (ref 0.3–6.2)
EPI CELLS #/AREA URNS HPF: NORMAL /HPF
ERYTHROCYTE [DISTWIDTH] IN BLOOD BY AUTOMATED COUNT: 13.9 % (ref 12.3–15.4)
GLOBULIN SER CALC-MCNC: 2.7 GM/DL
GLUCOSE SERPL-MCNC: 249 MG/DL (ref 65–99)
GLUCOSE UR QL: ABNORMAL
HCT VFR BLD AUTO: 42.9 % (ref 37.5–51)
HGB BLD-MCNC: 13.6 G/DL (ref 13–17.7)
HGB UR QL STRIP: NEGATIVE
IMM GRANULOCYTES # BLD AUTO: 0.03 10*3/MM3 (ref 0–0.05)
IMM GRANULOCYTES NFR BLD AUTO: 0.5 % (ref 0–0.5)
KETONES UR QL STRIP: ABNORMAL
LEUKOCYTE ESTERASE UR QL STRIP: NEGATIVE
LYMPHOCYTES # BLD AUTO: 1.69 10*3/MM3 (ref 0.7–3.1)
LYMPHOCYTES NFR BLD AUTO: 30.2 % (ref 19.6–45.3)
MCH RBC QN AUTO: 28.2 PG (ref 26.6–33)
MCHC RBC AUTO-ENTMCNC: 31.7 G/DL (ref 31.5–35.7)
MCV RBC AUTO: 88.8 FL (ref 79–97)
MONOCYTES # BLD AUTO: 0.47 10*3/MM3 (ref 0.1–0.9)
MONOCYTES NFR BLD AUTO: 8.4 % (ref 5–12)
NEUTROPHILS # BLD AUTO: 3.12 10*3/MM3 (ref 1.7–7)
NEUTROPHILS NFR BLD AUTO: 55.7 % (ref 42.7–76)
NITRITE UR QL STRIP: NEGATIVE
NRBC BLD AUTO-RTO: 0 /100 WBC (ref 0–0.2)
PH UR STRIP: 6.5 [PH] (ref 5–8)
PLATELET # BLD AUTO: 164 10*3/MM3 (ref 140–450)
POTASSIUM SERPL-SCNC: 4.2 MMOL/L (ref 3.5–5.2)
PROT SERPL-MCNC: 6.8 G/DL (ref 6–8.5)
PROT UR QL STRIP: ABNORMAL
RBC # BLD AUTO: 4.83 10*6/MM3 (ref 4.14–5.8)
RBC #/AREA URNS HPF: NORMAL /HPF
SODIUM SERPL-SCNC: 140 MMOL/L (ref 136–145)
SP GR UR: 1.02 (ref 1–1.03)
UROBILINOGEN UR STRIP-MCNC: ABNORMAL MG/DL
WBC # BLD AUTO: 5.6 10*3/MM3 (ref 3.4–10.8)
WBC #/AREA URNS HPF: NORMAL /HPF

## 2021-07-26 ENCOUNTER — TREATMENT (OUTPATIENT)
Dept: CARDIAC REHAB | Facility: HOSPITAL | Age: 76
End: 2021-07-26

## 2021-07-26 DIAGNOSIS — Z95.5 STATUS POST INSERTION OF DRUG-ELUTING STENT INTO LEFT ANTERIOR DESCENDING (LAD) ARTERY: Primary | ICD-10-CM

## 2021-07-26 PROCEDURE — 93798 PHYS/QHP OP CAR RHAB W/ECG: CPT

## 2021-07-26 PROCEDURE — 93797 PHYS/QHP OP CAR RHAB WO ECG: CPT

## 2021-07-28 ENCOUNTER — APPOINTMENT (OUTPATIENT)
Dept: GENERAL RADIOLOGY | Facility: HOSPITAL | Age: 76
End: 2021-07-28

## 2021-07-28 ENCOUNTER — APPOINTMENT (OUTPATIENT)
Dept: CARDIAC REHAB | Facility: HOSPITAL | Age: 76
End: 2021-07-28

## 2021-07-28 ENCOUNTER — HOSPITAL ENCOUNTER (EMERGENCY)
Facility: HOSPITAL | Age: 76
Discharge: HOME | End: 2021-07-28
Attending: EMERGENCY MEDICINE | Admitting: EMERGENCY MEDICINE

## 2021-07-28 VITALS
HEIGHT: 73 IN | WEIGHT: 235.89 LBS | TEMPERATURE: 97.5 F | BODY MASS INDEX: 31.26 KG/M2 | RESPIRATION RATE: 15 BRPM | DIASTOLIC BLOOD PRESSURE: 75 MMHG | OXYGEN SATURATION: 99 % | HEART RATE: 77 BPM | SYSTOLIC BLOOD PRESSURE: 175 MMHG

## 2021-07-28 DIAGNOSIS — M54.16 ACUTE LEFT LUMBAR RADICULOPATHY: Primary | ICD-10-CM

## 2021-07-28 PROCEDURE — 63710000001 PREDNISONE PER 1 MG: Performed by: EMERGENCY MEDICINE

## 2021-07-28 PROCEDURE — 72110 X-RAY EXAM L-2 SPINE 4/>VWS: CPT

## 2021-07-28 PROCEDURE — 99283 EMERGENCY DEPT VISIT LOW MDM: CPT

## 2021-07-28 RX ORDER — METHYLPREDNISOLONE 4 MG/1
TABLET ORAL
Qty: 21 TABLET | Refills: 0 | Status: SHIPPED | OUTPATIENT
Start: 2021-07-28 | End: 2021-10-06

## 2021-07-28 RX ORDER — PREDNISONE 20 MG/1
60 TABLET ORAL ONCE
Status: COMPLETED | OUTPATIENT
Start: 2021-07-28 | End: 2021-07-28

## 2021-07-28 RX ORDER — TRAMADOL HYDROCHLORIDE 50 MG/1
50 TABLET ORAL ONCE
Status: COMPLETED | OUTPATIENT
Start: 2021-07-28 | End: 2021-07-28

## 2021-07-28 RX ORDER — LIDOCAINE 50 MG/G
1 PATCH TOPICAL EVERY 24 HOURS
Qty: 10 PATCH | Refills: 0 | Status: SHIPPED | OUTPATIENT
Start: 2021-07-28 | End: 2021-07-30 | Stop reason: SDUPTHER

## 2021-07-28 RX ORDER — ACETAMINOPHEN 500 MG
1000 TABLET ORAL ONCE
Status: COMPLETED | OUTPATIENT
Start: 2021-07-28 | End: 2021-07-28

## 2021-07-28 RX ORDER — LIDOCAINE 50 MG/G
1 PATCH TOPICAL
Status: DISCONTINUED | OUTPATIENT
Start: 2021-07-28 | End: 2021-07-28 | Stop reason: HOSPADM

## 2021-07-28 RX ORDER — TRAMADOL HYDROCHLORIDE 50 MG/1
50 TABLET ORAL EVERY 8 HOURS PRN
Qty: 12 TABLET | Refills: 0 | Status: SHIPPED | OUTPATIENT
Start: 2021-07-28 | End: 2021-07-30 | Stop reason: SDUPTHER

## 2021-07-28 RX ADMIN — PREDNISONE 60 MG: 20 TABLET ORAL at 09:05

## 2021-07-28 RX ADMIN — TRAMADOL HYDROCHLORIDE 50 MG: 50 TABLET, FILM COATED ORAL at 09:49

## 2021-07-28 RX ADMIN — ACETAMINOPHEN 1000 MG: 500 TABLET ORAL at 09:05

## 2021-07-28 RX ADMIN — LIDOCAINE 1 PATCH: 50 PATCH TOPICAL at 09:49

## 2021-07-30 ENCOUNTER — TREATMENT (OUTPATIENT)
Dept: CARDIAC REHAB | Facility: HOSPITAL | Age: 76
End: 2021-07-30

## 2021-07-30 ENCOUNTER — OFFICE VISIT (OUTPATIENT)
Dept: FAMILY MEDICINE CLINIC | Facility: CLINIC | Age: 76
End: 2021-07-30

## 2021-07-30 VITALS
DIASTOLIC BLOOD PRESSURE: 60 MMHG | SYSTOLIC BLOOD PRESSURE: 114 MMHG | HEIGHT: 73 IN | WEIGHT: 237 LBS | BODY MASS INDEX: 31.41 KG/M2 | RESPIRATION RATE: 16 BRPM

## 2021-07-30 DIAGNOSIS — Z95.5 STATUS POST INSERTION OF DRUG-ELUTING STENT INTO LEFT ANTERIOR DESCENDING (LAD) ARTERY: Primary | ICD-10-CM

## 2021-07-30 DIAGNOSIS — M54.16 ACUTE LEFT LUMBAR RADICULOPATHY: ICD-10-CM

## 2021-07-30 DIAGNOSIS — M54.9 MECHANICAL BACK PAIN: Primary | ICD-10-CM

## 2021-07-30 PROCEDURE — 99214 OFFICE O/P EST MOD 30 MIN: CPT | Performed by: INTERNAL MEDICINE

## 2021-07-30 PROCEDURE — 93798 PHYS/QHP OP CAR RHAB W/ECG: CPT

## 2021-07-30 RX ORDER — TRAMADOL HYDROCHLORIDE 50 MG/1
50 TABLET ORAL EVERY 6 HOURS PRN
Qty: 60 TABLET | Refills: 1 | Status: SHIPPED | OUTPATIENT
Start: 2021-07-30 | End: 2022-05-02 | Stop reason: ALTCHOICE

## 2021-07-30 RX ORDER — TIZANIDINE 4 MG/1
4 TABLET ORAL EVERY 8 HOURS PRN
Qty: 30 TABLET | Refills: 1 | Status: SHIPPED | OUTPATIENT
Start: 2021-07-30 | End: 2022-05-02 | Stop reason: ALTCHOICE

## 2021-07-30 RX ORDER — LIDOCAINE 50 MG/G
1 PATCH TOPICAL EVERY 24 HOURS
Qty: 30 PATCH | Refills: 1 | Status: SHIPPED | OUTPATIENT
Start: 2021-07-30 | End: 2021-10-06 | Stop reason: SDUPTHER

## 2021-07-30 NOTE — PROGRESS NOTES
Subjective Chief complaint is follow-up for back pain  Earl Marc is a 76 y.o. male.     History of Present Illness Rajinder is here today for follow-up.  At his last visit we did give him a little bit of tizanidine for some mechanical back pain.  He felt he was unable to take it because of lowering his blood pressure.  It did seem to cause that at least 1 time.  Since that time his back has progressed in terms of pain.  He was seen at the emergency room on the 28th.  He was treated with a steroid Dosepak and that is increasing his blood sugars.  He was also given a lidocaine patch and some tramadol.  Overall his pain really is not much improved.  I did review the x-rays.  He does have lipping bone spurs at multiple levels but the disc spaces seem to be pretty well-preserved.    The following portions of the patient's history were reviewed and updated as appropriate: allergies, current medications, past family history, past medical history, past social history, past surgical history and problem list.    Review of Systems   Constitutional: Negative for chills and fever.   Gastrointestinal:        He is not having any incontinence of stool   Genitourinary:        He is having no urinary incontinence       Objective   Physical Exam  Constitutional:       Appearance: Normal appearance.   Cardiovascular:      Rate and Rhythm: Normal rate.   Pulmonary:      Effort: Pulmonary effort is normal.   Musculoskeletal:      Comments: He has no point spinous tenderness.  He has a considerable amount of tenderness in the lumbar paraspinous muscles with considerable tightening of the musculature in this area.   Neurological:      Mental Status: He is alert.           Assessment/Plan   Diagnoses and all orders for this visit:    1. Mechanical back pain (Primary)  -     Ambulatory Referral to Physical Therapy Evaluate and treat, Ortho    2. Acute left lumbar radiculopathy  -     traMADol (ULTRAM) 50 MG tablet; Take 1 tablet by mouth  Every 6 (Six) Hours As Needed for Moderate Pain .  Dispense: 60 tablet; Refill: 1  -     Ambulatory Referral to Physical Therapy Evaluate and treat, Ortho    Other orders  -     lidocaine (LIDODERM) 5 %; Place 1 patch on the skin as directed by provider Daily. Remove & Discard patch within 12 hours or as directed by MD  Dispense: 30 patch; Refill: 1  -     tiZANidine (ZANAFLEX) 4 MG tablet; Take 1 tablet by mouth Every 8 (Eight) Hours As Needed for Muscle Spasms (back pain).  Dispense: 30 tablet; Refill: 1      Earl is here today for back pain.  I think the majority of this is going to be musculoskeletal.  I am going to renew his tramadol and Lidoderm patches.  We are going to let him try to take a little bit of the tizanidine see if it will help.  I am going to refer him to physical therapy.

## 2021-08-02 ENCOUNTER — TREATMENT (OUTPATIENT)
Dept: CARDIAC REHAB | Facility: HOSPITAL | Age: 76
End: 2021-08-02

## 2021-08-02 DIAGNOSIS — Z95.5 STATUS POST INSERTION OF DRUG-ELUTING STENT INTO LEFT ANTERIOR DESCENDING (LAD) ARTERY: Primary | ICD-10-CM

## 2021-08-02 PROCEDURE — 93798 PHYS/QHP OP CAR RHAB W/ECG: CPT

## 2021-08-04 ENCOUNTER — TREATMENT (OUTPATIENT)
Dept: CARDIAC REHAB | Facility: HOSPITAL | Age: 76
End: 2021-08-04

## 2021-08-04 DIAGNOSIS — Z95.5 STATUS POST INSERTION OF DRUG-ELUTING STENT INTO LEFT ANTERIOR DESCENDING (LAD) ARTERY: Primary | ICD-10-CM

## 2021-08-04 PROCEDURE — 93798 PHYS/QHP OP CAR RHAB W/ECG: CPT

## 2021-08-06 ENCOUNTER — APPOINTMENT (OUTPATIENT)
Dept: CARDIAC REHAB | Facility: HOSPITAL | Age: 76
End: 2021-08-06

## 2021-08-09 ENCOUNTER — APPOINTMENT (OUTPATIENT)
Dept: CARDIAC REHAB | Facility: HOSPITAL | Age: 76
End: 2021-08-09

## 2021-08-11 ENCOUNTER — APPOINTMENT (OUTPATIENT)
Dept: CARDIAC REHAB | Facility: HOSPITAL | Age: 76
End: 2021-08-11

## 2021-08-13 ENCOUNTER — APPOINTMENT (OUTPATIENT)
Dept: CARDIAC REHAB | Facility: HOSPITAL | Age: 76
End: 2021-08-13

## 2021-08-16 ENCOUNTER — APPOINTMENT (OUTPATIENT)
Dept: CARDIAC REHAB | Facility: HOSPITAL | Age: 76
End: 2021-08-16

## 2021-08-18 ENCOUNTER — APPOINTMENT (OUTPATIENT)
Dept: CARDIAC REHAB | Facility: HOSPITAL | Age: 76
End: 2021-08-18

## 2021-08-20 ENCOUNTER — APPOINTMENT (OUTPATIENT)
Dept: CARDIAC REHAB | Facility: HOSPITAL | Age: 76
End: 2021-08-20

## 2021-08-23 ENCOUNTER — APPOINTMENT (OUTPATIENT)
Dept: CARDIAC REHAB | Facility: HOSPITAL | Age: 76
End: 2021-08-23

## 2021-08-25 ENCOUNTER — APPOINTMENT (OUTPATIENT)
Dept: CARDIAC REHAB | Facility: HOSPITAL | Age: 76
End: 2021-08-25

## 2021-08-27 ENCOUNTER — APPOINTMENT (OUTPATIENT)
Dept: CARDIAC REHAB | Facility: HOSPITAL | Age: 76
End: 2021-08-27

## 2021-08-30 ENCOUNTER — APPOINTMENT (OUTPATIENT)
Dept: CARDIAC REHAB | Facility: HOSPITAL | Age: 76
End: 2021-08-30

## 2021-09-01 ENCOUNTER — APPOINTMENT (OUTPATIENT)
Dept: CARDIAC REHAB | Facility: HOSPITAL | Age: 76
End: 2021-09-01

## 2021-09-03 ENCOUNTER — APPOINTMENT (OUTPATIENT)
Dept: CARDIAC REHAB | Facility: HOSPITAL | Age: 76
End: 2021-09-03

## 2021-09-08 ENCOUNTER — APPOINTMENT (OUTPATIENT)
Dept: CARDIAC REHAB | Facility: HOSPITAL | Age: 76
End: 2021-09-08

## 2021-09-17 DIAGNOSIS — E11.42 DIABETIC PERIPHERAL NEUROPATHY (HCC): Primary | ICD-10-CM

## 2021-09-18 RX ORDER — PREGABALIN 150 MG/1
150 CAPSULE ORAL 2 TIMES DAILY
Qty: 180 CAPSULE | Refills: 0 | Status: SHIPPED | OUTPATIENT
Start: 2021-09-18 | End: 2021-09-23 | Stop reason: SDUPTHER

## 2021-09-21 ENCOUNTER — PATIENT MESSAGE (OUTPATIENT)
Dept: CARDIOLOGY | Facility: CLINIC | Age: 76
End: 2021-09-21

## 2021-09-21 DIAGNOSIS — E11.42 DIABETIC PERIPHERAL NEUROPATHY (HCC): ICD-10-CM

## 2021-09-21 RX ORDER — PREGABALIN 150 MG/1
150 CAPSULE ORAL 2 TIMES DAILY
Qty: 180 CAPSULE | Refills: 0 | OUTPATIENT
Start: 2021-09-21

## 2021-09-23 DIAGNOSIS — E11.42 DIABETIC PERIPHERAL NEUROPATHY (HCC): ICD-10-CM

## 2021-09-23 RX ORDER — PREGABALIN 150 MG/1
150 CAPSULE ORAL 2 TIMES DAILY
Qty: 180 CAPSULE | Refills: 0 | Status: SHIPPED | OUTPATIENT
Start: 2021-09-23 | End: 2021-10-25 | Stop reason: SDUPTHER

## 2021-09-23 NOTE — TELEPHONE ENCOUNTER
Rx Refill Note  Requested Prescriptions     Pending Prescriptions Disp Refills   • metoprolol tartrate (LOPRESSOR) 25 MG tablet 60 tablet 5     Sig: Take 1 tablet by mouth 2 (Two) Times a Day.      Last office visit with prescribing clinician: 7/30/2021      Next office visit with prescribing clinician: 10/6/2021            Brittny Ortega MA  09/23/21, 11:00 EDT

## 2021-09-23 NOTE — TELEPHONE ENCOUNTER
Caller: Janessa Marc    Relationship: Self    Best call back number: [phone]  What is the best time to reach you: ASAP  Who are you requesting to speak with (clinical staff, provider,  specific staff member): CLINICAL    Do you know the name of the person who called: JANESSA    What was the call regarding: PATIENT STATES HIS KIDNEY SPECIALIST SAID HE SHOULD GET FENTANEL PATCHES FOR HIS BACK PAIN AND ALSO HE IS NEEDING A REFILL FOR HIS   metoprolol tartrate (LOPRESSOR) 25 MG tablet  25 mg, 2 Times Daily 5 ordered     TO BE SENT TO THE [facility] OR HE CAN  A WRITTEN RX.   Do you require a callback: YES

## 2021-09-28 DIAGNOSIS — E11.42 DIABETIC PERIPHERAL NEUROPATHY (HCC): ICD-10-CM

## 2021-09-28 RX ORDER — PREGABALIN 150 MG/1
150 CAPSULE ORAL 2 TIMES DAILY
Qty: 180 CAPSULE | Refills: 0 | OUTPATIENT
Start: 2021-09-28

## 2021-09-28 NOTE — TELEPHONE ENCOUNTER
Refill sent electronically to "[facility] on September 2021.  Please verify with "[facility] if they have received and filled this prescription

## 2021-10-06 ENCOUNTER — OFFICE VISIT (OUTPATIENT)
Dept: FAMILY MEDICINE CLINIC | Facility: CLINIC | Age: 76
End: 2021-10-06

## 2021-10-06 VITALS
HEART RATE: 66 BPM | DIASTOLIC BLOOD PRESSURE: 70 MMHG | WEIGHT: 239 LBS | BODY MASS INDEX: 31.68 KG/M2 | HEIGHT: 73 IN | OXYGEN SATURATION: 97 % | SYSTOLIC BLOOD PRESSURE: 148 MMHG | TEMPERATURE: 98 F

## 2021-10-06 DIAGNOSIS — R06.02 SHORTNESS OF BREATH: ICD-10-CM

## 2021-10-06 DIAGNOSIS — G89.29 CHRONIC LOW BACK PAIN WITHOUT SCIATICA, UNSPECIFIED BACK PAIN LATERALITY: Primary | ICD-10-CM

## 2021-10-06 DIAGNOSIS — M54.50 CHRONIC LOW BACK PAIN WITHOUT SCIATICA, UNSPECIFIED BACK PAIN LATERALITY: Primary | ICD-10-CM

## 2021-10-06 DIAGNOSIS — G25.0 TREMOR, ESSENTIAL: ICD-10-CM

## 2021-10-06 PROCEDURE — 99214 OFFICE O/P EST MOD 30 MIN: CPT | Performed by: INTERNAL MEDICINE

## 2021-10-06 RX ORDER — LIDOCAINE 50 MG/G
1 PATCH TOPICAL EVERY 24 HOURS
Qty: 90 PATCH | Refills: 1 | Status: SHIPPED | OUTPATIENT
Start: 2021-10-06

## 2021-10-06 RX ORDER — PANTOPRAZOLE SODIUM 20 MG/1
20 TABLET, DELAYED RELEASE ORAL DAILY
Qty: 90 TABLET | Refills: 1 | Status: SHIPPED | OUTPATIENT
Start: 2021-10-06 | End: 2022-08-08 | Stop reason: SDUPTHER

## 2021-10-07 LAB
ALBUMIN SERPL-MCNC: 4.5 G/DL (ref 3.5–5.2)
ALBUMIN/GLOB SERPL: 1.8 G/DL
ALP SERPL-CCNC: 68 U/L (ref 39–117)
ALT SERPL-CCNC: 19 U/L (ref 1–41)
AST SERPL-CCNC: 23 U/L (ref 1–40)
BASOPHILS # BLD AUTO: 0.04 10*3/MM3 (ref 0–0.2)
BASOPHILS NFR BLD AUTO: 0.8 % (ref 0–1.5)
BILIRUB SERPL-MCNC: 0.7 MG/DL (ref 0–1.2)
BUN SERPL-MCNC: 17 MG/DL (ref 8–23)
BUN/CREAT SERPL: 12.8 (ref 7–25)
CALCIUM SERPL-MCNC: 9.9 MG/DL (ref 8.6–10.5)
CHLORIDE SERPL-SCNC: 100 MMOL/L (ref 98–107)
CO2 SERPL-SCNC: 26.7 MMOL/L (ref 22–29)
CREAT SERPL-MCNC: 1.33 MG/DL (ref 0.76–1.27)
EOSINOPHIL # BLD AUTO: 0.17 10*3/MM3 (ref 0–0.4)
EOSINOPHIL NFR BLD AUTO: 3.3 % (ref 0.3–6.2)
ERYTHROCYTE [DISTWIDTH] IN BLOOD BY AUTOMATED COUNT: 13.8 % (ref 12.3–15.4)
GLOBULIN SER CALC-MCNC: 2.5 GM/DL
GLUCOSE SERPL-MCNC: 151 MG/DL (ref 65–99)
HCT VFR BLD AUTO: 47.4 % (ref 37.5–51)
HGB BLD-MCNC: 15.4 G/DL (ref 13–17.7)
IMM GRANULOCYTES # BLD AUTO: 0.02 10*3/MM3 (ref 0–0.05)
IMM GRANULOCYTES NFR BLD AUTO: 0.4 % (ref 0–0.5)
LYMPHOCYTES # BLD AUTO: 1.03 10*3/MM3 (ref 0.7–3.1)
LYMPHOCYTES NFR BLD AUTO: 19.9 % (ref 19.6–45.3)
MAGNESIUM SERPL-MCNC: 2.1 MG/DL (ref 1.6–2.4)
MCH RBC QN AUTO: 29.8 PG (ref 26.6–33)
MCHC RBC AUTO-ENTMCNC: 32.5 G/DL (ref 31.5–35.7)
MCV RBC AUTO: 91.9 FL (ref 79–97)
MONOCYTES # BLD AUTO: 0.54 10*3/MM3 (ref 0.1–0.9)
MONOCYTES NFR BLD AUTO: 10.4 % (ref 5–12)
NEUTROPHILS # BLD AUTO: 3.38 10*3/MM3 (ref 1.7–7)
NEUTROPHILS NFR BLD AUTO: 65.2 % (ref 42.7–76)
NRBC BLD AUTO-RTO: 0.2 /100 WBC (ref 0–0.2)
PLATELET # BLD AUTO: 208 10*3/MM3 (ref 140–450)
POTASSIUM SERPL-SCNC: 5 MMOL/L (ref 3.5–5.2)
PROT SERPL-MCNC: 7 G/DL (ref 6–8.5)
RBC # BLD AUTO: 5.16 10*6/MM3 (ref 4.14–5.8)
SODIUM SERPL-SCNC: 140 MMOL/L (ref 136–145)
T4 FREE SERPL-MCNC: 1.29 NG/DL (ref 0.93–1.7)
TSH SERPL DL<=0.005 MIU/L-ACNC: 2.77 UIU/ML (ref 0.27–4.2)
WBC # BLD AUTO: 5.18 10*3/MM3 (ref 3.4–10.8)

## 2021-10-19 RX ORDER — LINAGLIPTIN 5 MG/1
5 TABLET, FILM COATED ORAL DAILY
Qty: 30 TABLET | Refills: 0 | Status: SHIPPED | OUTPATIENT
Start: 2021-10-19 | End: 2021-10-25

## 2021-10-22 DIAGNOSIS — E78.5 DYSLIPIDEMIA: Primary | ICD-10-CM

## 2021-10-22 RX ORDER — ICOSAPENT ETHYL 1000 MG/1
2 CAPSULE ORAL 2 TIMES DAILY WITH MEALS
Qty: 360 CAPSULE | Refills: 0 | Status: SHIPPED | OUTPATIENT
Start: 2021-10-22 | End: 2021-10-25 | Stop reason: SDUPTHER

## 2021-10-25 ENCOUNTER — OFFICE VISIT (OUTPATIENT)
Dept: ENDOCRINOLOGY | Age: 76
End: 2021-10-25

## 2021-10-25 VITALS
DIASTOLIC BLOOD PRESSURE: 78 MMHG | WEIGHT: 236.6 LBS | HEART RATE: 62 BPM | BODY MASS INDEX: 31.36 KG/M2 | HEIGHT: 73 IN | SYSTOLIC BLOOD PRESSURE: 136 MMHG | OXYGEN SATURATION: 98 %

## 2021-10-25 DIAGNOSIS — E29.1 HYPOGONADISM IN MALE: ICD-10-CM

## 2021-10-25 DIAGNOSIS — E11.9 TYPE 2 DIABETES MELLITUS WITHOUT COMPLICATION, WITHOUT LONG-TERM CURRENT USE OF INSULIN (HCC): Primary | ICD-10-CM

## 2021-10-25 DIAGNOSIS — E55.9 VITAMIN D DEFICIENCY: ICD-10-CM

## 2021-10-25 DIAGNOSIS — E11.42 DIABETIC PERIPHERAL NEUROPATHY (HCC): ICD-10-CM

## 2021-10-25 DIAGNOSIS — E78.5 DYSLIPIDEMIA: ICD-10-CM

## 2021-10-25 PROCEDURE — 99214 OFFICE O/P EST MOD 30 MIN: CPT | Performed by: INTERNAL MEDICINE

## 2021-10-25 RX ORDER — LANCETS
EACH MISCELLANEOUS
Qty: 200 EACH | Refills: 3 | Status: SHIPPED | OUTPATIENT
Start: 2021-10-25 | End: 2022-05-02 | Stop reason: SDUPTHER

## 2021-10-25 RX ORDER — LORATADINE 10 MG
1 TABLET ORAL DAILY
Qty: 400 EACH | Refills: 3 | Status: SHIPPED | OUTPATIENT
Start: 2021-10-25 | End: 2022-05-02 | Stop reason: SDUPTHER

## 2021-10-25 RX ORDER — ICOSAPENT ETHYL 1000 MG/1
2 CAPSULE ORAL 2 TIMES DAILY WITH MEALS
Qty: 360 CAPSULE | Refills: 0 | Status: SHIPPED | OUTPATIENT
Start: 2021-10-25 | End: 2022-01-24

## 2021-10-25 RX ORDER — INSULIN GLARGINE 100 [IU]/ML
INJECTION, SOLUTION SUBCUTANEOUS
Qty: 15 PEN | Refills: 3 | Status: SHIPPED | OUTPATIENT
Start: 2021-10-25 | End: 2022-05-02 | Stop reason: SDUPTHER

## 2021-10-25 RX ORDER — LORATADINE 10 MG
1 TABLET ORAL DAILY
Qty: 400 EACH | Refills: 3 | Status: SHIPPED | OUTPATIENT
Start: 2021-10-25 | End: 2021-10-25 | Stop reason: SDUPTHER

## 2021-10-25 RX ORDER — PIOGLITAZONEHYDROCHLORIDE 45 MG/1
45 TABLET ORAL DAILY
Qty: 90 TABLET | Refills: 1 | Status: SHIPPED | OUTPATIENT
Start: 2021-10-25 | End: 2022-03-11 | Stop reason: SDUPTHER

## 2021-10-25 RX ORDER — PREGABALIN 150 MG/1
150 CAPSULE ORAL DAILY
Qty: 90 CAPSULE | Refills: 1 | Status: SHIPPED | OUTPATIENT
Start: 2021-10-25 | End: 2022-05-02 | Stop reason: SDUPTHER

## 2021-10-25 RX ORDER — FLURBIPROFEN SODIUM 0.3 MG/ML
SOLUTION/ DROPS OPHTHALMIC
Qty: 100 EACH | Refills: 3 | Status: SHIPPED | OUTPATIENT
Start: 2021-10-25 | End: 2022-03-09 | Stop reason: SDUPTHER

## 2021-10-25 RX ORDER — ATORVASTATIN CALCIUM 20 MG/1
20 TABLET, FILM COATED ORAL NIGHTLY
Qty: 90 TABLET | Refills: 1 | Status: SHIPPED | OUTPATIENT
Start: 2021-10-25 | End: 2022-01-14 | Stop reason: SDUPTHER

## 2021-10-25 NOTE — PATIENT INSTRUCTIONS
Home instructions  Diabetes medications    Measure glucose before each meal and bedtime  Take Jardiance [empagliflozin ]    Stop Tradjenta     Take pioglitazone 45 mg daily    Take Empaglitozone 10 mg every morning    Take Lantus 28 every evening     Note: if exercise over 1 hr planned next morning consider decrease lantus by 6 units. Reduce another 3 units if notice low symptoms or glucose under 100 mg/dl .        Mealtime        Meal planning  75 g plan  Meals 3 meals and 3 snacks  Please limit meals to 75 g (5 servings)  Please limit snacks to 15-30 g (1-2 serving)    Counting carbohydrates use phone [email or web address]  1 serving equal 15 g (about half a cup)  2 servings equal 30 g (about 1 cup)  3 servings equals 45 g  4 servings equal 60 g  5 servings ago 75 g  6 servings equals 90 g    Breakfast example (5 servings of carbohydrates)    A.  Half a cup cereal (2 servings) and 1 cup milk (1 serving) and 1 fruit/half a cup fruit juice (1 serving), 1 slice of bread with peanut butter (1 serving) = 5 servings carbohydrate    B.  Eggs plus madrigal plus sausage plus cheese with 2 slices of bread (2 servings) and one yogurt (1 serving )and one fruit (1 serving) and half a cup hash brown potatoes (1 serving) =5 servings carbohydrate    Lunch or dinner example  A.  [place] equals 2 slices of bread (2 servings) and 1 fruit (1 serving), half a cup of potato salad (1 serving) and half a cup of ice cream (1 serving ) = 5 servings carbohydrates    B.  Any meat/chicken/fish and 2 cups Pasta (4 servings) and 1 slice of bread (1 serving)  = 5 servings carbohydrate    C.  Any meat chicken fish 1 cup of rice (3 servings), 1 fruit (1 serving) = 4 servings of carbohydrate    May add vegetables/salads to any meal for free    Except the following are 1 serving of carbohydrates    A.  Lima beans half a cup (1 serving)  B.  1 cup kidney/dallas beans (1 serving 30 g -14 g fiber equals 16 g)  C.  Half a cup of corn (1 serving)  D.  Half  cup of peas (1 serving)  E.  Half a cup of potato, cubes (medium potato) (1 serving)  F  Sweets: Prep packaged 3 ounce ice cream cup, [place] brand/Nestlé 1 serving (15 g carbohydrates)  Notes: Fruits: 1 fruit about the size for lady's visit is 1 serving of carbohydrate  Half a cup fruit juice 1 serving    Snack examples (1 serving carbohydrate/15 g]    1.  No carbohydrates (0 servings): Criticize: Raw celery, rashes, bell pepper, banana peppers, corn, carrots, cucumbers dipped in salad dressings, guacamole, obese seasoning, peanut butter, almond butter, salsa    2.  15 g (1 serving): Half a sandwich: Turkey and cheese, smoked salmon and cream cheese, peanut butter and banana slices, cheese and crackers, peanut butter and crackers, half a bagel or half an English muffin or 1 cup of fruit plus cottage cheese or 1 rice cake with sugar-free fruit spreading    3.  15 g or less (1 serving): Protein shakes: Ensure DM, Glucerna, boost protein: Wings and salad dressing, 1 taco    4.  Fruit, 1 servin apple/orange/8 large grapes/peach/a protein/fat (avocado/nuts/meat)

## 2021-10-25 NOTE — PROGRESS NOTES
Chief Complaint  Chief Complaint   Patient presents with   • Diabetes   • Hyperlipidemia   • Hypertension   • Peripheral Neuropathy       Subjective     {CC  Problem List  Visit Diagnosis   Encounters  Notes  Medications  Labs  Result Review Imaging  Media :23}     History of Present Illness    Earl Marc,76 y.o. is here as new patient to me however is a known patient of this Endocrine clinic  for. Last Endocrine office visit on 4/7/21 with JL QUINTEROS .Primary physician is Dr. Paul Velazquez.      # Type 2 diabetes diagnosed 15 years ago and complicated by neuropathy, CAD with recent new stent., BMI 31, CKD Stage 3, hypertension, hyper cholesterolemia  . He is currently on Lantus 28 units at bedtime basal insulin and oral agents: Tradjenta 5 mg daily ,pioglitazone 45 mg daily.   # Glucose goal is 100-200 . Lower glucose avoided 'to prevent hurting kidneys'     Checks glucose every Morning.  He notes glucose  is usually 115-130 mg/dl. Glucose feels low in the afternoons with cardiac rehab where felt weak when under 100 [  Rehab completed Aug 4th 2021 ]    # CAD: summer 2021 had an MI and a  new stent placed for total of 4 stents in CAD and sent to cardiac rehab. He was also started on Brilinta [ticagrelor ] 90 mg bid. He now grows a beard as shaving caused too much follicular bleeding.     # Eye health: Glaucoma 2 stents placed .   No retinopathy.Last eye exam 9/2021 with glaucoma pressure check.     # Neuropathy bilateral symptomatic paraesthesias of the forefoot and plantar anesthesia to heels. He see podiatrist every 3 months for nail trimming. Dr. Edwards   He requires Lyrica 150 mg daily for relief. If he takes it twice daily then he is too somnolant.     # CKD Stage 3 disease monitored by Dr. Wilbert Gilmore and prescribes the furosemide and vitamin D    #Hyperlipidemia on atorvastatin 20 mg daily without myalgia managed here. He also takes Vascepa 1 g capsule as 2 g bid with meals. 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 RBC 5.16,WNL. . PSA and testosterone levels as well as pharmacotherapy managed there.   Patient does take asa 81 mg daily . Instructed on risk of thromboembolic phenomena here.  Currently taking   DEPO-TESTOSTERONE 200 MG/ML injection        Dose, Route, Frequency: 200 mg, Intramuscular, Weekly            # Sleep apnea -  No CPAP. Has difficulty sleeping so takes tramadol to decrease pain as well as suppl for insomnia.       I have reviewed the patient's allergies, medicines, past medical hx, family hx and social hx in detail.    Objective   Vital Signs:   /78   Pulse 62   Ht 185.4 cm (73\")   Wt 107 kg (236 lb 9.6 oz)   SpO2 98%   BMI 31.22 kg/m²   Physical Exam \  Physical Exam  Vitals and nursing note reviewed.   HENT:      Head: Normocephalic.      Mouth/Throat:      Mouth: Mucous membranes are moist.   Cardiovascular:      Rate and Rhythm: Normal rate.      Pulses: Normal pulses.      Heart sounds: Normal heart sounds.   Pulmonary:      Effort: Pulmonary effort is normal.      Breath sounds: Normal breath sounds. No wheezing or rhonchi.   Abdominal:      General: Bowel sounds are normal.      Palpations: Abdomen is soft.   Musculoskeletal:         General: No swelling. Normal range of motion.      Cervical back: Normal range of motion and neck supple.   Skin:     General: Skin is warm and dry.   Neurological:      Mental Status: He is alert and oriented to person, place, and time. Mental status is at baseline.   Psychiatric:         Mood and Affect: Mood normal.         Behavior: Behavior normal.         Judgment: Judgment normal.       Result Review :   The following data was reviewed by: Diana Hill MD on 10/25/2021:  Office Visit on 10/06/2021   Component Date Value Ref Range Status   • WBC 10/06/2021 5.18  3.40 - 10.80 10*3/mm3 Final   • RBC 10/06/2021 5.16  " 4.14 - 5.80 10*6/mm3 Final   • Hemoglobin 10/06/2021 15.4  13.0 - 17.7 g/dL Final   • Hematocrit 10/06/2021 47.4  37.5 - 51.0 % Final   • MCV 10/06/2021 91.9  79.0 - 97.0 fL Final   • MCH 10/06/2021 29.8  26.6 - 33.0 pg Final   • MCHC 10/06/2021 32.5  31.5 - 35.7 g/dL Final   • RDW 10/06/2021 13.8  12.3 - 15.4 % Final   • Platelets 10/06/2021 208  140 - 450 10*3/mm3 Final   • Neutrophil Rel % 10/06/2021 65.2  42.7 - 76.0 % Final   • Lymphocyte Rel % 10/06/2021 19.9  19.6 - 45.3 % Final   • Monocyte Rel % 10/06/2021 10.4  5.0 - 12.0 % Final   • Eosinophil Rel % 10/06/2021 3.3  0.3 - 6.2 % Final   • Basophil Rel % 10/06/2021 0.8  0.0 - 1.5 % Final   • Neutrophils Absolute 10/06/2021 3.38  1.70 - 7.00 10*3/mm3 Final   • Lymphocytes Absolute 10/06/2021 1.03  0.70 - 3.10 10*3/mm3 Final   • Monocytes Absolute 10/06/2021 0.54  0.10 - 0.90 10*3/mm3 Final   • Eosinophils Absolute 10/06/2021 0.17  0.00 - 0.40 10*3/mm3 Final   • Basophils Absolute 10/06/2021 0.04  0.00 - 0.20 10*3/mm3 Final   • Immature Granulocyte Rel % 10/06/2021 0.4  0.0 - 0.5 % Final   • Immature Grans Absolute 10/06/2021 0.02  0.00 - 0.05 10*3/mm3 Final   • nRBC 10/06/2021 0.2  0.0 - 0.2 /100 WBC Final   • TSH 10/06/2021 2.770  0.270 - 4.200 uIU/mL Final   • Free T4 10/06/2021 1.29  0.93 - 1.70 ng/dL Final    Results may be falsely increased if patient taking Biotin.   • Glucose 10/06/2021 151* 65 - 99 mg/dL Final   • BUN 10/06/2021 17  8 - 23 mg/dL Final   • Creatinine 10/06/2021 1.33* 0.76 - 1.27 mg/dL Final   • eGFR Non African Am 10/06/2021 52* >60 mL/min/1.73 Final    Comment: The MDRD GFR formula is only valid for adults with stable  renal function between ages 18 and 70.     • eGFR  Am 10/06/2021 63  >60 mL/min/1.73 Final   • BUN/Creatinine Ratio 10/06/2021 12.8  7.0 - 25.0 Final   • Sodium 10/06/2021 140  136 - 145 mmol/L Final   • Potassium 10/06/2021 5.0  3.5 - 5.2 mmol/L Final   • Chloride 10/06/2021 100  98 - 107 mmol/L Final   • Total  CO2 10/06/2021 26.7  22.0 - 29.0 mmol/L Final   • Calcium 10/06/2021 9.9  8.6 - 10.5 mg/dL Final   • Total Protein 10/06/2021 7.0  6.0 - 8.5 g/dL Final   • Albumin 10/06/2021 4.50  3.50 - 5.20 g/dL Final   • Globulin 10/06/2021 2.5  gm/dL Final   • A/G Ratio 10/06/2021 1.8  g/dL Final   • Total Bilirubin 10/06/2021 0.7  0.0 - 1.2 mg/dL Final   • Alkaline Phosphatase 10/06/2021 68  39 - 117 U/L Final   • AST (SGOT) 10/06/2021 23  1 - 40 U/L Final   • ALT (SGPT) 10/06/2021 19  1 - 41 U/L Final   • Magnesium 10/06/2021 2.1  1.6 - 2.4 mg/dL Final     Component      Latest Ref Rng & Units 5/26/2021   Total Cholesterol      0 - 200 mg/dL 75   Triglycerides      0 - 150 mg/dL 109   HDL Cholesterol      40 - 60 mg/dL 34 (L)   LDL Cholesterol       0 - 100 mg/dL 21   VLDL Cholesterol      5 - 40 mg/dL 20   LDL/HDL Ratio       0.56   Hemoglobin A1C      4.80 - 5.60 % 6.60 (H)           Results Review:    I reviewed the patient's new clinical results.     Assessment and Plan    Problem List Items Addressed This Visit        Other    Diabetic peripheral neuropathy (HCC)    Current Assessment & Plan     Diabetes neuropathy  is unchanged.   Continue current treatment regimen. Lyrica 150 mg daily. He finds bid dosing causes too much somnolance.   Diabetes neuropathy will be reassessed in 6 months.         Relevant Medications    empagliflozin (JARDIANCE) 10 MG tablet tablet    Lantus SoloStar 100 UNIT/ML injection pen    pioglitazone (ACTOS) 45 MG tablet    pregabalin (Lyrica) 150 MG capsule    Alcohol Swabs ([employer] Prep) 70 % pads    Other Relevant Orders    Hemoglobin A1c    Basic Metabolic Panel    Dyslipidemia    Current Assessment & Plan     Continue current pharmacotherapy. LDL Cholesterol 21 at goal. HDL cholesterol 34. Continue atorvastating and Vescipa         Relevant Medications    atorvastatin (LIPITOR) 20 MG tablet    Vascepa 1 g capsule capsule    Alcohol Swabs ([employer] Prep) 70 % pads    Other Relevant Orders    Lipid  "Panel    Hypogonadism in male    Relevant Medications    Alcohol Swabs ([employer] Prep) 70 % pads    Vitamin D deficiency    Relevant Medications    Alcohol Swabs ([employer] Prep) 70 % pads    Diabetes mellitus (HCC) - Primary    Current Assessment & Plan     Diabetes is unchanged.   Continue current treatment regimen. Except discontinue tradjenta. Start Jardiance  10 mg daily to also assist with CAD and CKD stage 3.  Diabetes will be reassessed in 6 months.         Relevant Medications    empagliflozin (JARDIANCE) 10 MG tablet tablet    B-D UF III MINI PEN NEEDLES 31G X 5 MM misc    glucose blood test strip    Lantus SoloStar 100 UNIT/ML injection pen    Monolet Lancets misc    pioglitazone (ACTOS) 45 MG tablet    Alcohol Swabs ([employer] Prep) 70 % pads    Other Relevant Orders    Hemoglobin A1c          Interpreted the blood work-up/imaging results performed by the primary care/consulting physician -    Refills sent to pharmacy    Follow Up     Patient was given instructions and counseling regarding her condition or for health maintenance advice. Please see specific information pulled into the AVS if appropriate.       Thank you for asking me to see your patient, Earl Marc in consultation.         Diana Hill MD  10/26/21      EMR Dragon / transcription disclaimer:     \"Dictated utilizing Dragon dictation\".                                                  "

## 2021-10-25 NOTE — ASSESSMENT & PLAN NOTE
Diabetes is unchanged.   Continue current treatment regimen. Except discontinue tradjenta. Start Jardiance  10 mg daily to also assist with CAD and CKD stage 3.  Diabetes will be reassessed in 6 months.

## 2021-10-25 NOTE — ASSESSMENT & PLAN NOTE
Diabetes neuropathy  is unchanged.   Continue current treatment regimen. Lyrica 150 mg daily. He finds bid dosing causes too much somnolance.   Diabetes neuropathy will be reassessed in 6 months.

## 2021-10-25 NOTE — ASSESSMENT & PLAN NOTE
Continue current pharmacotherapy. LDL Cholesterol 21 at goal. HDL cholesterol 34. Continue atorvastating and Vescipa

## 2021-10-26 LAB
CHOLEST SERPL-MCNC: 94 MG/DL (ref 100–199)
HBA1C MFR BLD: 6.6 % (ref 4.8–5.6)
HDLC SERPL-MCNC: 42 MG/DL
IMP & REVIEW OF LAB RESULTS: NORMAL
LDLC SERPL CALC-MCNC: 20 MG/DL (ref 0–99)
TRIGL SERPL-MCNC: 203 MG/DL (ref 0–149)
VLDLC SERPL CALC-MCNC: 32 MG/DL (ref 5–40)

## 2021-12-09 ENCOUNTER — OFFICE VISIT (OUTPATIENT)
Dept: CARDIOLOGY | Facility: CLINIC | Age: 76
End: 2021-12-09

## 2021-12-09 VITALS
SYSTOLIC BLOOD PRESSURE: 152 MMHG | WEIGHT: 233 LBS | HEART RATE: 55 BPM | BODY MASS INDEX: 30.88 KG/M2 | HEIGHT: 73 IN | DIASTOLIC BLOOD PRESSURE: 72 MMHG

## 2021-12-09 DIAGNOSIS — I25.10 CORONARY ARTERY DISEASE INVOLVING NATIVE CORONARY ARTERY OF NATIVE HEART WITHOUT ANGINA PECTORIS: Primary | ICD-10-CM

## 2021-12-09 DIAGNOSIS — I10 PRIMARY HYPERTENSION: ICD-10-CM

## 2021-12-09 DIAGNOSIS — R07.2 PRECORDIAL PAIN: ICD-10-CM

## 2021-12-09 DIAGNOSIS — E78.5 DYSLIPIDEMIA: ICD-10-CM

## 2021-12-09 PROCEDURE — 99214 OFFICE O/P EST MOD 30 MIN: CPT | Performed by: INTERNAL MEDICINE

## 2021-12-09 NOTE — PROGRESS NOTES
6 MONTH FOLLOW UP    Subjective:        Earl Marc is a 76 y.o. male who here for follow up    CC  Cad    Nose bleed  HPI  76 years old male with known history of the coronary artery disease, benign essential arterial hypertension dyslipidemia known history of the coronary artery disease has been complaining of off-and-on bleeding especially the nosebleeding     Problems Addressed this Visit        Cardiac and Vasculature    Dyslipidemia    Hypertension    Coronary artery disease involving native coronary artery of native heart - Primary    Chest pain      Diagnoses       Codes Comments    Coronary artery disease involving native coronary artery of native heart without angina pectoris    -  Primary ICD-10-CM: I25.10  ICD-9-CM: 414.01     Precordial pain     ICD-10-CM: R07.2  ICD-9-CM: 786.51     Dyslipidemia     ICD-10-CM: E78.5  ICD-9-CM: 272.4     Primary hypertension     ICD-10-CM: I10  ICD-9-CM: 401.9         .    The following portions of the patient's history were reviewed and updated as appropriate: allergies, current medications, past family history, past medical history, past social history, past surgical history and problem list.    Past Medical History:   Diagnosis Date   • Abnormal cardiovascular stress test    • Acid reflux    • Arthritis    • Asthma    • Cancer (HCC)     skin    • Chronic kidney disease    • Coronary artery disease    • Diabetes mellitus (HCC)    • Diabetic neuropathy associated with type 2 diabetes mellitus (HCC)    • Diabetic peripheral neuropathy (HCC) 3/10/2016   • Dyslipidemia    • Erectile dysfunction    • Fatty liver disease, nonalcoholic    • Gastritis    • Glaucoma     both eyes   • Hyperlipidemia    • Hypertension    • Hypogonadism male    • Type 2 diabetes mellitus (HCC)      reports that he has never smoked. He has never used smokeless tobacco. He reports that he does not drink alcohol and does not use drugs.   Family History   Problem Relation Age of Onset   • Breast  "cancer Daughter    • Heart attack Mother    • Hypertension Mother    • Heart disease Mother    • Thyroid disease Mother    • Lupus Mother    • Heart attack Brother    • Heart disease Brother    • Hyperlipidemia Brother    • Cancer Brother    • Depression Father    • Stroke Maternal Grandmother        Review of Systems  Constitutional: No wt loss, fever, fatigue  Gastrointestinal: No nausea, abdominal pain  Behavioral/Psych: No insomnia or anxiety   Cardiovascular no chest pains or tightness in the chest  Objective:       Physical Exam  /72   Pulse 55   Ht 185.4 cm (73\")   Wt 106 kg (233 lb)   BMI 30.74 kg/m²   General appearance: No acute changes   Neck: Trachea midline; NECK, supple, no thyromegaly or lymphadenopathy   Lungs: Normal size and shape, normal breath sounds, equal distribution of air, no rales and rhonchi   CV: S1-S2 regular, no murmurs, no rub, no gallop   Abdomen: Soft, nontender; no masses , no abnormal abdominal sounds   Extremities: No deformity , normal color , no peripheral edema   Skin: Normal temperature, turgor and texture; no rash, ulcers          Procedures      Echocardiogram:        Current Outpatient Medications:   •  acetaminophen (TYLENOL) 325 MG tablet, Take 2 tablets by mouth Every 6 (Six) Hours As Needed for Mild Pain . (Patient taking differently: Take 650 mg by mouth Every 4 (Four) Hours As Needed for Mild Pain .), Disp: 30 tablet, Rfl: 0  •  Alcohol Swabs (CVS Prep) 70 % pads, Place 1 application on the skin as directed by provider Daily., Disp: 400 each, Rfl: 3  •  AmLactin 12 % lotion, Apply  topically to the appropriate area as directed As Needed for Dry Skin., Disp: 500 g, Rfl: 3  •  ascorbic acid (VITAMIN C) 1000 MG tablet, Take 1,000 mg by mouth Daily., Disp: , Rfl:   •  aspirin 81 MG EC tablet, Take 1 tablet by mouth Daily., Disp: 90 tablet, Rfl: 3  •  atorvastatin (LIPITOR) 20 MG tablet, Take 1 tablet by mouth Every Night., Disp: 90 tablet, Rfl: 1  •  B-D UF III " MINI PEN NEEDLES 31G X 5 MM misc, Use once daily with Lantus Pen for injection of insulin, Disp: 100 each, Rfl: 3  •  Carboxymeth-Glycerin-Polysorb 0.5-1-0.5 % solution, Apply  to eye(s) as directed by provider Daily., Disp: , Rfl:   •  cholecalciferol (VITAMIN D3) 25 MCG (1000 UT) tablet, Take 1,000 Units by mouth Daily., Disp: , Rfl:   •  cyanocobalamin 1000 MCG/ML injection, Cyanocobalamin 1000 MCG/ML Injection Solution; Patient Sig: Cyanocobalamin 1000 MCG/ML Injection Solution INJECT 1ML INTRAMUSCULARLY EVERY OTHER WEEK; 0; 17-Feb-2015; Active, Disp: , Rfl:   •  DEPO-TESTOSTERONE 200 MG/ML injection, Inject 1 mL into the appropriate muscle as directed by prescriber 1 (One) Time Per Week., Disp: 14 mL, Rfl: 1  •  diphenhydrAMINE (BENADRYL) 50 MG capsule, Take 50 mg by mouth At Night As Needed for Sleep., Disp: , Rfl:   •  empagliflozin (JARDIANCE) 10 MG tablet tablet, Take 1 tablet by mouth Daily., Disp: 90 tablet, Rfl: 1  •  finasteride (PROSCAR) 5 MG tablet, Take 5 mg by mouth Daily., Disp: , Rfl:   •  fluticasone (FLONASE) 50 MCG/ACT nasal spray, 1 spray into the nostril(s) as directed by provider Daily As Needed for Allergies., Disp: , Rfl:   •  folic acid (FOLVITE) 800 MCG tablet, Take 1 tablet by mouth Daily., Disp: 90 tablet, Rfl: 3  •  furosemide (LASIX) 20 MG tablet, Take 1 tablet by mouth Daily., Disp: 30 tablet, Rfl: 0  •  glucose blood test strip, Precision Xtra Blood Glucose In Vitro Strip; Patient Sig: Precision Xtra Blood Glucose In Vitro Strip TEST 2  TIMES DAILY, Disp: 200 each, Rfl: 3  •  hydrocortisone 2.5 % ointment, Apply  topically to the appropriate area as directed 2 (Two) Times a Day., Disp: 20 g, Rfl: 5  •  Lantus SoloStar 100 UNIT/ML injection pen, 28 units subcutaneously daily, Disp: 15 pen, Rfl: 3  •  latanoprost (XALATAN) 0.005 % ophthalmic solution, Administer 1 drop to both eyes Every Night., Disp: , Rfl:   •  lidocaine (LIDODERM) 5 %, Place 1 patch on the skin as directed by  provider Daily. Remove & Discard patch within 12 hours or as directed by MD, Disp: 90 patch, Rfl: 1  •  loratadine (CLARITIN) 10 MG tablet, , Disp: , Rfl:   •  magnesium oxide (MAGOX) 400 (241.3 Mg) MG tablet tablet, Take 250 mg by mouth Every Night., Disp: , Rfl:   •  metoprolol tartrate (LOPRESSOR) 25 MG tablet, Take 1 tablet by mouth 2 (Two) Times a Day., Disp: 180 tablet, Rfl: 1  •  Monolet Lancets misc, Use to test BG 2 times daily, Disp: 200 each, Rfl: 3  •  nitroglycerin (Nitrostat) 0.4 MG SL tablet, Place 1 tablet under the tongue Every 5 (Five) Minutes As Needed for Chest Pain. Indications: Acute Angina Pectoris, Disp: 25 tablet, Rfl: 3  •  pantoprazole (PROTONIX) 20 MG EC tablet, Take 1 tablet by mouth Daily., Disp: 90 tablet, Rfl: 1  •  pioglitazone (ACTOS) 45 MG tablet, Take 1 tablet by mouth Daily., Disp: 90 tablet, Rfl: 1  •  pregabalin (Lyrica) 150 MG capsule, Take 1 capsule by mouth Daily., Disp: 90 capsule, Rfl: 1  •  PREVIDENT 5000 PLUS 1.1 % cream, , Disp: , Rfl:   •  tamsulosin (FLOMAX) 0.4 MG capsule 24 hr capsule, Take 1 capsule by mouth Daily With Dinner., Disp: , Rfl:   •  ticagrelor (BRILINTA) 90 MG tablet tablet, Take 1 tablet by mouth 2 (Two) Times a Day., Disp: 180 tablet, Rfl: 3  •  tiZANidine (ZANAFLEX) 4 MG tablet, Take 1 tablet by mouth Every 8 (Eight) Hours As Needed for Muscle Spasms (back pain)., Disp: 30 tablet, Rfl: 1  •  traMADol (ULTRAM) 50 MG tablet, Take 1 tablet by mouth Every 6 (Six) Hours As Needed for Moderate Pain ., Disp: 60 tablet, Rfl: 1  •  Vascepa 1 g capsule capsule, Take 2 g by mouth 2 (Two) Times a Day With Meals., Disp: 360 capsule, Rfl: 0  •  VIAGRA 100 MG tablet, Take 1 tablet by mouth As Needed for erectile dysfunction (1 tablet prior to planned intercourse.)., Disp: 24 tablet, Rfl: 3  •  Zinc 30 MG tablet, Take 50 mg by mouth Daily., Disp: , Rfl:    Assessment:        Patient Active Problem List   Diagnosis   • Bell's palsy   • Persistent insomnia   •  Osteoarthritis of cervical spine   • Diabetic peripheral neuropathy (HCC)   • Dyslipidemia   • Steatosis of liver   • Fibromyalgia   • Gastroesophageal reflux disease without esophagitis   • Hypertension   • Hypogonadism in male   • Renal insufficiency   • Vitamin D deficiency   • Benign non-nodular prostatic hyperplasia with lower urinary tract symptoms   • S/P drug eluting coronary stent placement   • Coronary artery disease involving native coronary artery of native heart   • Malignant neoplasm of skin   • Type 2 diabetes mellitus (HCC)   • Diabetes mellitus (HCC)   • Chronic insomnia   • Other specified glaucoma   • Vertigo   • Epigastric pain   • Chest pain   • Precordial pain   • S/P arthroscopy of shoulder     Conclusion       · Successful right and left coronary angiogram and LV pressures  · Successful angioplasty and stent to the mid LAD 70% with haziness reduced to 0% with 3.0/12 Xience stent  · Normal left main  · Left anterior descending had a proximal LAD stent widely patent midportion had 70% with haziness was reduced to 0% with 3.0/12 Xience stent, minimal distal irregularity including the diagonal and  branches  · Circumflex artery was nondominant none and normal  · Right coronary artery codominant and normal  · Normal LVEDP               Plan:            ICD-10-CM ICD-9-CM   1. Coronary artery disease involving native coronary artery of native heart without angina pectoris  I25.10 414.01   2. Precordial pain  R07.2 786.51   3. Dyslipidemia  E78.5 272.4   4. Primary hypertension  I10 401.9     1. Coronary artery disease involving native coronary artery of native heart without angina pectoris  No angina pectoris    2. Precordial pain  Angina pectoris    3. Dyslipidemia  Continue current treatment    4. Primary hypertension  Blood pressure under control       Stop brillinta   Pros and cons of this new medication / change medication has been explained to  the patient    Possible side effects  has been explained    Associated need of the blood  Work has been explained    Need for the compliance of the medication has been explained      1 yr  COUNSELING:    Earl Hernández was given to patient for the following topics: diagnostic results, risk factor reductions, impressions, risks and benefits of treatment options and importance of treatment compliance .       SMOKING COUNSELING:    [unfilled]    Dictated using Dragon dictation

## 2022-01-11 DIAGNOSIS — E78.5 DYSLIPIDEMIA: ICD-10-CM

## 2022-01-14 ENCOUNTER — TELEPHONE (OUTPATIENT)
Dept: ENDOCRINOLOGY | Age: 77
End: 2022-01-14

## 2022-01-14 DIAGNOSIS — E78.5 DYSLIPIDEMIA: ICD-10-CM

## 2022-01-14 DIAGNOSIS — E11.9 TYPE 2 DIABETES MELLITUS WITHOUT COMPLICATION, WITHOUT LONG-TERM CURRENT USE OF INSULIN: ICD-10-CM

## 2022-01-14 DIAGNOSIS — E11.42 DIABETIC PERIPHERAL NEUROPATHY: ICD-10-CM

## 2022-01-14 RX ORDER — ATORVASTATIN CALCIUM 20 MG/1
20 TABLET, FILM COATED ORAL NIGHTLY
Qty: 90 TABLET | Refills: 1 | Status: SHIPPED | OUTPATIENT
Start: 2022-01-14 | End: 2022-01-14 | Stop reason: SDUPTHER

## 2022-01-14 RX ORDER — ATORVASTATIN CALCIUM 20 MG/1
20 TABLET, FILM COATED ORAL NIGHTLY
Qty: 90 TABLET | Refills: 2 | Status: SHIPPED | OUTPATIENT
Start: 2022-01-14 | End: 2022-03-09 | Stop reason: SDUPTHER

## 2022-01-14 NOTE — TELEPHONE ENCOUNTER
Patient called.    He sent paperwork that needs to be worked on. He said emeaglislozin 10mg, atorvastatin 20mg needs to be sent to [facility] in [place].      He is wondering if this can get done today

## 2022-01-24 RX ORDER — ICOSAPENT ETHYL 1000 MG/1
CAPSULE ORAL
Qty: 360 CAPSULE | Refills: 3 | Status: SHIPPED | OUTPATIENT
Start: 2022-01-24 | End: 2022-05-02 | Stop reason: SDUPTHER

## 2022-02-11 ENCOUNTER — OFFICE VISIT (OUTPATIENT)
Dept: FAMILY MEDICINE CLINIC | Facility: CLINIC | Age: 77
End: 2022-02-11

## 2022-02-11 ENCOUNTER — HOSPITAL ENCOUNTER (OUTPATIENT)
Dept: GENERAL RADIOLOGY | Facility: HOSPITAL | Age: 77
Discharge: HOME | End: 2022-02-11
Admitting: INTERNAL MEDICINE

## 2022-02-11 ENCOUNTER — TELEPHONE (OUTPATIENT)
Dept: FAMILY MEDICINE CLINIC | Facility: CLINIC | Age: 77
End: 2022-02-11

## 2022-02-11 VITALS
DIASTOLIC BLOOD PRESSURE: 62 MMHG | SYSTOLIC BLOOD PRESSURE: 138 MMHG | HEART RATE: 60 BPM | OXYGEN SATURATION: 97 % | HEIGHT: 73 IN | BODY MASS INDEX: 30.75 KG/M2 | WEIGHT: 232 LBS

## 2022-02-11 DIAGNOSIS — M25.552 LEFT HIP PAIN: ICD-10-CM

## 2022-02-11 DIAGNOSIS — M76.892 TENDINITIS OF LEFT HIP FLEXOR: ICD-10-CM

## 2022-02-11 DIAGNOSIS — M25.552 LEFT HIP PAIN: Primary | ICD-10-CM

## 2022-02-11 PROCEDURE — 73502 X-RAY EXAM HIP UNI 2-3 VIEWS: CPT

## 2022-02-11 PROCEDURE — 99214 OFFICE O/P EST MOD 30 MIN: CPT | Performed by: INTERNAL MEDICINE

## 2022-02-11 RX ORDER — MELOXICAM 15 MG/1
15 TABLET ORAL DAILY
Qty: 15 TABLET | Refills: 0 | Status: SHIPPED | OUTPATIENT
Start: 2022-02-11 | End: 2023-03-28

## 2022-02-11 RX ORDER — MELOXICAM 15 MG/1
15 TABLET ORAL DAILY
Qty: 15 TABLET | Refills: 0 | Status: SHIPPED | OUTPATIENT
Start: 2022-02-11 | End: 2022-02-11 | Stop reason: SDUPTHER

## 2022-02-11 NOTE — TELEPHONE ENCOUNTER
Caller: Earl Marc    Relationship: Self    Best call back number: [phone]     Requested Prescriptions:   MELOXICAM 15MG     Pharmacy where request should be sent: [facility] [address] AT [facility] & ([facility] - [phone] [facility] [phone] FX     Additional details provided by patient: SENT TO WRONG PHARMACY       Does the patient have less than a 3 day supply:  [x] Yes  [] No    Arely Izaguirre Rep   02/11/22 12:39 EST

## 2022-02-11 NOTE — PROGRESS NOTES
Subjective Chief complaint is left hip pain   Earl Marc is a 76 y.o. male.     History of Present Illness Earl is here today for left hip pain.  This is been present over the last 1 to 2 weeks.  It is located more superior to the actual hip joint.  Is worse when he flexes his leg.  He has not had any recent falls or injuries.  He is complaining of some nosebleeds and also some bleeding if he scratches himself.  He was scratching an area on his leg and then all of a sudden there was a lot of bleeding.  The area of bleeding was from a plexus of varicose veins.  Additionally he is experiencing some arm pain near the elbow but not in the joint itself    The following portions of the patient's history were reviewed and updated as appropriate: allergies, current medications, past family history, past medical history, past social history, past surgical history and problem list.    Review of Systems   Constitutional: Negative for chills and fever.   HENT: Positive for nosebleeds.    Respiratory: Negative for cough.    Hematological: Bruises/bleeds easily.       Objective   Physical Exam  Vitals and nursing note reviewed.   HENT:      Nose:      Comments: I do not see a visible bleeding spot in the anterior nares.  Cardiovascular:      Rate and Rhythm: Normal rate and regular rhythm.   Pulmonary:      Effort: Pulmonary effort is normal.   Musculoskeletal:      Comments: He actually has good internal and external rotation of both hips.  The left hip flexor tendon is quite tender.  He has good pronation supination of the elbow.  There is some tenderness of the tendinous insertions at the olecranon.   Neurological:      Mental Status: He is alert.           Assessment/Plan   Diagnoses and all orders for this visit:    1. Left hip pain (Primary)  -     XR Hip With or Without Pelvis 2 - 3 View Left; Future    2. Tendinitis of left hip flexor  -     XR Hip With or Without Pelvis 2 - 3 View Left; Future    Other orders  -      meloxicam (MOBIC) 15 MG tablet; Take 1 tablet by mouth Daily.  Dispense: 15 tablet; Refill: 0  -     metoprolol tartrate (LOPRESSOR) 25 MG tablet; Take 1 tablet by mouth 2 (Two) Times a Day.  Dispense: 180 tablet; Refill: 1    Earl is here today for some left hip pain.  I suspect this is more hip flexor tendinitis.  We are going to get some x-rays but he has good internal and external rotation of the hips.  I did demonstrate some stretching exercises for the tendinitis.  He also is likely having some degree of lateral epicondylitis.  He has some chronic kidney disease.  His creatinine however is about 1.3.  I am going to let him use approximately 2 weeks of some meloxicam and see if this will help both of these issues.  I did advise him to maintain good hydration.  His last platelet count was okay.  I did advise him to remain off his aspirin while he is taking the meloxicam.

## 2022-03-09 ENCOUNTER — TELEPHONE (OUTPATIENT)
Dept: ENDOCRINOLOGY | Age: 77
End: 2022-03-09

## 2022-03-09 DIAGNOSIS — E78.5 DYSLIPIDEMIA: ICD-10-CM

## 2022-03-09 DIAGNOSIS — E11.9 TYPE 2 DIABETES MELLITUS WITHOUT COMPLICATION, WITHOUT LONG-TERM CURRENT USE OF INSULIN: ICD-10-CM

## 2022-03-09 RX ORDER — ATORVASTATIN CALCIUM 20 MG/1
20 TABLET, FILM COATED ORAL NIGHTLY
Qty: 90 TABLET | Refills: 2 | Status: SHIPPED | OUTPATIENT
Start: 2022-03-09 | End: 2022-11-02 | Stop reason: SDUPTHER

## 2022-03-09 RX ORDER — FLURBIPROFEN SODIUM 0.3 MG/ML
SOLUTION/ DROPS OPHTHALMIC
Qty: 100 EACH | Refills: 3 | Status: SHIPPED | OUTPATIENT
Start: 2022-03-09 | End: 2022-03-11 | Stop reason: SDUPTHER

## 2022-03-09 RX ORDER — FLURBIPROFEN SODIUM 0.3 MG/ML
SOLUTION/ DROPS OPHTHALMIC
Qty: 100 EACH | Refills: 3 | Status: SHIPPED | OUTPATIENT
Start: 2022-03-09 | End: 2022-03-09 | Stop reason: SDUPTHER

## 2022-03-09 NOTE — TELEPHONE ENCOUNTER
Patient called    Patient needs his Atorvastatin and Pen needles. He is wanting to get these today    [facility]: [phone].    Please let patient know before he starts driving out there

## 2022-03-11 ENCOUNTER — TELEPHONE (OUTPATIENT)
Dept: ENDOCRINOLOGY | Age: 77
End: 2022-03-11

## 2022-03-11 DIAGNOSIS — E11.9 TYPE 2 DIABETES MELLITUS WITHOUT COMPLICATION, WITHOUT LONG-TERM CURRENT USE OF INSULIN: ICD-10-CM

## 2022-03-11 RX ORDER — FLURBIPROFEN SODIUM 0.3 MG/ML
SOLUTION/ DROPS OPHTHALMIC
Qty: 100 EACH | Refills: 3 | Status: SHIPPED | OUTPATIENT
Start: 2022-03-11 | End: 2022-11-02 | Stop reason: SDUPTHER

## 2022-03-11 RX ORDER — PIOGLITAZONEHYDROCHLORIDE 45 MG/1
45 TABLET ORAL DAILY
Qty: 90 TABLET | Refills: 1 | Status: SHIPPED | OUTPATIENT
Start: 2022-03-11 | End: 2022-05-02

## 2022-05-02 ENCOUNTER — OFFICE VISIT (OUTPATIENT)
Dept: ENDOCRINOLOGY | Age: 77
End: 2022-05-02

## 2022-05-02 VITALS
HEART RATE: 58 BPM | WEIGHT: 236 LBS | OXYGEN SATURATION: 98 % | DIASTOLIC BLOOD PRESSURE: 58 MMHG | BODY MASS INDEX: 31.28 KG/M2 | HEIGHT: 73 IN | SYSTOLIC BLOOD PRESSURE: 132 MMHG

## 2022-05-02 DIAGNOSIS — N18.31 CHRONIC KIDNEY DISEASE, STAGE 3A: ICD-10-CM

## 2022-05-02 DIAGNOSIS — E29.1 HYPOGONADISM IN MALE: ICD-10-CM

## 2022-05-02 DIAGNOSIS — E11.42 DIABETIC PERIPHERAL NEUROPATHY: Primary | ICD-10-CM

## 2022-05-02 DIAGNOSIS — E55.9 VITAMIN D DEFICIENCY: ICD-10-CM

## 2022-05-02 DIAGNOSIS — E11.9 TYPE 2 DIABETES MELLITUS WITHOUT COMPLICATION, WITHOUT LONG-TERM CURRENT USE OF INSULIN: ICD-10-CM

## 2022-05-02 DIAGNOSIS — E78.5 DYSLIPIDEMIA: ICD-10-CM

## 2022-05-02 DIAGNOSIS — E11.59 TYPE 2 DIABETES MELLITUS WITH OTHER CIRCULATORY COMPLICATION, WITHOUT LONG-TERM CURRENT USE OF INSULIN: ICD-10-CM

## 2022-05-02 PROCEDURE — 99214 OFFICE O/P EST MOD 30 MIN: CPT | Performed by: INTERNAL MEDICINE

## 2022-05-02 RX ORDER — INSULIN GLARGINE 100 [IU]/ML
INJECTION, SOLUTION SUBCUTANEOUS
Qty: 15 PEN | Refills: 3 | Status: SHIPPED | OUTPATIENT
Start: 2022-05-02 | End: 2022-11-02 | Stop reason: SDUPTHER

## 2022-05-02 RX ORDER — LANCETS
EACH MISCELLANEOUS
Qty: 200 EACH | Refills: 11 | Status: SHIPPED | OUTPATIENT
Start: 2022-05-02 | End: 2022-11-02 | Stop reason: SDUPTHER

## 2022-05-02 RX ORDER — LORATADINE 10 MG
1 TABLET ORAL DAILY
Qty: 400 EACH | Refills: 3 | Status: SHIPPED | OUTPATIENT
Start: 2022-05-02 | End: 2022-11-02 | Stop reason: SDUPTHER

## 2022-05-02 RX ORDER — FEXOFENADINE HCL 180 MG/1
180 TABLET ORAL DAILY
COMMUNITY

## 2022-05-02 RX ORDER — ACETAMINOPHEN 500 MG
1 TABLET ORAL
COMMUNITY
End: 2022-09-30

## 2022-05-02 RX ORDER — PREGABALIN 150 MG/1
1 CAPSULE ORAL
COMMUNITY
End: 2022-05-02 | Stop reason: SDUPTHER

## 2022-05-02 RX ORDER — ICOSAPENT ETHYL 1000 MG/1
2 CAPSULE ORAL 2 TIMES DAILY WITH MEALS
Qty: 360 CAPSULE | Refills: 3 | Status: SHIPPED | OUTPATIENT
Start: 2022-05-02 | End: 2022-11-02 | Stop reason: SDUPTHER

## 2022-05-02 RX ORDER — ASPIRIN 81 MG/1
1 TABLET ORAL DAILY
COMMUNITY
End: 2022-09-30

## 2022-05-02 RX ORDER — PREGABALIN 150 MG/1
150 CAPSULE ORAL DAILY
Qty: 90 CAPSULE | Refills: 1 | Status: SHIPPED | OUTPATIENT
Start: 2022-05-02 | End: 2022-11-02 | Stop reason: SDUPTHER

## 2022-05-02 NOTE — PROGRESS NOTES
Chief Complaint  Diabetes (Dm testing bs 1 x day / last dm eye exam nov2021)    Subjective        Earl Marc presents to [facility]  History of Present Illness  LOV 10/25/2021, 6 months ago with well-controlled diabetes hemoglobin A1c 6.6% on 5/26/2021  77-year-old  male with type 2 diabetes mellitus over 15 years complicated by peripheral neuropathy, CAD status post new stents in 2021, BMI 31, CKD stage III, HTN, hyperlipidemia.    Since last visit has had PT for his back.Had episode of hip pain requiring pain medication. These orthopedic issues limited his visits to the gym. Now feels well and plans on returning to gym for treadmill and machine workouts. His next cardiology appointment is in December. Brother is having CABG scheduled .     Type 2 diabetes diagnosed 15 years ago and complicated by neuropathy, CAD with recent new stent., BMI 31, CKD Stage 3, hypertension, hyper cholesterolemia    . He is currently on   Lantus 24 units at bedtime basal insulin and oral agents:   Jardiance 10 mg daily  # Glucose goal is 100-200 . Lower glucose avoided 'to prevent hurting kidneys'      Checks glucose every Morning.  glucose now usually 150's more frequently in 160-200 range. See written log sheet.     # CAD: summer 2021 had an MI and a  new stent placed for total of 4 stents in CAD and sent to cardiac rehab. He was also started on Brilinta [ticagrelor ] 90 mg bid. He now grows a beard as shaving caused too much follicular bleeding.    No issues since last visit  # Eye health: Glaucoma 2 stents placed .   No retinopathy.Last eye exam 9/2021 with glaucoma pressure check.      # Neuropathy bilateral symptomatic paraesthesias of the forefoot and plantar anesthesia to heels. He see podiatrist every 3 months for nail trimming. Dr. Edwards   He requires Lyrica 150 mg daily for relief. If he takes it twice daily then he is too somnolant. no new issues     # CKD Stage 3 disease  "monitored by Dr. Wilbert Gilmore and prescribes the furosemide and vitamin D. No new issues     #Hyperlipidemia on atorvastatin 20 mg daily without myalgia managed here. He also takes Vascepa 1 g capsule as 2 g bid with meals. Aspirin 81 mg daily. No new issues. Trig remains over 150 and less than 300     #Hypertension stable on metoprolol 25 mg bid,      # Hypogonadal managed by  Dr. Newman at [facility]. RBC 5.16,WNL. . PSA and testosterone levels as well as pharmacotherapy managed there.   Patient does take asa 81 mg daily . Instructed on risk of thromboembolic phenomena here.  Currently taking   DEPO-TESTOSTERONE 200 MG/ML injection             Dose, Route, Frequency: 200 mg, Intramuscular, Weekly          and viagra     # Sleep apnea -  No CPAP. Has difficulty sleeping no longer takes tramadol to decrease pain. Takes  well as suppl for insomnia. Takes a suppl to be able to fall asleep.    # Vitamin D suppl per Dr. Atkins.   Objective   Vital Signs:   /58   Pulse 58   Ht 185.4 cm (72.99\")   Wt 107 kg (236 lb)   SpO2 98%   BMI 31.14 kg/m²     Physical Exam  Vitals and nursing note reviewed.   HENT:      Head: Normocephalic.      Mouth/Throat:      Mouth: Mucous membranes are moist.   Cardiovascular:      Rate and Rhythm: Normal rate.      Pulses: Normal pulses.      Heart sounds: Normal heart sounds.   Pulmonary:      Effort: Pulmonary effort is normal.      Breath sounds: Normal breath sounds. No wheezing or rhonchi.   Abdominal:      General: Bowel sounds are normal.      Palpations: Abdomen is soft.   Musculoskeletal:         General: No swelling. Normal range of motion.      Cervical back: Normal range of motion and neck supple.   Feet:      Comments:   Nails long and irregular. Will see podiatrist.   Skin:     General: Skin is warm and dry.   Neurological:      Mental Status: He is alert and oriented to person, place, and time. Mental status is at baseline.   Psychiatric:         Mood " and Affect: Mood normal.         Behavior: Behavior normal.         Judgment: Judgment normal.        Result Review :     The following data was reviewed by: Diana Hill MD on 05/02/2022:       Component      Latest Ref Rng & Units 10/25/2021 10/25/2021 4/18/2022           3:12 PM  3:12 PM  9:26 AM   Total Cholesterol      100 - 199 mg/dL 94 (L)     Triglycerides      0 - 149 mg/dL 203 (H)     HDL Cholesterol      >39 mg/dL 42     VLDL Cholesterol José      5 - 40 mg/dL 32     LDL Cholesterol       0 - 99 mg/dL 20     Hemoglobin A1C      4.8 - 5.6 %  6.6 (H)    Interpretation         Note     Component      Latest Ref Rng & Units 3/31/2021 10/6/2021 10/25/2021 10/25/2021          10:03 AM 10:05 AM  3:12 PM  3:12 PM   25 Hydroxy, Vitamin D      30.0 - 100.0 ng/ml 39.0        Component   Ref Range & Units 2 wk ago   (4/18/22) 6 mo ago   (10/6/21) 9 mo ago   (7/22/21) 11 mo ago   (5/29/21) 11 mo ago   (5/29/21) 11 mo ago   (5/29/21) 11 mo ago   (5/28/21)   Glucose   65 - 99 mg/dL 190 High   151 High   249 High   214 High  R  133 High  R  139 High   101 R    BUN   8 - 27 mg/dL 24  17 R  16 R    17 R     Creatinine   0.76 - 1.27 mg/dL 1.78 High   1.33 High   1.31 High     1.39 High      EGFR Result   >59 mL/min/1.73 39 Low           BUN/Creatinine Ratio   10 - 24 13  12.8 R  12.2 R    12.2 R     Sodium   134 - 144 mmol/L 143  140 R  140 R    138 R     Potassium   3.5 - 5.2 mmol/L 4.8  5.0  4.2    4.0     Chloride   96 - 106 mmol/L 98  100 R  105 R    103 R     Total CO2   20 - 29 mmol/L 24  26.7 R  27.7 R        Calcium   8.6 - 10.2 mg/dL 9.9  9.9 R  9.7 R    9.2 R     Resulting Agency [facility]  [facility]  [facility]              Assessment and Plan      Lantus 24 units increase to 26 units  at bedtime basal insulin and oral agents:   Tradjenta 5 mg daily   Jardiance 10 mg daily    Diagnoses and all orders for this visit:    1. Diabetic peripheral neuropathy (HCC)  (Primary)  -     Alcohol Swabs (CVS Prep) 70 % pads; Place 1 application on the skin as directed by provider Daily.  Dispense: 400 each; Refill: 3  -     pregabalin (Lyrica) 150 MG capsule; Take 1 capsule by mouth Daily.  Dispense: 90 capsule; Refill: 1  -     empagliflozin (JARDIANCE) 10 MG tablet tablet; Take 1 tablet by mouth Daily.  Dispense: 90 tablet; Refill: 2    2. Dyslipidemia  -     Vascepa 1 g capsule capsule; Take 2 g by mouth 2 (Two) Times a Day With Meals.  Dispense: 360 capsule; Refill: 3  -     Alcohol Swabs (CVS Prep) 70 % pads; Place 1 application on the skin as directed by provider Daily.  Dispense: 400 each; Refill: 3    3. Type 2 diabetes mellitus with other circulatory complication, without long-term current use of insulin (HCC)  -     Microalbumin / Creatinine Urine Ratio - Urine, Clean Catch  -     TSH Rfx On Abnormal To Free T4  -     Hemoglobin A1c  -     Lipid Panel    4. Chronic kidney disease, stage 3a ([facility])  -     Microalbumin / Creatinine Urine Ratio - Urine, Clean Catch    5. Hypogonadism in male  -     Alcohol Swabs (CVS Prep) 70 % pads; Place 1 application on the skin as directed by provider Daily.  Dispense: 400 each; Refill: 3    6. Vitamin D deficiency  -     Alcohol Swabs (CVS Prep) 70 % pads; Place 1 application on the skin as directed by provider Daily.  Dispense: 400 each; Refill: 3    7. Type 2 diabetes mellitus without complication, without long-term current use of insulin (HCC)  -     Alcohol Swabs (CVS Prep) 70 % pads; Place 1 application on the skin as directed by provider Daily.  Dispense: 400 each; Refill: 3  -     glucose blood test strip; E11.65 Precision Xtra Blood Glucose In Vitro Strip; Patient Sig: Precision Xtra Blood Glucose In Vitro Strip TEST 2  TIMES DAILY  Dispense: 200 each; Refill: 11  -     Monolet Lancets misc; E11.65 Use to test BG 2 times daily  Dispense: 200 each; Refill: 11  -     Lantus SoloStar 100 UNIT/ML injection pen; 26 units subcutaneously  daily  Dispense: 15 pen; Refill: 3  -     empagliflozin (JARDIANCE) 10 MG tablet tablet; Take 1 tablet by mouth Daily.  Dispense: 90 tablet; Refill: 2           I spent 30 minutes caring for Earl on this date of service. This time includes time spent by me in the following activities:reviewing tests, obtaining and/or reviewing a separately obtained history, performing a medically appropriate examination and/or evaluation , counseling and educating the patient/family/caregiver and ordering medications, tests, or procedures  Follow Up   Return in about 6 months (around 11/2/2022) for Recheck.  Patient was given instructions and counseling regarding his condition or for health maintenance advice. Please see specific information pulled into the AVS if appropriate.

## 2022-05-03 LAB
ALBUMIN/CREAT UR: 8 MG/G CREAT (ref 0–29)
CHOLEST SERPL-MCNC: 85 MG/DL (ref 100–199)
CREAT UR-MCNC: 72.7 MG/DL
HBA1C MFR BLD: 8.1 % (ref 4.8–5.6)
HDLC SERPL-MCNC: 39 MG/DL
IMP & REVIEW OF LAB RESULTS: NORMAL
LDLC SERPL CALC-MCNC: 28 MG/DL (ref 0–99)
Lab: NORMAL
MICROALBUMIN UR-MCNC: 5.8 UG/ML
TRIGL SERPL-MCNC: 92 MG/DL (ref 0–149)
TSH SERPL DL<=0.005 MIU/L-ACNC: 2.64 UIU/ML (ref 0.45–4.5)
VLDLC SERPL CALC-MCNC: 18 MG/DL (ref 5–40)

## 2022-07-14 ENCOUNTER — TELEPHONE (OUTPATIENT)
Dept: FAMILY MEDICINE CLINIC | Facility: CLINIC | Age: 77
End: 2022-07-14

## 2022-07-14 NOTE — TELEPHONE ENCOUNTER
Caller: Earl Marc    Relationship to patient: Self    Best call back number: [phone] (M)    Date of exposure: ?    Date of positive COVID19 test: 07/14/22    Date if possible COVID19 exposure: ?    COVID19 symptoms: COUGH FEELS LIKE HE IS COUGHING UP SAND, OFF BALANCE, STUFFY NOSE, CHEST AND BODY FEEL LIKE THEY ARE ON FIRE, HEADACHE.     Date of initial quarantine:  07/14/22    Additional information or concerns: PATIENT CALLED TO ADVISE THAT HE HAS JUST TESTED POSITIVE FOR COVID VIA A HOME COVID TEST. PATIENT IS WANTING TO KNOW WHAT TYPES OF TREATMENT OR MEDICATION CAN BE PRESCRIBED TO HELP WITH HIS SYMPTOMS.      What is the patients preferred pharmacy: [address] AT [facility] & ([facility] - [phone] [facility] [phone] FX            THANKS    The patient is having a cough fever and body aches.  He may be a little more short of breath than usual but he said he will monitor that.  We did advise using Mucinex DM for cough and congestion.  I advised using Tylenol for aches and fever and increase his fluids.  He does want to try an antiviral and we will give him a renal dose of the Paxlovid.  I have advised him not to take his atorvastatin or Viagra while using this.

## 2022-07-26 ENCOUNTER — TELEPHONE (OUTPATIENT)
Dept: FAMILY MEDICINE CLINIC | Facility: CLINIC | Age: 77
End: 2022-07-26

## 2022-07-26 NOTE — TELEPHONE ENCOUNTER
Caller: Earl Marc    Relationship: Self    Best call back number: [phone]    What orders are you requesting (i.e. lab or imaging): BLOOD WORK LABS    In what timeframe would the patient need to come in: PRIOR TO 8/8/22    Where will you receive your lab/imaging services: IN OFFICE    Additional notes: PATIENT REQUESTS A CALL BACK TO SCHEDULE LABS ONCE ORDERS ARE IN PRIOR TO HIS WELLNESS VISIT ON 8/8//22

## 2022-08-08 ENCOUNTER — OFFICE VISIT (OUTPATIENT)
Dept: FAMILY MEDICINE CLINIC | Facility: CLINIC | Age: 77
End: 2022-08-08

## 2022-08-08 VITALS
SYSTOLIC BLOOD PRESSURE: 118 MMHG | OXYGEN SATURATION: 97 % | DIASTOLIC BLOOD PRESSURE: 62 MMHG | HEIGHT: 73 IN | BODY MASS INDEX: 30.62 KG/M2 | HEART RATE: 60 BPM | WEIGHT: 231 LBS

## 2022-08-08 DIAGNOSIS — R10.11 POSTPRANDIAL RUQ PAIN: ICD-10-CM

## 2022-08-08 DIAGNOSIS — E78.5 DYSLIPIDEMIA: ICD-10-CM

## 2022-08-08 DIAGNOSIS — E79.0 HYPERURICEMIA: ICD-10-CM

## 2022-08-08 DIAGNOSIS — E11.42 DIABETIC PERIPHERAL NEUROPATHY: ICD-10-CM

## 2022-08-08 DIAGNOSIS — Z11.59 ENCOUNTER FOR HEPATITIS C SCREENING TEST FOR LOW RISK PATIENT: ICD-10-CM

## 2022-08-08 DIAGNOSIS — M76.892 TENDONITIS OF LEFT KNEE: ICD-10-CM

## 2022-08-08 DIAGNOSIS — E11.49 TYPE 2 DIABETES MELLITUS WITH OTHER NEUROLOGIC COMPLICATION, WITHOUT LONG-TERM CURRENT USE OF INSULIN: ICD-10-CM

## 2022-08-08 DIAGNOSIS — I10 ESSENTIAL HYPERTENSION: ICD-10-CM

## 2022-08-08 DIAGNOSIS — E11.9 TYPE 2 DIABETES MELLITUS WITHOUT COMPLICATION, WITHOUT LONG-TERM CURRENT USE OF INSULIN: ICD-10-CM

## 2022-08-08 DIAGNOSIS — E29.1 HYPOGONADISM IN MALE: ICD-10-CM

## 2022-08-08 DIAGNOSIS — Z00.00 MEDICARE ANNUAL WELLNESS VISIT, SUBSEQUENT: Primary | ICD-10-CM

## 2022-08-08 DIAGNOSIS — E55.9 VITAMIN D DEFICIENCY: ICD-10-CM

## 2022-08-08 PROBLEM — N18.32 STAGE 3B CHRONIC KIDNEY DISEASE (HCC): Status: ACTIVE | Noted: 2022-03-24

## 2022-08-08 PROBLEM — G62.9 PERIPHERAL NEUROPATHY: Status: ACTIVE | Noted: 2022-03-24

## 2022-08-08 PROCEDURE — 1159F MED LIST DOCD IN RCRD: CPT | Performed by: INTERNAL MEDICINE

## 2022-08-08 PROCEDURE — 1170F FXNL STATUS ASSESSED: CPT | Performed by: INTERNAL MEDICINE

## 2022-08-08 PROCEDURE — G0439 PPPS, SUBSEQ VISIT: HCPCS | Performed by: INTERNAL MEDICINE

## 2022-08-08 RX ORDER — PANTOPRAZOLE SODIUM 20 MG/1
20 TABLET, DELAYED RELEASE ORAL DAILY
Qty: 90 TABLET | Refills: 1 | Status: SHIPPED | OUTPATIENT
Start: 2022-08-08 | End: 2023-03-29 | Stop reason: SDUPTHER

## 2022-08-08 RX ORDER — ASPIRIN 81 MG/1
81 TABLET ORAL DAILY
Qty: 90 TABLET | Refills: 3 | Status: SHIPPED | OUTPATIENT
Start: 2022-08-08 | End: 2022-12-08 | Stop reason: SDUPTHER

## 2022-08-08 RX ORDER — UREA 10 %
800 LOTION (ML) TOPICAL DAILY
Qty: 90 TABLET | Refills: 3 | Status: SHIPPED | OUTPATIENT
Start: 2022-08-08 | End: 2023-03-15 | Stop reason: SDUPTHER

## 2022-08-08 RX ORDER — PANTOPRAZOLE SODIUM 20 MG/1
20 TABLET, DELAYED RELEASE ORAL DAILY
Qty: 90 TABLET | Refills: 1 | Status: SHIPPED | OUTPATIENT
Start: 2022-08-08 | End: 2022-08-08 | Stop reason: SDUPTHER

## 2022-08-08 RX ORDER — AMMONIUM LACTATE 12 %
LOTION (GRAM) TOPICAL AS NEEDED
Qty: 500 G | Refills: 3 | Status: SHIPPED | OUTPATIENT
Start: 2022-08-08 | End: 2022-08-08 | Stop reason: SDUPTHER

## 2022-08-08 RX ORDER — AMMONIUM LACTATE 12 %
LOTION (GRAM) TOPICAL AS NEEDED
Qty: 500 G | Refills: 3 | Status: SHIPPED | OUTPATIENT
Start: 2022-08-08 | End: 2023-03-15 | Stop reason: SDUPTHER

## 2022-08-08 NOTE — PROGRESS NOTES
The ABCs of the Annual Wellness Visit  Subsequent Medicare Wellness Visit    Chief Complaint   Patient presents with   • Medicare Wellness-subsequent   • Abdominal Pain     After eating, thinking trapped gas    • Leg Pain     Going up the stairs left side    • Dizziness      Subjective    History of Present Illness:  Earl Marc is a 77 y.o. male who presents for a Subsequent Medicare Wellness Visit.  Earl is here today for his Medicare wellness visit.  [facility]  [facility] his [facility].  He does report that he has had the pneumococcal vaccine as well as the Shingrix.  He thinks he is up-to-date on tetanus as well.  He is going to try to get us these dates.  He did have a ophthalmology appointment and September 2021 with Dr. Valles.  We will try to get that as well.  I did review his blood sugars and they seem to be fairly stable despite his slight increase in hemoglobin A1c last time.  We will check on that.  He is experiencing some abdominal pain.  This is in the right upper quadrant.  It seems to be worse after eating.  He has tried medicine such as Gas-X and sometimes it seems to help and sometimes it does not.  I did look at a CAT scan of the chest and was able to see the liver and gallbladder.  There is no thickening of the gallbladder wall and I did not see any stones in the gallbladder at that time.  He is experiencing some left leg pain.  This is on the lateral aspect of the knee.  It is worse when going up steps.  Sometimes he feels like his knee will give out.  He also gets occasional dizziness and lightheadedness when he goes from sitting to standing.  This likely reflects some sort of autonomic neuropathy from his diabetes.    The following portions of the patient's history were reviewed and   updated as appropriate: allergies, current medications, past family history, past medical history, past social history, past surgical history and problem list.    Compared to one year  ago, the patient feels his physical   health is better.    Compared to one year ago, the patient feels his mental   health is the same.    Recent Hospitalizations:  He was not admitted to the hospital during the last year.       Current Medical Providers:  Patient Care Team:  Avery Velazquez MD as PCP - General (Internal Medicine)    Outpatient Medications Prior to Visit   Medication Sig Dispense Refill   • acetaminophen (TYLENOL) 325 MG tablet Take 2 tablets by mouth Every 6 (Six) Hours As Needed for Mild Pain . (Patient taking differently: Take 650 mg by mouth Every 4 (Four) Hours As Needed for Mild Pain . pm) 30 tablet 0   • acetaminophen (TYLENOL) 500 MG tablet Take 1 tablet by mouth.     • Alcohol Swabs ([employer] Prep) 70 % pads Place 1 application on the skin as directed by provider Daily. 400 each 3   • AmLactin 12 % lotion Apply  topically to the appropriate area as directed As Needed for Dry Skin. 500 g 3   • ascorbic acid (VITAMIN C) 1000 MG tablet Take 1,000 mg by mouth Daily.     • aspirin 81 MG EC tablet Take 1 tablet by mouth Daily. 90 tablet 3   • aspirin 81 MG EC tablet Take 1 tablet by mouth Daily.     • atorvastatin (LIPITOR) 20 MG tablet Take 1 tablet by mouth Every Night. 90 tablet 2   • B-D UF III MINI PEN NEEDLES 31G X 5 MM misc Use once daily with Lantus Pen for injection of insulin 100 each 3   • Carboxymeth-Glycerin-Polysorb 0.5-1-0.5 % solution Apply  to eye(s) as directed by provider Daily.     • cholecalciferol (VITAMIN D3) 25 MCG (1000 UT) tablet Take 1,000 Units by mouth Daily.     • cyanocobalamin 1000 MCG/ML injection Cyanocobalamin 1000 MCG/ML Injection Solution; Patient Sig: Cyanocobalamin 1000 MCG/ML Injection Solution INJECT 1ML INTRAMUSCULARLY EVERY OTHER WEEK; 0; 17-Feb-2015; Active     • DEPO-TESTOSTERONE 200 MG/ML injection Inject 1 mL into the appropriate muscle as directed by prescriber 1 (One) Time Per Week. 14 mL 1   • diphenhydrAMINE (BENADRYL) 50 MG capsule Take 50  mg by mouth At Night As Needed for Sleep.     • empagliflozin (JARDIANCE) 10 MG tablet tablet Take 1 tablet by mouth Daily. 90 tablet 2   • fexofenadine (ALLEGRA) 180 MG tablet Take 180 mg by mouth Daily.     • finasteride (PROSCAR) 5 MG tablet Take 5 mg by mouth Daily.     • fluticasone (FLONASE) 50 MCG/ACT nasal spray 1 spray into the nostril(s) as directed by provider Daily As Needed for Allergies.     • folic acid (FOLVITE) 800 MCG tablet Take 1 tablet by mouth Daily. 90 tablet 3   • furosemide (LASIX) 20 MG tablet Take 1 tablet by mouth Daily. 30 tablet 0   • glucose blood test strip E11.65 Precision Xtra Blood Glucose In Vitro Strip; Patient Sig: Precision Xtra Blood Glucose In Vitro Strip TEST 2  TIMES DAILY 200 each 11   • hydrocortisone 2.5 % ointment Apply  topically to the appropriate area as directed 2 (Two) Times a Day. 20 g 5   • Lantus SoloStar 100 UNIT/ML injection pen 26 units subcutaneously daily 15 pen 3   • latanoprost (XALATAN) 0.005 % ophthalmic solution Administer 1 drop to both eyes Every Night.     • lidocaine (LIDODERM) 5 % Place 1 patch on the skin as directed by provider Daily. Remove & Discard patch within 12 hours or as directed by MD 90 patch 1   • loratadine (CLARITIN) 10 MG tablet      • magnesium oxide (MAGOX) 400 (241.3 Mg) MG tablet tablet Take 250 mg by mouth Every Night.     • Melatonin 2.5 MG chewable tablet Chew 1 tablet.     • meloxicam (MOBIC) 15 MG tablet Take 1 tablet by mouth Daily. 15 tablet 0   • metoprolol tartrate (LOPRESSOR) 25 MG tablet Take 1 tablet by mouth 2 (Two) Times a Day. 180 tablet 1   • Monolet Lancets misc E11.65 Use to test BG 2 times daily 200 each 11   • nitroglycerin (Nitrostat) 0.4 MG SL tablet Place 1 tablet under the tongue Every 5 (Five) Minutes As Needed for Chest Pain. Indications: Acute Angina Pectoris 25 tablet 3   • pantoprazole (PROTONIX) 20 MG EC tablet Take 1 tablet by mouth Daily. 90 tablet 1   • pregabalin (Lyrica) 150 MG capsule Take 1  capsule by mouth Daily. 90 capsule 1   • tamsulosin (FLOMAX) 0.4 MG capsule 24 hr capsule Take 1 capsule by mouth Daily With Dinner.     • Vascepa 1 g capsule capsule Take 2 g by mouth 2 (Two) Times a Day With Meals. 360 capsule 3   • VIAGRA 100 MG tablet Take 1 tablet by mouth As Needed for erectile dysfunction (1 tablet prior to planned intercourse.). 24 tablet 3   • Zinc 30 MG tablet Take 50 mg by mouth Daily.       No facility-administered medications prior to visit.       No opioid medication identified on active medication list. I have reviewed chart for other potential  high risk medication/s and harmful drug interactions in the elderly.          Aspirin is on active medication list. Aspirin use is indicated based on review of current medical condition/s. Pros and cons of this therapy have been discussed today. Benefits of this medication outweigh potential harm.  Patient has been encouraged to continue taking this medication.  .      Patient Active Problem List   Diagnosis   • Bell's palsy   • Persistent insomnia   • Osteoarthritis of cervical spine   • Diabetic peripheral neuropathy (HCC)   • Dyslipidemia   • Steatosis of liver   • Fibromyalgia   • Gastroesophageal reflux disease without esophagitis   • Hypertension   • Hypogonadism in male   • Renal insufficiency   • Vitamin D deficiency   • Benign non-nodular prostatic hyperplasia with lower urinary tract symptoms   • S/P drug eluting coronary stent placement   • Coronary artery disease involving native coronary artery of native heart   • Malignant neoplasm of skin   • Type 2 diabetes mellitus (HCC)   • Diabetes mellitus (HCC)   • Chronic insomnia   • Other specified glaucoma   • Vertigo   • Epigastric pain   • Chest pain   • Precordial pain   • S/P arthroscopy of shoulder   • Peripheral neuropathy   • Stage 3b chronic kidney disease (HCC)     Advance Care Planning  Advance Directive is on file.  ACP discussion was held with the patient during this visit.  "Patient has an advance directive in EMR which is still valid.     Review of Systems   Constitutional: Negative for chills and fever.   Respiratory: Negative for chest tightness and shortness of breath.    Gastrointestinal: Positive for abdominal pain and constipation. Negative for blood in stool.   Genitourinary: Negative for dysuria and hematuria.   Neurological:        He does report that he has had some brain fog since having COVID in June        Objective    Vitals:    08/08/22 1407   BP: 118/62   Pulse: 60   SpO2: 97%   Weight: 105 kg (231 lb)   Height: 185.4 cm (72.99\")     Estimated body mass index is 30.48 kg/m² as calculated from the following:    Height as of this encounter: 185.4 cm (72.99\").    Weight as of this encounter: 105 kg (231 lb).    BMI is >= 30 and <35. (Class 1 Obesity). The following options were offered after discussion;: weight loss educational material (shared in after visit summary)      Does the patient have evidence of cognitive impairment? No    Physical Exam  Vitals and nursing note reviewed.   Constitutional:       Appearance: Normal appearance.   Eyes:      General: No scleral icterus.  Neck:      Vascular: No carotid bruit.   Cardiovascular:      Rate and Rhythm: Normal rate and regular rhythm.      Pulses: Normal pulses.      Heart sounds: Normal heart sounds.   Pulmonary:      Effort: Pulmonary effort is normal.      Breath sounds: Normal breath sounds.      Comments: He is tender over the right lower ribs in particular the right lower free rib  Chest:      Chest wall: Tenderness present.   Abdominal:      General: Bowel sounds are normal.      Palpations: Abdomen is soft.      Tenderness: There is abdominal tenderness.      Comments: He has some tenderness in the right upper quadrant but no rebound or guarding   Musculoskeletal:      Comments: He has tenderness to the lateral joint line and posterior tibial tendons of the left knee   Neurological:      Mental Status: He is " alert.                 HEALTH RISK ASSESSMENT    Smoking Status:  Social History     Tobacco Use   Smoking Status Never Smoker   Smokeless Tobacco Never Used     Alcohol Consumption:  Social History     Substance and Sexual Activity   Alcohol Use No     Fall Risk Screen:    DEYAADI Fall Risk Assessment was completed, and patient is at LOW risk for falls.Assessment completed on:8/8/2022    Depression Screening:  PHQ-2/PHQ-9 Depression Screening 8/8/2022   Retired PHQ-9 Total Score -   Retired Total Score -   Little Interest or Pleasure in Doing Things 0-->not at all   Feeling Down, Depressed or Hopeless 0-->not at all   PHQ-9: Brief Depression Severity Measure Score 0       Health Habits and Functional and Cognitive Screening:  Functional & Cognitive Status 8/8/2022   Do you have difficulty preparing food and eating? No   Do you have difficulty bathing yourself, getting dressed or grooming yourself? No   Do you have difficulty using the toilet? Yes   Do you have difficulty moving around from place to place? No   Do you have trouble with steps or getting out of a bed or a chair? Yes   Dental Exam Up to date   Eye Exam Up to date   Exercise (times per week) Other        Exercise Frequency Comment Since covid not as much    Do you need help using the phone?  No   Are you deaf or do you have serious difficulty hearing?  Yes   Do you need help with transportation? No   Do you need help shopping? No   Do you need help preparing meals?  No   Do you need help with housework?  No   Do you need help with laundry? No   Do you need help taking your medications? No   Do you need help managing money? No   Do you ever drive or ride in a car without wearing a seat belt? No   Do you have difficulty concentrating, remembering or making decisions? Yes       Age-appropriate Screening Schedule:  Refer to the list below for future screening recommendations based on patient's age, sex and/or medical conditions. Orders for these recommended  tests are listed in the plan section. The patient has been provided with a written plan.    Health Maintenance   Topic Date Due   • TDAP/TD VACCINES (1 - Tdap) Never done   • ZOSTER VACCINE (2 of 2) 03/12/2014   • DIABETIC EYE EXAM  05/04/2021   • INFLUENZA VACCINE  10/01/2022   • HEMOGLOBIN A1C  11/02/2022   • LIPID PANEL  05/02/2023   • URINE MICROALBUMIN  05/02/2023              Assessment & Plan   CMS Preventative Services Quick Reference  Risk Factors Identified During Encounter  Cardiovascular Disease  Immunizations Discussed/Encouraged (specific Immunizations; Prevnar 20 (Pneumococcal 20-valent conjugate) and Shingrix  The above risks/problems have been discussed with the patient.  Follow up actions/plans if indicated are seen below in the Assessment/Plan Section.  Pertinent information has been shared with the patient in the After Visit Summary.    Diagnoses and all orders for this visit:    1. Medicare annual wellness visit, subsequent (Primary)    2. Type 2 diabetes mellitus with other neurologic complication, without long-term current use of insulin (HCC)  -     Comprehensive Metabolic Panel  -     Hemoglobin A1c    3. Postprandial RUQ pain  -     US Gallbladder; Future    4. Tendonitis of left knee    5. Encounter for hepatitis C screening test for low risk patient  -     Hepatitis C Antibody    6. Essential hypertension  -     Discontinue: AmLactin 12 % lotion; Apply  topically to the appropriate area as directed As Needed for Dry Skin.  Dispense: 500 g; Refill: 3  -     AmLactin 12 % lotion; Apply  topically to the appropriate area as directed As Needed for Dry Skin.  Dispense: 500 g; Refill: 3    7. Diabetic peripheral neuropathy (HCC)  -     Discontinue: AmLactin 12 % lotion; Apply  topically to the appropriate area as directed As Needed for Dry Skin.  Dispense: 500 g; Refill: 3  -     AmLactin 12 % lotion; Apply  topically to the appropriate area as directed As Needed for Dry Skin.  Dispense: 500 g;  Refill: 3    8. Dyslipidemia  -     Discontinue: AmLactin 12 % lotion; Apply  topically to the appropriate area as directed As Needed for Dry Skin.  Dispense: 500 g; Refill: 3  -     AmLactin 12 % lotion; Apply  topically to the appropriate area as directed As Needed for Dry Skin.  Dispense: 500 g; Refill: 3    9. Vitamin D deficiency  -     Discontinue: AmLactin 12 % lotion; Apply  topically to the appropriate area as directed As Needed for Dry Skin.  Dispense: 500 g; Refill: 3  -     AmLactin 12 % lotion; Apply  topically to the appropriate area as directed As Needed for Dry Skin.  Dispense: 500 g; Refill: 3    10. Hypogonadism in male  -     Discontinue: AmLactin 12 % lotion; Apply  topically to the appropriate area as directed As Needed for Dry Skin.  Dispense: 500 g; Refill: 3  -     AmLactin 12 % lotion; Apply  topically to the appropriate area as directed As Needed for Dry Skin.  Dispense: 500 g; Refill: 3    11. Type 2 diabetes mellitus without complication, without long-term current use of insulin (HCC)  -     Discontinue: AmLactin 12 % lotion; Apply  topically to the appropriate area as directed As Needed for Dry Skin.  Dispense: 500 g; Refill: 3  -     AmLactin 12 % lotion; Apply  topically to the appropriate area as directed As Needed for Dry Skin.  Dispense: 500 g; Refill: 3    12. Hyperuricemia  -     Discontinue: AmLactin 12 % lotion; Apply  topically to the appropriate area as directed As Needed for Dry Skin.  Dispense: 500 g; Refill: 3  -     AmLactin 12 % lotion; Apply  topically to the appropriate area as directed As Needed for Dry Skin.  Dispense: 500 g; Refill: 3    Other orders  -     Discontinue: pantoprazole (PROTONIX) 20 MG EC tablet; Take 1 tablet by mouth Daily.  Dispense: 90 tablet; Refill: 1  -     Discontinue: metoprolol tartrate (LOPRESSOR) 25 MG tablet; Take 1 tablet by mouth 2 (Two) Times a Day.  Dispense: 180 tablet; Refill: 1  -     Discontinue: hydrocortisone 2.5 % ointment; Apply   topically to the appropriate area as directed 2 (Two) Times a Day.  Dispense: 20 g; Refill: 5  -     folic acid (FOLVITE) 800 MCG tablet; Take 1 tablet by mouth Daily.  Dispense: 90 tablet; Refill: 3  -     aspirin 81 MG EC tablet; Take 1 tablet by mouth Daily.  Dispense: 90 tablet; Refill: 3  -     pantoprazole (PROTONIX) 20 MG EC tablet; Take 1 tablet by mouth Daily.  Dispense: 90 tablet; Refill: 1  -     metoprolol tartrate (LOPRESSOR) 25 MG tablet; Take 1 tablet by mouth 2 (Two) Times a Day.  Dispense: 180 tablet; Refill: 1  -     hydrocortisone 2.5 % ointment; Apply  topically to the appropriate area as directed 2 (Two) Times a Day.  Dispense: 20 g; Refill: 5    Earl is here today for his wellness visit.  He is up to speed on most things.  We are going to renew his medications.  We are going to get an ultrasound of his gallbladder.    Follow Up:   No follow-ups on file.     An After Visit Summary and PPPS were made available to the patient.

## 2022-08-09 LAB
ALBUMIN SERPL-MCNC: 4.4 G/DL (ref 3.7–4.7)
ALBUMIN/GLOB SERPL: 1.6 {RATIO} (ref 1.2–2.2)
ALP SERPL-CCNC: 72 IU/L (ref 44–121)
ALT SERPL-CCNC: 21 IU/L (ref 0–44)
AST SERPL-CCNC: 23 IU/L (ref 0–40)
BILIRUB SERPL-MCNC: 0.6 MG/DL (ref 0–1.2)
BUN SERPL-MCNC: 21 MG/DL (ref 8–27)
BUN/CREAT SERPL: 12 (ref 10–24)
CALCIUM SERPL-MCNC: 9.7 MG/DL (ref 8.6–10.2)
CHLORIDE SERPL-SCNC: 100 MMOL/L (ref 96–106)
CO2 SERPL-SCNC: 23 MMOL/L (ref 20–29)
CREAT SERPL-MCNC: 1.7 MG/DL (ref 0.76–1.27)
EGFRCR SERPLBLD CKD-EPI 2021: 41 ML/MIN/1.73
GLOBULIN SER CALC-MCNC: 2.8 G/DL (ref 1.5–4.5)
GLUCOSE SERPL-MCNC: 143 MG/DL (ref 65–99)
HBA1C MFR BLD: 7.8 % (ref 4.8–5.6)
HCV AB S/CO SERPL IA: <0.1 S/CO RATIO (ref 0–0.9)
POTASSIUM SERPL-SCNC: 4.5 MMOL/L (ref 3.5–5.2)
PROT SERPL-MCNC: 7.2 G/DL (ref 6–8.5)
SODIUM SERPL-SCNC: 138 MMOL/L (ref 134–144)

## 2022-08-17 ENCOUNTER — HOSPITAL ENCOUNTER (OUTPATIENT)
Dept: ULTRASOUND IMAGING | Facility: HOSPITAL | Age: 77
Discharge: HOME | End: 2022-08-17
Admitting: INTERNAL MEDICINE

## 2022-08-17 DIAGNOSIS — R10.11 POSTPRANDIAL RUQ PAIN: ICD-10-CM

## 2022-08-17 PROCEDURE — 76705 ECHO EXAM OF ABDOMEN: CPT

## 2022-08-22 DIAGNOSIS — R10.11 POSTPRANDIAL RUQ PAIN: Primary | ICD-10-CM

## 2022-08-26 ENCOUNTER — OFFICE (AMBULATORY)
Dept: URBAN - METROPOLITAN AREA CLINIC 75 | Facility: CLINIC | Age: 77
End: 2022-08-26

## 2022-08-26 VITALS — SYSTOLIC BLOOD PRESSURE: 144 MMHG | HEIGHT: 73 IN | DIASTOLIC BLOOD PRESSURE: 82 MMHG | WEIGHT: 231 LBS

## 2022-08-26 DIAGNOSIS — K76.0 FATTY (CHANGE OF) LIVER, NOT ELSEWHERE CLASSIFIED: ICD-10-CM

## 2022-08-26 DIAGNOSIS — Z86.010 PERSONAL HISTORY OF COLONIC POLYPS: ICD-10-CM

## 2022-08-26 DIAGNOSIS — K21.9 GASTRO-ESOPHAGEAL REFLUX DISEASE WITHOUT ESOPHAGITIS: ICD-10-CM

## 2022-08-26 DIAGNOSIS — K59.00 CONSTIPATION, UNSPECIFIED: ICD-10-CM

## 2022-08-26 DIAGNOSIS — Z80.0 FAMILY HISTORY OF MALIGNANT NEOPLASM OF DIGESTIVE ORGANS: ICD-10-CM

## 2022-08-26 DIAGNOSIS — R10.11 RIGHT UPPER QUADRANT PAIN: ICD-10-CM

## 2022-08-26 PROCEDURE — 99214 OFFICE O/P EST MOD 30 MIN: CPT | Performed by: NURSE PRACTITIONER

## 2022-09-19 VITALS
WEIGHT: 231 LBS | SYSTOLIC BLOOD PRESSURE: 180 MMHG | HEART RATE: 52 BPM | DIASTOLIC BLOOD PRESSURE: 66 MMHG | SYSTOLIC BLOOD PRESSURE: 129 MMHG | RESPIRATION RATE: 16 BRPM | DIASTOLIC BLOOD PRESSURE: 81 MMHG | OXYGEN SATURATION: 99 % | TEMPERATURE: 97.3 F | HEART RATE: 60 BPM | HEART RATE: 57 BPM | SYSTOLIC BLOOD PRESSURE: 152 MMHG | DIASTOLIC BLOOD PRESSURE: 67 MMHG | RESPIRATION RATE: 14 BRPM | SYSTOLIC BLOOD PRESSURE: 187 MMHG | HEART RATE: 65 BPM | OXYGEN SATURATION: 96 % | DIASTOLIC BLOOD PRESSURE: 74 MMHG | HEART RATE: 56 BPM | SYSTOLIC BLOOD PRESSURE: 168 MMHG | HEART RATE: 58 BPM | DIASTOLIC BLOOD PRESSURE: 72 MMHG | OXYGEN SATURATION: 95 % | OXYGEN SATURATION: 98 % | TEMPERATURE: 98.6 F | RESPIRATION RATE: 12 BRPM | HEIGHT: 73 IN | RESPIRATION RATE: 13 BRPM

## 2022-09-20 ENCOUNTER — OFFICE (AMBULATORY)
Dept: URBAN - METROPOLITAN AREA PATHOLOGY 4 | Facility: PATHOLOGY | Age: 77
End: 2022-09-20

## 2022-09-20 ENCOUNTER — AMBULATORY SURGICAL CENTER (AMBULATORY)
Dept: URBAN - METROPOLITAN AREA SURGERY 17 | Facility: SURGERY | Age: 77
End: 2022-09-20

## 2022-09-20 DIAGNOSIS — K31.89 OTHER DISEASES OF STOMACH AND DUODENUM: ICD-10-CM

## 2022-09-20 DIAGNOSIS — K21.9 GASTRO-ESOPHAGEAL REFLUX DISEASE WITHOUT ESOPHAGITIS: ICD-10-CM

## 2022-09-20 DIAGNOSIS — K29.70 GASTRITIS, UNSPECIFIED, WITHOUT BLEEDING: ICD-10-CM

## 2022-09-20 DIAGNOSIS — K31.A15 GASTRIC INTESTINAL METAPLASIA WITHOUT DYSPLASIA, INVOLVING M: ICD-10-CM

## 2022-09-20 DIAGNOSIS — R10.13 EPIGASTRIC PAIN: ICD-10-CM

## 2022-09-20 DIAGNOSIS — R10.11 RIGHT UPPER QUADRANT PAIN: ICD-10-CM

## 2022-09-20 LAB
GI HISTOLOGY: A. SELECT: (no result)
GI HISTOLOGY: B. SELECT: (no result)
GI HISTOLOGY: PDF REPORT: (no result)

## 2022-09-20 PROCEDURE — 43239 EGD BIOPSY SINGLE/MULTIPLE: CPT | Performed by: INTERNAL MEDICINE

## 2022-09-20 PROCEDURE — 88305 TISSUE EXAM BY PATHOLOGIST: CPT | Performed by: INTERNAL MEDICINE

## 2022-09-20 PROCEDURE — 88342 IMHCHEM/IMCYTCHM 1ST ANTB: CPT | Performed by: INTERNAL MEDICINE

## 2022-09-20 NOTE — SERVICEHPINOTES
YEHUDA BLOCK is a 77 year old male with hx of DDD, DM, hepatomegaly, GERD, hemorrhoids, Hep A, IBS, colon polyps here with c/o RUQ pain for "several months". Gui saw his PCP on 08/08/2022 for a wellness visit, but mentioned post-prandial RUQ pain. There was a CT of his chest done recently (no record available for review) where, according to Dr. Kaufman, did not show any obvious abnormalities with the liver or gallbladder.  brRUQ US on 08/17/22 was unremarkable. br8/8/2022 - LABS--HEP, A1C, CMP - Total Bili 0.6, Alk Phos 72, AST 23, ALT 21Not always after he eats, but seems to worsen after he eats. Worse at night, but when he goes to bed is gone. He avoids spicy foods, but no specific food that seems to exacerbate pain. Sometimes will radiate down to right mid quadrant. Pain described as dull, but then will feel sharp. "feels like someone is grabbing me". He feels very bloated, takes Gas-X, which does help somewhat. He does have acid reflux, stable. He continues to take daily PPI, but does know know the name. Thinks is pantoprazole. No NV, dysphagia, odynophagia, good appetite. He is having some early satiety. Gui does have stage 4 kidney disease, no dialysis. Last EGD with Dr. Rodriguez on 07/26/2018---gastric erythema PATH--mild chronic inactive gastritis. He has hx of constipation, passing very small, hard BMs and large-caliber BMs. He drinks prune juice "once in a while" and eats daily oats or bran cereal. No fiber supplement. Rarely he uses Miralax or stool softener. Alisson weight is stable, no fever, chills, fatigue. He does not appear acutely ill today. Last colonoscopy with Dr. Linn was 01/14/2020---Polyps (2 mm to 5 mm) in the cecum, ascending colon, descending colon and transverse colon. (Polypectomy). Biopsies unremarkable.

## 2022-09-30 ENCOUNTER — OFFICE VISIT (OUTPATIENT)
Dept: FAMILY MEDICINE CLINIC | Facility: CLINIC | Age: 77
End: 2022-09-30

## 2022-09-30 VITALS
DIASTOLIC BLOOD PRESSURE: 66 MMHG | OXYGEN SATURATION: 96 % | HEIGHT: 73 IN | WEIGHT: 233 LBS | BODY MASS INDEX: 30.88 KG/M2 | SYSTOLIC BLOOD PRESSURE: 122 MMHG | HEART RATE: 56 BPM

## 2022-09-30 DIAGNOSIS — R10.11 RIGHT UPPER QUADRANT PAIN: ICD-10-CM

## 2022-09-30 DIAGNOSIS — K82.8 BILIARY DYSKINESIA: Primary | ICD-10-CM

## 2022-09-30 DIAGNOSIS — R94.8 ABNORMAL BILIARY HIDA SCAN: ICD-10-CM

## 2022-09-30 PROCEDURE — 99213 OFFICE O/P EST LOW 20 MIN: CPT | Performed by: INTERNAL MEDICINE

## 2022-09-30 NOTE — PROGRESS NOTES
Answers for HPI/ROS submitted by the patient on 9/29/2022  What is the primary reason for your visit?: Abdominal Pain  Chronicity: recurrent  Onset: more than 1 month ago  Onset quality: gradual  Frequency: daily  Episode duration: 3 hours  Progression since onset: gradually worsening  Pain location: right flank  Pain - numeric: 8/10  Pain quality: sharp  Radiates to: does not radiate  anorexia: No  constipation: Yes  flatus: Yes  myalgias: Yes  Aggravated by: nothing  Relieved by: standing  Diagnostic workup: GI consult, ultrasound    Subjective Chief complaint is follow-up on abdominal pain and discuss gallbladder tests  Earl Marc is a 77 y.o. male.     History of Present Illness Earl is here today for follow-up.  Since his last visit he continues to have the right sided pain.  He reports it feels like someone is gripping the abdomen.  This usually occurs in the afternoon on most days.  His initial ultrasound of the gallbladder did not show any stones and no gallbladder wall thickening.  He did have a HIDA scan performed at [facility].  The initial ejection fraction was 25%.  He was then given a fatty challenge and the ejection fraction was 40%.  The lower limit of normal was 40% for that hospital.  We did discuss that we should likely get the opinion of his surgeon.  Options would be to continue watching this but he is having pain on a daily basis.  The other option would be to remove the gallbladder.  No one could say for certain whether that would alleviate his symptoms.    The following portions of the patient's history were reviewed and updated as appropriate: allergies, current medications, past family history, past medical history, past social history, past surgical history and problem list.    Review of Systems   Gastrointestinal: Positive for abdominal pain and constipation.   Musculoskeletal: Positive for myalgias.       Objective   Physical Exam  Constitutional:       Appearance: Normal  appearance.   Cardiovascular:      Rate and Rhythm: Normal rate.   Pulmonary:      Effort: Pulmonary effort is normal.   Neurological:      Mental Status: He is alert.           Assessment & Plan   Diagnoses and all orders for this visit:    1. Biliary dyskinesia (Primary)  -     Ambulatory Referral to General Surgery    2. Abnormal biliary HIDA scan  -     Ambulatory Referral to General Surgery    3. Right upper quadrant pain  -     Ambulatory Referral to General Surgery      Earl is here today for follow-up.  I am going to get a surgical opinion regarding his right upper quadrant symptoms and abnormal HIDA scan.

## 2022-10-25 ENCOUNTER — OFFICE VISIT (OUTPATIENT)
Dept: SURGERY | Facility: CLINIC | Age: 77
End: 2022-10-25

## 2022-10-25 VITALS — WEIGHT: 233.6 LBS | BODY MASS INDEX: 30.96 KG/M2 | HEIGHT: 73 IN

## 2022-10-25 DIAGNOSIS — E11.9 TYPE 2 DIABETES MELLITUS WITHOUT COMPLICATION, WITHOUT LONG-TERM CURRENT USE OF INSULIN: ICD-10-CM

## 2022-10-25 DIAGNOSIS — E11.42 DIABETIC PERIPHERAL NEUROPATHY: ICD-10-CM

## 2022-10-25 DIAGNOSIS — R10.13 EPIGASTRIC PAIN: Primary | ICD-10-CM

## 2022-10-25 PROCEDURE — 99203 OFFICE O/P NEW LOW 30 MIN: CPT | Performed by: SURGERY

## 2022-10-25 NOTE — PROGRESS NOTES
Subjective   Earl Marc is a 77 y.o. male who presents to the office in surgical consultation from Avery Velazquez MD for right upper quadrant abdominal pain.    History of Present Illness     The patient has a several month history of right upper quadrant abdominal pain.  He states the pain occurs in the evening at around 5:00 PM and will last for 3 or 4 hours until he goes to bed around 9:00 PM.  The pain is constant with pressure as well as sharp component.  Some evenings it begins before he eats and some evenings it begins after he eats.  He believes that eating makes it worse.  He does not have any associated nausea or vomiting.  His bowel function has remained regular.    He was evaluated with a right upper quadrant ultrasound on 8/17/2022 that was normal.  A HIDA scan on 9/16/2022 was normal with a gallbladder ejection fraction of 40%.  The ingestion of Ensure did not reproduce his symptoms.    Review of Systems   Constitutional: Negative for fatigue and fever.   Respiratory: Negative for chest tightness and shortness of breath.    Cardiovascular: Negative for chest pain and palpitations.   Gastrointestinal: Positive for abdominal pain. Negative for blood in stool, constipation, diarrhea, nausea and vomiting.     Past Medical History:   Diagnosis Date   • Abnormal cardiovascular stress test    • Acid reflux    • Arthritis    • Asthma    • Cancer (HCC)     skin    • Chronic kidney disease    • Colon polyp    • COPD (chronic obstructive pulmonary disease) (HCC) 2021    Old age COPD/2D hand Smoke emphysema   • Coronary artery disease    • Diabetes mellitus (HCC)    • Diabetic neuropathy associated with type 2 diabetes mellitus (HCC)    • Diabetic peripheral neuropathy (HCC) 03/10/2016   • Dyslipidemia    • Erectile dysfunction    • Fatty liver disease, nonalcoholic    • Gastritis    • Glaucoma     both eyes   • Hyperlipidemia    • Hypertension    • 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   • Myocardial infarction (HCC) 2018   • Type 2 diabetes mellitus (HCC)      Past Surgical History:   Procedure Laterality Date   • CARDIAC CATHETERIZATION N/A 03/25/2016    Procedure: Left Heart Cath;  Surgeon: Maria E Gilbert MD;  Location: [facility] CATH INVASIVE LOCATION;  Service:    • CARDIAC CATHETERIZATION N/A 03/25/2016    Procedure: Coronary angiography;  Surgeon: Maria E Gilbert MD;  Location:  [facility] CATH INVASIVE LOCATION;  Service:    • CARDIAC CATHETERIZATION N/A 03/28/2016    Procedure: Coronary angiography;  Surgeon: Maria E Gilbert MD;  Location: [facility] CATH [facility]VASIVE LOCATION;  Service:    • CARDIAC CATHETERIZATION N/A 03/28/2016    Procedure: Stent MOISE coronary;  Surgeon: Maria E Gilbert MD;  Location: [facility] CATH INVASIVE LOCATION;  Service:    • CARDIAC CATHETERIZATION N/A 10/18/2018    Procedure: Coronary angiography  no LV gram d/t CKD;  Surgeon: Maria E Gilbert MD;  Location: [facility] CATH INVASIVE LOCATION;  Service: Cardiology   • CARDIAC CATHETERIZATION N/A 10/18/2018    Procedure: Left Heart Cath;  Surgeon: Maria E Gilbert MD;  Location: [facility] CATH INVASIVE LOCATION;  Service: Cardiology   • CARDIAC CATHETERIZATION N/A 10/18/2018    Procedure: Stent MOISE coronary;  Surgeon: Maria E Gilbert MD;  Location: [facility] CATH [facility]VASIVE LOCATION;  Service: Cardiology   • CARDIAC CATHETERIZATION N/A 05/28/2021    Procedure: Coronary angiography;  Surgeon: Maria E Gilbert MD;  Location: [facility] CATH INVASIVE LOCATION;  Service: Cardiovascular;  Laterality: N/A;   • CARDIAC CATHETERIZATION N/A 05/28/2021    Procedure: Left Heart Cath;  Surgeon: Maria E Gilbert MD;  Location: [facility] CATH [facility]IVE LOCATION;  Service: Cardiovascular;  Laterality: N/A;   • CARDIAC CATHETERIZATION N/A 05/28/2021    Procedure: Left ventriculography;  Surgeon: Maria E Gilbert MD;  Location:   [facility] CATH INVASIVE LOCATION;  Service: Cardiovascular;  Laterality: N/A;   • CARDIAC CATHETERIZATION N/A 05/28/2021    Procedure: Stent MOISE coronary;  Surgeon: Maria E Gilbert MD;  Location:  [facility] CATH INVASIVE LOCATION;  Service: Cardiovascular;  Laterality: N/A;   • CAROTID STENT     • CORONARY ANGIOPLASTY     • EYE SURGERY  9/2017   • NECK EXPLORATION N/A    • DE RT/LT HEART CATHETERS N/A 10/18/2018    Procedure: Percutaneous Coronary Intervention;  Surgeon: Maria E Gilbert MD;  Location:  [facility] CATH INVASIVE LOCATION;  Service: Cardiology     Family History   Problem Relation Age of Onset   • Breast cancer Daughter    • Heart attack Mother    • Hypertension Mother    • Heart disease Mother    • Thyroid disease Mother    • Lupus Mother    • Early death Mother    • Miscarriages / Stillbirths Mother         Miscarriage 1944   • Heart attack Brother    • Heart disease Brother    • Hyperlipidemia Brother    • Cancer Brother         Due to Agent Orange, [place]   • Hypertension Brother    • Depression Father    • COPD Father    • Stroke Maternal Grandmother    • Cancer Maternal Grandmother         Lung Cancer non-smoker   • Diabetes Brother    • Heart disease Brother    • Hypertension Brother    • Kidney disease Brother    • Heart disease Brother    • Hypertension Brother      Social History     Socioeconomic History   • Marital status:      Spouse name: Ivette   • Number of children: 1   Tobacco Use   • Smoking status: Never   • Smokeless tobacco: Never   • Tobacco comments:     Never   Vaping Use   • Vaping Use: Never used   Substance and Sexual Activity   • Alcohol use: No   • Drug use: Never   • Sexual activity: Not Currently     Partners: Female     Birth control/protection: Surgical     Comment: Vasectomy 1974       Objective   Physical Exam  Constitutional:       Appearance: Normal appearance. He is well-developed. He is not toxic-appearing.   Eyes:      General: No scleral  icterus.  Pulmonary:      Effort: Pulmonary effort is normal. No respiratory distress.   Abdominal:      Palpations: Abdomen is soft.      Tenderness: There is generalized abdominal tenderness (Mildly tender).      Comments: The abdomen is soft and mildly tender diffusely.  It is not distended.  There is no guarding or rebound.   Skin:     General: Skin is warm and dry.   Neurological:      Mental Status: He is alert and oriented to person, place, and time.   Psychiatric:         Behavior: Behavior normal.         Thought Content: Thought content normal.         Judgment: Judgment normal.         Assessment & Plan     1.  Abdominal pain: The patient has vague abdominal pain that he identifies as right upper quadrant in nature.  The pain itself is somewhat nonspecific in the gallbladder evaluation is normal but the HIDA scan shows an ejection fraction that is in the low normal range.  The ingestion of Ensure did not reproduce his symptoms.  On physical exam his abdominal tenderness is diffuse but mild.  It is not localized.  Based on his advanced age with the poorly defined abdominal pain, further evaluation with a CT scan of the abdomen and pelvis is appropriate.  This study has been ordered.  Further decisions will be based upon the results of the CT scan of the abdomen and pelvis.

## 2022-10-26 DIAGNOSIS — I10 ESSENTIAL HYPERTENSION: ICD-10-CM

## 2022-10-26 DIAGNOSIS — E78.5 DYSLIPIDEMIA: ICD-10-CM

## 2022-10-26 DIAGNOSIS — E11.9 TYPE 2 DIABETES MELLITUS WITHOUT COMPLICATION, WITHOUT LONG-TERM CURRENT USE OF INSULIN: Primary | ICD-10-CM

## 2022-10-27 LAB
ALBUMIN SERPL-MCNC: 4.3 G/DL (ref 3.5–5.2)
ALBUMIN/GLOB SERPL: 2 G/DL
ALP SERPL-CCNC: 75 U/L (ref 39–117)
ALT SERPL-CCNC: 20 U/L (ref 1–41)
AST SERPL-CCNC: 22 U/L (ref 1–40)
BILIRUB SERPL-MCNC: 0.8 MG/DL (ref 0–1.2)
BUN SERPL-MCNC: 15 MG/DL (ref 8–23)
BUN/CREAT SERPL: 10.1 (ref 7–25)
CALCIUM SERPL-MCNC: 9.2 MG/DL (ref 8.6–10.5)
CHLORIDE SERPL-SCNC: 101 MMOL/L (ref 98–107)
CHOLEST SERPL-MCNC: 81 MG/DL (ref 0–200)
CO2 SERPL-SCNC: 27.1 MMOL/L (ref 22–29)
CREAT SERPL-MCNC: 1.49 MG/DL (ref 0.76–1.27)
EGFRCR SERPLBLD CKD-EPI 2021: 48 ML/MIN/1.73
FRUCTOSAMINE SERPL-SCNC: 367 UMOL/L (ref 0–285)
GLOBULIN SER CALC-MCNC: 2.2 GM/DL
GLUCOSE SERPL-MCNC: 261 MG/DL (ref 65–99)
HDLC SERPL-MCNC: 38 MG/DL (ref 40–60)
IMP & REVIEW OF LAB RESULTS: NORMAL
LDLC SERPL CALC-MCNC: 18 MG/DL (ref 0–100)
POTASSIUM SERPL-SCNC: 4.9 MMOL/L (ref 3.5–5.2)
PROT SERPL-MCNC: 6.5 G/DL (ref 6–8.5)
SODIUM SERPL-SCNC: 142 MMOL/L (ref 136–145)
TRIGL SERPL-MCNC: 146 MG/DL (ref 0–150)
TSH SERPL DL<=0.005 MIU/L-ACNC: 2.21 UIU/ML (ref 0.27–4.2)
VLDLC SERPL CALC-MCNC: 25 MG/DL (ref 5–40)

## 2022-11-02 ENCOUNTER — OFFICE VISIT (OUTPATIENT)
Dept: ENDOCRINOLOGY | Age: 77
End: 2022-11-02

## 2022-11-02 VITALS
TEMPERATURE: 97.7 F | DIASTOLIC BLOOD PRESSURE: 74 MMHG | BODY MASS INDEX: 31.04 KG/M2 | SYSTOLIC BLOOD PRESSURE: 130 MMHG | WEIGHT: 234.2 LBS | OXYGEN SATURATION: 97 % | HEIGHT: 73 IN | HEART RATE: 61 BPM | RESPIRATION RATE: 18 BRPM

## 2022-11-02 DIAGNOSIS — I12.9 HYPERTENSIVE KIDNEY DISEASE WITH STAGE 3A CHRONIC KIDNEY DISEASE: ICD-10-CM

## 2022-11-02 DIAGNOSIS — E78.5 DYSLIPIDEMIA: ICD-10-CM

## 2022-11-02 DIAGNOSIS — E55.9 VITAMIN D DEFICIENCY: ICD-10-CM

## 2022-11-02 DIAGNOSIS — E11.42 DIABETIC PERIPHERAL NEUROPATHY: ICD-10-CM

## 2022-11-02 DIAGNOSIS — E11.9 TYPE 2 DIABETES MELLITUS WITHOUT COMPLICATION, WITHOUT LONG-TERM CURRENT USE OF INSULIN: Primary | ICD-10-CM

## 2022-11-02 DIAGNOSIS — N18.31 HYPERTENSIVE KIDNEY DISEASE WITH STAGE 3A CHRONIC KIDNEY DISEASE: ICD-10-CM

## 2022-11-02 PROCEDURE — 99214 OFFICE O/P EST MOD 30 MIN: CPT | Performed by: NURSE PRACTITIONER

## 2022-11-02 RX ORDER — INSULIN GLARGINE 100 [IU]/ML
INJECTION, SOLUTION SUBCUTANEOUS
Qty: 45 ML | Refills: 1 | Status: SHIPPED | OUTPATIENT
Start: 2022-11-02

## 2022-11-02 RX ORDER — PREGABALIN 150 MG/1
150 CAPSULE ORAL DAILY
Qty: 90 CAPSULE | Refills: 1 | Status: SHIPPED | OUTPATIENT
Start: 2022-11-02

## 2022-11-02 RX ORDER — ATORVASTATIN CALCIUM 20 MG/1
20 TABLET, FILM COATED ORAL NIGHTLY
Qty: 90 TABLET | Refills: 1 | Status: SHIPPED | OUTPATIENT
Start: 2022-11-02

## 2022-11-02 RX ORDER — FLURBIPROFEN SODIUM 0.3 MG/ML
SOLUTION/ DROPS OPHTHALMIC
Qty: 100 EACH | Refills: 3 | Status: SHIPPED | OUTPATIENT
Start: 2022-11-02

## 2022-11-02 RX ORDER — ICOSAPENT ETHYL 1000 MG/1
2 CAPSULE ORAL 2 TIMES DAILY WITH MEALS
Qty: 360 CAPSULE | Refills: 3 | Status: SHIPPED | OUTPATIENT
Start: 2022-11-02

## 2022-11-02 RX ORDER — LANCETS
EACH MISCELLANEOUS
Qty: 200 EACH | Refills: 5 | Status: SHIPPED | OUTPATIENT
Start: 2022-11-02

## 2022-11-02 RX ORDER — LORATADINE 10 MG
1 TABLET ORAL DAILY
Qty: 400 EACH | Refills: 2 | Status: SHIPPED | OUTPATIENT
Start: 2022-11-02

## 2022-11-02 RX ORDER — ERGOCALCIFEROL 1.25 MG/1
50000 CAPSULE ORAL WEEKLY
Qty: 4 CAPSULE | Refills: 2 | COMMUNITY
Start: 2022-11-01 | End: 2023-01-30

## 2022-11-02 NOTE — PROGRESS NOTES
Chief Complaint  Chief Complaint   Patient presents with   • type 2 diabetes     Patient checks BS once a day   Lowest BS, past month- 97  Highest BS, past month- 273  Last eye exam- September 30, 2022  NO signs of diabetic retinopathy   NO tingling or burning sensation in the legs/feet but patient does experience numbness.   Patient needs to talk about medication(s)       Subjective          History of Present Illness    Earl Marc 77 y.o. presents for a follow-up evaluation for type 2 DM    He has been diabetic since around 2006    Pt is on the following medications for their DM: Lantus 28 units at bedtime and Jardiance 10 mg daily      Pioglitazone 45 mg daily was stopped due to edema  Tradjenta 5 mg daily was stopped by Dr. Goodman at 10/21 visit        Pt complains of intermittent constipation and numbness and tingling in feet.    Denies diarrhea, chest pain, shortness of breath and vision changes.    Pt does not have a history of DM retinopathy.  Last eye exam was 09/21 - appointment in Dec 2022    Pt does have a history of nephropathy.  Patient is/not currently taking ACE/ARB - follows with Nephrology - Dr. Wilbert Gilmore     Pt does have neuropathy.  Current takes Lyrica 150 mg daily for this condition.  Pt follows with podiatry - Dr. Edwards  Lyrica BID causes pt to be sleepy    Pt does have a history of CAD.  Summer of 2021, pt had an MI and stent placed for total of 4 stents    Last Fructosamine in 10/22 was 367 (~7.8%)    Last microalbumin in 05/22 was negative          Blood Sugars    Blood glucoses are checked 1-2/day.    Fasting blood glucoses: mid 100s - 200s    Pre-meal blood glucoses: mid 100s - 200s    Pt has no episodes of hypoglycemia.            Hyperlipidemia     Pt denies any muscle/body aches, chest pain, or shortness of breath    Pt is currently taking atorvastatin 20 mg HS and Vascepa 2 gm BID    Last lipid panel in 10/22 showed Total 81, Triglycerides 146, HDL 38 and LDL  "18            Hypertension with CKD Stage 3a    Pt denies any chest pain, palpitations, shortness of breath, or headache    Current regimen includes metoprolol tartrate 25 mg bid and lasix 20 mg daily          Other History    Hypogonadal managed by  Dr. Newman at [facility]        I have reviewed the patient's allergies, medicines, past medical hx, family hx and social hx.    Objective   Vital Signs:   /74   Pulse 61   Temp 97.7 °F (36.5 °C) (Temporal)   Resp 18   Ht 185.4 cm (73\")   Wt 106 kg (234 lb 3.2 oz)   SpO2 97%   BMI 30.90 kg/m²       Physical Exam   Physical Exam  Constitutional:       General: He is not in acute distress.     Appearance: Normal appearance. He is not diaphoretic.   HENT:      Head: Normocephalic and atraumatic.   Eyes:      General:         Right eye: No discharge.         Left eye: No discharge.   Skin:     General: Skin is warm and dry.   Neurological:      Mental Status: He is alert.   Psychiatric:         Mood and Affect: Mood normal.         Behavior: Behavior normal.                    Results Review:   Hemoglobin A1C   Date Value Ref Range Status   08/08/2022 7.8 (H) 4.8 - 5.6 % Final     Comment:              Prediabetes: 5.7 - 6.4           Diabetes: >6.4           Glycemic control for adults with diabetes: <7.0     05/26/2021 6.60 (H) 4.80 - 5.60 % Final     Total Cholesterol   Date Value Ref Range Status   05/26/2021 75 0 - 200 mg/dL Final     Triglycerides   Date Value Ref Range Status   10/26/2022 146 0 - 150 mg/dL Final   05/26/2021 109 0 - 150 mg/dL Final     HDL Cholesterol   Date Value Ref Range Status   10/26/2022 38 (L) 40 - 60 mg/dL Final   05/26/2021 34 (L) 40 - 60 mg/dL Final     LDL Chol Calc (NIH)   Date Value Ref Range Status   10/26/2022 18 0 - 100 mg/dL Final     VLDL Cholesterol José   Date Value Ref Range Status   10/26/2022 25 5 - 40 mg/dL Final     LDL/HDL Ratio   Date Value Ref Range Status   05/26/2021 0.56  Final         Assessment and " Plan {CC Problem List  Visit Diagnosis  ROS  Review (Popup)  [facility] Maintenance  Quality  BestPractice  Medications  SmartSets  SnapShot Encounters  Media :23  Diagnoses and all orders for this visit:    1. Type 2 diabetes mellitus without complication, without long-term current use of insulin (HCC) (Primary)  -     Alcohol Swabs ([employer] Prep) 70 % pads; Place 1 application on the skin as directed by provider Daily.  Dispense: 400 each; Refill: 2  -     Lantus SoloStar 100 UNIT/ML injection pen; 26-40 units subcutaneously daily  Dispense: 45 mL; Refill: 1  -     glucose blood test strip; E11.65 Precision Xtra Blood Glucose In Vitro Strip; Patient Sig: Precision Xtra Blood Glucose In Vitro Strip TEST 2  TIMES DAILY  Dispense: 200 each; Refill: 5  -     Monolet Lancets misc; E11.65 Use to test BG 2 times daily  Dispense: 200 each; Refill: 5  -     B-D UF III MINI PEN NEEDLES 31G X 5 MM misc; Use once daily with Lantus Pen for injection of insulin  Dispense: 100 each; Refill: 3  -     empagliflozin (JARDIANCE) 10 MG tablet tablet; Take 1 tablet by mouth Daily.  Dispense: 90 tablet; Refill: 1  -     Hemoglobin A1c; Future  -     Comprehensive Metabolic Panel; Future    Fructosamine elevated at 367  Increase Lantus 30 units at bedtime   Continue with Jardiance 10 mg daily  Pt states that he hasn't been able to exercise due to on going stomach issues  Continue with with blood sugar checks  Check labs prior to next visit        2. Diabetic peripheral neuropathy (HCC)  -     pregabalin (Lyrica) 150 MG capsule; Take 1 capsule by mouth Daily.  Dispense: 90 capsule; Refill: 1    Continue with Lyrica  Pt was getting a compound lotion with gabapentin within it, but his insurance doesn't cover it any more.        3. Dyslipidemia  -     atorvastatin (LIPITOR) 20 MG tablet; Take 1 tablet by mouth Every Night.  Dispense: 90 tablet; Refill: 1  -     Vascepa 1 g capsule capsule; Take 2 g by mouth 2 (Two) Times a Day With  Meals.  Dispense: 360 capsule; Refill: 3  -     Comprehensive Metabolic Panel; Future  -     Lipid Panel; Future    Lipid panel is good  Continue with statin.       4. Hypertensive kidney disease with stage 3a chronic kidney disease (HCC)  -     Comprehensive Metabolic Panel; Future    Stable  Continue with current medication regimen  Defer management to PCP/nephrology           Refills sent to pharmacy      RTC in 3-4 months with me, labs prior      Follow Up     Patient was given instructions and counseling regarding her condition or for health maintenance advice. Please see specific information pulled into the AVS if appropriate.              Margi Loredo, APRN  11/02/22

## 2022-11-10 ENCOUNTER — HOSPITAL ENCOUNTER (OUTPATIENT)
Dept: CT IMAGING | Facility: HOSPITAL | Age: 77
Discharge: HOME | End: 2022-11-10
Admitting: SURGERY

## 2022-11-10 DIAGNOSIS — R10.13 EPIGASTRIC PAIN: ICD-10-CM

## 2022-11-10 PROCEDURE — 74177 CT ABD & PELVIS W/CONTRAST: CPT

## 2022-11-10 PROCEDURE — 25010000002 IOPAMIDOL 61 % SOLUTION: Performed by: SURGERY

## 2022-11-10 PROCEDURE — 0 DIATRIZOATE MEGLUMINE & SODIUM PER 1 ML: Performed by: SURGERY

## 2022-11-10 RX ADMIN — IOPAMIDOL 85 ML: 612 INJECTION, SOLUTION INTRAVENOUS at 10:23

## 2022-11-10 RX ADMIN — DIATRIZOATE MEGLUMINE AND DIATRIZOATE SODIUM 30 ML: 660; 100 LIQUID ORAL; RECTAL at 09:00

## 2022-11-14 ENCOUNTER — TELEPHONE (OUTPATIENT)
Dept: SURGERY | Facility: CLINIC | Age: 77
End: 2022-11-14

## 2022-11-14 NOTE — TELEPHONE ENCOUNTER
The patient was called on telephone to discuss the CT scan of the abdomen and pelvis.  It was a normal study.  He continues having his same symptoms but he believes they are somewhat improved.  He would like to follow it clinically and contact our office if it does not resolve.

## 2022-11-18 ENCOUNTER — APPOINTMENT (OUTPATIENT)
Dept: CT IMAGING | Facility: HOSPITAL | Age: 77
End: 2022-11-18

## 2022-12-08 ENCOUNTER — OFFICE VISIT (OUTPATIENT)
Dept: CARDIOLOGY | Facility: CLINIC | Age: 77
End: 2022-12-08

## 2022-12-08 VITALS
SYSTOLIC BLOOD PRESSURE: 146 MMHG | BODY MASS INDEX: 29.42 KG/M2 | WEIGHT: 222 LBS | DIASTOLIC BLOOD PRESSURE: 66 MMHG | HEART RATE: 62 BPM | HEIGHT: 73 IN

## 2022-12-08 DIAGNOSIS — E78.5 DYSLIPIDEMIA: Primary | ICD-10-CM

## 2022-12-08 DIAGNOSIS — I25.10 CORONARY ARTERY DISEASE INVOLVING NATIVE CORONARY ARTERY OF NATIVE HEART WITHOUT ANGINA PECTORIS: ICD-10-CM

## 2022-12-08 DIAGNOSIS — R07.9 CHEST PAIN WITH HIGH RISK FOR CARDIAC ETIOLOGY: ICD-10-CM

## 2022-12-08 PROCEDURE — 93000 ELECTROCARDIOGRAM COMPLETE: CPT | Performed by: INTERNAL MEDICINE

## 2022-12-08 PROCEDURE — 99213 OFFICE O/P EST LOW 20 MIN: CPT | Performed by: INTERNAL MEDICINE

## 2022-12-08 RX ORDER — ASPIRIN 81 MG/1
81 TABLET ORAL DAILY
Qty: 90 TABLET | Refills: 3 | Status: SHIPPED | OUTPATIENT
Start: 2022-12-08 | End: 2023-03-15 | Stop reason: SDUPTHER

## 2022-12-08 RX ORDER — NITROGLYCERIN 0.4 MG/1
0.4 TABLET SUBLINGUAL
Qty: 25 TABLET | Refills: 3 | Status: SHIPPED | OUTPATIENT
Start: 2022-12-08

## 2022-12-08 NOTE — PROGRESS NOTES
1 yr follow up    Subjective:        Earl Marc is a 77 y.o. male who here for follow up    CC  cad  HPI  77-year-old male with coronary artery disease dyslipidemia here for the follow-up with no complaints of chest pains tightness heaviness or the pressure sensation     Problems Addressed this Visit        Cardiac and Vasculature    Dyslipidemia - Primary    Coronary artery disease involving native coronary artery of native heart    Relevant Medications    nitroglycerin (Nitrostat) 0.4 MG SL tablet    Chest pain with high risk for cardiac etiology    Relevant Medications    nitroglycerin (Nitrostat) 0.4 MG SL tablet   Diagnoses       Codes Comments    Dyslipidemia    -  Primary ICD-10-CM: E78.5  ICD-9-CM: 272.4     Chest pain with high risk for cardiac etiology     ICD-10-CM: R07.9  ICD-9-CM: 786.50     Coronary artery disease involving native coronary artery of native heart without angina pectoris     ICD-10-CM: I25.10  ICD-9-CM: 414.01         .    The following portions of the patient's history were reviewed and updated as appropriate: allergies, current medications, past family history, past medical history, past social history, past surgical history and problem list.    Past Medical History:   Diagnosis Date   • Abnormal cardiovascular stress test    • Acid reflux    • Arthritis    • Asthma    • Cancer (HCC)     skin    • Chronic kidney disease    • Colon polyp    • Congenital heart disease    • COPD (chronic obstructive pulmonary disease) (HCC) 2021    Old age COPD/2D hand Smoke emphysema   • Coronary artery disease    • Diabetes mellitus (HCC)    • Diabetic neuropathy associated with type 2 diabetes mellitus (HCC)    • Diabetic peripheral neuropathy (HCC) 03/10/2016   • Dyslipidemia    • Erectile dysfunction    • Fatty liver disease, nonalcoholic    • Gastritis    • Glaucoma     both eyes   • Hyperlipidemia    • Hypertension    • Hypogonadism male    • Infectious viral hepatitis Hep A July 1959    [place]  "water from broken water line and Grandparents house.   • Myocardial infarction (HCC) 2018   • Type 2 diabetes mellitus (HCC)      reports that he has never smoked. He has never used smokeless tobacco. He reports that he does not drink alcohol and does not use drugs.   Family History   Problem Relation Age of Onset   • Breast cancer Daughter    • Heart attack Mother          10/31/1980   • Hypertension Mother    • Heart disease Mother    • Thyroid disease Mother    • Lupus Mother    • Early death Mother    • Miscarriages / Stillbirths Mother         Miscarriage    • Heart failure Mother    • Heart attack Brother    • Heart disease Brother    • Hyperlipidemia Brother    • Cancer Brother         Due to Agent Orange, [place]   • Hypertension Brother    • Depression Father    • COPD Father    • Stroke Maternal Grandmother    • Cancer Maternal Grandmother         Lung Cancer non-smoker   • Diabetes Brother    • Heart disease Brother    • Hypertension Brother    • Kidney disease Brother    • Heart disease Brother    • Hypertension Brother        Review of Systems  Constitutional: No wt loss, fever, fatigue  Gastrointestinal: No nausea, abdominal pain  Behavioral/Psych: No insomnia or anxiety   Cardiovascular no chest pains or tightness in the chest  Objective:       Physical Exam  /66   Pulse 62   Ht 185.4 cm (73\")   Wt 101 kg (222 lb)   BMI 29.29 kg/m²   General appearance: No acute changes   Neck: Trachea midline; NECK, supple, no thyromegaly or lymphadenopathy   Lungs: Normal size and shape, normal breath sounds, equal distribution of air, no rales and rhonchi   CV: S1-S2 regular, no murmurs, no rub, no gallop   Abdomen: Soft, nontender; no masses , no abnormal abdominal sounds   Extremities: No deformity , normal color , no peripheral edema   Skin: Normal temperature, turgor and texture; no rash, ulcers            ECG 12 Lead    Date/Time: 2022 10:25 AM  Performed by: Maria E Gilbert, " MD  Authorized by: Maria E Gilbert MD   Comparison: compared with previous ECG   Similar to previous ECG  Rhythm: sinus rhythm  ST Flattening: all    Clinical impression: non-specific ECG              Echocardiogram:        Current Outpatient Medications:   •  Alcohol Swabs ([employer] Prep) 70 % pads, Place 1 application on the skin as directed by provider Daily., Disp: 400 each, Rfl: 2  •  AmLactin 12 % lotion, Apply  topically to the appropriate area as directed As Needed for Dry Skin., Disp: 500 g, Rfl: 3  •  ascorbic acid (VITAMIN C) 1000 MG tablet, Take 1,000 mg by mouth Daily., Disp: , Rfl:   •  aspirin 81 MG EC tablet, Take 1 tablet by mouth Daily., Disp: 90 tablet, Rfl: 3  •  atorvastatin (LIPITOR) 20 MG tablet, Take 1 tablet by mouth Every Night., Disp: 90 tablet, Rfl: 1  •  B-D UF III MINI PEN NEEDLES 31G X 5 MM misc, Use once daily with Lantus Pen for injection of insulin, Disp: 100 each, Rfl: 3  •  Carboxymeth-Glycerin-Polysorb 0.5-1-0.5 % solution, Apply  to eye(s) as directed by provider Daily., Disp: , Rfl:   •  CBD (cannabidiol) oral oil, Take  by mouth., Disp: , Rfl:   •  cholecalciferol (VITAMIN D3) 25 MCG (1000 UT) tablet, Take 1,000 Units by mouth Daily., Disp: , Rfl:   •  cyanocobalamin 1000 MCG/ML injection, Cyanocobalamin 1000 MCG/ML Injection Solution; Patient Sig: Cyanocobalamin 1000 MCG/ML Injection Solution INJECT 1ML INTRAMUSCULARLY EVERY OTHER WEEK; 0; 17-Feb-2015; Active, Disp: , Rfl:   •  DEPO-TESTOSTERONE 200 MG/ML injection, Inject 1 mL into the appropriate muscle as directed by prescriber 1 (One) Time Per Week., Disp: 14 mL, Rfl: 1  •  diphenhydrAMINE (BENADRYL) 50 MG capsule, Take 50 mg by mouth At Night As Needed for Sleep., Disp: , Rfl:   •  empagliflozin (JARDIANCE) 10 MG tablet tablet, Take 1 tablet by mouth Daily., Disp: 90 tablet, Rfl: 1  •  ergocalciferol (ERGOCALCIFEROL) 1.25 MG (81765 UT) capsule, Take 1 capsule by mouth 1 (One) Time Per Week., Disp: 4 capsule, Rfl: 2  •   fexofenadine (ALLEGRA) 180 MG tablet, Take 180 mg by mouth Daily., Disp: , Rfl:   •  finasteride (PROSCAR) 5 MG tablet, Take 5 mg by mouth Daily., Disp: , Rfl:   •  fluticasone (FLONASE) 50 MCG/ACT nasal spray, 1 spray into the nostril(s) as directed by provider Daily As Needed for Allergies., Disp: , Rfl:   •  folic acid (FOLVITE) 800 MCG tablet, Take 1 tablet by mouth Daily., Disp: 90 tablet, Rfl: 3  •  furosemide (LASIX) 20 MG tablet, Take 1 tablet by mouth Daily., Disp: 30 tablet, Rfl: 0  •  glucose blood test strip, E11.65 Precision Xtra Blood Glucose In Vitro Strip; Patient Sig: Precision Xtra Blood Glucose In Vitro Strip TEST 2  TIMES DAILY, Disp: 200 each, Rfl: 5  •  hydrocortisone 2.5 % ointment, Apply  topically to the appropriate area as directed 2 (Two) Times a Day., Disp: 20 g, Rfl: 5  •  Lantus SoloStar 100 UNIT/ML injection pen, 26-40 units subcutaneously daily, Disp: 45 mL, Rfl: 1  •  latanoprost (XALATAN) 0.005 % ophthalmic solution, Administer 1 drop to both eyes Every Night., Disp: , Rfl:   •  lidocaine (LIDODERM) 5 %, Place 1 patch on the skin as directed by provider Daily. Remove & Discard patch within 12 hours or as directed by MD, Disp: 90 patch, Rfl: 1  •  magnesium oxide (MAGOX) 400 (241.3 Mg) MG tablet tablet, Take 250 mg by mouth Every Night., Disp: , Rfl:   •  Melatonin 2.5 MG chewable tablet, Chew 1 tablet., Disp: , Rfl:   •  meloxicam (MOBIC) 15 MG tablet, Take 1 tablet by mouth Daily., Disp: 15 tablet, Rfl: 0  •  metoprolol tartrate (LOPRESSOR) 25 MG tablet, Take 1 tablet by mouth 2 (Two) Times a Day., Disp: 180 tablet, Rfl: 1  •  Monolet Lancets misc, E11.65 Use to test BG 2 times daily, Disp: 200 each, Rfl: 5  •  nitroglycerin (Nitrostat) 0.4 MG SL tablet, Place 1 tablet under the tongue Every 5 (Five) Minutes As Needed for Chest Pain. Indications: Acute Angina Pectoris, Disp: 25 tablet, Rfl: 3  •  pantoprazole (PROTONIX) 20 MG EC tablet, Take 1 tablet by mouth Daily., Disp: 90  tablet, Rfl: 1  •  pregabalin (Lyrica) 150 MG capsule, Take 1 capsule by mouth Daily., Disp: 90 capsule, Rfl: 1  •  tamsulosin (FLOMAX) 0.4 MG capsule 24 hr capsule, Take 1 capsule by mouth Daily With Dinner., Disp: , Rfl:   •  Vascepa 1 g capsule capsule, Take 2 g by mouth 2 (Two) Times a Day With Meals., Disp: 360 capsule, Rfl: 3  •  VIAGRA 100 MG tablet, Take 1 tablet by mouth As Needed for erectile dysfunction (1 tablet prior to planned intercourse.)., Disp: 24 tablet, Rfl: 3  •  Zinc 30 MG tablet, Take 50 mg by mouth Daily., Disp: , Rfl:    Assessment:        Patient Active Problem List   Diagnosis   • Bell's palsy   • Persistent insomnia   • Osteoarthritis of cervical spine   • Diabetic peripheral neuropathy (HCC)   • Dyslipidemia   • Steatosis of liver   • Fibromyalgia   • Gastroesophageal reflux disease without esophagitis   • Hypertensive kidney disease with stage 3a chronic kidney disease (HCC)   • Hypogonadism in male   • Renal insufficiency   • Vitamin D deficiency   • Benign non-nodular prostatic hyperplasia with lower urinary tract symptoms   • S/P drug eluting coronary stent placement   • Coronary artery disease involving native coronary artery of native heart   • Malignant neoplasm of skin   • Type 2 diabetes mellitus (HCC)   • Diabetes mellitus (HCC)   • Chronic insomnia   • Other specified glaucoma   • Vertigo   • Epigastric pain   • Chest pain with high risk for cardiac etiology   • Precordial pain   • S/P arthroscopy of shoulder   • Peripheral neuropathy   • Stage 3b chronic kidney disease (HCC)               Plan:            ICD-10-CM ICD-9-CM   1. Dyslipidemia  E78.5 272.4   2. Chest pain with high risk for cardiac etiology  R07.9 786.50   3. Coronary artery disease involving native coronary artery of native heart without angina pectoris  I25.10 414.01     1. Chest pain with high risk for cardiac etiology  Work-up is negative  - nitroglycerin (Nitrostat) 0.4 MG SL tablet; Place 1 tablet under  the tongue Every 5 (Five) Minutes As Needed for Chest Pain. Indications: Acute Angina Pectoris  Dispense: 25 tablet; Refill: 3    2. Dyslipidemia  Continue current treatment    3. Coronary artery disease involving native coronary artery of native heart without angina pectoris  Continue current treatment       1 yr  COUNSELING:    Earl Hernández was given to patient for the following topics: diagnostic results, risk factor reductions, impressions, risks and benefits of treatment options and importance of treatment compliance .       SMOKING COUNSELING:        Dictated using Dragon dictation

## 2023-01-05 ENCOUNTER — OFFICE (AMBULATORY)
Dept: URBAN - METROPOLITAN AREA CLINIC 76 | Facility: CLINIC | Age: 78
End: 2023-01-05

## 2023-01-05 VITALS
HEART RATE: 71 BPM | HEIGHT: 73 IN | SYSTOLIC BLOOD PRESSURE: 130 MMHG | DIASTOLIC BLOOD PRESSURE: 60 MMHG | WEIGHT: 233.6 LBS | OXYGEN SATURATION: 93 %

## 2023-01-05 DIAGNOSIS — Z80.9 FAMILY HISTORY OF MALIGNANT NEOPLASM, UNSPECIFIED: ICD-10-CM

## 2023-01-05 DIAGNOSIS — K21.9 GASTRO-ESOPHAGEAL REFLUX DISEASE WITHOUT ESOPHAGITIS: ICD-10-CM

## 2023-01-05 DIAGNOSIS — R10.11 RIGHT UPPER QUADRANT PAIN: ICD-10-CM

## 2023-01-05 DIAGNOSIS — K59.00 CONSTIPATION, UNSPECIFIED: ICD-10-CM

## 2023-01-05 PROCEDURE — 99214 OFFICE O/P EST MOD 30 MIN: CPT | Performed by: INTERNAL MEDICINE

## 2023-01-05 NOTE — SERVICEHPINOTES
Mister Schneider's here for follow-up.  He's been having issues with right-sided abdominal pain which she says does seem to get worse after his evening meal but otherwise doesn't correlate with diet but he says it seems to be improving, he says the last time it was a significant pain was on December 18.  He also has left shoulder pain, he's been using CBD oily thinks that helps his abdominal pain as well.  He's had CT scan, he's had upper endoscopy.  He's had HIDA scan.  He said surgical evaluation.  Workup is been negative.  EGD showed gastropathy and intestinal metaplasia but no H. pylori.  Small bowel biopsies were negative.  There is no nausea, vomiting or weight loss.
br
br He also has a history of polyps.  He is due for a colonoscopy.  He has hard stool and marbles.  He goes daily.  He says occasionally have a little blood with straining.  He would benefit from increased fiber and he says when he takes prune juice or eats raisin bran that does help.  He is in no acute distress.  Otherwise there is no change in his past medical or past surgical history.

## 2023-01-05 NOTE — SERVICENOTES
CT scan does not show any acute abdominal findings.  Hepatic function panel within normal limits.  TSH 2.21.  HIDA scan showed an ejection fraction of 40%.

## 2023-01-24 VITALS
DIASTOLIC BLOOD PRESSURE: 68 MMHG | HEART RATE: 70 BPM | DIASTOLIC BLOOD PRESSURE: 55 MMHG | DIASTOLIC BLOOD PRESSURE: 52 MMHG | TEMPERATURE: 97.3 F | HEART RATE: 66 BPM | RESPIRATION RATE: 9 BRPM | OXYGEN SATURATION: 96 % | HEIGHT: 73 IN | RESPIRATION RATE: 6 BRPM | SYSTOLIC BLOOD PRESSURE: 138 MMHG | RESPIRATION RATE: 11 BRPM | DIASTOLIC BLOOD PRESSURE: 57 MMHG | DIASTOLIC BLOOD PRESSURE: 78 MMHG | SYSTOLIC BLOOD PRESSURE: 129 MMHG | HEART RATE: 69 BPM | SYSTOLIC BLOOD PRESSURE: 179 MMHG | SYSTOLIC BLOOD PRESSURE: 156 MMHG | OXYGEN SATURATION: 95 % | RESPIRATION RATE: 18 BRPM | SYSTOLIC BLOOD PRESSURE: 208 MMHG | HEART RATE: 74 BPM | SYSTOLIC BLOOD PRESSURE: 190 MMHG | DIASTOLIC BLOOD PRESSURE: 62 MMHG | SYSTOLIC BLOOD PRESSURE: 139 MMHG | HEART RATE: 68 BPM | RESPIRATION RATE: 14 BRPM | SYSTOLIC BLOOD PRESSURE: 113 MMHG | HEART RATE: 67 BPM | OXYGEN SATURATION: 94 % | SYSTOLIC BLOOD PRESSURE: 120 MMHG | TEMPERATURE: 97.2 F | WEIGHT: 216 LBS | HEART RATE: 76 BPM | DIASTOLIC BLOOD PRESSURE: 56 MMHG | OXYGEN SATURATION: 97 % | RESPIRATION RATE: 10 BRPM | SYSTOLIC BLOOD PRESSURE: 144 MMHG | RESPIRATION RATE: 16 BRPM | SYSTOLIC BLOOD PRESSURE: 147 MMHG | DIASTOLIC BLOOD PRESSURE: 59 MMHG | DIASTOLIC BLOOD PRESSURE: 85 MMHG

## 2023-01-26 ENCOUNTER — OFFICE (AMBULATORY)
Dept: URBAN - METROPOLITAN AREA PATHOLOGY 4 | Facility: PATHOLOGY | Age: 78
End: 2023-01-26

## 2023-01-26 ENCOUNTER — AMBULATORY SURGICAL CENTER (AMBULATORY)
Dept: URBAN - METROPOLITAN AREA SURGERY 17 | Facility: SURGERY | Age: 78
End: 2023-01-26

## 2023-01-26 DIAGNOSIS — D12.3 BENIGN NEOPLASM OF TRANSVERSE COLON: ICD-10-CM

## 2023-01-26 DIAGNOSIS — D12.2 BENIGN NEOPLASM OF ASCENDING COLON: ICD-10-CM

## 2023-01-26 DIAGNOSIS — Z12.11 ENCOUNTER FOR SCREENING FOR MALIGNANT NEOPLASM OF COLON: ICD-10-CM

## 2023-01-26 DIAGNOSIS — Z86.010 PERSONAL HISTORY OF COLONIC POLYPS: ICD-10-CM

## 2023-01-26 DIAGNOSIS — K57.30 DIVERTICULOSIS OF LARGE INTESTINE WITHOUT PERFORATION OR ABS: ICD-10-CM

## 2023-01-26 DIAGNOSIS — K63.5 POLYP OF COLON: ICD-10-CM

## 2023-01-26 LAB
GI HISTOLOGY: A. UNSPECIFIED: (no result)
GI HISTOLOGY: B. UNSPECIFIED: (no result)
GI HISTOLOGY: C. UNSPECIFIED: (no result)
GI HISTOLOGY: PDF REPORT: (no result)

## 2023-01-26 PROCEDURE — 45385 COLONOSCOPY W/LESION REMOVAL: CPT | Mod: PT | Performed by: INTERNAL MEDICINE

## 2023-01-26 PROCEDURE — 88305 TISSUE EXAM BY PATHOLOGIST: CPT | Performed by: INTERNAL MEDICINE

## 2023-01-26 NOTE — SERVICEHPINOTES
Mister Schneider's here for follow-up.  He's been having issues with right-sided abdominal pain which she says does seem to get worse after his evening meal but otherwise doesn't correlate with diet but he says it seems to be improving, he says the last time it was a significant pain was on December 18.  He also has left shoulder pain, he's been using CBD oily thinks that helps his abdominal pain as well.  He's had CT scan, he's had upper endoscopy.  He's had HIDA scan.  He said surgical evaluation.  Workup is been negative.  EGD showed gastropathy and intestinal metaplasia but no H. pylori.  Small bowel biopsies were negative.  There is no nausea, vomiting or weight loss.He also has a history of polyps.  He is due for a colonoscopy.  He has hard stool and marbles.  He goes daily.  He says occasionally have a little blood with straining.  He would benefit from increased fiber and he says when he takes prune juice or eats raisin bran that does help.  He is in no acute distress.  Otherwise there is no change in his past medical or past surgical history.

## 2023-03-15 ENCOUNTER — OFFICE VISIT (OUTPATIENT)
Dept: FAMILY MEDICINE CLINIC | Facility: CLINIC | Age: 78
End: 2023-03-15
Payer: MEDICARE

## 2023-03-15 VITALS
OXYGEN SATURATION: 97 % | WEIGHT: 223 LBS | DIASTOLIC BLOOD PRESSURE: 78 MMHG | SYSTOLIC BLOOD PRESSURE: 176 MMHG | HEART RATE: 63 BPM | BODY MASS INDEX: 29.55 KG/M2 | HEIGHT: 73 IN

## 2023-03-15 DIAGNOSIS — R53.83 OTHER FATIGUE: ICD-10-CM

## 2023-03-15 DIAGNOSIS — E11.42 DIABETIC PERIPHERAL NEUROPATHY: ICD-10-CM

## 2023-03-15 DIAGNOSIS — E55.9 VITAMIN D DEFICIENCY: ICD-10-CM

## 2023-03-15 DIAGNOSIS — E79.0 HYPERURICEMIA: ICD-10-CM

## 2023-03-15 DIAGNOSIS — E29.1 HYPOGONADISM IN MALE: ICD-10-CM

## 2023-03-15 DIAGNOSIS — R11.0 NAUSEA: ICD-10-CM

## 2023-03-15 DIAGNOSIS — R63.4 WEIGHT LOSS, UNINTENTIONAL: ICD-10-CM

## 2023-03-15 DIAGNOSIS — O16.1 ELEVATED BLOOD PRESSURE AFFECTING PREGNANCY IN FIRST TRIMESTER, ANTEPARTUM: ICD-10-CM

## 2023-03-15 DIAGNOSIS — E11.9 TYPE 2 DIABETES MELLITUS WITHOUT COMPLICATION, WITHOUT LONG-TERM CURRENT USE OF INSULIN: ICD-10-CM

## 2023-03-15 DIAGNOSIS — E78.5 DYSLIPIDEMIA: ICD-10-CM

## 2023-03-15 DIAGNOSIS — R51.9 ACUTE NONINTRACTABLE HEADACHE, UNSPECIFIED HEADACHE TYPE: Primary | ICD-10-CM

## 2023-03-15 DIAGNOSIS — I10 ESSENTIAL HYPERTENSION: ICD-10-CM

## 2023-03-15 DIAGNOSIS — E11.65 POORLY CONTROLLED DIABETES MELLITUS: ICD-10-CM

## 2023-03-15 DIAGNOSIS — R42 DIZZINESS: ICD-10-CM

## 2023-03-15 PROCEDURE — 99214 OFFICE O/P EST MOD 30 MIN: CPT | Performed by: INTERNAL MEDICINE

## 2023-03-15 RX ORDER — AMLODIPINE BESYLATE 5 MG/1
5 TABLET ORAL DAILY
Qty: 30 TABLET | Refills: 5 | Status: SHIPPED | OUTPATIENT
Start: 2023-03-15

## 2023-03-15 RX ORDER — AMMONIUM LACTATE 12 %
LOTION (GRAM) TOPICAL AS NEEDED
Qty: 500 G | Refills: 3 | Status: SHIPPED | OUTPATIENT
Start: 2023-03-15

## 2023-03-15 RX ORDER — LANOLIN ALCOHOL/MO/W.PET/CERES
800 CREAM (GRAM) TOPICAL DAILY
Qty: 180 TABLET | Refills: 1 | Status: SHIPPED | OUTPATIENT
Start: 2023-03-15 | End: 2023-03-28

## 2023-03-15 RX ORDER — ASPIRIN 81 MG/1
81 TABLET ORAL DAILY
Qty: 90 TABLET | Refills: 3 | Status: SHIPPED | OUTPATIENT
Start: 2023-03-15

## 2023-03-15 NOTE — PROGRESS NOTES
"Subjective Chief complaint is dizziness and not feeling well  Earl Marc is a 77 y.o. male.     History of Present Illness Earl is here today for host of complaints.  He reports he is not feeling well over the last several weeks.  He has been having dizziness.  This seems to be worse when he goes from sitting to standing.  He seems to feel \"woozy or off balance.  He also is having some headaches.  These are primarily on the left side of the head.  He is having some extreme fatigue.  He did a COVID test because of his symptoms and that was negative.  He has been having loss of appetite and nausea for the past month.  I did did review a CT scan of his abdomen and a CT scan of the chest from a year or 2 ago.  They really did not look bad.  There is certainly nothing that would explain weight loss in terms of tumors or malignancies.  I did review lab work that he had done yesterday for his nephrologist.  His creatinine was stable.  His blood sugar was high and his A1c is now above 9.  I suspect this may be contributing some to his generally feeling poorly.  I did review his blood pressures.  The most part they are okay.  He has had some elevations in his blood pressure initially today was 176/78.  I did however retake it at 132/80    The following portions of the patient's history were reviewed and updated as appropriate: allergies, current medications, past family history, past medical history, past social history, past surgical history and problem list.    Review of Systems   Constitutional: Positive for fatigue and unexpected weight loss. Negative for chills and fever.   Gastrointestinal: Positive for nausea.   Neurological: Positive for dizziness and light-headedness.       Objective   Physical Exam  Vitals and nursing note reviewed.   Constitutional:       Appearance: Normal appearance.   HENT:      Nose: Congestion present.   Eyes:      Comments: There is no papilledema   Neck:      Vascular: No carotid " bruit.   Cardiovascular:      Rate and Rhythm: Normal rate and regular rhythm.   Pulmonary:      Effort: Pulmonary effort is normal.      Breath sounds: No wheezing, rhonchi or rales.   Abdominal:      General: Bowel sounds are normal.      Palpations: Abdomen is soft.      Comments: He has tenderness to the abdominal musculature but no deep abdominal tenderness.   Musculoskeletal:      Right lower leg: No edema.      Left lower leg: No edema.   Neurological:      General: No focal deficit present.      Mental Status: He is alert.      Cranial Nerves: No cranial nerve deficit.           Assessment & Plan   Diagnoses and all orders for this visit:    1. Acute nonintractable headache, unspecified headache type (Primary)  -     Sedimentation Rate    2. Dizziness    3. Poorly controlled diabetes mellitus (HCC)    4. Other fatigue  -     Urine Culture - Urine, Urine, Clean Catch  -     Testosterone, Free, Total  -     TSH+Free T4    5. Weight loss, unintentional  -     Urinalysis With Microscopic - Urine, Clean Catch  -     Urine Culture - Urine, Urine, Clean Catch    6. Nausea  -     Urinalysis With Microscopic - Urine, Clean Catch  -     Urine Culture - Urine, Urine, Clean Catch    7. Hypogonadism in male  -     Testosterone, Free, Total    8. Elevated blood pressure affecting pregnancy in first trimester, antepartum    Other orders  -     amLODIPine (NORVASC) 5 MG tablet; Take 1 tablet by mouth Daily.  Dispense: 30 tablet; Refill: 5      Earl is here today for host of complaints.  I do not come up with a good reason for his headache.  His blood pressure is somewhat variable and I am going to add some amlodipine to his metoprolol.  Also going to check a thyroid and his testosterone level.  Because of not feeling well I am going to check a urine and see if there are any ketones but also make sure there is no underlying infection.

## 2023-03-20 LAB
APPEARANCE UR: CLEAR
BACTERIA #/AREA URNS HPF: NORMAL /HPF
BACTERIA UR CULT: NO GROWTH
BACTERIA UR CULT: NORMAL
BILIRUB UR QL STRIP: NEGATIVE
CASTS URNS MICRO: NORMAL
COLOR UR: YELLOW
EPI CELLS #/AREA URNS HPF: NORMAL /HPF
ERYTHROCYTE [SEDIMENTATION RATE] IN BLOOD BY WESTERGREN METHOD: 13 MM/HR (ref 0–20)
GLUCOSE UR QL STRIP: ABNORMAL
HGB UR QL STRIP: NEGATIVE
KETONES UR QL STRIP: NEGATIVE
LEUKOCYTE ESTERASE UR QL STRIP: NEGATIVE
NITRITE UR QL STRIP: NEGATIVE
PH UR STRIP: 6 [PH] (ref 5–8)
PROT UR QL STRIP: NEGATIVE
RBC #/AREA URNS HPF: NORMAL /HPF
SP GR UR STRIP: 1.02 (ref 1–1.03)
T4 FREE SERPL-MCNC: 1.19 NG/DL (ref 0.93–1.7)
TESTOST FREE SERPL-MCNC: 17.2 PG/ML (ref 6.6–18.1)
TESTOST SERPL-MCNC: 980 NG/DL (ref 264–916)
TSH SERPL DL<=0.005 MIU/L-ACNC: 2.53 UIU/ML (ref 0.27–4.2)
UROBILINOGEN UR STRIP-MCNC: ABNORMAL MG/DL
WBC #/AREA URNS HPF: NORMAL /HPF

## 2023-03-28 ENCOUNTER — OFFICE VISIT (OUTPATIENT)
Dept: ENDOCRINOLOGY | Age: 78
End: 2023-03-28
Payer: MEDICARE

## 2023-03-28 VITALS
HEIGHT: 61 IN | HEART RATE: 58 BPM | OXYGEN SATURATION: 95 % | BODY MASS INDEX: 41.61 KG/M2 | WEIGHT: 220.4 LBS | SYSTOLIC BLOOD PRESSURE: 116 MMHG | TEMPERATURE: 97.4 F | DIASTOLIC BLOOD PRESSURE: 56 MMHG

## 2023-03-28 DIAGNOSIS — I12.9 HYPERTENSIVE KIDNEY DISEASE WITH STAGE 3A CHRONIC KIDNEY DISEASE: ICD-10-CM

## 2023-03-28 DIAGNOSIS — Z79.4 TYPE 2 DIABETES MELLITUS WITH HYPERGLYCEMIA, WITH LONG-TERM CURRENT USE OF INSULIN: Primary | ICD-10-CM

## 2023-03-28 DIAGNOSIS — N18.31 HYPERTENSIVE KIDNEY DISEASE WITH STAGE 3A CHRONIC KIDNEY DISEASE: ICD-10-CM

## 2023-03-28 DIAGNOSIS — E11.65 TYPE 2 DIABETES MELLITUS WITH HYPERGLYCEMIA, WITH LONG-TERM CURRENT USE OF INSULIN: Primary | ICD-10-CM

## 2023-03-28 DIAGNOSIS — E78.5 DYSLIPIDEMIA: ICD-10-CM

## 2023-03-28 RX ORDER — AMLODIPINE BESYLATE 5 MG/1
1 TABLET ORAL DAILY
COMMUNITY
Start: 2023-03-15 | End: 2023-03-28 | Stop reason: SDUPTHER

## 2023-03-28 RX ORDER — CHOLECALCIFEROL (VITAMIN D3) 125 MCG
1 CAPSULE ORAL
COMMUNITY

## 2023-03-28 RX ORDER — CYANOCOBALAMIN (VITAMIN B-12) 500 MCG
1 TABLET ORAL DAILY
COMMUNITY

## 2023-03-28 RX ORDER — NIACIN 500 MG
500 TABLET ORAL
COMMUNITY

## 2023-03-28 NOTE — PATIENT INSTRUCTIONS
Increase Lantus 34 units at bedtime   Continue with Jardiance 10 mg daily - don't want to increase due to weight loss and dizziness  Start back Tradjenta 5 mg daily  Work on dietary modification  Check blood sugars twice daily

## 2023-03-28 NOTE — PROGRESS NOTES
Chief Complaint  Chief Complaint   Patient presents with   • Diabetes     Testing bs 1 x day /last dm eye exam 2/27/23       Subjective          History of Present Illness    Earl Marc 77 y.o. presents for a follow-up evaluation for type 2 DM     He has been diabetic since around 2006     Pt is on the following medications for their DM: Lantus 30-40 units at bedtime and Jardiance 10 mg daily        Pioglitazone 45 mg daily was stopped due to edema  Tradjenta 5 mg daily was stopped by Dr. Goodman at 10/21 visit           Pt complains of dizziness, intermittent constipation, numbness and tingling in feet and shoulder/neck pain.  Pt has had right upper quadrant pain and has had total GI workup and couldn't find cause.  It has been better. GI note states possible nerve pain    Denies diarrhea, chest pain, shortness of breath and vision changes    Weight loss of 14 lbs since last visit.    Pt does not have a history of DM retinopathy.  Last eye exam was 02/27/23     Pt does have a history of nephropathy.  Patient is not currently taking ACE/ARB - follows with Nephrology - Dr. Wilbert Gilmore      Pt does have neuropathy.  Current takes Lyrica 150 mg daily for this condition.  Pt follows with podiatry - Dr. Edwards  Lyrica BID caused pt to be sleepy     Pt does have a history of CAD.  Summer of 2021, pt had an MI and stent placed for total of 4 stents    Last A1C in 03/23 was 9.3    Last microalbumin in 05/22 was negative          Blood Sugars    Blood glucoses are checked 1/day.    Fasting blood glucoses:    Pt has no episodes of hypoglycemia.            Hyperlipidemia     Pt is currently taking atorvastatin 20 mg HS and Vascepa 2 gm BID    Last lipid panel in 03/23 showed Total 79, HDL 39, LDL 20 and Triglycerides 107            Hypertension with CKD Stage 3a     Pt denies any chest pain, palpitations, shortness of breath, or headache     Current regimen includes metoprolol tartrate 25 mg bid and  "lasix 20 mg daily              Other History     Hypogonadism managed by  Dr. Newman at [facility]            I have reviewed the patient's allergies, medicines, past medical hx, family hx and social hx.    Objective   Vital Signs:   /56   Pulse 58   Temp 97.4 °F (36.3 °C) (Temporal)   Ht 155.4 cm (61.18\")   Wt 100 kg (220 lb 6.4 oz)   SpO2 95%   BMI 41.40 kg/m²       Physical Exam   Physical Exam  Constitutional:       General: He is not in acute distress.     Appearance: Normal appearance. He is not diaphoretic.   HENT:      Head: Normocephalic and atraumatic.   Eyes:      General:         Right eye: No discharge.         Left eye: No discharge.   Skin:     General: Skin is warm and dry.   Neurological:      Mental Status: He is alert.   Psychiatric:         Mood and Affect: Mood normal.         Behavior: Behavior normal.                    Results Review:   Hemoglobin A1C   Date Value Ref Range Status   03/14/2023 9.30 (H) 4.80 - 5.60 % Final     Comment:     Hemoglobin A1C Ranges:  Increased Risk for Diabetes  5.7% to 6.4%  Diabetes                     >= 6.5%  Diabetic Goal                < 7.0%     05/26/2021 6.60 (H) 4.80 - 5.60 % Final     Total Cholesterol   Date Value Ref Range Status   05/26/2021 75 0 - 200 mg/dL Final     Triglycerides   Date Value Ref Range Status   03/14/2023 107 0 - 150 mg/dL Final   05/26/2021 109 0 - 150 mg/dL Final     HDL Cholesterol   Date Value Ref Range Status   03/14/2023 39 (L) 40 - 60 mg/dL Final   05/26/2021 34 (L) 40 - 60 mg/dL Final     LDL Chol Calc (NIH)   Date Value Ref Range Status   03/14/2023 20 0 - 100 mg/dL Final     VLDL Cholesterol José   Date Value Ref Range Status   03/14/2023 20 5 - 40 mg/dL Final     LDL/HDL Ratio   Date Value Ref Range Status   05/26/2021 0.56  Final         Assessment and Plan {CC Problem List  Visit Diagnosis  ROS  Review (Popup)  Health Maintenance  Quality  BestPractice  Medications  SmartSets  SnapShot " Encounters  Media :23  Diagnoses and all orders for this visit:    1. Type 2 diabetes mellitus with hyperglycemia, with long-term current use of insulin (HCC) (Primary)  -     linagliptin (Tradjenta) 5 MG tablet tablet; Take 1 tablet by mouth Daily. By mouth take one tab daily.  Dispense: 90 tablet; Refill: 1  -     Hemoglobin A1c; Future  -     Comprehensive Metabolic Panel; Future  -     Microalbumin / Creatinine Urine Ratio - Urine, Clean Catch; Future  -     Fructosamine; Future    A1c is higher at 9.3%  Increase Lantus 34 units at bedtime   Continue with Jardiance 10 mg daily - don't want to increase due to weight loss and dizziness  Don't really want to use sulfonureas, due to age and kidney function  Actos stopped due to edema  GLP1 would cause more weight loss and possible increase in GI issues  Start back Tradjenta 5 mg daily  If can't tolerate Tradjenta then will need to consider meal time insulin  Work on dietary modification  Check blood sugars twice daily; would like to get CGM - G7      2. Dyslipidemia  -     Comprehensive Metabolic Panel; Future  -     Lipid Panel; Future    Lipid panel is good.    Continue with statin.        3. Hypertensive kidney disease with stage 3a chronic kidney disease (HCC)  -     Comprehensive Metabolic Panel; Future    PCP is having patient monitor BP to see if it is the cause of dizziness  Continue with current medication regimen  Defer management to PCP/Nephrology        RTC in 3 months with me, labs prior and 6 months with Dr. Dang      Follow Up     Patient was given instructions and counseling regarding her condition or for health maintenance advice. Please see specific information pulled into the AVS if appropriate.              JL Noonan  03/28/23

## 2023-03-29 ENCOUNTER — OFFICE VISIT (OUTPATIENT)
Dept: FAMILY MEDICINE CLINIC | Facility: CLINIC | Age: 78
End: 2023-03-29
Payer: MEDICARE

## 2023-03-29 VITALS
WEIGHT: 220 LBS | HEIGHT: 73 IN | SYSTOLIC BLOOD PRESSURE: 128 MMHG | BODY MASS INDEX: 29.16 KG/M2 | DIASTOLIC BLOOD PRESSURE: 68 MMHG

## 2023-03-29 DIAGNOSIS — R42 VERTIGO: ICD-10-CM

## 2023-03-29 DIAGNOSIS — I10 ESSENTIAL (PRIMARY) HYPERTENSION: Primary | ICD-10-CM

## 2023-03-29 PROCEDURE — 99213 OFFICE O/P EST LOW 20 MIN: CPT | Performed by: INTERNAL MEDICINE

## 2023-03-29 RX ORDER — PANTOPRAZOLE SODIUM 20 MG/1
20 TABLET, DELAYED RELEASE ORAL DAILY
Qty: 90 TABLET | Refills: 1 | Status: SHIPPED | OUTPATIENT
Start: 2023-03-29

## 2023-03-29 NOTE — PROGRESS NOTES
Subjective Chief complaint is follow-up on blood pressure  Earl Marc is a 77 y.o. male.     History of Present Illness Earl is here today for follow-up.  Since his last visit he has seen his endocrinologist.  He was started back on Tradjenta.  His blood pressure is doing a little bit better with the addition of the amlodipine.  He is still having some dizzy spells described as a spinning sensation.  We did offer possible referral to an ear nose and throat doctor physical therapy but he wants to wait and see if the sugar control gets better and helps with that.    The following portions of the patient's history were reviewed and updated as appropriate: allergies, current medications, past family history, past medical history, past social history, past surgical history and problem list.    Review of Systems   Constitutional: Negative for chills and fever.   Respiratory: Negative for cough.    Neurological: Positive for dizziness.       Objective   Physical Exam  Vitals and nursing note reviewed.   Constitutional:       Appearance: Normal appearance.   Cardiovascular:      Rate and Rhythm: Normal rate.   Pulmonary:      Effort: Pulmonary effort is normal.   Neurological:      Mental Status: He is alert.           Assessment & Plan   Diagnoses and all orders for this visit:    1. Essential (primary) hypertension (Primary)    2. Vertigo    Other orders  -     pantoprazole (PROTONIX) 20 MG EC tablet; Take 1 tablet by mouth Daily.  Dispense: 90 tablet; Refill: 1    Earl is here today for follow-up.  His blood pressure seems to be doing better.  We are going to observe the vertigo for now but he can contact me at any time he wants to see an ear nose and throat doctor or go to physical therapy.

## 2023-03-30 ENCOUNTER — TELEPHONE (OUTPATIENT)
Dept: ENDOCRINOLOGY | Age: 78
End: 2023-03-30
Payer: MEDICARE

## 2023-03-30 NOTE — TELEPHONE ENCOUNTER
Pt called stating Janelle pedraza said they need the Prior Auth number and expiration date because they can not see the PA on his trajenta      Fax it to [phone]

## 2023-04-05 ENCOUNTER — TELEPHONE (OUTPATIENT)
Dept: ENDOCRINOLOGY | Age: 78
End: 2023-04-05
Payer: MEDICARE

## 2023-04-05 NOTE — TELEPHONE ENCOUNTER
PT called stating he still does not have the tradjenta, He is going to call the pharmacy on that since the pa was sent back on the 31st. He is also wanting to know about the Dexcom G7, he was told she was going to get him one. He has not heard anything else after the appointment

## 2023-04-17 ENCOUNTER — TELEPHONE (OUTPATIENT)
Dept: FAMILY MEDICINE CLINIC | Facility: CLINIC | Age: 78
End: 2023-04-17

## 2023-04-17 DIAGNOSIS — R42 VERTIGO: Primary | ICD-10-CM

## 2023-04-17 NOTE — TELEPHONE ENCOUNTER
"  Caller: Earl Marc \"Earl Marc\"    Relationship: Self    Best call back number: [phone]    What is the medical concern/diagnosis: WORSENING DIZZINESS    What specialty or service is being requested: EARS, NOSE, AND THROAT    What is the provider, practice or medical service name: [facility]    What is the office location: [facility]    What is the office phone number: [facility]    Any additional details:    PLEASE CALL TO CONFIRM REFERRAL SO HE CAN GET SCHEDULED ASAP.         "

## 2023-04-19 NOTE — TELEPHONE ENCOUNTER
"  Caller: Earl Marc \"Earl Marc\"    Relationship: Self    Best call back number: [phone]    What was the call regarding: PATIENT WOULD LIKE TO KNOW IF THE REFERRAL HAS BEEN PUT IN YET     PLEASE CALL AND [facility]               "

## 2023-04-27 RX ORDER — ASPIRIN 81 MG/1
81 TABLET ORAL DAILY
Qty: 90 TABLET | Refills: 3 | Status: SHIPPED | OUTPATIENT
Start: 2023-04-27

## 2023-05-04 ENCOUNTER — TELEPHONE (OUTPATIENT)
Dept: ENDOCRINOLOGY | Age: 78
End: 2023-05-04

## 2023-05-04 NOTE — TELEPHONE ENCOUNTER
PT SENT IN A FAX FOR A REFILL REQUEST OF THE BELOW MEDS.    atorvastatin (LIPITOR) 20 MG tablet [[id number]]    GOING TO :    [facility] [place] [facility] [place] - [address] [facility] [address] [address] [phone] [facility] [phone]    [address] [facility] [place] [address]     Vascepa 1 g capsule capsule [[id number]]    pregabalin (Lyrica) 150 MG capsule [[id number]]      GOING TO:    [facility] - [address] [phone] [facility] [phone] [facility]

## 2023-05-05 DIAGNOSIS — E78.5 DYSLIPIDEMIA: ICD-10-CM

## 2023-05-05 DIAGNOSIS — E11.42 DIABETIC PERIPHERAL NEUROPATHY: ICD-10-CM

## 2023-05-05 RX ORDER — PREGABALIN 150 MG/1
150 CAPSULE ORAL DAILY
Qty: 90 CAPSULE | Refills: 1 | OUTPATIENT
Start: 2023-05-05

## 2023-05-05 RX ORDER — ICOSAPENT ETHYL 1000 MG/1
2 CAPSULE ORAL 2 TIMES DAILY WITH MEALS
Qty: 360 CAPSULE | Refills: 3 | Status: SHIPPED | OUTPATIENT
Start: 2023-05-05

## 2023-05-05 RX ORDER — ATORVASTATIN CALCIUM 20 MG/1
20 TABLET, FILM COATED ORAL NIGHTLY
Qty: 90 TABLET | Refills: 1 | Status: SHIPPED | OUTPATIENT
Start: 2023-05-05

## 2023-05-05 RX ORDER — ATORVASTATIN CALCIUM 20 MG/1
20 TABLET, FILM COATED ORAL NIGHTLY
Qty: 90 TABLET | Refills: 1 | Status: SHIPPED | OUTPATIENT
Start: 2023-05-05 | End: 2023-05-05

## 2023-05-05 NOTE — TELEPHONE ENCOUNTER
Patient called back in regarding his vespeca needing [facility]. He would like a call back when it has been done

## 2023-05-05 NOTE — TELEPHONE ENCOUNTER
Rx Refill Note  Requested Prescriptions     Pending Prescriptions Disp Refills   • Vascepa 1 g capsule capsule 360 capsule 3     Sig: Take 2 g by mouth 2 (Two) Times a Day With Meals.   • pregabalin (Lyrica) 150 MG capsule 90 capsule 1     Sig: Take 1 capsule by mouth Daily.      Last office visit with prescribing clinician: 3/28/2023   Last telemedicine visit with prescribing clinician: 6/14/2023   Next office visit with prescribing clinician: 6/28/2023                         Would you like a call back once the refill request has been completed: [] Yes [] No    If the office needs to give you a call back, can they leave a voicemail: [] Yes [] No    Darline Padilla MA  05/05/23, 10:38 EDT

## 2023-05-05 NOTE — TELEPHONE ENCOUNTER
Rx Refill Note  Requested Prescriptions     Signed Prescriptions Disp Refills   • atorvastatin (LIPITOR) 20 MG tablet 90 tablet 1     Sig: Take 1 tablet by mouth Every Night.     Authorizing Provider: ESTEE ARIZA     Ordering User: MARTI AQUINO      Last office visit with prescribing clinician: 3/28/2023   Last telemedicine visit with prescribing clinician: 6/14/2023   Next office visit with prescribing clinician: 6/28/2023                         Would you like a call back once the refill request has been completed: [] Yes [] No    If the office needs to give you a call back, can they leave a voicemail: [] Yes [] No    Marti Aquino MA  05/05/23, 10:37 EDT

## 2023-05-23 ENCOUNTER — TRANSCRIBE ORDERS (OUTPATIENT)
Dept: ADMINISTRATIVE | Facility: HOSPITAL | Age: 78
End: 2023-05-23
Payer: MEDICARE

## 2023-05-23 ENCOUNTER — LAB (OUTPATIENT)
Dept: LAB | Facility: HOSPITAL | Age: 78
End: 2023-05-23
Payer: MEDICARE

## 2023-05-23 ENCOUNTER — TELEPHONE (OUTPATIENT)
Dept: FAMILY MEDICINE CLINIC | Facility: CLINIC | Age: 78
End: 2023-05-23
Payer: MEDICARE

## 2023-05-23 DIAGNOSIS — R51.9 FACIAL PAIN: ICD-10-CM

## 2023-05-23 DIAGNOSIS — Z79.4 TYPE 2 DIABETES MELLITUS WITH HYPERGLYCEMIA, WITH LONG-TERM CURRENT USE OF INSULIN: ICD-10-CM

## 2023-05-23 DIAGNOSIS — R42 DIZZINESS AND GIDDINESS: ICD-10-CM

## 2023-05-23 DIAGNOSIS — E11.65 TYPE 2 DIABETES MELLITUS WITH HYPERGLYCEMIA, WITH LONG-TERM CURRENT USE OF INSULIN: ICD-10-CM

## 2023-05-23 DIAGNOSIS — R51.9 FACIAL PAIN: Primary | ICD-10-CM

## 2023-05-23 LAB
CRP SERPL-MCNC: <0.3 MG/DL (ref 0–0.5)
DEPRECATED RDW RBC AUTO: 42.6 FL (ref 37–54)
ERYTHROCYTE [DISTWIDTH] IN BLOOD BY AUTOMATED COUNT: 14.5 % (ref 12.3–15.4)
ERYTHROCYTE [SEDIMENTATION RATE] IN BLOOD: 5 MM/HR (ref 0–20)
HBA1C MFR BLD: 7.8 % (ref 4.8–5.6)
HCT VFR BLD AUTO: 55.5 % (ref 37.5–51)
HGB BLD-MCNC: 18.2 G/DL (ref 13–17.7)
MCH RBC QN AUTO: 27.7 PG (ref 26.6–33)
MCHC RBC AUTO-ENTMCNC: 32.8 G/DL (ref 31.5–35.7)
MCV RBC AUTO: 84.3 FL (ref 79–97)
PLATELET # BLD AUTO: 205 10*3/MM3 (ref 140–450)
PMV BLD AUTO: 9.7 FL (ref 6–12)
RBC # BLD AUTO: 6.58 10*6/MM3 (ref 4.14–5.8)
WBC NRBC COR # BLD: 7.24 10*3/MM3 (ref 3.4–10.8)

## 2023-05-23 PROCEDURE — 85027 COMPLETE CBC AUTOMATED: CPT

## 2023-05-23 PROCEDURE — 86140 C-REACTIVE PROTEIN: CPT

## 2023-05-23 PROCEDURE — 85652 RBC SED RATE AUTOMATED: CPT

## 2023-05-23 PROCEDURE — 36415 COLL VENOUS BLD VENIPUNCTURE: CPT

## 2023-05-23 PROCEDURE — 83036 HEMOGLOBIN GLYCOSYLATED A1C: CPT

## 2023-05-23 NOTE — TELEPHONE ENCOUNTER
Caller: SOSA    Relationship: Other    Best call back number: [phone]    What orders are you requesting (i.e. lab or imaging): MRI OF RIGHT EYE     In what timeframe would the patient need to come in: AS SOON AS POSSIBLE     Where will you receive your lab/imaging services: UNKNOWN     Additional notes: SOSA FROM DR. TREVINO'S OFFICE WILL BE SENDING OVER OFFICE NOTES FOR THE PATIENT.     I returned the call but there was no answer.  I left a message

## 2023-05-25 DIAGNOSIS — E11.42 DIABETIC PERIPHERAL NEUROPATHY: ICD-10-CM

## 2023-05-25 NOTE — TELEPHONE ENCOUNTER
Rx Refill Note  Requested Prescriptions     Pending Prescriptions Disp Refills   • pregabalin (Lyrica) 150 MG capsule 90 capsule 1     Sig: Take 1 capsule by mouth Daily.      Last office visit with prescribing clinician: 3/28/2023   Last telemedicine visit with prescribing clinician: Visit date not found   Next office visit with prescribing clinician: 6/28/2023                         Would you like a call back once the refill request has been completed: [] Yes [] No    If the office needs to give you a call back, can they leave a voicemail: [] Yes [] No    Shruthi Oliveros MA  05/25/23, 17:22 EDT

## 2023-05-26 DIAGNOSIS — E11.42 DIABETIC PERIPHERAL NEUROPATHY: ICD-10-CM

## 2023-05-26 RX ORDER — PREGABALIN 150 MG/1
150 CAPSULE ORAL DAILY
Qty: 90 CAPSULE | Refills: 1 | Status: CANCELLED | OUTPATIENT
Start: 2023-05-26

## 2023-05-26 RX ORDER — PREGABALIN 150 MG/1
150 CAPSULE ORAL DAILY
Qty: 90 CAPSULE | Refills: 1 | Status: SHIPPED | OUTPATIENT
Start: 2023-05-26

## 2023-06-02 ENCOUNTER — OFFICE VISIT (OUTPATIENT)
Dept: FAMILY MEDICINE CLINIC | Facility: CLINIC | Age: 78
End: 2023-06-02
Payer: MEDICARE

## 2023-06-02 VITALS
SYSTOLIC BLOOD PRESSURE: 132 MMHG | OXYGEN SATURATION: 95 % | HEART RATE: 60 BPM | WEIGHT: 219 LBS | BODY MASS INDEX: 29.03 KG/M2 | DIASTOLIC BLOOD PRESSURE: 68 MMHG | RESPIRATION RATE: 18 BRPM | HEIGHT: 73 IN

## 2023-06-02 DIAGNOSIS — R42 DIZZINESS: ICD-10-CM

## 2023-06-02 DIAGNOSIS — H49.22 6TH NERVE PALSY, LEFT: Primary | ICD-10-CM

## 2023-06-02 DIAGNOSIS — R71.8 OTHER ABNORMALITY OF RED BLOOD CELLS: ICD-10-CM

## 2023-06-02 DIAGNOSIS — D75.1 ERYTHROCYTOSIS: ICD-10-CM

## 2023-06-02 PROCEDURE — 99213 OFFICE O/P EST LOW 20 MIN: CPT | Performed by: INTERNAL MEDICINE

## 2023-06-02 RX ORDER — AMLODIPINE BESYLATE 5 MG/1
5 TABLET ORAL DAILY
Qty: 90 TABLET | Refills: 1 | Status: SHIPPED | OUTPATIENT
Start: 2023-06-02

## 2023-06-02 NOTE — PROGRESS NOTES
Subjective Chief complaint is double vision and follow-up on doctor recommendation  Earl Marc is a 78 y.o. male.     History of Present Illness Earl is here today for double vision.  This is worse with gazing towards his left.  His eye doctor wanted him to have an MRI scan of the orbits.  He is not having any other neurologic symptoms other than dizziness.  He is seeing an ear nose and throat doctor about that.  It sounds like he has an electronystagmogram coming up.  He does have diabetes.  It is somewhat poorly controlled.  His last A1c was 7.8.  He had a recent sedimentation rate also ordered by the eye doctor which was normal.  His hemoglobin however was elevated as well as his RBCs and hematocrit.  He is on testosterone replacement from the urologist.  His testosterone levels have been high.  We did discuss that this does put him at risk for stroke and heart attack.    The following portions of the patient's history were reviewed and updated as appropriate: allergies, current medications, past family history, past medical history, past social history, past surgical history and problem list.    Review of Systems   Constitutional: Negative for chills and fever.   Eyes: Positive for double vision.   Respiratory: Negative for chest tightness and shortness of breath.    Cardiovascular: Negative for chest pain.   Neurological: Positive for dizziness.       Objective   Physical Exam  Vitals and nursing note reviewed.   Constitutional:       Appearance: Normal appearance.   Eyes:      Comments: He appears to have some slightly diminished ability to gaze to the left with his left eye.  There may also be a little bit of a supranuclear palsy as well.   Cardiovascular:      Rate and Rhythm: Normal rate and regular rhythm.   Pulmonary:      Effort: Pulmonary effort is normal.      Breath sounds: No wheezing, rhonchi or rales.   Neurological:      Mental Status: He is alert.           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 Dizziness  -     MRI Brain With & Without Contrast; Future    3. Erythrocytosis  -     CBC & Differential  -     Iron Profile  -     Ferritin  -     Erythropoietin    4. Other abnormality of red blood cells  -     Iron Profile  -     Ferritin    Other orders  -     amLODIPine (NORVASC) 5 MG tablet; Take 1 tablet by mouth Daily.  Dispense: 90 tablet; Refill: 1      Earl is here today for what appears to be his 6th nerve palsy.  He also is having some dizziness.  I am going to get an MRI scan of the brain.  I suspect however this is a diabetic neuropathy issue.  There may also be a supranuclear come opponent to this.  He does have some erythrocytosis likely due to his testosterone injections and elevated testosterone level.  I did advise him to discuss diminishing the dose with the urologist.  In the interim I will check some iron levels and erythropoietin level.         Answers for HPI/ROS submitted by the patient on 5/31/2023  What is the primary reason for your visit?: Other  Please describe your symptoms.: Extreme dizziness, neck pain, shoulder pain, headaches, shakiness, spinning spells to point of fighting to remain conscientious to avoid blackouts, soreness at left side of head/temple and to obtain an MRI of brain as recommended by Dr. Charla Valles.  Have you had these symptoms before?: Yes  How long have you been having these symptoms?: Greater than 2 weeks  Please list any medications you are currently taking for this condition.: None  Please describe any probable cause for these symptoms. : Unknown.  That is the reason that Dr Valles wanted me to get an MRI, she is concern that there may be some kind of brain problem due to soreness to left temple of head.

## 2023-06-05 LAB
BASOPHILS # BLD AUTO: 0.07 10*3/MM3 (ref 0–0.2)
BASOPHILS NFR BLD AUTO: 0.9 % (ref 0–1.5)
EOSINOPHIL # BLD AUTO: 0.24 10*3/MM3 (ref 0–0.4)
EOSINOPHIL NFR BLD AUTO: 3.2 % (ref 0.3–6.2)
EPO SERPL-ACNC: 9 MIU/ML (ref 2.6–18.5)
ERYTHROCYTE [DISTWIDTH] IN BLOOD BY AUTOMATED COUNT: 14 % (ref 12.3–15.4)
FERRITIN SERPL-MCNC: 124 NG/ML (ref 30–400)
HCT VFR BLD AUTO: 52.3 % (ref 37.5–51)
HGB BLD-MCNC: 17.4 G/DL (ref 13–17.7)
IMM GRANULOCYTES # BLD AUTO: 0.02 10*3/MM3 (ref 0–0.05)
IMM GRANULOCYTES NFR BLD AUTO: 0.3 % (ref 0–0.5)
IRON SATN MFR SERPL: 25 % (ref 20–50)
IRON SERPL-MCNC: 89 MCG/DL (ref 59–158)
LYMPHOCYTES # BLD AUTO: 1.68 10*3/MM3 (ref 0.7–3.1)
LYMPHOCYTES NFR BLD AUTO: 22.4 % (ref 19.6–45.3)
MCH RBC QN AUTO: 28 PG (ref 26.6–33)
MCHC RBC AUTO-ENTMCNC: 33.3 G/DL (ref 31.5–35.7)
MCV RBC AUTO: 84.2 FL (ref 79–97)
MONOCYTES # BLD AUTO: 0.65 10*3/MM3 (ref 0.1–0.9)
MONOCYTES NFR BLD AUTO: 8.7 % (ref 5–12)
NEUTROPHILS # BLD AUTO: 4.84 10*3/MM3 (ref 1.7–7)
NEUTROPHILS NFR BLD AUTO: 64.5 % (ref 42.7–76)
NRBC BLD AUTO-RTO: 0 /100 WBC (ref 0–0.2)
PLATELET # BLD AUTO: 185 10*3/MM3 (ref 140–450)
RBC # BLD AUTO: 6.21 10*6/MM3 (ref 4.14–5.8)
TIBC SERPL-MCNC: 358 MCG/DL
UIBC SERPL-MCNC: 269 MCG/DL (ref 112–346)
WBC # BLD AUTO: 7.5 10*3/MM3 (ref 3.4–10.8)

## 2023-06-06 DIAGNOSIS — E11.42 DIABETIC PERIPHERAL NEUROPATHY: ICD-10-CM

## 2023-06-06 NOTE — TELEPHONE ENCOUNTER
PATIENT CALLED NEEDING HIS LYRICA REFILLED. TOLD HIM WE SENT IT TO THE PHARMACY IN [place]. HE SAID THEY DO NOT CARRY THE MEDICATION. CAN WE S[facility] TO [facility]?    HE IS RUNNING VERY LOW ON IT

## 2023-06-07 DIAGNOSIS — E11.42 DIABETIC PERIPHERAL NEUROPATHY: ICD-10-CM

## 2023-06-07 RX ORDER — PREGABALIN 150 MG/1
150 CAPSULE ORAL DAILY
Qty: 90 CAPSULE | Refills: 1 | Status: SHIPPED | OUTPATIENT
Start: 2023-06-07

## 2023-06-07 RX ORDER — PREGABALIN 150 MG/1
150 CAPSULE ORAL DAILY
Qty: 90 CAPSULE | Refills: 1 | Status: SHIPPED | OUTPATIENT
Start: 2023-06-07 | End: 2023-06-07 | Stop reason: SDUPTHER

## 2023-06-07 NOTE — TELEPHONE ENCOUNTER
Rx Refill Note  Requested Prescriptions     Pending Prescriptions Disp Refills    pregabalin (Lyrica) 150 MG capsule 90 capsule 1     Sig: Take 1 capsule by mouth Daily.      Last office visit with prescribing clinician: 3/28/2023   Last telemedicine visit with prescribing clinician: Visit date not found   Next office visit with prescribing clinician: 6/28/2023                         Would you like a call back once the refill request has been completed: [] Yes [] No    If the office needs to give you a call back, can they leave a voicemail: [] Yes [] No    Darline Padilla MA  06/07/23, 10:59 EDT

## 2023-06-08 ENCOUNTER — TELEPHONE (OUTPATIENT)
Dept: FAMILY MEDICINE CLINIC | Facility: CLINIC | Age: 78
End: 2023-06-08

## 2023-06-08 NOTE — TELEPHONE ENCOUNTER
DELETE AFTER REVIEWING: Telephone encounter to be sent to the clinical pool     Caller: Earl Marc    Relationship: Self    Best call back number:     [phone]       What orders are you requesting (i.e. lab or imaging): ANY    In what timeframe would the patient need to come in: PATIENT STATES THE SOONEST THEY CAN GET HIM IN FOR HIS MRI IS JULY10TH AND HE WOULD LIKE TO GET IN SOONER.  IS THERE ANYWHERE ELSE HE CAN GO?    Advised we can see if someplace like [facility] can get it done sooner.

## 2023-06-14 ENCOUNTER — LAB (OUTPATIENT)
Dept: ENDOCRINOLOGY | Age: 78
End: 2023-06-14
Payer: MEDICARE

## 2023-06-14 DIAGNOSIS — E11.65 TYPE 2 DIABETES MELLITUS WITH HYPERGLYCEMIA, WITH LONG-TERM CURRENT USE OF INSULIN: ICD-10-CM

## 2023-06-14 DIAGNOSIS — E78.5 DYSLIPIDEMIA: ICD-10-CM

## 2023-06-14 DIAGNOSIS — Z79.4 TYPE 2 DIABETES MELLITUS WITH HYPERGLYCEMIA, WITH LONG-TERM CURRENT USE OF INSULIN: ICD-10-CM

## 2023-06-14 DIAGNOSIS — N18.31 HYPERTENSIVE KIDNEY DISEASE WITH STAGE 3A CHRONIC KIDNEY DISEASE: ICD-10-CM

## 2023-06-14 DIAGNOSIS — I12.9 HYPERTENSIVE KIDNEY DISEASE WITH STAGE 3A CHRONIC KIDNEY DISEASE: ICD-10-CM

## 2023-06-15 LAB
ALBUMIN SERPL-MCNC: 4.5 G/DL (ref 3.5–5.2)
ALBUMIN/CREAT UR: 17 MG/G CREAT (ref 0–29)
ALBUMIN/GLOB SERPL: 1.7 G/DL
ALP SERPL-CCNC: 82 U/L (ref 39–117)
ALT SERPL-CCNC: 19 U/L (ref 1–41)
AST SERPL-CCNC: 13 U/L (ref 1–40)
BILIRUB SERPL-MCNC: 0.8 MG/DL (ref 0–1.2)
BUN SERPL-MCNC: 20 MG/DL (ref 8–23)
BUN/CREAT SERPL: 14.5 (ref 7–25)
CALCIUM SERPL-MCNC: 10.1 MG/DL (ref 8.6–10.5)
CHLORIDE SERPL-SCNC: 102 MMOL/L (ref 98–107)
CHOLEST SERPL-MCNC: 88 MG/DL (ref 0–200)
CO2 SERPL-SCNC: 25.2 MMOL/L (ref 22–29)
CREAT SERPL-MCNC: 1.38 MG/DL (ref 0.76–1.27)
CREAT UR-MCNC: 63.7 MG/DL
EGFRCR SERPLBLD CKD-EPI 2021: 52.3 ML/MIN/1.73
FRUCTOSAMINE SERPL-SCNC: 323 UMOL/L (ref 0–285)
GLOBULIN SER CALC-MCNC: 2.6 GM/DL
GLUCOSE SERPL-MCNC: 255 MG/DL (ref 65–99)
HDLC SERPL-MCNC: 35 MG/DL (ref 40–60)
IMP & REVIEW OF LAB RESULTS: NORMAL
LDLC SERPL CALC-MCNC: 29 MG/DL (ref 0–100)
MICROALBUMIN UR-MCNC: 11.1 UG/ML
POTASSIUM SERPL-SCNC: 4.4 MMOL/L (ref 3.5–5.2)
PROT SERPL-MCNC: 7.1 G/DL (ref 6–8.5)
REPORT: NORMAL
SODIUM SERPL-SCNC: 138 MMOL/L (ref 136–145)
TRIGL SERPL-MCNC: 141 MG/DL (ref 0–150)
VLDLC SERPL CALC-MCNC: 24 MG/DL (ref 5–40)

## 2023-07-06 ENCOUNTER — OFFICE (AMBULATORY)
Dept: URBAN - METROPOLITAN AREA CLINIC 76 | Facility: CLINIC | Age: 78
End: 2023-07-06

## 2023-07-06 VITALS
WEIGHT: 215 LBS | HEIGHT: 73 IN | OXYGEN SATURATION: 93 % | DIASTOLIC BLOOD PRESSURE: 64 MMHG | HEART RATE: 57 BPM | SYSTOLIC BLOOD PRESSURE: 114 MMHG

## 2023-07-06 DIAGNOSIS — R11.0 NAUSEA: ICD-10-CM

## 2023-07-06 DIAGNOSIS — K30 FUNCTIONAL DYSPEPSIA: ICD-10-CM

## 2023-07-06 DIAGNOSIS — K57.30 DIVERTICULOSIS OF LARGE INTESTINE WITHOUT PERFORATION OR ABS: ICD-10-CM

## 2023-07-06 DIAGNOSIS — Z80.9 FAMILY HISTORY OF MALIGNANT NEOPLASM, UNSPECIFIED: ICD-10-CM

## 2023-07-06 DIAGNOSIS — K76.0 FATTY (CHANGE OF) LIVER, NOT ELSEWHERE CLASSIFIED: ICD-10-CM

## 2023-07-06 DIAGNOSIS — K59.00 CONSTIPATION, UNSPECIFIED: ICD-10-CM

## 2023-07-06 DIAGNOSIS — R10.13 EPIGASTRIC PAIN: ICD-10-CM

## 2023-07-06 DIAGNOSIS — K21.9 GASTRO-ESOPHAGEAL REFLUX DISEASE WITHOUT ESOPHAGITIS: ICD-10-CM

## 2023-07-06 PROBLEM — K31.89 OTHER DISEASES OF STOMACH AND DUODENUM: Status: ACTIVE | Noted: 2022-09-20

## 2023-07-06 PROBLEM — D12.0 BENIGN NEOPLASM OF CECUM: Status: ACTIVE | Noted: 2020-01-14

## 2023-07-06 PROBLEM — K63.5 POLYP OF COLON: Status: ACTIVE | Noted: 2023-01-26

## 2023-07-06 PROBLEM — D12.4 BENIGN NEOPLASM OF DESCENDING COLON: Status: ACTIVE | Noted: 2020-01-14

## 2023-07-06 PROCEDURE — 99213 OFFICE O/P EST LOW 20 MIN: CPT | Performed by: INTERNAL MEDICINE

## 2023-07-31 ENCOUNTER — OFFICE VISIT (OUTPATIENT)
Dept: FAMILY MEDICINE CLINIC | Facility: CLINIC | Age: 78
End: 2023-07-31
Payer: MEDICARE

## 2023-07-31 VITALS
RESPIRATION RATE: 16 BRPM | WEIGHT: 223 LBS | SYSTOLIC BLOOD PRESSURE: 124 MMHG | HEIGHT: 73 IN | BODY MASS INDEX: 29.55 KG/M2 | OXYGEN SATURATION: 97 % | HEART RATE: 57 BPM | DIASTOLIC BLOOD PRESSURE: 78 MMHG | TEMPERATURE: 97.8 F

## 2023-07-31 DIAGNOSIS — R07.89 CHEST WALL PAIN: ICD-10-CM

## 2023-07-31 DIAGNOSIS — R42 DIZZINESS: Primary | ICD-10-CM

## 2023-07-31 DIAGNOSIS — R42 VERTIGO: ICD-10-CM

## 2023-07-31 PROCEDURE — 1160F RVW MEDS BY RX/DR IN RCRD: CPT | Performed by: INTERNAL MEDICINE

## 2023-07-31 PROCEDURE — 1159F MED LIST DOCD IN RCRD: CPT | Performed by: INTERNAL MEDICINE

## 2023-07-31 PROCEDURE — 99214 OFFICE O/P EST MOD 30 MIN: CPT | Performed by: INTERNAL MEDICINE

## 2023-07-31 RX ORDER — MECLIZINE HCL 12.5 MG/1
12.5 TABLET ORAL 3 TIMES DAILY
Qty: 90 TABLET | Refills: 1 | Status: SHIPPED | OUTPATIENT
Start: 2023-07-31

## 2023-07-31 RX ORDER — ERGOCALCIFEROL 1.25 MG/1
CAPSULE ORAL
COMMUNITY
Start: 2023-06-28

## 2023-08-02 LAB
FOLATE SERPL-MCNC: >20 NG/ML (ref 4.78–24.2)
METHYLMALONATE SERPL-SCNC: 219 NMOL/L (ref 0–378)
VIT B12 SERPL-MCNC: 1983 PG/ML (ref 211–946)

## 2023-08-17 ENCOUNTER — TELEPHONE (OUTPATIENT)
Dept: FAMILY MEDICINE CLINIC | Facility: CLINIC | Age: 78
End: 2023-08-17
Payer: MEDICARE

## 2023-08-17 NOTE — TELEPHONE ENCOUNTER
Caller: Earl Marc    Relationship to patient: Self    Best call back number: [phone]    Patient is needing: PATIENT STATES THAT THE MECLIZINE ISNT REALLY WORKING, THAT HE IS STILL HAVING THE DIZZY SPELLS AND ALL IT DOES IS MAKE HIM TIRED. PLEASE REACH OUT AND [facility].     I advised that I really did not have much else.  We can give some consideration to some clonazepam or benzodiazepine.  However that would require an office visit.  He is going to stop meclizine for now.  He has a neurology appointment on September 8

## 2023-09-29 ENCOUNTER — OFFICE VISIT (OUTPATIENT)
Dept: ENDOCRINOLOGY | Age: 78
End: 2023-09-29
Payer: MEDICARE

## 2023-09-29 VITALS
WEIGHT: 222 LBS | TEMPERATURE: 96.9 F | BODY MASS INDEX: 29.42 KG/M2 | SYSTOLIC BLOOD PRESSURE: 130 MMHG | HEIGHT: 73 IN | OXYGEN SATURATION: 97 % | DIASTOLIC BLOOD PRESSURE: 78 MMHG | HEART RATE: 62 BPM

## 2023-09-29 DIAGNOSIS — E11.65 TYPE 2 DIABETES MELLITUS WITH HYPERGLYCEMIA, WITH LONG-TERM CURRENT USE OF INSULIN: ICD-10-CM

## 2023-09-29 DIAGNOSIS — Z79.4 TYPE 2 DIABETES MELLITUS WITH HYPERGLYCEMIA, WITH LONG-TERM CURRENT USE OF INSULIN: ICD-10-CM

## 2023-09-29 DIAGNOSIS — K21.9 GASTROESOPHAGEAL REFLUX DISEASE, UNSPECIFIED WHETHER ESOPHAGITIS PRESENT: Primary | ICD-10-CM

## 2023-09-29 RX ORDER — VENLAFAXINE HYDROCHLORIDE 37.5 MG/1
37.5 CAPSULE, EXTENDED RELEASE ORAL DAILY
Qty: 30 CAPSULE | Refills: 1 | COMMUNITY
Start: 2023-09-08 | End: 2023-11-07

## 2023-09-29 RX ORDER — PANTOPRAZOLE SODIUM 20 MG/1
20 TABLET, DELAYED RELEASE ORAL DAILY
Qty: 90 TABLET | Refills: 1 | Status: SHIPPED | OUTPATIENT
Start: 2023-09-29

## 2023-09-29 NOTE — PROGRESS NOTES
Chief complaint: T2DM    HPI:   - 78 year old male here for management of diabetes mellitus type 2  - Was diagnosed with diabetes in 2006  - Complications include CKD 3, CAD, peripheral neuropathy  - Is currently taking Lantus 36 units daily and Tradjenta 5 mg daily  - He was also on Farxiga and Lyrica but these were stopped by his nephrologist due to dizziness which he states is improving  - He also uses a DexCom CGM  - Is also on atorvastatin 20 mg daily  - He states his A1c increased due to being less active due to dizziness and the death of his friend recently however more recently his BG levels have been better controlled    The following portions of the patient's history were reviewed and updated as appropriate: allergies, current medications, past family history, past medical history, past social history, past surgical history, and problem list.    Objective     Vitals:    09/29/23 1035   BP: 130/78   Pulse: 62   Temp: 96.9 °F (36.1 °C)   SpO2: 97%        Physical Exam  Vitals reviewed.   Constitutional:       Appearance: Normal appearance.   HENT:      Head: Normocephalic and atraumatic.   Eyes:      General: No scleral icterus.  Pulmonary:      Effort: Pulmonary effort is normal. No respiratory distress.   Neurological:      Mental Status: He is alert.      Gait: Gait normal.   Psychiatric:         Mood and Affect: Mood normal.         Behavior: Behavior normal.         Thought Content: Thought content normal.         Judgment: Judgment normal.     Labs/Imaging:  A1c was 9.3% in 9/2023    CGM interpretation  Dates reviewed:  9/16-9/23/23  Data:  Avg of 180, GMI of 7.6%, 54% time in range  Interpretation:  Reasonable glycemic control although he does have postprandial hyperglycemia after dinner    Assessment & Plan   T2DM, controlled, complicated by CKD 3, CAD, peripheral neuropathy  - His A1c has increased however his CGM shows that his BG is better controlled in the last 2 weeks  - Cont. Lantus 36 units  daily and Tradjenta 5 mg daily  - His Lyrica was stopped by his nephrologist    2. Hyperlipidemia  - Cont. Atorvastatin 20 mg daily    - Return to clinic in 4 months

## 2023-10-09 DIAGNOSIS — Z79.4 TYPE 2 DIABETES MELLITUS WITH HYPERGLYCEMIA, WITH LONG-TERM CURRENT USE OF INSULIN: ICD-10-CM

## 2023-10-09 DIAGNOSIS — E11.65 TYPE 2 DIABETES MELLITUS WITH HYPERGLYCEMIA, WITH LONG-TERM CURRENT USE OF INSULIN: ICD-10-CM

## 2023-10-09 NOTE — TELEPHONE ENCOUNTER
Rx Refill Note  Requested Prescriptions     Pending Prescriptions Disp Refills    linagliptin (Tradjenta) 5 MG tablet tablet 90 tablet 1     Sig: Take 1 tablet by mouth Daily. By mouth take one tab daily.      Last office visit with prescribing clinician: 6/28/2023   Last telemedicine visit with prescribing clinician: Visit date not found   Next office visit with prescribing clinician: 1/29/2024                         Would you like a call back once the refill request has been completed: [] Yes [] No    If the office needs to give you a call back, can they leave a voicemail: [] Yes [] No    Jenn Solano  10/09/23, 11:12 EDT

## 2023-10-17 ENCOUNTER — TELEPHONE (OUTPATIENT)
Dept: CARDIOLOGY | Facility: CLINIC | Age: 78
End: 2023-10-17

## 2023-10-17 NOTE — TELEPHONE ENCOUNTER
Caller: Earl Marc     Relationship: SELF    Best call back number: [phone]    What is your medical concern? PT HAS HAD DIZZY SPELLS AND LIGHTHEADEDNESS THE PAST 7 MONTHS WITH NEAR SYNCOPE. PT SEEN NEUROLOGIST WHOM SUGGEST HE GET IN TO CARDIOLOGIST ASAP. PT SCHEDULED FOR A FOLLOW UP IN DECEMBER.     How long has this issue been going on? 7 MONTHS    Is your provider already aware of this issue? NO    Have you been treated for this issue? NO

## 2023-10-27 ENCOUNTER — OFFICE VISIT (OUTPATIENT)
Dept: CARDIOLOGY | Facility: CLINIC | Age: 78
End: 2023-10-27
Payer: MEDICARE

## 2023-10-27 VITALS
DIASTOLIC BLOOD PRESSURE: 66 MMHG | HEART RATE: 62 BPM | WEIGHT: 223 LBS | BODY MASS INDEX: 29.55 KG/M2 | SYSTOLIC BLOOD PRESSURE: 126 MMHG | HEIGHT: 73 IN

## 2023-10-27 DIAGNOSIS — I10 PRIMARY HYPERTENSION: ICD-10-CM

## 2023-10-27 DIAGNOSIS — R55 POSTURAL DIZZINESS WITH PRESYNCOPE: ICD-10-CM

## 2023-10-27 DIAGNOSIS — R42 POSTURAL DIZZINESS WITH PRESYNCOPE: ICD-10-CM

## 2023-10-27 DIAGNOSIS — R42 DIZZINESS: ICD-10-CM

## 2023-10-27 DIAGNOSIS — E78.5 DYSLIPIDEMIA: ICD-10-CM

## 2023-10-27 DIAGNOSIS — R07.2 PRECORDIAL PAIN: Primary | ICD-10-CM

## 2023-10-27 DIAGNOSIS — I25.10 CORONARY ARTERY DISEASE INVOLVING NATIVE CORONARY ARTERY OF NATIVE HEART WITHOUT ANGINA PECTORIS: ICD-10-CM

## 2023-10-27 PROCEDURE — 93000 ELECTROCARDIOGRAM COMPLETE: CPT | Performed by: NURSE PRACTITIONER

## 2023-10-27 PROCEDURE — 99214 OFFICE O/P EST MOD 30 MIN: CPT | Performed by: NURSE PRACTITIONER

## 2023-10-27 RX ORDER — MULTIVITAMIN WITH IRON
250 TABLET ORAL NIGHTLY
COMMUNITY

## 2023-10-27 RX ORDER — OLOPATADINE HYDROCHLORIDE 1 MG/ML
1 SOLUTION/ DROPS OPHTHALMIC DAILY
COMMUNITY

## 2023-10-27 RX ORDER — MECLIZINE HYDROCHLORIDE 25 MG/1
25 TABLET ORAL 3 TIMES DAILY PRN
COMMUNITY
Start: 2023-10-13

## 2023-10-27 NOTE — PROGRESS NOTES
Subjective:        Earl Marc is a 78 y.o. male who here for follow up    No chief complaint on file.      HPI     Earl Marc is a 78-year-old male who is this provider.  He has a history of CAD, asthma, CKD, arthritis, diabetes mellitus, dyslipidemia, and hypertension.  Here today due to dizziness.  His nephrologist.  Lyrica and Farxiga due to his dizziness and it has improved some since then.  Is also placed on meclizine but it did not work much.  He was still having dizziness after it was began.  He is following neurology for of his vestibular migraines, double vision and vertigo.    Previous cardiac cath in 2021 LAD had a proximal LAD stent widely patent with midportion 70% haziness was reduced to 0%.  Minimal distal irregularity including the diagonal and  branches.  Left circumflex is nondominant and normal.  RCA is codominant with early atherosclerotic plaque.  Successful angioplasty and stent to mid LAD 70% with haziness reduced to 0%.   Echo in 2021 revealed EF 58.9%, LV diastolic function was normal and no evidence of pericardial effusion.    The following portions of the patient's history were reviewed and updated as appropriate: allergies, current medications, past family history, past medical history, past social history, past surgical history and problem list.    Past Medical History:   Diagnosis Date    Abnormal cardiovascular stress test     Acid reflux     Arthritis     Asthma     Cancer     skin     Chronic kidney disease     Colon polyp     Congenital heart disease     COPD (chronic obstructive pulmonary disease) 2021    Old age COPD/2D hand Smoke emphysema    Coronary artery disease     Diabetes mellitus     Diabetic neuropathy associated with type 2 diabetes mellitus     Diabetic peripheral neuropathy 03/10/2016    Dyslipidemia     Erectile dysfunction     Fatty liver disease, nonalcoholic     Gastritis     Glaucoma     both eyes    Hyperlipidemia     Hypertension      Hypogonadism male     Infectious viral hepatitis Hep A 1959    Drank water from broken water line and Grandparents house.    Myocardial infarction     Type 2 diabetes mellitus          reports that he has never smoked. He has been exposed to tobacco smoke. He has never used smokeless tobacco. He reports that he does not drink alcohol and does not use drugs.     Family History   Problem Relation Age of Onset    Breast cancer Daughter     Heart attack Mother          10/31/1980    Hypertension Mother     Heart disease Mother     Thyroid disease Mother     Lupus Mother     Early death Mother     Miscarriages / Stillbirths Mother         Miscarriage     Heart failure Mother     Heart attack Brother     Heart disease Brother     Hyperlipidemia Brother     Cancer Brother         Due to Agent [place], [place]    Hypertension Brother     Depression Father     COPD Father     Stroke Maternal Grandmother     Cancer Maternal Grandmother         Lung Cancer non-smoker    Diabetes Brother     Heart disease Brother     Hypertension Brother     Kidney disease Brother     Heart disease Brother     Hypertension Brother        ROS     Review of Systems  Constitutional: No wt loss, fever, fatigue  Gastrointestinal: No nausea, abdominal pain  Behavioral/Psych: No insomnia or anxiety  Cardiovascular: +chest discomfort which feels like a burning sensation.  +shortness of breath.  Positive for occasional dizziness at times, and with blackouts      Objective:           Constitutional:       Appearance: Well-developed.   Neck:      Vascular: No JVD.      Trachea: No tracheal deviation.   Pulmonary:      Effort: Pulmonary effort is normal. No respiratory distress.      Breath sounds: Normal breath sounds. No stridor. No decreased breath sounds. No wheezing. No rhonchi. No rales.   Chest:      Chest wall: Not tender to palpatation.   Cardiovascular:      Normal rate. Regular rhythm.      No gallop.    Pulses:     Intact distal  pulses.   Edema:     Peripheral edema absent.   Abdominal:      General: Bowel sounds are normal. There is no distension.      Palpations: Abdomen is soft.      Tenderness: There is no abdominal tenderness.   Skin:     General: Skin is warm.   Neurological:      Mental Status: Alert and oriented to person, place, and time.      Deep Tendon Reflexes: Reflexes are normal and symmetric.           ECG 12 Lead    Date/Time: 10/27/2023 8:59 AM  Performed by: Lidia Thayer APRN    Authorized by: Lidia Thayer APRN  Comparison: compared with previous ECG from 12/8/2022  Similar to previous ECG  Rhythm: sinus rhythm  Rate: normal  BPM: 60    Clinical impression: non-specific ECG            HEMODYNAMIC / ANGIOGRAPHIC DATA:    Left ventricular end diastolic pressure was 10 mmHg.    The left main is normal left main.  The left anterior descending artery is *.Left anterior descending had a proximal LAD stent widely patent midportion had 70% with haziness was reduced to 0% with 3.0/12 Xience stent, minimal distal irregularity including the diagonal and  branches  The left circumflex is nondominant and normal.  The right coronary artery is codominant with early atherosclerotic plaque.  Successful angioplasty and stent to the mid LAD 70% with haziness reduced to 0% with 3.0/12 Xience stent     KELVIN FLOW    PRE....3...       POST....3..     TYPE OF LESION........B.....     RECOMMENDATIONS:  Post-procedure care will focus on prevention of any ischemic events and congestive complications.  Aggressive risk factor modification will be carried out.  Importance of taking dual antiplatelets for one year  has been explained, risk of stent thrombosis leading to the acute MI, which carries high morbidity and mortality has been explained     Discontinuation or interruptions of these medications should be under the strict guidance of appropriate health professionalInterpretation Summary    Left ventricular ejection  fraction is normal. (Calculated EF = 60%).  Myocardial perfusion imaging indicates a normal myocardial perfusion study with no evidence of ischemia.  Impressions are consistent with a low risk study.Interpretation Summary    Calculated left ventricular EF = 58.9% Estimated left ventricular EF was in agreement with the calculated left ventricular EF.  Left ventricular diastolic function was normal.  There is no evidence of pericardial effusion.       Current Outpatient Medications:     Alcohol Swabs ([employer] Prep) 70 % pads, Place 1 application on the skin as directed by provider Daily., Disp: 400 each, Rfl: 2    AmLactin 12 % lotion, Apply  topically to the appropriate area as directed As Needed for Dry Skin., Disp: 500 g, Rfl: 3    amLODIPine (NORVASC) 5 MG tablet, Take 1 tablet by mouth Daily., Disp: 90 tablet, Rfl: 1    ascorbic acid (VITAMIN C) 1000 MG tablet, Take 1 tablet by mouth Daily., Disp: , Rfl:     aspirin 81 MG EC tablet, Take 1 tablet by mouth Daily., Disp: 90 tablet, Rfl: 3    atorvastatin (LIPITOR) 20 MG tablet, Take 1 tablet by mouth Every Night., Disp: 90 tablet, Rfl: 1    B-D UF III MINI PEN NEEDLES 31G X 5 MM misc, Use once daily with Lantus Pen for injection of insulin, Disp: 100 each, Rfl: 3    Carboxymeth-Glycerin-Polysorb 0.5-1-0.5 % solution, Apply  to eye(s) as directed by provider Daily., Disp: , Rfl:     Cyanocobalamin (Vitamin B-12) 1000 MCG sublingual tablet, Place  under the tongue., Disp: , Rfl:     fluticasone (FLONASE) 50 MCG/ACT nasal spray, 1 spray into the nostril(s) as directed by provider Daily As Needed for Allergies., Disp: , Rfl:     Folic Acid 0.8 MG capsule, Take 1 tablet by mouth Daily. 0.4 2 tab daily, Disp: , Rfl:     glucose (DEX4) 4 GM chewable tablet, Chew 4 tablets., Disp: , Rfl:     glucose blood test strip, E11.65 Precision Xtra Blood Glucose In Vitro Strip; Patient Sig: Precision Xtra Blood Glucose In Vitro Strip TEST 2  TIMES DAILY, Disp: 200 each, Rfl: 5     hydrocortisone 2.5 % ointment, Apply  topically to the appropriate area as directed 2 (Two) Times a Day., Disp: 20 g, Rfl: 5    Insulin Glargine (Lantus SoloStar) 100 UNIT/ML injection pen, Inject 34 Units under the skin into the appropriate area as directed Daily., Disp: 30 mL, Rfl: 1    latanoprost (XALATAN) 0.005 % ophthalmic solution, Administer 1 drop to both eyes Every Night., Disp: , Rfl:     linagliptin (Tradjenta) 5 MG tablet tablet, Take 1 tablet by mouth Daily. By mouth take one tab daily., Disp: 90 tablet, Rfl: 1    melatonin 5 MG tablet tablet, Take 1 tablet by mouth., Disp: , Rfl:     metoprolol tartrate (LOPRESSOR) 25 MG tablet, Take 1 tablet by mouth 2 (Two) Times a Day., Disp: 180 tablet, Rfl: 1    Monolet Lancets misc, E11.65 Use to test BG 2 times daily, Disp: 200 each, Rfl: 5    niacin 500 MG tablet, Take 1 tablet by mouth., Disp: , Rfl:     nitroglycerin (Nitrostat) 0.4 MG SL tablet, Place 1 tablet under the tongue Every 5 (Five) Minutes As Needed for Chest Pain. Indications: Acute Angina Pectoris, Disp: 25 tablet, Rfl: 3    pantoprazole (PROTONIX) 20 MG EC tablet, Take 1 tablet by mouth Daily., Disp: 90 tablet, Rfl: 1    venlafaxine XR (EFFEXOR-XR) 37.5 MG 24 hr capsule, Take 1 capsule by mouth Daily., Disp: 30 capsule, Rfl: 1    VIAGRA 100 MG tablet, Take 1 tablet by mouth As Needed for erectile dysfunction (1 tablet prior to planned intercourse.)., Disp: 24 tablet, Rfl: 3    Zinc 30 MG tablet, Take 50 mg by mouth Daily., Disp: , Rfl:      Assessment:        Patient Active Problem List   Diagnosis    Bell's palsy    Persistent insomnia    Osteoarthritis of cervical spine    Diabetic peripheral neuropathy    Dyslipidemia    Steatosis of liver    Fibromyalgia    Gastroesophageal reflux disease without esophagitis    Hypertensive kidney disease with stage 3a chronic kidney disease    Hypogonadism in male    Renal insufficiency    Vitamin D deficiency    Benign non-nodular prostatic hyperplasia  with lower urinary tract symptoms    S/P drug eluting coronary stent placement    Coronary artery disease involving native coronary artery of native heart    Malignant neoplasm of skin    Type 2 diabetes mellitus with hyperglycemia, with long-term current use of insulin    Diabetes mellitus    Chronic insomnia    Other specified glaucoma    Vertigo    Epigastric pain    Chest pain with high risk for cardiac etiology    Precordial pain    S/P arthroscopy of shoulder    Peripheral neuropathy    Stage 3b chronic kidney disease               Plan:   1.  CAD history of stents: +chest discomfort which feels like a burning sensation.  +shortness of breath.  Positive for occasional dizziness at times, and with blackouts. Previous testing as above.    Risk reduction for the coronary artery disease, controlling the blood pressure, blood sugar management, cholesterol management, exercise, stress management, and proper compliance with medications and follow-up has been discussed      2.  Hypertension: In the office his blood pressure is stable .  His EKG shows SR.    Educated patient on exercising for at least 30 minutes a day for 2 to 3 days a week. Importance of controlling hypertension and blood pressure checkup on the regular basis has been explained. Hypertension as a silent killer has been discussed. Risk reduction of the weight and regular exercises to control the hypertension has been explained.      3.  Hyperlipidemia: He states his primary care manages his labs and cholesterol.    Risk of the hyperlipidemia, importance of the treatment has been explained. Pros and cons of the statins has been explained. Regular blood workup as well as side effects including the liver failure, myelopathy death has been explained.    4. Dizziness and presyncope: MRA of head/neck ok. Reviewed home blood pressures as well.  Stress test, echo, and holter    .stress             No diagnosis found.    There are no diagnoses linked to this  encounter.    COUNSELING: shaila Hernández was given to patient for the following topics: diagnostic results, risk factor reductions, impressions, risks and benefits of treatment options and importance of treatment compliance .       SMOKING COUNSELING: denies    +chest discomfort which feels like a burning sensation.  +shortness of breath.  Positive for occasional dizziness at times, and with blackouts. He will have a 14 day holter, stress test and echo.    Sincerely,   JL Oconnor  [facility]  10/27/23  08:39 EDT    EMR Dragon/Transcription disclaimer:   Much of this encounter note is an electronic transcription/translation of spoken language to printed text. The electronic translation of spoken language may permit erroneous, or at times, nonsensical words or phrases to be inadvertently transcribed; Although I have reviewed the note for such errors, some may still exist.

## 2023-10-30 ENCOUNTER — HOSPITAL ENCOUNTER (OUTPATIENT)
Dept: CARDIOLOGY | Facility: HOSPITAL | Age: 78
Discharge: HOME | End: 2023-10-30
Payer: MEDICARE

## 2023-10-30 ENCOUNTER — HOSPITAL ENCOUNTER (OUTPATIENT)
Dept: CARDIOLOGY | Facility: HOSPITAL | Age: 78
Discharge: HOME | End: 2023-10-30
Admitting: NURSE PRACTITIONER
Payer: MEDICARE

## 2023-10-30 VITALS — BODY MASS INDEX: 29.55 KG/M2 | HEIGHT: 73 IN | WEIGHT: 223 LBS

## 2023-10-30 VITALS — HEART RATE: 56 BPM | DIASTOLIC BLOOD PRESSURE: 65 MMHG | SYSTOLIC BLOOD PRESSURE: 152 MMHG

## 2023-10-30 LAB
BH CV REST NUCLEAR ISOTOPE DOSE: 10.9 MCI
BH CV STRESS BP STAGE 1: NORMAL
BH CV STRESS COMMENTS STAGE 1: NORMAL
BH CV STRESS DOSE REGADENOSON STAGE 1: 0.4
BH CV STRESS DURATION MIN STAGE 1: 0
BH CV STRESS DURATION SEC STAGE 1: 10
BH CV STRESS HR STAGE 1: 63
BH CV STRESS NUCLEAR ISOTOPE DOSE: 32.1 MCI
BH CV STRESS PROTOCOL 1: NORMAL
BH CV STRESS RECOVERY BP: NORMAL MMHG
BH CV STRESS RECOVERY HR: 67 BPM
BH CV STRESS STAGE 1: 1
LV EF NUC BP: 66 %
MAXIMAL PREDICTED HEART RATE: 142 BPM
PERCENT MAX PREDICTED HR: 44.37 %
STRESS BASELINE BP: NORMAL MMHG
STRESS BASELINE HR: 59 BPM
STRESS PERCENT HR: 52 %
STRESS POST ESTIMATED WORKLOAD: 1 METS
STRESS POST EXERCISE DUR MIN: 1 MIN
STRESS POST EXERCISE DUR SEC: 0 SEC
STRESS POST PEAK BP: NORMAL MMHG
STRESS POST PEAK HR: 63 BPM
STRESS TARGET HR: 121 BPM

## 2023-10-30 PROCEDURE — 93306 TTE W/DOPPLER COMPLETE: CPT

## 2023-10-30 PROCEDURE — 78452 HT MUSCLE IMAGE SPECT MULT: CPT

## 2023-10-30 PROCEDURE — A9500 TC99M SESTAMIBI: HCPCS | Performed by: INTERNAL MEDICINE

## 2023-10-30 PROCEDURE — 93017 CV STRESS TEST TRACING ONLY: CPT

## 2023-10-30 PROCEDURE — 93016 CV STRESS TEST SUPVJ ONLY: CPT

## 2023-10-30 PROCEDURE — 78452 HT MUSCLE IMAGE SPECT MULT: CPT | Performed by: INTERNAL MEDICINE

## 2023-10-30 PROCEDURE — 93018 CV STRESS TEST I&R ONLY: CPT | Performed by: INTERNAL MEDICINE

## 2023-10-30 PROCEDURE — 93306 TTE W/DOPPLER COMPLETE: CPT | Performed by: INTERNAL MEDICINE

## 2023-10-30 PROCEDURE — 25010000002 REGADENOSON 0.4 MG/5ML SOLUTION: Performed by: INTERNAL MEDICINE

## 2023-10-30 PROCEDURE — 0 TECHNETIUM SESTAMIBI: Performed by: INTERNAL MEDICINE

## 2023-10-30 RX ORDER — REGADENOSON 0.08 MG/ML
0.4 INJECTION, SOLUTION INTRAVENOUS
Status: COMPLETED | OUTPATIENT
Start: 2023-10-30 | End: 2023-10-30

## 2023-10-30 RX ADMIN — TECHNETIUM TC 99M SESTAMIBI 1 DOSE: 1 INJECTION INTRAVENOUS at 07:56

## 2023-10-30 RX ADMIN — REGADENOSON 0.4 MG: 0.08 INJECTION, SOLUTION INTRAVENOUS at 10:30

## 2023-10-30 RX ADMIN — TECHNETIUM TC 99M SESTAMIBI 1 DOSE: 1 INJECTION INTRAVENOUS at 10:30

## 2023-10-31 LAB
AORTIC DIMENSIONLESS INDEX: 0.9 (DI)
ASCENDING AORTA: 3.4 CM
BH CV ECHO MEAS - ACS: 2.4 CM
BH CV ECHO MEAS - AO MAX PG: 6.3 MMHG
BH CV ECHO MEAS - AO MEAN PG: 3 MMHG
BH CV ECHO MEAS - AO ROOT DIAM: 3.8 CM
BH CV ECHO MEAS - AO V2 MAX: 125 CM/SEC
BH CV ECHO MEAS - AO V2 VTI: 30.3 CM
BH CV ECHO MEAS - AVA(I,D): 2.8 CM2
BH CV ECHO MEAS - EDV(CUBED): 91.1 ML
BH CV ECHO MEAS - EDV(MOD-SP2): 95 ML
BH CV ECHO MEAS - EDV(MOD-SP4): 111 ML
BH CV ECHO MEAS - EF(MOD-BP): 63.7 %
BH CV ECHO MEAS - EF(MOD-SP2): 64.2 %
BH CV ECHO MEAS - EF(MOD-SP4): 63.1 %
BH CV ECHO MEAS - ESV(CUBED): 24.4 ML
BH CV ECHO MEAS - ESV(MOD-SP2): 34 ML
BH CV ECHO MEAS - ESV(MOD-SP4): 41 ML
BH CV ECHO MEAS - FS: 35.6 %
BH CV ECHO MEAS - IVS/LVPW: 1.22 CM
BH CV ECHO MEAS - IVSD: 1.1 CM
BH CV ECHO MEAS - LAT PEAK E' VEL: 6.5 CM/SEC
BH CV ECHO MEAS - LV MASS(C)D: 153.3 GRAMS
BH CV ECHO MEAS - LV MAX PG: 3.9 MMHG
BH CV ECHO MEAS - LV MEAN PG: 2 MMHG
BH CV ECHO MEAS - LV V1 MAX: 98.3 CM/SEC
BH CV ECHO MEAS - LV V1 VTI: 27.4 CM
BH CV ECHO MEAS - LVIDD: 4.5 CM
BH CV ECHO MEAS - LVIDS: 2.9 CM
BH CV ECHO MEAS - LVOT AREA: 3.1 CM2
BH CV ECHO MEAS - LVOT DIAM: 2 CM
BH CV ECHO MEAS - LVPWD: 0.9 CM
BH CV ECHO MEAS - MED PEAK E' VEL: 7.6 CM/SEC
BH CV ECHO MEAS - MV A DUR: 0.16 SEC
BH CV ECHO MEAS - MV A MAX VEL: 109 CM/SEC
BH CV ECHO MEAS - MV DEC SLOPE: 291 CM/SEC2
BH CV ECHO MEAS - MV DEC TIME: 301 SEC
BH CV ECHO MEAS - MV E MAX VEL: 77.1 CM/SEC
BH CV ECHO MEAS - MV E/A: 0.71
BH CV ECHO MEAS - MV MAX PG: 6 MMHG
BH CV ECHO MEAS - MV MEAN PG: 2 MMHG
BH CV ECHO MEAS - MV P1/2T: 94.5 MSEC
BH CV ECHO MEAS - MV V2 VTI: 33.7 CM
BH CV ECHO MEAS - MVA(P1/2T): 2.33 CM2
BH CV ECHO MEAS - MVA(VTI): 2.6 CM2
BH CV ECHO MEAS - PA ACC TIME: 0.17 SEC
BH CV ECHO MEAS - PA V2 MAX: 134 CM/SEC
BH CV ECHO MEAS - PULM A REVS DUR: 0.17 SEC
BH CV ECHO MEAS - PULM A REVS VEL: 23.8 CM/SEC
BH CV ECHO MEAS - PULM DIAS VEL: 55.6 CM/SEC
BH CV ECHO MEAS - PULM S/D: 1.5
BH CV ECHO MEAS - PULM SYS VEL: 83.2 CM/SEC
BH CV ECHO MEAS - QP/QS: 0.77
BH CV ECHO MEAS - RAP SYSTOLE: 3 MMHG
BH CV ECHO MEAS - RV MAX PG: 2.18 MMHG
BH CV ECHO MEAS - RV V1 MAX: 73.9 CM/SEC
BH CV ECHO MEAS - RV V1 VTI: 17.4 CM
BH CV ECHO MEAS - RVOT DIAM: 2.2 CM
BH CV ECHO MEAS - RVSP: 22 MMHG
BH CV ECHO MEAS - SV(LVOT): 86.1 ML
BH CV ECHO MEAS - SV(MOD-SP2): 61 ML
BH CV ECHO MEAS - SV(MOD-SP4): 70 ML
BH CV ECHO MEAS - SV(RVOT): 66.1 ML
BH CV ECHO MEAS - TAPSE (>1.6): 2.49 CM
BH CV ECHO MEAS - TR MAX PG: 19 MMHG
BH CV ECHO MEAS - TR MAX VEL: 218 CM/SEC
BH CV ECHO MEASUREMENTS AVERAGE E/E' RATIO: 10.94
BH CV VAS BP RIGHT ARM: NORMAL MMHG
BH CV XLRA - RV BASE: 3.5 CM
BH CV XLRA - RV LENGTH: 6.7 CM
BH CV XLRA - RV MID: 3.1 CM
BH CV XLRA - TDI S': 11.2 CM/SEC
LEFT ATRIUM VOLUME INDEX: 31.1 ML/M2
SINUS: 3.8 CM
STJ: 3.1 CM

## 2023-11-01 ENCOUNTER — OFFICE VISIT (OUTPATIENT)
Dept: FAMILY MEDICINE CLINIC | Facility: CLINIC | Age: 78
End: 2023-11-01
Payer: MEDICARE

## 2023-11-01 VITALS
SYSTOLIC BLOOD PRESSURE: 120 MMHG | OXYGEN SATURATION: 97 % | HEART RATE: 63 BPM | HEIGHT: 73 IN | RESPIRATION RATE: 16 BRPM | BODY MASS INDEX: 30.35 KG/M2 | WEIGHT: 229 LBS | DIASTOLIC BLOOD PRESSURE: 68 MMHG

## 2023-11-01 DIAGNOSIS — R26.89 IMBALANCE: ICD-10-CM

## 2023-11-01 DIAGNOSIS — R42 DIZZINESS: Primary | ICD-10-CM

## 2023-11-01 DIAGNOSIS — L98.9 SKIN LESION: ICD-10-CM

## 2023-11-01 NOTE — PROGRESS NOTES
Subjective chief complaint is follow-up on dizziness  Earl Marc is a 78 y.o. male.     History of Present Illness Earl is here today for follow-up on the dizziness.  He has had a fairly extensive work-up.  He recently had a stress test and echocardiogram that looked okay.  He has seen an ear nose and throat doctor who did not think it was vestibular.  He has seen a neurologist and had MRI and MRA that really did not look bad.  They are treating him for what sounds like a vertiginous migraine type of issue.  They did put him on some Effexor which may or may not of help.  He had some recent blood work from his urologist.  He is low on testosterone and therefore his estrogen level is low but I do not think that is anything to worry about.  I advised him that we basically worry about elevated estrogen levels if someone is on testosterone replacement.    The following portions of the patient's history were reviewed and updated as appropriate: allergies, current medications, and problem list.    Review of Systems   Neurological:  Positive for dizziness.     Objective   Physical Exam  Vitals and nursing note reviewed.   Constitutional:       Appearance: Normal appearance.   Cardiovascular:      Rate and Rhythm: Normal rate and regular rhythm.      Pulses: Normal pulses.      Heart sounds: No murmur heard.     No friction rub. No gallop.   Pulmonary:      Effort: Pulmonary effort is normal.      Breath sounds: No wheezing, rhonchi or rales.   Skin:     Comments: The right side of his neck there is a picked at skin lesion which looks to be more of a comedone.   Neurological:      General: No focal deficit present.      Mental Status: He is alert.      Cranial Nerves: No cranial nerve deficit.       Assessment & Plan   Diagnoses and all orders for this visit:    1. Dizziness (Primary)  -     Ambulatory Referral to Physical Therapy Evaluate and treat, Neuro, Vestibular    2. Imbalance  -     Ambulatory Referral to  Physical Therapy Evaluate and treat, Neuro, Vestibular    3. Skin lesion  -     Ambulatory Referral to Dermatology    Other orders  -     COVID-19 F23 (Pfizer) 12yrs+ (COMIRNATY)    Earl is here today for follow-up.  His work-up to date has been fairly unremarkable.  I did advise we would refer him to physical therapy and see if they can do anything about his balance and dizziness.  We will also get him a dermatology appointment.

## 2023-11-06 DIAGNOSIS — E78.5 DYSLIPIDEMIA: ICD-10-CM

## 2023-11-06 RX ORDER — ATORVASTATIN CALCIUM 20 MG/1
20 TABLET, FILM COATED ORAL NIGHTLY
Qty: 90 TABLET | Refills: 1 | Status: SHIPPED | OUTPATIENT
Start: 2023-11-06

## 2023-11-06 NOTE — TELEPHONE ENCOUNTER
Physical Therapy Daily Treatment    Visit Count: 3   Plan of Care: 8/7/2019 Through: 10/30/2019  Insurance Information: Payor: [facility]  Authorization Needed: no  Maximum Visit Limit Per Year: 15 visits per calendar year for PT  CoPay: $0  Notes: no PTA  2/7/2020  Referred by: Delaney Johnson MD; Next provider visit (if known/scheduled): unknown   Medical Diagnosis (from order):       724.5 (ICD-9-CM) - M54.9 (ICD-10-CM) - Back pain, unspecified back location, unspecified back pain laterality, unspecified chronicity         Date of onset/injury: chronic   Diagnosis Precautions: none   Chart reviewed at time of initial evaluation (relevant co-morbidities, allergies, tests and medications listed): 2018 lumbar x-ray available        SUBJECTIVE   Patient reports minimal pain currently, feeling better than last week so she is improving, no pinching pain in the back, exercise is going well.        OBJECTIVE     Negative march test for sacroiliac dysfunction     Therapeutic Exercise:   Patient performed ball bridge x 30\" and x 1' - verbal and manual instruction for abdominal brace focus, ball bridge with hamstring curl 2 sets x     10 each, posterior pelvic tilt - verbal and manual instruction for form, posterior pelvic tilt with single to double knee march x 20, posterior pelvic tilt with alternating knee extension (bike) x 20    Skilled input: as detailed above    Home Program:   right supine anterior innominate self muscle energy correction, bridge, body weight squat, added posterior pelvic tilt with alternating knee extension (bike)     Writer verbally educated the patient and received verbal consent from the patient on hand placement, positioning of patient, and techniques to be performed today including treatment as described above and how they are pertinent to the patient's plan of care.      Suggestions for next session as indicated: progress per plan of care, manual as indicated, check PSIS, progress cores stability  Rx Refill Note  Requested Prescriptions     Pending Prescriptions Disp Refills    atorvastatin (LIPITOR) 20 MG tablet 90 tablet 1     Sig: Take 1 tablet by mouth Every Night.      Last office visit with prescribing clinician: 6/28/2023   Last telemedicine visit with prescribing clinician: Visit date not found   Next office visit with prescribing clinician: 1/29/2024                         Would you like a call back once the refill request has been completed: [] Yes [] No    If the office needs to give you a call back, can they leave a voicemail: [] Yes [] No    Darline Padilla MA  11/06/23, 11:39 EST   as tolerated     ASSESSMENT   Patient tolerated therapy well today, continues to improve with core stability progression.     Pain after treatment (patient reported, 0-10 scale): 0  Result of above outlined education: Verbalizes understanding and Demonstrates understanding    THERAPY DAILY BILLING   Insurance: [employer]     Evaluation Procedures:  No evaluation codes were used on this date of service    Timed Procedures:  Therapeutic Exercise, 34 minutes    Untimed Procedures:  No untimed codes were used on this date of service    Total Treatment Time: 34 minutes

## 2023-11-08 DIAGNOSIS — E11.9 TYPE 2 DIABETES MELLITUS WITHOUT COMPLICATION, WITHOUT LONG-TERM CURRENT USE OF INSULIN: ICD-10-CM

## 2023-11-08 RX ORDER — FLURBIPROFEN SODIUM 0.3 MG/ML
SOLUTION/ DROPS OPHTHALMIC
Qty: 100 EACH | Refills: 3 | Status: SHIPPED | OUTPATIENT
Start: 2023-11-08

## 2023-11-08 RX ORDER — FLURBIPROFEN SODIUM 0.3 MG/ML
SOLUTION/ DROPS OPHTHALMIC
Qty: 100 EACH | Refills: 3 | Status: SHIPPED | OUTPATIENT
Start: 2023-11-08 | End: 2023-11-08 | Stop reason: SDUPTHER

## 2023-11-08 NOTE — TELEPHONE ENCOUNTER
Rx Refill Note  Requested Prescriptions     Pending Prescriptions Disp Refills    B-D UF III MINI PEN NEEDLES 31G X 5 MM misc 100 each 3     Sig: Use once daily with Lantus Pen for injection of insulin      Last office visit with prescribing clinician: 6/28/2023   Last telemedicine visit with prescribing clinician: Visit date not found   Next office visit with prescribing clinician: 1/29/2024                         Would you like a call back once the refill request has been completed: [] Yes [] No    If the office needs to give you a call back, can they leave a voicemail: [] Yes [] No    Jenn Solano  11/08/23, 12:29 EST

## 2023-11-21 ENCOUNTER — HOSPITAL ENCOUNTER (OUTPATIENT)
Dept: PHYSICAL THERAPY | Facility: HOSPITAL | Age: 78
Discharge: HOME | End: 2023-11-21
Admitting: INTERNAL MEDICINE
Payer: MEDICARE

## 2023-11-21 DIAGNOSIS — R29.898 WEAKNESS OF LOWER EXTREMITY, UNSPECIFIED LATERALITY: ICD-10-CM

## 2023-11-21 DIAGNOSIS — R26.89 BALANCE PROBLEM: ICD-10-CM

## 2023-11-21 DIAGNOSIS — R42 VERTIGO: Primary | ICD-10-CM

## 2023-11-21 DIAGNOSIS — I99.8 BLOOD PRESSURE INSTABILITY: ICD-10-CM

## 2023-11-21 PROCEDURE — 97162 PT EVAL MOD COMPLEX 30 MIN: CPT

## 2023-11-21 NOTE — THERAPY EVALUATION
Outpatient Physical Therapy Vestibular Initial Evaluation  [facility]     Patient Name: Earl Marc  : 1945  MRN: [id number]  Today's Date: 2023      Visit Date: 2023    Patient Active Problem List   Diagnosis    Bell's palsy    Persistent insomnia    Osteoarthritis of cervical spine    Diabetic peripheral neuropathy    Dyslipidemia    Steatosis of liver    Fibromyalgia    Gastroesophageal reflux disease without esophagitis    Hypertensive kidney disease with stage 3a chronic kidney disease    Hypogonadism in male    Renal insufficiency    Vitamin D deficiency    Benign non-nodular prostatic hyperplasia with lower urinary tract symptoms    S/P drug eluting coronary stent placement    Coronary artery disease involving native coronary artery of native heart    Malignant neoplasm of skin    Type 2 diabetes mellitus with hyperglycemia, with long-term current use of insulin    Diabetes mellitus    Chronic insomnia    Other specified glaucoma    Vertigo    Epigastric pain    Chest pain with high risk for cardiac etiology    Precordial pain    S/P arthroscopy of shoulder    Peripheral neuropathy    Stage 3b chronic kidney disease        Past Medical History:   Diagnosis Date    Abnormal cardiovascular stress test     Acid reflux     Arthritis     Asthma     Cancer     skin     Chronic kidney disease     Colon polyp     Congenital heart disease     COPD (chronic obstructive pulmonary disease)     Old age COPD/2D hand Smoke emphysema    Coronary artery disease     Diabetes mellitus     Diabetic neuropathy associated with type 2 diabetes mellitus     Diabetic peripheral neuropathy 03/10/2016    Dyslipidemia     Erectile dysfunction     Fatty liver disease, nonalcoholic     Gastritis     Glaucoma     both eyes    Hyperlipidemia     Hypertension     Hypogonadism male     Infectious viral hepatitis Hep A 1959    Drank water from broken water line and Grandparents house.    Migraines 2023     Myocardial infarction 2018    Type 2 diabetes mellitus         Past Surgical History:   Procedure Laterality Date    CARDIAC CATHETERIZATION N/A 03/25/2016    Procedure: Left Heart Cath;  Surgeon: Maria E Gilbert MD;  Location:  [facility];  Service:     CARDIAC CATHETERIZATION N/A 03/25/2016    Procedure: Coronary angiography;  Surgeon: Maria E Gilbert MD;  Location:  [facility];  Service:     CARDIAC CATHETERIZATION N/A 03/28/2016    Procedure: Coronary angiography;  Surgeon: Maria E Gilbert MD;  Location:  [facility];  Service:     CARDIAC CATHETERIZATION N/A 03/28/2016    Procedure: Stent MOISE coronary;  Surgeon: Maria E Gilbert MD;  Location:  [facility];  Service:     CARDIAC CATHETERIZATION N/A 10/18/2018    Procedure: Coronary angiography  no LV gram d/t CKD;  Surgeon: Maria E Gilbert MD;  Location:  [facility];  Service: Cardiology    CARDIAC CATHETERIZATION N/A 10/18/2018    Procedure: Left Heart Cath;  Surgeon: Maria E Gilbert MD;  [facility];  Service: Cardiology    CARDIAC CATHETERIZATION N/A 10/18/2018    Procedure: Stent MOISE coronary;  Surgeon: Maria E Gilbert MD;  [facility];  Service: Cardiology    CARDIAC CATHETERIZATION N/A 05/28/2021    Procedure: Coronary angiography;  Surgeon: Maria E Gilbert MD;  [facility];  Service: Cardiovascular;  Laterality: N/A;    CARDIAC CATHETERIZATION N/A 05/28/2021    Procedure: Left Heart Cath;  Surgeon: Maria E Gilbert MD;  [facility];  Service: Cardiovascular;  Laterality: N/A;    CARDIAC CATHETERIZATION N/A 05/28/2021    Procedure: Left ventriculography;  Surgeon: Maria E Gilbert MD;  [facility];  Service: Cardiovascular;  Laterality: N/A;    CARDIAC  CATHETERIZATION N/A 05/28/2021    Procedure: Stent MOISE coronary;  Surgeon: Maria E Gilbert MD;  Location:  [facility];  Service: Cardiovascular;  Laterality: N/A;    CAROTID STENT      CORONARY ANGIOPLASTY      CORONARY STENT PLACEMENT  3/28/2016    EYE SURGERY  9/2017    NECK EXPLORATION N/A     WV RT/LT HEART CATHETERS N/A 10/18/2018    Procedure: Percutaneous Coronary Intervention;  Surgeon: Maria E Gilbert MD;  Location:  [facility];  Service: Cardiology         Visit Dx:     ICD-10-CM ICD-9-CM   1. Vertigo  R42 780.4   2. Balance problem  R26.89 781.99   3. Weakness of lower extremity, unspecified laterality  R29.898 729.89   4. Blood pressure instability  I99.8 796.4        Patient History       Row Name 11/21/23 1300             History    Chief Complaint Dizziness  -      Date Current Problem(s) Began 04/09/23  -      Brief Description of Current Complaint Pt. Presents to clinic for evaluation of vertigo and imbalance. He has had quite extensive workup via different specialties and PCP regarding symptoms, he has seen ENT who felt symptoms were not vestibular in nature (he did have caloric testing - report brought by patient indicates no deficits) and neurology including MRI and MRA that were unremarkable, being treated for possible vertiginous migraine and on Effexor which has helped him relax but no other noticeable changes in symptoms. He has had recent stress test and ECG that were WNL. He describes his symptoms as at times he will feel he is being pushed over, other times he will having spinning spells where it feels like a satellite is spinning around his head. He will occasionally get shakes and nausea. He didn't drive for 6 months out of fear of the dizziness occurring but has resumed driving and is no longer using cane. He is uncertain if condition is improving or if he is adjusting to current symptoms. Symptoms started without MISTI on 4/9/2023.  The tremors have progressed from baseline in April and have persisted. He is uncertain of any provoking factors, does seem to be triggered by increased activity but initially would feel it at times when just sitting. He does have fluctuations in BP but typically runs low, he has diabetes and monitors blood sugars but not able to recall any pattern with blood sugar and symptoms. He has noticed improvement in symptoms when he stopped taking Flomax for enlarged prostate, when he resumed symptoms  significantly increased so he has since  again stopped with improvement but not resolution in symptoms. He has a history of diplopia, worse when looking to R this is ongoing >4 years. Pt reports often feeling tired and rests most of the day  -      Previous treatment for THIS PROBLEM Medication  -      Patient/Caregiver Goals Relief from dizziness;Know what to do to help the symptoms  -      Patient seeing anyone else for problem(s)? ENT, cardiology, neurology  -      What clinical tests have you had for this problem? MRI;Other 1 (comment)  MRA, stress test, ECG  -         Pain     Is your sleep disturbed? Yes  -      What position do you sleep in? Right sidelying;Left sidelying  -         Fall Risk Assessment    Any falls in the past year: No  2 falls 2-3 years ago  -         Services    Prior Rehab/Home Health Experiences No  -         Daily Activities    Primary Language English  -      Are you able to read Yes  -      Are you able to write Yes  -      How does patient learn best? Listening;Reading;Demonstration  -      Does patient have problems with the following? None  -      Barriers to learning None  -      Pt Participated in POC and Goals Yes  -                User Key  (r) = Recorded By, (t) = Taken By, (c) = Cosigned By      Initials Name Provider Type     Lianna Arana, PT Physical Therapist                     Vestibular Sherice       Row Name 11/21/23 1400             Occulomotor  Exam Fixation Present    Occular ROM Normal  -      Spontaneous Nystagmus Absent  -      Gaze-induced Nystagmus Absent  -      Smooth Pursuit Normal  -      Saccades Intact  -      Convergence Abnormal  baseline deficit, see optometrist  -         Vestibulo-Occular Reflex (VOR)    VOR to Fast Head Movement/Head Thrust Test Normal  -      VOR Cancellation Normal  -         Positional Testing    Positional Testing With infrared goggles  -      Vertebrobasilar Artery Screen - Right Negative  -      Vertebrobasilar Artery Screen - Left Negative  -      [place]-Hallpike Right No nystagmus  -MH      [place]-Hallpike Left No nystagmus  -      Horizontal Roll Test Right No nystagmus  -      Horizontal Roll Test Left No nystagmus  -         General ROM    GENERAL ROM COMMENTS limited extension  -MH         High-level Balance    High-level Balance Other mCTSIB (30, 24, 30, 0) 84/120  -MH                User Key  (r) = Recorded By, (t) = Taken By, (c) = Cosigned By      Initials Name Provider Type     Lianna Arana, PT Physical Therapist                   PT Ortho       Row Name 11/21/23 1400       Vital Signs    Post Systolic BP Rehab 128  -MH (r) MHA (t) MH (c)    Post Treatment Diastolic BP 56  -MH (r) MHA (t) MH (c)    Post Patient Position Sitting  -MH (r) MHA (t) MH (c)    Recovery Time 3 min rest with water  -MH (r) MHA (t) MH (c)    Vitals Comment post tandem gait assessment, pt demonstrates pallor and acute onset of dizziness, sat directly after and took BP to check for low BP per history and subjective comments.  -MH (r) MHA (t) MH (c)       Posture/Observations    Forward Head Mild  -MH (r) MHA (t) MH (c)    Thoracic Kyphosis Moderate  -MH (r) MHA (t) MH (c)    Rounded Shoulders Moderate  -MH (r) MHA (t) MH (c)    Lumbar lordosis Decreased  -MH (r) MHA (t) MH (c)       Balance Skills Training    Sitting-Level of Assistance Independent  -MH (r) MHA (t) MH (c)    Standing-Level of Assistance --   -MH (r) MHA (t) MH (c)    Static Standing Balance Support --  -MH (r) MHA (t) MH (c)    Standing-Balance Activities --  -MH (r) MHA (t) MH (c)    Gait Balance-Level of Assistance --  -MH (r) MHA (t) MH (c)    Gait Balance Support --  -MH (r) MHA (t) MH (c)    Gait Balance Activities --  -MH (r) MHA (t) MH (c)    Balance Comments see outcome measures  -MH (r) MHA (t) MH (c)       Gait/Stairs (Locomotion)    [place] Level (Gait) independent  -MH (r) MHA (t) MH (c)    Patient was able to Ambulate yes  -MH (r) MHA (t) MH (c)    Right Sided Gait Deviations --  decreased heel strike  -MH (r) MHA (t) MH (c)    Gait Assessment/Intervention performed FGA, TUG/TUG COG and mCTSIB. see outcome measures  -MH (r) MHA (t) MH (c)    Comment, (Gait/Stairs) Pt ambulates with severely reduced heel strike on R and moderately reduced heel strike on L, significant dizziness directly after standing up or sitting down  -MH (r) MHA (t) MH (c)              User Key  (r) = Recorded By, (t) = Taken By, (c) = Cosigned By      Initials Name Provider Type    Lianna Freeman, PT Physical Therapist    Seb Hamilton, PT Student PT Student                              Therapy Education  Education Details: systems of balance, role and scope of PT, POC, expected outcomes, scheduling  Given: Posture/body mechanics, Fall prevention and home safety  Program: New  How Provided: Verbal, Demonstration  Provided to: Patient  Level of Understanding: Verbalized, Demonstrated, Teach back education performed       OP Exercises       Row Name 11/21/23 1500             Exercise 1    Exercise Name 1 NuStep Planned  -MH (r) MHA (t) MH (c)                User Key  (r) = Recorded By, (t) = Taken By, (c) = Cosigned By      Initials Name Provider Type    Lianna Freeman, PT Physical Therapist    Seb Hamilton, PT Student PT Student                                 PT OP Goals       Row Name 11/21/23 1500          PT Short Term Goals    STG Date to  Achieve 12/21/23  -MH (r) MHA (t) MH (c)     STG 1 Pt will be independent with initial HEP for symptom management.  -MH (r) MHA (t) MH (c)     STG 1 Progress New  -MH (r) MHA (t) MH (c)     STG 2 Pt will demonstrate a score of 100/120 on the mCTSIB to indicate improvements in balance and proprioception.  -MH (r) MHA (t) MH (c)     STG 2 Progress New  -MH (r) MHA (t) MH (c)        Long Term Goals    LTG Date to Achieve 01/20/24  -MH (r) MHA (t) MH (c)     LTG 1 Pt will be independent and compliant with advanced HEP for long term management of symptoms and prevention of future occurrence.  -MH (r) MHA (t) MH (c)     LTG 1 Progress New  -MH (r) MHA (t) MH (c)     LTG 2 Pt will reduce level of perceived disability as measured by the DHI to 30% in order to improve QOL.  -MH (r) MHA (t) MH (c)     LTG 2 Progress New  -MH (r) MHA (t) MH (c)     LTG 3 Pt will demonstrate ability to perform full FGA outcome measure with no measures at severe disability to indicate improved standing endurance and balance.  -MH (r) MHA (t) MH (c)     LTG 3 Progress New  -MH (r) MHA (t) MH (c)     LTG 4 Pt will report >/=50% improvement in sleep quality to indicate improvement in quality of life and independent managment of symptoms.  -MH (r) MHA (t) MH (c)     LTG 4 Progress New  -MH (r) MHA (t) MH (c)        Time Calculation    PT Goal Re-Cert Due Date 02/19/24  -MH (r) MHA (t) MH (c)               User Key  (r) = Recorded By, (t) = Taken By, (c) = Cosigned By      Initials Name Provider Type    Lianna Freeman, PT Physical Therapist    Seb Hamilton, PT Student PT Student                     PT Assessment/Plan       Row Name 11/21/23 1500          PT Assessment    Functional Limitations Impaired gait;Decreased safety during functional activities;Limitation in home management;Limitations in community activities;Limitations in functional capacity and performance;Impaired locomotion  -MH (r) MHA (t) MH (c)     Impairments  Balance;Gait;Posture;Endurance;Locomotion;Muscle strength  -MH (r) MHA (t) MH (c)     Assessment Comments Earl Marc is a 78 y.o. year-old male referred to physical therapy for vertigo and balance instability. He presents with a evolving clinical presentation. He has comorbidities of arthritis, DM, hypertension, COPD, asthma, CAD 3 titanium plates in his neck and personal factors of recent death of his best friend that may affect his progress in the plan of care. Self scored disability measure of DHI was a 64/100 indicating severe handicap. He demonstrated unremarkable oculomotor exam, negative VOR testing, and negative BPPV screening via [place] Hallpike maneuvers. Pt demonstrates reduced heel strike R>L and balance instabilities noted via testing of mCTSIB, 5xSTS, TUG/TUG COG, and partial assessment of FGA. Pt guarded during balance assessments via gait belt and CGA. Pt demonstrates increased instability with uneven surfaces while eyes closed, frequent mild - mod LOB during tandem gait and reduced gait speed with head turns during gait. Pt demonstrated inability to perform 5xSTS w/o UE support. Upon trial of 5xSTS with UE support, pt demonstrated significantly flexed posture during the stance phases of 5xSTS. Pt demonstrated increased dizziness/ spinning spells directly after stopping gait, standing up, or sitting back down which symptoms dissipated within 1-2 minutes of rest in sitting. Signs and symptoms are consistent with referring diagnosis. Pt educated on role and scope of PT relative to his symptom presentation to address balance, confidence and mobility. Pt educated that due to the negative vestibular assessments, reduction in vertigo is uncertain. Pt verbalizes understanding and expresses intent to attend therapy to address balance and strength deficits noted during evaluation. He is appropriate for skilled therapy services at this time to address deficits and improve ease with gait, balance, and safety  with community/ home mobility.  -MH     Please refer to paper survey for additional self-reported information No  -MH (r) MHA (t) MH (c)     Rehab Potential Good  -MH (r) MHA (t) MH (c)     Patient/caregiver participated in establishment of treatment plan and goals Yes  -MH (r) MHA (t) MH (c)     Patient would benefit from skilled therapy intervention Yes  -MH (r) MHA (t) MH (c)        PT Plan    PT Frequency 1x/week  -MH (r) MHA (t) MH (c)     Predicted Duration of Therapy Intervention (PT) 10 visits  -MH (r) MHA (t) MH (c)     Planned CPT's? PT EVAL MOD COMPLELITY: 84356;PT RE-EVAL: 04898;PT THER PROC EA 15 MIN: 25719;PT THER ACT EA 15 MIN: 91557;PT MANUAL THERAPY EA 15 MIN: 42572;PT NEUROMUSC RE-EDUCATION EA 15 MIN: 73951;PT GAIT TRAINING EA 15 MIN: 54667;PT EVAL AQUA: 90521;PT AQUATIC THERAPY EA 15 MIN: 12039;PT SELF CARE/HOME MGMT/TRAIN EA 15: 26288;PT HOT OR COLD PACK TREAT MCARE;PT ELECTRICAL STIM UNATTEND: ;PT ELECTRICAL STIM ATTD EA 15 MIN: 53351;PT ULTRASOUND EA 15 MIN: 57917  -MH (r) MHA (t) MH (c)     PT Plan Comments Assess response to eval, consider Nustep warmup, SL clamshell, seated HS stretch, continue gait assessment/ complete FGA, quad strengthening, hip strengthening  -MH (r) MHA (t) MH (c)               User Key  (r) = Recorded By, (t) = Taken By, (c) = Cosigned By      Initials Name Provider Type    Lianna Freeman, PT Physical Therapist    Seb Hamilton, PT Student PT Student                     Outcome Measure Options: Dizziness Handicap Inventory  5 Times Sit to Stand  5 Times Sit to Stand (seconds): 13.2 seconds  5 Times Sit to Stand Comments: attempted w/o UE support, unable to get fully erect standing posture, restarted timer and used B UE support to stand  Dizziness Handicap Inventory Score: 62  Functional Gait Assessment (FGA)  Gait Level Surface: Mild Impairment (mild gait deviations noted at ankle)  Gait with Horizontal Head Turns: Mild Impairment (reduced gait velocity  compared to normal gait speed)  Gait with Vertical Head Turns: Mild Impairment (reduced gait velocity compared to normal gait speed)  Gait with Narrow Base of Support: Severe Impairment (unable to take more than 2 steps without mild -mod MOB)  FGA Comments: used gait belt ang CGA for safety  Timed Up and Go (TUG)  TUG Test 1: 16.5 seconds  TUG Test 2: 17 seconds  Timed Up and Go Comments: adding cognitive component had low performance  Other Outcome Measure Tool Used  Other Outcome Measure Tool Comments: mCTSIB (84/120 with more instability with uneven surface and eyes closed. Fairly stable with level surface eyes open. Performed at countertop for support as needed)      Time Calculation:   Start Time: 1345  Stop Time: 1432  Time Calculation (min): 47 min  Total Timed Code Minutes- PT: 47 minute(s)  Untimed Charges  PT Eval/Re-eval Minutes: 47  Total Minutes  Untimed Charges Total Minutes: 47   Total Minutes: 47   Therapy Charges for Today       Code Description Service Date Service Provider Modifiers Qty    [id number] HC PT EVAL MOD COMPLEXITY 3 11/21/2023 Seb Cevallos, PT Student GP 1            PT G-Codes  Outcome Measure Options: Dizziness Handicap Inventory  TUG Test 1: 16.5 seconds  TUG Test 2: 17 seconds         Seb Cevallos, PT Student  11/21/2023

## 2023-11-28 ENCOUNTER — OFFICE VISIT (OUTPATIENT)
Dept: CARDIOLOGY | Facility: CLINIC | Age: 78
End: 2023-11-28
Payer: MEDICARE

## 2023-11-28 VITALS
DIASTOLIC BLOOD PRESSURE: 60 MMHG | WEIGHT: 226 LBS | SYSTOLIC BLOOD PRESSURE: 130 MMHG | HEIGHT: 73 IN | BODY MASS INDEX: 29.95 KG/M2 | HEART RATE: 71 BPM

## 2023-11-28 DIAGNOSIS — E78.2 MIXED HYPERLIPIDEMIA: ICD-10-CM

## 2023-11-28 DIAGNOSIS — R55 POSTURAL DIZZINESS WITH PRESYNCOPE: ICD-10-CM

## 2023-11-28 DIAGNOSIS — I10 PRIMARY HYPERTENSION: ICD-10-CM

## 2023-11-28 DIAGNOSIS — R42 POSTURAL DIZZINESS WITH PRESYNCOPE: ICD-10-CM

## 2023-11-28 DIAGNOSIS — I25.10 CORONARY ARTERY DISEASE INVOLVING NATIVE CORONARY ARTERY OF NATIVE HEART WITHOUT ANGINA PECTORIS: Primary | ICD-10-CM

## 2023-11-28 PROCEDURE — 99214 OFFICE O/P EST MOD 30 MIN: CPT

## 2023-11-28 RX ORDER — NITROGLYCERIN 0.4 MG/1
0.4 TABLET SUBLINGUAL
Qty: 25 TABLET | Refills: 3 | Status: SHIPPED | OUTPATIENT
Start: 2023-11-28 | End: 2023-11-28 | Stop reason: SDUPTHER

## 2023-11-28 RX ORDER — NITROGLYCERIN 0.4 MG/1
0.4 TABLET SUBLINGUAL
Qty: 25 TABLET | Refills: 3 | Status: SHIPPED | OUTPATIENT
Start: 2023-11-28 | End: 2023-11-30 | Stop reason: SDUPTHER

## 2023-11-28 RX ORDER — METOPROLOL SUCCINATE 25 MG/1
12.5 TABLET, EXTENDED RELEASE ORAL DAILY
Qty: 30 TABLET | Refills: 11 | Status: SHIPPED | OUTPATIENT
Start: 2023-11-28 | End: 2023-11-30 | Stop reason: SDUPTHER

## 2023-11-28 NOTE — PROGRESS NOTES
Subjective:        Earl Marc is a 78 y.o. male who here for follow up    No chief complaint on file.      HPI    This is a 78-year-old male with coronary artery disease (MOISE ostial OM and PDA 2016, MOISE to the bifurcation lesion of the proximal LAD as well as angioplasty to the diagonal branch 2018, MOISE midportion LAD 2021), diabetes mellitus, hypertension, hyperlipidemia, COPD, CKD.  He follows up in office today for management of coronary artery disease.  He was seen on 10/27/2023 with complaints of chest discomfort and shortness of breath.    Echo 10/30/2023 EF 60 to 65%, normal LV function.  Stress test 10/30/2023 myocardial perfusion imaging indicates a normal study with no evidence of ischemia.  Holter monitor 11/15/2023 normal sinus rhythm with rare PACs PVCs and NS SVTs.    Cardiac catheterization 2021 successful angioplasty and stent to the mid LAD 70% reduced to 0% with MOISE, normal left main, proximal LAD stent widely patent, midportion 70% with haziness reduced to 0% with MOISE minimal distal irregularity including the diagonal  branches.  Circumflex nondominant normal.  RCA codominant normal.    The following portions of the patient's history were reviewed and updated as appropriate: allergies, current medications, past family history, past medical history, past social history, past surgical history and problem list.    Past Medical History:   Diagnosis Date    Abnormal cardiovascular stress test     Acid reflux     Arthritis     Asthma     Cancer     skin     Chronic kidney disease     Colon polyp     Congenital heart disease     COPD (chronic obstructive pulmonary disease) 2021    Old age COPD/2D hand Smoke emphysema    Coronary artery disease     Diabetes mellitus     Diabetic neuropathy associated with type 2 diabetes mellitus     Diabetic peripheral neuropathy 03/10/2016    Dyslipidemia     Erectile dysfunction     Fatty liver disease, nonalcoholic     Gastritis     Glaucoma     both  eyes    Hyperlipidemia     Hypertension     Hypogonadism male     Infectious viral hepatitis Hep A 1959    Drank water from broken water line and Grandparents house.    Migraines 2023    Myocardial infarction 2018    Type 2 diabetes mellitus          reports that he has never smoked. He has been exposed to tobacco smoke. He has never used smokeless tobacco. He reports that he does not drink alcohol and does not use drugs.     Family History   Problem Relation Age of Onset    Breast cancer Daughter     Heart attack Mother          10/31/1980    Hypertension Mother     Heart disease Mother     Thyroid disease Mother     Lupus Mother     Early death Mother     Miscarriages / Stillbirths Mother         Miscarriage     Heart failure Mother     Heart attack Brother     Heart disease Brother     Hyperlipidemia Brother     Cancer Brother         Due to Agent [place], [place]    Hypertension Brother     Depression Father     COPD Father     Stroke Maternal Grandmother     Cancer Maternal Grandmother         Lung Cancer non-smoker    Diabetes Brother     Heart disease Brother     Hypertension Brother     Kidney disease Brother     Heart disease Brother     Hypertension Brother        ROS     Review of Systems  Constitutional: No wt loss, fever, fatigue  Gastrointestinal: No nausea, abdominal pain  Behavioral/Psych: No insomnia or anxiety  Cardiovascular no chest pain or shortness of breath      Objective:           Vitals and nursing note reviewed.   Constitutional:       Appearance: Well-developed.   HENT:      Head: Normocephalic.      Right Ear: External ear normal.      Left Ear: External ear normal.   Neck:      Vascular: No JVD.   Pulmonary:      Effort: Pulmonary effort is normal. No respiratory distress.      Breath sounds: Normal breath sounds. No stridor. No rales.   Cardiovascular:      Normal rate. Regular rhythm.      No gallop.    Pulses:     Intact distal pulses.   Edema:     Peripheral edema  absent.   Abdominal:      General: Bowel sounds are normal. There is no distension.      Palpations: Abdomen is soft.      Tenderness: There is no abdominal tenderness. There is no guarding.   Musculoskeletal: Normal range of motion.         General: No tenderness.      Cervical back: Normal range of motion. Skin:     General: Skin is warm.   Neurological:      Mental Status: Alert and oriented to person, place, and time.      Deep Tendon Reflexes: Reflexes are normal and symmetric.   Psychiatric:         Judgment: Judgment normal.         Procedures    Interpretation Summary         A normal monitor study.    Earl Marc monitored for 13d 23h starting on 10/30/2023.  Primary rhythm was Sinus Rhythm.  The average heart rate, excluding ectopy, was 68 BPM with a minumim of 52 BPM  on Day 7 and a maximum of 99 BPM on Day 10. SVE: Wharton was 0.04 %, 568  total SVE SVT:3 events, longest event 5 beats on Day 8, fastest  event 99 BPM on Day 9 PVC: Wharton was <0.01%, 100 total PVC,3  disparate morphologies VENTRICULAR TACHYCARDIA: 1 events, longest event 6 beats at Day 5 fastest rate 154 BPM at Day 5    NSR, RARE PACS, PVCS AND NSSVTS    Interpretation Summary         Findings consistent with a normal ECG stress test.    Left ventricular ejection fraction is normal (Calculated EF = 66%).    Myocardial perfusion imaging indicates a normal myocardial perfusion study with no evidence of ischemia.    Impressions are consistent with a low risk study.    Interpretation Summary         Left ventricular ejection fraction appears to be 61 - 65%.    Left ventricular diastolic function was normal.    Estimated right ventricular systolic pressure from tricuspid regurgitation is normal (<35 mmHg).    HEMODYNAMIC / ANGIOGRAPHIC DATA:    Left ventricular end diastolic pressure was 10 mmHg.    The left main is normal left main.  The left anterior descending artery is *.Left anterior descending had a proximal LAD stent widely patent  midportion had 70% with haziness was reduced to 0% with 3.0/12 Xience stent, minimal distal irregularity including the diagonal and  branches  The left circumflex is nondominant and normal.  The right coronary artery is codominant with early atherosclerotic plaque.  Successful angioplasty and stent to the mid LAD 70% with haziness reduced to 0% with 3.0/12 Xience stent     KELVIN FLOW    PRE....3...       POST....3..     TYPE OF LESION........B.....     RECOMMENDATIONS:  Post-procedure care will focus on prevention of any ischemic events and congestive complications.  Aggressive risk factor modification will be carried out.  Importance of taking dual antiplatelets for one year  has been explained, risk of stent thrombosis leading to the acute MI, which carries high morbidity and mortality has been explained     Discontinuation or interruptions of these medications should be under the strict guidance of appropriate health professional         Current Outpatient Medications:     Alcohol Swabs ([employer] Prep) 70 % pads, Place 1 application on the skin as directed by provider Daily., Disp: 400 each, Rfl: 2    AmLactin 12 % lotion, Apply  topically to the appropriate area as directed As Needed for Dry Skin., Disp: 500 g, Rfl: 3    ascorbic acid (VITAMIN C) 1000 MG tablet, Take 1 tablet by mouth Daily., Disp: , Rfl:     aspirin 81 MG EC tablet, Take 1 tablet by mouth Daily., Disp: 90 tablet, Rfl: 3    atorvastatin (LIPITOR) 20 MG tablet, Take 1 tablet by mouth Every Night., Disp: 90 tablet, Rfl: 1    B-D UF III MINI PEN NEEDLES 31G X 5 MM misc, Use once daily with Lantus Pen for injection of insulin, Disp: 100 each, Rfl: 3    CALCIUM CITRATE PO, Take  by mouth., Disp: , Rfl:     Carboxymeth-Glycerin-Polysorb 0.5-1-0.5 % solution, Apply  to eye(s) as directed by provider Daily., Disp: , Rfl:     Cyanocobalamin (Vitamin B-12) 1000 MCG sublingual tablet, Place  under the tongue., Disp: , Rfl:     fluticasone (FLONASE) 50  MCG/ACT nasal spray, 1 spray into the nostril(s) as directed by provider Daily As Needed for Allergies., Disp: , Rfl:     Folic Acid 0.8 MG capsule, Take 1 tablet by mouth Daily. 0.4 2 tab daily, Disp: , Rfl:     glucose blood test strip, E11.65 Precision Xtra Blood Glucose In Vitro Strip; Patient Sig: Precision Xtra Blood Glucose In Vitro Strip TEST 2  TIMES DAILY, Disp: 200 each, Rfl: 5    hydrocortisone 2.5 % ointment, Apply  topically to the appropriate area as directed 2 (Two) Times a Day., Disp: 20 g, Rfl: 5    Insulin Glargine (Lantus SoloStar) 100 UNIT/ML injection pen, Inject 34 Units under the skin into the appropriate area as directed Daily., Disp: 30 mL, Rfl: 1    linagliptin (Tradjenta) 5 MG tablet tablet, Take 1 tablet by mouth Daily. By mouth take one tab daily., Disp: 90 tablet, Rfl: 1    Magnesium 250 MG tablet, Take 1 tablet by mouth Every Night., Disp: , Rfl:     melatonin 5 MG tablet tablet, Take 1 tablet by mouth., Disp: , Rfl:     metoprolol tartrate (LOPRESSOR) 25 MG tablet, Take 1 tablet by mouth 2 (Two) Times a Day., Disp: 180 tablet, Rfl: 1    Monolet Lancets misc, E11.65 Use to test BG 2 times daily, Disp: 200 each, Rfl: 5    nitroglycerin (Nitrostat) 0.4 MG SL tablet, Place 1 tablet under the tongue Every 5 (Five) Minutes As Needed for Chest Pain. Indications: Acute Angina Pectoris, Disp: 25 tablet, Rfl: 3    pantoprazole (PROTONIX) 20 MG EC tablet, Take 1 tablet by mouth Daily., Disp: 90 tablet, Rfl: 1    venlafaxine XR (EFFEXOR-XR) 37.5 MG 24 hr capsule, Take 1 capsule by mouth Daily., Disp: 30 capsule, Rfl: 1    VIAGRA 100 MG tablet, Take 1 tablet by mouth As Needed for erectile dysfunction (1 tablet prior to planned intercourse.)., Disp: 24 tablet, Rfl: 3    Zinc 30 MG tablet, Take 50 mg by mouth Daily., Disp: , Rfl:     amLODIPine (NORVASC) 5 MG tablet, Take 1 tablet by mouth Daily., Disp: 90 tablet, Rfl: 1    glucose (DEX4) 4 GM chewable tablet, Chew 4 tablets., Disp: , Rfl:      latanoprost (XALATAN) 0.005 % ophthalmic solution, Administer 1 drop to both eyes Every Night., Disp: , Rfl:     meclizine (ANTIVERT) 25 MG tablet, Take 1 tablet by mouth 3 (Three) Times a Day As Needed., Disp: , Rfl:     niacin 500 MG tablet, Take 1 tablet by mouth., Disp: , Rfl:     olopatadine (PATANOL) 0.1 % ophthalmic solution, Administer 1 drop to both eyes Daily., Disp: , Rfl:      Assessment:        Patient Active Problem List   Diagnosis    Bell's palsy    Persistent insomnia    Osteoarthritis of cervical spine    Diabetic peripheral neuropathy    Dyslipidemia    Steatosis of liver    Fibromyalgia    Gastroesophageal reflux disease without esophagitis    Hypertensive kidney disease with stage 3a chronic kidney disease    Hypogonadism in male    Renal insufficiency    Vitamin D deficiency    Benign non-nodular prostatic hyperplasia with lower urinary tract symptoms    S/P drug eluting coronary stent placement    Coronary artery disease involving native coronary artery of native heart    Malignant neoplasm of skin    Type 2 diabetes mellitus with hyperglycemia, with long-term current use of insulin    Diabetes mellitus    Chronic insomnia    Other specified glaucoma    Vertigo    Epigastric pain    Chest pain with high risk for cardiac etiology    Precordial pain    S/P arthroscopy of shoulder    Peripheral neuropathy    Stage 3b chronic kidney disease               Plan:   1.  Coronary artery disease: Stress test as above normal study.  No chest pain. Continue aspirin, atorvastatin, metoprolol.    2.  Hypertension: BP today in office 130/60.  Controlled.  Continue metoprolol.    Importance of controlling hypertension and blood pressure  checkup on the regular basis has been explained. Hypertension as a silent killer has been discussed. Risk reduction of the weight and regular exercises to control the hypertension has been explained.    3.  Hyperlipidemia: He reports his PCP manages his cholesterol labs.   Continue atorvastatin.    Risk of the hyperlipidemia, importance of the treatment has been explained. Pros and cons of the statins has been explained. Regular blood workup as well as side effects including the liver failure, myelopathy death has been explained.    4. Dizziness/orthostatic hypotension: holter sr pac, pvc, nssvt. he has been advised to cut lopressor to 12.5mg twice daily,he has not yet started this, I will send in toprol 12.5 daily and advised to take at night. Wear stockings, stay hydrated. May need loop.                No diagnosis found.    There are no diagnoses linked to this encounter.    COUNSELING:shaila Hernández was given to patient for the following topics: diagnostic results, risk factor reductions, impressions, risks and benefits of treatment options and importance of treatment compliance .       SMOKING COUNSELING:denies    Follow up 1 month     Sincerely,   JL Robertson  [facility]  11/28/23  13:50 EST    EMR Dragon/Transcription disclaimer:   Much of this encounter note is an electronic transcription/translation of spoken language to printed text. The electronic translation of spoken language may permit erroneous, or at times, nonsensical words or phrases to be inadvertently transcribed; Although I have reviewed the note for such errors, some may still exist.

## 2023-11-29 ENCOUNTER — HOSPITAL ENCOUNTER (OUTPATIENT)
Dept: PHYSICAL THERAPY | Facility: HOSPITAL | Age: 78
Discharge: HOME | End: 2023-11-29
Payer: MEDICARE

## 2023-11-29 DIAGNOSIS — R42 VERTIGO: Primary | ICD-10-CM

## 2023-11-29 DIAGNOSIS — R29.898 WEAKNESS OF LOWER EXTREMITY, UNSPECIFIED LATERALITY: ICD-10-CM

## 2023-11-29 DIAGNOSIS — R26.89 BALANCE PROBLEM: ICD-10-CM

## 2023-11-29 DIAGNOSIS — I99.8 BLOOD PRESSURE INSTABILITY: ICD-10-CM

## 2023-11-29 PROCEDURE — 97112 NEUROMUSCULAR REEDUCATION: CPT

## 2023-11-29 PROCEDURE — 97110 THERAPEUTIC EXERCISES: CPT

## 2023-11-29 NOTE — THERAPY TREATMENT NOTE
Outpatient Physical Therapy Vestibular Treatment Note  [facility]     Patient Name: Earl Marc  : 1945  MRN: [id number]  Today's Date: 2023      Visit Date: 2023    Visit Dx:     ICD-10-CM ICD-9-CM   1. Vertigo  R42 780.4   2. Balance problem  R26.89 781.99   3. Weakness of lower extremity, unspecified laterality  R29.898 729.89   4. Blood pressure instability  I99.8 796.4       Patient Active Problem List   Diagnosis    Bell's palsy    Persistent insomnia    Osteoarthritis of cervical spine    Diabetic peripheral neuropathy    Dyslipidemia    Steatosis of liver    Fibromyalgia    Gastroesophageal reflux disease without esophagitis    Hypertensive kidney disease with stage 3a chronic kidney disease    Hypogonadism in male    Renal insufficiency    Vitamin D deficiency    Benign non-nodular prostatic hyperplasia with lower urinary tract symptoms    S/P drug eluting coronary stent placement    Coronary artery disease involving native coronary artery of native heart    Malignant neoplasm of skin    Type 2 diabetes mellitus with hyperglycemia, with long-term current use of insulin    Diabetes mellitus    Chronic insomnia    Other specified glaucoma    Vertigo    Epigastric pain    Chest pain with high risk for cardiac etiology    Precordial pain    S/P arthroscopy of shoulder    Peripheral neuropathy    Stage 3b chronic kidney disease          PT Ortho       Row Name 23 1300       Vital Signs    Intra Systolic BP Rehab 129  -MH    Intra Treatment Diastolic BP 64  -MH              User Key  (r) = Recorded By, (t) = Taken By, (c) = Cosigned By      Initials Name Provider Type    Lianna Freeman, PT Physical Therapist                               PT Assessment/Plan       Row Name 23 1300          PT Assessment    Assessment Comments Earl returns to clinic for first follow up from initial evaluation for balance instability, reports had f/u with cardiology yseterday and adjusted  "BP medication and placed in compression stockings as he has monitored his BP since evaluation when having a \"spell\" and each time it is low, yesterday in cardiology office when he became dizzy/lightheaded BP was 90/50. Again discussed plan to begin working on balance as dizziness did not seem to correlate to vesitbular component at evaluation. Pt. ambulates in crouched posture with flexed knees and decreased heel strike/limb clearance. Utilized several seated rest breaks between balance exercises due to symptoms but quickly resolves after sitting and BP monitored throughout with readings never below 120 systolic and 60 diastolic. Intermittent need for UE during balance exercises to correct but no overt LOB noted in clinic.  -        PT Plan    PT Plan Comments STS + march, semi-tandem with trunk turn?  -               User Key  (r) = Recorded By, (t) = Taken By, (c) = Cosigned By      Initials Name Provider Type     Lianna Arana, PT Physical Therapist                        OP Exercises       Row Name 11/29/23 1300             Subjective    Subjective Comments I saw my cardiologist yesterday and she thinks it has to do with low BP. Everytime I had a spell since last time I took my BP and it was low, yesterday in the office when  it happened it was 90/50. She cahnged my BP medication dose and put me in compression socks.  -         Total Minutes    51973 - PT Therapeutic Exercise Minutes 10  -      00484 -  PT Neuromuscular Reeducation Minutes 30  -MH         Exercise 1    Exercise Name 1 NuStep  -      Cueing 1 Verbal  -      Time 1 5min  -         Exercise 2    Exercise Name 2 tandem stance  -      Cueing 2 Verbal;Demo  -      Reps 2 3e  -MH      Time 2 30sec  -         Exercise 3    Exercise Name 3 rhomberg stance EC  -      Cueing 3 Verbal;Demo  -MH      Reps 3 3  -MH      Time 3 30sec  -MH         Exercise 4    Exercise Name 4 standing high march with pause at top  -      Cueing 4 " Verbal;Demo  -      Sets 4 3  -      Reps 4 10e  -      Time 4 between rhomberg EC  -MH         Exercise 5    Exercise Name 5 heel raises  -      Cueing 5 Verbal;Demo  -MH      Reps 5 20  -MH      Time 5 B UE  -         Exercise 6    Exercise Name 6 tandem walk // bars fwd  -      Cueing 6 Verbal;Demo  -MH      Reps 6 2 laps  -MH      Time 6 fingertips  -         Exercise 7    Exercise Name 7 gait with horizontal head turns  -      Cueing 7 Verbal;Demo  -MH      Reps 7 2 laps  -      Time 7 uneven step length, intermittent UE  -         Exercise 8    Exercise Name 8 gait with vertical head turns  -      Cueing 8 Verbal;Demo  -MH      Reps 8 2 laps // bars  -                User Key  (r) = Recorded By, (t) = Taken By, (c) = Cosigned By      Initials Name Provider Type    Lianna Freeman, PT Physical Therapist                                 PT OP Goals       Row Name 11/29/23 1300          PT Short Term Goals    STG Date to Achieve 12/21/23  -     STG 1 Pt will be independent with initial HEP for symptom management.  -     STG 1 Progress Ongoing  -     STG 2 Pt will demonstrate a score of 100/120 on the mCTSIB to indicate improvements in balance and proprioception.  -     STG 2 Progress Ongoing  -        Long Term Goals    LTG Date to Achieve 01/20/24  -     LTG 1 Pt will be independent and compliant with advanced HEP for long term management of symptoms and prevention of future occurrence.  -     LTG 1 Progress Ongoing  -     LTG 2 Pt will reduce level of perceived disability as measured by the DHI to 30% in order to improve QOL.  -     LTG 2 Progress Ongoing  -     LTG 3 Pt will demonstrate ability to perform full FGA outcome measure with no measures at severe disability to indicate improved standing endurance and balance.  -     LTG 3 Progress Ongoing  -     LTG 4 Pt will report >/=50% improvement in sleep quality to indicate improvement in quality of life and  independent managment of symptoms.  -     LTG 4 Progress Ongoing  -               User Key  (r) = Recorded By, (t) = Taken By, (c) = Cosigned By      Initials Name Provider Type     Lianna Arana PT Physical Therapist                    Therapy Education  Education Details: Issued HEP Access Code: [email or web address] and reinforced importance of safety awareness  Given: HEP, Symptoms/condition management  Program: New  How Provided: Verbal, Demonstration, Written  Provided to: Patient  Level of Understanding: Verbalized, Demonstrated              Time Calculation:   Start Time: 1340  Stop Time: 1420  Time Calculation (min): 40 min  Total Timed Code Minutes- PT: 40 minute(s)  Timed Charges  60932 - PT Therapeutic Exercise Minutes: 10  24142 -  PT Neuromuscular Reeducation Minutes: 30  Total Minutes  Timed Charges Total Minutes: 40   Total Minutes: 40   Therapy Charges for Today       Code Description Service Date Service Provider Modifiers Qty    [id number]  PT THER PROC EA 15 MIN 11/29/2023 Lianna Arana, PT GP 1    [id number]  PT NEUROMUSC RE EDUCATION EA 15 MIN 11/29/2023 Lianna Arana, PT GP 2                     Lianna Arana PT  11/29/2023

## 2023-11-30 DIAGNOSIS — I25.10 CORONARY ARTERY DISEASE INVOLVING NATIVE CORONARY ARTERY OF NATIVE HEART WITHOUT ANGINA PECTORIS: ICD-10-CM

## 2023-11-30 RX ORDER — METOPROLOL SUCCINATE 25 MG/1
12.5 TABLET, EXTENDED RELEASE ORAL DAILY
Qty: 30 TABLET | Refills: 5 | Status: SHIPPED | OUTPATIENT
Start: 2023-11-30

## 2023-11-30 RX ORDER — NITROGLYCERIN 0.4 MG/1
0.4 TABLET SUBLINGUAL
Qty: 25 TABLET | Refills: 3 | Status: SHIPPED | OUTPATIENT
Start: 2023-11-30

## 2023-11-30 NOTE — TELEPHONE ENCOUNTER
Caller: Earl Marc LUCIUS    Relationship: Self    Best call back number: [phone]    Requested Prescriptions:   Requested Prescriptions     Pending Prescriptions Disp Refills    metoprolol succinate XL (TOPROL-XL) 25 MG 24 hr tablet 30 tablet 11     Sig: Take 0.5 tablets by mouth Daily.    nitroglycerin (Nitrostat) 0.4 MG SL tablet 25 tablet 3     Sig: Place 1 tablet under the tongue Every 5 (Five) Minutes As Needed for Chest Pain. Indications: Acute Angina Pectoris        Pharmacy where request should be sent:      Last office visit with prescribing clinician: 11/28/2023   Last telemedicine visit with prescribing clinician: Visit date not found   Next office visit with prescribing clinician: 12/26/2023     Additional details provided by patient: PT [facility] DID NOT RECEIVE  PRESCRIPTION PLEASE RESEND RX ORDER    Does the patient have less than a 3 day supply:  [x] Yes  [] No    Would you like a call back once the refill request has been completed: [] Yes [x] No    If the office needs to give you a call back, can they leave a voicemail: [x] Yes [] No    Arely Lyon Rep   11/30/23 08:56 EST

## 2023-11-30 NOTE — TELEPHONE ENCOUNTER
Caller: Earl Marc    Relationship: Self    Best call back number: [phone]     Who prescribed you this medication: MACK SANDRA    What are your concerns: PT SPOKE WITH PHARMACY AND WAS TOLD THAT THE [facility] ADDRESS WOULD NO LONGER WORK FOR PRESCRIPTIONS AND THEY HAD TO BE LISTED UNDER [address] IN ZIP CODE [id number] - THE FAX NUMBER IS STILL [phone] WHICH BRINGS UP THE [facility] - PT STATES HIS WIFE IS TRYING TO  PRESCRIPTION WHILE SHE IN THE AREA IF POSSIBLE

## 2023-12-07 NOTE — TELEPHONE ENCOUNTER
9/23 called and john paul Call the tech @ [facility] told her to cancel the lyrica   
9/23 sent to dr Azar   
Last [facility] appt 4/7/21 with ty Bertrand is scheduled with dr Maddox  10/25/21  
Lyrica refill sent to [facility].  Please cancel prescription sent to [facility] on September 18, 2021.  
Refill sent to [facility] on September 18, 2021  
What Type Of Note Output Would You Prefer (Optional)?: Bullet Format
How Severe Is Your Skin Lesion?: mild
treated_been_treated
Is This A New Presentation, Or A Follow-Up?: Skin Lesion
Additional History: Hx of BCC behind right ear removed years ago, SCC MOHS performed 11/09/2023 to right temple\\n\\nHx of AKs to face Tx w/ LN2 near right temple, pt states more scaling and redness to right temple

## 2023-12-13 ENCOUNTER — HOSPITAL ENCOUNTER (OUTPATIENT)
Dept: PHYSICAL THERAPY | Facility: HOSPITAL | Age: 78
Discharge: HOME | End: 2023-12-13
Payer: MEDICARE

## 2023-12-13 DIAGNOSIS — R42 VERTIGO: Primary | ICD-10-CM

## 2023-12-13 DIAGNOSIS — I99.8 BLOOD PRESSURE INSTABILITY: ICD-10-CM

## 2023-12-13 DIAGNOSIS — R29.898 WEAKNESS OF LOWER EXTREMITY, UNSPECIFIED LATERALITY: ICD-10-CM

## 2023-12-13 DIAGNOSIS — R26.89 BALANCE PROBLEM: ICD-10-CM

## 2023-12-13 PROCEDURE — 97112 NEUROMUSCULAR REEDUCATION: CPT

## 2023-12-13 PROCEDURE — 97110 THERAPEUTIC EXERCISES: CPT

## 2023-12-13 NOTE — THERAPY TREATMENT NOTE
Outpatient Physical Therapy Ortho Treatment Note  [facility]     Patient Name: Earl Marc  : 1945  MRN: [id number]  Today's Date: 2023      Visit Date: 2023    Visit Dx:    ICD-10-CM ICD-9-CM   1. Vertigo  R42 780.4   2. Balance problem  R26.89 781.99   3. Weakness of lower extremity, unspecified laterality  R29.898 729.89   4. Blood pressure instability  I99.8 796.4       Patient Active Problem List   Diagnosis    Bell's palsy    Persistent insomnia    Osteoarthritis of cervical spine    Diabetic peripheral neuropathy    Dyslipidemia    Steatosis of liver    Fibromyalgia    Gastroesophageal reflux disease without esophagitis    Hypertensive kidney disease with stage 3a chronic kidney disease    Hypogonadism in male    Renal insufficiency    Vitamin D deficiency    Benign non-nodular prostatic hyperplasia with lower urinary tract symptoms    S/P drug eluting coronary stent placement    Coronary artery disease involving native coronary artery of native heart    Malignant neoplasm of skin    Type 2 diabetes mellitus with hyperglycemia, with long-term current use of insulin    Diabetes mellitus    Chronic insomnia    Other specified glaucoma    Vertigo    Epigastric pain    Chest pain with high risk for cardiac etiology    Precordial pain    S/P arthroscopy of shoulder    Peripheral neuropathy    Stage 3b chronic kidney disease        Past Medical History:   Diagnosis Date    Abnormal cardiovascular stress test     Acid reflux     Arthritis     Asthma     Cancer     skin     Chronic kidney disease     Colon polyp     Congenital heart disease     COPD (chronic obstructive pulmonary disease)     Old age COPD/2D hand Smoke emphysema    Coronary artery disease     Diabetes mellitus     Diabetic neuropathy associated with type 2 diabetes mellitus     Diabetic peripheral neuropathy 03/10/2016    Dyslipidemia     Erectile dysfunction     Fatty liver disease, nonalcoholic     Gastritis      Glaucoma     both eyes    Hyperlipidemia     Hypertension     Hypogonadism male     Infectious viral hepatitis Hep A July 1959    Drank water from broken water line and Grandparents house.    Migraines 09/2023    Myocardial infarction 2018    Type 2 diabetes mellitus         Past Surgical History:   Procedure Laterality Date    CARDIAC CATHETERIZATION N/A 03/25/2016    Procedure: Left Heart Cath;  Surgeon: Maria E Gilbert MD;  Location:  [facility];  Service:     CARDIAC CATHETERIZATION N/A 03/25/2016    Procedure: Coronary angiography;  Surgeon: Maria E Gilbert MD;  Location:  [facility];  Service:     CARDIAC CATHETERIZATION N/A 03/28/2016    Procedure: Coronary angiography;  Surgeon: Maria E Gilbert MD;  Location:  [facility];  Service:     CARDIAC CATHETERIZATION N/A 03/28/2016    Procedure: Stent MOISE coronary;  Surgeon: Maria E Gilbert MD;  Location:  [facility];  Service:     CARDIAC CATHETERIZATION N/A 10/18/2018    Procedure: Coronary angiography  no LV gram d/t CKD;  Surgeon: Maria E Gilbert MD;  Location:  [facility];  Service: Cardiology    CARDIAC CATHETERIZATION N/A 10/18/2018    Procedure: Left Heart Cath;  Surgeon: Maria E Gilbert MD;  Location:  [facility];  Service: Cardiology    CARDIAC CATHETERIZATION N/A 10/18/2018    Procedure: Stent MOISE coronary;  Surgeon: Maria E Gilbert MD;  [facility];  Service: Cardiology    CARDIAC CATHETERIZATION N/A 05/28/2021    Procedure: Coronary angiography;  Surgeon: Maria E Gilbert MD;  [facility];  Service: Cardiovascular;  Laterality: N/A;    CARDIAC CATHETERIZATION N/A 05/28/2021    Procedure: Left Heart Cath;  Surgeon: Maria E Gilbert MD;  Location:  [facility];  Service: Cardiovascular;  Laterality: N/A;    CARDIAC  CATHETERIZATION N/A 05/28/2021    Procedure: Left ventriculography;  Surgeon: Maria E Gilbert MD;  Location:  [facility];  Service: Cardiovascular;  Laterality: N/A;    CARDIAC CATHETERIZATION N/A 05/28/2021    Procedure: Stent MOISE coronary;  Surgeon: Maria E Gilbert MD;  Location:  [facility];  Service: Cardiovascular;  Laterality: N/A;    CAROTID STENT      CORONARY ANGIOPLASTY      CORONARY STENT PLACEMENT  3/28/2016    EYE SURGERY  9/2017    NECK EXPLORATION N/A     ND RT/LT HEART CATHETERS N/A 10/18/2018    Procedure: Percutaneous Coronary Intervention;  Surgeon: Maria E Gilbert MD;  Location:  [facility];  Service: Cardiology                        PT Assessment/Plan       Row Name 12/13/23 1400          PT Assessment    Assessment Comments Earl presents to clinic reporting continued fluctuations in dizziness/lightheadedness with fluctuations in BP, did have f/u with MD and will resume testosterone which they feel will improve symptoms including fatigue. Tolerated session better with reduced need for seated rest break due to lightheadedness, but did verbalize fatigue particularly following STS and increased repetitions on tandem walking, walking with head turns/nods and added airex to rhomberg + EC.  -        PT Plan    PT Plan Comments semi-tandem with trunk turn?  -               User Key  (r) = Recorded By, (t) = Taken By, (c) = Cosigned By      Initials Name Provider Type     Lianna Arana, PT Physical Therapist                       OP Exercises       Row Name 12/13/23 1400             Subjective    Subjective Comments No new complaints  -         Total Minutes    74675 - PT Therapeutic Exercise Minutes 12  -      68843 -  PT Neuromuscular Reeducation Minutes 26  -         Exercise 1    Exercise Name 1 NuStep  -      Cueing 1 Verbal  -      Time 1 5min  -         Exercise 2    Exercise Name 2 tandem stance   -MH      Cueing 2 Verbal;Demo  -MH      Reps 2 3e  -MH      Time 2 30sec  -MH         Exercise 3    Exercise Name 3 rhomberg stance EC  -MH      Cueing 3 Verbal;Demo  -MH      Reps 3 3  -MH      Time 3 30sec  -MH      Additional Comments on airex  -MH         Exercise 5    Exercise Name 5 heel raises  -MH      Cueing 5 Verbal;Demo  -MH      Sets 5 2  -MH      Reps 5 20  -MH      Time 5 B UE  -MH      Additional Comments on airex  -MH         Exercise 6    Exercise Name 6 tandem walk // bars fwd  -MH      Cueing 6 Verbal;Demo  -MH      Reps 6 3 laps  -MH      Time 6 fingertips  -         Exercise 7    Exercise Name 7 gait with horizontal head turns  -      Cueing 7 Verbal;Demo  -MH      Reps 7 3 laps  -MH         Exercise 8    Exercise Name 8 gait with vertical head turns  -      Cueing 8 Verbal;Demo  -MH      Reps 8 3 laps  -MH         Exercise 9    Exercise Name 9 STS + march  -      Cueing 9 Verbal;Demo  -MH      Reps 9 10  -MH                User Key  (r) = Recorded By, (t) = Taken By, (c) = Cosigned By      Initials Name Provider Type    Lianna Freeman, PT Physical Therapist                                  PT OP Goals       Row Name 12/13/23 1400          PT Short Term Goals    STG Date to Achieve 12/21/23  -     STG 1 Pt will be independent with initial HEP for symptom management.  -     STG 1 Progress Ongoing  -     STG 2 Pt will demonstrate a score of 100/120 on the mCTSIB to indicate improvements in balance and proprioception.  -     STG 2 Progress Ongoing  -        Long Term Goals    LTG Date to Achieve 01/20/24  -     LTG 1 Pt will be independent and compliant with advanced HEP for long term management of symptoms and prevention of future occurrence.  -     LTG 1 Progress Ongoing  -     LTG 2 Pt will reduce level of perceived disability as measured by the DHI to 30% in order to improve QOL.  -     LTG 2 Progress Ongoing  -     LTG 3 Pt will demonstrate ability to perform  full FGA outcome measure with no measures at severe disability to indicate improved standing endurance and balance.  -     LTG 3 Progress Ongoing  -     LTG 4 Pt will report >/=50% improvement in sleep quality to indicate improvement in quality of life and independent managment of symptoms.  -     LTG 4 Progress Ongoing  -               User Key  (r) = Recorded By, (t) = Taken By, (c) = Cosigned By      Initials Name Provider Type     Lianna Arana, PT Physical Therapist                    Therapy Education  Given: Symptoms/condition management  Program: Reinforced  How Provided: Verbal  Provided to: Patient  Level of Understanding: Verbalized              Time Calculation:   Start Time: 1430  Stop Time: 1508  Time Calculation (min): 38 min  Total Timed Code Minutes- PT: 38 minute(s)  Timed Charges  84460 - PT Therapeutic Exercise Minutes: 12  02307 -  PT Neuromuscular Reeducation Minutes: 26  Total Minutes  Timed Charges Total Minutes: 38   Total Minutes: 38  Therapy Charges for Today       Code Description Service Date Service Provider Modifiers Qty    62717313085  PT THER PROC EA 15 MIN 12/13/2023 Lianna Arana, PT GP 1    [id number]  PT NEUROMUSC RE EDUCATION EA 15 MIN 12/13/2023 Lianna Arana, PT GP 2                      Lianna Arana PT  12/13/2023

## 2023-12-22 ENCOUNTER — HOSPITAL ENCOUNTER (OUTPATIENT)
Dept: PHYSICAL THERAPY | Facility: HOSPITAL | Age: 78
Setting detail: THERAPIES SERIES
Discharge: HOME | End: 2023-12-22
Payer: MEDICARE

## 2023-12-22 DIAGNOSIS — R42 VERTIGO: Primary | ICD-10-CM

## 2023-12-22 DIAGNOSIS — R29.898 WEAKNESS OF LOWER EXTREMITY, UNSPECIFIED LATERALITY: ICD-10-CM

## 2023-12-22 DIAGNOSIS — I99.8 BLOOD PRESSURE INSTABILITY: ICD-10-CM

## 2023-12-22 DIAGNOSIS — R26.89 BALANCE PROBLEM: ICD-10-CM

## 2023-12-22 PROCEDURE — 97110 THERAPEUTIC EXERCISES: CPT

## 2023-12-22 PROCEDURE — 97530 THERAPEUTIC ACTIVITIES: CPT

## 2023-12-22 PROCEDURE — 97112 NEUROMUSCULAR REEDUCATION: CPT

## 2023-12-22 NOTE — THERAPY PROGRESS REPORT/RE-CERT
Outpatient Physical Therapy Vestibular Progress Note  [facility]     Patient Name: Earl Marc  : 1945  MRN: [id number]  Today's Date: 2023      Visit Date: 2023    Visit Dx:     ICD-10-CM ICD-9-CM   1. Vertigo  R42 780.4   2. Balance problem  R26.89 781.99   3. Weakness of lower extremity, unspecified laterality  R29.898 729.89   4. Blood pressure instability  I99.8 796.4       Patient Active Problem List   Diagnosis    Bell's palsy    Persistent insomnia    Osteoarthritis of cervical spine    Diabetic peripheral neuropathy    Dyslipidemia    Steatosis of liver    Fibromyalgia    Gastroesophageal reflux disease without esophagitis    Hypertensive kidney disease with stage 3a chronic kidney disease    Hypogonadism in male    Renal insufficiency    Vitamin D deficiency    Benign non-nodular prostatic hyperplasia with lower urinary tract symptoms    S/P drug eluting coronary stent placement    Coronary artery disease involving native coronary artery of native heart    Malignant neoplasm of skin    Type 2 diabetes mellitus with hyperglycemia, with long-term current use of insulin    Diabetes mellitus    Chronic insomnia    Other specified glaucoma    Vertigo    Epigastric pain    Chest pain with high risk for cardiac etiology    Precordial pain    S/P arthroscopy of shoulder    Peripheral neuropathy    Stage 3b chronic kidney disease          PT Ortho       Row Name 23 1000       Vital Signs    Intra Systolic BP Rehab 109  -MH    Intra Treatment Diastolic BP 53  -MH              User Key  (r) = Recorded By, (t) = Taken By, (c) = Cosigned By      Initials Name Provider Type    Lianna Freeman, PT Physical Therapist                               PT Assessment/Plan       Row Name 23 1000          PT Assessment    Functional Limitations Impaired gait;Decreased safety during functional activities;Limitation in home management;Limitations in community activities;Limitations in  functional capacity and performance;Impaired locomotion  -     Impairments Balance;Gait;Posture;Endurance;Locomotion;Muscle strength  -     Assessment Comments Earl Marc has been seen for 3 treatment physical therapy sessions for balance instability. Treatment has included therapeutic exercise, neuro-muscular retraining , and patient education with home exercise program . Progress to physical therapy goals is fair as pt. Has met 1/2 STGs, and 0/6 LTGs with 2 new goals established this date. He reports improved feelings of lightheadedness but somewhat improved since f/u with cardiology and adjusting BP medication, continues to have fluctuations in BP. He is also going to resume testosterone injections which is hopeful will improve fatigue. In regards to balance he reports less dizzy spells, feels his legs are beginning to regain strength but has had limited opportunity to complete HEP secondary to busy schedule with holidays. He demonstrates increased duration completed on mCTSIB from 84/1200 to 107/120, reduced perception of disability on DHI from severe to moderate handicap, reduced time on TUG cognitive from 16.5 sec to 15.6 sec this date (>/= 13.5 indicates falls risk), able to complete full FGA though severe impairment with tandem walking noted and falls risk indicated by score 13/30 (<22/30 indicates falls risk). He will benefit from continued skilled physical therapy to address remaining impairments and functional limitations.  -     Please refer to paper survey for additional self-reported information No  -MH     Rehab Potential Good  -     Patient/caregiver participated in establishment of treatment plan and goals Yes  -     Patient would benefit from skilled therapy intervention Yes  -        PT Plan    PT Frequency 1x/week  -     Predicted Duration of Therapy Intervention (PT) 6 visits  -     Planned CPT's? PT RE-EVAL: 56071;PT THER PROC EA 15 MIN: 01495;PT THER ACT EA 15 MIN:  91182;PT MANUAL THERAPY EA 15 MIN: 17951;PT NEUROMUSC RE-EDUCATION EA 15 MIN: 26695;PT GAIT TRAINING EA 15 MIN: 44152;PT SELF CARE/HOME MGMT/TRAIN EA 15: 35110;PT HOT OR COLD PACK TREAT MCARE  -MH     PT Plan Comments semit-tandem with trunk turn?  -MH               User Key  (r) = Recorded By, (t) = Taken By, (c) = Cosigned By      Initials Name Provider Type     Lianna Arana, PT Physical Therapist                        OP Exercises       Row Name 12/22/23 1000             Subjective    Subjective Comments no new complaints  -MH         Total Minutes    14269 - PT Therapeutic Exercise Minutes 9  -MH      60973 -  PT Neuromuscular Reeducation Minutes 10  -MH      38868 - PT Therapeutic Activity Minutes 21  -MH         Exercise 1    Exercise Name 1 NuStep  -MH      Cueing 1 Verbal  -MH      Time 1 5min  -MH         Exercise 2    Exercise Name 2 mCTSIB, TUG, FGA, DHI  -MH      Time 2 21 min  -MH         Exercise 3    Exercise Name 3 tandem stance  -MH      Cueing 3 Verbal;Demo  -MH      Reps 3 2e  -MH      Time 3 30s  -MH         Exercise 4    Exercise Name 4 semi-tandem walking  -MH      Cueing 4 Verbal;Demo  -MH      Reps 4 3 laps CGA  -MH         Exercise 5    Exercise Name 5 heel raises  -MH      Cueing 5 Verbal;Demo  -MH      Sets 5 2  -MH      Reps 5 20  -MH      Time 5 B UE  -MH         Exercise 9    Exercise Name 9 STS + march  -MH      Cueing 9 Verbal;Demo  -MH      Reps 9 10  -MH         Exercise 10    Exercise Name 10 rhomber + trunk rotation  -MH      Cueing 10 Verbal;Demo  -MH      Reps 10 10ea  -MH      Time 10 CGA  -MH                User Key  (r) = Recorded By, (t) = Taken By, (c) = Cosigned By      Initials Name Provider Type     Lianna Arana, PT Physical Therapist                                 PT OP Goals       Row Name 12/22/23 1000          PT Short Term Goals    STG Date to Achieve 12/21/23  -MH     STG 1 Pt will be independent with initial HEP for symptom management.  -MH     STG 1  Progress Progressing  -     STG 1 Progress Comments been busy with holidays  -     STG 2 Pt will demonstrate a score of 100/120 on the mCTSIB to indicate improvements in balance and proprioception.  -     STG 2 Progress Met  -     STG 2 Progress Comments 107/120  -        Long Term Goals    LTG Date to Achieve 01/20/24  -     LTG 1 Pt will be independent and compliant with advanced HEP for long term management of symptoms and prevention of future occurrence.  -     LTG 1 Progress Ongoing  -     LTG 1 Progress Comments updating as appropriate  -     LTG 2 Pt will reduce level of perceived disability as measured by the DHI to 30% in order to improve QOL.  -     LTG 2 Progress Ongoing  -     LTG 2 Progress Comments 38/100 from 62/100  -     LTG 3 Pt will demonstrate ability to perform full FGA outcome measure with no measures at severe disability to indicate improved standing endurance and balance.  -     LTG 3 Progress Ongoing;Progressing  -     LTG 3 Progress Comments completed full outcome measure, severe with tandem walk  -     LTG 4 Pt will report >/=50% improvement in sleep quality to indicate improvement in quality of life and independent managment of symptoms.  -     LTG 4 Progress Ongoing  -     LTG 5 Pt. will score >/= 17/30 on FGA to progress toward reduced falls risk.  -     LTG 5 Progress New  -     LTG 6 Pt. Will reduce TUG score </= 13.5 to indicate reduced falls risk.  -     LTG 6 Progress New  -               User Key  (r) = Recorded By, (t) = Taken By, (c) = Cosigned By      Initials Name Provider Type    Lianna Freeman, PT Physical Therapist                    Therapy Education  Given: Symptoms/condition management  Program: Reinforced  How Provided: Verbal  Provided to: Patient  Level of Understanding: Verbalized    Dizziness Handicap Inventory Score: 38  Functional Gait Assessment (FGA)  Gait Level Surface: Mild Impairment (slower gait speed and mild  deviations)  Change in Gait Speed: Mild Impairment  Gait with Horizontal Head Turns: Moderate Impairment (deviations ~10 in)  Gait with Vertical Head Turns: Mild Impairment  Gait and Pivot Turn: Moderate Impairment  Step Over Obstacle: Moderate Impairment  Gait with Narrow Base of Support: Severe Impairment  Gait with Eyes Closed: Moderate Impairment (gait deviations)  Ambulating Backwards: Mild Impairment  Steps: Moderate Impairment  FGA Total Score: 13  Timed Up and Go (TUG)  TUG Test 1: 12.52 seconds  TUG Test 2: 15.6 seconds (cognitive)  Other Outcome Measure Tool Used  Other Outcome Measure Tool Comments: mCTSIB 107/120      Time Calculation:   Start Time: 1030  Stop Time: 1110  Time Calculation (min): 40 min  Total Timed Code Minutes- PT: 40 minute(s)  Timed Charges  70673 - PT Therapeutic Exercise Minutes: 9  99781 -  PT Neuromuscular Reeducation Minutes: 10  32977 - PT Therapeutic Activity Minutes: 21  Total Minutes  Timed Charges Total Minutes: 40   Total Minutes: 40   Therapy Charges for Today       Code Description Service Date Service Provider Modifiers Qty    72607341813 HC PT THERAPEUTIC ACT EA 15 MIN 12/22/2023 Lianna Arana, PT GP 1    [id number] HC PT THER PROC EA 15 MIN 12/22/2023 Lianna Arana, PT GP 1    27720207989 HC PT NEUROMUSC RE EDUCATION EA 15 MIN 12/22/2023 Lianna Arana, PT GP 1            PT G-Codes  FGA Total Score: 13  TUG Test 1: 12.52 seconds  TUG Test 2: 15.6 seconds (cognitive)        Lianna Arana PT  12/22/2023

## 2023-12-27 ENCOUNTER — HOSPITAL ENCOUNTER (OUTPATIENT)
Dept: PHYSICAL THERAPY | Facility: HOSPITAL | Age: 78
Setting detail: THERAPIES SERIES
Discharge: HOME | End: 2023-12-27
Payer: MEDICARE

## 2023-12-27 DIAGNOSIS — R26.89 BALANCE PROBLEM: ICD-10-CM

## 2023-12-27 DIAGNOSIS — R29.898 WEAKNESS OF LOWER EXTREMITY, UNSPECIFIED LATERALITY: ICD-10-CM

## 2023-12-27 DIAGNOSIS — I99.8 BLOOD PRESSURE INSTABILITY: ICD-10-CM

## 2023-12-27 DIAGNOSIS — R42 VERTIGO: Primary | ICD-10-CM

## 2023-12-27 PROCEDURE — 97110 THERAPEUTIC EXERCISES: CPT

## 2023-12-27 PROCEDURE — 97530 THERAPEUTIC ACTIVITIES: CPT

## 2023-12-27 PROCEDURE — 97112 NEUROMUSCULAR REEDUCATION: CPT

## 2023-12-27 NOTE — THERAPY TREATMENT NOTE
Outpatient Physical Therapy Ortho Treatment Note  [facility]     Patient Name: Earl Marc  : 1945  MRN: [id number]  Today's Date: 2023      Visit Date: 2023    Visit Dx:    ICD-10-CM ICD-9-CM   1. Vertigo  R42 780.4   2. Balance problem  R26.89 781.99   3. Weakness of lower extremity, unspecified laterality  R29.898 729.89   4. Blood pressure instability  I99.8 796.4       Patient Active Problem List   Diagnosis    Bell's palsy    Persistent insomnia    Osteoarthritis of cervical spine    Diabetic peripheral neuropathy    Dyslipidemia    Steatosis of liver    Fibromyalgia    Gastroesophageal reflux disease without esophagitis    Hypertensive kidney disease with stage 3a chronic kidney disease    Hypogonadism in male    Renal insufficiency    Vitamin D deficiency    Benign non-nodular prostatic hyperplasia with lower urinary tract symptoms    S/P drug eluting coronary stent placement    Coronary artery disease involving native coronary artery of native heart    Malignant neoplasm of skin    Type 2 diabetes mellitus with hyperglycemia, with long-term current use of insulin    Diabetes mellitus    Chronic insomnia    Other specified glaucoma    Vertigo    Epigastric pain    Chest pain with high risk for cardiac etiology    Precordial pain    S/P arthroscopy of shoulder    Peripheral neuropathy    Stage 3b chronic kidney disease        Past Medical History:   Diagnosis Date    Abnormal cardiovascular stress test     Acid reflux     Arthritis     Asthma     Cancer     skin     Chronic kidney disease     Colon polyp     Congenital heart disease     COPD (chronic obstructive pulmonary disease)     Old age COPD/2D hand Smoke emphysema    Coronary artery disease     Diabetes mellitus     Diabetic neuropathy associated with type 2 diabetes mellitus     Diabetic peripheral neuropathy 03/10/2016    Dyslipidemia     Erectile dysfunction     Fatty liver disease, nonalcoholic     Gastritis      Glaucoma     both eyes    Hyperlipidemia     Hypertension     Hypogonadism male     Infectious viral hepatitis Hep A July 1959    Drank water from broken water line and Grandparents house.    Migraines 09/2023    Myocardial infarction 2018    Type 2 diabetes mellitus         Past Surgical History:   Procedure Laterality Date    CARDIAC CATHETERIZATION N/A 03/25/2016    Procedure: Left Heart Cath;  Surgeon: Maria E Gilbert MD;  Location:  [facility];  Service:     CARDIAC CATHETERIZATION N/A 03/25/2016    Procedure: Coronary angiography;  Surgeon: Maria E Gilbert MD;  Location:  [facility];  Service:     CARDIAC CATHETERIZATION N/A 03/28/2016    Procedure: Coronary angiography;  Surgeon: Maria E Gilbert MD;  Location:  [facility];  Service:     CARDIAC CATHETERIZATION N/A 03/28/2016    Procedure: Stent MOISE coronary;  Surgeon: Maria E Gilbert MD;  Location:  [facility];  Service:     CARDIAC CATHETERIZATION N/A 10/18/2018    Procedure: Coronary angiography  no LV gram d/t CKD;  Surgeon: Maria E Gilbert MD;  Location:  [facility];  Service: Cardiology    CARDIAC CATHETERIZATION N/A 10/18/2018    Procedure: Left Heart Cath;  Surgeon: Maria E Gilbert MD;  Location:  [facility];  Service: Cardiology    CARDIAC CATHETERIZATION N/A 10/18/2018    Procedure: Stent MOISE coronary;  Surgeon: Maria E Gilbert MD;  Location: [facility];  Service: Cardiology    CARDIAC CATHETERIZATION N/A 05/28/2021    Procedure: Coronary angiography;  Surgeon: Maria E Gilbert MD;  Location: [facility];  Service: Cardiovascular;  Laterality: N/A;    CARDIAC CATHETERIZATION N/A 05/28/2021    Procedure: Left Heart Cath;  Surgeon: Maria E Gilbert MD;  Location:  [facility];  Service: Cardiovascular;  Laterality: N/A;    CARDIAC  CATHETERIZATION N/A 05/28/2021    Procedure: Left ventriculography;  Surgeon: Maria E Gilbert MD;  Location:  [facility];  Service: Cardiovascular;  Laterality: N/A;    CARDIAC CATHETERIZATION N/A 05/28/2021    Procedure: Stent MOISE coronary;  Surgeon: Maria E Gilbert MD;  Location:  [facility];  Service: Cardiovascular;  Laterality: N/A;    CAROTID STENT      CORONARY ANGIOPLASTY      CORONARY STENT PLACEMENT  3/28/2016    EYE SURGERY  9/2017    NECK EXPLORATION N/A     AK RT/LT HEART CATHETERS N/A 10/18/2018    Procedure: Percutaneous Coronary Intervention;  Surgeon: Maria E Gilbert MD;  Location:  [facility];  Service: Cardiology                        PT Assessment/Plan       Row Name 12/27/23 1000          PT Assessment    Assessment Comments Mr. Marc returns to clinic reporting no new complaints, he required no rest breaks throughout session and no complaints of dizziness throughout. Further progressed balance training and functional movements, mild instability with use of compliant surface on lateral up and over and with initial turn upon STS.  -        PT Plan    PT Plan Comments 3/4 tandem walk, rocker board?  -               User Key  (r) = Recorded By, (t) = Taken By, (c) = Cosigned By      Initials Name Provider Type     Lianna Arana, PT Physical Therapist                       OP Exercises       Row Name 12/27/23 1000             Subjective    Subjective Comments no new complaints  -         Total Minutes    46722 - PT Therapeutic Exercise Minutes 8  -MH      41309 -  PT Neuromuscular Reeducation Minutes 15  -      92809 - PT Therapeutic Activity Minutes 15  -         Exercise 1    Exercise Name 1 NuStep  -      Cueing 1 Verbal  -      Time 1 5min  -         Exercise 4    Exercise Name 4 semi-tandem walking  -      Cueing 4 Verbal;Demo  -      Reps 4 3 laps // bars  -      Time 4 fwd/retro  -          Exercise 5    Exercise Name 5 heel raises  -MH      Cueing 5 Verbal;Demo  -MH      Sets 5 2  -MH      Reps 5 20  -MH      Time 5 B UE  -MH      Additional Comments on airex  -MH         Exercise 6    Exercise Name 6 rhomberg EC + airex  -MH      Cueing 6 Verbal  -MH      Reps 6 2  -MH      Time 6 30s  -MH         Exercise 7    Exercise Name 7 gait with horizontal head turns  -MH      Cueing 7 Verbal;Demo  -MH      Reps 7 3 laps  -MH         Exercise 8    Exercise Name 8 gait with vertical head turns  -MH      Cueing 8 Verbal;Demo  -MH      Reps 8 3 laps  -MH         Exercise 9    Exercise Name 9 STS + march with turn  -MH      Cueing 9 Verbal;Demo  -MH      Reps 9 10ea  -MH         Exercise 10    Exercise Name 10 rhomberg + trunk rotation  -MH      Cueing 10 Verbal;Demo  -MH      Reps 10 10ea  -MH      Time 10 CGA  -MH         Exercise 11    Exercise Name 11 lateral up and overs  -MH      Cueing 11 Verbal;Demo  -MH      Reps 11 10ea  -MH                User Key  (r) = Recorded By, (t) = Taken By, (c) = Cosigned By      Initials Name Provider Type    Lianna Freeman, PT Physical Therapist                                  PT OP Goals       Row Name 12/27/23 1000          PT Short Term Goals    STG Date to Achieve 12/21/23  -     STG 1 Pt will be independent with initial HEP for symptom management.  -     STG 1 Progress Progressing  -     STG 2 Pt will demonstrate a score of 100/120 on the mCTSIB to indicate improvements in balance and proprioception.  -     STG 2 Progress Met  -        Long Term Goals    LTG Date to Achieve 01/20/24  -     LTG 1 Pt will be independent and compliant with advanced HEP for long term management of symptoms and prevention of future occurrence.  -     LTG 1 Progress Ongoing  -     LTG 2 Pt will reduce level of perceived disability as measured by the DHI to 30% in order to improve QOL.  -     LTG 2 Progress Ongoing  -     LTG 3 Pt will demonstrate ability to perform  full FGA outcome measure with no measures at severe disability to indicate improved standing endurance and balance.  -     LTG 3 Progress Ongoing;Progressing  -     LTG 4 Pt will report >/=50% improvement in sleep quality to indicate improvement in quality of life and independent managment of symptoms.  -     LTG 4 Progress Ongoing  -     LTG 5 Pt. will score >/= 17/30 on FGA to progress toward reduced falls risk.  -     LTG 5 Progress New  -     LTG 6 Pt. Will reduce TUG score </= 13.5 to indicate reduced falls risk.  -     LTG 6 Progress New  -               User Key  (r) = Recorded By, (t) = Taken By, (c) = Cosigned By      Initials Name Provider Type     Lianna Arana, PT Physical Therapist                    Therapy Education  Given: Symptoms/condition management  Program: Reinforced  How Provided: Verbal  Provided to: Patient  Level of Understanding: Verbalized              Time Calculation:   Start Time: 1030  Stop Time: 1108  Time Calculation (min): 38 min  Total Timed Code Minutes- PT: 38 minute(s)  Timed Charges  50732 - PT Therapeutic Exercise Minutes: 8  95016 -  PT Neuromuscular Reeducation Minutes: 15  11939 - PT Therapeutic Activity Minutes: 15  Total Minutes  Timed Charges Total Minutes: 38   Total Minutes: 38  Therapy Charges for Today       Code Description Service Date Service Provider Modifiers Qty    58663639156  PT THERAPEUTIC ACT EA 15 MIN 12/27/2023 Lianna Arana, PT GP 1    [id number]  PT THER PROC EA 15 MIN 12/27/2023 Lianna Arana, PT GP 1    [id number]  PT NEUROMUSC RE EDUCATION EA 15 MIN 12/27/2023 Lianna Arana, PT GP 1                      Lianna Arana PT  12/27/2023

## 2024-01-03 ENCOUNTER — HOSPITAL ENCOUNTER (OUTPATIENT)
Dept: PHYSICAL THERAPY | Facility: HOSPITAL | Age: 79
Setting detail: THERAPIES SERIES
Discharge: HOME | End: 2024-01-03
Payer: MEDICARE

## 2024-01-03 DIAGNOSIS — R29.898 WEAKNESS OF LOWER EXTREMITY, UNSPECIFIED LATERALITY: ICD-10-CM

## 2024-01-03 DIAGNOSIS — I99.8 BLOOD PRESSURE INSTABILITY: ICD-10-CM

## 2024-01-03 DIAGNOSIS — R42 VERTIGO: Primary | ICD-10-CM

## 2024-01-03 DIAGNOSIS — R26.89 BALANCE PROBLEM: ICD-10-CM

## 2024-01-03 PROCEDURE — 97112 NEUROMUSCULAR REEDUCATION: CPT

## 2024-01-03 PROCEDURE — 97110 THERAPEUTIC EXERCISES: CPT

## 2024-01-03 PROCEDURE — 97530 THERAPEUTIC ACTIVITIES: CPT

## 2024-01-03 NOTE — THERAPY TREATMENT NOTE
"  Outpatient Physical Therapy Vestibular Treatment Note  [facility]     Patient Name: Earl Marc  : 1945  MRN: [id number]  Today's Date: 1/3/2024      Visit Date: 2024    Visit Dx:     ICD-10-CM ICD-9-CM   1. Vertigo  R42 780.4   2. Balance problem  R26.89 781.99   3. Weakness of lower extremity, unspecified laterality  R29.898 729.89   4. Blood pressure instability  I99.8 796.4       Patient Active Problem List   Diagnosis    Bell's palsy    Persistent insomnia    Osteoarthritis of cervical spine    Diabetic peripheral neuropathy    Dyslipidemia    Steatosis of liver    Fibromyalgia    Gastroesophageal reflux disease without esophagitis    Hypertensive kidney disease with stage 3a chronic kidney disease    Hypogonadism in male    Renal insufficiency    Vitamin D deficiency    Benign non-nodular prostatic hyperplasia with lower urinary tract symptoms    S/P drug eluting coronary stent placement    Coronary artery disease involving native coronary artery of native heart    Malignant neoplasm of skin    Type 2 diabetes mellitus with hyperglycemia, with long-term current use of insulin    Diabetes mellitus    Chronic insomnia    Other specified glaucoma    Vertigo    Epigastric pain    Chest pain with high risk for cardiac etiology    Precordial pain    S/P arthroscopy of shoulder    Peripheral neuropathy    Stage 3b chronic kidney disease                        PT Assessment/Plan       Row Name 24 1000          PT Assessment    Assessment Comments Mr. Marc returns with no new complaints, states he has not had a \"dizzy\" episode x2 days even with increased activity yesterday taking down [employer] decorations. He feels improvement has been combination of medication adjustments and PT. Increased challenge on tandem walking to 3/4 from 1/2 steps, tandem + EC on airex from rhomberg stance, added forward/retro up/over from compliant surface, and arms extended with trunk rotation vs. closer to " COM. Pt. tolerated session well, has 1 remaining skilled PT session at which time will discuss D/C to independent management vs. potential decrease in frequency to better develop independence and compliance with HEP.  -        PT Plan    PT Plan Comments D/C?  -               User Key  (r) = Recorded By, (t) = Taken By, (c) = Cosigned By      Initials Name Provider Type     Lianna Arana, PT Physical Therapist                        OP Exercises       Row Name 01/03/24 1000             Subjective    Subjective Comments I haven't had a dizzy spell in 2 days and I worked hard yesterday getting all the [employer] stuff down  -         Total Minutes    02420 - PT Therapeutic Exercise Minutes 10  -MH      09114 -  PT Neuromuscular Reeducation Minutes 18  -MH      93426 - PT Therapeutic Activity Minutes 10  -MH         Exercise 1    Exercise Name 1 NuStep  -MH      Cueing 1 Verbal  -MH      Time 1 5min  -MH         Exercise 4    Exercise Name 4 semi-tandem walking  -MH      Cueing 4 Verbal;Demo  -MH      Reps 4 3 laps // bars  -MH      Time 4 fwd/retro  -MH      Additional Comments no UE 3/4 tandem  -MH         Exercise 5    Exercise Name 5 heel raises  -MH      Cueing 5 Verbal;Demo  -MH      Sets 5 3  -MH      Reps 5 20  -MH      Time 5 B UE  -MH      Additional Comments on airex  -MH         Exercise 6    Exercise Name 6 1/2 tandem stance + EC on airex  -MH      Cueing 6 Verbal  -MH      Reps 6 3  -MH      Time 6 30s  -MH         Exercise 7    Exercise Name 7 gait with horizontal head turns  -MH      Cueing 7 Verbal;Demo  -MH      Reps 7 3 laps  -MH         Exercise 8    Exercise Name 8 gait with vertical head turns  -MH      Cueing 8 Verbal;Demo  -MH      Reps 8 3 laps  -MH         Exercise 10    Exercise Name 10 rhomberg + trunk rotation  -MH      Cueing 10 Verbal;Demo  -MH      Reps 10 10ea  -MH      Time 10 CGA  -MH      Additional Comments arms extended  -MH         Exercise 11    Exercise Name 11 lateral  up and overs  -MH      Cueing 11 Verbal;Demo  -MH      Reps 11 15ea  -MH      Time 11 airex  -MH         Exercise 12    Exercise Name 12 fwd/retro up/overs  -MH      Cueing 12 Verbal;Demo  -MH      Time 12 15ea  -MH      Additional Comments airex  -MH         Exercise 13    Exercise Name 13 high knee march w/ pause  -MH      Cueing 13 Verbal;Demo  -MH      Reps 13 3 laps // bars  -MH      Time 13 fwd  -MH                User Key  (r) = Recorded By, (t) = Taken By, (c) = Cosigned By      Initials Name Provider Type    Lianna Freeman, PT Physical Therapist                                 PT OP Goals       Row Name 01/03/24 1000          PT Short Term Goals    STG Date to Achieve 12/21/23  -     STG 1 Pt will be independent with initial HEP for symptom management.  -     STG 1 Progress Progressing  -     STG 2 Pt will demonstrate a score of 100/120 on the mCTSIB to indicate improvements in balance and proprioception.  -     STG 2 Progress Met  -        Long Term Goals    LTG Date to Achieve 01/20/24  -     LTG 1 Pt will be independent and compliant with advanced HEP for long term management of symptoms and prevention of future occurrence.  -     LTG 1 Progress Ongoing  -     LTG 2 Pt will reduce level of perceived disability as measured by the DHI to 30% in order to improve QOL.  -     LTG 2 Progress Ongoing  -     LTG 3 Pt will demonstrate ability to perform full FGA outcome measure with no measures at severe disability to indicate improved standing endurance and balance.  -     LTG 3 Progress Ongoing;Progressing  -     LTG 4 Pt will report >/=50% improvement in sleep quality to indicate improvement in quality of life and independent managment of symptoms.  -     LTG 4 Progress Ongoing  -     LTG 5 Pt. will score >/= 17/30 on FGA to progress toward reduced falls risk.  -     LTG 5 Progress New  -     LTG 6 Pt. Will reduce TUG score </= 13.5 to indicate reduced falls risk.  -      LTG 6 Progress New  -               User Key  (r) = Recorded By, (t) = Taken By, (c) = Cosigned By      Initials Name Provider Type     Lianna Arana, PT Physical Therapist                                   Time Calculation:   Start Time: 1029  Stop Time: 1107  Time Calculation (min): 38 min  Total Timed Code Minutes- PT: 38 minute(s)  Timed Charges  77484 - PT Therapeutic Exercise Minutes: 10  77822 -  PT Neuromuscular Reeducation Minutes: 18  21534 - PT Therapeutic Activity Minutes: 10  Total Minutes  Timed Charges Total Minutes: 38   Total Minutes: 38   Therapy Charges for Today       Code Description Service Date Service Provider Modifiers Qty    [id number]  PT THERAPEUTIC ACT EA 15 MIN 1/3/2024 Lianna Arana, PT GP 1    [id number] HC PT THER PROC EA 15 MIN 1/3/2024 Lianna Arana, PT GP 1    [id number]  PT NEUROMUSC RE EDUCATION EA 15 MIN 1/3/2024 Lianna Arana, PT GP 1                     Lianna Arana PT  1/3/2024

## 2024-01-09 ENCOUNTER — OFFICE VISIT (OUTPATIENT)
Dept: CARDIOLOGY | Facility: CLINIC | Age: 79
End: 2024-01-09
Payer: MEDICARE

## 2024-01-09 VITALS
WEIGHT: 231 LBS | SYSTOLIC BLOOD PRESSURE: 130 MMHG | BODY MASS INDEX: 31.29 KG/M2 | HEIGHT: 72 IN | HEART RATE: 77 BPM | DIASTOLIC BLOOD PRESSURE: 70 MMHG

## 2024-01-09 DIAGNOSIS — R42 DIZZINESS: ICD-10-CM

## 2024-01-09 DIAGNOSIS — I10 PRIMARY HYPERTENSION: ICD-10-CM

## 2024-01-09 DIAGNOSIS — I25.10 CORONARY ARTERY DISEASE INVOLVING NATIVE CORONARY ARTERY OF NATIVE HEART WITHOUT ANGINA PECTORIS: Primary | ICD-10-CM

## 2024-01-09 DIAGNOSIS — E78.2 MIXED HYPERLIPIDEMIA: ICD-10-CM

## 2024-01-09 PROCEDURE — 99214 OFFICE O/P EST MOD 30 MIN: CPT

## 2024-01-09 RX ORDER — TESTOSTERONE CYPIONATE 200 MG/ML
INJECTION, SOLUTION INTRAMUSCULAR
COMMUNITY
Start: 2024-01-08

## 2024-01-09 RX ORDER — CICLOPIROX 1 G/100ML
SHAMPOO TOPICAL
COMMUNITY
Start: 2023-12-07

## 2024-01-09 NOTE — PROGRESS NOTES
Subjective:        Earl Marc is a 78 y.o. male who here for follow up    Chief Complaint   Patient presents with    Follow-up     1MO       HPI    This is a 78-year-old male with coronary artery disease (MOISE ostial OM and PDA 2016, MOISE to the bifurcation lesion of the proximal LAD as well as angioplasty of diagonal branch 2018, MOISE midportion LAD 2021), diabetes mellitus, hypertension, hyperlipidemia, COPD, CKD.  He follows up in office today for management of coronary disease.    He was seen 11/28/2023 with complaints of dizziness and orthostatic hypotension.  Lopressor was decreased to 12.5 daily.    Reviewed and still relevant: Echo 10/30/2023 EF 60 to 65%, normal LV function.  Stress test 10/30/2023 myocardial perfusion imaging indicates a normal study with no evidence of ischemia.  Holter monitor 11/15/2023 normal sinus rhythm with rare PACs PVCs and NS SVTs.  Cardiac catheterization 2021 successful angioplasty and stent to the mid LAD 70% reduced to 0% with MOISE, normal left main, proximal LAD stent widely patent, midportion 70% with haziness reduced to 0% with MOISE minimal distal irregularity including the diagonal  branches.  Circumflex nondominant normal.  RCA codominant normal.    The following portions of the patient's history were reviewed and updated as appropriate: allergies, current medications, past family history, past medical history, past social history, past surgical history and problem list.    Past Medical History:   Diagnosis Date    Abnormal cardiovascular stress test     Acid reflux     Arthritis     Asthma     Cancer     skin     Chronic kidney disease     Colon polyp     Congenital heart disease     COPD (chronic obstructive pulmonary disease) 2021    Old age COPD/2D hand Smoke emphysema    Coronary artery disease     Diabetes mellitus     Diabetic neuropathy associated with type 2 diabetes mellitus     Diabetic peripheral neuropathy 03/10/2016    Dyslipidemia     Erectile  dysfunction     Fatty liver disease, nonalcoholic     Gastritis     Glaucoma     both eyes    Hyperlipidemia     Hypertension     Hypogonadism male     Infectious viral hepatitis Hep A 1959    Drank water from broken water line and Grandparents house.    Migraines 2023    Myocardial infarction 2018    Type 2 diabetes mellitus          reports that he has never smoked. He has been exposed to tobacco smoke. He has never used smokeless tobacco. He reports that he does not drink alcohol and does not use drugs.     Family History   Problem Relation Age of Onset    Breast cancer Daughter     Heart attack Mother          10/31/1980    Hypertension Mother     Heart disease Mother     Thyroid disease Mother     Lupus Mother     Early death Mother     Miscarriages / Stillbirths Mother         Miscarriage     Heart failure Mother     Heart attack Brother     Heart disease Brother     Hyperlipidemia Brother     Cancer Brother         Due to Agent [place], [place]    Hypertension Brother     Depression Father     COPD Father     Stroke Maternal Grandmother     Cancer Maternal Grandmother         Lung Cancer non-smoker    Diabetes Brother     Heart disease Brother     Hypertension Brother     Kidney disease Brother     Heart disease Brother     Hypertension Brother        ROS     Review of Systems  Constitutional: no wt loss, fever, fatigue  Gastrointestinal: no nausea, abdominal pain  Behavioral/Psych: no insomnia or anxiety  Cardiovascular no chest pain or shortness of breath      Objective:           Vitals and nursing note reviewed.   Constitutional:       Appearance: Well-developed.   HENT:      Head: Normocephalic.      Right Ear: External ear normal.      Left Ear: External ear normal.   Neck:      Vascular: No JVD.   Pulmonary:      Effort: Pulmonary effort is normal. No respiratory distress.      Breath sounds: Normal breath sounds. No stridor. No rales.   Cardiovascular:      Normal rate. Regular rhythm.       No gallop.    Pulses:     Intact distal pulses.   Edema:     Peripheral edema absent.   Abdominal:      General: Bowel sounds are normal. There is no distension.      Palpations: Abdomen is soft.      Tenderness: There is no abdominal tenderness. There is no guarding.   Musculoskeletal: Normal range of motion.         General: No tenderness.      Cervical back: Normal range of motion. Skin:     General: Skin is warm.   Neurological:      Mental Status: Alert and oriented to person, place, and time.      Deep Tendon Reflexes: Reflexes are normal and symmetric.   Psychiatric:         Judgment: Judgment normal.         Procedures    Interpretation Summary         A normal monitor study.    Earl Marc monitored for 13d 23h starting on 10/30/2023.  Primary rhythm was Sinus Rhythm.  The average heart rate, excluding ectopy, was 68 BPM with a minumim of 52 BPM  on Day 7 and a maximum of 99 BPM on Day 10. SVE: [place] was 0.04 %, 568  total SVE SVT:3 events, longest event 5 beats on Day 8, fastest  event 99 BPM on Day 9 PVC: [place] was <0.01%, 100 total PVC,3  disparate morphologies VENTRICULAR TACHYCARDIA: 1 events, longest event 6 beats at Day 5 fastest rate 154 BPM at Day 5    NSR, RARE PACS, PVCS AND NSSVTS    Interpretation Summary         Findings consistent with a normal ECG stress test.    Left ventricular ejection fraction is normal (Calculated EF = 66%).    Myocardial perfusion imaging indicates a normal myocardial perfusion study with no evidence of ischemia.    Impressions are consistent with a low risk study.    Interpretation Summary         Left ventricular ejection fraction appears to be 61 - 65%.    Left ventricular diastolic function was normal.    Estimated right ventricular systolic pressure from tricuspid regurgitation is normal (<35 mmHg).    HEMODYNAMIC / ANGIOGRAPHIC DATA:    Left ventricular end diastolic pressure was 10 mmHg.    The left main is normal left main.  The left anterior  descending artery is *.Left anterior descending had a proximal LAD stent widely patent midportion had 70% with haziness was reduced to 0% with 3.0/12 Xience stent, minimal distal irregularity including the diagonal and  branches  The left circumflex is nondominant and normal.  The right coronary artery is codominant with early atherosclerotic plaque.  Successful angioplasty and stent to the mid LAD 70% with haziness reduced to 0% with 3.0/12 Xience stent     KELVIN FLOW    PRE....3...       POST....3..     TYPE OF LESION........B.....     RECOMMENDATIONS:  Post-procedure care will focus on prevention of any ischemic events and congestive complications.  Aggressive risk factor modification will be carried out.  Importance of taking dual antiplatelets for one year  has been explained, risk of stent thrombosis leading to the acute MI, which carries high morbidity and mortality has been explained     Discontinuation or interruptions of these medications should be under the strict guidance of appropriate health professional         Current Outpatient Medications:     Alcohol Swabs ([employer] Prep) 70 % pads, Place 1 application on the skin as directed by provider Daily., Disp: 400 each, Rfl: 2    AmLactin 12 % lotion, Apply  topically to the appropriate area as directed As Needed for Dry Skin., Disp: 500 g, Rfl: 3    ascorbic acid (VITAMIN C) 1000 MG tablet, Take 1 tablet by mouth Daily., Disp: , Rfl:     aspirin 81 MG EC tablet, Take 1 tablet by mouth Daily., Disp: 90 tablet, Rfl: 3    atorvastatin (LIPITOR) 20 MG tablet, Take 1 tablet by mouth Every Night., Disp: 90 tablet, Rfl: 1    B-D UF III MINI PEN NEEDLES 31G X 5 MM misc, Use once daily with Lantus Pen for injection of insulin, Disp: 100 each, Rfl: 3    CALCIUM CITRATE PO, Take  by mouth., Disp: , Rfl:     Carboxymeth-Glycerin-Polysorb 0.5-1-0.5 % solution, Apply  to eye(s) as directed by provider Daily., Disp: , Rfl:     ciclopirox (LOPROX) 1 % shampoo, APPLY  TOPICALLY 3 TIMES EVERY WEEK, Disp: , Rfl:     Cyanocobalamin (Vitamin B-12) 1000 MCG sublingual tablet, Place  under the tongue., Disp: , Rfl:     fluticasone (FLONASE) 50 MCG/ACT nasal spray, 1 spray into the nostril(s) as directed by provider Daily As Needed for Allergies., Disp: , Rfl:     Folic Acid 0.8 MG capsule, Take 1 tablet by mouth Daily. 0.4 2 tab daily, Disp: , Rfl:     glucose blood test strip, E11.65 Precision Xtra Blood Glucose In Vitro Strip; Patient Sig: Precision Xtra Blood Glucose In Vitro Strip TEST 2  TIMES DAILY, Disp: 200 each, Rfl: 5    hydrocortisone 2.5 % ointment, Apply  topically to the appropriate area as directed 2 (Two) Times a Day., Disp: 20 g, Rfl: 5    Insulin Glargine (Lantus SoloStar) 100 UNIT/ML injection pen, Inject 34 Units under the skin into the appropriate area as directed Daily., Disp: 30 mL, Rfl: 1    linagliptin (Tradjenta) 5 MG tablet tablet, Take 1 tablet by mouth Daily. By mouth take one tab daily., Disp: 90 tablet, Rfl: 1    Magnesium 250 MG tablet, Take 1 tablet by mouth Every Night., Disp: , Rfl:     melatonin 5 MG tablet tablet, Take 1 tablet by mouth., Disp: , Rfl:     metoprolol succinate XL (TOPROL-XL) 25 MG 24 hr tablet, Take 0.5 tablets by mouth Daily., Disp: 30 tablet, Rfl: 5    Monolet Lancets misc, E11.65 Use to test BG 2 times daily, Disp: 200 each, Rfl: 5    nitroglycerin (Nitrostat) 0.4 MG SL tablet, Place 1 tablet under the tongue Every 5 (Five) Minutes As Needed for Chest Pain. Indications: Acute Angina Pectoris, Disp: 25 tablet, Rfl: 3    pantoprazole (PROTONIX) 20 MG EC tablet, Take 1 tablet by mouth Daily., Disp: 90 tablet, Rfl: 1    Testosterone Cypionate (DEPOTESTOTERONE CYPIONATE) 200 MG/ML injection, , Disp: , Rfl:     VIAGRA 100 MG tablet, Take 1 tablet by mouth As Needed for erectile dysfunction (1 tablet prior to planned intercourse.)., Disp: 24 tablet, Rfl: 3    Zinc 30 MG tablet, Take 50 mg by mouth Daily., Disp: , Rfl:     venlafaxine XR  (EFFEXOR-XR) 37.5 MG 24 hr capsule, Take 1 capsule by mouth Daily., Disp: 30 capsule, Rfl: 1     Assessment:        Patient Active Problem List   Diagnosis    Bell's palsy    Persistent insomnia    Osteoarthritis of cervical spine    Diabetic peripheral neuropathy    Dyslipidemia    Steatosis of liver    Fibromyalgia    Gastroesophageal reflux disease without esophagitis    Hypertensive kidney disease with stage 3a chronic kidney disease    Hypogonadism in male    Renal insufficiency    Vitamin D deficiency    Benign non-nodular prostatic hyperplasia with lower urinary tract symptoms    S/P drug eluting coronary stent placement    Coronary artery disease involving native coronary artery of native heart    Malignant neoplasm of skin    Type 2 diabetes mellitus with hyperglycemia, with long-term current use of insulin    Diabetes mellitus    Chronic insomnia    Other specified glaucoma    Vertigo    Epigastric pain    Chest pain with high risk for cardiac etiology    Precordial pain    S/P arthroscopy of shoulder    Peripheral neuropathy    Stage 3b chronic kidney disease               Plan:   1.  Coronary artery disease: No chest pain or shortness of breath.  Previous ischemic workup as above.  Continue aspirin, statin, metoprolol.    Risk reduction for the coronary artery disease, controlling the blood pressure, blood sugar management, cholesterol management, exercise, stress management, and proper compliance with medications and follow-up has been discussed    2.  Hypertension: /70 today in office.  Continue metoprolol 12.5    Importance of controlling hypertension and blood pressure  checkup on the regular basis has been explained. Hypertension as a silent killer has been discussed. Risk reduction of the weight and regular exercises to control the hypertension has been explained.    3.  Hyperlipidemia: He reports his PCP manages his cholesterol labs.  Continue atorvastatin    Risk of the hyperlipidemia,  importance of the treatment has been explained. Pros and cons of the statins has been explained. Regular blood workup as well as side effects including the liver failure, myelopathy death has been explained.    4. Dizziness/orthostatic hypotension: Feels well, symptoms improved on decreased metoprolol                   No diagnosis found.    There are no diagnoses linked to this encounter.    COUNSELING:shaila Hernández was given to patient for the following topics: diagnostic results, risk factor reductions, impressions, risks and benefits of treatment options and importance of treatment compliance .       SMOKING COUNSELING:denies      Follow up in 6months or sooner if neeeded    Sincerely,   JL Robertson  [facility]  01/09/24  09:30 EST    EMR Dragon/Transcription disclaimer:   Much of this encounter note is an electronic transcription/translation of spoken language to printed text. The electronic translation of spoken language may permit erroneous, or at times, nonsensical words or phrases to be inadvertently transcribed; Although I have reviewed the note for such errors, some may still exist.

## 2024-01-10 ENCOUNTER — HOSPITAL ENCOUNTER (OUTPATIENT)
Dept: PHYSICAL THERAPY | Facility: HOSPITAL | Age: 79
Discharge: HOME | End: 2024-01-10
Payer: MEDICARE

## 2024-01-10 DIAGNOSIS — R26.89 BALANCE PROBLEM: ICD-10-CM

## 2024-01-10 DIAGNOSIS — R42 VERTIGO: Primary | ICD-10-CM

## 2024-01-10 DIAGNOSIS — R29.898 WEAKNESS OF LOWER EXTREMITY, UNSPECIFIED LATERALITY: ICD-10-CM

## 2024-01-10 DIAGNOSIS — I99.8 BLOOD PRESSURE INSTABILITY: ICD-10-CM

## 2024-01-10 PROCEDURE — 97112 NEUROMUSCULAR REEDUCATION: CPT

## 2024-01-10 PROCEDURE — 97110 THERAPEUTIC EXERCISES: CPT

## 2024-01-10 PROCEDURE — 97530 THERAPEUTIC ACTIVITIES: CPT

## 2024-01-10 NOTE — THERAPY DISCHARGE NOTE
Outpatient Physical Therapy Vestibular Treatment Note/Discharge Summary  [facility]     Patient Name: Earl Marc  : 1945  MRN: [id number]  Today's Date: 1/10/2024      Visit Date: 01/10/2024    Visit Dx:     ICD-10-CM ICD-9-CM   1. Vertigo  R42 780.4   2. Balance problem  R26.89 781.99   3. Weakness of lower extremity, unspecified laterality  R29.898 729.89   4. Blood pressure instability  I99.8 796.4       Patient Active Problem List   Diagnosis    Bell's palsy    Persistent insomnia    Osteoarthritis of cervical spine    Diabetic peripheral neuropathy    Dyslipidemia    Steatosis of liver    Fibromyalgia    Gastroesophageal reflux disease without esophagitis    Hypertensive kidney disease with stage 3a chronic kidney disease    Hypogonadism in male    Renal insufficiency    Vitamin D deficiency    Benign non-nodular prostatic hyperplasia with lower urinary tract symptoms    S/P drug eluting coronary stent placement    Coronary artery disease involving native coronary artery of native heart    Malignant neoplasm of skin    Type 2 diabetes mellitus with hyperglycemia, with long-term current use of insulin    Diabetes mellitus    Chronic insomnia    Other specified glaucoma    Vertigo    Epigastric pain    Chest pain with high risk for cardiac etiology    Precordial pain    S/P arthroscopy of shoulder    Peripheral neuropathy    Stage 3b chronic kidney disease                         PT Assessment/Plan       Row Name 01/10/24 1000          PT Assessment    Assessment Comments Earl Marc was seen for 6 physical therapy sessions for balance instability, episodes of vertigo. Treatment included therapeutic exercise and patient education with home exercise program . Progress to physical therapy goals was good as pt. Met 2/2 STGs and 6/6 LTGs. He subjectively reports 100% improvement in condition, feels he is back toward baseline. Since last visit he reports only 3 very minor, brief episodes of  dizziness that quickly resolve. He has received 4 testosterone injections since beginning PT which he correlates to improvement in his overall energy levels and tolerance to activity throughout the day. Mr. Marc returns to clinic this date reporting improved compliance with HEP over last week. Since beginning PT he demonstrates improved mCTSIB 107/120 from 84/120 at evaluation with largest improvement inability to maintain balance with reduced visual input. He improved TUG score to 9.66 seconds without cognitive component and 11.46 seconds with cognitive component with < 13.5 seconds indicating falls risk (16.5 and 17 seconds respectively at evaluation) and FGA 23/30 (falls risk <22/30) this date where he was previously unable to complete test due to symptoms.  He was discharged to an independent [facility] and provided patient education to self-manage condition.  -        PT Plan    PT Plan Comments D/C  -               User Key  (r) = Recorded By, (t) = Taken By, (c) = Cosigned By      Initials Name Provider Type     Lianna Arana, PT Physical Therapist                          OP Exercises       Row Name 01/10/24 1000             Subjective    Subjective Comments I feel 100% better  -         Total Minutes    22511 - PT Therapeutic Exercise Minutes 9  -      06358 -  PT Neuromuscular Reeducation Minutes 15  -MH      73402 - PT Therapeutic Activity Minutes 15  -         Exercise 1    Exercise Name 1 NuStep  -      Cueing 1 Verbal  -      Time 1 5min  -         Exercise 2    Exercise Name 2 outcome measures and goals update  -         Exercise 9    Exercise Name 9 STS + march with turn  -      Cueing 9 Verbal;Demo  -      Reps 9 10ea  -         Exercise 13    Exercise Name 13 high knee march w/ pause  -      Cueing 13 Verbal;Demo  -      Reps 13 3 laps // bars  -      Time 13 fwd  -                User Key  (r) = Recorded By, (t) = Taken By, (c) = Cosigned By      Initials Name  Provider Type     Lianna Arana, PT Physical Therapist                                   PT OP Goals       Row Name 01/10/24 1000          PT Short Term Goals    STG Date to Achieve 12/21/23  -     STG 1 Pt will be independent with initial HEP for symptom management.  -     STG 1 Progress Met  -     STG 1 Progress Comments reports improved compliance  -     STG 2 Pt will demonstrate a score of 100/120 on the mCTSIB to indicate improvements in balance and proprioception.  -     STG 2 Progress Met  -        Long Term Goals    LTG Date to Achieve 01/20/24  -     LTG 1 Pt will be independent and compliant with advanced HEP for long term management of symptoms and prevention of future occurrence.  -     LTG 1 Progress Met  -     LTG 1 Progress Comments reports improved compliance  -     LTG 2 Pt will reduce level of perceived disability as measured by the DHI to 30% in order to improve QOL.  -     LTG 2 Progress Met  -     LTG 2 Progress Comments 10/100  -     LTG 3 Pt will demonstrate ability to perform full FGA outcome measure with no measures at severe disability to indicate improved standing endurance and balance.  -     LTG 3 Progress Met  -[facility] 3 Progress Comments see outcome measures  -     LTG 4 Pt will report >/=50% improvement in sleep quality to indicate improvement in quality of life and independent managment of symptoms.  -     LTG 4 Progress Met  -     LTG 4 Progress Comments off diuretics now and only waking 1-2x/night before it was hourly, not needing to sleep as much in day which is improving activity levels  -     LTG 5 Pt. will score >/= 17/30 on FGA to progress toward reduced falls risk.  -     LTG 5 Progress Met  -     LTG 5 Progress Comments 23/30  -     LT 6 Pt. Will reduce TUG score </= 13.5 to indicate reduced falls risk.  -     LTG 6 Progress Met  -     LTG 6 Progress Comments see outcome measures  -               User Key  (r) =  Recorded By, (t) = Taken By, (c) = Cosigned By      Initials Name Provider Type     Lianna Arana PT Physical Therapist                    Therapy Education  Education Details: Updated HEP Access Code: [email or web address]  Given: Symptoms/condition management  Program: Reinforced  How Provided: Verbal  Provided to: Patient  Level of Understanding: Verbalized       Functional Gait Assessment (FGA)  Gait Level Surface: Normal  Change in Gait Speed: Normal  Gait with Horizontal Head Turns: Mild Impairment  Gait with Vertical Head Turns: Normal  Gait and Pivot Turn: Mild Impairment  Step Over Obstacle: Mild Impairment  Gait with Narrow Base of Support: Mild Impairment (needed UE support on 8th step)  Gait with Eyes Closed: Mild Impairment  Ambulating Backwards: Mild Impairment  Steps: Mild Impairment  FGA Total Score: 23  Timed Up and Go (TUG)  TUG Test 1: 9.66 seconds  TUG Test 2: 11.46 seconds (cognitive)      Time Calculation:   Start Time: 1030  Stop Time: 1109  Time Calculation (min): 39 min  Total Timed Code Minutes- PT: 39 minute(s)  Timed Charges  36888 - PT Therapeutic Exercise Minutes: 9  66507 -  PT Neuromuscular Reeducation Minutes: 15  26224 - PT Therapeutic Activity Minutes: 15  Total Minutes  Timed Charges Total Minutes: 39   Total Minutes: 39     Therapy Charges for Today       Code Description Service Date Service Provider Modifiers Qty    83305281343 HC PT THERAPEUTIC ACT EA 15 MIN 1/10/2024 Lianna Arana, PT GP 1    64651938253 HC PT THER PROC EA 15 MIN 1/10/2024 Lianna Arana, PT GP 1    10909332858 HC PT NEUROMUSC RE EDUCATION EA 15 MIN 1/10/2024 Lianna Arana, PT GP 1            PT G-Codes  FGA Total Score: 23  TUG Test 1: 9.66 seconds  TUG Test 2: 11.46 seconds (cognitive)              Lianna Arana PT  1/10/2024

## 2024-01-19 ENCOUNTER — APPOINTMENT (OUTPATIENT)
Dept: GENERAL RADIOLOGY | Facility: HOSPITAL | Age: 79
End: 2024-01-19
Payer: MEDICARE

## 2024-01-19 ENCOUNTER — HOSPITAL ENCOUNTER (OUTPATIENT)
Facility: HOSPITAL | Age: 79
Setting detail: OBSERVATION
Discharge: HOME | End: 2024-01-20
Attending: EMERGENCY MEDICINE | Admitting: EMERGENCY MEDICINE
Payer: MEDICARE

## 2024-01-19 DIAGNOSIS — R07.9 CHEST PAIN WITH HIGH RISK FOR CARDIAC ETIOLOGY: ICD-10-CM

## 2024-01-19 DIAGNOSIS — I25.110 CORONARY ARTERY DISEASE INVOLVING NATIVE CORONARY ARTERY OF NATIVE HEART WITH UNSTABLE ANGINA PECTORIS: ICD-10-CM

## 2024-01-19 DIAGNOSIS — R79.89 ELEVATED TROPONIN: ICD-10-CM

## 2024-01-19 DIAGNOSIS — R07.9 CHEST PAIN, UNSPECIFIED TYPE: Primary | ICD-10-CM

## 2024-01-19 DIAGNOSIS — R73.09 LABILE BLOOD GLUCOSE: ICD-10-CM

## 2024-01-19 LAB
ALBUMIN SERPL-MCNC: 4 G/DL (ref 3.5–5.2)
ALBUMIN/GLOB SERPL: 1.4 G/DL
ALP SERPL-CCNC: 82 U/L (ref 39–117)
ALT SERPL W P-5'-P-CCNC: 25 U/L (ref 1–41)
ANION GAP SERPL CALCULATED.3IONS-SCNC: 14.9 MMOL/L (ref 5–15)
AST SERPL-CCNC: 21 U/L (ref 1–40)
BACTERIA UR QL AUTO: NORMAL /HPF
BASOPHILS # BLD AUTO: 0.06 10*3/MM3 (ref 0–0.2)
BASOPHILS NFR BLD AUTO: 0.6 % (ref 0–1.5)
BILIRUB SERPL-MCNC: 0.6 MG/DL (ref 0–1.2)
BILIRUB UR QL STRIP: NEGATIVE
BUN SERPL-MCNC: 12 MG/DL (ref 8–23)
BUN/CREAT SERPL: 8.5 (ref 7–25)
CALCIUM SPEC-SCNC: 9.3 MG/DL (ref 8.6–10.5)
CHLORIDE SERPL-SCNC: 100 MMOL/L (ref 98–107)
CLARITY UR: CLEAR
CO2 SERPL-SCNC: 22.1 MMOL/L (ref 22–29)
COLOR UR: YELLOW
CREAT SERPL-MCNC: 1.42 MG/DL (ref 0.76–1.27)
D DIMER PPP FEU-MCNC: <0.27 MCGFEU/ML (ref 0–0.78)
DEPRECATED RDW RBC AUTO: 39.6 FL (ref 37–54)
EGFRCR SERPLBLD CKD-EPI 2021: 50.6 ML/MIN/1.73
EOSINOPHIL # BLD AUTO: 0.13 10*3/MM3 (ref 0–0.4)
EOSINOPHIL NFR BLD AUTO: 1.3 % (ref 0.3–6.2)
ERYTHROCYTE [DISTWIDTH] IN BLOOD BY AUTOMATED COUNT: 12.6 % (ref 12.3–15.4)
GEN 5 2HR TROPONIN T REFLEX: 58 NG/L
GLOBULIN UR ELPH-MCNC: 2.8 GM/DL
GLUCOSE BLDC GLUCOMTR-MCNC: 194 MG/DL (ref 70–130)
GLUCOSE BLDC GLUCOMTR-MCNC: 209 MG/DL (ref 70–130)
GLUCOSE BLDC GLUCOMTR-MCNC: 254 MG/DL (ref 70–130)
GLUCOSE BLDC GLUCOMTR-MCNC: 280 MG/DL (ref 70–130)
GLUCOSE BLDC GLUCOMTR-MCNC: 284 MG/DL (ref 70–130)
GLUCOSE SERPL-MCNC: 218 MG/DL (ref 65–99)
GLUCOSE UR STRIP-MCNC: NEGATIVE MG/DL
HCT VFR BLD AUTO: 45.9 % (ref 37.5–51)
HGB BLD-MCNC: 15.2 G/DL (ref 13–17.7)
HGB UR QL STRIP.AUTO: NEGATIVE
HOLD SPECIMEN: NORMAL
HOLD SPECIMEN: NORMAL
HYALINE CASTS UR QL AUTO: NORMAL /LPF
IMM GRANULOCYTES # BLD AUTO: 0.07 10*3/MM3 (ref 0–0.05)
IMM GRANULOCYTES NFR BLD AUTO: 0.7 % (ref 0–0.5)
KETONES UR QL STRIP: ABNORMAL
LEUKOCYTE ESTERASE UR QL STRIP.AUTO: NEGATIVE
LYMPHOCYTES # BLD AUTO: 2.22 10*3/MM3 (ref 0.7–3.1)
LYMPHOCYTES NFR BLD AUTO: 22.6 % (ref 19.6–45.3)
MCH RBC QN AUTO: 29 PG (ref 26.6–33)
MCHC RBC AUTO-ENTMCNC: 33.1 G/DL (ref 31.5–35.7)
MCV RBC AUTO: 87.4 FL (ref 79–97)
MONOCYTES # BLD AUTO: 0.88 10*3/MM3 (ref 0.1–0.9)
MONOCYTES NFR BLD AUTO: 8.9 % (ref 5–12)
NEUTROPHILS NFR BLD AUTO: 6.48 10*3/MM3 (ref 1.7–7)
NEUTROPHILS NFR BLD AUTO: 65.9 % (ref 42.7–76)
NITRITE UR QL STRIP: NEGATIVE
NRBC BLD AUTO-RTO: 0 /100 WBC (ref 0–0.2)
NT-PROBNP SERPL-MCNC: 92.4 PG/ML (ref 0–1800)
PH UR STRIP.AUTO: 7.5 [PH] (ref 5–8)
PLATELET # BLD AUTO: 233 10*3/MM3 (ref 140–450)
PMV BLD AUTO: 9.7 FL (ref 6–12)
POTASSIUM SERPL-SCNC: 3.8 MMOL/L (ref 3.5–5.2)
PROT SERPL-MCNC: 6.8 G/DL (ref 6–8.5)
PROT UR QL STRIP: ABNORMAL
QT INTERVAL: 333 MS
QTC INTERVAL: 394 MS
RBC # BLD AUTO: 5.25 10*6/MM3 (ref 4.14–5.8)
RBC # UR STRIP: NORMAL /HPF
REF LAB TEST METHOD: NORMAL
SODIUM SERPL-SCNC: 137 MMOL/L (ref 136–145)
SP GR UR STRIP: 1.02 (ref 1–1.03)
SQUAMOUS #/AREA URNS HPF: NORMAL /HPF
TROPONIN T DELTA: 37 NG/L
TROPONIN T SERPL HS-MCNC: 21 NG/L
UROBILINOGEN UR QL STRIP: ABNORMAL
WBC # UR STRIP: NORMAL /HPF
WBC NRBC COR # BLD AUTO: 9.84 10*3/MM3 (ref 3.4–10.8)
WHOLE BLOOD HOLD COAG: NORMAL
WHOLE BLOOD HOLD SPECIMEN: NORMAL

## 2024-01-19 PROCEDURE — G0378 HOSPITAL OBSERVATION PER HR: HCPCS

## 2024-01-19 PROCEDURE — 63710000001 METOPROLOL SUCCINATE XL 25 MG TABLET SUSTAINED-RELEASE 24 HOUR: Performed by: INTERNAL MEDICINE

## 2024-01-19 PROCEDURE — 82948 REAGENT STRIP/BLOOD GLUCOSE: CPT

## 2024-01-19 PROCEDURE — 63710000001 MELATONIN 5 MG TABLET: Performed by: INTERNAL MEDICINE

## 2024-01-19 PROCEDURE — 93010 ELECTROCARDIOGRAM REPORT: CPT | Performed by: INTERNAL MEDICINE

## 2024-01-19 PROCEDURE — 84484 ASSAY OF TROPONIN QUANT: CPT

## 2024-01-19 PROCEDURE — 85025 COMPLETE CBC W/AUTO DIFF WBC: CPT

## 2024-01-19 PROCEDURE — A9270 NON-COVERED ITEM OR SERVICE: HCPCS | Performed by: INTERNAL MEDICINE

## 2024-01-19 PROCEDURE — 93458 L HRT ARTERY/VENTRICLE ANGIO: CPT | Performed by: INTERNAL MEDICINE

## 2024-01-19 PROCEDURE — C1894 INTRO/SHEATH, NON-LASER: HCPCS | Performed by: INTERNAL MEDICINE

## 2024-01-19 PROCEDURE — 93005 ELECTROCARDIOGRAM TRACING: CPT

## 2024-01-19 PROCEDURE — 25510000001 IOPAMIDOL PER 1 ML: Performed by: INTERNAL MEDICINE

## 2024-01-19 PROCEDURE — 85379 FIBRIN DEGRADATION QUANT: CPT | Performed by: PHYSICIAN ASSISTANT

## 2024-01-19 PROCEDURE — 36415 COLL VENOUS BLD VENIPUNCTURE: CPT

## 2024-01-19 PROCEDURE — 25010000002 FENTANYL CITRATE (PF) 50 MCG/ML SOLUTION: Performed by: INTERNAL MEDICINE

## 2024-01-19 PROCEDURE — 63710000001 LINAGLIPTIN 5 MG TABLET: Performed by: INTERNAL MEDICINE

## 2024-01-19 PROCEDURE — 63710000001 PANTOPRAZOLE 40 MG TABLET DELAYED-RELEASE: Performed by: INTERNAL MEDICINE

## 2024-01-19 PROCEDURE — C1769 GUIDE WIRE: HCPCS | Performed by: INTERNAL MEDICINE

## 2024-01-19 PROCEDURE — 63710000001 ATORVASTATIN 20 MG TABLET: Performed by: INTERNAL MEDICINE

## 2024-01-19 PROCEDURE — 80053 COMPREHEN METABOLIC PANEL: CPT

## 2024-01-19 PROCEDURE — 81001 URINALYSIS AUTO W/SCOPE: CPT | Performed by: PHYSICIAN ASSISTANT

## 2024-01-19 PROCEDURE — 25010000002 HEPARIN (PORCINE) PER 1000 UNITS: Performed by: INTERNAL MEDICINE

## 2024-01-19 PROCEDURE — 71045 X-RAY EXAM CHEST 1 VIEW: CPT

## 2024-01-19 PROCEDURE — 99284 EMERGENCY DEPT VISIT MOD MDM: CPT

## 2024-01-19 PROCEDURE — 25010000002 MIDAZOLAM PER 1 MG: Performed by: INTERNAL MEDICINE

## 2024-01-19 PROCEDURE — 83880 ASSAY OF NATRIURETIC PEPTIDE: CPT | Performed by: PHYSICIAN ASSISTANT

## 2024-01-19 PROCEDURE — 63710000001 INSULIN LISPRO (HUMAN) PER 5 UNITS: Performed by: INTERNAL MEDICINE

## 2024-01-19 PROCEDURE — 99214 OFFICE O/P EST MOD 30 MIN: CPT | Performed by: INTERNAL MEDICINE

## 2024-01-19 PROCEDURE — A9270 NON-COVERED ITEM OR SERVICE: HCPCS | Performed by: NURSE PRACTITIONER

## 2024-01-19 PROCEDURE — 63710000001 AMLODIPINE 5 MG TABLET: Performed by: NURSE PRACTITIONER

## 2024-01-19 RX ORDER — AMLODIPINE BESYLATE 5 MG/1
5 TABLET ORAL
Status: DISCONTINUED | OUTPATIENT
Start: 2024-01-19 | End: 2024-01-20 | Stop reason: HOSPADM

## 2024-01-19 RX ORDER — IBUPROFEN 600 MG/1
1 TABLET ORAL
Status: DISCONTINUED | OUTPATIENT
Start: 2024-01-19 | End: 2024-01-20 | Stop reason: HOSPADM

## 2024-01-19 RX ORDER — INSULIN GLARGINE 100 [IU]/ML
34 INJECTION, SOLUTION SUBCUTANEOUS DAILY
Status: DISCONTINUED | OUTPATIENT
Start: 2024-01-19 | End: 2024-01-20 | Stop reason: HOSPADM

## 2024-01-19 RX ORDER — HYDRALAZINE HYDROCHLORIDE 20 MG/ML
10 INJECTION INTRAMUSCULAR; INTRAVENOUS EVERY 6 HOURS PRN
Status: DISCONTINUED | OUTPATIENT
Start: 2024-01-19 | End: 2024-01-20 | Stop reason: HOSPADM

## 2024-01-19 RX ORDER — SODIUM CHLORIDE 0.9 % (FLUSH) 0.9 %
10 SYRINGE (ML) INJECTION EVERY 12 HOURS SCHEDULED
Status: DISCONTINUED | OUTPATIENT
Start: 2024-01-19 | End: 2024-01-20 | Stop reason: HOSPADM

## 2024-01-19 RX ORDER — AMOXICILLIN 250 MG
2 CAPSULE ORAL 2 TIMES DAILY
Status: DISCONTINUED | OUTPATIENT
Start: 2024-01-19 | End: 2024-01-20 | Stop reason: HOSPADM

## 2024-01-19 RX ORDER — ACETAMINOPHEN 650 MG/1
650 SUPPOSITORY RECTAL EVERY 4 HOURS PRN
Status: DISCONTINUED | OUTPATIENT
Start: 2024-01-19 | End: 2024-01-20 | Stop reason: HOSPADM

## 2024-01-19 RX ORDER — FENTANYL CITRATE 50 UG/ML
INJECTION, SOLUTION INTRAMUSCULAR; INTRAVENOUS
Status: DISCONTINUED | OUTPATIENT
Start: 2024-01-19 | End: 2024-01-19 | Stop reason: HOSPADM

## 2024-01-19 RX ORDER — NITROGLYCERIN 0.4 MG/1
0.4 TABLET SUBLINGUAL
Status: DISCONTINUED | OUTPATIENT
Start: 2024-01-19 | End: 2024-01-19 | Stop reason: SDUPTHER

## 2024-01-19 RX ORDER — ASPIRIN 81 MG/1
81 TABLET ORAL DAILY
Status: DISCONTINUED | OUTPATIENT
Start: 2024-01-19 | End: 2024-01-20 | Stop reason: HOSPADM

## 2024-01-19 RX ORDER — ACETAMINOPHEN 325 MG/1
650 TABLET ORAL EVERY 4 HOURS PRN
Status: DISCONTINUED | OUTPATIENT
Start: 2024-01-19 | End: 2024-01-19 | Stop reason: SDUPTHER

## 2024-01-19 RX ORDER — FLUTICASONE PROPIONATE 50 MCG
1 SPRAY, SUSPENSION (ML) NASAL DAILY PRN
Status: DISCONTINUED | OUTPATIENT
Start: 2024-01-19 | End: 2024-01-20 | Stop reason: HOSPADM

## 2024-01-19 RX ORDER — BISACODYL 5 MG/1
5 TABLET, DELAYED RELEASE ORAL DAILY PRN
Status: DISCONTINUED | OUTPATIENT
Start: 2024-01-19 | End: 2024-01-20 | Stop reason: HOSPADM

## 2024-01-19 RX ORDER — VERAPAMIL HYDROCHLORIDE 2.5 MG/ML
INJECTION, SOLUTION INTRAVENOUS
Status: DISCONTINUED | OUTPATIENT
Start: 2024-01-19 | End: 2024-01-19 | Stop reason: HOSPADM

## 2024-01-19 RX ORDER — POLYETHYLENE GLYCOL 3350 17 G/17G
17 POWDER, FOR SOLUTION ORAL DAILY PRN
Status: DISCONTINUED | OUTPATIENT
Start: 2024-01-19 | End: 2024-01-20 | Stop reason: HOSPADM

## 2024-01-19 RX ORDER — NICOTINE POLACRILEX 4 MG
15 LOZENGE BUCCAL
Status: DISCONTINUED | OUTPATIENT
Start: 2024-01-19 | End: 2024-01-20 | Stop reason: HOSPADM

## 2024-01-19 RX ORDER — METOPROLOL SUCCINATE 25 MG/1
12.5 TABLET, EXTENDED RELEASE ORAL DAILY
Status: DISCONTINUED | OUTPATIENT
Start: 2024-01-19 | End: 2024-01-20 | Stop reason: HOSPADM

## 2024-01-19 RX ORDER — HEPARIN SODIUM 1000 [USP'U]/ML
INJECTION, SOLUTION INTRAVENOUS; SUBCUTANEOUS
Status: DISCONTINUED | OUTPATIENT
Start: 2024-01-19 | End: 2024-01-19 | Stop reason: HOSPADM

## 2024-01-19 RX ORDER — MIDAZOLAM HYDROCHLORIDE 1 MG/ML
INJECTION INTRAMUSCULAR; INTRAVENOUS
Status: DISCONTINUED | OUTPATIENT
Start: 2024-01-19 | End: 2024-01-19 | Stop reason: HOSPADM

## 2024-01-19 RX ORDER — NITROGLYCERIN 0.4 MG/1
0.4 TABLET SUBLINGUAL
Status: DISCONTINUED | OUTPATIENT
Start: 2024-01-19 | End: 2024-01-20 | Stop reason: HOSPADM

## 2024-01-19 RX ORDER — CHOLECALCIFEROL (VITAMIN D3) 125 MCG
5 CAPSULE ORAL NIGHTLY
Status: DISCONTINUED | OUTPATIENT
Start: 2024-01-19 | End: 2024-01-20 | Stop reason: HOSPADM

## 2024-01-19 RX ORDER — ASCORBIC ACID 500 MG
1000 TABLET ORAL DAILY
Status: DISCONTINUED | OUTPATIENT
Start: 2024-01-19 | End: 2024-01-20 | Stop reason: HOSPADM

## 2024-01-19 RX ORDER — SODIUM CHLORIDE 0.9 % (FLUSH) 0.9 %
10 SYRINGE (ML) INJECTION AS NEEDED
Status: DISCONTINUED | OUTPATIENT
Start: 2024-01-19 | End: 2024-01-20 | Stop reason: HOSPADM

## 2024-01-19 RX ORDER — ONDANSETRON 2 MG/ML
4 INJECTION INTRAMUSCULAR; INTRAVENOUS EVERY 6 HOURS PRN
Status: DISCONTINUED | OUTPATIENT
Start: 2024-01-19 | End: 2024-01-20 | Stop reason: HOSPADM

## 2024-01-19 RX ORDER — LIDOCAINE HYDROCHLORIDE 20 MG/ML
INJECTION, SOLUTION INFILTRATION; PERINEURAL
Status: DISCONTINUED | OUTPATIENT
Start: 2024-01-19 | End: 2024-01-19 | Stop reason: HOSPADM

## 2024-01-19 RX ORDER — SODIUM CHLORIDE 9 MG/ML
INJECTION, SOLUTION INTRAVENOUS
Status: COMPLETED | OUTPATIENT
Start: 2024-01-19 | End: 2024-01-19

## 2024-01-19 RX ORDER — ASPIRIN 325 MG
325 TABLET ORAL ONCE
Status: DISCONTINUED | OUTPATIENT
Start: 2024-01-19 | End: 2024-01-19

## 2024-01-19 RX ORDER — PANTOPRAZOLE SODIUM 40 MG/1
40 TABLET, DELAYED RELEASE ORAL DAILY
Status: DISCONTINUED | OUTPATIENT
Start: 2024-01-19 | End: 2024-01-20 | Stop reason: HOSPADM

## 2024-01-19 RX ORDER — SODIUM CHLORIDE 9 MG/ML
40 INJECTION, SOLUTION INTRAVENOUS AS NEEDED
Status: DISCONTINUED | OUTPATIENT
Start: 2024-01-19 | End: 2024-01-20 | Stop reason: HOSPADM

## 2024-01-19 RX ORDER — BISACODYL 10 MG
10 SUPPOSITORY, RECTAL RECTAL DAILY PRN
Status: DISCONTINUED | OUTPATIENT
Start: 2024-01-19 | End: 2024-01-20 | Stop reason: HOSPADM

## 2024-01-19 RX ORDER — ACETAMINOPHEN 160 MG/5ML
650 SOLUTION ORAL EVERY 4 HOURS PRN
Status: DISCONTINUED | OUTPATIENT
Start: 2024-01-19 | End: 2024-01-20 | Stop reason: HOSPADM

## 2024-01-19 RX ORDER — ACETAMINOPHEN 325 MG/1
650 TABLET ORAL EVERY 4 HOURS PRN
Status: DISCONTINUED | OUTPATIENT
Start: 2024-01-19 | End: 2024-01-20 | Stop reason: HOSPADM

## 2024-01-19 RX ORDER — INSULIN LISPRO 100 [IU]/ML
2-9 INJECTION, SOLUTION INTRAVENOUS; SUBCUTANEOUS
Status: DISCONTINUED | OUTPATIENT
Start: 2024-01-19 | End: 2024-01-20 | Stop reason: HOSPADM

## 2024-01-19 RX ORDER — DEXTROSE MONOHYDRATE 25 G/50ML
25 INJECTION, SOLUTION INTRAVENOUS
Status: DISCONTINUED | OUTPATIENT
Start: 2024-01-19 | End: 2024-01-20 | Stop reason: HOSPADM

## 2024-01-19 RX ORDER — ATORVASTATIN CALCIUM 20 MG/1
20 TABLET, FILM COATED ORAL NIGHTLY
Status: DISCONTINUED | OUTPATIENT
Start: 2024-01-19 | End: 2024-01-20 | Stop reason: HOSPADM

## 2024-01-19 RX ADMIN — Medication 10 ML: at 20:23

## 2024-01-19 RX ADMIN — INSULIN LISPRO 4 UNITS: 100 INJECTION, SOLUTION INTRAVENOUS; SUBCUTANEOUS at 17:47

## 2024-01-19 RX ADMIN — INSULIN LISPRO 2 UNITS: 100 INJECTION, SOLUTION INTRAVENOUS; SUBCUTANEOUS at 21:25

## 2024-01-19 RX ADMIN — PANTOPRAZOLE SODIUM 40 MG: 40 TABLET, DELAYED RELEASE ORAL at 20:21

## 2024-01-19 RX ADMIN — Medication 5 MG: at 22:45

## 2024-01-19 RX ADMIN — METOPROLOL SUCCINATE 12.5 MG: 25 TABLET, EXTENDED RELEASE ORAL at 20:22

## 2024-01-19 RX ADMIN — LINAGLIPTIN 5 MG: 5 TABLET, FILM COATED ORAL at 20:22

## 2024-01-19 RX ADMIN — AMLODIPINE BESYLATE 5 MG: 5 TABLET ORAL at 22:45

## 2024-01-19 RX ADMIN — ATORVASTATIN CALCIUM 20 MG: 20 TABLET, FILM COATED ORAL at 20:21

## 2024-01-19 NOTE — PLAN OF CARE
Goal Outcome Evaluation:            Pt is S/P heart cath today, no PCI done rt radial site with Vasc band 13cc of air. Site CDI no hematoma noted, vitals stable plan of care in progress

## 2024-01-19 NOTE — Clinical Note
Hemostasis started on the right radial artery. R-Band was used in achieving hemostasis. Radial compression device applied to vessel. Closure device additional comment: Vasc band 13 cc air

## 2024-01-19 NOTE — ED NOTES
Nursing report ED to floor  Earl Marc  78 y.o.  male    HPI :   Chief Complaint   Patient presents with    Chest Pain       Admitting doctor:   Joesph Wilkinson MD    Admitting diagnosis:   The primary encounter diagnosis was Chest pain, unspecified type. Diagnoses of Elevated troponin, Labile blood glucose, Chest pain with high risk for cardiac etiology, and Coronary artery disease involving native coronary artery of native heart with unstable angina pectoris were also pertinent to this visit.    Code status:   Current Code Status       Date Active Code Status Order ID Comments User Context       1/19/2024 1540 CPR (Attempt to Resuscitate) [id number]  Aida Mack APRN ED        Question Answer    Code Status (Patient has no pulse and is not breathing) CPR (Attempt to Resuscitate)    Medical Interventions (Patient has pulse or is breathing) Full Support                    Allergies:   Ace inhibitors, Hydrogenated palm oil glycerides, Lanolin, Other, Prazosin, and Nsaids    Isolation:   No active isolations    Intake and Output  No intake or output data in the 24 hours ending 01/19/24 1557    Weight:   There were no vitals filed for this visit.    Most recent vitals:   Vitals:    01/19/24 1406 01/19/24 1436 01/19/24 1506 01/19/24 1536   BP: 138/65 156/68 150/68 172/74   Pulse: 74 77 79 82   Resp:       Temp:       SpO2: 95%  95% 98%       Active LDAs/IV Access:   Lines, Drains & Airways       Active LDAs       Name Placement date Placement time Site Days    Peripheral IV 01/19/24 Right Antecubital 01/19/24  --  Antecubital  less than 1                    Labs (abnormal labs have a star):   Labs Reviewed   COMPREHENSIVE METABOLIC PANEL - Abnormal; Notable for the following components:       Result Value    Glucose 218 (*)     Creatinine 1.42 (*)     eGFR 50.6 (*)     All other components within normal limits    Narrative:     GFR Normal >60  Chronic Kidney Disease <60  Kidney Failure <15    The GFR formula is  only valid for adults with stable renal function between ages 18 and 70.   CBC WITH AUTO DIFFERENTIAL - Abnormal; Notable for the following components:    Immature Grans % 0.7 (*)     Immature Grans, Absolute 0.07 (*)     All other components within normal limits   URINALYSIS W/ CULTURE IF INDICATED - Abnormal; Notable for the following components:    Ketones, UA Trace (*)     Protein,  mg/dL (2+) (*)     All other components within normal limits    Narrative:     In absence of clinical symptoms, the presence of pyuria, bacteria, and/or nitrites on the urinalysis result does not correlate with infection.   HIGH SENSITIVITIY TROPONIN T 2HR - Abnormal; Notable for the following components:    HS Troponin T 58 (*)     Troponin T Delta 37 (*)     All other components within normal limits    Narrative:     High Sensitive Troponin T Reference Range:  <14.0 ng/L- Negative Female for AMI  <22.0 ng/L- Negative Male for AMI  >=14 - Abnormal Female indicating possible myocardial injury.  >=22 - Abnormal Male indicating possible myocardial injury.   Clinicians would have to utilize clinical acumen, EKG, Troponin, and serial changes to determine if it is an Acute Myocardial Infarction or myocardial injury due to an underlying chronic condition.        POCT GLUCOSE FINGERSTICK - Abnormal; Notable for the following components:    Glucose 280 (*)     All other components within normal limits   POCT GLUCOSE FINGERSTICK - Abnormal; Notable for the following components:    Glucose 284 (*)     All other components within normal limits   POCT GLUCOSE FINGERSTICK - Abnormal; Notable for the following components:    Glucose 254 (*)     All other components within normal limits   TROPONIN - Normal    Narrative:     High Sensitive Troponin T Reference Range:  <14.0 ng/L- Negative Female for AMI  <22.0 ng/L- Negative Male for AMI  >=14 - Abnormal Female indicating possible myocardial injury.  >=22 - Abnormal Male indicating possible  "myocardial injury.   Clinicians would have to utilize clinical acumen, EKG, Troponin, and serial changes to determine if it is an Acute Myocardial Infarction or myocardial injury due to an underlying chronic condition.        D-DIMER, QUANTITATIVE - Normal    Narrative:     According to the assay 's published package insert, a normal (<0.50 MCGFEU/mL) D-dimer result in conjunction with a non-high clinical probability assessment, excludes deep vein thrombosis (DVT) and pulmonary embolism (PE) with high sensitivity.    D-dimer values increase with age and this can make VTE exclusion of an older population difficult. To address this, the [employer] College [employer], based on best available evidence and recent guidelines, recommends that clinicians use age-adjusted D-dimer thresholds in patients greater than 50 years of age with: a) a low probability of PE who do not meet all Pulmonary Embolism Rule Out Criteria, or b) in those with intermediate probability of PE.   The formula for an age-adjusted D-dimer cut-off is \"age/100\".  For example, a 60 year old patient would have an age-adjusted cut-off of 0.60 MCGFEU/mL and an 80 year old 0.80 MCGFEU/mL.   BNP (IN-HOUSE) - Normal    Narrative:     This assay is used as an aid in the diagnosis of individuals suspected of having heart failure. It can be used as an aid in the diagnosis of acute decompensated heart failure (ADHF) in patients presenting with signs and symptoms of ADHF to the emergency department (ED). In addition, NT-proBNP of <300 pg/mL indicates ADHF is not likely.    Age Range Result Interpretation  NT-proBNP Concentration (pg/mL:      <50             Positive            >450                   Gray                 300-450                    Negative             <300    50-75           Positive            >900                  Gray                300-900                  Negative            <300      >75             Positive            >1800       "            Cronin                300-1800                  Negative            <300   RAINBOW DRAW    Narrative:     The following orders were created for panel order Fisher Draw.  Procedure                               Abnormality         Status                     ---------                               -----------         ------                     Green Top (Gel)[[id number]]                                  Final result               Lavender Top[[id number]]                                     Final result               Gold Top - SST[[id number]]                                   Final result               Light Blue Top[[id number]]                                   Final result                 Please view results for these tests on the individual orders.   URINALYSIS, MICROSCOPIC ONLY   POCT GLUCOSE FINGERSTICK   POCT GLUCOSE FINGERSTICK   POCT GLUCOSE FINGERSTICK   POCT GLUCOSE FINGERSTICK   POCT GLUCOSE FINGERSTICK   POCT GLUCOSE FINGERSTICK   POCT GLUCOSE FINGERSTICK   CBC AND DIFFERENTIAL    Narrative:     The following orders were created for panel order CBC & Differential.  Procedure                               Abnormality         Status                     ---------                               -----------         ------                     CBC Auto Differential[[id number]]        Abnormal            Final result                 Please view results for these tests on the individual orders.   GREEN TOP   LAVENDER TOP   GOLD TOP - SST   LIGHT BLUE TOP       EKG:   ECG 12 Lead ED Triage Standing Order; Chest Pain   Final Result   HEART RATE= 84  bpm   RR Interval= 714  ms   IA Interval= 172  ms   P Horizontal Axis= 16  deg   P Front Axis= 44  deg   QRSD Interval= 74  ms   QT Interval= 333  ms   QTcB= 394  ms   QRS Axis= -36  deg   T Wave Axis= 61  deg   - ABNORMAL ECG -   Sinus rhythm   Inferior infarct, old   No change from previous tracing   Electronically Signed By: Maria E [facility] ([facility]) 19-Jan-2024  12:58:45   Date and Time of Study: 2024-01-19 11:57:44          Meds given in ED:   Medications   sodium chloride 0.9 % flush 10 mL (has no administration in time range)   sodium chloride 0.9 % flush 10 mL (has no administration in time range)   sodium chloride 0.9 % flush 10 mL (has no administration in time range)   sodium chloride 0.9 % infusion 40 mL (has no administration in time range)   sennosides-docusate (PERICOLACE) 8.6-50 MG per tablet 2 tablet (has no administration in time range)     And   polyethylene glycol (MIRALAX) packet 17 g (has no administration in time range)     And   bisacodyl (DULCOLAX) EC tablet 5 mg (has no administration in time range)     And   bisacodyl (DULCOLAX) suppository 10 mg (has no administration in time range)   nitroglycerin (NITROSTAT) SL tablet 0.4 mg (has no administration in time range)   acetaminophen (TYLENOL) tablet 650 mg (has no administration in time range)     Or   acetaminophen (TYLENOL) 160 MG/5ML oral solution 650 mg (has no administration in time range)     Or   acetaminophen (TYLENOL) suppository 650 mg (has no administration in time range)   ondansetron (ZOFRAN) injection 4 mg (has no administration in time range)   dextrose (GLUTOSE) oral gel 15 g (has no administration in time range)   dextrose (D50W) (25 g/50 mL) IV injection 25 g (has no administration in time range)   glucagon (GLUCAGEN) injection 1 mg (has no administration in time range)   insulin lispro (HUMALOG/ADMELOG) injection 2-9 Units (has no administration in time range)       Imaging results:  XR Chest 1 View    Result Date: 1/19/2024  Negative.  This report was finalized on 1/19/2024 1:04 PM by Dr. Collins Cerna M.D on Workstation: [id number]       Ambulatory status:   - assist    Social issues:   Social History     Socioeconomic History    Marital status:      Spouse name: Ivette    Number of children: 1   Tobacco Use    Smoking status: Never     Passive exposure: Past    Smokeless  tobacco: Never    Tobacco comments:     Never   Vaping Use    Vaping Use: Never used   Substance and Sexual Activity    Alcohol use: No    Drug use: Never    Sexual activity: Yes     Partners: Female     Birth control/protection: Surgical     Comment: Vasectomy 1974       NIH Stroke Scale:       Chioma Norman RN  01/19/24 15:57 EST

## 2024-01-19 NOTE — CONSULTS
[facility]  Cardiology Consult Note    Patient Identification:  Name: Earl Marc  Age: 78 y.o.  Sex: male  :  1945  MRN: [id number]             Requesting Physician: Carlos LIM    Reason for Consultation / Chief Complaint: chest pain    History of Present Illness:     This is a 78-year-old male who is well-known to our service with coronary artery disease, diabetes mellitus, hypertension, hyperlipidemia, COPD, CKD.  He has had MOISE to the ostial OM and PDA in , MOISE to the bifurcation lesion of the proximal LAD as well as angioplasty of the diagonal branch , MOISE midportion LAD in .  He presented to [facility] ER with complaints of sudden onset substernal chest squeezing/pain severe in nature with radiation into his neck and ear.  Workup in ER with initial high-sensitivity troponin 21, repeat 58. ECG sinus rhythm without acute ischemic changes and unchanged from previous.     Echo 10/30/2023 EF 60 to 65%, normal LV function.  Stress test 10/30/2023 myocardial perfusion imaging indicates a normal study with no evidence of ischemia.  Holter monitor 2023 normal sinus rhythm with rare PACs, PVCs and NS SVTs.  Cardiac catheterization  successful angioplasty and stent to the mid LAD 70% to 0% with drug-eluting stents, normal left main, proximal LAD stent widely patent midportion 70% with haziness reduced to 0% with drug-eluting stent, minimal distal irregularity including the diagonal  branches.  Circumflex nondominant normal.  RCA codominant normal.    Comorbid cardiac risk factors: Age, hypertension, hyperlipidemia, diabetes mellitus, CAD    Past Medical History:  Past Medical History:   Diagnosis Date    Abnormal cardiovascular stress test     Acid reflux     Arthritis     Asthma     Cancer     skin     Chronic kidney disease     Colon polyp     Congenital heart disease     COPD (chronic obstructive pulmonary disease)     Old age COPD/2D  hand Smoke emphysema    Coronary artery disease     Diabetes mellitus     Diabetic neuropathy associated with type 2 diabetes mellitus     Diabetic peripheral neuropathy 03/10/2016    Dyslipidemia     Erectile dysfunction     Fatty liver disease, nonalcoholic     Gastritis     Glaucoma     both eyes    Hyperlipidemia     Hypertension     Hypogonadism male     Infectious viral hepatitis Hep A July 1959    Drank water from broken water line and Grandparents house.    Migraines 09/2023    Myocardial infarction 2018    Type 2 diabetes mellitus      Past Surgical History:  Past Surgical History:   Procedure Laterality Date    CARDIAC CATHETERIZATION N/A 03/25/2016    Procedure: Left Heart Cath;  Surgeon: Maria E Gilbert MD;  Location:  [facility]AS[facility];  Service:     CARDIAC CATHETERIZATION N/A 03/25/2016    Procedure: Coronary angiography;  Surgeon: Maria E Gilbert MD;  Location:  [facility];  Service:     CARDIAC CATHETERIZATION N/A 03/28/2016    Procedure: Coronary angiography;  Surgeon: Maria E Gilbert MD;  Location:  [facility];  Service:     CARDIAC CATHETERIZATION N/A 03/28/2016    Procedure: Stent MOISE coronary;  Surgeon: Maria E Gilbert MD;  [facility];  Service:     CARDIAC CATHETERIZATION N/A 10/18/2018    Procedure: Coronary angiography  no LV gram d/t CKD;  Surgeon: Maria E Gilbert MD;  Location:  [facility] CATH INVASIVE LOCATION;  Service: Cardiology    CARDIAC CATHETERIZATION N/A 10/18/2018    Procedure: Left Heart Cath;  Surgeon: Maria E Gilbert MD;  Location: [facility] CATH INVASIVE LOCATION;  Service: Cardiology    CARDIAC CATHETERIZATION N/A 10/18/2018    Procedure: Stent MOISE coronary;  Surgeon: Maria E Gilbert MD;  Location: [facility] CATH INVASIVE LOCATION;  Service: Cardiology    CARDIAC CATHETERIZATION N/A 05/28/2021    Procedure: Coronary angiography;  Surgeon: Maria E Giblert MD;   Location:  [facility] CATH INVASIVE LOC[facility];  Service: Cardiovascular;  Laterality: N/A;    CARDIAC CATHETERIZATION N/A 2021    Procedure: Left Heart Cath;  Surgeon: Maria E Gilbert MD;  Location:  [facility] CATH INVASIVE LOC[facility];  Service: Cardiovascular;  Laterality: N/A;    CARDIAC CATHETERIZATION N/A 2021    Procedure: Left ventriculography;  Surgeon: Maria E Gilbert MD;  Location:  [facility];  Service: Cardiovascular;  Laterality: N/A;    CARDIAC CATHETERIZATION N/A 2021    Procedure: Stent MOISE coronary;  Surgeon: Maria E Gilbert MD;  Location:  [facility] INVASIVE [facility];  Service: Cardiovascular;  Laterality: N/A;    CAROTID STENT      CORONARY ANGIOPLASTY      CORONARY STENT PLACEMENT  3/28/2016    EYE SURGERY  2017    NECK EXPLORATION N/A     AR RT/LT HEART CATHETERS N/A 10/18/2018    Procedure: Percutaneous Coronary Intervention;  Surgeon: Maria E Gilbert MD;  Location:  [facility] INV[facility];  Service: Cardiology      Allergies:  Allergies   Allergen Reactions    Ace Inhibitors Other (See Comments)     Other reaction(s): Cough  Other reaction(s): Cough      Hydrogenated Palm Oil Glycerides Unknown - Low Severity    Lanolin Unknown - Low Severity    Other      Lotions- anything with palm oil or lanolin per pt (verified 6/15/16 1910)    Prazosin     Nsaids Other (See Comments)     CKD Stage 3     Home Meds:  (Not in a hospital admission)    Current Meds:   [unfilled]  Social History:   Social History     Tobacco Use    Smoking status: Never     Passive exposure: Past    Smokeless tobacco: Never    Tobacco comments:     Never   Substance Use Topics    Alcohol use: No      Family History:  Family History   Problem Relation Age of Onset    Breast cancer Daughter     Heart attack Mother          10/31/1980    Hypertension Mother     Heart disease Mother     Thyroid disease Mother     Lupus Mother     Early death Mother     Miscarriages /  Stillbirths Mother         Miscarriage 1944    Heart failure Mother     Heart attack Brother     Heart disease Brother     Hyperlipidemia Brother     Cancer Brother         Due to Agent [place], [place]    Hypertension Brother     Depression Father     COPD Father     Stroke Maternal Grandmother     Cancer Maternal Grandmother         Lung Cancer non-smoker    Diabetes Brother     Heart disease Brother     Hypertension Brother     Kidney disease Brother     Heart disease Brother     Hypertension Brother         Review of Systems  Constitutional: No wt loss, fever   Gastrointestinal: No nausea , abdominal pain  Behavioral/Psych: No insomnia or anxiety   Cardiovascular ----positive for chest pain. All other systems reviewed and are negative      /74 (BP Location: Right arm, Patient Position: Lying)   Pulse 84   Temp 98.4 °F (36.9 °C) (Oral)   Resp 16   SpO2 97%   General appearance: No acute changes   Neck: Trachea midline; NECK, supple, no thyromegaly or lymphadenopathy   Lungs: Normal size and shape, normal breath sounds, equal distribution of air, no rales and rhonchi   CV: S1-S2 regular, no murmurs, no rub, no gallop   Abdomen: Soft, nontender; no masses , no abnormal abdominal sounds   Extremities: No deformity , normal color , no peripheral edema   Skin: Normal temperature, turgor and texture; no rash, ulcers                  Cardiographics  ECG:     Prior ECG 10/27/2023 for comparison            Echocardiogram:   10/2023  Interpretation Summary         Left ventricular ejection fraction appears to be 61 - 65%.    Left ventricular diastolic function was normal.    Estimated right ventricular systolic pressure from tricuspid regurgitation is normal (<35 mmHg).    Stress test 10/2023  Interpretation Summary         Findings consistent with a normal ECG stress test.    Left ventricular ejection fraction is normal (Calculated EF = 66%).    Myocardial perfusion imaging indicates a normal myocardial perfusion  study with no evidence of ischemia.    Impressions are consistent with a low risk study.     Cardiac catheterization/PCI 2021  Left ventricular end diastolic pressure was 10 mmHg.    The left main is normal left main.  The left anterior descending artery is *.Left anterior descending had a proximal LAD stent widely patent midportion had 70% with haziness was reduced to 0% with 3.0/12 Xience stent, minimal distal irregularity including the diagonal and  branches  The left circumflex is nondominant and normal.  The right coronary artery is codominant with early atherosclerotic plaque.  Successful angioplasty and stent to the mid LAD 70% with haziness reduced to 0% with 3.0/12 Xience stent     KELVIN FLOW    PRE....3...       POST....3..     TYPE OF LESION........B.....     RECOMMENDATIONS:  Post-procedure care will focus on prevention of any ischemic events and congestive complications.  Aggressive risk factor modification will be carried out.  Importance of taking dual antiplatelets for one year  has been explained, risk of stent thrombosis leading to the acute MI, which carries high morbidity and mortality has been explained     Discontinuation or interruptions of these medications should be under the strict guidance of appropriate health professional           Imaging  Chest X-ray:     Lab Review   Results from last 7 days   Lab Units 01/19/24  1400 01/19/24  1201   HSTROP T ng/L 58* 21         Results from last 7 days   Lab Units 01/19/24  1201   SODIUM mmol/L 137   POTASSIUM mmol/L 3.8   BUN mg/dL 12   CREATININE mg/dL 1.42*   CALCIUM mg/dL 9.3     @[id number]@  Results from last 7 days   Lab Units 01/19/24  1201   WBC 10*3/mm3 9.84   HEMOGLOBIN g/dL 15.2   HEMATOCRIT % 45.9   PLATELETS 10*3/mm3 233             Assessment:  Chest pain  Coronary artery disease  Hypertension  Hyperlipidemia  Diabetes mellitus  CKD    Recommendations / Plan:     This is a 78-year-old male admitted for chest pain.  He did  have a bump in his troponin from 21-58.  Discussed with Dr. Gilbert, Given his classic symptom presentation, bump in troponin, and history of CAD we will proceed with cardiac catheterization.  Discussed with patient and family and he is agreeable    Procedures, risks, and options of cardiac cath explained to patient, including but not limited to MI, Stroke, death, infections, hemorrhage. Patient understands well and agrees, all questions answered.      Aliyah Ritter, APRN  1/19/2024, 15:45 EST    Patient personally interviewed and above subjective findings personally confirmed during a face to face contact with patient today  All findings of physical examination confirmed  All pertinent and performed labs, cardiac procedures ,  radiographs of the last 24 hours personally reviewed  Impression and plans discussed/elaborated and implemented jointly as described above     Maria E Gilbert MD          EMR Dragon/Transcription:   Dictated utilizing Dragon dictation'

## 2024-01-19 NOTE — ED TRIAGE NOTES
[facility] sudden onset of chest pain that comes and goes. Patient took one nitro at home pta to er. Patient alert x 4 at base line. While in triage patient started to have increasing in pain and rated his pain 8/10. Roomed patient at this time and placed orders. Patient reported no blood thinners.

## 2024-01-19 NOTE — ED PROVIDER NOTES
EMERGENCY DEPARTMENT ENCOUNTER      Room Number:  26/26  PCP: Avery Velazquez MD  Independent Historians: Patient  Patient Care Team:  Avery Velazquez MD as PCP - General (Internal Medicine)       HPI:  Chief Complaint: CP    A complete HPI/ROS/PMH/PSH/SH/FH are unobtainable due to: None    Chronic or social conditions impacting patient care (Social Determinants of Health): None      Context: Earl Marc is a 78 y.o. male with a PMH significant for hypertension, hyperlipidemia, diabetes, chronic kidney disease, COPD, congenital heart disease, CAD who presents to the ED c/o acute chest pain with hypoglycemia that started this morning.  The patient woke up later than normal and noticed that his blood sugar upon waking up was 208.  He took 34 units of long-acting insulin.  He started feeling nauseated and somewhat tremulous about 30 minutes later and recognize that his sugar was dropping.  He started to dose with glucose tablets when he felt the abrupt onset of sharp midsternal chest pain.  Symptoms lasted for several minutes before he called EMS.  He was encouraged to take 4 baby aspirin at that time.  Prior to arrival the patient reports complete resolution of chest discomfort.  His blood sugar on arrival was in the 130s.  He is somewhat tremulous but states that this is not increased from his baseline essential tremor.  No development of vomiting, dizziness, syncope.      Upon review of prior external notes (non-ED) -and- Review of prior external test results outside of this encounter it appears the patient was evaluated in the office with podiatry for onychomycosis of the toenails on 12/18/2023.  The patient had a normal ferritin and iron profile on 6/2/2023.      PAST MEDICAL HISTORY  Active Ambulatory Problems     Diagnosis Date Noted    Bell's palsy 03/10/2016    Persistent insomnia 03/10/2016    Osteoarthritis of cervical spine 03/10/2016    Diabetic peripheral neuropathy 03/10/2016     Dyslipidemia 03/10/2016    Steatosis of liver 03/10/2016    Fibromyalgia 03/10/2016    Gastroesophageal reflux disease without esophagitis 03/10/2016    Hypertensive kidney disease with stage 3a chronic kidney disease 03/10/2016    Hypogonadism in male 03/10/2016    Renal insufficiency 03/10/2016    Vitamin D deficiency 03/10/2016    Benign non-nodular prostatic hyperplasia with lower urinary tract symptoms 03/10/2016    S/P drug eluting coronary stent placement 04/13/2016    Coronary artery disease involving native coronary artery of native heart 06/21/2016    Malignant neoplasm of skin 10/03/2016    Type 2 diabetes mellitus with hyperglycemia, with long-term current use of insulin 04/04/2017    Diabetes mellitus 04/04/2017    Chronic insomnia 12/20/2017    Other specified glaucoma 04/01/2017    Vertigo 03/14/2018    Epigastric pain 06/11/2018    Chest pain with high risk for cardiac etiology 05/26/2021    Precordial pain 05/25/2021    S/P arthroscopy of shoulder 11/19/2019    Peripheral neuropathy 03/24/2022    Stage 3b chronic kidney disease 03/24/2022     Resolved Ambulatory Problems     Diagnosis Date Noted    Hyperuricemia 03/10/2016    Erectile dysfunction of nonorganic origin 03/10/2016    Type 2 diabetes mellitus without complication 03/10/2016    Precordial pain 03/10/2016    Abnormal nuclear stress test 03/25/2016    Coronary artery disease involving native coronary artery with angina pectoris 03/27/2016    S/P coronary angioplasty 04/05/2016    Chest pain 06/14/2016    Dizziness 06/21/2016    Lateral epicondylitis 12/19/2012    Long-term insulin use in type 2 diabetes 10/04/2016    Type II diabetes circulatory disorder causing erectile dysfunction 02/13/2017    Sialadenitis 06/28/2017    Acute bronchitis due to other specified organisms 11/14/2017    Chest pain 10/16/2018    Other forms of angina pectoris 10/16/2018     Past Medical History:   Diagnosis Date    Abnormal cardiovascular stress test      Acid reflux     Arthritis     Asthma     Cancer     Chronic kidney disease     Colon polyp     Congenital heart disease     COPD (chronic obstructive pulmonary disease) 2021    Coronary artery disease     Diabetic neuropathy associated with type 2 diabetes mellitus     Erectile dysfunction     Fatty liver disease, nonalcoholic     Gastritis     Glaucoma     Hyperlipidemia     Hypertension     Hypogonadism male     Infectious viral hepatitis Hep A July 1959    Migraines 09/2023    Myocardial infarction 2018    Type 2 diabetes mellitus          PAST SURGICAL HISTORY  Past Surgical History:   Procedure Laterality Date    CARDIAC CATHETERIZATION N/A 03/25/2016    Procedure: Left Heart Cath;  Surgeon: Maria E Gilbert MD;  Location: [facility] CATH [facility]AS[facility] LOC[facility];  Service:     CARDIAC CATHETERIZATION N/A 03/25/2016    Procedure: Coronary angiography;  Surgeon: Maria E Gilbert MD;  Location:  [facility] CATH INVASIVE LOCATION;  Service:     CARDIAC CATHETERIZATION N/A 03/28/2016    Procedure: Coronary angiography;  Surgeon: Maria E Gilbert MD;  Location:  [facility] CATH INVASIVE LOCATION;  Service:     CARDIAC CATHETERIZATION N/A 03/28/2016    Procedure: Stent MOISE coronary;  Surgeon: Maria E Gilbert MD;  Location: [facility] CATH [facility] LOC[facility];  Service:     CARDIAC CATHETERIZATION N/A 10/18/2018    Procedure: Coronary angiography  no LV gram d/t CKD;  Surgeon: Maria E Gilbert MD;  Location:  [facility] CATH INVASIVE LOCATION;  Service: Cardiology    CARDIAC CATHETERIZATION N/A 10/18/2018    Procedure: Left Heart Cath;  Surgeon: Maria E Gilbert MD;  Location: [facility] CATH [facility] LOC[facility];  Service: Cardiology    CARDIAC CATHETERIZATION N/A 10/18/2018    Procedure: Stent MOISE coronary;  Surgeon: Maria E Gilbert MD;  Location: [facility] CATH [facility] LOC[facility];  Service: Cardiology    CARDIAC CATHETERIZATION N/A 05/28/2021    Procedure: Coronary angiography;  Surgeon: Maria E Gilbert MD;   Location:  [facility] CATH INVASIVE LOCATION;  Service: Cardiovascular;  Laterality: N/A;    CARDIAC CATHETERIZATION N/A 2021    Procedure: Left Heart Cath;  Surgeon: Maria E Gilbert MD;  Location:  [facility] CATH INVASIVE LOCATION;  Service: Cardiovascular;  Laterality: N/A;    CARDIAC CATHETERIZATION N/A 2021    Procedure: Left ventriculography;  Surgeon: Maria E Gilbert MD;  Location:  [facility] CATH INVASIVE LOCATION;  Service: Cardiovascular;  Laterality: N/A;    CARDIAC CATHETERIZATION N/A 2021    Procedure: Stent MOISE coronary;  Surgeon: Maria E Gilbert MD;  Location:  [facility] CATH INVASIVE LOCATION;  Service: Cardiovascular;  Laterality: N/A;    CAROTID STENT      CORONARY ANGIOPLASTY      CORONARY STENT PLACEMENT  3/28/2016    EYE SURGERY  2017    NECK EXPLORATION N/A     CT RT/LT HEART CATHETERS N/A 10/18/2018    Procedure: Percutaneous Coronary Intervention;  Surgeon: Maria E Gilbert MD;  Location:  [facility] CATH INVASIVE LOCATION;  Service: Cardiology         FAMILY HISTORY  Family History   Problem Relation Age of Onset    Breast cancer Daughter     Heart attack Mother          10/31/1980    Hypertension Mother     Heart disease Mother     Thyroid disease Mother     Lupus Mother     Early death Mother     Miscarriages / Stillbirths Mother         Miscarriage     Heart failure Mother     Heart attack Brother     Heart disease Brother     Hyperlipidemia Brother     Cancer Brother         Due to [place], [place]    Hypertension Brother     Depression Father     COPD Father     Stroke Maternal Grandmother     Cancer Maternal Grandmother         Lung Cancer non-smoker    Diabetes Brother     Heart disease Brother     Hypertension Brother     Kidney disease Brother     Heart disease Brother     Hypertension Brother          SOCIAL HISTORY  Social History     Socioeconomic History    Marital status:      Spouse name: Ivette    Number of children: 1   Tobacco  Use    Smoking status: Never     Passive exposure: Past    Smokeless tobacco: Never    Tobacco comments:     Never   Vaping Use    Vaping Use: Never used   Substance and Sexual Activity    Alcohol use: No    Drug use: Never    Sexual activity: Yes     Partners: Female     Birth control/protection: Surgical     Comment: Vasectomy 1974         ALLERGIES  Ace inhibitors, Hydrogenated palm oil glycerides, Lanolin, Other, Prazosin, and Nsaids      REVIEW OF SYSTEMS  Included in HPI  All systems reviewed and negative except for those discussed in HPI.      PHYSICAL EXAM    I have reviewed the triage vital signs and nursing notes.    ED Triage Vitals   Temp Heart Rate Resp BP SpO2   01/19/24 1147 01/19/24 1146 01/19/24 1146 01/19/24 1146 01/19/24 1146   97.3 °F (36.3 °C) 110 20 114/60 96 %      Temp src Heart Rate Source Patient Position BP Location FiO2 (%)   -- -- -- -- --              Physical Exam  Constitutional:       General: He is not in acute distress.     Appearance: He is well-developed.   HENT:      Head: Normocephalic and atraumatic.   Eyes:      General: No scleral icterus.     Conjunctiva/sclera: Conjunctivae normal.   Neck:      Trachea: No tracheal deviation.   Cardiovascular:      Rate and Rhythm: Regular rhythm. Tachycardia present.   Pulmonary:      Effort: Pulmonary effort is normal.      Breath sounds: Normal breath sounds.   Abdominal:      Palpations: Abdomen is soft.      Tenderness: There is no abdominal tenderness. There is no guarding.   Musculoskeletal:         General: No deformity.      Cervical back: Normal range of motion.   Lymphadenopathy:      Cervical: No cervical adenopathy.   Skin:     General: Skin is warm and dry.   Neurological:      Mental Status: He is alert and oriented to person, place, and time.      Motor: Tremor present.   Psychiatric:         Behavior: Behavior normal.         Vital signs and nursing notes reviewed.      PPE: I wore a surgical mask throughout my encounters  with the pt. I performed hand hygiene on entry into the pt room and upon exit.      LAB RESULTS  Recent Results (from the past 24 hour(s))   ECG 12 Lead ED Triage Standing Order; Chest Pain    Collection Time: 01/19/24 11:57 AM   Result Value Ref Range    QT Interval 333 ms    QTC Interval 394 ms   Comprehensive Metabolic Panel    Collection Time: 01/19/24 12:01 PM    Specimen: Blood   Result Value Ref Range    Glucose 218 (H) 65 - 99 mg/dL    BUN 12 8 - 23 mg/dL    Creatinine 1.42 (H) 0.76 - 1.27 mg/dL    Sodium 137 136 - 145 mmol/L    Potassium 3.8 3.5 - 5.2 mmol/L    Chloride 100 98 - 107 mmol/L    CO2 22.1 22.0 - 29.0 mmol/L    Calcium 9.3 8.6 - 10.5 mg/dL    Total Protein 6.8 6.0 - 8.5 g/dL    Albumin 4.0 3.5 - 5.2 g/dL    ALT (SGPT) 25 1 - 41 U/L    AST (SGOT) 21 1 - 40 U/L    Alkaline Phosphatase 82 39 - 117 U/L    Total Bilirubin 0.6 0.0 - 1.2 mg/dL    Globulin 2.8 gm/dL    A/G Ratio 1.4 g/dL    BUN/Creatinine Ratio 8.5 7.0 - 25.0    Anion Gap 14.9 5.0 - 15.0 mmol/L    eGFR 50.6 (L) >60.0 mL/min/1.73   High Sensitivity Troponin T    Collection Time: 01/19/24 12:01 PM    Specimen: Blood   Result Value Ref Range    HS Troponin T 21 <22 ng/L   Green Top (Gel)    Collection Time: 01/19/24 12:01 PM   Result Value Ref Range    Extra Tube Hold for add-ons.    Lavender Top    Collection Time: 01/19/24 12:01 PM   Result Value Ref Range    Extra Tube hold for add-on    Gold Top - SST    Collection Time: 01/19/24 12:01 PM   Result Value Ref Range    Extra Tube Hold for add-ons.    Light Blue Top    Collection Time: 01/19/24 12:01 PM   Result Value Ref Range    Extra Tube Hold for add-ons.    CBC Auto Differential    Collection Time: 01/19/24 12:01 PM    Specimen: Blood   Result Value Ref Range    WBC 9.84 3.40 - 10.80 10*3/mm3    RBC 5.25 4.14 - 5.80 10*6/mm3    Hemoglobin 15.2 13.0 - 17.7 g/dL    Hematocrit 45.9 37.5 - 51.0 %    MCV 87.4 79.0 - 97.0 fL    MCH 29.0 26.6 - 33.0 pg    MCHC 33.1 31.5 - 35.7 g/dL    RDW  12.6 12.3 - 15.4 %    RDW-SD 39.6 37.0 - 54.0 fl    MPV 9.7 6.0 - 12.0 fL    Platelets 233 140 - 450 10*3/mm3    Neutrophil % 65.9 42.7 - 76.0 %    Lymphocyte % 22.6 19.6 - 45.3 %    Monocyte % 8.9 5.0 - 12.0 %    Eosinophil % 1.3 0.3 - 6.2 %    Basophil % 0.6 0.0 - 1.5 %    Immature Grans % 0.7 (H) 0.0 - 0.5 %    Neutrophils, Absolute 6.48 1.70 - 7.00 10*3/mm3    Lymphocytes, Absolute 2.22 0.70 - 3.10 10*3/mm3    Monocytes, Absolute 0.88 0.10 - 0.90 10*3/mm3    Eosinophils, Absolute 0.13 0.00 - 0.40 10*3/mm3    Basophils, Absolute 0.06 0.00 - 0.20 10*3/mm3    Immature Grans, Absolute 0.07 (H) 0.00 - 0.05 10*3/mm3    nRBC 0.0 0.0 - 0.2 /100 WBC   D-dimer, Quantitative    Collection Time: 01/19/24 12:01 PM    Specimen: Blood   Result Value Ref Range    D-Dimer, Quantitative <0.27 0.00 - 0.78 MCGFEU/mL   BNP    Collection Time: 01/19/24 12:01 PM    Specimen: Blood   Result Value Ref Range    proBNP 92.4 0.0 - 1,800.0 pg/mL   POC Glucose Once    Collection Time: 01/19/24 12:55 PM    Specimen: Blood   Result Value Ref Range    Glucose 280 (H) 70 - 130 mg/dL   Urinalysis With Culture If Indicated - Urine, Clean Catch    Collection Time: 01/19/24  1:29 PM    Specimen: Urine, Clean Catch   Result Value Ref Range    Color, UA Yellow Yellow, Straw    Appearance, UA Clear Clear    pH, UA 7.5 5.0 - 8.0    Specific Gravity, UA 1.021 1.005 - 1.030    Glucose, UA Negative Negative    Ketones, UA Trace (A) Negative    Bilirubin, UA Negative Negative    Blood, UA Negative Negative    Protein,  mg/dL (2+) (A) Negative    Leuk Esterase, UA Negative Negative    Nitrite, UA Negative Negative    Urobilinogen, UA 0.2 E.U./dL 0.2 - 1.0 E.U./dL   Urinalysis, Microscopic Only - Urine, Clean Catch    Collection Time: 01/19/24  1:29 PM    Specimen: Urine, Clean Catch   Result Value Ref Range    RBC, UA 0-2 None Seen, 0-2 /HPF    WBC, UA 0-2 None Seen, 0-2 /HPF    Bacteria, UA None Seen None Seen /HPF    Squamous Epithelial Cells, UA  None Seen None Seen, 0-2 /HPF    Hyaline Casts, UA 0-2 None Seen /LPF    Methodology Manual Light Microscopy    High Sensitivity Troponin T 2Hr    Collection Time: 01/19/24  2:00 PM    Specimen: Blood   Result Value Ref Range    HS Troponin T 58 (C) <22 ng/L    Troponin T Delta 37 (C) >=-4 - <+4 ng/L   POC Glucose Once    Collection Time: 01/19/24  2:40 PM    Specimen: Blood   Result Value Ref Range    Glucose 284 (H) 70 - 130 mg/dL   POC Glucose Once    Collection Time: 01/19/24  3:50 PM    Specimen: Blood   Result Value Ref Range    Glucose 254 (H) 70 - 130 mg/dL         RADIOLOGY  XR Chest 1 View    Result Date: 1/19/2024  PORTABLE CHEST X-RAY  HISTORY: Intermittent chest pain.  Portable chest x-ray is provided. Correlation: May 25, 2021.  FINDINGS: The cardiomediastinal silhouette is normal. The lungs are clear. The costophrenic sulci are dry and the bones appear normal. There is no pneumothorax.      Negative.  This report was finalized on 1/19/2024 1:04 PM by Dr. Collins Cerna M.D on Workstation: [id number]         MEDICATIONS GIVEN IN ER  Medications   sodium chloride 0.9 % flush 10 mL (has no administration in time range)   sodium chloride 0.9 % flush 10 mL (has no administration in time range)   sodium chloride 0.9 % flush 10 mL (has no administration in time range)   sodium chloride 0.9 % infusion 40 mL (has no administration in time range)   sennosides-docusate (PERICOLACE) 8.6-50 MG per tablet 2 tablet (has no administration in time range)     And   polyethylene glycol (MIRALAX) packet 17 g (has no administration in time range)     And   bisacodyl (DULCOLAX) EC tablet 5 mg (has no administration in time range)     And   bisacodyl (DULCOLAX) suppository 10 mg (has no administration in time range)   nitroglycerin (NITROSTAT) SL tablet 0.4 mg (has no administration in time range)   acetaminophen (TYLENOL) tablet 650 mg (has no administration in time range)     Or   acetaminophen (TYLENOL) 160 MG/5ML oral  solution 650 mg (has no administration in time range)     Or   acetaminophen (TYLENOL) suppository 650 mg (has no administration in time range)   ondansetron (ZOFRAN) injection 4 mg (has no administration in time range)   dextrose (GLUTOSE) oral gel 15 g (has no administration in time range)   dextrose (D50W) (25 g/50 mL) IV injection 25 g (has no administration in time range)   glucagon (GLUCAGEN) injection 1 mg (has no administration in time range)   insulin lispro (HUMALOG/ADMELOG) injection 2-9 Units (has no administration in time range)         ORDERS PLACED DURING THIS VISIT:  Orders Placed This Encounter   Procedures    XR Chest 1 View    [place] Draw    Comprehensive Metabolic Panel    High Sensitivity Troponin T    CBC Auto Differential    D-dimer, Quantitative    BNP    Urinalysis With Culture If Indicated - Urine, Clean Catch    High Sensitivity Troponin T 2Hr    Urinalysis, Microscopic Only - Urine, Clean Catch    Basic Metabolic Panel    CBC (No Diff)    NPO Diet NPO Type: Strict NPO    Undress & Gown    Continuous Pulse Oximetry    Vital Signs    Intake & Output    Weigh Patient    Oral Care    Place Sequential Compression Device    Maintain Sequential Compression Device    Telemetry - Maintain IV Access    Telemetry - Place Orders & Notify Provider of Results When Patient Experiences Acute Chest Pain, Dysrhythmia or Respiratory Distress    May Be Off Telemetry for Tests    Up With Assistance    Code Status and Medical Interventions:    Cardiology (on-call MD unless specified)    Oxygen Therapy- Nasal Cannula; Titrate 1-6 LPM Per SpO2; 90 - 95%    POC Glucose Q1H    POC Glucose Once    POC Glucose Once    POC Glucose 4x Daily Before Meals & at Bedtime    POC Glucose Once    ECG 12 Lead ED Triage Standing Order; Chest Pain    ECG 12 Lead ED Triage Standing Order; Chest Pain    Insert Peripheral IV    Insert Peripheral IV    Initiate ED Observation Status    CBC & Differential    Green Top (Gel)     Lavender Top    [facility] Top - [facility]    Light Blue Top         OUTPATIENT MEDICATION MANAGEMENT:  Current Facility-Administered Medications Ordered in Epic   Medication Dose Route Frequency Provider Last Rate Last Admin    acetaminophen (TYLENOL) tablet 650 mg  650 mg Oral Q4H PRN Aida Mack APRN        Or    acetaminophen (TYLENOL) 160 MG/5ML oral solution 650 mg  650 mg Oral Q4H PRN Aida Mack APRN        Or    acetaminophen (TYLENOL) suppository 650 mg  650 mg Rectal Q4H PRN Aida Mack APRN        sennosides-docusate (PERICOLACE) 8.6-50 MG per tablet 2 tablet  2 tablet Oral BID Aida Mack APRN        And    polyethylene glycol (MIRALAX) packet 17 g  17 g Oral Daily PRN Aida Mack APRN        And    bisacodyl (DULCOLAX) EC tablet 5 mg  5 mg Oral Daily PRN Aida Mack APRN        And    bisacodyl (DULCOLAX) suppository 10 mg  10 mg Rectal Daily PRN Aida Mack APRN        dextrose (D50W) (25 g/50 mL) IV injection 25 g  25 g Intravenous Q15 Min PRN Aida Mack APRN        dextrose (GLUTOSE) oral gel 15 g  15 g Oral Q15 Min PRN Aida aMck APRN        glucagon (GLUCAGEN) injection 1 mg  1 mg Intramuscular Q15 Min PRN Aida Mack APRN        insulin lispro (HUMALOG/ADMELOG) injection 2-9 Units  2-9 Units Subcutaneous 4x Daily AC & at Bedtime Aida Mack APRN        nitroglycerin (NITROSTAT) SL tablet 0.4 mg  0.4 mg Sublingual Q5 Min PRN Aida Mack APRN        ondansetron (ZOFRAN) injection 4 mg  4 mg Intravenous Q6H PRN Aida Mack APRN        sodium chloride 0.9 % flush 10 mL  10 mL Intravenous PRN Emergency, Triage Protocol, MD        sodium chloride 0.9 % flush 10 mL  10 mL Intravenous Q12H Aida Mack APRN        sodium chloride 0.9 % flush 10 mL  10 mL Intravenous PRN Aida Mack APRN        sodium chloride 0.9 % infusion 40 mL  40 mL Intravenous PRN Aida Mack APRN         Current Outpatient Medications Ordered in Epic   Medication  Sig Dispense Refill    Alcohol Swabs ([employer] Prep) 70 % pads Place 1 application on the skin as directed by provider Daily. 400 each 2    AmLactin 12 % lotion Apply  topically to the appropriate area as directed As Needed for Dry Skin. 500 g 3    ascorbic acid (VITAMIN C) 1000 MG tablet Take 1 tablet by mouth Daily.      aspirin 81 MG EC tablet Take 1 tablet by mouth Daily. 90 tablet 3    atorvastatin (LIPITOR) 20 MG tablet Take 1 tablet by mouth Every Night. 90 tablet 1    B-D UF III MINI PEN NEEDLES 31G X 5 MM misc Use once daily with Lantus Pen for injection of insulin 100 each 3    CALCIUM CITRATE PO Take  by mouth.      Carboxymeth-Glycerin-Polysorb 0.5-1-0.5 % solution Apply  to eye(s) as directed by provider Daily.      ciclopirox (LOPROX) 1 % shampoo APPLY TOPICALLY 3 TIMES EVERY WEEK      Cyanocobalamin (Vitamin B-12) 1000 MCG sublingual tablet Place  under the tongue.      fluticasone (FLONASE) 50 MCG/ACT nasal spray 1 spray into the nostril(s) as directed by provider Daily As Needed for Allergies.      Folic Acid 0.8 MG capsule Take 1 tablet by mouth Daily. 0.4 2 tab daily      glucose blood test strip E11.65 Precision Xtra Blood Glucose In Vitro Strip; Patient Sig: Precision Xtra Blood Glucose In Vitro Strip TEST 2  TIMES DAILY 200 each 5    hydrocortisone 2.5 % ointment Apply  topically to the appropriate area as directed 2 (Two) Times a Day. 20 g 5    Insulin Glargine (Lantus SoloStar) 100 UNIT/ML injection pen Inject 34 Units under the skin into the appropriate area as directed Daily. 30 mL 1    linagliptin (Tradjenta) 5 MG tablet tablet Take 1 tablet by mouth Daily. By mouth take one tab daily. 90 tablet 1    Magnesium 250 MG tablet Take 1 tablet by mouth Every Night.      melatonin 5 MG tablet tablet Take 1 tablet by mouth.      metoprolol succinate XL (TOPROL-XL) 25 MG 24 hr tablet Take 0.5 tablets by mouth Daily. 30 tablet 5    Monolet Lancets misc E11.65 Use to test BG 2 times daily 200 each 5     nitroglycerin (Nitrostat) 0.4 MG SL tablet Place 1 tablet under the tongue Every 5 (Five) Minutes As Needed for Chest Pain. Indications: Acute Angina Pectoris 25 tablet 3    pantoprazole (PROTONIX) 20 MG EC tablet Take 1 tablet by mouth Daily. 90 tablet 1    Testosterone Cypionate (DEPOTESTOTERONE CYPIONATE) 200 MG/ML injection       venlafaxine XR (EFFEXOR-XR) 37.5 MG 24 hr capsule Take 1 capsule by mouth Daily. 30 capsule 1    VIAGRA 100 MG tablet Take 1 tablet by mouth As Needed for erectile dysfunction (1 tablet prior to planned intercourse.). 24 tablet 3    Zinc 30 MG tablet Take 50 mg by mouth Daily.               PROGRESS, DATA ANALYSIS, CONSULTS, AND MEDICAL DECISION MAKING  All labs have been independently interpreted by me.  All radiology studies have been reviewed by me. All EKG's have been independently viewed and interpreted by me.  Discussion below represents my analysis of pertinent findings related to patient's condition, differential diagnosis, treatment plan and final disposition.    Patient presentation and evaluation most consistent with chest pain that sounds highly atypical to be from cardiac etiology.  He does have an elevated troponin which will require trending in the hospital and cardiology consultation.  The recommendation from cardiology was to admit to the observation unit.  Of note he has no symptoms on reassessment at this time and vital signs are stable.  Patient agreeable and all questions answered.    DIFFERENTIAL DIAGNOSIS INCLUDE BUT NOT LIMITED TO:     Differential diagnosis includes but is not limited to:  -acute coronary syndrome  -pulmonary embolism  -thoracic aortic dissection  -pneumonia  -pneumothorax  -musculoskeletal pain  -GERD  -esophageal spasm  -anxiety  -myocarditis/pericarditis  -esophageal rupture  -pancreatitis.       Clinical Scores: N/A    HEART SCORE    History Slightly or non-suspicious (0)  ECG Normal (0)  Age > or = 65 (2)  Risk factors > or = to 3 RF for  atherosclerotic dx (2)  Troponin > or = 3x normal limit (2)    This patient's HEART score is 6    HEART Score Key:  Scores 0-3: 0.9-1.7% risk of adverse cardiac event. In the HEART Score study, these patients were discharged (0.99% in the retrospective study, 1.7% in the prospective study)  Scores 4-6: 12-16.6% risk of adverse cardiac event. In the HEART Score study, these patients were admitted to the hospital. (11.6% retrospective, 16.6% prospective)  Scores ?7: 50-65% risk of adverse cardiac event. In the HEART Score study, these patients were candidates for early invasive measures. (65.2% retrospective, 50.1% prospective)       ED Course as of 01/19/24 1608   Fri Jan 19, 2024   1205 Patient reports that symptoms are presenting abruptly in brief waves at rest with no provocation. No active chest pain.  [DC]   1251 Creatinine(!): 1.42  At baseline [DC]   1502 Troponin T Delta(!!): 37 [DC]   1502 HS Troponin T(!!): 58 [DC]   1535 I discussed the case with MD Brisa with Cardiology at this time regarding the patient.  I discussed work-up, results, concerns.  I discussed the consulting provider's desire for admission to the observation unit for continued monitoring.   [DC]   1538 I discussed the case with Ally, JOCELYN with [facility] at this time regarding the patient.  I discussed work-up, results, concerns.  I discussed the consulting provider's desire for obs admit.    [DC]   1551 I discussed the case with JL Ly with Cardiology at this time regarding the patient.  I discussed work-up, results, concerns.  I discussed the consulting provider's desire for taking the patient to cath lab at this time. Obs Unit bed no longer recommended.    [DC]      ED Course User Index  [DC] Carlos Moyer, PA           1540 I rechecked the patient.  I discussed the patient's labs, radiology findings (including all incidental findings), diagnosis, and plan for admission. The patient understands and agrees with the plan.    1608  cardiology evaluated the patient at the bedside at this time and recommended cardiac cath.  The patient will be taken to the Cath Lab and then admitted afterward.  Observation unit called at this time to cancel the patient bed request for Obs bed.      AS OF 16:08 EST VITALS:    BP - 172/74  HR - 82  TEMP - 97.3 °F (36.3 °C)  O2 SATS - 98%    COMPLEXITY OF CARE  The patient requires admission.      DIAGNOSIS  Final diagnoses:   Chest pain, unspecified type   Elevated troponin   Labile blood glucose         DISPOSITION  ED Disposition       ED Disposition   Send to Cath Lab    Condition   --    Comment   --                Please note that portions of this document were completed with a voice recognition program.    Note Disclaimer: At [facility], we believe that sharing information builds trust and better relationships. You are receiving this note because you recently visited [facility]. It is possible you will see health information before a provider has talked with you about it. This kind of information can be easy to misunderstand. To help you fully understand what it means for your health, we urge you to discuss this note with your provider.         Carlos Moyer PA  01/19/24 1540       Carlos Moyer PA  01/19/24 1605

## 2024-01-20 VITALS
TEMPERATURE: 97.9 F | RESPIRATION RATE: 16 BRPM | OXYGEN SATURATION: 97 % | SYSTOLIC BLOOD PRESSURE: 157 MMHG | HEART RATE: 75 BPM | DIASTOLIC BLOOD PRESSURE: 70 MMHG

## 2024-01-20 LAB
ANION GAP SERPL CALCULATED.3IONS-SCNC: 9.8 MMOL/L (ref 5–15)
BUN SERPL-MCNC: 11 MG/DL (ref 8–23)
BUN/CREAT SERPL: 8.6 (ref 7–25)
CALCIUM SPEC-SCNC: 9.2 MG/DL (ref 8.6–10.5)
CHLORIDE SERPL-SCNC: 104 MMOL/L (ref 98–107)
CO2 SERPL-SCNC: 24.2 MMOL/L (ref 22–29)
CREAT SERPL-MCNC: 1.28 MG/DL (ref 0.76–1.27)
DEPRECATED RDW RBC AUTO: 42.5 FL (ref 37–54)
EGFRCR SERPLBLD CKD-EPI 2021: 57.3 ML/MIN/1.73
ERYTHROCYTE [DISTWIDTH] IN BLOOD BY AUTOMATED COUNT: 13.1 % (ref 12.3–15.4)
GLUCOSE BLDC GLUCOMTR-MCNC: 142 MG/DL (ref 70–130)
GLUCOSE BLDC GLUCOMTR-MCNC: 227 MG/DL (ref 70–130)
GLUCOSE SERPL-MCNC: 149 MG/DL (ref 65–99)
HCT VFR BLD AUTO: 47.5 % (ref 37.5–51)
HGB BLD-MCNC: 15.7 G/DL (ref 13–17.7)
MCH RBC QN AUTO: 29.6 PG (ref 26.6–33)
MCHC RBC AUTO-ENTMCNC: 33.1 G/DL (ref 31.5–35.7)
MCV RBC AUTO: 89.6 FL (ref 79–97)
PLATELET # BLD AUTO: 213 10*3/MM3 (ref 140–450)
PMV BLD AUTO: 10.2 FL (ref 6–12)
POTASSIUM SERPL-SCNC: 3.8 MMOL/L (ref 3.5–5.2)
RBC # BLD AUTO: 5.3 10*6/MM3 (ref 4.14–5.8)
SODIUM SERPL-SCNC: 138 MMOL/L (ref 136–145)
WBC NRBC COR # BLD AUTO: 11.17 10*3/MM3 (ref 3.4–10.8)

## 2024-01-20 PROCEDURE — A9270 NON-COVERED ITEM OR SERVICE: HCPCS | Performed by: INTERNAL MEDICINE

## 2024-01-20 PROCEDURE — 25010000002 HYDRALAZINE PER 20 MG: Performed by: NURSE PRACTITIONER

## 2024-01-20 PROCEDURE — 99238 HOSP IP/OBS DSCHRG MGMT 30/<: CPT | Performed by: INTERNAL MEDICINE

## 2024-01-20 PROCEDURE — 82948 REAGENT STRIP/BLOOD GLUCOSE: CPT

## 2024-01-20 PROCEDURE — 63710000001 INSULIN GLARGINE PER 5 UNITS: Performed by: INTERNAL MEDICINE

## 2024-01-20 PROCEDURE — 36415 COLL VENOUS BLD VENIPUNCTURE: CPT | Performed by: INTERNAL MEDICINE

## 2024-01-20 PROCEDURE — G0378 HOSPITAL OBSERVATION PER HR: HCPCS

## 2024-01-20 PROCEDURE — 63710000001 INSULIN LISPRO (HUMAN) PER 5 UNITS: Performed by: INTERNAL MEDICINE

## 2024-01-20 PROCEDURE — 63710000001 LINAGLIPTIN 5 MG TABLET: Performed by: INTERNAL MEDICINE

## 2024-01-20 PROCEDURE — 85027 COMPLETE CBC AUTOMATED: CPT | Performed by: INTERNAL MEDICINE

## 2024-01-20 PROCEDURE — 63710000001 ASPIRIN 81 MG TABLET DELAYED-RELEASE: Performed by: INTERNAL MEDICINE

## 2024-01-20 PROCEDURE — 63710000001 SENNOSIDES-DOCUSATE 8.6-50 MG TABLET: Performed by: INTERNAL MEDICINE

## 2024-01-20 PROCEDURE — 63710000001 VITAMIN C 500 MG TABLET: Performed by: INTERNAL MEDICINE

## 2024-01-20 PROCEDURE — 80048 BASIC METABOLIC PNL TOTAL CA: CPT | Performed by: INTERNAL MEDICINE

## 2024-01-20 RX ADMIN — ASPIRIN 81 MG: 81 TABLET, COATED ORAL at 08:25

## 2024-01-20 RX ADMIN — Medication 10 ML: at 10:05

## 2024-01-20 RX ADMIN — INSULIN GLARGINE 34 UNITS: 100 INJECTION, SOLUTION SUBCUTANEOUS at 08:25

## 2024-01-20 RX ADMIN — INSULIN LISPRO 4 UNITS: 100 INJECTION, SOLUTION INTRAVENOUS; SUBCUTANEOUS at 12:43

## 2024-01-20 RX ADMIN — OXYCODONE HYDROCHLORIDE AND ACETAMINOPHEN 1000 MG: 500 TABLET ORAL at 08:24

## 2024-01-20 RX ADMIN — DOCUSATE SODIUM 50MG AND SENNOSIDES 8.6MG 2 TABLET: 8.6; 5 TABLET, FILM COATED ORAL at 08:25

## 2024-01-20 RX ADMIN — LINAGLIPTIN 5 MG: 5 TABLET, FILM COATED ORAL at 08:25

## 2024-01-20 RX ADMIN — HYDRALAZINE HYDROCHLORIDE 10 MG: 20 INJECTION INTRAMUSCULAR; INTRAVENOUS at 00:02

## 2024-01-20 NOTE — H&P
Subjective:        Earl Marc is a 78 y.o. male who here for follow up    Chief Complaint   Patient presents with    Chest Pain       HPI    This is a 78-year-old male with coronary artery disease (MOISE ostial OM and PDA 2016, MOISE to the bifurcation lesion of the proximal LAD as well as angioplasty of diagonal branch 2018, MOISE midportion LAD 2021), diabetes mellitus, hypertension, hyperlipidemia, COPD, CKD.  He follows up in office today for management of coronary disease.    He was seen 11/28/2023 with complaints of dizziness and orthostatic hypotension.  Lopressor was decreased to 12.5 daily.    Reviewed and still relevant: Echo 10/30/2023 EF 60 to 65%, normal LV function.  Stress test 10/30/2023 myocardial perfusion imaging indicates a normal study with no evidence of ischemia.  Holter monitor 11/15/2023 normal sinus rhythm with rare PACs PVCs and NS SVTs.  Cardiac catheterization 2021 successful angioplasty and stent to the mid LAD 70% reduced to 0% with MOISE, normal left main, proximal LAD stent widely patent, midportion 70% with haziness reduced to 0% with MOISE minimal distal irregularity including the diagonal  branches.  Circumflex nondominant normal.  RCA codominant normal.    The following portions of the patient's history were reviewed and updated as appropriate: allergies, current medications, past family history, past medical history, past social history, past surgical history and problem list.    Past Medical History:   Diagnosis Date    Abnormal cardiovascular stress test     Acid reflux     Arthritis     Asthma     Cancer     skin     Chronic kidney disease     Colon polyp     Congenital heart disease     COPD (chronic obstructive pulmonary disease) 2021    Old age COPD/2D hand Smoke emphysema    Coronary artery disease     Diabetes mellitus     Diabetic neuropathy associated with type 2 diabetes mellitus     Diabetic peripheral neuropathy 03/10/2016    Dyslipidemia     Erectile  dysfunction     Fatty liver disease, nonalcoholic     Gastritis     Glaucoma     both eyes    Hyperlipidemia     Hypertension     Hypogonadism male     Infectious viral hepatitis Hep A 1959    Drank water from broken water line and Grandparents house.    Migraines 2023    Myocardial infarction 2018    Type 2 diabetes mellitus          reports that he has never smoked. He has been exposed to tobacco smoke. He has never used smokeless tobacco. He reports that he does not drink alcohol and does not use drugs.     Family History   Problem Relation Age of Onset    Breast cancer Daughter     Heart attack Mother          10/31/1980    Hypertension Mother     Heart disease Mother     Thyroid disease Mother     Lupus Mother     Early death Mother     Miscarriages / Stillbirths Mother         Miscarriage     Heart failure Mother     Heart attack Brother     Heart disease Brother     Hyperlipidemia Brother     Cancer Brother         Due to Agent [place], [place]    Hypertension Brother     Depression Father     COPD Father     Stroke Maternal Grandmother     Cancer Maternal Grandmother         Lung Cancer non-smoker    Diabetes Brother     Heart disease Brother     Hypertension Brother     Kidney disease Brother     Heart disease Brother     Hypertension Brother        ROS     Review of Systems  Constitutional: no wt loss, fever, fatigue  Gastrointestinal: no nausea, abdominal pain  Behavioral/Psych: no insomnia or anxiety  Cardiovascular no chest pain or shortness of breath      Objective:           Vitals and nursing note reviewed.   Constitutional:       Appearance: Well-developed.   HENT:      Head: Normocephalic.      Right Ear: External ear normal.      Left Ear: External ear normal.   Neck:      Vascular: No JVD.   Pulmonary:      Effort: Pulmonary effort is normal. No respiratory distress.      Breath sounds: Normal breath sounds. No stridor. No rales.   Cardiovascular:      Normal rate. Regular rhythm.       No gallop.    Pulses:     Intact distal pulses.   Edema:     Peripheral edema absent.   Abdominal:      General: Bowel sounds are normal. There is no distension.      Palpations: Abdomen is soft.      Tenderness: There is no abdominal tenderness. There is no guarding.   Musculoskeletal: Normal range of motion.         General: No tenderness.      Cervical back: Normal range of motion. Skin:     General: Skin is warm.   Neurological:      Mental Status: Alert and oriented to person, place, and time.      Deep Tendon Reflexes: Reflexes are normal and symmetric.   Psychiatric:         Judgment: Judgment normal.         Procedures    Interpretation Summary         A normal monitor study.    Earl Marc monitored for 13d 23h starting on 10/30/2023.  Primary rhythm was Sinus Rhythm.  The average heart rate, excluding ectopy, was 68 BPM with a minumim of 52 BPM  on Day 7 and a maximum of 99 BPM on Day 10. SVE: [place] was 0.04 %, 568  total SVE SVT:3 events, longest event 5 beats on Day 8, fastest  event 99 BPM on Day 9 PVC: [place] was <0.01%, 100 total PVC,3  disparate morphologies VENTRICULAR TACHYCARDIA: 1 events, longest event 6 beats at Day 5 fastest rate 154 BPM at Day 5    NSR, RARE PACS, PVCS AND NSSVTS    Interpretation Summary         Findings consistent with a normal ECG stress test.    Left ventricular ejection fraction is normal (Calculated EF = 66%).    Myocardial perfusion imaging indicates a normal myocardial perfusion study with no evidence of ischemia.    Impressions are consistent with a low risk study.    Interpretation Summary         Left ventricular ejection fraction appears to be 61 - 65%.    Left ventricular diastolic function was normal.    Estimated right ventricular systolic pressure from tricuspid regurgitation is normal (<35 mmHg).    HEMODYNAMIC / ANGIOGRAPHIC DATA:    Left ventricular end diastolic pressure was 10 mmHg.    The left main is normal left main.  The left anterior  descending artery is *.Left anterior descending had a proximal LAD stent widely patent midportion had 70% with haziness was reduced to 0% with 3.0/12 Xience stent, minimal distal irregularity including the diagonal and  branches  The left circumflex is nondominant and normal.  The right coronary artery is codominant with early atherosclerotic plaque.  Successful angioplasty and stent to the mid LAD 70% with haziness reduced to 0% with 3.0/12 Xience stent     KELVIN FLOW    PRE....3...       POST....3..     TYPE OF LESION........B.....     RECOMMENDATIONS:  Post-procedure care will focus on prevention of any ischemic events and congestive complications.  Aggressive risk factor modification will be carried out.  Importance of taking dual antiplatelets for one year  has been explained, risk of stent thrombosis leading to the acute MI, which carries high morbidity and mortality has been explained     Discontinuation or interruptions of these medications should be under the strict guidance of appropriate health professional         Current Facility-Administered Medications:     acetaminophen (TYLENOL) tablet 650 mg, 650 mg, Oral, Q4H PRN **OR** acetaminophen (TYLENOL) 160 MG/5ML oral solution 650 mg, 650 mg, Oral, Q4H PRN **OR** acetaminophen (TYLENOL) suppository 650 mg, 650 mg, Rectal, Q4H PRN, Maria E Gilbert MD    amLODIPine (NORVASC) tablet 5 mg, 5 mg, Oral, Q24H, AllisonHaritha J, APRN, 5 mg at 01/19/24 2245    ascorbic acid (VITAMIN C) tablet 1,000 mg, 1,000 mg, Oral, Daily, Maria E Gilbert MD, 1,000 mg at 01/20/24 0824    aspirin EC tablet 81 mg, 81 mg, Oral, Daily, Maria E Gilbert MD, 81 mg at 01/20/24 0825    atorvastatin (LIPITOR) tablet 20 mg, 20 mg, Oral, Nightly, Maria E Gilbert MD, 20 mg at 01/19/24 2021    sennosides-docusate (PERICOLACE) 8.6-50 MG per tablet 2 tablet, 2 tablet, Oral, BID, 2 tablet at 01/20/24 0825 **AND** polyethylene glycol (MIRALAX) packet 17 g, 17 g,  Oral, Daily PRN **AND** bisacodyl (DULCOLAX) EC tablet 5 mg, 5 mg, Oral, Daily PRN **AND** bisacodyl (DULCOLAX) suppository 10 mg, 10 mg, Rectal, Daily PRN, Maria E Gilbert MD    dextrose (D50W) (25 g/50 mL) IV injection 25 g, 25 g, Intravenous, Q15 Min PRN, Maria E Gilbert MD    dextrose (GLUTOSE) oral gel 15 g, 15 g, Oral, Q15 Min PRN, Maria E Gilbert MD    fluticasone (FLONASE) 50 MCG/ACT nasal spray 1 spray, 1 spray, Nasal, Daily PRN, Maria E Gilbert MD    glucagon (GLUCAGEN) injection 1 mg, 1 mg, Intramuscular, Q15 Min PRN, Maria E Gilbert MD    hydrALAZINE (APRESOLINE) injection 10 mg, 10 mg, Intravenous, Q6H PRN, Haritha Cooper, APRN, 10 mg at 01/20/24 0002    insulin glargine (LANTUS, SEMGLEE) injection 34 Units, 34 Units, Subcutaneous, Daily, Maria E Gilbert MD, 34 Units at 01/20/24 0825    insulin lispro (HUMALOG/ADMELOG) injection 2-9 Units, 2-9 Units, Subcutaneous, 4x Daily AC & at Bedtime, Maria E Gilbert MD, 2 Units at 01/19/24 2125    linagliptin (TRADJENTA) tablet 5 mg, 5 mg, Oral, Daily, Maria E Gilbert MD, 5 mg at 01/20/24 0825    melatonin tablet 5 mg, 5 mg, Oral, Nightly, Maria E Gilbert MD, 5 mg at 01/19/24 2245    metoprolol succinate XL (TOPROL-XL) 24 hr tablet 12.5 mg, 12.5 mg, Oral, Daily, Maria E Gilbert MD, 12.5 mg at 01/19/24 2022    nitroglycerin (NITROSTAT) SL tablet 0.4 mg, 0.4 mg, Sublingual, Q5 Min PRN, Maria E Gilbert MD    ondansetron (ZOFRAN) injection 4 mg, 4 mg, Intravenous, Q6H PRN, Maria E Gilbert MD    pantoprazole (PROTONIX) EC tablet 40 mg, 40 mg, Oral, Daily, Maria E Gilbert MD, 40 mg at 01/19/24 2021    [MAR Hold] sodium chloride 0.9 % flush 10 mL, 10 mL, Intravenous, PRN, Emergency, Triage Protocol, MD    sodium chloride 0.9 % flush 10 mL, 10 mL, Intravenous, Q12H, Maria E Gilbert MD, 10 mL at 01/20/24 1005    sodium chloride 0.9 % flush 10 mL, 10 mL, Intravenous, PRN,  Maria E Gilbert MD    sodium chloride 0.9 % infusion 40 mL, 40 mL, Intravenous, PRN, Maria E Gilbert MD     Assessment:        Patient Active Problem List   Diagnosis    Bell's palsy    Persistent insomnia    Osteoarthritis of cervical spine    Diabetic peripheral neuropathy    Dyslipidemia    Steatosis of liver    Fibromyalgia    Gastroesophageal reflux disease without esophagitis    Hypertensive kidney disease with stage 3a chronic kidney disease    Hypogonadism in male    Renal insufficiency    Vitamin D deficiency    Benign non-nodular prostatic hyperplasia with lower urinary tract symptoms    S/P drug eluting coronary stent placement    Coronary artery disease involving native coronary artery of native heart    Malignant neoplasm of skin    Type 2 diabetes mellitus with hyperglycemia, with long-term current use of insulin    Diabetes mellitus    Chronic insomnia    Other specified glaucoma    Vertigo    Epigastric pain    Chest pain with high risk for cardiac etiology    Precordial pain    S/P arthroscopy of shoulder    Peripheral neuropathy    Stage 3b chronic kidney disease    Chest pain               Plan:   1.  Coronary artery disease: No chest pain or shortness of breath.  Previous ischemic workup as above.  Continue aspirin, statin, metoprolol.    Risk reduction for the coronary artery disease, controlling the blood pressure, blood sugar management, cholesterol management, exercise, stress management, and proper compliance with medications and follow-up has been discussed    2.  Hypertension: /70 today in office.  Continue metoprolol 12.5    Importance of controlling hypertension and blood pressure  checkup on the regular basis has been explained. Hypertension as a silent killer has been discussed. Risk reduction of the weight and regular exercises to control the hypertension has been explained.    3.  Hyperlipidemia: He reports his PCP manages his cholesterol labs.  Continue  atorvastatin    Risk of the hyperlipidemia, importance of the treatment has been explained. Pros and cons of the statins has been explained. Regular blood workup as well as side effects including the liver failure, myelopathy death has been explained.    4. Dizziness/orthostatic hypotension: Feels well, symptoms improved on decreased metoprolol                     ICD-10-CM ICD-9-CM   1. Chest pain, unspecified type  R07.9 786.50   2. Elevated troponin  R79.89 790.6   3. Labile blood glucose  R73.09 790.29   4. Chest pain with high risk for cardiac etiology  R07.9 786.50   5. Coronary artery disease involving native coronary artery of native heart with unstable angina pectoris  I25.110 414.01     411.1       There are no diagnoses linked to this encounter.    COUNSELING:shaila Hernández was given to patient for the following topics: diagnostic results, risk factor reductions, impressions, risks and benefits of treatment options and importance of treatment compliance .       SMOKING COUNSELING:denies      Follow up in 6months or sooner if neeeded    Sincerely,   Maria E Gilbert MD  [facility]  01/20/24  10:50 EST    EMR Dragon/Transcription disclaimer:   Much of this encounter note is an electronic transcription/translation of spoken language to printed text. The electronic translation of spoken language may permit erroneous, or at times, nonsensical words or phrases to be inadvertently transcribed; Although I have reviewed the note for such errors, some may still exist.

## 2024-01-20 NOTE — CONSULTS
Internal medicine consult    Referring physician  Dr. MILLS    Chief complaint  Chest pain    Reason for consult  Follow medical problems    History of present illness  78-year-old white male with history of COPD diabetes hypertension hyperlipidemia and chronic kidney disease who also have known coronary artery disease admitted to cardiology service from ER with chest pain.  Patient stated that his blood sugar dropped to 30s and he took glucose tablets followed by severe chest pain and workup in ER revealed elevated troponin and taken to the Cath Lab which was essentially unremarkable.  At the time of examination he is chest pain-free and wants to go home.  Patient denies any fever chills cough congestion shortness of breath palpitation abdominal pain nausea vomiting diarrhea.    PAST MEDICAL HISTORY   Abnormal cardiovascular stress test      Acid reflux      Arthritis      Asthma      Cancer      Chronic kidney disease      Colon polyp      Congenital heart disease      COPD (chronic obstructive pulmonary disease) 2021    Coronary artery disease      Diabetic neuropathy associated with type 2 diabetes mellitus      Erectile dysfunction      Fatty liver disease, nonalcoholic      Gastritis      Glaucoma      Hyperlipidemia      Hypertension      Hypogonadism male      Infectious viral hepatitis Hep A July 1959    Migraines 09/2023    Myocardial infarction 2018    Type 2 diabetes mellitus       PAST SURGICAL HISTORY              Procedure Laterality Date    CARDIAC CATHETERIZATION N/A 03/25/2016     Procedure: Left Heart Cath;  Surgeon: Maria E Gilbert MD;  Location: [facility] CATH INVASIVE LOCATION;  Service:     CARDIAC CATHETERIZATION N/A 03/25/2016     Procedure: Coronary angiography;  Surgeon: Maria E Gilbert MD;  [facility];  Service:     CARDIAC CATHETERIZATION N/A 03/28/2016     Procedure: Coronary angiography;  Surgeon: Maria E Gilbert MD;  Location: [facility] CATH  INVASIVE LOCATION;  Service:     CARDIAC CATHETERIZATION N/A 03/28/2016     Procedure: Stent MOISE coronary;  Surgeon: Maria E Gilbert MD;  Location:  [facility] CATH INVASIVE LOCATION;  Service:     CARDIAC CATHETERIZATION N/A 10/18/2018     Procedure: Coronary angiography  no LV gram d/t CKD;  Surgeon: Maria E Gilbert MD;  Location:  [facility] CATH INVASIVE LOCATION;  Service: Cardiology    CARDIAC CATHETERIZATION N/A 10/18/2018     Procedure: Left Heart Cath;  Surgeon: Maria E Gilbert MD;  Location:  [facility] CATH INVASIVE LOCATION;  Service: Cardiology    CARDIAC CATHETERIZATION N/A 10/18/2018     Procedure: Stent MOISE coronary;  Surgeon: Maria E Gilbert MD;  Location: [facility] C[facility];  Service: Cardiology    CARDIAC CATHETERIZATION N/A 05/28/2021     Procedure: Coronary angiography;  Surgeon: Maria E Gilbert MD;  Location: [facility] CATH INVASIVE LOC[facility];  Service: Cardiovascular;  Laterality: N/A;    CARDIAC CATHETERIZATION N/A 05/28/2021     Procedure: Left Heart Cath;  Surgeon: Maria E Gilbert MD;  Location: [facility] CATH IN[facility] LOC[facility];  Service: Cardiovascular;  Laterality: N/A;    CARDIAC CATHETERIZATION N/A 05/28/2021     Procedure: Left ventriculography;  Surgeon: Maria E Gilbert MD;  Location: [facility] CATH INVASIVE LOC[facility];  Service: Cardiovascular;  Laterality: N/A;    CARDIAC CATHETERIZATION N/A 05/28/2021     Procedure: Stent MOISE coronary;  Surgeon: Maria E Gilbert MD;  Location: [facility] C[facility] LOC[facility];  Service: Cardiovascular;  Laterality: N/A;    CAROTID STENT        CORONARY ANGIOPLASTY        CORONARY STENT PLACEMENT   3/28/2016    EYE SURGERY   9/2017    NECK EXPLORATION N/A      CA RT/LT HEART CATHETERS N/A 10/18/2018     Procedure: Percutaneous Coronary Intervention;  Surgeon: Maria E Gilbert MD;  Location: [facility] C[facility];  Service: Cardiology         FAMILY HISTORY           Problem Relation Age of Onset     Breast cancer Daughter      Heart attack Mother            10/31/1980    Hypertension Mother      Heart disease Mother      Thyroid disease Mother      Lupus Mother      Early death Mother      Miscarriages / Stillbirths Mother           Miscarriage 194    Heart failure Mother      Heart attack Brother      Heart disease Brother      Hyperlipidemia Brother      Cancer Brother           Due to Agent [place], [place]    Hypertension Brother      Depression Father      COPD Father      Stroke Maternal Grandmother      Cancer Maternal Grandmother           Lung Cancer non-smoker    Diabetes Brother      Heart disease Brother      Hypertension Brother      Kidney disease Brother      Heart disease Brother      Hypertension Brother        SOCIAL HISTORY                 Socioeconomic History    Marital status:        Spouse name: Ivette    Number of children: 1   Tobacco Use    Smoking status: Never       Passive exposure: Past    Smokeless tobacco: Never    Tobacco comments:       Never   Vaping Use    Vaping Use: Never used   Substance and Sexual Activity    Alcohol use: No    Drug use: Never    Sexual activity: Yes       Partners: Female       Birth control/protection: Surgical       Comment: Vasectomy          ALLERGIES  Ace inhibitors, Hydrogenated palm oil glycerides, Lanolin, Other, Prazosin, and Nsaids  Home medications reviewed     REVIEW OF SYSTEMS  All systems reviewed and negative except for those discussed in HPI.     PHYSICAL EXAM   Blood pressure 157/70, pulse 75, temperature 97.9 °F (36.6 °C), temperature source Oral, resp. rate 16, SpO2 97%.    Constitutional:       General: He is not in acute distress.     Appearance: He is well-developed.   HENT:      Head: Normocephalic and atraumatic.   Eyes:      General: No scleral icterus.     Conjunctiva/sclera: Conjunctivae normal.   Neck:      Trachea: No tracheal deviation.   Cardiovascular:      Rate and Rhythm: Regular rhythm. Tachycardia  present.   Pulmonary:      Effort: Pulmonary effort is normal.      Breath sounds: Normal breath sounds.   Abdominal:      Palpations: Abdomen is soft.      Tenderness: There is no abdominal tenderness. There is no guarding.   Musculoskeletal:         General: No deformity.      Cervical back: Normal range of motion.   Lymphadenopathy:      Cervical: No cervical adenopathy.   Skin:     General: Skin is warm and dry.   Neurological:      Mental Status: He is alert and oriented to person, place, and time.      Motor: Tremor present.   Psychiatric:         Behavior: Behavior normal.      LAB RESULTS  Lab Results (last 24 hours)       Procedure Component Value Units Date/Time    POC Glucose Once [[id number]]  (Abnormal) Collected: 01/20/24 1047    Specimen: Blood Updated: 01/20/24 1050     Glucose 227 mg/dL     POC Glucose Once [[id number]]  (Abnormal) Collected: 01/20/24 0601    Specimen: Blood Updated: 01/20/24 0602     Glucose 142 mg/dL     Basic Metabolic Panel [[id number]]  (Abnormal) Collected: 01/20/24 0448    Specimen: Blood Updated: 01/20/24 0602     Glucose 149 mg/dL      BUN 11 mg/dL      Creatinine 1.28 mg/dL      Sodium 138 mmol/L      Potassium 3.8 mmol/L      Chloride 104 mmol/L      CO2 24.2 mmol/L      Calcium 9.2 mg/dL      BUN/Creatinine Ratio 8.6     Anion Gap 9.8 mmol/L      eGFR 57.3 mL/min/1.73     Narrative:      GFR Normal >60  Chronic Kidney Disease <60  Kidney Failure <15    The GFR formula is only valid for adults with stable renal function between ages 18 and 70.    CBC (No Diff) [[id number]]  (Abnormal) Collected: 01/20/24 0448    Specimen: Blood Updated: 01/20/24 0544     WBC 11.17 10*3/mm3      RBC 5.30 10*6/mm3      Hemoglobin 15.7 g/dL      Hematocrit 47.5 %      MCV 89.6 fL      MCH 29.6 pg      MCHC 33.1 g/dL      RDW 13.1 %      RDW-SD 42.5 fl      MPV 10.2 fL      Platelets 213 10*3/mm3     POC Glucose Once [[id number]]  (Abnormal) Collected: 01/19/24 2030    Specimen: Blood Updated:  01/19/24 2033     Glucose 194 mg/dL     POC Glucose Once [[id number]]  (Abnormal) Collected: 01/19/24 1729    Specimen: Blood Updated: 01/19/24 1730     Glucose 209 mg/dL     POC Glucose Once [[id number]]  (Abnormal) Collected: 01/19/24 1550    Specimen: Blood Updated: 01/19/24 1551     Glucose 254 mg/dL           Imaging Results (Last 24 Hours)       ** No results found for the last 24 hours. **          Results  Scan on 1/19/2024 1158 by [employer], [place]: ECG 12-LEAD         Author: -- Service: -- Author Type: --   Filed: Date of Service: Creation Time:   Status: (Other)   HEART RATE= 84  bpm  RR Interval= 714  ms  PA Interval= 172  ms  P Horizontal Axis= 16  deg  P Front Axis= 44  deg  QRSD Interval= 74  ms  QT Interval= 333  ms  QTcB= 394  ms  QRS Axis= -36  deg  T Wave Axis= 61  deg  - ABNORMAL ECG -  Sinus rhythm  Inferior infarct, old  No change from previous tracing          Current Facility-Administered Medications:     acetaminophen (TYLENOL) tablet 650 mg, 650 mg, Oral, Q4H PRN **OR** acetaminophen (TYLENOL) 160 MG/5ML oral solution 650 mg, 650 mg, Oral, Q4H PRN **OR** acetaminophen (TYLENOL) suppository 650 mg, 650 mg, Rectal, Q4H PRN, Maria E Gilbert MD    amLODIPine (NORVASC) tablet 5 mg, 5 mg, Oral, Q24H, Haritha Cooper, APRN, 5 mg at 01/19/24 2245    ascorbic acid (VITAMIN C) tablet 1,000 mg, 1,000 mg, Oral, Daily, Maria E Gilbert MD, 1,000 mg at 01/20/24 0824    aspirin EC tablet 81 mg, 81 mg, Oral, Daily, Maria E Gilbert MD, 81 mg at 01/20/24 0825    atorvastatin (LIPITOR) tablet 20 mg, 20 mg, Oral, Nightly, Maria E Gilbert MD, 20 mg at 01/19/24 2021    sennosides-docusate (PERICOLACE) 8.6-50 MG per tablet 2 tablet, 2 tablet, Oral, BID, 2 tablet at 01/20/24 0825 **AND** polyethylene glycol (MIRALAX) packet 17 g, 17 g, Oral, Daily PRN **AND** bisacodyl (DULCOLAX) EC tablet 5 mg, 5 mg, Oral, Daily PRN **AND** bisacodyl (DULCOLAX) suppository 10 mg, 10 mg, Rectal, Daily  PRN, Maria E Gilbert MD    dextrose (D50W) (25 g/50 mL) IV injection 25 g, 25 g, Intravenous, Q15 Min PRN, Maria E Gilbert MD    dextrose (GLUTOSE) oral gel 15 g, 15 g, Oral, Q15 Min PRN, MariaE Gilbert MD    fluticasone (FLONASE) 50 MCG/ACT nasal spray 1 spray, 1 spray, Nasal, Daily PRN, Maria E Gilbert MD    glucagon (GLUCAGEN) injection 1 mg, 1 mg, Intramuscular, Q15 Min PRN, Maria E Gilbert MD    hydrALAZINE (APRESOLINE) injection 10 mg, 10 mg, Intravenous, Q6H PRN, Haritha Cooper, APRN, 10 mg at 01/20/24 0002    insulin glargine (LANTUS, SEMGLEE) injection 34 Units, 34 Units, Subcutaneous, Daily, Maria E Gilbert MD, 34 Units at 01/20/24 0825    insulin lispro (HUMALOG/ADMELOG) injection 2-9 Units, 2-9 Units, Subcutaneous, 4x Daily AC & at Bedtime, Maria E Gilbert MD, 4 Units at 01/20/24 1243    linagliptin (TRADJENTA) tablet 5 mg, 5 mg, Oral, Daily, Maria E Gilbert MD, 5 mg at 01/20/24 0825    melatonin tablet 5 mg, 5 mg, Oral, Nightly, Maria E Gilbert MD, 5 mg at 01/19/24 2245    metoprolol succinate XL (TOPROL-XL) 24 hr tablet 12.5 mg, 12.5 mg, Oral, Daily, Maria E Gilbert MD, 12.5 mg at 01/19/24 2022    nitroglycerin (NITROSTAT) SL tablet 0.4 mg, 0.4 mg, Sublingual, Q5 Min PRN, Maria E Gilbert MD    ondansetron (ZOFRAN) injection 4 mg, 4 mg, Intravenous, Q6H PRN, Maria E Gilbert MD    pantoprazole (PROTONIX) EC tablet 40 mg, 40 mg, Oral, Daily, Maria E Gilbert MD, 40 mg at 01/19/24 2021    [MAR Hold] sodium chloride 0.9 % flush 10 mL, 10 mL, Intravenous, PRN, Emergency, Triage Protocol, MD    sodium chloride 0.9 % flush 10 mL, 10 mL, Intravenous, Q12H, Maria E Gilbert MD, 10 mL at 01/20/24 1005    sodium chloride 0.9 % flush 10 mL, 10 mL, Intravenous, PRN, Maria E Gilbert MD    sodium chloride 0.9 % infusion 40 mL, 40 mL, Intravenous, PRN, Maria E Gilbert MD    Current Outpatient Medications:      Alcohol Swabs (CVS Prep) 70 % pads, Place 1 application on the skin as directed by provider Daily., Disp: 400 each, Rfl: 2    AmLactin 12 % lotion, Apply  topically to the appropriate area as directed As Needed for Dry Skin., Disp: 500 g, Rfl: 3    ascorbic acid (VITAMIN C) 1000 MG tablet, Take 1 tablet by mouth Daily., Disp: , Rfl:     aspirin 81 MG EC tablet, Take 1 tablet by mouth Daily., Disp: 90 tablet, Rfl: 3    atorvastatin (LIPITOR) 20 MG tablet, Take 1 tablet by mouth Every Night., Disp: 90 tablet, Rfl: 1    B-D UF III MINI PEN NEEDLES 31G X 5 MM misc, Use once daily with Lantus Pen for injection of insulin, Disp: 100 each, Rfl: 3    CALCIUM CITRATE PO, Take  by mouth., Disp: , Rfl:     Carboxymeth-Glycerin-Polysorb 0.5-1-0.5 % solution, Apply  to eye(s) as directed by provider Daily., Disp: , Rfl:     ciclopirox (LOPROX) 1 % shampoo, APPLY TOPICALLY 3 TIMES EVERY WEEK, Disp: , Rfl:     Cyanocobalamin (Vitamin B-12) 1000 MCG sublingual tablet, Place  under the tongue., Disp: , Rfl:     fluticasone (FLONASE) 50 MCG/ACT nasal spray, 1 spray into the nostril(s) as directed by provider Daily As Needed for Allergies., Disp: , Rfl:     glucose blood test strip, E11.65 Precision Xtra Blood Glucose In Vitro Strip; Patient Sig: Precision Xtra Blood Glucose In Vitro Strip TEST 2  TIMES DAILY, Disp: 200 each, Rfl: 5    hydrocortisone 2.5 % ointment, Apply  topically to the appropriate area as directed 2 (Two) Times a Day., Disp: 20 g, Rfl: 5    Insulin Glargine (Lantus SoloStar) 100 UNIT/ML injection pen, Inject 34 Units under the skin into the appropriate area as directed Daily., Disp: 30 mL, Rfl: 1    linagliptin (Tradjenta) 5 MG tablet tablet, Take 1 tablet by mouth Daily. By mouth take one tab daily., Disp: 90 tablet, Rfl: 1    Magnesium 250 MG tablet, Take 1 tablet by mouth Every Night., Disp: , Rfl:     melatonin 5 MG tablet tablet, Take 1 tablet by mouth., Disp: , Rfl:     metoprolol succinate XL (TOPROL-XL) 25  MG 24 hr tablet, Take 0.5 tablets by mouth Daily., Disp: 30 tablet, Rfl: 5    nitroglycerin (Nitrostat) 0.4 MG SL tablet, Place 1 tablet under the tongue Every 5 (Five) Minutes As Needed for Chest Pain. Indications: Acute Angina Pectoris, Disp: 25 tablet, Rfl: 3    Testosterone Cypionate (DEPOTESTOTERONE CYPIONATE) 200 MG/ML injection, , Disp: , Rfl:     venlafaxine XR (EFFEXOR-XR) 37.5 MG 24 hr capsule, Take 1 capsule by mouth Daily., Disp: 30 capsule, Rfl: 1    VIAGRA 100 MG tablet, Take 1 tablet by mouth As Needed for erectile dysfunction (1 tablet prior to planned intercourse.)., Disp: 24 tablet, Rfl: 3    Zinc 30 MG tablet, Take 50 mg by mouth Daily., Disp: , Rfl:     Monolet Lancets misc, E11.65 Use to test BG 2 times daily, Disp: 200 each, Rfl: 5    pantoprazole (PROTONIX) 20 MG EC tablet, Take 1 tablet by mouth Daily., Disp: 90 tablet, Rfl: 1     ASSESSMENT  Chest pain with elevated troponin and known coronary artery disease per cardiology  Diabetes mellitus with low blood sugar resolved  Hypertension  Hyperlipidemia  Gastroesophageal reflux disease    PLAN  Agree with current care  Accu-Chek with sliding scale insulin  Continue home medications  Stress ulcer DVT prophylaxis  Supportive care  Patient is full code  Discussed with family nursing staff  Okay to discharge on current medications with close follow-up with primary care doctor    CANDICE ENRIQUEZ MD

## 2024-01-20 NOTE — DISCHARGE SUMMARY
"  Date of Discharge:  1/20/2024    Discharge Diagnosis:   Active Hospital Problems    Diagnosis  POA    Chest pain [R07.9]  Yes    Chest pain with high risk for cardiac etiology [R07.9]  Unknown    Coronary artery disease involving native coronary artery of native heart [I25.10]  Unknown      Resolved Hospital Problems   No resolved problems to display.       Presenting Problem/History of Present Illness  Chest pain [R07.9]  Elevated troponin [R79.89]  Labile blood glucose [R73.09]  Chest pain with high risk for cardiac etiology [R07.9]  Coronary artery disease involving native coronary artery of native heart with unstable angina pectoris [I25.110]  Chest pain, unspecified type [R07.9]      Hospital Course  Patient is a 78 y.o. male presented with Chest pains show sternal, in the emergency room patient was admitted to the hospital was found to have the elevated troponin underwent a diagnostic heart catheterization which was unremarkable    Procedures Performed  Procedure(s):  Left Heart Cath       Consults:   Consults       Date and Time Order Name Status Description    1/19/2024  5:03 PM Inpatient Internal Medicine Consult      1/19/2024  2:39 PM Cardiology (on-call MD unless specified) Completed             Pertinent Test Results:     Ejection Fraction  Lab Results   Component Value Date    EF 66 10/30/2023       Echo EF Estimated  No results found for: \"ECHOEFEST\"    Nuclear Stress Ejection Fraction  No components found for: \"NUCEF\"    Cath Ejection Fraction Quantitative  No results found for: \"CATHEF\"    Condition on Discharge: Stable    Physical Exam at Discharge    Vital Signs  Temp:  [97.9 °F (36.6 °C)-98.6 °F (37 °C)] 97.9 °F (36.6 °C)  Heart Rate:  [] 75  Resp:  [14-20] 16  BP: (136-194)/(52-80) 157/70    Physical Exam:     General Appearance:  Alert, cooperative, in no acute distress   Head:  Normocephalic, without obvious abnormality, atraumatic   Eyes:  Lids and lashes normal, conjunctivae and " sclerae normal, no icterus, no pallor, corneas clear, PERRLA   Ears:  Ears appear intact with no abnormalities noted   Throat:  No oral lesions, no thrush, oral mucosa moist   Neck:  No adenopathy, supple, trachea midline, no thyromegaly, no carotid bruit, no JVD   Back:  No kyphosis present, no scoliosis present, no skin lesions, erythema or scars, no tenderness to percussion or palpation, range of motion normal   Lungs:  Clear to auscultation, respirations regular, even and unlabored    Heart:  Regular rhythm and normal rate, normal S1 and S2, no murmur, no gallop, no rub, no click   Chest Wall:  No abnormalities observed   Abdomen:  Normal bowel sounds, no masses, no organomegaly, soft non-tender, non-distended, no guarding, no rebound tenderness   Rectal:  Deferred   Extremities:  Moves all extremities well, no edema, no cyanosis, no redness   Pulses:  Pulses palpable and equal bilaterally   Skin:  No bleeding, bruising or rash   Lymph nodes:  No palpable adenopathy   Neurologic:  Cranial nerves 2 - 12 grossly intact, sensation intact, DTR present and equal bilaterally                                                                               Discharge Disposition  Home or Self Care    Discharge Medications     Discharge Medications        Continue These Medications        Instructions Start Date   AmLactin 12 % lotion  Generic drug: ammonium lactate   Topical, As Needed      ascorbic acid 1000 MG tablet  Commonly known as: VITAMIN C   1,000 mg, Oral, Daily      aspirin 81 MG EC tablet   81 mg, Oral, Daily      atorvastatin 20 MG tablet  Commonly known as: LIPITOR   20 mg, Oral, Nightly      B-D UF III MINI PEN NEEDLES 31G X 5 MM misc  Generic drug: Insulin Pen Needle   Use once daily with Lantus Pen for injection of insulin      CALCIUM CITRATE PO   Oral      Carboxymeth-Glycerin-Polysorb 0.5-1-0.5 % solution   Ophthalmic, Daily      ciclopirox 1 % shampoo  Commonly known as: LOPROX   APPLY TOPICALLY 3 TIMES  EVERY WEEK      CVS Prep 70 % pads   1 application , Transdermal, Daily      fluticasone 50 MCG/ACT nasal spray  Commonly known as: FLONASE   1 spray, Nasal, Daily PRN      glucose blood test strip   E11.65 Precision Xtra Blood Glucose In Vitro Strip; Patient Sig: Precision Xtra Blood Glucose In Vitro Strip TEST 2  TIMES DAILY      hydrocortisone 2.5 % ointment   Topical, 2 Times Daily      Lantus SoloStar 100 UNIT/ML injection pen  Generic drug: Insulin Glargine   34 Units, Subcutaneous, Daily      linagliptin 5 MG tablet tablet  Commonly known as: Tradjenta   5 mg, Oral, Daily, By mouth take one tab daily.      Magnesium 250 MG tablet   250 mg, Oral, Nightly      melatonin 5 MG tablet tablet   1 tablet, Oral      metoprolol succinate XL 25 MG 24 hr tablet  Commonly known as: TOPROL-XL   12.5 mg, Oral, Daily      Monolet Lancets misc   E11.65 Use to test BG 2 times daily      nitroglycerin 0.4 MG SL tablet  Commonly known as: Nitrostat   0.4 mg, Sublingual, Every 5 Minutes PRN      pantoprazole 20 MG EC tablet  Commonly known as: PROTONIX   20 mg, Oral, Daily      Testosterone Cypionate 200 MG/ML injection  Commonly known as: DEPOTESTOTERONE CYPIONATE       venlafaxine XR 37.5 MG 24 hr capsule  Commonly known as: EFFEXOR-XR   37.5 mg, Oral, Daily      Viagra 100 MG tablet  Generic drug: sildenafil   100 mg, Oral, As Needed      Vitamin B-12 1000 MCG sublingual tablet   Sublingual      Zinc 30 MG tablet   50 mg, Oral, Daily               Discharge Diet:     Activity at Discharge:     Follow-up Appointments  Future Appointments   Date Time Provider Department Center   1/29/2024 11:00 AM Margi Loredo APRN [facility]   5/1/2024 10:30 AM Avery Velazquez MD [facility]   7/8/2024 11:15 AM Maria E iGlbert MD [facility]         Test Results Pending at Discharge       Maria E Gilbert MD  01/20/24  13:56 EST    Time:

## 2024-01-20 NOTE — PLAN OF CARE
Problem: Adult Inpatient Plan of Care  Goal: Plan of Care Review  Outcome: Ongoing, Progressing  Flowsheets (Taken 1/20/2024 0421)  Progress: improving  Plan of Care Reviewed With: patient  Outcome Evaluation:   VSS   A&Ox4. Pt with elevated 200-165 SBP during the night   call placed to on call see orders. Pt with right radial site that remains c/d/s. Pt w/o c/o of pain during the night. Pt up with standby assist to the bathroom. Pt expresses no additional needs at this time. Plan of care is ongoing.  Goal: Patient-Specific Goal (Individualized)  Outcome: Ongoing, Progressing  Goal: Absence of Hospital-Acquired Illness or Injury  Outcome: Ongoing, Progressing  Intervention: Identify and Manage Fall Risk  Description: Perform standard risk assessment on admission using a validated tool or comprehensive approach appropriate to the patient; reassess fall risk frequently, with change in status or transfer to another level of care.  Communicate fall injury risk to interprofessional healthcare team.  Determine need for increased observation, equipment and environmental modification, such as low bed, signage and supportive, nonskid footwear.  Adjust safety measures to individual developmental age, stage and identified risk factors.  Reinforce the importance of safety and physical activity with patient and family.  Perform regular intentional rounding to assess need for position change, pain assessment and personal needs, including assistance with toileting.  Recent Flowsheet Documentation  Taken 1/20/2024 0400 by Harleen Malik, RN  Safety Promotion/Fall Prevention:   nonskid shoes/slippers when out of bed   safety round/check completed   room organization consistent   clutter free environment maintained   activity supervised  Taken 1/20/2024 0000 by Harleen Malik, RN  Safety Promotion/Fall Prevention:   nonskid shoes/slippers when out of bed   safety round/check completed   room organization consistent   gait  belt   clutter free environment maintained   activity supervised  Taken 1/19/2024 2200 by Harleen Malik RN  Safety Promotion/Fall Prevention:   nonskid shoes/slippers when out of bed   safety round/check completed   room organization consistent   gait belt   clutter free environment maintained   activity supervised  Taken 1/19/2024 2000 by Harleen Malik RN  Safety Promotion/Fall Prevention:   nonskid shoes/slippers when out of bed   safety round/check completed   room organization consistent   gait belt   clutter free environment maintained   activity supervised  Intervention: Prevent Skin Injury  Description: Perform a screening for skin injury risk, such as pressure or moisture associated skin damage on admission and at regular intervals throughout hospital stay.  Keep all areas of skin (especially folds) clean and dry.  Maintain adequate skin hydration.  Relieve and redistribute pressure and protect bony prominences; implement measures based on patient-specific risk factors.  Match turning and repositioning schedule to clinical condition.  Encourage weight shift frequently; assist with reposition if unable to complete independently.  Float heels off bed; avoid pressure on the Achilles tendon.  Keep skin free from extended contact with medical devices.  Encourage functional activity and mobility, as early as tolerated.  Use aids (e.g., slide boards, mechanical lift) during transfer.  Recent Flowsheet Documentation  Taken 1/20/2024 0400 by Harleen Malik RN  Body Position:   position changed independently   side-lying   weight shifting  Taken 1/20/2024 0000 by Harleen Malik RN  Body Position:   position changed independently   supine   weight shifting  Skin Protection: adhesive use limited  Taken 1/19/2024 2200 by Harleen Malik RN  Body Position:   position changed independently   supine   weight shifting  Taken 1/19/2024 2000 by Harleen Malik RN  Body Position:   position changed  independently   supine   weight shifting  Skin Protection: adhesive use limited  Intervention: Prevent and Manage VTE (Venous Thromboembolism) Risk  Description: Assess for VTE (venous thromboembolism) risk.  Encourage and assist with early ambulation.  Initiate and maintain compression or other therapy, as indicated, based on identified risk in accordance with organizational protocol and provider order.  Encourage both active and passive leg exercises while in bed, if unable to ambulate.  Recent Flowsheet Documentation  Taken 1/20/2024 0400 by Harleen Malik RN  Activity Management: activity encouraged  Taken 1/20/2024 0000 by Harleen Malik RN  Activity Management: activity encouraged  Range of Motion: active ROM (range of motion) encouraged  Taken 1/19/2024 2200 by Harleen Malik RN  Activity Management: activity encouraged  Taken 1/19/2024 2000 by Harleen Malik RN  Activity Management: activity encouraged  Range of Motion: active ROM (range of motion) encouraged  Intervention: Prevent Infection  Description: Maintain skin and mucous membrane integrity; promote hand, oral and pulmonary hygiene.  Optimize fluid balance, nutrition, sleep and glycemic control to maximize infection resistance.  Identify potential sources of infection early to prevent or mitigate progression of infection (e.g., wound, lines, devices).  Evaluate ongoing need for invasive devices; remove promptly when no longer indicated.  Recent Flowsheet Documentation  Taken 1/20/2024 0400 by Harleen Malik RN  Infection Prevention:   single patient room provided   rest/sleep promoted   hand hygiene promoted   environmental surveillance performed  Taken 1/20/2024 0000 by Harelen Malik RN  Infection Prevention:   single patient room provided   rest/sleep promoted   hand hygiene promoted   environmental surveillance performed  Taken 1/19/2024 2200 by Harleen Malik RN  Infection Prevention:   single patient room provided    rest/sleep promoted   hand hygiene promoted   environmental surveillance performed  Taken 1/19/2024 2000 by Harleen Malik RN  Infection Prevention:   single patient room provided   rest/sleep promoted   hand hygiene promoted   environmental surveillance performed  Goal: Optimal Comfort and Wellbeing  Outcome: Ongoing, Progressing  Intervention: Provide Person-Centered Care  Description: Use a family-focused approach to care.  Develop trust and rapport by proactively providing information, encouraging questions, addressing concerns and offering reassurance.  Acknowledge emotional response to hospitalization.  Recognize and utilize personal coping strategies.  Honor spiritual and cultural preferences.  Recent Flowsheet Documentation  Taken 1/20/2024 0400 by Harleen Malik RN  Trust Relationship/Rapport:   care explained   choices provided  Taken 1/20/2024 0000 by Harleen Malik RN  Trust Relationship/Rapport:   care explained   choices provided  Taken 1/19/2024 2000 by Harleen Malik RN  Trust Relationship/Rapport:   care explained   choices provided  Goal: Readiness for Transition of Care  Outcome: Ongoing, Progressing     Problem: Skin Injury Risk Increased  Goal: Skin Health and Integrity  Outcome: Ongoing, Progressing  Intervention: Optimize Skin Protection  Description: Perform a full pressure injury risk assessment, as indicated by screening, upon admission to care unit.  Reassess skin (injury risk, full inspection) frequently (e.g., scheduled interval, with change in condition) to provide optimal early detection and prevention.  Maintain adequate tissue perfusion (e.g., encourage fluid balance; avoid crossing legs, constrictive clothing or devices) to promote tissue oxygenation.  Maintain head of bed at lowest degree of elevation tolerated, considering medical condition and other restrictions.  Avoid positioning onto an area that remains reddened.  Minimize incontinence and moisture (e.g.,  toileting schedule; moisture-wicking pad, diaper or incontinence collection device; skin moisture barrier).  Cleanse skin promptly and gently when soiled utilizing a pH-balanced cleanser.  Relieve and redistribute pressure (e.g., scheduled position changes, weight shifts, use of support surface, medical device repositioning, protective dressing application, use of positioning device, microclimate control, use of pressure-injury-monitor  Encourage increased activity, such as sitting in a chair at the bedside or early mobilization, when able to tolerate.  Recent Flowsheet Documentation  Taken 1/20/2024 0400 by Harleen Malik RN  Head of Bed (HOB) Positioning: HOB at 30 degrees  Taken 1/20/2024 0000 by Harleen Malik RN  Pressure Reduction Techniques: frequent weight shift encouraged  Head of Bed (HOB) Positioning: HOB at 30 degrees  Skin Protection: adhesive use limited  Taken 1/19/2024 2200 by Harleen Malik RN  Head of Bed (HOB) Positioning: HOB at 30 degrees  Taken 1/19/2024 2000 by Harleen Malik RN  Pressure Reduction Techniques: frequent weight shift encouraged  Head of Bed (HOB) Positioning: HOB at 30 degrees  Skin Protection: adhesive use limited   Goal Outcome Evaluation:  Plan of Care Reviewed With: patient        Progress: improving  Outcome Evaluation: VSS; A&Ox4. Pt with elevated 200-165 SBP during the night; call placed to on call see orders. Pt remains SR on the monitor and room air during the night. Pt s/p heart cath w/o interventions; right radial site that remains c/d/s. Pt w/o c/o of pain during the night. Pt up with standby assist to the bathroom. Pt expresses no additional needs at this time. Plan of care is ongoing.

## 2024-01-20 NOTE — CASE MANAGEMENT/SOCIAL WORK
Case Management Discharge Note      Final Note: dc home         Selected Continued Care - Discharged on 1/20/2024 Admission date: 1/19/2024 - Discharge disposition: Home or Self Care      Destination    No services have been selected for the patient.                Durable Medical Equipment    No services have been selected for the patient.                Dialysis/Infusion    No services have been selected for the patient.                Home Medical Care    No services have been selected for the patient.                Therapy    No services have been selected for the patient.                Community Resources    No services have been selected for the patient.                Community & DME    No services have been selected for the patient.                         Final Discharge Disposition Code: 01 - home or self-care

## 2024-01-20 NOTE — ED PROVIDER NOTES
EMERGENCY DEPARTMENT MD ATTESTATION NOTE    SHARED VISIT: This visit was performed by BOTH a physician and an APC. The substantive portion of the medical decision making was performed by this attesting physician who made or approved the management plan and takes responsibility for patient management. All studies documented in the APC note (if performed) were independently interpreted by me.    The JOCELYN and I have discussed this patient's history, physical exam, MDM, and treatment plan.  I have reviewed the documentation and personally had a face to face interaction with the patient. The attached note describes my personal findings.        Room Number:  3110/1  PCP: Avery Velazquez MD  Independent Historians: Patient    HPI:  A complete HPI/ROS/PMH/PSH/SH/FH are unobtainable due to: None    Chronic or social conditions impacting patient care (Social Determinants of Health): None      Context: Earl Marc is a 78 y.o. male with a medical history of CAD, CKD, DM, COPD, HTN and hyperlipidemia who presents to the ED c/o acute chest pain and concerns about his blood sugar level.  This morning, patient noticed that his blood glucose level was elevated at 208.  He took a dose of his insulin but then subsequently began to feel nauseous and somewhat shaky.  He was concerned that perhaps his blood sugar was dropping dangerously low.  His wife gave him some glucose tablets to see if that would help him feel better, but during this time he began to develop some sharp pain just to the left of the midsternal region.  This pain did not radiate to the left arm.  It waxed and waned for several minutes.  Eventually he called paramedics to the house and they brought him here for further evaluation.  Patient did have aspirin prior to arrival.  At the time of my encounter, patient says he is feeling better without any chest pain symptoms.        Review of prior external notes (non-ED) -and- Review of prior external test  results outside of this encounter: I reviewed the cardiology office progress note from October 27, 2023 when patient had follow-up care for coronary artery disease with history of stents as well as hypertension management.          PHYSICAL EXAM    I have reviewed the triage vital signs and nursing notes.    ED Triage Vitals   Temp Heart Rate Resp BP SpO2   01/19/24 1147 01/19/24 1146 01/19/24 1146 01/19/24 1146 01/19/24 1146   97.3 °F (36.3 °C) 110 20 114/60 96 %      Temp src Heart Rate Source Patient Position BP Location FiO2 (%)   01/19/24 1705 01/19/24 1705 01/19/24 1900 01/19/24 1705 --   Oral Monitor Lying Left leg        Physical Exam  GENERAL: alert, no acute distress  SKIN: Warm, dry, no diaphoresis  HENT: Normocephalic, atraumatic  EYES: no scleral icterus  CV: regular rhythm, regular rate, no murmurs  RESPIRATORY: normal effort, lungs clear  ABDOMEN: soft, nontender, nondistended  MUSCULOSKELETAL: no deformity, no asymmetry or edema  NEURO: alert, moves all extremities, follows commands      NIH:                                                             MEDICATIONS GIVEN IN ER  Medications   sodium chloride 0.9 % flush 10 mL ( Intravenous MAR Hold 1/19/24 1620)   sodium chloride 0.9 % flush 10 mL (10 mL Intravenous Given 1/19/24 2023)   sodium chloride 0.9 % flush 10 mL ( Intravenous MAR Unhold 1/19/24 1703)   sodium chloride 0.9 % infusion 40 mL ( Intravenous MAR Unhold 1/19/24 1703)   sennosides-docusate (PERICOLACE) 8.6-50 MG per tablet 2 tablet (2 tablets Oral Not Given 1/19/24 2042)     And   polyethylene glycol (MIRALAX) packet 17 g ( Oral MAR Unhold 1/19/24 1703)     And   bisacodyl (DULCOLAX) EC tablet 5 mg ( Oral MAR Unhold 1/19/24 1703)     And   bisacodyl (DULCOLAX) suppository 10 mg ( Rectal MAR Unhold 1/19/24 1703)   acetaminophen (TYLENOL) tablet 650 mg ( Oral MAR Unhold 1/19/24 1703)     Or   acetaminophen (TYLENOL) 160 MG/5ML oral solution 650 mg ( Oral MAR Unhold 1/19/24 0097)     Or    acetaminophen (TYLENOL) suppository 650 mg ( Rectal MAR Unhold 1/19/24 1703)   ondansetron (ZOFRAN) injection 4 mg ( Intravenous MAR Unhold 1/19/24 1703)   dextrose (GLUTOSE) oral gel 15 g ( Oral MAR Unhold 1/19/24 1703)   dextrose (D50W) (25 g/50 mL) IV injection 25 g ( Intravenous MAR Unhold 1/19/24 1703)   glucagon (GLUCAGEN) injection 1 mg ( Intramuscular MAR Unhold 1/19/24 1703)   insulin lispro (HUMALOG/ADMELOG) injection 2-9 Units (2 Units Subcutaneous Given 1/19/24 2125)   fluticasone (FLONASE) 50 MCG/ACT nasal spray 1 spray (has no administration in time range)   ascorbic acid (VITAMIN C) tablet 1,000 mg ( Oral Canceled Entry 1/19/24 1900)   aspirin EC tablet 81 mg (81 mg Oral Not Given 1/19/24 1742)   melatonin tablet 5 mg (has no administration in time range)   insulin glargine (LANTUS, SEMGLEE) injection 34 Units (34 Units Subcutaneous Not Given 1/19/24 1743)   pantoprazole (PROTONIX) EC tablet 40 mg (40 mg Oral Given 1/19/24 2021)   linagliptin (TRADJENTA) tablet 5 mg (5 mg Oral Given 1/19/24 2022)   atorvastatin (LIPITOR) tablet 20 mg (20 mg Oral Given 1/19/24 2021)   metoprolol succinate XL (TOPROL-XL) 24 hr tablet 12.5 mg (12.5 mg Oral Given 1/19/24 2022)   nitroglycerin (NITROSTAT) SL tablet 0.4 mg (has no administration in time range)   amLODIPine (NORVASC) tablet 5 mg (has no administration in time range)   hydrALAZINE (APRESOLINE) injection 10 mg (has no administration in time range)   sodium chloride 0.9 % infusion (75 mL/hr Intravenous New Bag 1/19/24 1628)         ORDERS PLACED DURING THIS VISIT:  Orders Placed This Encounter   Procedures    XR Chest 1 View    [place] Draw    Comprehensive Metabolic Panel    High Sensitivity Troponin T    CBC Auto Differential    D-dimer, Quantitative    BNP    Urinalysis With Culture If Indicated - Urine, Clean Catch    High Sensitivity Troponin T 2Hr    Urinalysis, Microscopic Only - Urine, Clean Catch    Basic Metabolic Panel    CBC (No Diff)    Diet:  Cardiac Diets, Diabetic Diets; Healthy Heart (2-3 Na+); Consistent Carbohydrate; Texture: Regular Texture (IDDSI 7); Fluid Consistency: Thin (IDDSI 0)    Undress & Gown    Vital Signs    Intake & Output    Weigh Patient    Oral Care    Place Sequential Compression Device    Maintain Sequential Compression Device    Telemetry - Place Orders & Notify Provider of Results When Patient Experiences Acute Chest Pain, Dysrhythmia or Respiratory Distress    May Be Off Telemetry for Tests    Up With Assistance    Vital Signs, Site Check & Distal Extremity Check for Warmth, Color, Sensation & Pulses    Telemetry - Maintain IV Access    Telemetry - Place Orders & Notify Provider of Results When Patient Experiences Acute Chest Pain, Dysrhythmia or Respiratory Distress    May Be Off Telemetry for Tests    Change Site Dressing    Encourage Fluids    Strict Intake & Output    Continuous Pulse Oximetry    Advance Diet As Tolerated -    Notify MD if platelet count is less than 100,000, is less than 1/2 baseline, or if Hgb drops by more than 3mg/dl.    Notify MD of hypotension (SBP less than 95), bleeding, or dysrythmia and follow Sheath Removal Policy if needed.    Hold metFORMIN (GLUCOPHAGE) for 48 Hours    Remove Radial Pressure Device / Dressing    Vital Signs & Reverse Waldo's Test (While Radial Compression Device in Place)    Keep Affected Arm Straight & Elevated    Activity As Tolerated    Code Status and Medical Interventions:    Cardiology (on-call MD unless specified)    Inpatient Internal Medicine Consult    Oxygen Therapy- Nasal Cannula; Titrate 1-6 LPM Per SpO2; 90 - 95%    Oxygen Therapy- Nasal Cannula; Titrate 1-6 LPM Per SpO2; 90 - 95%    Incentive Spirometry    POC Glucose Q1H    POC Glucose Once    POC Glucose Once    POC Glucose 4x Daily Before Meals & at Bedtime    POC Glucose Once    POC Glucose Once    POC Glucose Once    ECG 12 Lead ED Triage Standing Order; Chest Pain    ECG 12 Lead ED Triage Standing Order;  Chest Pain    SCANNED - TELEMETRY      Insert Peripheral IV    Insert Peripheral IV    Initiate ED Observation Status    CBC & Differential    Green Top (Gel)    Lavender Top    Gold Top - SST    Light Blue Top         PROCEDURES  Procedures            PROGRESS, DATA ANALYSIS, CONSULTS, AND MEDICAL DECISION MAKING  All labs have been independently interpreted by me.  All radiology studies have been reviewed by me. All EKG's have been independently viewed and interpreted by me.  Discussion below represents my analysis of pertinent findings related to patient's condition, differential diagnosis, treatment plan and final disposition.    Differential diagnosis includes but is not limited to acute MI, GERD, pneumothorax, pulmonary embolism, hypoglycemia, aortic dissection.    Clinical Scores:              Heart score = 6    ED Course as of 01/19/24 2245   Fri Jan 19, 2024   1205 Patient reports that symptoms are presenting abruptly in brief waves at rest with no provocation. No active chest pain.  [DC]   1251 Creatinine(!): 1.42  At baseline [DC]   1502 Troponin T Delta(!!): 37 [DC]   1502 HS Troponin T(!!): 58 [DC]   1535 I discussed the case with MD Brisa with Cardiology at this time regarding the patient.  I discussed work-up, results, concerns.  I discussed the consulting provider's desire for admission to the observation unit for continued monitoring.   [DC]   1538 I discussed the case with Ally, JOCELYN with [facility] at this time regarding the patient.  I discussed work-up, results, concerns.  I discussed the consulting provider's desire for obs admit.    [DC]   1551 I discussed the case with JL Ly with Cardiology at this time regarding the patient.  I discussed work-up, results, concerns.  I discussed the consulting provider's desire for taking the patient to cath lab at this time. Obs Unit bed no longer recommended.    [DC]      ED Course User Index  [DC] Carlos Moyer, PA       MDM:   EKG         EKG  time/Interp time: 1157/1200  Rhythm/Rate: Sinus rhythm, 84 bpm  P waves and GA: Present, 172 ms  QRS, axis: 74 ms, left axis deviation  ST and T waves: No ST segment elevation present  Independently interpreted by me contemporaneously with treatment    CXR  I independently interpreted the Chest X-ray and my findings are: No Pneumothorax, No Effusion, No Infiltrate    Initial clinical presentation was rather atypical for acute cardiac chest pain based on patient's descriptors and physical exam findings.  He had additional concerns regarding his blood sugar levels which we continue to monitor his glucose carefully.  We initiated typical cardiac workup with serial troponin enzyme testing.  The first troponin was within normal limits, however the second troponin did indicate a sharp rise with a positive delta.  Therefore we promptly consulted with cardiology given concerns for possible non-ST elevation MI.  Briefly considered placing patient in observation unit but on further conversation with cardiology team, recommendation was made to take patient for cath procedure today to further investigate.  Patient is agreeable with this plan.        COMPLEXITY OF CARE  The patient requires admission.    Please note that portions of this document were completed with a voice recognition program.    Note Disclaimer: At [facility], we believe that sharing information builds trust and better relationships. You are receiving this note because you recently visited [facility]. It is possible you will see health information before a provider has talked with you about it. This kind of information can be easy to misunderstand. To help you fully understand what it means for your health, we urge you to discuss this note with your provider.         Eric Delgadillo MD  01/19/24 9608

## 2024-01-21 ENCOUNTER — READMISSION MANAGEMENT (OUTPATIENT)
Dept: CALL CENTER | Facility: HOSPITAL | Age: 79
End: 2024-01-21
Payer: MEDICARE

## 2024-01-21 NOTE — OUTREACH NOTE
Prep Survey      Flowsheet Row Responses   [facility] patient discharged from? [place]   Is LACE score < 7 ? No   Eligibility Readm Mgmt   Discharge diagnosis Chest pain   Does the patient have one of the following disease processes/diagnoses(primary or secondary)? Other   Does the patient have Home health ordered? No   Is there a DME ordered? No   Prep survey completed? Yes            Soni DELA CRUZ - Registered Nurse

## 2024-01-23 ENCOUNTER — READMISSION MANAGEMENT (OUTPATIENT)
Dept: CALL CENTER | Facility: HOSPITAL | Age: 79
End: 2024-01-23
Payer: MEDICARE

## 2024-01-23 NOTE — OUTREACH NOTE
Medical Week 1 Survey      Flowsheet Row Responses   [facility] patient discharged from? [place]   Does the patient have one of the following disease processes/diagnoses(primary or secondary)? Other   Week 1 attempt successful? No   Unsuccessful attempts Attempt 1            Jennifer OLIVARES - Registered Nurse

## 2024-01-24 ENCOUNTER — OFFICE VISIT (OUTPATIENT)
Dept: FAMILY MEDICINE CLINIC | Facility: CLINIC | Age: 79
End: 2024-01-24
Payer: MEDICARE

## 2024-01-24 VITALS
OXYGEN SATURATION: 96 % | HEIGHT: 72 IN | RESPIRATION RATE: 16 BRPM | WEIGHT: 235 LBS | DIASTOLIC BLOOD PRESSURE: 66 MMHG | SYSTOLIC BLOOD PRESSURE: 126 MMHG | BODY MASS INDEX: 31.83 KG/M2 | HEART RATE: 68 BPM

## 2024-01-24 DIAGNOSIS — D72.829 LEUKOCYTOSIS, UNSPECIFIED TYPE: ICD-10-CM

## 2024-01-24 DIAGNOSIS — Z79.4 TYPE 2 DIABETES MELLITUS WITH HYPERGLYCEMIA, WITH LONG-TERM CURRENT USE OF INSULIN: ICD-10-CM

## 2024-01-24 DIAGNOSIS — R07.9 CHEST PAIN, UNSPECIFIED TYPE: Primary | ICD-10-CM

## 2024-01-24 DIAGNOSIS — E11.65 TYPE 2 DIABETES MELLITUS WITH HYPERGLYCEMIA, WITH LONG-TERM CURRENT USE OF INSULIN: ICD-10-CM

## 2024-01-24 PROBLEM — M25.512 TRIGGER POINT OF LEFT SHOULDER REGION: Status: RESOLVED | Noted: 2023-03-02 | Resolved: 2024-01-24

## 2024-01-24 NOTE — PROGRESS NOTES
Subjective Earl is here today for hospital follow-up.  He was actually kept as an observation stay and therefore not admitted to the hospital.  Earl Marc is a 78 y.o. male.     History of Present Illness Earl went to the emergency room on the 19th.  He was having chest pain following a low blood sugar reaction.  In the emergency room his initial troponin was negative.  It did elevate after the second test.  He went for a heart catheterization that showed a patent stent to the LAD.  The remaining arteries looked okay.  He was released.  His blood sugars in the hospital were never below 142.  The highest was 284.  His A1c recently was 7.8.  His initial white count was normal.  The repeat did show an elevation to 11,000.  His chest x-ray was normal  The following portions of the patient's history were reviewed and updated as appropriate: allergies, current medications, and problem list.    Review of Systems   Respiratory:  Negative for chest tightness and shortness of breath.    Cardiovascular:  Negative for chest pain.       Objective   Physical Exam  Vitals and nursing note reviewed.   Neck:      Vascular: No carotid bruit.   Cardiovascular:      Rate and Rhythm: Normal rate and regular rhythm.      Comments: Blood pressure to my exam is 128/64  Pulmonary:      Effort: Pulmonary effort is normal.      Breath sounds: No wheezing, rhonchi or rales.   Neurological:      Mental Status: He is alert.       Assessment & Plan   Diagnoses and all orders for this visit:    1. Chest pain, unspecified type (Primary)    2. Leukocytosis, unspecified type  -     CBC & Differential    3. Type 2 diabetes mellitus with hyperglycemia, with long-term current use of insulin    Earl is here today for follow-up.  His chest pain sounded cardiac and that it was substernal and radiated to his neck.  We did advise that this could have been vasospasm or possibly esophageal.  We are going to repeat his CBC.  I suspect his  leukocytosis was a stress leukocytosis.  He did have a low blood sugar reaction that may have triggered the chest pain.  We did advise 5 smaller meals versus 3 large meals daily if he is going to be on long-acting insulin

## 2024-01-25 LAB
BASOPHILS # BLD AUTO: 0.1 X10E3/UL (ref 0–0.2)
BASOPHILS NFR BLD AUTO: 1 %
EOSINOPHIL # BLD AUTO: 0.2 X10E3/UL (ref 0–0.4)
EOSINOPHIL NFR BLD AUTO: 3 %
ERYTHROCYTE [DISTWIDTH] IN BLOOD BY AUTOMATED COUNT: 12.7 % (ref 11.6–15.4)
HCT VFR BLD AUTO: 49.6 % (ref 37.5–51)
HGB BLD-MCNC: 16.4 G/DL (ref 13–17.7)
IMM GRANULOCYTES # BLD AUTO: 0 X10E3/UL (ref 0–0.1)
IMM GRANULOCYTES NFR BLD AUTO: 1 %
LYMPHOCYTES # BLD AUTO: 1.4 X10E3/UL (ref 0.7–3.1)
LYMPHOCYTES NFR BLD AUTO: 18 %
MCH RBC QN AUTO: 29.4 PG (ref 26.6–33)
MCHC RBC AUTO-ENTMCNC: 33.1 G/DL (ref 31.5–35.7)
MCV RBC AUTO: 89 FL (ref 79–97)
MONOCYTES # BLD AUTO: 0.7 X10E3/UL (ref 0.1–0.9)
MONOCYTES NFR BLD AUTO: 8 %
NEUTROPHILS # BLD AUTO: 5.7 X10E3/UL (ref 1.4–7)
NEUTROPHILS NFR BLD AUTO: 69 %
PLATELET # BLD AUTO: 259 X10E3/UL (ref 150–450)
RBC # BLD AUTO: 5.58 X10E6/UL (ref 4.14–5.8)
WBC # BLD AUTO: 8.1 X10E3/UL (ref 3.4–10.8)

## 2024-01-29 ENCOUNTER — OFFICE VISIT (OUTPATIENT)
Dept: ENDOCRINOLOGY | Age: 79
End: 2024-01-29
Payer: MEDICARE

## 2024-01-29 VITALS
DIASTOLIC BLOOD PRESSURE: 80 MMHG | SYSTOLIC BLOOD PRESSURE: 130 MMHG | TEMPERATURE: 97.1 F | HEART RATE: 66 BPM | HEIGHT: 72 IN | WEIGHT: 234.2 LBS | OXYGEN SATURATION: 98 % | BODY MASS INDEX: 31.72 KG/M2

## 2024-01-29 DIAGNOSIS — N18.31 HYPERTENSIVE KIDNEY DISEASE WITH STAGE 3A CHRONIC KIDNEY DISEASE: ICD-10-CM

## 2024-01-29 DIAGNOSIS — I12.9 HYPERTENSIVE KIDNEY DISEASE WITH STAGE 3A CHRONIC KIDNEY DISEASE: ICD-10-CM

## 2024-01-29 DIAGNOSIS — Z79.4 TYPE 2 DIABETES MELLITUS WITH HYPERGLYCEMIA, WITH LONG-TERM CURRENT USE OF INSULIN: Primary | ICD-10-CM

## 2024-01-29 DIAGNOSIS — E11.65 TYPE 2 DIABETES MELLITUS WITH HYPERGLYCEMIA, WITH LONG-TERM CURRENT USE OF INSULIN: Primary | ICD-10-CM

## 2024-01-29 DIAGNOSIS — E78.5 DYSLIPIDEMIA: ICD-10-CM

## 2024-01-29 PROCEDURE — 95251 CONT GLUC MNTR ANALYSIS I&R: CPT | Performed by: NURSE PRACTITIONER

## 2024-01-29 PROCEDURE — 99214 OFFICE O/P EST MOD 30 MIN: CPT | Performed by: NURSE PRACTITIONER

## 2024-01-29 RX ORDER — ATORVASTATIN CALCIUM 20 MG/1
20 TABLET, FILM COATED ORAL NIGHTLY
Qty: 90 TABLET | Refills: 1 | Status: SHIPPED | OUTPATIENT
Start: 2024-01-29

## 2024-01-29 RX ORDER — INSULIN GLARGINE 100 [IU]/ML
38 INJECTION, SOLUTION SUBCUTANEOUS DAILY
Qty: 36 ML | Refills: 1 | Status: SHIPPED | OUTPATIENT
Start: 2024-01-29

## 2024-01-29 RX ORDER — LANCETS
EACH MISCELLANEOUS
Qty: 100 EACH | Refills: 5 | Status: SHIPPED | OUTPATIENT
Start: 2024-01-29

## 2024-01-29 RX ORDER — LORATADINE 10 MG
1 TABLET ORAL DAILY
Qty: 400 EACH | Refills: 2 | Status: SHIPPED | OUTPATIENT
Start: 2024-01-29

## 2024-01-29 RX ORDER — FLURBIPROFEN SODIUM 0.3 MG/ML
SOLUTION/ DROPS OPHTHALMIC
Qty: 100 EACH | Refills: 3 | Status: SHIPPED | OUTPATIENT
Start: 2024-01-29

## 2024-01-29 NOTE — PROGRESS NOTES
Chief Complaint  Chief Complaint   Patient presents with    Diabetes     Type 2: Pt Dexcom is attached, is up to date on eye exam, no hx of retinopathy, mild neuropathy.        Subjective          History of Present Illness    Earl Marc 78 y.o. presents for a follow-up evaluation for type 2 DM     He has been diabetic since around 2006     Pt is on the following medications for their DM: Lantus 34 units at bedtime and Tradjenta 5 mg daily       Jardiance stopped by Nephrology due to dizziness  Pioglitazone 45 mg daily was stopped due to edema  Tradjenta 5 mg daily was stopped by Dr. Goodman at 10/21 visit    Pt going to [place] for [place]iness       Pt complains of constipation, numbness and tingling in feet and double vision    Denies diarrhea, chest pain and shortness of breath      Pt does not have a history of DM retinopathy.  Last eye exam was 02/23     Pt does have a history of nephropathy.  Patient is not currently taking ACE/ARB - follows with Nephrology - Dr. Wilbert Gilmore      Pt does have neuropathy.  Current takes   Lyrica 150 mg daily stopped by Nephrology due to dizziness  Lyrica BID caused pt to be sleepy  Pt follows with podiatry - [facility]     Pt does have a history of CAD.  Summer of 2021, pt had an MI and stent placed for total of 4 stents    Last A1C in 01/24 was 7.8    Last microalbumin in 06/23 was negative          Blood Sugars    Blood glucoses are checked via Dexcom.    Fasting blood glucoses: mid 100s- high 100s    Pre-meal blood glucoses:    Pt has no episodes of hypoglycemia.        Sensor Data    Time in range 13%  High 36%  Very high 51%  Low 0%  Very low 0%      Average Glucose - 250 mg/dL      Sensor Wear - 93 %          Hyperlipidemia     Pt is currently taking atorvastatin 20 mg HS    Last lipid panel in 01/24 showed Total 86, HDL 41, LDL 22 and Triglycerides 130            Hypertension with CKD Stage 3a     Pt denies any chest pain, palpitations or headache     Current regimen  "includes metoprolol tartrate 12.5 mg bid              Other History     Hypogonadism managed by  Dr. Newman at [facility]            I have reviewed the patient's allergies, medicines, past medical hx, family hx and social hx.    Objective   Vital Signs:   /80   Pulse 66   Temp 97.1 °F (36.2 °C) (Temporal)   Ht 182.9 cm (72.01\")   Wt 106 kg (234 lb 3.2 oz)   SpO2 98%   BMI 31.76 kg/m²       Physical Exam   Physical Exam  Constitutional:       General: He is not in acute distress.     Appearance: Normal appearance. He is not diaphoretic.   HENT:      Head: Normocephalic and atraumatic.   Eyes:      General:         Right eye: No discharge.         Left eye: No discharge.   Skin:     General: Skin is warm and dry.   Neurological:      Mental Status: He is alert.   Psychiatric:         Mood and Affect: Mood normal.         Behavior: Behavior normal.                    Results Review:   Hemoglobin A1C   Date Value Ref Range Status   01/15/2024 7.80 (H) 4.80 - 5.60 % Final     Comment:     Hemoglobin A1C Ranges:  Increased Risk for Diabetes  5.7% to 6.4%  Diabetes                     >= 6.5%  Diabetic Goal                < 7.0%     05/23/2023 7.80 (H) 4.80 - 5.60 % Final     Total Cholesterol   Date Value Ref Range Status   05/26/2021 75 0 - 200 mg/dL Final     Triglycerides   Date Value Ref Range Status   01/15/2024 130 0 - 150 mg/dL Final   05/26/2021 109 0 - 150 mg/dL Final     HDL Cholesterol   Date Value Ref Range Status   01/15/2024 41 40 - 60 mg/dL Final   05/26/2021 34 (L) 40 - 60 mg/dL Final     LDL Chol Calc (NIH)   Date Value Ref Range Status   01/15/2024 22 0 - 100 mg/dL Final     VLDL Cholesterol José   Date Value Ref Range Status   01/15/2024 23 5 - 40 mg/dL Final     LDL/HDL Ratio   Date Value Ref Range Status   05/26/2021 0.56  Final         Assessment and Plan {CC Problem List  Visit Diagnosis  ROS  Review (Popup)  Health Maintenance  Quality  BestPractice  Medications  " [facility]  SnapShot Encounters  Media :23  Diagnoses and all orders for this visit:    1. Type 2 diabetes mellitus with hyperglycemia, with long-term current use of insulin (Primary)  -     Insulin Glargine (Lantus SoloStar) 100 UNIT/ML injection pen; Inject 38 Units under the skin into the appropriate area as directed Daily.  Dispense: 36 mL; Refill: 1  -     glucose blood test strip; E11.65 Precision Xtra Blood Glucose In Vitro Strip; Patient Sig: Precision Xtra Blood Glucose In Vitro Strip TEST 1  TIMES DAILY  Dispense: 100 each; Refill: 5  -     Alcohol Swabs ([employer] Prep) 70 % pads; Place 1 application  on the skin as directed by provider Daily.  Dispense: 400 each; Refill: 2  -     Monolet Lancets misc; E11.65 Use to test BG 1-2 times daily  Dispense: 100 each; Refill: 5  -     linagliptin (Tradjenta) 5 MG tablet tablet; Take 1 tablet by mouth Daily. By mouth take one tab daily.  Dispense: 90 tablet; Refill: 1  -     B-D UF III MINI PEN NEEDLES 31G X 5 MM misc; Use once daily with Lantus Pen for injection of insulin  Dispense: 100 each; Refill: 3  -     Hemoglobin A1c; Future  -     Comprehensive Metabolic Panel; Future  -     Microalbumin / Creatinine Urine Ratio - Urine, Clean Catch; Future    A1c is 7.8%  Continue with Dexcom  Increase Lantus 38 units at bedtime  Continue with Tradjenta 5 mg daily      2. Dyslipidemia  -     atorvastatin (LIPITOR) 20 MG tablet; Take 1 tablet by mouth Every Night.  Dispense: 90 tablet; Refill: 1  -     Comprehensive Metabolic Panel; Future  -     Lipid Panel; Future    Lipid panel is good.    Continue with statin.       3. Hypertensive kidney disease with stage 3a chronic kidney disease  -     Comprehensive Metabolic Panel; Future     Stable  Continue with current medication regimen  Defer management to PCP/Nephrology          Refills sent to pharmacy      RTC in 3 months with me or with Dr. Dang, labs prior      Follow Up     Patient was given instructions and counseling  regarding her condition or for health maintenance advice. Please see specific information pulled into the AVS if appropriate.              JL Noonan  01/29/24

## 2024-01-31 ENCOUNTER — READMISSION MANAGEMENT (OUTPATIENT)
Dept: CALL CENTER | Facility: HOSPITAL | Age: 79
End: 2024-01-31
Payer: MEDICARE

## 2024-01-31 NOTE — OUTREACH NOTE
Medical Week 2 Survey      Flowsheet Row Responses   [facility] patient discharged from? [facility]   Does the patient have one of the following disease processes/diagnoses(primary or secondary)? Other   Week 2 attempt successful? Yes   Call start time 1253   Discharge diagnosis Chest pain   Call end time 1256   Person spoke with today (if not patient) and relationship Patient   Meds reviewed with patient/caregiver? Yes   Does the patient have all medications ordered at discharge? N/A  [No new meds ordered at discharge]   Is the patient taking all medications as directed (includes completed medication regime)? Yes   Does the patient have a primary care provider?  Yes   Comments regarding PCP Patient reports that he has had follow up with Dr Velazquez.   Has the patient kept scheduled appointments due by today? Yes   Has home health visited the patient within 72 hours of discharge? N/A   Psychosocial issues? No   Did the patient receive a copy of their discharge instructions? Yes   Nursing interventions Reviewed instructions with patient   What is the patient's perception of their health status since discharge? Improving  [Denies any further chest pain and reports heart cath site has healed fine.]   Is the patient/caregiver able to teach back signs and symptoms related to disease process for when to call PCP? Yes   Is the patient/caregiver able to teach back signs and symptoms related to disease process for when to call 911? Yes   Is the patient/caregiver able to teach back the hierarchy of who to call/visit for symptoms/problems? PCP, Specialist, Home health nurse, Urgent Care, ED, 911 Yes   If the patient is a current smoker, are they able to teach back resources for cessation? Not a smoker   Week 2 Call Completed? Yes   Graduated Yes   Is the patient interested in additional calls from an ambulatory ? No   Would this patient benefit from a Referral to Amb Social Work? No   Graduated/Revoked  comments Patient reports doing well. Denies any questions or needs at this time.   Call end time [id number]            MIKKI DELGADO - Registered Nurse

## 2024-02-20 ENCOUNTER — APPOINTMENT (OUTPATIENT)
Dept: CT IMAGING | Facility: HOSPITAL | Age: 79
End: 2024-02-20
Payer: MEDICARE

## 2024-02-20 ENCOUNTER — HOSPITAL ENCOUNTER (OUTPATIENT)
Facility: HOSPITAL | Age: 79
Setting detail: OBSERVATION
Discharge: HOME | End: 2024-02-21
Attending: EMERGENCY MEDICINE | Admitting: EMERGENCY MEDICINE
Payer: MEDICARE

## 2024-02-20 ENCOUNTER — APPOINTMENT (OUTPATIENT)
Dept: MRI IMAGING | Facility: HOSPITAL | Age: 79
End: 2024-02-20
Payer: MEDICARE

## 2024-02-20 DIAGNOSIS — I10 ESSENTIAL HYPERTENSION: ICD-10-CM

## 2024-02-20 DIAGNOSIS — E78.5 DYSLIPIDEMIA: ICD-10-CM

## 2024-02-20 DIAGNOSIS — E11.42 DIABETIC PERIPHERAL NEUROPATHY: ICD-10-CM

## 2024-02-20 DIAGNOSIS — E11.9 TYPE 2 DIABETES MELLITUS WITHOUT COMPLICATION, WITHOUT LONG-TERM CURRENT USE OF INSULIN: ICD-10-CM

## 2024-02-20 DIAGNOSIS — E79.0 HYPERURICEMIA: ICD-10-CM

## 2024-02-20 DIAGNOSIS — E29.1 HYPOGONADISM IN MALE: ICD-10-CM

## 2024-02-20 DIAGNOSIS — R51.9 GENERALIZED HEADACHE: ICD-10-CM

## 2024-02-20 DIAGNOSIS — Z79.4 TYPE 2 DIABETES MELLITUS WITH HYPERGLYCEMIA, WITH LONG-TERM CURRENT USE OF INSULIN: ICD-10-CM

## 2024-02-20 DIAGNOSIS — E55.9 VITAMIN D DEFICIENCY: ICD-10-CM

## 2024-02-20 DIAGNOSIS — G45.9 TIA (TRANSIENT ISCHEMIC ATTACK): Primary | ICD-10-CM

## 2024-02-20 DIAGNOSIS — E11.65 TYPE 2 DIABETES MELLITUS WITH HYPERGLYCEMIA, WITH LONG-TERM CURRENT USE OF INSULIN: ICD-10-CM

## 2024-02-20 PROBLEM — R29.90 STROKE-LIKE SYMPTOMS: Status: ACTIVE | Noted: 2024-02-20

## 2024-02-20 LAB
ALBUMIN SERPL-MCNC: 4.2 G/DL (ref 3.5–5.2)
ALBUMIN/GLOB SERPL: 1.5 G/DL
ALP SERPL-CCNC: 79 U/L (ref 39–117)
ALT SERPL W P-5'-P-CCNC: 23 U/L (ref 1–41)
ANION GAP SERPL CALCULATED.3IONS-SCNC: 12.4 MMOL/L (ref 5–15)
AST SERPL-CCNC: 17 U/L (ref 1–40)
BASOPHILS # BLD AUTO: 0.05 10*3/MM3 (ref 0–0.2)
BASOPHILS NFR BLD AUTO: 0.7 % (ref 0–1.5)
BILIRUB SERPL-MCNC: 0.6 MG/DL (ref 0–1.2)
BUN SERPL-MCNC: 14 MG/DL (ref 8–23)
BUN/CREAT SERPL: 10.4 (ref 7–25)
CALCIUM SPEC-SCNC: 9.5 MG/DL (ref 8.6–10.5)
CHLORIDE SERPL-SCNC: 102 MMOL/L (ref 98–107)
CHOLEST SERPL-MCNC: 79 MG/DL (ref 0–200)
CO2 SERPL-SCNC: 21.6 MMOL/L (ref 22–29)
CREAT SERPL-MCNC: 1.34 MG/DL (ref 0.76–1.27)
DEPRECATED RDW RBC AUTO: 38.2 FL (ref 37–54)
EGFRCR SERPLBLD CKD-EPI 2021: 54.2 ML/MIN/1.73
EOSINOPHIL # BLD AUTO: 0.32 10*3/MM3 (ref 0–0.4)
EOSINOPHIL NFR BLD AUTO: 4.4 % (ref 0.3–6.2)
ERYTHROCYTE [DISTWIDTH] IN BLOOD BY AUTOMATED COUNT: 12.3 % (ref 12.3–15.4)
ERYTHROCYTE [SEDIMENTATION RATE] IN BLOOD: 13 MM/HR (ref 0–20)
GLOBULIN UR ELPH-MCNC: 2.8 GM/DL
GLUCOSE SERPL-MCNC: 243 MG/DL (ref 65–99)
HBA1C MFR BLD: 8.8 % (ref 4.8–5.6)
HCT VFR BLD AUTO: 51 % (ref 37.5–51)
HDLC SERPL-MCNC: 39 MG/DL (ref 40–60)
HGB BLD-MCNC: 17 G/DL (ref 13–17.7)
IMM GRANULOCYTES # BLD AUTO: 0.03 10*3/MM3 (ref 0–0.05)
IMM GRANULOCYTES NFR BLD AUTO: 0.4 % (ref 0–0.5)
LDLC SERPL CALC-MCNC: 17 MG/DL (ref 0–100)
LDLC/HDLC SERPL: 0.36 {RATIO}
LYMPHOCYTES # BLD AUTO: 1.57 10*3/MM3 (ref 0.7–3.1)
LYMPHOCYTES NFR BLD AUTO: 21.7 % (ref 19.6–45.3)
MCH RBC QN AUTO: 28.8 PG (ref 26.6–33)
MCHC RBC AUTO-ENTMCNC: 33.3 G/DL (ref 31.5–35.7)
MCV RBC AUTO: 86.3 FL (ref 79–97)
MONOCYTES # BLD AUTO: 0.6 10*3/MM3 (ref 0.1–0.9)
MONOCYTES NFR BLD AUTO: 8.3 % (ref 5–12)
NEUTROPHILS NFR BLD AUTO: 4.65 10*3/MM3 (ref 1.7–7)
NEUTROPHILS NFR BLD AUTO: 64.5 % (ref 42.7–76)
NRBC BLD AUTO-RTO: 0 /100 WBC (ref 0–0.2)
PLATELET # BLD AUTO: 206 10*3/MM3 (ref 140–450)
PMV BLD AUTO: 9.9 FL (ref 6–12)
POTASSIUM SERPL-SCNC: 4.2 MMOL/L (ref 3.5–5.2)
PROT SERPL-MCNC: 7 G/DL (ref 6–8.5)
QT INTERVAL: 356 MS
QTC INTERVAL: 379 MS
RBC # BLD AUTO: 5.91 10*6/MM3 (ref 4.14–5.8)
SODIUM SERPL-SCNC: 136 MMOL/L (ref 136–145)
TRIGL SERPL-MCNC: 130 MG/DL (ref 0–150)
TSH SERPL DL<=0.05 MIU/L-ACNC: 2.17 UIU/ML (ref 0.27–4.2)
VLDLC SERPL-MCNC: 23 MG/DL (ref 5–40)
WBC NRBC COR # BLD AUTO: 7.22 10*3/MM3 (ref 3.4–10.8)

## 2024-02-20 PROCEDURE — G0378 HOSPITAL OBSERVATION PER HR: HCPCS

## 2024-02-20 PROCEDURE — 70498 CT ANGIOGRAPHY NECK: CPT

## 2024-02-20 PROCEDURE — 80061 LIPID PANEL: CPT | Performed by: STUDENT IN AN ORGANIZED HEALTH CARE EDUCATION/TRAINING PROGRAM

## 2024-02-20 PROCEDURE — 96361 HYDRATE IV INFUSION ADD-ON: CPT

## 2024-02-20 PROCEDURE — 25010000002 PROCHLORPERAZINE 10 MG/2ML SOLUTION: Performed by: EMERGENCY MEDICINE

## 2024-02-20 PROCEDURE — 80053 COMPREHEN METABOLIC PANEL: CPT | Performed by: EMERGENCY MEDICINE

## 2024-02-20 PROCEDURE — 85652 RBC SED RATE AUTOMATED: CPT | Performed by: EMERGENCY MEDICINE

## 2024-02-20 PROCEDURE — 99285 EMERGENCY DEPT VISIT HI MDM: CPT

## 2024-02-20 PROCEDURE — 99204 OFFICE O/P NEW MOD 45 MIN: CPT | Performed by: PSYCHIATRY & NEUROLOGY

## 2024-02-20 PROCEDURE — 25010000002 ONDANSETRON PER 1 MG: Performed by: EMERGENCY MEDICINE

## 2024-02-20 PROCEDURE — 84443 ASSAY THYROID STIM HORMONE: CPT | Performed by: STUDENT IN AN ORGANIZED HEALTH CARE EDUCATION/TRAINING PROGRAM

## 2024-02-20 PROCEDURE — 70496 CT ANGIOGRAPHY HEAD: CPT

## 2024-02-20 PROCEDURE — 25010000002 HYDROMORPHONE PER 4 MG: Performed by: EMERGENCY MEDICINE

## 2024-02-20 PROCEDURE — 83036 HEMOGLOBIN GLYCOSYLATED A1C: CPT | Performed by: STUDENT IN AN ORGANIZED HEALTH CARE EDUCATION/TRAINING PROGRAM

## 2024-02-20 PROCEDURE — 99284 EMERGENCY DEPT VISIT MOD MDM: CPT

## 2024-02-20 PROCEDURE — 85025 COMPLETE CBC W/AUTO DIFF WBC: CPT | Performed by: EMERGENCY MEDICINE

## 2024-02-20 PROCEDURE — 25810000003 SODIUM CHLORIDE 0.9 % SOLUTION: Performed by: STUDENT IN AN ORGANIZED HEALTH CARE EDUCATION/TRAINING PROGRAM

## 2024-02-20 PROCEDURE — 25510000001 IOPAMIDOL PER 1 ML: Performed by: EMERGENCY MEDICINE

## 2024-02-20 PROCEDURE — 96375 TX/PRO/DX INJ NEW DRUG ADDON: CPT

## 2024-02-20 PROCEDURE — 93005 ELECTROCARDIOGRAM TRACING: CPT | Performed by: EMERGENCY MEDICINE

## 2024-02-20 PROCEDURE — 70551 MRI BRAIN STEM W/O DYE: CPT

## 2024-02-20 PROCEDURE — 25010000002 DIPHENHYDRAMINE PER 50 MG: Performed by: EMERGENCY MEDICINE

## 2024-02-20 PROCEDURE — 96374 THER/PROPH/DIAG INJ IV PUSH: CPT

## 2024-02-20 PROCEDURE — 93010 ELECTROCARDIOGRAM REPORT: CPT | Performed by: INTERNAL MEDICINE

## 2024-02-20 RX ORDER — HYDROMORPHONE HYDROCHLORIDE 1 MG/ML
0.5 INJECTION, SOLUTION INTRAMUSCULAR; INTRAVENOUS; SUBCUTANEOUS ONCE
Status: COMPLETED | OUTPATIENT
Start: 2024-02-20 | End: 2024-02-20

## 2024-02-20 RX ORDER — NIACIN 500 MG
500 TABLET ORAL AS NEEDED
COMMUNITY

## 2024-02-20 RX ORDER — ONDANSETRON 2 MG/ML
4 INJECTION INTRAMUSCULAR; INTRAVENOUS EVERY 6 HOURS PRN
Status: DISCONTINUED | OUTPATIENT
Start: 2024-02-20 | End: 2024-02-21 | Stop reason: HOSPADM

## 2024-02-20 RX ORDER — ONDANSETRON 2 MG/ML
4 INJECTION INTRAMUSCULAR; INTRAVENOUS ONCE
Status: COMPLETED | OUTPATIENT
Start: 2024-02-20 | End: 2024-02-20

## 2024-02-20 RX ORDER — SODIUM CHLORIDE 0.9 % (FLUSH) 0.9 %
10 SYRINGE (ML) INJECTION EVERY 12 HOURS SCHEDULED
Status: DISCONTINUED | OUTPATIENT
Start: 2024-02-20 | End: 2024-02-21 | Stop reason: HOSPADM

## 2024-02-20 RX ORDER — DIPHENHYDRAMINE HYDROCHLORIDE 50 MG/ML
25 INJECTION INTRAMUSCULAR; INTRAVENOUS ONCE
Status: COMPLETED | OUTPATIENT
Start: 2024-02-20 | End: 2024-02-20

## 2024-02-20 RX ORDER — SODIUM CHLORIDE 0.9 % (FLUSH) 0.9 %
10 SYRINGE (ML) INJECTION AS NEEDED
Status: DISCONTINUED | OUTPATIENT
Start: 2024-02-20 | End: 2024-02-21 | Stop reason: HOSPADM

## 2024-02-20 RX ORDER — ATORVASTATIN CALCIUM 20 MG/1
20 TABLET, FILM COATED ORAL NIGHTLY
Status: DISCONTINUED | OUTPATIENT
Start: 2024-02-20 | End: 2024-02-21 | Stop reason: HOSPADM

## 2024-02-20 RX ORDER — VITAMIN E 268 MG
400 CAPSULE ORAL DAILY
COMMUNITY

## 2024-02-20 RX ORDER — VENLAFAXINE HYDROCHLORIDE 37.5 MG/1
37.5 CAPSULE, EXTENDED RELEASE ORAL DAILY
Status: DISCONTINUED | OUTPATIENT
Start: 2024-02-20 | End: 2024-02-21 | Stop reason: HOSPADM

## 2024-02-20 RX ORDER — LATANOPROST 50 UG/ML
1 SOLUTION/ DROPS OPHTHALMIC NIGHTLY
COMMUNITY

## 2024-02-20 RX ORDER — PROCHLORPERAZINE EDISYLATE 5 MG/ML
10 INJECTION INTRAMUSCULAR; INTRAVENOUS ONCE
Status: COMPLETED | OUTPATIENT
Start: 2024-02-20 | End: 2024-02-20

## 2024-02-20 RX ORDER — LUTEIN 6 MG
1 TABLET ORAL DAILY
COMMUNITY

## 2024-02-20 RX ORDER — ONDANSETRON 4 MG/1
4 TABLET, ORALLY DISINTEGRATING ORAL EVERY 6 HOURS PRN
Status: DISCONTINUED | OUTPATIENT
Start: 2024-02-20 | End: 2024-02-21 | Stop reason: HOSPADM

## 2024-02-20 RX ORDER — SODIUM CHLORIDE 9 MG/ML
75 INJECTION, SOLUTION INTRAVENOUS CONTINUOUS
Status: DISCONTINUED | OUTPATIENT
Start: 2024-02-20 | End: 2024-02-21 | Stop reason: HOSPADM

## 2024-02-20 RX ORDER — NITROGLYCERIN 0.4 MG/1
0.4 TABLET SUBLINGUAL
Status: DISCONTINUED | OUTPATIENT
Start: 2024-02-20 | End: 2024-02-21 | Stop reason: HOSPADM

## 2024-02-20 RX ORDER — PANTOPRAZOLE SODIUM 20 MG/1
20 TABLET, DELAYED RELEASE ORAL DAILY
Status: DISCONTINUED | OUTPATIENT
Start: 2024-02-21 | End: 2024-02-21

## 2024-02-20 RX ORDER — ASPIRIN 81 MG/1
81 TABLET ORAL DAILY
Status: DISCONTINUED | OUTPATIENT
Start: 2024-02-21 | End: 2024-02-21 | Stop reason: HOSPADM

## 2024-02-20 RX ORDER — ACETAMINOPHEN 325 MG/1
650 TABLET ORAL EVERY 4 HOURS PRN
Status: DISCONTINUED | OUTPATIENT
Start: 2024-02-20 | End: 2024-02-21 | Stop reason: HOSPADM

## 2024-02-20 RX ORDER — SODIUM CHLORIDE 9 MG/ML
40 INJECTION, SOLUTION INTRAVENOUS AS NEEDED
Status: DISCONTINUED | OUTPATIENT
Start: 2024-02-20 | End: 2024-02-21 | Stop reason: HOSPADM

## 2024-02-20 RX ORDER — METOPROLOL SUCCINATE 25 MG/1
12.5 TABLET, EXTENDED RELEASE ORAL DAILY
Status: DISCONTINUED | OUTPATIENT
Start: 2024-02-21 | End: 2024-02-21 | Stop reason: HOSPADM

## 2024-02-20 RX ADMIN — DIPHENHYDRAMINE HYDROCHLORIDE 25 MG: 50 INJECTION, SOLUTION INTRAMUSCULAR; INTRAVENOUS at 21:55

## 2024-02-20 RX ADMIN — LINAGLIPTIN 5 MG: 5 TABLET, FILM COATED ORAL at 20:20

## 2024-02-20 RX ADMIN — ACETAMINOPHEN 325MG 650 MG: 325 TABLET ORAL at 20:25

## 2024-02-20 RX ADMIN — HYDROMORPHONE HYDROCHLORIDE 0.5 MG: 1 INJECTION, SOLUTION INTRAMUSCULAR; INTRAVENOUS; SUBCUTANEOUS at 13:07

## 2024-02-20 RX ADMIN — SODIUM CHLORIDE 75 ML/HR: 9 INJECTION, SOLUTION INTRAVENOUS at 20:07

## 2024-02-20 RX ADMIN — Medication 10 ML: at 20:09

## 2024-02-20 RX ADMIN — IOPAMIDOL 95 ML: 755 INJECTION, SOLUTION INTRAVENOUS at 12:34

## 2024-02-20 RX ADMIN — PROCHLORPERAZINE EDISYLATE 10 MG: 5 INJECTION INTRAMUSCULAR; INTRAVENOUS at 21:55

## 2024-02-20 RX ADMIN — VENLAFAXINE HYDROCHLORIDE 37.5 MG: 37.5 CAPSULE, EXTENDED RELEASE ORAL at 20:21

## 2024-02-20 RX ADMIN — ONDANSETRON 4 MG: 2 INJECTION INTRAMUSCULAR; INTRAVENOUS at 13:05

## 2024-02-20 RX ADMIN — ATORVASTATIN CALCIUM 20 MG: 20 TABLET, FILM COATED ORAL at 20:20

## 2024-02-20 NOTE — PROGRESS NOTES
Clinical Pharmacy Services: Medication History    Earl Marc is a 78 y.o. male presenting to [facility] for   Chief Complaint   Patient presents with    Neuro Deficit(s)       He  has a past medical history of Abnormal cardiovascular stress test, Acid reflux, Arthritis, Asthma, Cancer, Chronic kidney disease, Colon polyp, Congenital heart disease, COPD (chronic obstructive pulmonary disease) (2021), Coronary artery disease, Diabetes mellitus, Diabetic neuropathy associated with type 2 diabetes mellitus, Diabetic peripheral neuropathy (03/10/2016), Dyslipidemia, Erectile dysfunction, Fatty liver disease, nonalcoholic, Gastritis, Glaucoma, Hyperlipidemia, Hypertension, Hypogonadism male, Infectious viral hepatitis (Hep A July 1959), Migraines (09/2023), Myocardial infarction (2018), Trigger point of left shoulder region (03/02/2023), and Type 2 diabetes mellitus.    Allergies as of 02/20/2024 - Reviewed 02/20/2024   Allergen Reaction Noted    Ace inhibitors Other (See Comments) 01/29/2001    Hydrogenated palm oil glycerides Unknown - Low Severity 05/25/2021    Lanolin Unknown - Low Severity 05/25/2021    Other  03/10/2016    Prazosin  07/01/2015    Nsaids Other (See Comments) 11/07/2019       Medication information was obtained from: Patient   Pharmacy and Phone Number:     Prior to Admission Medications       Prescriptions Last Dose Informant Patient Reported? Taking?    AmLactin 12 % lotion  Self, Pharmacy No Yes    Apply  topically to the appropriate area as directed As Needed for Dry Skin.    Patient taking differently:  Apply 1 Application topically to the appropriate area as directed As Needed for Dry Skin.    ascorbic acid (VITAMIN C) 1000 MG tablet  Self Yes Yes    Take 2 tablets by mouth Daily.    aspirin 81 MG EC tablet  Self No Yes    Take 1 tablet by mouth Daily.    atorvastatin (LIPITOR) 20 MG tablet  Self No Yes    Take 1 tablet by mouth Every Night.    Calcium Carbonate (CALCIUM 600  PO)  Self Yes Yes    Take 1 tablet by mouth Daily.    ciclopirox (LOPROX) 1 % shampoo  Self Yes Yes    Apply 1 Application topically to the appropriate area as directed 3 (Three) Times a Week.    Cyanocobalamin (Vitamin B-12) 1000 MCG sublingual tablet  Self Yes Yes    Place 1 tablet under the tongue Daily.    Dextrose, Diabetic Use, (Glucose) 1 g chewable tablet  Self Yes Yes    Chew 1 tablet As Needed.    fluticasone (FLONASE) 50 MCG/ACT nasal spray  Self Yes Yes    1 spray into the nostril(s) as directed by provider Daily As Needed for Allergies.    hydrocortisone 2.5 % ointment  Self No Yes    Apply  topically to the appropriate area as directed 2 (Two) Times a Day.    Patient taking differently:  Apply 1 Application topically to the appropriate area as directed 2 (Two) Times a Day.    Insulin Glargine (Lantus SoloStar) 100 UNIT/ML injection pen  Self No Yes    Inject 38 Units under the skin into the appropriate area as directed Daily.    latanoprost (XALATAN) 0.005 % ophthalmic solution  Self Yes Yes    Administer 1 drop to both eyes Every Night.    linagliptin (Tradjenta) 5 MG tablet tablet  Self No Yes    Take 1 tablet by mouth Daily. By mouth take one tab daily.    Patient taking differently:  Take 1 tablet by mouth Daily.    Lutein 20 MG tablet  Self Yes Yes    Take 1 tablet by mouth Daily.    Magnesium 250 MG tablet  Self Yes Yes    Take 1 tablet by mouth Every Night.    Melatonin 12 MG tablet  Self Yes Yes    Take 1 tablet by mouth At Night As Needed.    metoprolol succinate XL (TOPROL-XL) 25 MG 24 hr tablet  Self No Yes    Take 0.5 tablets by mouth Daily.    Multiple Vitamins-Minerals (ZINC PO)  Self Yes Yes    Take 50 mg by mouth Daily.    mupirocin (BACTROBAN) 2 % ointment  Self Yes Yes    Apply 1 Application topically to the appropriate area as directed 2 (Two) Times a Day.    niacin 500 MG tablet  Self Yes Yes    Take 1 tablet by mouth As Needed.    NON FORMULARY  Self Yes Yes    Ketamin  4%/Gabapentin 6%/clonidine 0.2%/nifedipine 2%  1-2 PUMPS to affected area 3-4 times daily.    NON FORMULARY  Self Yes Yes    Fluconazole/terbinafine/Ibuprofen/DMSO 2/3/3/50% with vitamin D 82277 Iu/100ml   1 Application to toe nails 2 times daily    pantoprazole (PROTONIX) 20 MG EC tablet  Pharmacy No Yes    Take 1 tablet by mouth Daily.    Testosterone Cypionate (DEPOTESTOTERONE CYPIONATE) 200 MG/ML injection  Self Yes Yes    Inject 1 mL into the appropriate muscle as directed by prescriber Every 14 (Fourteen) Days.    venlafaxine XR (EFFEXOR-XR) 37.5 MG 24 hr capsule  Self Yes Yes    Take 1 capsule by mouth Daily.    VIAGRA 100 MG tablet  Self No Yes    Take 1 tablet by mouth As Needed for erectile dysfunction (1 tablet prior to planned intercourse.).    VITAMIN E 400 UNIT capsule  Self Yes Yes    Take 1 capsule by mouth Daily.    nitroglycerin (Nitrostat) 0.4 MG SL tablet  Pharmacy No No    Place 1 tablet under the tongue Every 5 (Five) Minutes As Needed for Chest Pain. Indications: Acute Angina Pectoris              Medication notes:     This medication list is complete to the best of my knowledge as of 2/20/2024    Please call if questions.    Edison Moore  Medication History Technician  [phone]    2/20/2024 14:50 EST

## 2024-02-20 NOTE — ED NOTES
Patient to ER via car from home for AMS and headache last night  Patient reports he couldn't read and nothing made sense    Patient reports he went to bed and woke up with headache this morning   Patient reports he had blurry vision, dizziness, and generalized leg weakness yesterday    Patient a&o x 4 at time of triage no s/s of stroke

## 2024-02-20 NOTE — CONSULTS
Neurology Consult Note    Consult Date: 2/20/2024    Referring MD: Angel Loredo II*    Reason for Consult I have been asked to see the patient in neurological consultation to render advice and opinion regarding confusion, headache    Earl Marc is a 78 y.o. male with history of COPD, diabetes with diabetic peripheral neuropathy, hypertension, hyperlipidemia, no prior history of stroke.  He reports sudden onset of a confusional state yesterday.  He was driving home with his wife from a late lunch.  He suddenly had difficulty speaking and felt disoriented.  His wife initially thought he was joking with her.  He did not have any associated facial droop or unilateral weakness or numbness.  No difficulty walking.  Symptoms lasted about 30 to 45 minutes and then resolved with a moderate to severe headache from which she decided to lay down.  Today he feels back to his baseline.  No recurrent spells so far.    No personal or family history of migraine.    Past Medical History:   Diagnosis Date    Abnormal cardiovascular stress test     Acid reflux     Arthritis     Asthma     Cancer     skin     Chronic kidney disease     Colon polyp     Congenital heart disease     COPD (chronic obstructive pulmonary disease) 2021    Old age COPD/2D hand Smoke emphysema    Coronary artery disease     Diabetes mellitus     Diabetic neuropathy associated with type 2 diabetes mellitus     Diabetic peripheral neuropathy 03/10/2016    Dyslipidemia     Erectile dysfunction     Fatty liver disease, nonalcoholic     Gastritis     Glaucoma     both eyes    Hyperlipidemia     Hypertension     Hypogonadism male     Infectious viral hepatitis Hep A July 1959    Drank water from broken water line and Grandparents house.    Migraines 09/2023    Myocardial infarction 2018    Trigger point of left shoulder region 03/02/2023    Type 2 diabetes mellitus        Exam  BP (!) 189/89 (BP Location: Right arm, Patient Position: Lying)   Pulse 66    "Temp 98.1 °F (36.7 °C) (Oral)   Resp 16   Ht 185.4 cm (73\")   Wt 101 kg (222 lb)   SpO2 93%   BMI 29.29 kg/m²   Gen: NAD, vitals reviewed  MS: oriented x3, recent/remote memory intact, normal attention/concentration, language intact, no neglect.  CN: Visual fields full, visual acuity grossly normal, PERRL, EOMI, no facial droop, no dysarthria  Motor: 5/5 throughout upper and lower extremities, normal tone.  Left upper extremity postural and resting tremor, aborts with distraction  Coordination: No dysmetria or overshoot    DATA:    Lab Results   Component Value Date    GLUCOSE 243 (H) 02/20/2024    CALCIUM 9.5 02/20/2024     02/20/2024    K 4.2 02/20/2024    CO2 21.6 (L) 02/20/2024     02/20/2024    BUN 14 02/20/2024    CREATININE 1.34 (H) 02/20/2024    EGFRIFAFRI 63 10/06/2021    EGFRIFNONA 52 (L) 10/06/2021    BCR 10.4 02/20/2024    ANIONGAP 12.4 02/20/2024     Lab Results   Component Value Date    WBC 7.22 02/20/2024    HGB 17.0 02/20/2024    HCT 51.0 02/20/2024    MCV 86.3 02/20/2024     02/20/2024       Lab review: Creatinine 1.34, glucose 243, CBC normal    Imaging review: I personally reviewed his CT and CTA head and neck performed in the emergency department.  CT head shows moderate white matter disease.  CTA shows right greater than left P1 segment narrowing, 30% right carotid stenosis, no significant left carotid stenosis.  Radiology report reviewed.    Diagnoses:  Word finding difficulty  New onset headache  Insulin-dependent diabetes with diabetic peripheral neuropathy  Essential hypertension  Coronary artery disease  Functional tremor    Pre-stroke MRS: 0  NIHSS: 0    Comment: Symptom description is overall most consistent with migraine and migrainous aphasia.  Obviously in a 78-year-old man this is a diagnosis of exclusion. Initial CTA looks reassuring. I would recommend observation overnight for brain MRI    PLAN:  ASA  Statin  MRI brain    Management discussed with  " Ted

## 2024-02-20 NOTE — H&P
[facility]   HISTORY AND PHYSICAL    Patient Name: Earl Marc  : 1945  MRN: [id number]  Primary Care Physician:  Avery Velazquez MD  Date of admission: 2024    Subjective   Subjective     Chief Complaint:   Chief Complaint   Patient presents with    Neuro Deficit(s)         HPI:    Earl Marc is a 78 y.o. male with a history of coronary artery disease, COPD, type 2 diabetes, hypertension, hyperlipidemia who presents to [facility] ER with confusion difficulty with speech and headache.  Patient states yesterday he had gotten home after a late lunch around 2:00 when he started to have some sudden onset disorientation.  He states he felt very confused and did not understand what was going on.  He states he tried to look at his phone and was not able to understand the letters on the phone and also states that he thought of the word [place] but then was unable to comprehend what [place] meant. States about 30 minutes after he started develop a left-sided headache around the temporal area and then decided to lay down to take a nap.  He states he woke up about an hour and a half later and did feel better.  He states this morning upon awakening he did not have any further confusion or difficulty with his speech but his headache was worse again on the left temporal area.  He reports he is got neuropathy in his bilateral feet but otherwise no new numbness or tingling.  He denies any slurred speech or difficulty with swallowing.  He denies any blurred or double vision.  He reports that he had to has had some issues mainly with his left leg due to some arthritis and bursitis but otherwise has not had any difficulty walking or moving the extremities.  Denies chest pain shortness of breath.  Denies abdominal pain.  Denies fevers and chills.    Review of Systems   All systems were reviewed and negative except for: what is mentioned above in the HPI.     Personal History      Past Medical History:   Diagnosis Date    Abnormal cardiovascular stress test     Acid reflux     Arthritis     Asthma     Cancer     skin     Chronic kidney disease     Colon polyp     Congenital heart disease     COPD (chronic obstructive pulmonary disease) 2021    Old age COPD/2D hand Smoke emphysema    Coronary artery disease     Diabetes mellitus     Diabetic neuropathy associated with type 2 diabetes mellitus     Diabetic peripheral neuropathy 03/10/2016    Dyslipidemia     Erectile dysfunction     Fatty liver disease, nonalcoholic     Gastritis     Glaucoma     both eyes    Hyperlipidemia     Hypertension     Hypogonadism male     Infectious viral hepatitis Hep A July 1959    Drank water from broken water line and Grandparents house.    Migraines 09/2023    Myocardial infarction 2018    Trigger point of left shoulder region 03/02/2023    Type 2 diabetes mellitus        Past Surgical History:   Procedure Laterality Date    CARDIAC CATHETERIZATION N/A 03/25/2016    Procedure: Left Heart Cath;  Surgeon: Maria E Gilbert MD;  [facility];  Service:     CARDIAC CATHETERIZATION N/A 03/25/2016    Procedure: Coronary angiography;  Surgeon: Maria E Gilbert MD;  [facility];  Service:     CARDIAC CATHETERIZATION N/A 03/28/2016    Procedure: Coronary angiography;  Surgeon: Maria E Gilbert MD;  [facility];  Service:     CARDIAC CATHETERIZATION N/A 03/28/2016    Procedure: Stent MOISE coronary;  Surgeon: Maria E Gilbert MD;  [facility];  Service:     CARDIAC CATHETERIZATION N/A 10/18/2018    Procedure: Coronary angiography  no LV gram d/t CKD;  Surgeon: Maria E Gilbert MD;  [facility];  Service: Cardiology    CARDIAC CATHETERIZATION N/A 10/18/2018    Procedure: Left Heart Cath;  Surgeon: Maria E Gilbert MD;  [facility]  [facility];  Service: Cardiology    CARDIAC CATHETERIZATION N/A 10/18/2018    Procedure: Stent MOISE coronary;  Surgeon: Maria E Gilbert MD;  Location:  [facility] CATH INVASIVE LOCATION;  Service: Cardiology    CARDIAC CATHETERIZATION N/A 05/28/2021    Procedure: Coronary angiography;  Surgeon: Maria E Gilbert MD;  Location:  [facility] CATH INVASIVE LOCATION;  Service: Cardiovascular;  Laterality: N/A;    CARDIAC CATHETERIZATION N/A 05/28/2021    Procedure: Left Heart Cath;  Surgeon: Maria E Gilbert MD;  Location:  [facility] CATH INVASIVE LOCATION;  Service: Cardiovascular;  Laterality: N/A;    CARDIAC CATHETERIZATION N/A 05/28/2021    Procedure: Left ventriculography;  Surgeon: Maria E Gilbert MD;  Location:  [facility] CATH INVASIVE LOCATION;  Service: Cardiovascular;  Laterality: N/A;    CARDIAC CATHETERIZATION N/A 05/28/2021    Procedure: Stent MOISE coronary;  Surgeon: Maria E Gilbert MD;  [facility];  Service: Cardiovascular;  Laterality: N/A;    CARDIAC CATHETERIZATION N/A 1/19/2024    Procedure: Left Heart Cath;  Surgeon: Maria E Gilbert MD;  [facility];  Service: Cardiovascular;  Laterality: N/A;    CARDIAC CATHETERIZATION N/A 1/19/2024    Procedure: Coronary angiography;  Surgeon: Maria E Gilbert MD;  [facility];  Service: Cardiovascular;  Laterality: N/A;    CARDIAC CATHETERIZATION N/A 1/19/2024    Procedure: Left ventriculography;  Surgeon: Maria E Gilbert MD;  [facility];  Service: Cardiovascular;  Laterality: N/A;    CAROTID STENT      CORONARY ANGIOPLASTY      CORONARY STENT PLACEMENT  3/28/2016    EYE SURGERY  9/2017    NECK EXPLORATION N/A     MI RT/LT HEART CATHETERS N/A 10/18/2018    Procedure: Percutaneous Coronary Intervention;  Surgeon: Maria E Gilbert MD;  [facility];  Service: Cardiology       Family History:  family history includes Breast cancer in his daughter; COPD in his father; Cancer in his brother and maternal grandmother; Depression in his father; Diabetes in his brother; Early death in his mother; Heart attack in his brother and mother; Heart disease in his brother, brother, brother, and mother; Heart failure in his mother; Hyperlipidemia in his brother; Hypertension in his brother, brother, brother, and mother; Kidney disease in his brother; Lupus in his mother; Miscarriages / Stillbirths in his mother; Stroke in his maternal grandmother; Thyroid disease in his mother. Otherwise pertinent FHx was reviewed and not pertinent to current issue.    Social History:  reports that he has never smoked. He has been exposed to tobacco smoke. He has never used smokeless tobacco. He reports that he does not drink alcohol and does not use drugs.    Home Medications:  Calcium Carbonate, Glucose, Insulin Glargine, Lutein, Magnesium, Melatonin, Multiple Vitamins-Minerals, NON FORMULARY, Testosterone Cypionate, Vitamin B-12, ammonium lactate, ascorbic acid, aspirin, atorvastatin, ciclopirox, fluticasone, hydrocortisone, latanoprost, linagliptin, metoprolol succinate XL, mupirocin, niacin, nitroglycerin, pantoprazole, sildenafil, venlafaxine XR, and vitamin E    Allergies:  Allergies   Allergen Reactions    Ace Inhibitors Other (See Comments)     Other reaction(s): Cough  Other reaction(s): Cough      Hydrogenated Palm Oil Glycerides Unknown - Low Severity    Lanolin Unknown - Low Severity    Other      Lotions- anything with palm oil or lanolin per pt (verified 6/15/16 1910)    Prazosin     Nsaids Other (See Comments)     CKD Stage 3       Objective   Objective     Vitals:   Temp:  [97.1 °F (36.2 °C)] 97.1 °F (36.2 °C)  Heart Rate:  [64-85] 64  Resp:  [16] 16  BP: (166-174)/(73-84) 166/80  Physical Exam    Constitutional: 78-year-old male in no acute distress on room air   Eyes: PERRLA, sclerae anicteric, no conjunctival  injection, EOMI   HENT: NCAT, mucous membranes moist   Neck: Supple, no thyromegaly, no lymphadenopathy, trachea midline   Respiratory: Clear to auscultation bilaterally, nonlabored respirations    Cardiovascular: RRR, no murmurs, rubs, or gallops, palpable pedal pulses bilaterally   Gastrointestinal: Positive bowel sounds, soft, nontender, nondistended   Musculoskeletal: No bilateral ankle edema, no clubbing or cyanosis to extremities   Psychiatric: Appropriate affect, cooperative   Neurologic: Oriented x 3, strength symmetric in all extremities, Cranial Nerves grossly intact to confrontation, speech clear, no facial droop noted.  Equal  strength bilaterally.  No pronator drift noted.  Finger-to-nose and heel-to-shin exams normal   Skin: No rashes     Result Review    Result Review:  I have personally reviewed the results from the time of this admission to 2/20/2024 14:52 EST and agree with these findings:  [x]  Laboratory list / accordion  []  Microbiology  [x]  Radiology  [x]  EKG/Telemetry   []  Cardiology/Vascular   []  Pathology  [x]  Old records  []  Other:  Most notable findings include:     CT Angiogram Neck    Result Date: 2/20/2024  CONTRAST-ENHANCED CT ANGIOGRAM OF THE HEAD AND NECK ON 02/20/2024  CLINICAL HISTORY: Patient had a period of confusion yesterday and woke up today with headache and mild confusion and some leg weakness and difficulty walking.  TECHNIQUE: Spiral CT images were obtained from the base of the skull to the vertex both pre-intravenous and postintravenous contrast and images were reformatted and submitted in 3 mm thick axial, sagittal and coronal CT sections with brain algorithm. Additional spiral CT angiogram images were obtained from the top of the aortic arch up through the great vessels of the head and neck during the arterial phase of contrast and images were reformatted and submitted in 1 mm thick axial, sagittal and coronal CT sections with soft tissue algorithm and 3D  reconstructions were performed to complete the CT angiogram of the head and neck.  There are no prior CT angiograms of the head and neck for comparison.  FINDINGS:  HEAD CT: There are patchy areas of low-density extending from the periventricular into the subcortical white matter of the cerebral hemispheres consistent with moderate small vessel disease. The remainder of the brain parenchyma is normal in attenuation. There is mild diffuse cerebral atrophy. The lateral and third ventricles are mildly prominent in size felt to be on the basis of central volume loss or atrophy. I see no focal mass effect. There is no midline shift. No extra-axial fluid collections are identified. There is no evidence of acute intracranial hemorrhage.  No abnormal areas of enhancement are seen in the head. The calvarium and the skull base are normal in appearance. There is a 1 cm mucous retention cyst in the inferior medial right maxillary sinus and mild inferior right maxillary sinus mucosal thickening. The remainder of the paranasal sinuses and the mastoid air cells and middle ear cavities are clear. Bilateral intraocular lens implants are in place in the globes from previous bilateral cataract surgery, otherwise the orbits are unremarkable.   CT ANGIOGRAM OF THE NECK: The nasopharynx, oropharynx, the hypopharynx, the true cords and the subglottic airway are normal in appearance. Thyroid gland is unremarkable. The lung apices are clear. The parotid, , parapharyngeal and submandibular spaces are symmetric and normal in appearance. Patient has had previous anterior cervical discectomy and fusion procedure from C5 to C7 with anterior plate and screw fixation and solid bony fusion of C5 through C7 vertebrae.  There are large anterior marginal bridging osteophytes at C4-5. At C3-4 there is moderate-to-severe left facet overgrowth and there is bilateral uncovertebral joint hypertrophy and there is moderate-to-severe left bony  foraminal narrowing.  There is anatomic origin of the great vessels off the aortic arch. The left subclavian artery origin is normal in appearance.  No stenosis is seen in the left subclavian artery. Calcified plaque mildly narrows the left vertebral artery origin. Beyond its origin the left vertebral artery is widely patent to the vertebrobasilar junction without additional stenosis. The left common carotid is normal in appearance. No stenosis is seen in the left common carotid artery. There is calcified plaque involving the left internal carotid artery that results in a mild 30% stenosis of the origin of the cervical segment of the left internal carotid artery using the NASCET criteria. The brachiocephalic artery origin is normal in appearance.  No stenosis is seen in the brachiocephalic artery and its bifurcation into the right subclavian, and common carotid arteries is normal in appearance.  No stenosis is seen in the right subclavian artery. There is heavily calcified plaque that moderately narrows the right vertebral artery origin.  Beyond its origin the right vertebral artery is widely patent without additional stenosis to the vertebrobasilar junction. The right common carotid origin is normal in appearance and no stenosis is seen in the right common carotid artery. There is mixed calcified and noncalcified plaque that involves the right common carotid bifurcation but does not narrow the bifurcation.  Plaque extends into the origin of the right internal carotid artery but there is essentially no stenosis in the cervical segment of the right internal carotid artery using the NASCET criteria.   CT ANGIOGRAM OF THE HEAD: The intracranial segments of the distal vertebral arteries are widely patent without stenosis to the vertebrobasilar junction. The basilar artery and the basilar tip is normal in appearance. The posterior cerebral arteries are normal in appearance.  The upper cervical, petrous, cavernous and  supracavernous segments of the internal carotid arteries are within normal limits.  The A1 segment of the left anterior cerebral artery is somewhat hypoplastic but is widely patent without stenosis.  The right A1 segment is normal in appearance.  The anterior communicating artery origin and A2 segments of the anterior cerebral arteries are normal in appearance. The M1 segments of the middle cerebral arteries and the middle cerebral artery bifurcations are normal in appearance.  There is good filling of the M2 segments within the sylvian fissures.      1. CT of the head demonstrates moderate small vessel disease in the cerebral white matter and there is diffuse cerebral atrophy and the lateral and third ventricles are mildly prominent in size likely on the basis of central volume loss or atrophy. There are bilateral intraocular lens implants in place in the globes from previous cataract surgery. The remainder of the head CT is within normal limits with no discernible acute completed infarct and no intracranial hemorrhage identified and the etiology of the patient's confusion and leg weakness and difficulty walking is not established on this exam.  If there remains any clinical suspicion of an acute infarct I recommend an MRI of the brain for more complete assessment. 2. Patient has had a prior anterior cervical discectomy and fusion procedure from C5 to C7 with anterior plate and screw fixation.  There is moderate-to-severe left facet overgrowth at C3-4 with moderate-to-severe left bony foraminal narrowing at C3-4. 4. Calcified plaque mildly narrows the left vertebral artery origin and calcified plaque moderately narrows the right vertebral artery origin. Beyond their origins the vertebral arteries are widely patent without additional stenosis. 5. Noncalcified plaque mildly narrows the origin of the cervical segment of the left internal carotid artery by up to 30% using the NASCET criteria.  No stenosis is seen in  the cervical segment of the right internal carotid artery and the remainder of the CT angiogram of the head and neck is normal.    Radiation dose reduction techniques were utilized, including automated exposure control and exposure modulation based on body size.       CT Angiogram Head    Result Date: 2/20/2024  CONTRAST-ENHANCED CT ANGIOGRAM OF THE HEAD AND NECK ON 02/20/2024  CLINICAL HISTORY: Patient had a period of confusion yesterday and woke up today with headache and mild confusion and some leg weakness and difficulty walking.  TECHNIQUE: Spiral CT images were obtained from the base of the skull to the vertex both pre-intravenous and postintravenous contrast and images were reformatted and submitted in 3 mm thick axial, sagittal and coronal CT sections with brain algorithm. Additional spiral CT angiogram images were obtained from the top of the aortic arch up through the great vessels of the head and neck during the arterial phase of contrast and images were reformatted and submitted in 1 mm thick axial, sagittal and coronal CT sections with soft tissue algorithm and 3D reconstructions were performed to complete the CT angiogram of the head and neck.  There are no prior CT angiograms of the head and neck for comparison.  FINDINGS:  HEAD CT: There are patchy areas of low-density extending from the periventricular into the subcortical white matter of the cerebral hemispheres consistent with moderate small vessel disease. The remainder of the brain parenchyma is normal in attenuation. There is mild diffuse cerebral atrophy. The lateral and third ventricles are mildly prominent in size felt to be on the basis of central volume loss or atrophy. I see no focal mass effect. There is no midline shift. No extra-axial fluid collections are identified. There is no evidence of acute intracranial hemorrhage.  No abnormal areas of enhancement are seen in the head. The calvarium and the skull base are normal in appearance.  There is a 1 cm mucous retention cyst in the inferior medial right maxillary sinus and mild inferior right maxillary sinus mucosal thickening. The remainder of the paranasal sinuses and the mastoid air cells and middle ear cavities are clear. Bilateral intraocular lens implants are in place in the globes from previous bilateral cataract surgery, otherwise the orbits are unremarkable.   CT ANGIOGRAM OF THE NECK: The nasopharynx, oropharynx, the hypopharynx, the true cords and the subglottic airway are normal in appearance. Thyroid gland is unremarkable. The lung apices are clear. The parotid, , parapharyngeal and submandibular spaces are symmetric and normal in appearance. Patient has had previous anterior cervical discectomy and fusion procedure from C5 to C7 with anterior plate and screw fixation and solid bony fusion of C5 through C7 vertebrae.  There are large anterior marginal bridging osteophytes at C4-5. At C3-4 there is moderate-to-severe left facet overgrowth and there is bilateral uncovertebral joint hypertrophy and there is moderate-to-severe left bony foraminal narrowing.  There is anatomic origin of the great vessels off the aortic arch. The left subclavian artery origin is normal in appearance.  No stenosis is seen in the left subclavian artery. Calcified plaque mildly narrows the left vertebral artery origin. Beyond its origin the left vertebral artery is widely patent to the vertebrobasilar junction without additional stenosis. The left common carotid is normal in appearance. No stenosis is seen in the left common carotid artery. There is calcified plaque involving the left internal carotid artery that results in a mild 30% stenosis of the origin of the cervical segment of the left internal carotid artery using the NASCET criteria. The brachiocephalic artery origin is normal in appearance.  No stenosis is seen in the brachiocephalic artery and its bifurcation into the right subclavian, and  common carotid arteries is normal in appearance.  No stenosis is seen in the right subclavian artery. There is heavily calcified plaque that moderately narrows the right vertebral artery origin.  Beyond its origin the right vertebral artery is widely patent without additional stenosis to the vertebrobasilar junction. The right common carotid origin is normal in appearance and no stenosis is seen in the right common carotid artery. There is mixed calcified and noncalcified plaque that involves the right common carotid bifurcation but does not narrow the bifurcation.  Plaque extends into the origin of the right internal carotid artery but there is essentially no stenosis in the cervical segment of the right internal carotid artery using the NASCET criteria.   CT ANGIOGRAM OF THE HEAD: The intracranial segments of the distal vertebral arteries are widely patent without stenosis to the vertebrobasilar junction. The basilar artery and the basilar tip is normal in appearance. The posterior cerebral arteries are normal in appearance.  The upper cervical, petrous, cavernous and supracavernous segments of the internal carotid arteries are within normal limits.  The A1 segment of the left anterior cerebral artery is somewhat hypoplastic but is widely patent without stenosis.  The right A1 segment is normal in appearance.  The anterior communicating artery origin and A2 segments of the anterior cerebral arteries are normal in appearance. The M1 segments of the middle cerebral arteries and the middle cerebral artery bifurcations are normal in appearance.  There is good filling of the M2 segments within the sylvian fissures.      1. CT of the head demonstrates moderate small vessel disease in the cerebral white matter and there is diffuse cerebral atrophy and the lateral and third ventricles are mildly prominent in size likely on the basis of central volume loss or atrophy. There are bilateral intraocular lens implants in place  in the globes from previous cataract surgery. The remainder of the head CT is within normal limits with no discernible acute completed infarct and no intracranial hemorrhage identified and the etiology of the patient's confusion and leg weakness and difficulty walking is not established on this exam.  If there remains any clinical suspicion of an acute infarct I recommend an MRI of the brain for more complete assessment. 2. Patient has had a prior anterior cervical discectomy and fusion procedure from C5 to C7 with anterior plate and screw fixation.  There is moderate-to-severe left facet overgrowth at C3-4 with moderate-to-severe left bony foraminal narrowing at C3-4. 4. Calcified plaque mildly narrows the left vertebral artery origin and calcified plaque moderately narrows the right vertebral artery origin. Beyond their origins the vertebral arteries are widely patent without additional stenosis. 5. Noncalcified plaque mildly narrows the origin of the cervical segment of the left internal carotid artery by up to 30% using the NASCET criteria.  No stenosis is seen in the cervical segment of the right internal carotid artery and the remainder of the CT angiogram of the head and neck is normal.    Radiation dose reduction techniques were utilized, including automated exposure control and exposure modulation based on body size.            Assessment & Plan   Assessment / Plan     Brief Patient Summary:  Earl Marc is a 78 y.o. male who is being admitted to the observation unit for further evaluation of strokelike symptoms including confusion, aphasia, and left-sided headache.  In the ER, CT head revealed some moderate white matter disease, CTA of the head and neck showed some 30% right carotid stenosis, and right greater than left P1 segment narrowing.  Plan for consultation with neurology and MRI of the brain.    Active Hospital Problems:  Active Hospital Problems    Diagnosis     **Stroke-like symptoms       Plan:     Strokelike symptoms  -CT head shows moderate white matter disease  -CTA of the head and neck shows 30% right carotid stenosis, right greater than left P1 segment narrowing  -MRI of the brain ordered  -Neurology consulted  -A1c, TSH, lipid panel pending  -Neurochecks every 4 hours  -Aspirin/statin  -Telemetry monitoring  -IV fluids    Coronary artery disease  Hypertension  Hyperlipidemia  -Continue aspirin and statin  -Continue metoprolol, delaying next dose until tomorrow morning for permissive hypertension until MRI results  -Monitor blood pressure with vital signs every 4 hours    COPD  -No acute exacerbation noted  -Albuterol as needed    Type 2 diabetes  -Continue home basal insulin  -Sliding scale insulin and Accu-Cheks with meals and at bedtime  -A1c pending      DVT prophylaxis:  Mechanical DVT prophylaxis orders are present.        CODE STATUS:    Code Status (Patient has no pulse and is not breathing): CPR (Attempt to Resuscitate)  Medical Interventions (Patient has pulse or is breathing): Full Support    Admission Status:  I believe this patient meets observation status.    77 minutes have been spent by [facility] providers in the care of this patient while under observation status.      Appropriate PPE worn during patient encounter.  Hand hygeine performed before and after seeing the patient.      Electronically signed by Marge Andersen PA-C, 02/20/24, 2:52 PM EST.

## 2024-02-20 NOTE — ED PROVIDER NOTES
EMERGENCY DEPARTMENT ENCOUNTER    Room Number:  26/26  PCP: Avery Velazquez MD  Historian: Patient      HPI:  Chief Complaint: Headache confusion  A complete HPI/ROS/PMH/PSH/SH/FH are unobtainable due to: Nothing  Context: Earl Marc is a 78 y.o. male who presents to the ED c/o headache confusion.  Patient states was in his normal state of health yesterday.   was driving home last night.  States at 330 he got very confused.  States he could not read his text messages.  Could not speak.  Could not remember his alarm to get into his house.  Patient states he did have a general headache afterwards.  States headache is there.  The confusion and word finding issues are better.  Has had no focal weakness or numbness.  States he has baseline double vision it is not any worse.            PAST MEDICAL HISTORY  Active Ambulatory Problems     Diagnosis Date Noted    Bell's palsy 03/10/2016    Persistent insomnia 03/10/2016    Osteoarthritis of cervical spine 03/10/2016    Diabetic peripheral neuropathy 03/10/2016    Dyslipidemia 03/10/2016    Steatosis of liver 03/10/2016    Fibromyalgia 03/10/2016    Gastroesophageal reflux disease without esophagitis 03/10/2016    Hypertensive kidney disease with stage 3a chronic kidney disease 03/10/2016    Hypogonadism in male 03/10/2016    Renal insufficiency 03/10/2016    Vitamin D deficiency 03/10/2016    Benign non-nodular prostatic hyperplasia with lower urinary tract symptoms 03/10/2016    S/P drug eluting coronary stent placement 04/13/2016    Coronary artery disease involving native coronary artery of native heart 06/21/2016    Malignant neoplasm of skin 10/03/2016    Type 2 diabetes mellitus with hyperglycemia, with long-term current use of insulin 04/04/2017    Diabetes mellitus 04/04/2017    Chronic insomnia 12/20/2017    Other specified glaucoma 04/01/2017    Vertigo 03/14/2018    Epigastric pain 06/11/2018    Chest pain with high risk for cardiac etiology  05/26/2021    Precordial pain 05/25/2021    S/P arthroscopy of shoulder 11/19/2019    Peripheral neuropathy 03/24/2022    Stage 3b chronic kidney disease 03/24/2022    Chest pain 01/19/2024     Resolved Ambulatory Problems     Diagnosis Date Noted    Hyperuricemia 03/10/2016    Erectile dysfunction of nonorganic origin 03/10/2016    Type 2 diabetes mellitus without complication 03/10/2016    Precordial pain 03/10/2016    Abnormal nuclear stress test 03/25/2016    Coronary artery disease involving native coronary artery with angina pectoris 03/27/2016    S/P coronary angioplasty 04/05/2016    Chest pain 06/14/2016    Dizziness 06/21/2016    Lateral epicondylitis 12/19/2012    Long-term insulin use in type 2 diabetes 10/04/2016    Type II diabetes circulatory disorder causing erectile dysfunction 02/13/2017    Sialadenitis 06/28/2017    Acute bronchitis due to other specified organisms 11/14/2017    Chest pain 10/16/2018    Other forms of angina pectoris 10/16/2018    Trigger point of left shoulder region 03/02/2023     Past Medical History:   Diagnosis Date    Abnormal cardiovascular stress test     Acid reflux     Arthritis     Asthma     Cancer     Chronic kidney disease     Colon polyp     Congenital heart disease     COPD (chronic obstructive pulmonary disease) 2021    Coronary artery disease     Diabetic neuropathy associated with type 2 diabetes mellitus     Erectile dysfunction     Fatty liver disease, nonalcoholic     Gastritis     Glaucoma     Hyperlipidemia     Hypertension     Hypogonadism male     Infectious viral hepatitis Hep A July 1959    Migraines 09/2023    Myocardial infarction 2018    Type 2 diabetes mellitus          PAST SURGICAL HISTORY  Past Surgical History:   Procedure Laterality Date    CARDIAC CATHETERIZATION N/A 03/25/2016    Procedure: Left Heart Cath;  Surgeon: Maria E Gilbert MD;  Location: [facility] INVASIVE LOCATION;  Service:     CARDIAC CATHETERIZATION N/A 03/25/2016     Procedure: Coronary angiography;  Surgeon: Maria E Gilbert MD;  Location:  [facility] CATH INVASIVE LOCATION;  Service:     CARDIAC CATHETERIZATION N/A 03/28/2016    Procedure: Coronary angiography;  Surgeon: Maria E Gilbert MD;  Location:  [facility] CATH INVASIVE LOCATION;  Service:     CARDIAC CATHETERIZATION N/A 03/28/2016    Procedure: Stent MOISE coronary;  Surgeon: Maria E Gilbert MD;  Location:  [facility] CATH INVASIVE LOCATION;  Service:     CARDIAC CATHETERIZATION N/A 10/18/2018    Procedure: Coronary angiography  no LV gram d/t CKD;  Surgeon: Maria E Gilbert MD;  Location:  [facility] CATH INVASIVE LOCATION;  Service: Cardiology    CARDIAC CATHETERIZATION N/A 10/18/2018    Procedure: Left Heart Cath;  Surgeon: Maria E Gilbert MD;  Location: [facility] CATH INVASIVE LOCATION;  Service: Cardiology    CARDIAC CATHETERIZATION N/A 10/18/2018    Procedure: Stent MOISE coronary;  Surgeon: Maria E Gilbert MD;  Location: [facility] CATH INVASIVE LOCATION;  Service: Cardiology    CARDIAC CATHETERIZATION N/A 05/28/2021    Procedure: Coronary angiography;  Surgeon: Maria E Gilbert MD;  Location: [facility] CATH INVASIVE LOCATION;  Service: Cardiovascular;  Laterality: N/A;    CARDIAC CATHETERIZATION N/A 05/28/2021    Procedure: Left Heart Cath;  Surgeon: Maria E Gilbert MD;  Location: [facility] CATH INVASIVE LOCATION;  Service: Cardiovascular;  Laterality: N/A;    CARDIAC CATHETERIZATION N/A 05/28/2021    Procedure: Left ventriculography;  Surgeon: Maria E Gilbert MD;  Location: [facility] CATH INVASIVE LOCATION;  Service: Cardiovascular;  Laterality: N/A;    CARDIAC CATHETERIZATION N/A 05/28/2021    Procedure: Stent MOISE coronary;  Surgeon: Maria E Gilbert MD;  Location: [facility] CATH INVASIVE LOCATION;  Service: Cardiovascular;  Laterality: N/A;    CARDIAC CATHETERIZATION N/A 1/19/2024    Procedure: Left Heart Cath;  Surgeon: Maria E Gilbert MD;  Location: [facility] CATH INVASIVE LOCATION;   Service: Cardiovascular;  Laterality: N/A;    CARDIAC CATHETERIZATION N/A 2024    Procedure: Coronary angiography;  Surgeon: Maria E Gilbert MD;  Location:  [facility] CATH INVASIVE LOCATION;  Service: Cardiovascular;  Laterality: N/A;    CARDIAC CATHETERIZATION N/A 2024    Procedure: Left ventriculography;  Surgeon: Maria E Gilbert MD;  Location:  [facility] CATH INVASIVE LOCATION;  Service: Cardiovascular;  Laterality: N/A;    CAROTID STENT      CORONARY ANGIOPLASTY      CORONARY STENT PLACEMENT  3/28/2016    EYE SURGERY  2017    NECK EXPLORATION N/A     ND RT/LT HEART CATHETERS N/A 10/18/2018    Procedure: Percutaneous Coronary Intervention;  Surgeon: Maria E Gilbert MD;  Location:  [facility] CATH INVASIVE LOCATION;  Service: Cardiology         FAMILY HISTORY  Family History   Problem Relation Age of Onset    Breast cancer Daughter     Heart attack Mother          10/31/1980    Hypertension Mother     Heart disease Mother     Thyroid disease Mother     Lupus Mother     Early death Mother     Miscarriages / Stillbirths Mother         Miscarriage     Heart failure Mother     Heart attack Brother     Heart disease Brother     Hyperlipidemia Brother     Cancer Brother         Due to Agent [place], [place]    Hypertension Brother     Depression Father     COPD Father     Stroke Maternal Grandmother     Cancer Maternal Grandmother         Lung Cancer non-smoker    Diabetes Brother     Heart disease Brother     Hypertension Brother     Kidney disease Brother     Heart disease Brother     Hypertension Brother          SOCIAL HISTORY  Social History     Socioeconomic History    Marital status:      Spouse name: Ivette    Number of children: 1   Tobacco Use    Smoking status: Never     Passive exposure: Past    Smokeless tobacco: Never    Tobacco comments:     Never   Vaping Use    Vaping Use: Never used   Substance and Sexual Activity    Alcohol use: No    Drug use: Never    Sexual  activity: Yes     Partners: Female     Birth control/protection: Surgical     Comment: Vasectomy 1974         ALLERGIES  Ace inhibitors, Hydrogenated palm oil glycerides, Lanolin, Other, Prazosin, and Nsaids        REVIEW OF SYSTEMS  Review of Systems   Headache, confusion      PHYSICAL EXAM  ED Triage Vitals   Temp Heart Rate Resp BP SpO2   02/20/24 1122 02/20/24 1122 02/20/24 1122 02/20/24 1126 02/20/24 1122   97.1 °F (36.2 °C) 85 16 174/83 97 %      Temp src Heart Rate Source Patient Position BP Location FiO2 (%)   -- -- -- -- --              Physical Exam      GENERAL: no acute distress  HENT: nares patent  EYES: no scleral icterus  CV: regular rhythm, normal rate  RESPIRATORY: normal effort  ABDOMEN: soft  MUSCULOSKELETAL: no deformity  NEURO: alert, moves all extremities, follows commands  PSYCH:  calm, cooperative  SKIN: warm, dry    Vital signs and nursing notes reviewed.    NIH 0      LAB RESULTS  Recent Results (from the past 24 hour(s))   Comprehensive Metabolic Panel    Collection Time: 02/20/24 11:41 AM    Specimen: Blood   Result Value Ref Range    Glucose 243 (H) 65 - 99 mg/dL    BUN 14 8 - 23 mg/dL    Creatinine 1.34 (H) 0.76 - 1.27 mg/dL    Sodium 136 136 - 145 mmol/L    Potassium 4.2 3.5 - 5.2 mmol/L    Chloride 102 98 - 107 mmol/L    CO2 21.6 (L) 22.0 - 29.0 mmol/L    Calcium 9.5 8.6 - 10.5 mg/dL    Total Protein 7.0 6.0 - 8.5 g/dL    Albumin 4.2 3.5 - 5.2 g/dL    ALT (SGPT) 23 1 - 41 U/L    AST (SGOT) 17 1 - 40 U/L    Alkaline Phosphatase 79 39 - 117 U/L    Total Bilirubin 0.6 0.0 - 1.2 mg/dL    Globulin 2.8 gm/dL    A/G Ratio 1.5 g/dL    BUN/Creatinine Ratio 10.4 7.0 - 25.0    Anion Gap 12.4 5.0 - 15.0 mmol/L    eGFR 54.2 (L) >60.0 mL/min/1.73   Sedimentation Rate    Collection Time: 02/20/24 11:41 AM    Specimen: Blood   Result Value Ref Range    Sed Rate 13 0 - 20 mm/hr   CBC Auto Differential    Collection Time: 02/20/24 11:41 AM    Specimen: Blood   Result Value Ref Range    WBC 7.22 3.40 -  10.80 10*3/mm3    RBC 5.91 (H) 4.14 - 5.80 10*6/mm3    Hemoglobin 17.0 13.0 - 17.7 g/dL    Hematocrit 51.0 37.5 - 51.0 %    MCV 86.3 79.0 - 97.0 fL    MCH 28.8 26.6 - 33.0 pg    MCHC 33.3 31.5 - 35.7 g/dL    RDW 12.3 12.3 - 15.4 %    RDW-SD 38.2 37.0 - 54.0 fl    MPV 9.9 6.0 - 12.0 fL    Platelets 206 140 - 450 10*3/mm3    Neutrophil % 64.5 42.7 - 76.0 %    Lymphocyte % 21.7 19.6 - 45.3 %    Monocyte % 8.3 5.0 - 12.0 %    Eosinophil % 4.4 0.3 - 6.2 %    Basophil % 0.7 0.0 - 1.5 %    Immature Grans % 0.4 0.0 - 0.5 %    Neutrophils, Absolute 4.65 1.70 - 7.00 10*3/mm3    Lymphocytes, Absolute 1.57 0.70 - 3.10 10*3/mm3    Monocytes, Absolute 0.60 0.10 - 0.90 10*3/mm3    Eosinophils, Absolute 0.32 0.00 - 0.40 10*3/mm3    Basophils, Absolute 0.05 0.00 - 0.20 10*3/mm3    Immature Grans, Absolute 0.03 0.00 - 0.05 10*3/mm3    nRBC 0.0 0.0 - 0.2 /100 WBC   ECG 12 Lead Stroke Evaluation    Collection Time: 02/20/24  1:56 PM   Result Value Ref Range    QT Interval 356 ms    QTC Interval 379 ms       Ordered the above labs and reviewed the results.        RADIOLOGY  No Radiology Exams Resulted Within Past 24 Hours    CT head and angiogram independently interpreted by me as well as discussed with Dr. Ziegler radiology and there is no evidence of bleeding on CT scan          PROCEDURES  Procedures        EKG          EKG time: 1356  Rhythm/Rate: Normal sinus rhythm 68  P waves and [place]: Normal P waves  QRS, axis: LVH  ST and T waves: Nonspecific ST-T wave    Interpreted Contemporaneously by me, independently viewed  Unchanged compared to prior 1/19/2024      MEDICATIONS GIVEN IN ER  Medications   iopamidol (ISOVUE-370) 76 % injection 100 mL (95 mL Intravenous Given 2/20/24 1234)   HYDROmorphone (DILAUDID) injection 0.5 mg (0.5 mg Intravenous Given 2/20/24 1307)   ondansetron (ZOFRAN) injection 4 mg (4 mg Intravenous Given 2/20/24 1305)                   MEDICAL DECISION MAKING, PROGRESS, and CONSULTS    All labs have been independently  reviewed by me.  All radiology studies have been reviewed by me and I have also reviewed the radiology report.   EKG's independently viewed and interpreted by me.  Discussion below represents my analysis of pertinent findings related to patient's condition, differential diagnosis, treatment plan and final disposition.      Additional sources:  - Discussed/ obtained information from independent historians: None    - External (non-ED) record review: Epic reviewed patient seen by endocrinologist 1/29/2024 for diabetes    - Chronic or social conditions impacting care: None    - Shared decision making: None      Orders placed during this visit:  Orders Placed This Encounter   Procedures    CT Angiogram Neck    CT Angiogram Head    Comprehensive Metabolic Panel    Sedimentation Rate    CBC Auto Differential    ECG 12 Lead Stroke Evaluation    Initiate ED Observation Status    CBC & Differential         Additional orders considered but not ordered:  None        Differential diagnosis includes but is not limited to:    TIA versus CVA versus transient global amnesia      Independent interpretation of labs, radiology studies, and discussions with consultants:  ED Course as of 02/20/24 1359   Tue Feb 20, 2024   1355 13:55 EST  Patient presents for confusion and expressive aphasia.  The symptoms started yesterday and have resolved.  NIH is 0.  Not a tPA candidate.  Patient also has headache which is abnormal for him.  Discussed with stroke neurologist.  CT and CT angiogram was done and has no obvious stroke, bleed, aneurysm.  Patient has been discussed with G[facility] with observation unit and will be admitted. [SL]      ED Course User Index  [SL] Frank Jean MD                 DIAGNOSIS  Final diagnoses:   TIA (transient ischemic attack)   Generalized headache         DISPOSITION  admit            Latest Documented Vital Signs:  As of 13:59 EST  BP- 171/84 HR- 68 Temp- 97.1 °F (36.2 °C) O2 sat- 95%               --    Please note that portions of this were completed with a voice recognition program.       Note Disclaimer: At [facility], we believe that sharing information builds trust and better relationships. You are receiving this note because you are receiving care at [facility] or recently visited. It is possible you will see health information before a provider has talked with you about it. This kind of information can be easy to misunderstand. To help you fully understand what it means for your health, we urge you to discuss this note with your provider.            Frank Jean MD  02/20/24 1400

## 2024-02-20 NOTE — ED NOTES
"Nursing report ED to floor  Earl Marc  78 y.o.  male    HPI :   Chief Complaint   Patient presents with    Neuro Deficit(s)       Admitting doctor:   Anegl Loredo II, MD    Admitting diagnosis:   The primary encounter diagnosis was TIA (transient ischemic attack). A diagnosis of Generalized headache was also pertinent to this visit.    Code status:   Current Code Status       Date Active Code Status Order ID Comments User Context       Prior            Allergies:   Ace inhibitors, Hydrogenated palm oil glycerides, Lanolin, Other, Prazosin, and Nsaids    Isolation:   No active isolations    Intake and Output  No intake or output data in the 24 hours ending 02/20/24 1357    Weight:       02/20/24  1135   Weight: 101 kg (222 lb)       Most recent vitals:   Vitals:    02/20/24 1126 02/20/24 1135 02/20/24 1201 02/20/24 1301   BP: 174/83  170/73 171/84   Pulse:  77 67 66   Resp:       Temp:       SpO2:  96% 94% 97%   Weight:  101 kg (222 lb)     Height:  185.4 cm (73\")         Active LDAs/IV Access:   Lines, Drains & Airways       Active LDAs       Name Placement date Placement time Site Days    Peripheral IV 02/20/24 1148 Right Antecubital 02/20/24  1148  Antecubital  less than 1                    Labs (abnormal labs have a star):   Labs Reviewed   COMPREHENSIVE METABOLIC PANEL - Abnormal; Notable for the following components:       Result Value    Glucose 243 (*)     Creatinine 1.34 (*)     CO2 21.6 (*)     eGFR 54.2 (*)     All other components within normal limits    Narrative:     GFR Normal >60  Chronic Kidney Disease <60  Kidney Failure <15    The GFR formula is only valid for adults with stable renal function between ages 18 and 70.   CBC WITH AUTO DIFFERENTIAL - Abnormal; Notable for the following components:    RBC 5.91 (*)     All other components within normal limits   SEDIMENTATION RATE - Normal   CBC AND DIFFERENTIAL    Narrative:     The following orders were created for panel order CBC & " Differential.  Procedure                               Abnormality         Status                     ---------                               -----------         ------                     CBC Auto Differential[[id number]]        Abnormal            Final result                 Please view results for these tests on the individual orders.       EKG:   ECG 12 Lead Stroke Evaluation    (Results Pending)       Meds given in ED:   Medications   iopamidol (ISOVUE-370) 76 % injection 100 mL (95 mL Intravenous Given 2/20/24 1234)   HYDROmorphone (DILAUDID) injection 0.5 mg (0.5 mg Intravenous Given 2/20/24 1307)   ondansetron (ZOFRAN) injection 4 mg (4 mg Intravenous Given 2/20/24 1305)       Imaging results:  No radiology results for the last day    Ambulatory status:   - up ad veronica    Social issues:   Social History     Socioeconomic History    Marital status:      Spouse name: Ivette    Number of children: 1   Tobacco Use    Smoking status: Never     Passive exposure: Past    Smokeless tobacco: Never    Tobacco comments:     Never   Vaping Use    Vaping Use: Never used   Substance and Sexual Activity    Alcohol use: No    Drug use: Never    Sexual activity: Yes     Partners: Female     Birth control/protection: Surgical     Comment: Vasectomy 1974       NIH Stroke Scale:  Interval: baseline      Kaitlin Miller, JAZMYN  02/20/24 13:57 EST

## 2024-02-21 ENCOUNTER — READMISSION MANAGEMENT (OUTPATIENT)
Dept: CALL CENTER | Facility: HOSPITAL | Age: 79
End: 2024-02-21
Payer: MEDICARE

## 2024-02-21 VITALS
OXYGEN SATURATION: 99 % | TEMPERATURE: 97.6 F | HEIGHT: 73 IN | HEART RATE: 73 BPM | RESPIRATION RATE: 18 BRPM | WEIGHT: 222 LBS | DIASTOLIC BLOOD PRESSURE: 82 MMHG | BODY MASS INDEX: 29.42 KG/M2 | SYSTOLIC BLOOD PRESSURE: 166 MMHG

## 2024-02-21 LAB
ANION GAP SERPL CALCULATED.3IONS-SCNC: 9 MMOL/L (ref 5–15)
BUN SERPL-MCNC: 14 MG/DL (ref 8–23)
BUN/CREAT SERPL: 11.5 (ref 7–25)
CALCIUM SPEC-SCNC: 9.1 MG/DL (ref 8.6–10.5)
CHLORIDE SERPL-SCNC: 103 MMOL/L (ref 98–107)
CO2 SERPL-SCNC: 26 MMOL/L (ref 22–29)
CREAT SERPL-MCNC: 1.22 MG/DL (ref 0.76–1.27)
DEPRECATED RDW RBC AUTO: 38.7 FL (ref 37–54)
EGFRCR SERPLBLD CKD-EPI 2021: 60.7 ML/MIN/1.73
ERYTHROCYTE [DISTWIDTH] IN BLOOD BY AUTOMATED COUNT: 12.3 % (ref 12.3–15.4)
GLUCOSE SERPL-MCNC: 176 MG/DL (ref 65–99)
HCT VFR BLD AUTO: 48.3 % (ref 37.5–51)
HGB BLD-MCNC: 16 G/DL (ref 13–17.7)
MCH RBC QN AUTO: 28.8 PG (ref 26.6–33)
MCHC RBC AUTO-ENTMCNC: 33.1 G/DL (ref 31.5–35.7)
MCV RBC AUTO: 87 FL (ref 79–97)
PLATELET # BLD AUTO: 198 10*3/MM3 (ref 140–450)
PMV BLD AUTO: 9.9 FL (ref 6–12)
POTASSIUM SERPL-SCNC: 3.8 MMOL/L (ref 3.5–5.2)
RBC # BLD AUTO: 5.55 10*6/MM3 (ref 4.14–5.8)
SODIUM SERPL-SCNC: 138 MMOL/L (ref 136–145)
WBC NRBC COR # BLD AUTO: 7.94 10*3/MM3 (ref 3.4–10.8)

## 2024-02-21 PROCEDURE — 99214 OFFICE O/P EST MOD 30 MIN: CPT | Performed by: PSYCHIATRY & NEUROLOGY

## 2024-02-21 PROCEDURE — 80048 BASIC METABOLIC PNL TOTAL CA: CPT | Performed by: STUDENT IN AN ORGANIZED HEALTH CARE EDUCATION/TRAINING PROGRAM

## 2024-02-21 PROCEDURE — 63710000001 INSULIN GLARGINE PER 5 UNITS: Performed by: EMERGENCY MEDICINE

## 2024-02-21 PROCEDURE — 85027 COMPLETE CBC AUTOMATED: CPT | Performed by: STUDENT IN AN ORGANIZED HEALTH CARE EDUCATION/TRAINING PROGRAM

## 2024-02-21 PROCEDURE — G0378 HOSPITAL OBSERVATION PER HR: HCPCS

## 2024-02-21 PROCEDURE — 96361 HYDRATE IV INFUSION ADD-ON: CPT

## 2024-02-21 PROCEDURE — 25810000003 SODIUM CHLORIDE 0.9 % SOLUTION: Performed by: STUDENT IN AN ORGANIZED HEALTH CARE EDUCATION/TRAINING PROGRAM

## 2024-02-21 RX ORDER — AMMONIUM LACTATE 12 G/100G
1 LOTION TOPICAL AS NEEDED
Start: 2024-02-21

## 2024-02-21 RX ORDER — PANTOPRAZOLE SODIUM 40 MG/1
40 TABLET, DELAYED RELEASE ORAL DAILY
Status: DISCONTINUED | OUTPATIENT
Start: 2024-02-21 | End: 2024-02-21 | Stop reason: HOSPADM

## 2024-02-21 RX ADMIN — INSULIN GLARGINE 38 UNITS: 100 INJECTION, SOLUTION SUBCUTANEOUS at 09:53

## 2024-02-21 RX ADMIN — ACETAMINOPHEN 325MG 650 MG: 325 TABLET ORAL at 09:51

## 2024-02-21 RX ADMIN — VENLAFAXINE HYDROCHLORIDE 37.5 MG: 37.5 CAPSULE, EXTENDED RELEASE ORAL at 09:52

## 2024-02-21 RX ADMIN — PANTOPRAZOLE SODIUM 40 MG: 40 TABLET, DELAYED RELEASE ORAL at 11:30

## 2024-02-21 RX ADMIN — ASPIRIN 81 MG: 81 TABLET, COATED ORAL at 09:51

## 2024-02-21 RX ADMIN — Medication 10 ML: at 09:53

## 2024-02-21 RX ADMIN — SODIUM CHLORIDE 75 ML/HR: 9 INJECTION, SOLUTION INTRAVENOUS at 08:26

## 2024-02-21 RX ADMIN — METOPROLOL SUCCINATE 12.5 MG: 25 TABLET, EXTENDED RELEASE ORAL at 09:52

## 2024-02-21 NOTE — DISCHARGE INSTRUCTIONS
Follow up with neurology, their office will call you to schedule.  Follow up with your primary care provider.  Return to the emergency department with worsening symptoms, uncontrolled pain, inability to tolerate oral liquids, fever greater than 101°F not controlled by Tylenol or as needed with emergent concerns.

## 2024-02-21 NOTE — SIGNIFICANT NOTE
02/21/24 0637   OTHER   Discipline physical therapist   Therapy Assessment/Plan (PT)   Criteria for Skilled Interventions Met (PT) no problems identified which require skilled intervention  (Pt admitted with some speech difficulties.  Nsg doc pt indep with mobility, extremities move with no wkns, as well as an ampac of 24. No indication for acute PT. Continue to facilitate indep activity.)

## 2024-02-21 NOTE — PROGRESS NOTES
ED OBSERVATION PROGRESS/DISCHARGE SUMMARY    Date of Admission: 2/20/2024   LOS: 0 days   PCP: Avery Velazquez MD      Subjective   No acute events overnight.  Hospital Outcome:   78-year-old male admitted to the observation unit with a complaint of occultly with speech, confusion, and headache.  Lab work done in emergency department is at baseline for the patient.  CT head shows moderate small vessel disease in the cerebral white matter and diffuse cerebral atrophy in the lateral and third ventricles are mildly prominent in size likely on the basis of central volume loss or atrophy.  CTA of the head and neck shows calcified plaques mildly narrows the left vertebral artery origin and calcified plaque moderately narrows the right vertebral artery origin.  Beyond the origins the vertebral arteries are widely patent without additional stenosis.  Noncalcified plaque mildly narrows the origin of the cervical segment of the left internal carotid artery by up to 30% using the NASCET criteria.  No stenosis is seen in the cervical segment of the right internal carotid artery with remainder of the CT angiogram of the head and neck is normal.  MRI brain shows no acute infarct.  Neurology was consulted to see the patient.  He was given migraine cocktail with resolution of his headache.  ROS:  General: no fevers, chills  Respiratory: no cough, dyspnea  Cardiovascular: no chest pain, palpitations  Abdomen: No abdominal pain, nausea, vomiting, or diarrhea  Neurologic: No focal weakness    Objective   Physical Exam:  I have reviewed the vital signs.  Temp:  [97.1 °F (36.2 °C)-98.1 °F (36.7 °C)] 97.3 °F (36.3 °C)  Heart Rate:  [61-85] 61  Resp:  [16-18] 18  BP: (161-191)/(65-89) 161/85  General Appearance:    Alert, cooperative, no distress  Head:    Normocephalic, atraumatic  Eyes:    Sclerae anicteric  Neck:   Supple, no mass  Lungs: Clear to auscultation bilaterally, respirations unlabored  Heart: Regular rate and  rhythm, S1 and S2 normal, no murmur, rub or gallop  Abdomen:  Soft, non-tender, bowel sounds active, nondistended  Extremities: No clubbing, cyanosis, or edema to lower extremities  Pulses:  2+ and symmetric in distal lower extremities  Skin: No rashes   Neurologic: Oriented x3, Normal strength to extremities    Results Review:    I have reviewed the labs, radiology results and diagnostic studies.    Results from last 7 days   Lab Units 02/21/24  0341   WBC 10*3/mm3 7.94   HEMOGLOBIN g/dL 16.0   HEMATOCRIT % 48.3   PLATELETS 10*3/mm3 198     Results from last 7 days   Lab Units 02/21/24  0341 02/20/24  1141   SODIUM mmol/L 138 136   POTASSIUM mmol/L 3.8 4.2   CHLORIDE mmol/L 103 102   CO2 mmol/L 26.0 21.6*   BUN mg/dL 14 14   CREATININE mg/dL 1.22 1.34*   CALCIUM mg/dL 9.1 9.5   BILIRUBIN mg/dL  --  0.6   ALK PHOS U/L  --  79   ALT (SGPT) U/L  --  23   AST (SGOT) U/L  --  17   GLUCOSE mg/dL 176* 243*     Imaging Results (Last 24 Hours)       Procedure Component Value Units Date/Time    MRI Brain Without Contrast [[id number]] Collected: 02/20/24 1853     Updated: 02/20/24 1908    Narrative:      MRI BRAIN WO CONTRAST-     INDICATIONS: Aphasia, headache     TECHNIQUE: Multiplanar multisequence noncontrast magnetic resonance  imaging of the brain.     COMPARISON: CT from same date.     FINDINGS:     The diffusion-weighted images show no restricted diffusion to suggest  acute infarct.     The midline structures appear unremarkable.     The brain parenchyma shows mild to moderate periventricular white matter  T2 FLAIR signal hyperintensities, compatible with chronic small vessel  ischemic change in a patient this age..     Flow voids in the major arteries at the base of the brain appear  unremarkable.     Likely mucous retention cyst in right maxillary sinus. The paranasal  sinuses, mastoid air cells, and orbits appear otherwise unremarkable.       Impression:         No acute infarct. Mild to moderate periventricular  white matter chronic  small vessel ischemic change.     This report was finalized on 2/20/2024 7:05 PM by Dr. Earl Goodman M.D on Workstation: [id number]       CT Angiogram Neck [[id number]] Collected: 02/20/24 1428     Updated: 02/20/24 1428    Narrative:      CONTRAST-ENHANCED CT ANGIOGRAM OF THE HEAD AND NECK ON 02/20/2024     CLINICAL HISTORY: Patient had a period of confusion yesterday and woke  up today with headache and mild confusion and some leg weakness and  difficulty walking.      TECHNIQUE: Spiral CT images were obtained from the base of the skull to  the vertex both pre-intravenous and postintravenous contrast and images  were reformatted and submitted in 3 mm thick axial, sagittal and coronal  CT sections with brain algorithm. Additional spiral CT angiogram images  were obtained from the top of the aortic arch up through the great  vessels of the head and neck during the arterial phase of contrast and  images were reformatted and submitted in 1 mm thick axial, sagittal and  coronal CT sections with soft tissue algorithm and 3D reconstructions  were performed to complete the CT angiogram of the head and neck.      There are no prior CT angiograms of the head and neck for comparison.     FINDINGS:     HEAD CT: There are patchy areas of low-density extending from the  periventricular into the subcortical white matter of the cerebral  hemispheres consistent with moderate small vessel disease. The remainder  of the brain parenchyma is normal in attenuation. There is mild diffuse  cerebral atrophy. The lateral and third ventricles are mildly prominent  in size felt to be on the basis of central volume loss or atrophy. I see  no focal mass effect. There is no midline shift. No extra-axial fluid  collections are identified. There is no evidence of acute intracranial  hemorrhage.  No abnormal areas of enhancement are seen in the head. The  calvarium and the skull base are normal in appearance. There is a 1  cm  mucous retention cyst in the inferior medial right maxillary sinus and  mild inferior right maxillary sinus mucosal thickening. The remainder of  the paranasal sinuses and the mastoid air cells and middle ear cavities  are clear. Bilateral intraocular lens implants are in place in the  globes from previous bilateral cataract surgery, otherwise the orbits  are unremarkable.         CT ANGIOGRAM OF THE NECK: The nasopharynx, oropharynx, the hypopharynx,  the true cords and the subglottic airway are normal in appearance.  Thyroid gland is unremarkable. The lung apices are clear. The parotid,  , parapharyngeal and submandibular spaces are symmetric and  normal in appearance. Patient has had previous anterior cervical  discectomy and fusion procedure from C5 to C7 with anterior plate and  screw fixation and solid bony fusion of C5 through C7 vertebrae.  There  are large anterior marginal bridging osteophytes at C4-5. At C3-4 there  is moderate-to-severe left facet overgrowth and there is bilateral  uncovertebral joint hypertrophy and there is moderate-to-severe left  bony foraminal narrowing.  There is anatomic origin of the great vessels  off the aortic arch. The left subclavian artery origin is normal in  appearance.  No stenosis is seen in the left subclavian artery.   Calcified plaque mildly narrows the left vertebral artery origin.   Beyond its origin the left vertebral artery is widely patent to the  vertebrobasilar junction without additional stenosis. The left common  carotid is normal in appearance. No stenosis is seen in the left common  carotid artery. There is calcified plaque involving the left internal  carotid artery that results in a mild 30% stenosis of the origin of the  cervical segment of the left internal carotid artery using the NASCET  criteria. The brachiocephalic artery origin is normal in appearance.  No  stenosis is seen in the brachiocephalic artery and its bifurcation into  the  right subclavian, and common carotid arteries is normal in  appearance.  No stenosis is seen in the right subclavian artery. There  is heavily calcified plaque that moderately narrows the right vertebral  artery origin.  Beyond its origin the right vertebral artery is widely  patent without additional stenosis to the vertebrobasilar junction. The  right common carotid origin is normal in appearance and no stenosis is  seen in the right common carotid artery. There is mixed calcified and  noncalcified plaque that involves the right common carotid bifurcation  but does not narrow the bifurcation.  Plaque extends into the origin of  the right internal carotid artery but there is essentially no stenosis  in the cervical segment of the right internal carotid artery using the  NASCET criteria.        CT ANGIOGRAM OF THE HEAD: The intracranial segments of the distal  vertebral arteries are widely patent without stenosis to the  vertebrobasilar junction. The basilar artery and the basilar tip is  normal in appearance. The posterior cerebral arteries are normal in  appearance.  The upper cervical, petrous, cavernous and supracavernous  segments of the internal carotid arteries are within normal limits.  The  A1 segment of the left anterior cerebral artery is somewhat hypoplastic  but is widely patent without stenosis.  The right A1 segment is normal  in appearance.  The anterior communicating artery origin and A2 segments  of the anterior cerebral arteries are normal in appearance. The M1  segments of the middle cerebral arteries and the middle cerebral artery  bifurcations are normal in appearance.  There is good filling of the M2  segments within the sylvian fissures.       Impression:      1. CT of the head demonstrates moderate small vessel disease in the  cerebral white matter and there is diffuse cerebral atrophy and the  lateral and third ventricles are mildly prominent in size likely on the  basis of central volume  loss or atrophy. There are bilateral intraocular  lens implants in place in the globes from previous cataract surgery.   The remainder of the head CT is within normal limits with no discernible  acute completed infarct and no intracranial hemorrhage identified and  the etiology of the patient's confusion and leg weakness and difficulty  walking is not established on this exam.  If there remains any clinical  suspicion of an acute infarct I recommend an MRI of the brain for more  complete assessment.   2. Patient has had a prior anterior cervical discectomy and fusion  procedure from C5 to C7 with anterior plate and screw fixation.  There  is moderate-to-severe left facet overgrowth at C3-4 with  moderate-to-severe left bony foraminal narrowing at C3-4.   4. Calcified plaque mildly narrows the left vertebral artery origin and  calcified plaque moderately narrows the right vertebral artery origin.   Beyond their origins the vertebral arteries are widely patent without  additional stenosis.   5. Noncalcified plaque mildly narrows the origin of the cervical segment  of the left internal carotid artery by up to 30% using the NASCET  criteria.  No stenosis is seen in the cervical segment of the right  internal carotid artery and the remainder of the CT angiogram of the  head and neck is normal.          Radiation dose reduction techniques were utilized, including automated  exposure control and exposure modulation based on body size.          CT Angiogram Head [[id number]] Collected: 02/20/24 1428     Updated: 02/20/24 1428    Narrative:      CONTRAST-ENHANCED CT ANGIOGRAM OF THE HEAD AND NECK ON 02/20/2024     CLINICAL HISTORY: Patient had a period of confusion yesterday and woke  up today with headache and mild confusion and some leg weakness and  difficulty walking.      TECHNIQUE: Spiral CT images were obtained from the base of the skull to  the vertex both pre-intravenous and postintravenous contrast and images  were  reformatted and submitted in 3 mm thick axial, sagittal and coronal  CT sections with brain algorithm. Additional spiral CT angiogram images  were obtained from the top of the aortic arch up through the great  vessels of the head and neck during the arterial phase of contrast and  images were reformatted and submitted in 1 mm thick axial, sagittal and  coronal CT sections with soft tissue algorithm and 3D reconstructions  were performed to complete the CT angiogram of the head and neck.      There are no prior CT angiograms of the head and neck for comparison.     FINDINGS:     HEAD CT: There are patchy areas of low-density extending from the  periventricular into the subcortical white matter of the cerebral  hemispheres consistent with moderate small vessel disease. The remainder  of the brain parenchyma is normal in attenuation. There is mild diffuse  cerebral atrophy. The lateral and third ventricles are mildly prominent  in size felt to be on the basis of central volume loss or atrophy. I see  no focal mass effect. There is no midline shift. No extra-axial fluid  collections are identified. There is no evidence of acute intracranial  hemorrhage.  No abnormal areas of enhancement are seen in the head. The  calvarium and the skull base are normal in appearance. There is a 1 cm  mucous retention cyst in the inferior medial right maxillary sinus and  mild inferior right maxillary sinus mucosal thickening. The remainder of  the paranasal sinuses and the mastoid air cells and middle ear cavities  are clear. Bilateral intraocular lens implants are in place in the  globes from previous bilateral cataract surgery, otherwise the orbits  are unremarkable.         CT ANGIOGRAM OF THE NECK: The nasopharynx, oropharynx, the hypopharynx,  the true cords and the subglottic airway are normal in appearance.  Thyroid gland is unremarkable. The lung apices are clear. The parotid,  , parapharyngeal and submandibular spaces  are symmetric and  normal in appearance. Patient has had previous anterior cervical  discectomy and fusion procedure from C5 to C7 with anterior plate and  screw fixation and solid bony fusion of C5 through C7 vertebrae.  There  are large anterior marginal bridging osteophytes at C4-5. At C3-4 there  is moderate-to-severe left facet overgrowth and there is bilateral  uncovertebral joint hypertrophy and there is moderate-to-severe left  bony foraminal narrowing.  There is anatomic origin of the great vessels  off the aortic arch. The left subclavian artery origin is normal in  appearance.  No stenosis is seen in the left subclavian artery.   Calcified plaque mildly narrows the left vertebral artery origin.   Beyond its origin the left vertebral artery is widely patent to the  vertebrobasilar junction without additional stenosis. The left common  carotid is normal in appearance. No stenosis is seen in the left common  carotid artery. There is calcified plaque involving the left internal  carotid artery that results in a mild 30% stenosis of the origin of the  cervical segment of the left internal carotid artery using the NASCET  criteria. The brachiocephalic artery origin is normal in appearance.  No  stenosis is seen in the brachiocephalic artery and its bifurcation into  the right subclavian, and common carotid arteries is normal in  appearance.  No stenosis is seen in the right subclavian artery. There  is heavily calcified plaque that moderately narrows the right vertebral  artery origin.  Beyond its origin the right vertebral artery is widely  patent without additional stenosis to the vertebrobasilar junction. The  right common carotid origin is normal in appearance and no stenosis is  seen in the right common carotid artery. There is mixed calcified and  noncalcified plaque that involves the right common carotid bifurcation  but does not narrow the bifurcation.  Plaque extends into the origin of  the right  internal carotid artery but there is essentially no stenosis  in the cervical segment of the right internal carotid artery using the  NASCET criteria.        CT ANGIOGRAM OF THE HEAD: The intracranial segments of the distal  vertebral arteries are widely patent without stenosis to the  vertebrobasilar junction. The basilar artery and the basilar tip is  normal in appearance. The posterior cerebral arteries are normal in  appearance.  The upper cervical, petrous, cavernous and supracavernous  segments of the internal carotid arteries are within normal limits.  The  A1 segment of the left anterior cerebral artery is somewhat hypoplastic  but is widely patent without stenosis.  The right A1 segment is normal  in appearance.  The anterior communicating artery origin and A2 segments  of the anterior cerebral arteries are normal in appearance. The M1  segments of the middle cerebral arteries and the middle cerebral artery  bifurcations are normal in appearance.  There is good filling of the M2  segments within the sylvian fissures.       Impression:      1. CT of the head demonstrates moderate small vessel disease in the  cerebral white matter and there is diffuse cerebral atrophy and the  lateral and third ventricles are mildly prominent in size likely on the  basis of central volume loss or atrophy. There are bilateral intraocular  lens implants in place in the globes from previous cataract surgery.   The remainder of the head CT is within normal limits with no discernible  acute completed infarct and no intracranial hemorrhage identified and  the etiology of the patient's confusion and leg weakness and difficulty  walking is not established on this exam.  If there remains any clinical  suspicion of an acute infarct I recommend an MRI of the brain for more  complete assessment.   2. Patient has had a prior anterior cervical discectomy and fusion  procedure from C5 to C7 with anterior plate and screw fixation.  There  is  moderate-to-severe left facet overgrowth at C3-4 with  moderate-to-severe left bony foraminal narrowing at C3-4.   4. Calcified plaque mildly narrows the left vertebral artery origin and  calcified plaque moderately narrows the right vertebral artery origin.   Beyond their origins the vertebral arteries are widely patent without  additional stenosis.   5. Noncalcified plaque mildly narrows the origin of the cervical segment  of the left internal carotid artery by up to 30% using the NASCET  criteria.  No stenosis is seen in the cervical segment of the right  internal carotid artery and the remainder of the CT angiogram of the  head and neck is normal.          Radiation dose reduction techniques were utilized, including automated  exposure control and exposure modulation based on body size.                  I have reviewed the medications.  ---------------------------------------------------------------------------------------------  Assessment & Plan   Assessment/Problem List    Stroke-like symptoms      Plan:  Strokelike symptoms  -CT head shows moderate white matter disease  -CTA of the head and neck shows 30% right carotid stenosis, right greater than left P1 segment narrowing  -MRI of the brain ordered  -Neurology consulted  -A1c, TSH, lipid panel pending  -Neurochecks every 4 hours  -Aspirin/statin  -Telemetry monitoring  -IV fluids     Coronary artery disease  Hypertension  Hyperlipidemia  -Continue aspirin and statin  -Continue metoprolol, delaying next dose until tomorrow morning for permissive hypertension until MRI results  -Monitor blood pressure with vital signs every 4 hours     COPD  -No acute exacerbation noted  -Albuterol as needed     Type 2 diabetes  -Continue home basal insulin  -Sliding scale insulin and Accu-Cheks with meals and at bedtime  -A1c 8.8      This note will serve as a progress note    Clara Mauro, APRN 02/21/24 05:24 EST    I have worn appropriate PPE during this patient  encounter, sanitized my hands both with entering and exiting patient's room.

## 2024-02-21 NOTE — PLAN OF CARE
Goal Outcome Evaluation:  Plan of Care Reviewed With: patient        Progress: no change  Outcome Evaluation: pt remains in ED obs. Here with stroke like symptoms. Pt with neuro checks q4h, Pt is alert/oriented, pupils PERRLA. moves extremities equal no signs of weakness. This RN felt patient had some Mild difficulty with finding some words at times but pt and family at bedside reports he is back to his baseline. Pt was Hypertensive at the start of the shift with 's, notifief provider at bedside. pt had a HA and MD wanted to treat with HA medicaiton cocktail first. After, noted decrease in SBP to 160's. Pt reports relief in HA with one time HA cocktail and PRN tylenol. HR, SPO2 and RR stable. Pt up to bathroom independently. IVF infusing at 75ml/hr per orders. Neurology consulted.

## 2024-02-21 NOTE — PROGRESS NOTES
"DOS: 2024  NAME: Earl Marc   : 1945  PCP: Avery Velazquez MD  Chief Complaint   Patient presents with    Neuro Deficit(s)     Chief complaint: altered mental status  Subjective: no recurrent episode of aphasia or confusion    Objective:  Vital signs: /82 (BP Location: Right arm, Patient Position: Lying)   Pulse 73   Temp 97.6 °F (36.4 °C) (Oral)   Resp 18   Ht 185.4 cm (73\")   Wt 101 kg (222 lb)   SpO2 99%   BMI 29.29 kg/m²    Gen: NAD, vitals reviewed  MS: oriented x3, recent/remote memory intact, normal attention/concentration, language intact, no neglect.  CN: visual acuity grossly normal, pupils equal, conjugate gaze, no facial asymmetry, no dysarthria  Motor: moving all 4 ext.    Laboratory results:  Lab Results   Component Value Date    GLUCOSE 176 (H) 2024    CALCIUM 9.1 2024     2024    K 3.8 2024    CO2 26.0 2024     2024    BUN 14 2024    CREATININE 1.22 2024    EGFRIFAFRI 63 10/06/2021    EGFRIFNONA 52 (L) 10/06/2021    BCR 11.5 2024    ANIONGAP 9.0 2024     Lab Results   Component Value Date    WBC 7.94 2024    HGB 16.0 2024    HCT 48.3 2024    MCV 87.0 2024     2024     Lab Results   Component Value Date    LDL 17 2024    LDL 22 01/15/2024    LDL 29 2023         Lab 24  1141   HEMOGLOBIN A1C 8.80*        Review of labs: HBA1C 8.8%, LDL 17    Review and interpretation of imaging: I personally reviewed his MRI and CTA performed yesterday.  MRI brain shows no acute abnormality, mild small vessel disease.  CTA shows mild left carotid stenosis.  Radiology reports reviewed.    Diagnoses:  Word finding difficulty  New onset headache  Insulin-dependent diabetes with diabetic peripheral neuropathy  Essential hypertension  Coronary artery disease  Functional tremor    Comment: Suspected migrainous aphasia but ultimately the cause of his acute " confusional spell is unknown.  MRI and CTA reassuring.    Plan:  1.  Aspirin, statin  2.  Needs better glycemic control    Routine follow-up with me in the office in 3 months    Management discussed with Dr. Wilkinson.

## 2024-02-21 NOTE — DISCHARGE SUMMARY
ED OBSERVATION PROGRESS/DISCHARGE SUMMARY    Date of Admission: 2/20/2024   LOS: 0 days   PCP: Avery Velazquez MD    Subjective  patient reports feeling improved today, states he did have a mild headache this morning.    Hospital Outcome: 78-year-old male admitted to the observation unit for further evaluation of word finding difficulty.  Patient was seen by neurology yesterday, recommended MRI brain.  MRI brain is negative acute.  Patient received migraine cocktail overnight of Benadryl 25 mg and Compazine 10 mg.    2/21: Patient was seen by neurology again today, discussed with Dr. Danielle.  No medication changes at this time, patient to follow-up with neurology outpatient.  Usual return to ER precautions discussed with patient who expresses understanding and is in agreement with plan.    ROS:  General: no fevers, chills  Respiratory: no cough, dyspnea  Cardiovascular: no chest pain, palpitations  Abdomen: No abdominal pain, nausea, vomiting, or diarrhea  Neurologic: No focal weakness    Objective   Physical Exam:  I have reviewed the vital signs.  Temp:  [97.1 °F (36.2 °C)-98.1 °F (36.7 °C)] 97.3 °F (36.3 °C)  Heart Rate:  [61-85] 69  Resp:  [16-18] 18  BP: (161-191)/(65-89) 161/85  General Appearance:    Alert, cooperative, no distress  Head:    Normocephalic, atraumatic  Eyes:    Sclerae anicteric  Neck:   Supple, no mass  Lungs: Clear to auscultation bilaterally, respirations unlabored  Heart: Regular rate and rhythm, S1 and S2 normal, no murmur, rub or gallop  Abdomen:  Soft, nontender, bowel sounds active, nondistended  Extremities: No clubbing, cyanosis, or edema to lower extremities  Pulses:  2+ and symmetric in distal lower extremities  Skin: No rashes   Neurologic: Oriented x3, Normal strength to extremities    Results Review:    I have reviewed the labs, radiology results and diagnostic studies.    Results from last 7 days   Lab Units 02/21/24  0341   WBC 10*3/mm3 7.94   HEMOGLOBIN g/dL 16.0    HEMATOCRIT % 48.3   PLATELETS 10*3/mm3 198     Results from last 7 days   Lab Units 02/21/24  0341 02/20/24  1141   SODIUM mmol/L 138 136   POTASSIUM mmol/L 3.8 4.2   CHLORIDE mmol/L 103 102   CO2 mmol/L 26.0 21.6*   BUN mg/dL 14 14   CREATININE mg/dL 1.22 1.34*   CALCIUM mg/dL 9.1 9.5   BILIRUBIN mg/dL  --  0.6   ALK PHOS U/L  --  79   ALT (SGPT) U/L  --  23   AST (SGOT) U/L  --  17   GLUCOSE mg/dL 176* 243*     Imaging Results (Last 24 Hours)       Procedure Component Value Units Date/Time    CT Angiogram Neck [[id number]] Collected: 02/20/24 1428     Updated: 02/21/24 0659    Narrative:      CONTRAST-ENHANCED CT ANGIOGRAM OF THE HEAD AND NECK ON 02/20/2024     CLINICAL HISTORY: Patient had a period of confusion yesterday and woke  up today with headache and mild confusion and some leg weakness and  difficulty walking.      TECHNIQUE: Spiral CT images were obtained from the base of the skull to  the vertex both pre-intravenous and postintravenous contrast and images  were reformatted and submitted in 3 mm thick axial, sagittal and coronal  CT sections with brain algorithm. Additional spiral CT angiogram images  were obtained from the top of the aortic arch up through the great  vessels of the head and neck during the arterial phase of contrast and  images were reformatted and submitted in 1 mm thick axial, sagittal and  coronal CT sections with soft tissue algorithm and 3D reconstructions  were performed to complete the CT angiogram of the head and neck.      There are no prior CT angiograms of the head and neck for comparison.     FINDINGS:     HEAD CT: There are patchy areas of low-density extending from the  periventricular into the subcortical white matter of the cerebral  hemispheres consistent with moderate small vessel disease. The remainder  of the brain parenchyma is normal in attenuation. There is mild diffuse  cerebral atrophy. The lateral and third ventricles are mildly prominent  in size felt to be  on the basis of central volume loss or atrophy. I see  no focal mass effect. There is no midline shift. No extra-axial fluid  collections are identified. There is no evidence of acute intracranial  hemorrhage.  No abnormal areas of enhancement are seen in the head. The  calvarium and the skull base are normal in appearance. There is a 1 cm  mucous retention cyst in the inferior medial right maxillary sinus and  mild inferior right maxillary sinus mucosal thickening. The remainder of  the paranasal sinuses and the mastoid air cells and middle ear cavities  are clear. Bilateral intraocular lens implants are in place in the  globes from previous bilateral cataract surgery, otherwise the orbits  are unremarkable.         CT ANGIOGRAM OF THE NECK: The nasopharynx, oropharynx, the hypopharynx,  the true cords and the subglottic airway are normal in appearance.  Thyroid gland is unremarkable. The lung apices are clear. The parotid,  , parapharyngeal and submandibular spaces are symmetric and  normal in appearance. Patient has had previous anterior cervical  discectomy and fusion procedure from C5 to C7 with anterior plate and  screw fixation and solid bony fusion of C5 through C7 vertebrae.  There  are large anterior marginal bridging osteophytes at C4-5. At C3-4 there  is moderate-to-severe left facet overgrowth and there is bilateral  uncovertebral joint hypertrophy and there is moderate-to-severe left  bony foraminal narrowing.  There is anatomic origin of the great vessels  off the aortic arch. The left subclavian artery origin is normal in  appearance.  No stenosis is seen in the left subclavian artery.   Calcified plaque mildly narrows the left vertebral artery origin.   Beyond its origin the left vertebral artery is widely patent to the  vertebrobasilar junction without additional stenosis. The left common  carotid is normal in appearance. No stenosis is seen in the left common  carotid artery. There is  calcified plaque involving the left internal  carotid artery that results in a mild 30% stenosis of the origin of the  cervical segment of the left internal carotid artery using the NASCET  criteria. The brachiocephalic artery origin is normal in appearance.  No  stenosis is seen in the brachiocephalic artery and its bifurcation into  the right subclavian, and common carotid arteries is normal in  appearance.  No stenosis is seen in the right subclavian artery. There  is heavily calcified plaque that moderately narrows the right vertebral  artery origin.  Beyond its origin the right vertebral artery is widely  patent without additional stenosis to the vertebrobasilar junction. The  right common carotid origin is normal in appearance and no stenosis is  seen in the right common carotid artery. There is mixed calcified and  noncalcified plaque that involves the right common carotid bifurcation  but does not narrow the bifurcation.  Plaque extends into the origin of  the right internal carotid artery but there is essentially no stenosis  in the cervical segment of the right internal carotid artery using the  NASCET criteria.        CT ANGIOGRAM OF THE HEAD: The intracranial segments of the distal  vertebral arteries are widely patent without stenosis to the  vertebrobasilar junction. The basilar artery and the basilar tip is  normal in appearance. The posterior cerebral arteries are normal in  appearance.  The upper cervical, petrous, cavernous and supracavernous  segments of the internal carotid arteries are within normal limits.  The  A1 segment of the left anterior cerebral artery is somewhat hypoplastic  but is widely patent without stenosis.  The right A1 segment is normal  in appearance.  The anterior communicating artery origin and A2 segments  of the anterior cerebral arteries are normal in appearance. The M1  segments of the middle cerebral arteries and the middle cerebral artery  bifurcations are normal in  appearance.  There is good filling of the M2  segments within the sylvian fissures.       Impression:      1. CT of the head demonstrates moderate small vessel disease in the  cerebral white matter and there is diffuse cerebral atrophy and the  lateral and third ventricles are mildly prominent in size likely on the  basis of central volume loss or atrophy. There are bilateral intraocular  lens implants in place in the globes from previous cataract surgery.   The remainder of the head CT is within normal limits with no discernible  acute completed infarct and no intracranial hemorrhage identified and  the etiology of the patient's confusion and leg weakness and difficulty  walking is not established on this exam.  If there remains any clinical  suspicion of an acute infarct, I recommend an MRI of the brain for a  more complete assessment.   2. Patient has had a prior anterior cervical discectomy and fusion  procedure from C5 to C7 with anterior plate and screw fixation.  There  is moderate-to-severe left facet overgrowth at C3-4 with  moderate-to-severe left bony foraminal narrowing at C3-4.   4. Calcified plaque mildly narrows the left vertebral artery origin and  calcified plaque moderately narrows the right vertebral artery origin.   Beyond their origins the vertebral arteries are widely patent without  additional stenosis.   5. Noncalcified plaque mildly narrows the origin of the cervical segment  of the left internal carotid artery by up to 30% using the NASCET  criteria.  No stenosis is seen in the cervical segment of the right  internal carotid artery and the remainder of the CT angiogram of the  head and neck is normal.       Radiation dose reduction techniques were utilized, including automated  exposure control and exposure modulation based on body size.        This report was finalized on 2/21/2024 6:56 AM by Dr. Brice Ziegler M.D  on Workstation: [id number]       CT Angiogram Head [[id number]] Collected:  02/20/24 1428     Updated: 02/21/24 0659    Narrative:      CONTRAST-ENHANCED CT ANGIOGRAM OF THE HEAD AND NECK ON 02/20/2024     CLINICAL HISTORY: Patient had a period of confusion yesterday and woke  up today with headache and mild confusion and some leg weakness and  difficulty walking.      TECHNIQUE: Spiral CT images were obtained from the base of the skull to  the vertex both pre-intravenous and postintravenous contrast and images  were reformatted and submitted in 3 mm thick axial, sagittal and coronal  CT sections with brain algorithm. Additional spiral CT angiogram images  were obtained from the top of the aortic arch up through the great  vessels of the head and neck during the arterial phase of contrast and  images were reformatted and submitted in 1 mm thick axial, sagittal and  coronal CT sections with soft tissue algorithm and 3D reconstructions  were performed to complete the CT angiogram of the head and neck.      There are no prior CT angiograms of the head and neck for comparison.     FINDINGS:     HEAD CT: There are patchy areas of low-density extending from the  periventricular into the subcortical white matter of the cerebral  hemispheres consistent with moderate small vessel disease. The remainder  of the brain parenchyma is normal in attenuation. There is mild diffuse  cerebral atrophy. The lateral and third ventricles are mildly prominent  in size felt to be on the basis of central volume loss or atrophy. I see  no focal mass effect. There is no midline shift. No extra-axial fluid  collections are identified. There is no evidence of acute intracranial  hemorrhage.  No abnormal areas of enhancement are seen in the head. The  calvarium and the skull base are normal in appearance. There is a 1 cm  mucous retention cyst in the inferior medial right maxillary sinus and  mild inferior right maxillary sinus mucosal thickening. The remainder of  the paranasal sinuses and the mastoid air cells and  middle ear cavities  are clear. Bilateral intraocular lens implants are in place in the  globes from previous bilateral cataract surgery, otherwise the orbits  are unremarkable.         CT ANGIOGRAM OF THE NECK: The nasopharynx, oropharynx, the hypopharynx,  the true cords and the subglottic airway are normal in appearance.  Thyroid gland is unremarkable. The lung apices are clear. The parotid,  , parapharyngeal and submandibular spaces are symmetric and  normal in appearance. Patient has had previous anterior cervical  discectomy and fusion procedure from C5 to C7 with anterior plate and  screw fixation and solid bony fusion of C5 through C7 vertebrae.  There  are large anterior marginal bridging osteophytes at C4-5. At C3-4 there  is moderate-to-severe left facet overgrowth and there is bilateral  uncovertebral joint hypertrophy and there is moderate-to-severe left  bony foraminal narrowing.  There is anatomic origin of the great vessels  off the aortic arch. The left subclavian artery origin is normal in  appearance.  No stenosis is seen in the left subclavian artery.   Calcified plaque mildly narrows the left vertebral artery origin.   Beyond its origin the left vertebral artery is widely patent to the  vertebrobasilar junction without additional stenosis. The left common  carotid is normal in appearance. No stenosis is seen in the left common  carotid artery. There is calcified plaque involving the left internal  carotid artery that results in a mild 30% stenosis of the origin of the  cervical segment of the left internal carotid artery using the NASCET  criteria. The brachiocephalic artery origin is normal in appearance.  No  stenosis is seen in the brachiocephalic artery and its bifurcation into  the right subclavian, and common carotid arteries is normal in  appearance.  No stenosis is seen in the right subclavian artery. There  is heavily calcified plaque that moderately narrows the right  vertebral  artery origin.  Beyond its origin the right vertebral artery is widely  patent without additional stenosis to the vertebrobasilar junction. The  right common carotid origin is normal in appearance and no stenosis is  seen in the right common carotid artery. There is mixed calcified and  noncalcified plaque that involves the right common carotid bifurcation  but does not narrow the bifurcation.  Plaque extends into the origin of  the right internal carotid artery but there is essentially no stenosis  in the cervical segment of the right internal carotid artery using the  NASCET criteria.        CT ANGIOGRAM OF THE HEAD: The intracranial segments of the distal  vertebral arteries are widely patent without stenosis to the  vertebrobasilar junction. The basilar artery and the basilar tip is  normal in appearance. The posterior cerebral arteries are normal in  appearance.  The upper cervical, petrous, cavernous and supracavernous  segments of the internal carotid arteries are within normal limits.  The  A1 segment of the left anterior cerebral artery is somewhat hypoplastic  but is widely patent without stenosis.  The right A1 segment is normal  in appearance.  The anterior communicating artery origin and A2 segments  of the anterior cerebral arteries are normal in appearance. The M1  segments of the middle cerebral arteries and the middle cerebral artery  bifurcations are normal in appearance.  There is good filling of the M2  segments within the sylvian fissures.       Impression:      1. CT of the head demonstrates moderate small vessel disease in the  cerebral white matter and there is diffuse cerebral atrophy and the  lateral and third ventricles are mildly prominent in size likely on the  basis of central volume loss or atrophy. There are bilateral intraocular  lens implants in place in the globes from previous cataract surgery.   The remainder of the head CT is within normal limits with no  discernible  acute completed infarct and no intracranial hemorrhage identified and  the etiology of the patient's confusion and leg weakness and difficulty  walking is not established on this exam.  If there remains any clinical  suspicion of an acute infarct, I recommend an MRI of the brain for a  more complete assessment.   2. Patient has had a prior anterior cervical discectomy and fusion  procedure from C5 to C7 with anterior plate and screw fixation.  There  is moderate-to-severe left facet overgrowth at C3-4 with  moderate-to-severe left bony foraminal narrowing at C3-4.   4. Calcified plaque mildly narrows the left vertebral artery origin and  calcified plaque moderately narrows the right vertebral artery origin.   Beyond their origins the vertebral arteries are widely patent without  additional stenosis.   5. Noncalcified plaque mildly narrows the origin of the cervical segment  of the left internal carotid artery by up to 30% using the NASCET  criteria.  No stenosis is seen in the cervical segment of the right  internal carotid artery and the remainder of the CT angiogram of the  head and neck is normal.       Radiation dose reduction techniques were utilized, including automated  exposure control and exposure modulation based on body size.        This report was finalized on 2/21/2024 6:56 AM by Dr. Brice Ziegler M.D  on Workstation: [id number]       MRI Brain Without Contrast [[id number]] Collected: 02/20/24 1853     Updated: 02/20/24 1908    Narrative:      MRI BRAIN WO CONTRAST-     INDICATIONS: Aphasia, headache     TECHNIQUE: Multiplanar multisequence noncontrast magnetic resonance  imaging of the brain.     COMPARISON: CT from same date.     FINDINGS:     The diffusion-weighted images show no restricted diffusion to suggest  acute infarct.     The midline structures appear unremarkable.     The brain parenchyma shows mild to moderate periventricular white matter  T2 FLAIR signal hyperintensities,  compatible with chronic small vessel  ischemic change in a patient this age..     Flow voids in the major arteries at the base of the brain appear  unremarkable.     Likely mucous retention cyst in right maxillary sinus. The paranasal  sinuses, mastoid air cells, and orbits appear otherwise unremarkable.       Impression:         No acute infarct. Mild to moderate periventricular white matter chronic  small vessel ischemic change.     This report was finalized on 2/20/2024 7:05 PM by Dr. Earl Goodman M.D on Workstation: [id number]               I have reviewed the medications.  ---------------------------------------------------------------------------------------------  Assessment & Plan   Assessment/Problem List    Stroke-like symptoms      Plan:    Strokelike symptoms  -CT head shows moderate white matter disease  -CTA of the head and neck shows 30% right carotid stenosis, right greater than left P1 segment narrowing  -MRI of the brain negative acute  -A1c, TSH, lipid panel pending  -Neurochecks every 4 hours  -Aspirin/statin  -Telemetry monitoring  -IV fluids  -Neurology consult, Dr. Danielle  -No medication changes at this time, follow-up outpatient with neurology in 3 months     Coronary artery disease  Hypertension  Hyperlipidemia  -Continue aspirin and statin  -Continue metoprolol, delaying next dose until tomorrow morning for permissive hypertension until MRI results  -Monitor blood pressure with vital signs every 4 hours     COPD  -No acute exacerbation noted  -Albuterol as needed     Type 2 diabetes  -Continue home basal insulin  -Sliding scale insulin and Accu-Cheks with meals and at bedtime  -A1c pending    Disposition: Home    Follow-up after Discharge: PCP, neurology    33 minutes has been spent by [facility] providers in the care of this patient while under observation status     This note will serve as discharge summary    CARINA Post 02/21/24 11:20  EST    I have worn appropriate PPE during this patient encounter and sanitized my hands with entering and exiting patient room.

## 2024-02-21 NOTE — PLAN OF CARE
Goal Outcome Evaluation:     Patient is A&O x's 4, headache and all other complaints have resolved. Reviewed d/c follow up and medications, no further questions noted. Family will be driving patient home.

## 2024-02-21 NOTE — PROGRESS NOTES
TANNER BILLINGSLEY ATTESTATION NOTE    The JOCELYN and I have discussed this patient's history, physical exam, and treatment plan.  I have reviewed the documentation and personally had a face to face interaction with the patient. I affirm the documentation and agree with the treatment and plan.  The attached note describes my personal findings.      I provided a substantive portion of the care of this patient. I personally performed the physical exam, in its entirety.    Earl Marc is a 78 y.o. male who presented to the emergency department yesterday complaining of strokelike symptoms.  The patient had CTAs of the head and neck.  He was admitted to the observation unit for further evaluation.  He had an MRI of the brain which was negative.  He developed a headache after developing the neurologic symptoms.  Neurology is consulted and is concerned that his symptoms were related to migraine headache.  The patient reports he is currently back to normal.  Neurology has evaluated the patient and is okay with him going home today.  He is to follow-up with neurology as an outpatient.  He is to return immediately if he develops any new neurologic symptoms.      On exam:  GENERAL: Awake, alert, no acute distress  SKIN: Warm, dry  HENT: Normocephalic, atraumatic  EYES: no scleral icterus  CV: regular rhythm, regular rate  RESPIRATORY: normal effort, lungs clear  ABDOMEN: soft, nontender, nondistended  MUSCULOSKELETAL: no deformity  NEURO: alert, moves all extremities, follows commands        Labs  Recent Results (from the past 24 hour(s))   Comprehensive Metabolic Panel    Collection Time: 02/20/24 11:41 AM    Specimen: Blood   Result Value Ref Range    Glucose 243 (H) 65 - 99 mg/dL    BUN 14 8 - 23 mg/dL    Creatinine 1.34 (H) 0.76 - 1.27 mg/dL    Sodium 136 136 - 145 mmol/L    Potassium 4.2 3.5 - 5.2 mmol/L    Chloride 102 98 - 107 mmol/L    CO2 21.6 (L) 22.0 - 29.0 mmol/L    Calcium 9.5 8.6 - 10.5 mg/dL    Total Protein 7.0 6.0 - 8.5  g/dL    Albumin 4.2 3.5 - 5.2 g/dL    ALT (SGPT) 23 1 - 41 U/L    AST (SGOT) 17 1 - 40 U/L    Alkaline Phosphatase 79 39 - 117 U/L    Total Bilirubin 0.6 0.0 - 1.2 mg/dL    Globulin 2.8 gm/dL    A/G Ratio 1.5 g/dL    BUN/Creatinine Ratio 10.4 7.0 - 25.0    Anion Gap 12.4 5.0 - 15.0 mmol/L    eGFR 54.2 (L) >60.0 mL/min/1.73   Sedimentation Rate    Collection Time: 02/20/24 11:41 AM    Specimen: Blood   Result Value Ref Range    Sed Rate 13 0 - 20 mm/hr   CBC Auto Differential    Collection Time: 02/20/24 11:41 AM    Specimen: Blood   Result Value Ref Range    WBC 7.22 3.40 - 10.80 10*3/mm3    RBC 5.91 (H) 4.14 - 5.80 10*6/mm3    Hemoglobin 17.0 13.0 - 17.7 g/dL    Hematocrit 51.0 37.5 - 51.0 %    MCV 86.3 79.0 - 97.0 fL    MCH 28.8 26.6 - 33.0 pg    MCHC 33.3 31.5 - 35.7 g/dL    RDW 12.3 12.3 - 15.4 %    RDW-SD 38.2 37.0 - 54.0 fl    MPV 9.9 6.0 - 12.0 fL    Platelets 206 140 - 450 10*3/mm3    Neutrophil % 64.5 42.7 - 76.0 %    Lymphocyte % 21.7 19.6 - 45.3 %    Monocyte % 8.3 5.0 - 12.0 %    Eosinophil % 4.4 0.3 - 6.2 %    Basophil % 0.7 0.0 - 1.5 %    Immature Grans % 0.4 0.0 - 0.5 %    Neutrophils, Absolute 4.65 1.70 - 7.00 10*3/mm3    Lymphocytes, Absolute 1.57 0.70 - 3.10 10*3/mm3    Monocytes, Absolute 0.60 0.10 - 0.90 10*3/mm3    Eosinophils, Absolute 0.32 0.00 - 0.40 10*3/mm3    Basophils, Absolute 0.05 0.00 - 0.20 10*3/mm3    Immature Grans, Absolute 0.03 0.00 - 0.05 10*3/mm3    nRBC 0.0 0.0 - 0.2 /100 WBC   Lipid Panel    Collection Time: 02/20/24 11:41 AM    Specimen: Blood   Result Value Ref Range    Total Cholesterol 79 0 - 200 mg/dL    Triglycerides 130 0 - 150 mg/dL    HDL Cholesterol 39 (L) 40 - 60 mg/dL    LDL Cholesterol  17 0 - 100 mg/dL    VLDL Cholesterol 23 5 - 40 mg/dL    LDL/HDL Ratio 0.36    TSH    Collection Time: 02/20/24 11:41 AM    Specimen: Blood   Result Value Ref Range    TSH 2.170 0.270 - 4.200 uIU/mL   Hemoglobin A1c    Collection Time: 02/20/24 11:41 AM    Specimen: Blood   Result  Value Ref Range    Hemoglobin A1C 8.80 (H) 4.80 - 5.60 %   ECG 12 Lead Stroke Evaluation    Collection Time: 02/20/24  1:56 PM   Result Value Ref Range    QT Interval 356 ms    QTC Interval 379 ms   CBC (No Diff)    Collection Time: 02/21/24  3:41 AM    Specimen: Hand, Right; Blood   Result Value Ref Range    WBC 7.94 3.40 - 10.80 10*3/mm3    RBC 5.55 4.14 - 5.80 10*6/mm3    Hemoglobin 16.0 13.0 - 17.7 g/dL    Hematocrit 48.3 37.5 - 51.0 %    MCV 87.0 79.0 - 97.0 fL    MCH 28.8 26.6 - 33.0 pg    MCHC 33.1 31.5 - 35.7 g/dL    RDW 12.3 12.3 - 15.4 %    RDW-SD 38.7 37.0 - 54.0 fl    MPV 9.9 6.0 - 12.0 fL    Platelets 198 140 - 450 10*3/mm3   Basic Metabolic Panel    Collection Time: 02/21/24  3:41 AM    Specimen: Hand, Right; Blood   Result Value Ref Range    Glucose 176 (H) 65 - 99 mg/dL    BUN 14 8 - 23 mg/dL    Creatinine 1.22 0.76 - 1.27 mg/dL    Sodium 138 136 - 145 mmol/L    Potassium 3.8 3.5 - 5.2 mmol/L    Chloride 103 98 - 107 mmol/L    CO2 26.0 22.0 - 29.0 mmol/L    Calcium 9.1 8.6 - 10.5 mg/dL    BUN/Creatinine Ratio 11.5 7.0 - 25.0    Anion Gap 9.0 5.0 - 15.0 mmol/L    eGFR 60.7 >60.0 mL/min/1.73       Radiology  CT Angiogram Neck, CT Angiogram Head    Result Date: 2/21/2024  CONTRAST-ENHANCED CT ANGIOGRAM OF THE HEAD AND NECK ON 02/20/2024  CLINICAL HISTORY: Patient had a period of confusion yesterday and woke up today with headache and mild confusion and some leg weakness and difficulty walking.  TECHNIQUE: Spiral CT images were obtained from the base of the skull to the vertex both pre-intravenous and postintravenous contrast and images were reformatted and submitted in 3 mm thick axial, sagittal and coronal CT sections with brain algorithm. Additional spiral CT angiogram images were obtained from the top of the aortic arch up through the great vessels of the head and neck during the arterial phase of contrast and images were reformatted and submitted in 1 mm thick axial, sagittal and coronal CT sections  with soft tissue algorithm and 3D reconstructions were performed to complete the CT angiogram of the head and neck.  There are no prior CT angiograms of the head and neck for comparison.  FINDINGS:  HEAD CT: There are patchy areas of low-density extending from the periventricular into the subcortical white matter of the cerebral hemispheres consistent with moderate small vessel disease. The remainder of the brain parenchyma is normal in attenuation. There is mild diffuse cerebral atrophy. The lateral and third ventricles are mildly prominent in size felt to be on the basis of central volume loss or atrophy. I see no focal mass effect. There is no midline shift. No extra-axial fluid collections are identified. There is no evidence of acute intracranial hemorrhage.  No abnormal areas of enhancement are seen in the head. The calvarium and the skull base are normal in appearance. There is a 1 cm mucous retention cyst in the inferior medial right maxillary sinus and mild inferior right maxillary sinus mucosal thickening. The remainder of the paranasal sinuses and the mastoid air cells and middle ear cavities are clear. Bilateral intraocular lens implants are in place in the globes from previous bilateral cataract surgery, otherwise the orbits are unremarkable.   CT ANGIOGRAM OF THE NECK: The nasopharynx, oropharynx, the hypopharynx, the true cords and the subglottic airway are normal in appearance. Thyroid gland is unremarkable. The lung apices are clear. The parotid, , parapharyngeal and submandibular spaces are symmetric and normal in appearance. Patient has had previous anterior cervical discectomy and fusion procedure from C5 to C7 with anterior plate and screw fixation and solid bony fusion of C5 through C7 vertebrae.  There are large anterior marginal bridging osteophytes at C4-5. At C3-4 there is moderate-to-severe left facet overgrowth and there is bilateral uncovertebral joint hypertrophy and there is  moderate-to-severe left bony foraminal narrowing.  There is anatomic origin of the great vessels off the aortic arch. The left subclavian artery origin is normal in appearance.  No stenosis is seen in the left subclavian artery. Calcified plaque mildly narrows the left vertebral artery origin. Beyond its origin the left vertebral artery is widely patent to the vertebrobasilar junction without additional stenosis. The left common carotid is normal in appearance. No stenosis is seen in the left common carotid artery. There is calcified plaque involving the left internal carotid artery that results in a mild 30% stenosis of the origin of the cervical segment of the left internal carotid artery using the NASCET criteria. The brachiocephalic artery origin is normal in appearance.  No stenosis is seen in the brachiocephalic artery and its bifurcation into the right subclavian, and common carotid arteries is normal in appearance.  No stenosis is seen in the right subclavian artery. There is heavily calcified plaque that moderately narrows the right vertebral artery origin.  Beyond its origin the right vertebral artery is widely patent without additional stenosis to the vertebrobasilar junction. The right common carotid origin is normal in appearance and no stenosis is seen in the right common carotid artery. There is mixed calcified and noncalcified plaque that involves the right common carotid bifurcation but does not narrow the bifurcation.  Plaque extends into the origin of the right internal carotid artery but there is essentially no stenosis in the cervical segment of the right internal carotid artery using the NASCET criteria.   CT ANGIOGRAM OF THE HEAD: The intracranial segments of the distal vertebral arteries are widely patent without stenosis to the vertebrobasilar junction. The basilar artery and the basilar tip is normal in appearance. The posterior cerebral arteries are normal in appearance.  The upper  cervical, petrous, cavernous and supracavernous segments of the internal carotid arteries are within normal limits.  The A1 segment of the left anterior cerebral artery is somewhat hypoplastic but is widely patent without stenosis.  The right A1 segment is normal in appearance.  The anterior communicating artery origin and A2 segments of the anterior cerebral arteries are normal in appearance. The M1 segments of the middle cerebral arteries and the middle cerebral artery bifurcations are normal in appearance.  There is good filling of the M2 segments within the sylvian fissures.      1. CT of the head demonstrates moderate small vessel disease in the cerebral white matter and there is diffuse cerebral atrophy and the lateral and third ventricles are mildly prominent in size likely on the basis of central volume loss or atrophy. There are bilateral intraocular lens implants in place in the globes from previous cataract surgery. The remainder of the head CT is within normal limits with no discernible acute completed infarct and no intracranial hemorrhage identified and the etiology of the patient's confusion and leg weakness and difficulty walking is not established on this exam.  If there remains any clinical suspicion of an acute infarct, I recommend an MRI of the brain for a more complete assessment. 2. Patient has had a prior anterior cervical discectomy and fusion procedure from C5 to C7 with anterior plate and screw fixation.  There is moderate-to-severe left facet overgrowth at C3-4 with moderate-to-severe left bony foraminal narrowing at C3-4. 4. Calcified plaque mildly narrows the left vertebral artery origin and calcified plaque moderately narrows the right vertebral artery origin. Beyond their origins the vertebral arteries are widely patent without additional stenosis. 5. Noncalcified plaque mildly narrows the origin of the cervical segment of the left internal carotid artery by up to 30% using the NASCET  criteria.  No stenosis is seen in the cervical segment of the right internal carotid artery and the remainder of the CT angiogram of the head and neck is normal.   Radiation dose reduction techniques were utilized, including automated exposure control and exposure modulation based on body size.   This report was finalized on 2/21/2024 6:56 AM by Dr. Brice Ziegler M.D on Workstation: [id number]      MRI Brain Without Contrast    Result Date: 2/20/2024  MRI BRAIN WO CONTRAST-  INDICATIONS: Aphasia, headache  TECHNIQUE: Multiplanar multisequence noncontrast magnetic resonance imaging of the brain.  COMPARISON: CT from same date.  FINDINGS:  The diffusion-weighted images show no restricted diffusion to suggest acute infarct.  The midline structures appear unremarkable.  The brain parenchyma shows mild to moderate periventricular white matter T2 FLAIR signal hyperintensities, compatible with chronic small vessel ischemic change in a patient this age..  Flow voids in the major arteries at the base of the brain appear unremarkable.  Likely mucous retention cyst in right maxillary sinus. The paranasal sinuses, mastoid air cells, and orbits appear otherwise unremarkable.       No acute infarct. Mild to moderate periventricular white matter chronic small vessel ischemic change.  This report was finalized on 2/20/2024 7:05 PM by Dr. Earl Goodman M.D on Workstation: [id number]             Note Disclaimer: At [facility], we believe that sharing information builds trust and better relationships. You are receiving this note because you recently visited [facility]. It is possible you will see health information before a provider has talked with you about it. This kind of information can be easy to misunderstand. To help you fully understand what it means for your health, we urge you to discuss this note with your provider.

## 2024-02-21 NOTE — NURSING NOTE
Pt. 78 yr. Old male.  Admitted to the observation unit related to Confusion, unable to read.  During this shift PT. Had an MRI is still pending. Pt. AOX4 and able to verbalize needs. VSS. Pt. To stay over night. Neurology consulted. Pt. Had no other questions at this time.

## 2024-02-22 ENCOUNTER — TRANSITIONAL CARE MANAGEMENT TELEPHONE ENCOUNTER (OUTPATIENT)
Dept: CALL CENTER | Facility: HOSPITAL | Age: 79
End: 2024-02-22
Payer: MEDICARE

## 2024-02-22 NOTE — OUTREACH NOTE
Call Center TCM Note      Flowsheet Row Responses   [facility] patient discharged from? [place]   Does the patient have one of the following disease processes/diagnoses(primary or secondary)? Stroke   TCM attempt successful? Yes   Call start time 1324   Call end time 1330   Discharge diagnosis Stroke like symptoms   Person spoke with today (if not patient) and relationship spouse   Meds reviewed with patient/caregiver? Yes   Is the patient having any side effects they believe may be caused by any medication additions or changes? No   Does the patient have all medications ordered at discharge? N/A   Is the patient taking all medications as directed (includes completed medication regime)? Yes   Comments Hospital d/c f/u appt on 3/1/24 @3pm   Does the patient have an appointment with their PCP within 7-14 days of discharge? Yes   Has home health visited the patient within 72 hours of discharge? N/A   Psychosocial issues? No   Does the patient require any assistance with activities of daily living such as eating, bathing, dressing, walking, etc.? No   Does the patient have any residual symptoms from stroke/TIA? No   Did the patient receive a copy of their discharge instructions? Yes   Nursing interventions Reviewed instructions with patient   What is the patient's perception of their health status since discharge? Improving   Nursing interventions Nurse provided patient education   Is the patient/caregiver able to teach back the risk factors for a stroke? High Cholesterol, Carotid or other artery disease, High blood pressure-goal below 120/80   Is the patient/caregiver able to teach back the hierarchy of who to call/visit for symptoms/problems? PCP, Specialist, Home health nurse, Urgent Care, ED, 911 Yes   Is the patient able to teach back FAST for Stroke? B alance: Watch for sudden loss of balance, E yes: Check for vision loss, F ace: Look for an uneven smile, A rm: Check if one arm is weak, S peech: Listen  for slurred speech, T antonio: Call 9-1-1 right away   TCM call completed? Yes   Call end time 1330   Would this patient benefit from a Referral to [facility] Social Work? No   Is the patient interested in additional calls from an ambulatory ? No            Lianna Gill RN    2/22/2024, 13:30 EST

## 2024-02-22 NOTE — OUTREACH NOTE
Prep Survey      Flowsheet Row Responses   [facility] patient discharged from? [place]   Is LACE score < 7 ? No   Eligibility [facility]   Date of Admission 02/20/24   Date of Discharge 02/21/24   Discharge Disposition Home or Self Care   Discharge diagnosis Stroke like symptoms   Does the patient have one of the following disease processes/diagnoses(primary or secondary)? Stroke   Does the patient have Home health ordered? No   Is there a DME ordered? No   Prep survey completed? Yes            JESÚS A - Registered Nurse

## 2024-02-29 ENCOUNTER — TELEPHONE (OUTPATIENT)
Dept: NEUROLOGY | Facility: CLINIC | Age: 79
End: 2024-02-29
Payer: MEDICARE

## 2024-03-01 ENCOUNTER — READMISSION MANAGEMENT (OUTPATIENT)
Dept: CALL CENTER | Facility: HOSPITAL | Age: 79
End: 2024-03-01
Payer: MEDICARE

## 2024-03-01 ENCOUNTER — OFFICE VISIT (OUTPATIENT)
Dept: FAMILY MEDICINE CLINIC | Facility: CLINIC | Age: 79
End: 2024-03-01
Payer: MEDICARE

## 2024-03-01 VITALS
OXYGEN SATURATION: 97 % | HEART RATE: 69 BPM | BODY MASS INDEX: 31.01 KG/M2 | DIASTOLIC BLOOD PRESSURE: 60 MMHG | SYSTOLIC BLOOD PRESSURE: 130 MMHG | WEIGHT: 234 LBS | HEIGHT: 73 IN | RESPIRATION RATE: 18 BRPM

## 2024-03-01 DIAGNOSIS — Z79.4 TYPE 2 DIABETES MELLITUS WITH HYPERGLYCEMIA, WITH LONG-TERM CURRENT USE OF INSULIN: ICD-10-CM

## 2024-03-01 DIAGNOSIS — R26.89 IMBALANCE: ICD-10-CM

## 2024-03-01 DIAGNOSIS — G25.0 ESSENTIAL TREMOR: ICD-10-CM

## 2024-03-01 DIAGNOSIS — M25.552 BILATERAL HIP PAIN: ICD-10-CM

## 2024-03-01 DIAGNOSIS — R29.898 BILATERAL LEG WEAKNESS: ICD-10-CM

## 2024-03-01 DIAGNOSIS — M25.561 CHRONIC PAIN OF BOTH KNEES: ICD-10-CM

## 2024-03-01 DIAGNOSIS — G43.809 OTHER MIGRAINE WITHOUT STATUS MIGRAINOSUS, NOT INTRACTABLE: Primary | ICD-10-CM

## 2024-03-01 DIAGNOSIS — M25.551 BILATERAL HIP PAIN: ICD-10-CM

## 2024-03-01 DIAGNOSIS — I10 ESSENTIAL (PRIMARY) HYPERTENSION: ICD-10-CM

## 2024-03-01 DIAGNOSIS — G89.29 CHRONIC PAIN OF BOTH KNEES: ICD-10-CM

## 2024-03-01 DIAGNOSIS — M25.562 CHRONIC PAIN OF BOTH KNEES: ICD-10-CM

## 2024-03-01 DIAGNOSIS — E11.65 TYPE 2 DIABETES MELLITUS WITH HYPERGLYCEMIA, WITH LONG-TERM CURRENT USE OF INSULIN: ICD-10-CM

## 2024-03-01 RX ORDER — PROPRANOLOL HCL 60 MG
60 CAPSULE, EXTENDED RELEASE 24HR ORAL DAILY
Qty: 30 CAPSULE | Refills: 1 | Status: SHIPPED | OUTPATIENT
Start: 2024-03-01

## 2024-03-01 NOTE — OUTREACH NOTE
Stroke Week 2 Survey      Flowsheet Row Responses   [facility] patient discharged from? [place]   Does the patient have one of the following disease processes/diagnoses(primary or secondary)? Stroke   Week 2 attempt successful? Yes   Call start time 1257   Call end time 1258   Discharge diagnosis Stroke like symptoms   Is the patient taking all medications as directed (includes completed medication regime)? Yes   Does the patient have a primary care provider?  Yes   Comments regarding PCP Hospital follow up with PCP today (3/1)   Has the patient kept scheduled appointments due by today? N/A   Has home health visited the patient within 72 hours of discharge? N/A   Psychosocial issues? No   What is the patient's perception of their health status since discharge? Improving   Nursing interventions Nurse provided patient education   Is the patient able to teach back FAST for Stroke? B alance: Watch for sudden loss of balance, E yes: Check for vision loss, F ace: Look for an uneven smile, A rm: Check if one arm is weak, S peech: Listen for slurred speech, T antonio: Call 9-1-1 right away   Is the patient/caregiver able to teach back signs and symptoms related to disease process for when to call PCP? Yes   Is the patient/caregiver able to teach back signs and symptoms related to disease process for when to call 911? Yes   Is the patient/caregiver able to teach back the hierarchy of who to call/visit for symptoms/problems? PCP, Specialist, Home health nurse, Urgent Care, ED, 911 Yes   Week 2 call completed? Yes   Revoked No further contact(revokes)-requires comment   Is the patient interested in additional calls from an ambulatory ? No   Would this patient benefit from a Referral to Amb Social Work? No   Graduated/Revoked comments Doing great, going to see PCP today, no further calls needed.   Call end time 1258            Penny RAMOS - Registered Nurse

## 2024-03-01 NOTE — PROGRESS NOTES
Transitional Care Follow Up Visit  Subjective     Earl Marc is a 78 y.o. male who presents for a transitional care management visit.    Within 48 business hours after discharge our office contacted him via telephone to coordinate his care and needs.      I reviewed and discussed the details of that call along with the discharge summary, hospital problems, inpatient lab results, inpatient diagnostic studies, and consultation reports with Earl.     Current outpatient and discharge medications have been reconciled for the patient.  Reviewed by: Avery Velazquez MD          2/21/2024     9:15 PM   Date of TCM Phone Call   [facility]   Date of Admission 2/20/2024   Date of Discharge 2/21/2024   Discharge Disposition Home or Self Care     Risk for Readmission (LACE) Score: 8 (2/21/2024  6:00 AM)      History of Present Illness   Course During Hospital Stay: Chief complaint is hospital follow-up Earl is here today for hospital follow-up.  He was admitted to the hospital overnight on 20 February.  Was discharged on the 21st.  He had a confusional episode associated with a headache.  The headache was severe.  His initial workup in the emergency room did not show any acute intracerebral bleed.  He had a follow-up MRI scan that basically showed no significant problems other than age-related changes.  He did have mucous retention cyst and sinuses.  His lab work was fairly unremarkable.  Neurology felt that the patient may have had a complex migraine.  He reports that since that episode he just feels poorly.  He still has a little bit of a nagging headache.  He is complaining of tremors in the hands.  The metoprolol does not seem to help that any.  His blood pressures have been elevated at home but seem okay.  He is complaining of some bilateral leg weakness and difficulty getting up from a seated position.  He does have known diabetic neuropathy.  He is also complaining of pain in the hips and  knees.     The following portions of the patient's history were reviewed and updated as appropriate: allergies, current medications, and problem list.    Review of Systems    Objective   Physical Exam  Vitals and nursing note reviewed.   Constitutional:       Appearance: Normal appearance.   Cardiovascular:      Rate and Rhythm: Normal rate and regular rhythm.      Pulses: Normal pulses.   Pulmonary:      Effort: Pulmonary effort is normal.      Breath sounds: No wheezing, rhonchi or rales.   Musculoskeletal:      Comments: He has fairly good internal and external rotation of the hips.  He has some mild end extension crepitation of the knees.  He struggles to get up from a seated position.  He does have some proximal muscle wasting in the thighs.   Neurological:      General: No focal deficit present.      Mental Status: He is alert.      Cranial Nerves: No cranial nerve deficit.      Deep Tendon Reflexes: Reflexes normal.      Comments: He does have an essential tremor that seems to be worse when he has his arms outstretched.       Assessment & Plan   Diagnoses and all orders for this visit:    1. Other migraine without status migrainosus, not intractable (Primary)    2. Essential tremor    3. Essential (primary) hypertension    4. Bilateral leg weakness  -     MRI Lumbar Spine Without Contrast; Future    5. Imbalance  -     MRI Lumbar Spine Without Contrast; Future    6. Type 2 diabetes mellitus with hyperglycemia, with long-term current use of insulin    7. Chronic pain of both knees  -     XR Knee 1 or 2 View Bilateral; Future    8. Bilateral hip pain  -     XR Hips Bilateral With or Without Pelvis 2 View; Future    Other orders  -     propranolol LA (INDERAL LA) 60 MG 24 hr capsule; Take 1 capsule by mouth Daily.  Dispense: 30 capsule; Refill: 1  -     Pneumococcal Conjugate Vaccine 20-Valent Bryan Elliott is here today for follow-up.  His workup was fairly extensive at the hospital.  I do not have much to add  in terms of the migraine.  For his tremor however I am going to switch him from metoprolol to Inderal.  That may also help a little bit better with migraine prevention.  We will have him come back in a week and make sure his blood pressure is doing okay.  We may need to add something additionally for that.  For his bilateral leg weakness we are going to get an MRI scan of the lumbar spine.  He can go for plain x-rays of his hips and knees.

## 2024-03-02 ENCOUNTER — HOSPITAL ENCOUNTER (OUTPATIENT)
Dept: GENERAL RADIOLOGY | Facility: HOSPITAL | Age: 79
Discharge: HOME | End: 2024-03-02
Payer: MEDICARE

## 2024-03-02 DIAGNOSIS — G89.29 CHRONIC PAIN OF BOTH KNEES: ICD-10-CM

## 2024-03-02 DIAGNOSIS — M25.561 CHRONIC PAIN OF BOTH KNEES: ICD-10-CM

## 2024-03-02 DIAGNOSIS — M25.551 BILATERAL HIP PAIN: ICD-10-CM

## 2024-03-02 DIAGNOSIS — M25.552 BILATERAL HIP PAIN: ICD-10-CM

## 2024-03-02 DIAGNOSIS — M25.562 CHRONIC PAIN OF BOTH KNEES: ICD-10-CM

## 2024-03-02 PROCEDURE — 73521 X-RAY EXAM HIPS BI 2 VIEWS: CPT

## 2024-03-02 PROCEDURE — 73560 X-RAY EXAM OF KNEE 1 OR 2: CPT

## 2024-03-04 ENCOUNTER — TELEPHONE (OUTPATIENT)
Dept: FAMILY MEDICINE CLINIC | Facility: CLINIC | Age: 79
End: 2024-03-04
Payer: MEDICARE

## 2024-03-04 NOTE — TELEPHONE ENCOUNTER
Caller: Earl Marc    Relationship: Self    Best call back number: [phone]    What is the best time to reach you: ANYTIME    Who are you requesting to speak with (clinical staff, provider,  specific staff member): DR HAMPTON    Do you know the name of the person who called: DR HAMPTON    What was the call regarding: PATIENT WAS RETURNING A CALL FROM DR HAMPTON ABOUT RESULTS OF X-RAY AND REQUESTING CALLBACK.    Results were discussed with the patient.  Offered an appointment with an orthopedist.  We are going to wait however and see what the MRI scan shows.  It certainly did not look like he would need a hip or knee replacement.

## 2024-03-05 ENCOUNTER — TELEPHONE (OUTPATIENT)
Dept: FAMILY MEDICINE CLINIC | Facility: CLINIC | Age: 79
End: 2024-03-05

## 2024-03-05 NOTE — TELEPHONE ENCOUNTER
Caller: Earl Marc    Relationship: Self    Best call back number: [phone]     What was the call regarding: THE PATIENT SCHEDULED EARLIEST AVAILABLE MRI FOR 3/20/2024.    SHOULD HE STILL COME IN 3/8/2024, OR SHOULD HE PUSH IT BACK FOR THE MRI RESULTS?    HE HAS NO OTHER CONCERNS UNLESS DR. HAMPTON WANTS TO SEE HIM. THANK YOU!

## 2024-03-13 ENCOUNTER — HOSPITAL ENCOUNTER (EMERGENCY)
Facility: HOSPITAL | Age: 79
Discharge: HOME | End: 2024-03-13
Attending: STUDENT IN AN ORGANIZED HEALTH CARE EDUCATION/TRAINING PROGRAM | Admitting: STUDENT IN AN ORGANIZED HEALTH CARE EDUCATION/TRAINING PROGRAM
Payer: MEDICARE

## 2024-03-13 VITALS
HEIGHT: 73 IN | WEIGHT: 222.2 LBS | BODY MASS INDEX: 29.45 KG/M2 | HEART RATE: 72 BPM | SYSTOLIC BLOOD PRESSURE: 149 MMHG | OXYGEN SATURATION: 96 % | RESPIRATION RATE: 18 BRPM | DIASTOLIC BLOOD PRESSURE: 75 MMHG | TEMPERATURE: 98.1 F

## 2024-03-13 DIAGNOSIS — S46.811A TRAPEZIUS STRAIN, RIGHT, INITIAL ENCOUNTER: Primary | ICD-10-CM

## 2024-03-13 PROCEDURE — 96372 THER/PROPH/DIAG INJ SC/IM: CPT

## 2024-03-13 PROCEDURE — 99283 EMERGENCY DEPT VISIT LOW MDM: CPT

## 2024-03-13 PROCEDURE — 25010000002 KETOROLAC TROMETHAMINE PER 15 MG: Performed by: PHYSICIAN ASSISTANT

## 2024-03-13 RX ORDER — BACLOFEN 10 MG/1
10 TABLET ORAL 3 TIMES DAILY
Qty: 20 TABLET | Refills: 0 | Status: SHIPPED | OUTPATIENT
Start: 2024-03-13 | End: 2024-03-18 | Stop reason: SDUPTHER

## 2024-03-13 RX ORDER — KETOROLAC TROMETHAMINE 30 MG/ML
30 INJECTION, SOLUTION INTRAMUSCULAR; INTRAVENOUS ONCE
Status: COMPLETED | OUTPATIENT
Start: 2024-03-13 | End: 2024-03-13

## 2024-03-13 RX ORDER — LIDOCAINE 4 G/G
1 PATCH TOPICAL EVERY 24 HOURS
Status: DISCONTINUED | OUTPATIENT
Start: 2024-03-13 | End: 2024-03-13 | Stop reason: HOSPADM

## 2024-03-13 RX ORDER — LIDOCAINE 50 MG/G
1 PATCH TOPICAL EVERY 24 HOURS
Qty: 6 PATCH | Refills: 0 | Status: SHIPPED | OUTPATIENT
Start: 2024-03-13

## 2024-03-13 RX ORDER — OXYCODONE HYDROCHLORIDE AND ACETAMINOPHEN 5; 325 MG/1; MG/1
1 TABLET ORAL ONCE
Status: COMPLETED | OUTPATIENT
Start: 2024-03-13 | End: 2024-03-13

## 2024-03-13 RX ORDER — METHOCARBAMOL 750 MG/1
750 TABLET, FILM COATED ORAL ONCE
Status: COMPLETED | OUTPATIENT
Start: 2024-03-13 | End: 2024-03-13

## 2024-03-13 RX ADMIN — LIDOCAINE 1 PATCH: 4 PATCH TOPICAL at 16:51

## 2024-03-13 RX ADMIN — OXYCODONE HYDROCHLORIDE AND ACETAMINOPHEN 1 TABLET: 5; 325 TABLET ORAL at 18:12

## 2024-03-13 RX ADMIN — KETOROLAC TROMETHAMINE 30 MG: 30 INJECTION, SOLUTION INTRAMUSCULAR at 16:49

## 2024-03-13 RX ADMIN — METHOCARBAMOL TABLETS 750 MG: 750 TABLET, COATED ORAL at 16:49

## 2024-03-13 NOTE — ED PROVIDER NOTES
EMERGENCY DEPARTMENT ENCOUNTER      Room Number:  S01/01  PCP: Avery Velazquez MD  Independent Historians: Patient  Patient Care Team:  Avery Velazquez MD as PCP - General (Internal Medicine)       HPI:  Chief Complaint: Neck pain    A complete HPI/ROS/PMH/PSH/SH/FH are unobtainable due to: None    Chronic or social conditions impacting patient care (Social Determinants of Health): None      Context: Earl Marc is a 78 y.o. male with a PMH significant for osteoarthritis of the cervical spine, fibromyalgia, migraines who presents to the ED c/o acute right-sided neck pain.  The patient started feeling some discomfort a couple days ago in the right-sided neck without any known injury.  Symptoms have progressed and today he noticed that he was having difficulty bending his neck to the left.  He feels a stiffness that moves from the neck towards the right shoulder.  No chest pain, shortness of breath.  No numbness or weakness of the extremities, slurred speech, visual steroids, photophobia.  No new injuries or falls.  No fever, chills.      Upon review of prior external notes (non-ED) -and- Review of prior external test results outside of this encounter it appears the patient was evaluated in the office with nephrology for chronic kidney disease on March 12, 2023.  The patient had a normal urinalysis on 1/19/2024 and a normal BMP on that date.    CONTRAST-ENHANCED CT ANGIOGRAM OF THE HEAD AND NECK ON 02/20/2024     CLINICAL HISTORY: Patient had a period of confusion yesterday and woke  up today with headache and mild confusion and some leg weakness and  difficulty walking.      TECHNIQUE: Spiral CT images were obtained from the base of the skull to  the vertex both pre-intravenous and postintravenous contrast and images  were reformatted and submitted in 3 mm thick axial, sagittal and coronal  CT sections with brain algorithm. Additional spiral CT angiogram images  were obtained from the top of  the aortic arch up through the great  vessels of the head and neck during the arterial phase of contrast and  images were reformatted and submitted in 1 mm thick axial, sagittal and  coronal CT sections with soft tissue algorithm and 3D reconstructions  were performed to complete the CT angiogram of the head and neck.      There are no prior CT angiograms of the head and neck for comparison.     FINDINGS:     HEAD CT: There are patchy areas of low-density extending from the  periventricular into the subcortical white matter of the cerebral  hemispheres consistent with moderate small vessel disease. The remainder  of the brain parenchyma is normal in attenuation. There is mild diffuse  cerebral atrophy. The lateral and third ventricles are mildly prominent  in size felt to be on the basis of central volume loss or atrophy. I see  no focal mass effect. There is no midline shift. No extra-axial fluid  collections are identified. There is no evidence of acute intracranial  hemorrhage.  No abnormal areas of enhancement are seen in the head. The  calvarium and the skull base are normal in appearance. There is a 1 cm  mucous retention cyst in the inferior medial right maxillary sinus and  mild inferior right maxillary sinus mucosal thickening. The remainder of  the paranasal sinuses and the mastoid air cells and middle ear cavities  are clear. Bilateral intraocular lens implants are in place in the  globes from previous bilateral cataract surgery, otherwise the orbits  are unremarkable.         IMPRESSION:  1. CT of the head demonstrates moderate small vessel disease in the  cerebral white matter and there is diffuse cerebral atrophy and the  lateral and third ventricles are mildly prominent in size likely on the  basis of central volume loss or atrophy. There are bilateral intraocular  lens implants in place in the globes from previous cataract surgery.   The remainder of the head CT is within normal limits with no  discernible  acute completed infarct and no intracranial hemorrhage identified and  the etiology of the patient's confusion and leg weakness and difficulty  walking is not established on this exam.  If there remains any clinical  suspicion of an acute infarct, I recommend an MRI of the brain for a  more complete assessment.   2. Patient has had a prior anterior cervical discectomy and fusion  procedure from C5 to C7 with anterior plate and screw fixation.  There  is moderate-to-severe left facet overgrowth at C3-4 with  moderate-to-severe left bony foraminal narrowing at C3-4.   4. Calcified plaque mildly narrows the left vertebral artery origin and  calcified plaque moderately narrows the right vertebral artery origin.   Beyond their origins the vertebral arteries are widely patent without  additional stenosis.   5. Noncalcified plaque mildly narrows the origin of the cervical segment  of the left internal carotid artery by up to 30% using the NASCET  criteria.  No stenosis is seen in the cervical segment of the right  internal carotid artery and the remainder of the CT angiogram of the  head and neck is normal.       Radiation dose reduction techniques were utilized, including automated  exposure control and exposure modulation based on body size.        This report was finalized on 2/21/2024 6:56 AM by Dr. Brice Ziegler M.D  on Workstation: [id number]      PAST MEDICAL HISTORY  Active Ambulatory Problems     Diagnosis Date Noted    Bell's palsy 03/10/2016    Persistent insomnia 03/10/2016    Osteoarthritis of cervical spine 03/10/2016    Diabetic peripheral neuropathy 03/10/2016    Dyslipidemia 03/10/2016    Steatosis of liver 03/10/2016    Fibromyalgia 03/10/2016    Gastroesophageal reflux disease without esophagitis 03/10/2016    Hypertensive kidney disease with stage 3a chronic kidney disease 03/10/2016    Hypogonadism in male 03/10/2016    Renal insufficiency 03/10/2016    Vitamin D deficiency 03/10/2016     Benign non-nodular prostatic hyperplasia with lower urinary tract symptoms 03/10/2016    S/P drug eluting coronary stent placement 04/13/2016    Coronary artery disease involving native coronary artery of native heart 06/21/2016    Malignant neoplasm of skin 10/03/2016    Type 2 diabetes mellitus with hyperglycemia, with long-term current use of insulin 04/04/2017    Diabetes mellitus 04/04/2017    Chronic insomnia 12/20/2017    Other specified glaucoma 04/01/2017    Vertigo 03/14/2018    Epigastric pain 06/11/2018    Chest pain with high risk for cardiac etiology 05/26/2021    Precordial pain 05/25/2021    S/P arthroscopy of shoulder 11/19/2019    Peripheral neuropathy 03/24/2022    Stage 3b chronic kidney disease 03/24/2022    Chest pain 01/19/2024    Stroke-like symptoms 02/20/2024     Resolved Ambulatory Problems     Diagnosis Date Noted    Hyperuricemia 03/10/2016    Erectile dysfunction of nonorganic origin 03/10/2016    Type 2 diabetes mellitus without complication 03/10/2016    Precordial pain 03/10/2016    Abnormal nuclear stress test 03/25/2016    Coronary artery disease involving native coronary artery with angina pectoris 03/27/2016    S/P coronary angioplasty 04/05/2016    Chest pain 06/14/2016    Dizziness 06/21/2016    Lateral epicondylitis 12/19/2012    Long-term insulin use in type 2 diabetes 10/04/2016    Type II diabetes circulatory disorder causing erectile dysfunction 02/13/2017    Sialadenitis 06/28/2017    Acute bronchitis due to other specified organisms 11/14/2017    Chest pain 10/16/2018    Other forms of angina pectoris 10/16/2018    Trigger point of left shoulder region 03/02/2023     Past Medical History:   Diagnosis Date    Abnormal cardiovascular stress test     Acid reflux     Arthritis     Asthma     Cancer     Chronic kidney disease     Colon polyp     Congenital heart disease     COPD (chronic obstructive pulmonary disease) 2021    Coronary artery disease     Diabetic neuropathy  associated with type 2 diabetes mellitus     Erectile dysfunction     Fatty liver disease, nonalcoholic     Gastritis     Glaucoma     Hyperlipidemia     Hypertension     Hypogonadism male     Infectious viral hepatitis Hep A July 1959    Migraines 09/2023    Myocardial infarction 2018    Type 2 diabetes mellitus          PAST SURGICAL HISTORY  Past Surgical History:   Procedure Laterality Date    CARDIAC CATHETERIZATION N/A 03/25/2016    Procedure: Left Heart Cath;  Surgeon: Maria E Gilbert MD;  Location:  [facility];  Service:     CARDIAC CATHETERIZATION N/A 03/25/2016    Procedure: Coronary angiography;  Surgeon: Maria E Gilbert MD;  Location:  [facility];  Service:     CARDIAC CATHETERIZATION N/A 03/28/2016    Procedure: Coronary angiography;  Surgeon: Maria E Gilbert MD;  Location:  [facility];  Service:     CARDIAC CATHETERIZATION N/A 03/28/2016    Procedure: Stent MOISE coronary;  Surgeon: Maria E Gilbert MD;  Location:  [facility];  Service:     CARDIAC CATHETERIZATION N/A 10/18/2018    Procedure: Coronary angiography  no LV gram d/t CKD;  Surgeon: Maria E Gilbert MD;  Location:  [facility];  Service: Cardiology    CARDIAC CATHETERIZATION N/A 10/18/2018    Procedure: Left Heart Cath;  Surgeon: Maria E Gilbert MD;  Location:  [facility];  Service: Cardiology    CARDIAC CATHETERIZATION N/A 10/18/2018    Procedure: Stent MOISE coronary;  Surgeon: Maria E Gilbert MD;  Location:  [facility];  Service: Cardiology    CARDIAC CATHETERIZATION N/A 05/28/2021    Procedure: Coronary angiography;  Surgeon: Maria E Gilbert MD;  Location:  [facility];  Service: Cardiovascular;  Laterality: N/A;    CARDIAC CATHETERIZATION N/A 05/28/2021    Procedure: Left Heart Cath;  Surgeon: Maria E Gilbert MD;  Location:  [facility]  [facility];  Service: Cardiovascular;  Laterality: N/A;    CARDIAC CATHETERIZATION N/A 2021    Procedure: Left ventriculography;  Surgeon: Maria E Gilbert MD;  Location:  [facility];  Service: Cardiovascular;  Laterality: N/A;    CARDIAC CATHETERIZATION N/A 2021    Procedure: Stent MOISE coronary;  Surgeon: Maria E Gilbert MD;  Location:  [facility];  Service: Cardiovascular;  Laterality: N/A;    CARDIAC CATHETERIZATION N/A 2024    Procedure: Left Heart Cath;  Surgeon: Maria E Gilbert MD;  Location:  [facility];  Service: Cardiovascular;  Laterality: N/A;    CARDIAC CATHETERIZATION N/A 2024    Procedure: Coronary angiography;  Surgeon: Maria E Gilbert MD;  Location: [facility] CATH INVASIVE LOCATION;  Service: Cardiovascular;  Laterality: N/A;    CARDIAC CATHETERIZATION N/A 2024    Procedure: Left ventriculography;  Surgeon: Maria E Gilbert MD;  Location: [facility] CATH [facility] LOC[facility];  Service: Cardiovascular;  Laterality: N/A;    CAROTID STENT      CORONARY ANGIOPLASTY      CORONARY STENT PLACEMENT  3/28/2016    EYE SURGERY  2017    NECK EXPLORATION N/A     AL RT/LT HEART CATHETERS N/A 10/18/2018    Procedure: Percutaneous Coronary Intervention;  Surgeon: Maria E Gilbert MD;  Location: [facility] CATH INVAS[facility];  Service: Cardiology         FAMILY HISTORY  Family History   Problem Relation Age of Onset    Breast cancer Daughter     Heart attack Mother          10/31/1980    Hypertension Mother     Heart disease Mother     Thyroid disease Mother     Lupus Mother     Early death Mother     Miscarriages / Stillbirths Mother         Miscarriage     Heart failure Mother     Heart attack Brother     Heart disease Brother     Hyperlipidemia Brother     Cancer Brother         Due to [place], [place]    Hypertension Brother     Depression Father     COPD Father     Stroke Maternal  Grandmother     Cancer Maternal Grandmother         Lung Cancer non-smoker    Diabetes Brother     Heart disease Brother     Hypertension Brother     Kidney disease Brother     Heart disease Brother     Hypertension Brother          SOCIAL HISTORY  Social History     Socioeconomic History    Marital status:      Spouse name: Ivette    Number of children: 1   Tobacco Use    Smoking status: Never     Passive exposure: Past    Smokeless tobacco: Never    Tobacco comments:     Never   Vaping Use    Vaping status: Never Used   Substance and Sexual Activity    Alcohol use: No    Drug use: Never    Sexual activity: Defer         ALLERGIES  Ace inhibitors, Hydrogenated palm oil glycerides, Lanolin, Other, Prazosin, and Nsaids      REVIEW OF SYSTEMS  Included in HPI  All systems reviewed and negative except for those discussed in HPI.      PHYSICAL EXAM    I have reviewed the triage vital signs and nursing notes.    ED Triage Vitals   Temp Heart Rate Resp BP SpO2   03/13/24 1559 03/13/24 1559 03/13/24 1559 03/13/24 1600 03/13/24 1559   98.1 °F (36.7 °C) 74 18 149/75 96 %      Temp src Heart Rate Source Patient Position BP Location FiO2 (%)   -- 03/13/24 1600 03/13/24 1600 03/13/24 1600 --    Monitor Sitting Right arm        Physical Exam  Constitutional:       General: He is not in acute distress.     Appearance: He is well-developed.   HENT:      Head: Normocephalic and atraumatic.   Eyes:      General: No scleral icterus.     Conjunctiva/sclera: Conjunctivae normal.   Neck:      Trachea: No tracheal deviation.     Cardiovascular:      Rate and Rhythm: Normal rate and regular rhythm.   Pulmonary:      Effort: Pulmonary effort is normal.      Breath sounds: Normal breath sounds.   Abdominal:      Palpations: Abdomen is soft.      Tenderness: There is no abdominal tenderness. There is no guarding.   Musculoskeletal:         General: No deformity.      Cervical back: Normal range of motion. Muscular tenderness present.  No spinous process tenderness.   Lymphadenopathy:      Cervical: No cervical adenopathy.   Skin:     General: Skin is warm and dry.   Neurological:      Mental Status: He is alert and oriented to person, place, and time.   Psychiatric:         Behavior: Behavior normal.         Vital signs and nursing notes reviewed.      PPE: I wore a surgical mask throughout my encounters with the pt. I performed hand hygiene on entry into the pt room and upon exit.      LAB RESULTS  No results found for this or any previous visit (from the past 24 hour(s)).      RADIOLOGY  No Radiology Exams Resulted Within Past 24 Hours      MEDICATIONS GIVEN IN ER  Medications   Lidocaine 4 % 1 patch (1 patch Transdermal Medication Applied 3/13/24 1651)   methocarbamol (ROBAXIN) tablet 750 mg (750 mg Oral Given 3/13/24 1649)   ketorolac (TORADOL) injection 30 mg (30 mg Intramuscular Given 3/13/24 1649)   oxyCODONE-acetaminophen (PERCOCET) 5-325 MG per tablet 1 tablet (1 tablet Oral Given 3/13/24 1812)         ORDERS PLACED DURING THIS VISIT:  No orders of the defined types were placed in this encounter.        OUTPATIENT MEDICATION MANAGEMENT:  Current Facility-Administered Medications Ordered in Epic   Medication Dose Route Frequency Provider Last Rate Last Admin    Lidocaine 4 % 1 patch  1 patch Transdermal Q24H Carlos Moyer PA   1 patch at 03/13/24 1651     Current Outpatient Medications Ordered in Epic   Medication Sig Dispense Refill    ammonium lactate (AmLactin) 12 % lotion Apply 1 Application topically to the appropriate area as directed As Needed for Dry Skin.      ascorbic acid (VITAMIN C) 1000 MG tablet Take 2 tablets by mouth Daily.      aspirin 81 MG EC tablet Take 1 tablet by mouth Daily. 90 tablet 3    atorvastatin (LIPITOR) 20 MG tablet Take 1 tablet by mouth Every Night. 90 tablet 1    baclofen (LIORESAL) 10 MG tablet Take 1 tablet by mouth 3 (Three) Times a Day. 20 tablet 0    Calcium Carbonate (CALCIUM 600 PO) Take 1  tablet by mouth Daily.      ciclopirox (LOPROX) 1 % shampoo Apply 1 Application topically to the appropriate area as directed 3 (Three) Times a Week.      Cyanocobalamin (Vitamin B-12) 1000 MCG sublingual tablet Place 1 tablet under the tongue Daily.      Dextrose, Diabetic Use, (Glucose) 1 g chewable tablet Chew 1 tablet As Needed.      fluticasone (FLONASE) 50 MCG/ACT nasal spray 1 spray into the nostril(s) as directed by provider Daily As Needed for Allergies.      hydrocortisone 2.5 % ointment Apply 1 Application topically to the appropriate area as directed 2 (Two) Times a Day.      Insulin Glargine (Lantus SoloStar) 100 UNIT/ML injection pen Inject 38 Units under the skin into the appropriate area as directed Daily. 36 mL 1    latanoprost (XALATAN) 0.005 % ophthalmic solution Administer 1 drop to both eyes Every Night.      lidocaine (LIDODERM) 5 % Place 1 patch on the skin as directed by provider Daily. Remove & Discard patch within 12 hours or as directed by MD 6 patch 0    linagliptin (Tradjenta) 5 MG tablet tablet Take 1 tablet by mouth Daily.      Lutein 20 MG tablet Take 1 tablet by mouth Daily.      Magnesium 250 MG tablet Take 1 tablet by mouth Every Night.      Melatonin 12 MG tablet Take 1 tablet by mouth At Night As Needed.      Multiple Vitamins-Minerals (ZINC PO) Take 50 mg by mouth Daily.      mupirocin (BACTROBAN) 2 % ointment Apply 1 Application topically to the appropriate area as directed 2 (Two) Times a Day.      niacin 500 MG tablet Take 1 tablet by mouth As Needed.      nitroglycerin (Nitrostat) 0.4 MG SL tablet Place 1 tablet under the tongue Every 5 (Five) Minutes As Needed for Chest Pain. Indications: Acute Angina Pectoris 25 tablet 3    NON FORMULARY Ketamin 4%/Gabapentin 6%/clonidine 0.2%/nifedipine 2%  1-2 PUMPS to affected area 3-4 times daily.      NON FORMULARY Fluconazole/terbinafine/Ibuprofen/DMSO 2/3/3/50% with vitamin D 53004 Iu/100ml   1 Application to toe nails 2 times daily       pantoprazole (PROTONIX) 20 MG EC tablet Take 1 tablet by mouth Daily. 90 tablet 1    propranolol LA (INDERAL LA) 60 MG 24 hr capsule Take 1 capsule by mouth Daily. 30 capsule 1    Testosterone Cypionate (DEPOTESTOTERONE CYPIONATE) 200 MG/ML injection Inject 1 mL into the appropriate muscle as directed by prescriber Every 14 (Fourteen) Days.      venlafaxine XR (EFFEXOR-XR) 37.5 MG 24 hr capsule Take 1 capsule by mouth Daily. 30 capsule 1    VIAGRA 100 MG tablet Take 1 tablet by mouth As Needed for erectile dysfunction (1 tablet prior to planned intercourse.). 24 tablet 3    VITAMIN E 400 UNIT capsule Take 1 capsule by mouth Daily.               PROGRESS, DATA ANALYSIS, CONSULTS, AND MEDICAL DECISION MAKING  All labs have been independently interpreted by me.  All radiology studies have been reviewed by me. All EKG's have been independently viewed and interpreted by me.  Discussion below represents my analysis of pertinent findings related to patient's condition, differential diagnosis, treatment plan and final disposition.      DIFFERENTIAL DIAGNOSIS INCLUDE BUT NOT LIMITED TO:     Cervical strain, trapezius strain, paraspinal muscle strain, shoulder contusion    Clinical Scores: N/A      ED Course as of 03/13/24 1821   Wed Mar 13, 2024   1810 Patient presentation and evaluation consistent with uncomplicated muscle strain or muscle spasm to the right-sided paraspinal and trapezius muscles.  He has no midline or bony tenderness.  No neurologic deficits or radiculopathic pattern.  Tenderness over the muscle bodies diffusely with worsening pain with range of motion.  Some mild torticollis secondary to symptoms.  Plan for outpatient follow-up with PCP and muscle relaxers with supportive care as an outpatient.  No indication for admission or further workup at this time.  Several recent images of the neck are unremarkable and reassuring. [DC]      ED Course User Index  [DC] Carlos Moyer, PA         1822 I  rechecked the patient.  I discussed the patient's labs, radiology findings (including all incidental findings), diagnosis, and plan for discharge.  A repeat exam reveals no new worrisome changes from my initial exam findings.  The patient understands that the fact that they are being discharged does not denote that nothing is abnormal, it indicates that no clinical emergency is present and that they must follow-up as directed in order to properly maintain their health.  Follow-up instructions (specifically listed below) and return to ER precautions were given at this time.  I specifically instructed the patient to follow-up with their PCP.  The patient understands and agrees with the plan, and is ready for discharge.  All questions answered.        AS OF 18:21 EDT VITALS:    BP - 149/75  HR - 72  TEMP - 98.1 °F (36.7 °C)  O2 SATS - 96%    COMPLEXITY OF CARE  Admission was considered but after careful review of the patient's presentation, physical examination, diagnostic results, and response to treatment the patient may be safely discharged with outpatient follow-up.      DIAGNOSIS  Final diagnoses:   Trapezius strain, right, initial encounter         DISPOSITION  ED Disposition       ED Disposition   Discharge    Condition   Stable    Comment   --                Please note that portions of this document were completed with a voice recognition program.    Note Disclaimer: At [facility], we believe that sharing information builds trust and better relationships. You are receiving this note because you recently visited [facility]. It is possible you will see health information before a provider has talked with you about it. This kind of information can be easy to misunderstand. To help you fully understand what it means for your health, we urge you to discuss this note with your provider.         Carlos Moyer PA  03/13/24 1958

## 2024-03-13 NOTE — ED PROVIDER NOTES
MD ATTESTATION NOTE    The JOCELYN and I have discussed this patient's history, physical exam, and treatment plan.  I have reviewed the documentation and personally had a face to face interaction with the patient. I affirm the documentation and agree with the treatment and plan.  The attached note describes my personal findings.      I provided a substantive portion of the care of the patient.  I personally performed the physical exam in its entirety, and below are my findings.        Brief HPI: Patient presented emergency department with right neck pain.  Ongoing for the last several days, no known injury.  No numbness or weakness.  Worse with movement.  Improved with heating pad.    PHYSICAL EXAM  ED Triage Vitals   Temp Heart Rate Resp BP SpO2   03/13/24 1559 03/13/24 1559 03/13/24 1559 03/13/24 1600 03/13/24 1559   98.1 °F (36.7 °C) 74 18 149/75 96 %      Temp src Heart Rate Source Patient Position BP Location FiO2 (%)   -- 03/13/24 1600 03/13/24 1600 03/13/24 1600 --    Monitor Sitting Right arm          GENERAL: no acute distress  HENT: nares patent, tenderness palpation over the posterior head of the SCM on the right and over the right posterior neck muscles, no midline tenderness or step-off  EYES: no scleral icterus  CV: regular rhythm, normal rate  RESPIRATORY: normal effort  ABDOMEN: soft  MUSCULOSKELETAL: no deformity  NEURO: alert, moves all extremities, follows commands  PSYCH:  calm, cooperative  SKIN: warm, dry    Vital signs and nursing notes reviewed.        Plan: Patient presented emergency department with neck pain, exam consistent with muscular cause.  Otherwise well-appearing, vitals otherwise stable.  Exam otherwise reassuring.  Will treat symptoms and discharged with close follow-up.  Given return precautions and discharged home.    ED Course as of 03/13/24 1827   Wed Mar 13, 2024   1810 Patient presentation and evaluation consistent with uncomplicated muscle strain or muscle spasm to the  right-sided paraspinal and trapezius muscles.  He has no midline or bony tenderness.  No neurologic deficits or radiculopathic pattern.  Tenderness over the muscle bodies diffusely with worsening pain with range of motion.  Some mild torticollis secondary to symptoms.  Plan for outpatient follow-up with PCP and muscle relaxers with supportive care as an outpatient.  No indication for admission or further workup at this time.  Several recent images of the neck are unremarkable and reassuring. [DC]      ED Course User Index  [DC] Carlos Moyer PA       SHARED VISIT: This visit was performed by BOTH a physician and an APC. The substantive portion of the medical decision making was performed by this attesting physician who made or approved the management plan and takes responsibility for patient management. All studies in the APC note (if performed) were independently interpreted by me.        Cedric Max MD  03/13/24 [id number]

## 2024-03-18 ENCOUNTER — OFFICE VISIT (OUTPATIENT)
Dept: FAMILY MEDICINE CLINIC | Facility: CLINIC | Age: 79
End: 2024-03-18
Payer: MEDICARE

## 2024-03-18 VITALS
DIASTOLIC BLOOD PRESSURE: 68 MMHG | HEART RATE: 64 BPM | HEIGHT: 73 IN | SYSTOLIC BLOOD PRESSURE: 136 MMHG | WEIGHT: 236 LBS | RESPIRATION RATE: 18 BRPM | OXYGEN SATURATION: 96 % | BODY MASS INDEX: 31.28 KG/M2

## 2024-03-18 DIAGNOSIS — M62.838 CERVICAL PARASPINAL MUSCLE SPASM: Primary | ICD-10-CM

## 2024-03-18 DIAGNOSIS — H93.A3 PULSATILE TINNITUS OF BOTH EARS: ICD-10-CM

## 2024-03-18 DIAGNOSIS — M54.2 NECK PAIN: ICD-10-CM

## 2024-03-18 RX ORDER — PATIROMER 8.4 G/1
8.4 POWDER, FOR SUSPENSION ORAL DAILY
COMMUNITY

## 2024-03-18 RX ORDER — PROPRANOLOL HCL 60 MG
1 CAPSULE, EXTENDED RELEASE 24HR ORAL DAILY
COMMUNITY
Start: 2024-03-01

## 2024-03-18 RX ORDER — RIBOFLAVIN (VITAMIN B2) 400 MG
1 TABLET ORAL NIGHTLY
COMMUNITY

## 2024-03-18 RX ORDER — BACLOFEN 10 MG/1
10 TABLET ORAL 3 TIMES DAILY
Qty: 30 TABLET | Refills: 0 | Status: SHIPPED | OUTPATIENT
Start: 2024-03-18

## 2024-03-18 NOTE — PROGRESS NOTES
Subjective chief complaint is follow-up from the emergency room  Earl Marc is a 78 y.o. male.     History of Present Illness Earl is here today for follow-up.  He had severe throbbing right sided neck pain and went to the emergency room on 13 March.  No x-rays were done.  His exam was consistent with cervical spasm and he was treated with some baclofen 3 times daily which she is tolerating.  It seems to be helping some.  He is concerned that he may have a sinus infection because he is having some pulsations in his ears.  His blood pressure retaken to my exam was 132/58  Recent CT angiogram showed no significant carotid blockages.  His sinuses look clear on that CAT scan at the end of February.  The following portions of the patient's history were reviewed and updated as appropriate: allergies, current medications, and problem list.    Review of Systems   Constitutional:  Negative for chills and fever.   HENT:  Positive for tinnitus.      Objective   Physical Exam  Vitals and nursing note reviewed.   HENT:      Right Ear: Tympanic membrane normal.      Left Ear: Tympanic membrane and ear canal normal.      Nose: Congestion present.      Comments: He has some mild bilateral nasal congestion but no significant erythema  Neck:      Vascular: No carotid bruit.   Cardiovascular:      Rate and Rhythm: Normal rate and regular rhythm.      Heart sounds: No murmur heard.      Assessment & Plan   Diagnoses and all orders for this visit:    1. Cervical paraspinal muscle spasm (Primary)    2. Neck pain    3. Pulsatile tinnitus of both ears    Other orders  -     baclofen (LIORESAL) 10 MG tablet; Take 1 tablet by mouth 3 (Three) Times a Day.  Dispense: 30 tablet; Refill: 0      Earl is here today for follow-up.  He reports that his neck is less tender.  There is still some tenderness to palpation of the right side of the neck but the left seems actually more tender now.  I am going to renew the baclofen.  He is having  some pulsatile sensations in his ears but his blood pressure seems okay.  I am not going to make any adjustments.  I do not see any evidence of any type of sinus infection.

## 2024-03-20 ENCOUNTER — HOSPITAL ENCOUNTER (OUTPATIENT)
Dept: MRI IMAGING | Facility: HOSPITAL | Age: 79
Discharge: HOME | End: 2024-03-20
Admitting: INTERNAL MEDICINE
Payer: MEDICARE

## 2024-03-20 DIAGNOSIS — R26.89 IMBALANCE: ICD-10-CM

## 2024-03-20 DIAGNOSIS — R29.898 BILATERAL LEG WEAKNESS: ICD-10-CM

## 2024-03-20 PROCEDURE — 72148 MRI LUMBAR SPINE W/O DYE: CPT

## 2024-03-22 ENCOUNTER — OFFICE VISIT (OUTPATIENT)
Dept: FAMILY MEDICINE CLINIC | Facility: CLINIC | Age: 79
End: 2024-03-22
Payer: MEDICARE

## 2024-03-22 VITALS
RESPIRATION RATE: 18 BRPM | HEIGHT: 73 IN | HEART RATE: 66 BPM | BODY MASS INDEX: 31.14 KG/M2 | OXYGEN SATURATION: 96 % | DIASTOLIC BLOOD PRESSURE: 70 MMHG | WEIGHT: 235 LBS | SYSTOLIC BLOOD PRESSURE: 136 MMHG

## 2024-03-22 DIAGNOSIS — M25.551 BILATERAL HIP PAIN: Primary | ICD-10-CM

## 2024-03-22 DIAGNOSIS — M25.552 BILATERAL HIP PAIN: Primary | ICD-10-CM

## 2024-03-22 DIAGNOSIS — M62.89 TENSOR FASCIA LATA SYNDROME: ICD-10-CM

## 2024-03-22 PROCEDURE — 99213 OFFICE O/P EST LOW 20 MIN: CPT | Performed by: INTERNAL MEDICINE

## 2024-03-22 NOTE — PROGRESS NOTES
Subjective chief complaint is follow-up on MRI  Earl Marc is a 78 y.o. male.     History of Present Illness Earl is here today for follow-up on his MRI of the lumbar spine.  He has been having pain in the hips and trouble with walking as well as weakness in the legs.  His hip x-rays really did not reveal significant arthritis.  We did an MRI scan of his lumbar spine and is really not all that bad.  He has some facet arthritis and some disc osteophyte complexes but nothing in terms of significant canal stenosis or foraminal impingement.    The following portions of the patient's history were reviewed and updated as appropriate: allergies, current medications, and problem list.    Review of Systems   Musculoskeletal:  Positive for gait problem.   Neurological:  Positive for weakness and numbness.     Objective   Physical Exam  Vitals and nursing note reviewed.   Cardiovascular:      Rate and Rhythm: Normal rate.   Pulmonary:      Effort: Pulmonary effort is normal.   Musculoskeletal:      Comments: His hips have good internal and external rotation.  His left tensor fascia prateek is quite tender.  The right is not quite so bad.       Assessment & Plan   Diagnoses and all orders for this visit:    1. Bilateral hip pain (Primary)  -     Ambulatory Referral to Physical Therapy Evaluate and treat, Ortho    2. Tensor fascia prateek syndrome  -     Ambulatory Referral to Physical Therapy Evaluate and treat, Ortho    Earl is here today for follow-up.  He is having hip pain.  His x-rays were okay.  We thought this may be coming from his back but back really does not look all that bad.  We are going to refer him to physical therapy.  Unfortunately because of his diabetes and other health problems we cannot use NSAIDs or steroids

## 2024-04-05 ENCOUNTER — PATIENT MESSAGE (OUTPATIENT)
Dept: ENDOCRINOLOGY | Age: 79
End: 2024-04-05
Payer: MEDICARE

## 2024-04-05 DIAGNOSIS — E11.65 TYPE 2 DIABETES MELLITUS WITH HYPERGLYCEMIA, WITH LONG-TERM CURRENT USE OF INSULIN: ICD-10-CM

## 2024-04-05 DIAGNOSIS — Z79.4 TYPE 2 DIABETES MELLITUS WITH HYPERGLYCEMIA, WITH LONG-TERM CURRENT USE OF INSULIN: ICD-10-CM

## 2024-04-05 RX ORDER — VENLAFAXINE HYDROCHLORIDE 37.5 MG/1
37.5 CAPSULE, EXTENDED RELEASE ORAL DAILY
Qty: 30 CAPSULE | Refills: 1 | Status: SHIPPED | OUTPATIENT
Start: 2024-04-05 | End: 2024-09-17

## 2024-04-05 NOTE — TELEPHONE ENCOUNTER
From: Earl Marc  To: Baljinder Dang  Sent: 4/5/2024 10:46 AM EDT  Subject: Refill - Tradjenta    I need a refill for Tradjenta 5 mg tablet sent to the [facility] at [place]    Thanks    Earl Marc

## 2024-04-09 RX ORDER — ASPIRIN 81 MG/1
81 TABLET ORAL DAILY
Qty: 90 TABLET | Refills: 3 | Status: SHIPPED | OUTPATIENT
Start: 2024-04-09

## 2024-04-09 RX ORDER — FLUTICASONE PROPIONATE 50 MCG
2 SPRAY, SUSPENSION (ML) NASAL DAILY PRN
Qty: 48 G | Refills: 1 | Status: SHIPPED | OUTPATIENT
Start: 2024-04-09

## 2024-04-18 ENCOUNTER — TELEPHONE (OUTPATIENT)
Dept: FAMILY MEDICINE CLINIC | Facility: CLINIC | Age: 79
End: 2024-04-18

## 2024-04-18 ENCOUNTER — HOSPITAL ENCOUNTER (OUTPATIENT)
Dept: PHYSICAL THERAPY | Facility: HOSPITAL | Age: 79
Setting detail: THERAPIES SERIES
Discharge: HOME | End: 2024-04-18
Payer: MEDICARE

## 2024-04-18 DIAGNOSIS — M25.552 BILATERAL HIP PAIN: Primary | ICD-10-CM

## 2024-04-18 DIAGNOSIS — M25.551 BILATERAL HIP PAIN: Primary | ICD-10-CM

## 2024-04-18 DIAGNOSIS — M62.89 TENSOR FASCIA LATA SYNDROME: ICD-10-CM

## 2024-04-18 PROCEDURE — 97162 PT EVAL MOD COMPLEX 30 MIN: CPT
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
## 2024-04-22 DIAGNOSIS — E11.65 TYPE 2 DIABETES MELLITUS WITH HYPERGLYCEMIA, WITH LONG-TERM CURRENT USE OF INSULIN: ICD-10-CM

## 2024-04-22 DIAGNOSIS — N18.31 HYPERTENSIVE KIDNEY DISEASE WITH STAGE 3A CHRONIC KIDNEY DISEASE: ICD-10-CM

## 2024-04-22 DIAGNOSIS — E78.5 DYSLIPIDEMIA: ICD-10-CM

## 2024-04-22 DIAGNOSIS — Z79.4 TYPE 2 DIABETES MELLITUS WITH HYPERGLYCEMIA, WITH LONG-TERM CURRENT USE OF INSULIN: ICD-10-CM

## 2024-04-22 DIAGNOSIS — I12.9 HYPERTENSIVE KIDNEY DISEASE WITH STAGE 3A CHRONIC KIDNEY DISEASE: ICD-10-CM

## 2024-04-23 LAB
ALBUMIN SERPL-MCNC: 4.1 G/DL (ref 3.8–4.8)
ALBUMIN/CREAT UR: 63 MG/G CREAT (ref 0–29)
ALBUMIN/GLOB SERPL: 1.5 {RATIO} (ref 1.2–2.2)
ALP SERPL-CCNC: 79 IU/L (ref 44–121)
ALT SERPL-CCNC: 18 IU/L (ref 0–44)
AST SERPL-CCNC: 14 IU/L (ref 0–40)
BILIRUB SERPL-MCNC: 0.6 MG/DL (ref 0–1.2)
BUN SERPL-MCNC: 16 MG/DL (ref 8–27)
BUN/CREAT SERPL: 12 (ref 10–24)
CALCIUM SERPL-MCNC: 9.7 MG/DL (ref 8.6–10.2)
CHLORIDE SERPL-SCNC: 99 MMOL/L (ref 96–106)
CHOLEST SERPL-MCNC: 81 MG/DL (ref 100–199)
CO2 SERPL-SCNC: 25 MMOL/L (ref 20–29)
CREAT SERPL-MCNC: 1.32 MG/DL (ref 0.76–1.27)
CREAT UR-MCNC: 164 MG/DL
EGFRCR SERPLBLD CKD-EPI 2021: 55 ML/MIN/1.73
GLOBULIN SER CALC-MCNC: 2.8 G/DL (ref 1.5–4.5)
GLUCOSE SERPL-MCNC: 250 MG/DL (ref 70–99)
HBA1C MFR BLD: 10.2 % (ref 4.8–5.6)
HDLC SERPL-MCNC: 35 MG/DL
IMP & REVIEW OF LAB RESULTS: NORMAL
LDLC SERPL CALC-MCNC: 19 MG/DL (ref 0–99)
MICROALBUMIN UR-MCNC: 103.7 UG/ML
POTASSIUM SERPL-SCNC: 5 MMOL/L (ref 3.5–5.2)
PROT SERPL-MCNC: 6.9 G/DL (ref 6–8.5)
REPORT: NORMAL
SODIUM SERPL-SCNC: 138 MMOL/L (ref 134–144)
TRIGL SERPL-MCNC: 165 MG/DL (ref 0–149)
VLDLC SERPL CALC-MCNC: 27 MG/DL (ref 5–40)

## 2024-04-30 ENCOUNTER — OFFICE VISIT (OUTPATIENT)
Dept: ENDOCRINOLOGY | Age: 79
End: 2024-04-30
Payer: MEDICARE

## 2024-04-30 VITALS
DIASTOLIC BLOOD PRESSURE: 80 MMHG | BODY MASS INDEX: 30.27 KG/M2 | WEIGHT: 228.4 LBS | OXYGEN SATURATION: 97 % | SYSTOLIC BLOOD PRESSURE: 138 MMHG | HEART RATE: 48 BPM | HEIGHT: 73 IN

## 2024-04-30 DIAGNOSIS — Z79.4 TYPE 2 DIABETES MELLITUS WITH HYPERGLYCEMIA, WITH LONG-TERM CURRENT USE OF INSULIN: Primary | ICD-10-CM

## 2024-04-30 DIAGNOSIS — E11.65 TYPE 2 DIABETES MELLITUS WITH HYPERGLYCEMIA, WITH LONG-TERM CURRENT USE OF INSULIN: Primary | ICD-10-CM

## 2024-04-30 DIAGNOSIS — E78.5 DYSLIPIDEMIA: ICD-10-CM

## 2024-04-30 DIAGNOSIS — I12.9 HYPERTENSIVE KIDNEY DISEASE WITH STAGE 3A CHRONIC KIDNEY DISEASE: ICD-10-CM

## 2024-04-30 DIAGNOSIS — N18.31 HYPERTENSIVE KIDNEY DISEASE WITH STAGE 3A CHRONIC KIDNEY DISEASE: ICD-10-CM

## 2024-04-30 PROCEDURE — 95251 CONT GLUC MNTR ANALYSIS I&R: CPT | Performed by: NURSE PRACTITIONER

## 2024-04-30 PROCEDURE — 99214 OFFICE O/P EST MOD 30 MIN: CPT | Performed by: NURSE PRACTITIONER

## 2024-04-30 RX ORDER — BLOOD SUGAR DIAGNOSTIC
STRIP MISCELLANEOUS
COMMUNITY
Start: 2024-04-22

## 2024-04-30 RX ORDER — INSULIN ASPART 100 [IU]/ML
5-6 INJECTION, SOLUTION INTRAVENOUS; SUBCUTANEOUS
Qty: 15 ML | Refills: 1 | Status: SHIPPED | OUTPATIENT
Start: 2024-04-30
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
## 2024-04-30 NOTE — PATIENT INSTRUCTIONS
Continue with Lantus 38 units at bedtime   Continue with Tradjenta 5 mg daily  Start Novolog 5 units 10-15 minutes before each meal

## 2024-05-01 ENCOUNTER — OFFICE VISIT (OUTPATIENT)
Dept: FAMILY MEDICINE CLINIC | Facility: CLINIC | Age: 79
End: 2024-05-01
Payer: MEDICARE

## 2024-05-01 VITALS
DIASTOLIC BLOOD PRESSURE: 76 MMHG | RESPIRATION RATE: 18 BRPM | OXYGEN SATURATION: 98 % | BODY MASS INDEX: 30.09 KG/M2 | HEIGHT: 73 IN | SYSTOLIC BLOOD PRESSURE: 138 MMHG | HEART RATE: 62 BPM | WEIGHT: 227 LBS

## 2024-05-01 DIAGNOSIS — G89.29 CHRONIC LEFT HIP PAIN: Primary | ICD-10-CM

## 2024-05-01 DIAGNOSIS — G25.0 ESSENTIAL TREMOR: ICD-10-CM

## 2024-05-01 DIAGNOSIS — M25.552 CHRONIC LEFT HIP PAIN: Primary | ICD-10-CM

## 2024-05-01 PROCEDURE — 99214 OFFICE O/P EST MOD 30 MIN: CPT | Performed by: INTERNAL MEDICINE

## 2024-05-01 RX ORDER — TRAMADOL HYDROCHLORIDE 50 MG/1
50 TABLET ORAL EVERY 8 HOURS PRN
Qty: 30 TABLET | Refills: 1 | Status: SHIPPED | OUTPATIENT
Start: 2024-05-01

## 2024-05-01 RX ORDER — PROPRANOLOL HCL 60 MG
60 CAPSULE, EXTENDED RELEASE 24HR ORAL DAILY
Qty: 90 CAPSULE | Refills: 1 | Status: SHIPPED | OUTPATIENT
Start: 2024-05-01

## 2024-05-01 NOTE — PROGRESS NOTES
Subjective chief complaint is follow-up on blood pressure and also left hip pain  Earl Marc is a 79 y.o. male.     History of Present Illness Earl is here today for follow-up.  His blood pressure seems to be doing okay.  His tremor is doing a little bit better on the propranolol.  His heart rate today seems to be okay.  He did have a visit yesterday with the physician his heart rate was 48.  He is still complaining of hip pain.  His MRI of the lumbar spine was not terribly remarkable other than some arthritis in spinal stenosis.  He previously has been on Lyrica but did not tolerated that dose is high enough to alleviate his pain.  He has diabetes and chronic kidney disease and therefore cannot take steroids or anti-inflammatories.  Tylenol does not help.    The following portions of the patient's history were reviewed and updated as appropriate: allergies, current medications, and problem list.    Review of Systems   Musculoskeletal:  Positive for back pain and gait problem.       Objective   Physical Exam  Vitals and nursing note reviewed.   Constitutional:       Appearance: Normal appearance.   Cardiovascular:      Rate and Rhythm: Normal rate.      Comments: Blood pressure to my exam is 142/66  Pulmonary:      Effort: Pulmonary effort is normal.   Neurological:      Mental Status: He is alert.           Assessment & Plan   Diagnoses and all orders for this visit:    1. Chronic left hip pain (Primary)    Other orders  -     propranolol LA (INDERAL LA) 60 MG 24 hr capsule; Take 1 capsule by mouth Daily.  Dispense: 90 capsule; Refill: 1  -     traMADol (ULTRAM) 50 MG tablet; Take 1 tablet by mouth Every 8 (Eight) Hours As Needed for Moderate Pain.  Dispense: 30 tablet; Refill: 1    Earl is here today for follow-up.  The tremor is a little bit better on the propranolol.  His blood pressure and heart rate look to be okay here today.  We are not going to make any changes in the dosing.  We are going to  initiate some tramadol.  We did have him sign the appropriate controlled substance agreement.  I did check a Eddy study.  He is no longer receiving the ketamine or pregabalin.

## 2024-05-03 ENCOUNTER — HOSPITAL ENCOUNTER (OUTPATIENT)
Dept: PHYSICAL THERAPY | Facility: HOSPITAL | Age: 79
Setting detail: THERAPIES SERIES
Discharge: HOME | End: 2024-05-03
Payer: MEDICARE

## 2024-05-03 DIAGNOSIS — M25.551 BILATERAL HIP PAIN: Primary | ICD-10-CM

## 2024-05-03 DIAGNOSIS — M25.552 BILATERAL HIP PAIN: Primary | ICD-10-CM

## 2024-05-03 DIAGNOSIS — M62.89 TENSOR FASCIA LATA SYNDROME: ICD-10-CM

## 2024-05-03 PROCEDURE — 97110 THERAPEUTIC EXERCISES: CPT

## 2024-05-03 NOTE — THERAPY TREATMENT NOTE
Outpatient Physical Therapy Ortho Treatment Note  [facility]     Patient Name: Earl Marc  : 1945  MRN: [id number]  Today's Date: 5/3/2024      Visit Date: 2024    Visit Dx:    ICD-10-CM ICD-9-CM   1. Bilateral hip pain  M25.551 719.45    M25.552    2. Tensor fascia prateek syndrome  M62.89 727.09       Patient Active Problem List   Diagnosis    Bell's palsy    Persistent insomnia    Osteoarthritis of cervical spine    Diabetic peripheral neuropathy    Dyslipidemia    Steatosis of liver    Fibromyalgia    Gastroesophageal reflux disease without esophagitis    Hypertensive kidney disease with stage 3a chronic kidney disease    Hypogonadism in male    Renal insufficiency    Vitamin D deficiency    Benign non-nodular prostatic hyperplasia with lower urinary tract symptoms    S/P drug eluting coronary stent placement    Coronary artery disease involving native coronary artery of native heart    Malignant neoplasm of skin    Type 2 diabetes mellitus with hyperglycemia, with long-term current use of insulin    Diabetes mellitus    Chronic insomnia    Other specified glaucoma    Vertigo    Epigastric pain    Chest pain with high risk for cardiac etiology    Precordial pain    S/P arthroscopy of shoulder    Peripheral neuropathy    Stage 3b chronic kidney disease    Chest pain    Stroke-like symptoms        Past Medical History:   Diagnosis Date    Abnormal cardiovascular stress test     Acid reflux     Arthritis     Asthma     Cancer     skin     Chronic kidney disease     Colon polyp     Congenital heart disease     COPD (chronic obstructive pulmonary disease)     Old age COPD/2D hand Smoke emphysema    Coronary artery disease     Diabetes mellitus     Diabetic neuropathy associated with type 2 diabetes mellitus     Diabetic peripheral neuropathy 03/10/2016    Dyslipidemia     Erectile dysfunction     Fatty liver disease, nonalcoholic     Gastritis     Glaucoma     both eyes    Hyperlipidemia      Hypertension     Hypogonadism male     Infectious viral hepatitis Hep A July 1959    Drank water from broken water line and Grandparents house.    Migraines 09/2023    Myocardial infarction 2018    Trigger point of left shoulder region 03/02/2023    Type 2 diabetes mellitus         Past Surgical History:   Procedure Laterality Date    CARDIAC CATHETERIZATION N/A 03/25/2016    Procedure: Left Heart Cath;  Surgeon: Maria E Gilbert MD;  Location:  [facility];  Service:     CARDIAC CATHETERIZATION N/A 03/25/2016    Procedure: Coronary angiography;  Surgeon: Maria E Gilbert MD;  Location:  [facility];  Service:     CARDIAC CATHETERIZATION N/A 03/28/2016    Procedure: Coronary angiography;  Surgeon: Maria E Gilbert MD;  Location:  [facility];  Service:     CARDIAC CATHETERIZATION N/A 03/28/2016    Procedure: Stent MOISE coronary;  Surgeon: Maria E Gilbert MD;  Location:  [facility];  Service:     CARDIAC CATHETERIZATION N/A 10/18/2018    Procedure: Coronary angiography  no LV gram d/t CKD;  Surgeon: Maria E Gilbert MD;  Location:  [facility];  Service: Cardiology    CARDIAC CATHETERIZATION N/A 10/18/2018    Procedure: Left Heart Cath;  Surgeon: Maria E Gilbert MD;  Location:  [facility];  Service: Cardiology    CARDIAC CATHETERIZATION N/A 10/18/2018    Procedure: Stent MOISE coronary;  Surgeon: Maria E Gilbert MD;  Location:  [facility];  Service: Cardiology    CARDIAC CATHETERIZATION N/A 05/28/2021    Procedure: Coronary angiography;  Surgeon: Maria E Gilbert MD;  Location:  [facility];  Service: Cardiovascular;  Laterality: N/A;    CARDIAC CATHETERIZATION N/A 05/28/2021    Procedure: Left Heart Cath;  Surgeon: Maria E Gilbert MD;  Location:  [facility];  Service: Cardiovascular;  Laterality: N/A;    CARDIAC  CATHETERIZATION N/A 05/28/2021    Procedure: Left ventriculography;  Surgeon: Maria E Gilbert MD;  Location:  [facility];  Service: Cardiovascular;  Laterality: N/A;    CARDIAC CATHETERIZATION N/A 05/28/2021    Procedure: Stent MOISE coronary;  Surgeon: Maria E Gilbert MD;  Location:  [facility];  Service: Cardiovascular;  Laterality: N/A;    CARDIAC CATHETERIZATION N/A 1/19/2024    Procedure: Left Heart Cath;  Surgeon: Maria E Gilbert MD;  Location:  [facility];  Service: Cardiovascular;  Laterality: N/A;    CARDIAC CATHETERIZATION N/A 1/19/2024    Procedure: Coronary angiography;  Surgeon: Maria E Gilbert MD;  Location:  [facility];  Service: Cardiovascular;  Laterality: N/A;    CARDIAC CATHETERIZATION N/A 1/19/2024    Procedure: Left ventriculography;  Surgeon: Maria E Gilbert MD;  Location:  [facility];  Service: Cardiovascular;  Laterality: N/A;    CAROTID STENT      CORONARY ANGIOPLASTY      CORONARY STENT PLACEMENT  3/28/2016    EYE SURGERY  9/2017    NECK EXPLORATION N/A     CA RT/LT HEART CATHETERS N/A 10/18/2018    Procedure: Percutaneous Coronary Intervention;  Surgeon: Maria E Gilbert MD;  Location:  [facility];  Service: Cardiology                        PT Assessment/Plan       Row Name 05/03/24 1000          PT Assessment    Assessment Comments Pt returns for first f/u since eval. Reports hip pain is well managed. Initiated HEP with bilateraly hip flexibility and strengthening. Pt reports pain relief with hip stretches. Issues initiale HEP for home.  -CC        PT Plan    PT Plan Comments Consider h/s str, STS.  -CC               User Key  (r) = Recorded By, (t) = Taken By, (c) = Cosigned By      Initials Name Provider Type    Ines Ordonez, PT Physical Therapist                       OP Exercises       Row Name 05/03/24 1000              Subjective    Subjective Comments Pt reports hips are feeling a little better, doesn't feel need to use cane today  -CC         Subjective Pain    Able to rate subjective pain? yes  -CC      Pre-Treatment Pain Level 3  -CC         Total Minutes    54726 - PT Therapeutic Exercise Minutes 38  -CC         Exercise 1    Exercise Name 1 NuStep  -CC      Cueing 1 Verbal  -CC      Time 1 5 min  -CC         Exercise 2    Exercise Name 2 LTR  -CC      Cueing 2 Verbal  -CC      Reps 2 10 ea  -CC      Time 2 5s  -CC         Exercise 3    Exercise Name 3 piriformis str  -CC      Cueing 3 Verbal  -CC      Reps 3 3 ea  -CC      Time 3 20s  -CC         Exercise 4    Exercise Name 4 fig 4 str  -CC      Cueing 4 Verbal  -CC      Reps 4 3 ea  -CC      Time 4 20s  -CC         Exercise 5    Exercise Name 5 HL hip abd  -CC      Cueing 5 Verbal  -CC      Sets 5 2  -CC      Reps 5 10  -CC      Time 5 RTB  -CC         Exercise 6    Exercise Name 6 HL hip add  -CC      Cueing 6 Verbal  -CC      Sets 6 2  -CC      Reps 6 10  -CC      Time 6 5s  -CC                User Key  (r) = Recorded By, (t) = Taken By, (c) = Cosigned By      Initials Name Provider Type    Ines Ordonez, PT Physical Therapist                                  PT OP Goals       Row Name 05/03/24 1000          PT Short Term Goals    STG Date to Achieve 05/18/24  -CC     STG 1 Pt. will be independent with initial HEP to improve self-management of condition.  -CC     STG 1 Progress Ongoing  -CC     STG 2 Pt. will tolerate 10 minute of standing/walking ther ex in clinic before seated rest break to demonstrate improved activity tolerance.  -CC     STG 2 Progress Ongoing  -CC     STG 3 Pt. will perform 1 STS from chair with only 1 UE to demosntrate improving functional strength.  -CC     STG 3 Progress Ongoing  -CC        Long Term Goals    LTG Date to Achieve 06/17/24  -CC     LTG 1 Pt. will be independent with advanced HEP to improve long term management of condition  and independence.  -CC     LTG 1 Progress Ongoing  -CC     LTG 2 Pt will score >/= 40% on LEFS (from 21% on initial evaluation) to indicate improved perception of disability.  -CC     LTG 2 Progress Ongoing  -CC     LTG 3 Pt. will demonstrate ability to complete 5xSTS in </= 20 seconds to progress toward falls risk cut off of 15 seconds.  -CC     LTG 3 Progress Ongoing  -CC     LTG 4 Pt. will increase L LE strength >/= 4+/5 to improve mobility.  -CC     LTG 4 Progress Ongoing  -CC     LTG 5 Pt. will increase 2MWT >/= 150 feet with appropriate AD to indicate improved gait speed and mobility.  -CC     LTG 5 Progress Ongoing  -CC               User Key  (r) = Recorded By, (t) = Taken By, (c) = Cosigned By      Initials Name Provider Type    Ines Ordonez, PT Physical Therapist                    Therapy Education  Education Details: Access Code [email or web address]  Given: HEP  Program: New  How Provided: Verbal, Demonstration, Written  Provided to: Patient  Level of Understanding: Teach back education performed, Verbalized, Demonstrated              Time Calculation:   Start Time: 1000  Stop Time: 1038  Time Calculation (min): 38 min  Total Timed Code Minutes- PT: 38 minute(s)  Timed Charges  88604 - PT Therapeutic Exercise Minutes: 38  Total Minutes  Timed Charges Total Minutes: 38   Total Minutes: 38  Therapy Charges for Today       Code Description Service Date Service Provider Modifiers Qty    [id number] HC PT THER PROC EA 15 MIN 5/3/2024 Ines Capone, PT GP 3                      Ines Capone PT  5/3/2024

## 2024-05-07 DIAGNOSIS — E78.5 DYSLIPIDEMIA: ICD-10-CM

## 2024-05-07 RX ORDER — ATORVASTATIN CALCIUM 20 MG/1
20 TABLET, FILM COATED ORAL NIGHTLY
Qty: 90 TABLET | Refills: 1 | Status: SHIPPED | OUTPATIENT
Start: 2024-05-07

## 2024-05-07 RX ORDER — FLURBIPROFEN SODIUM 0.3 MG/ML
SOLUTION/ DROPS OPHTHALMIC
Qty: 500 EACH | Refills: 2 | Status: SHIPPED | OUTPATIENT
Start: 2024-05-07

## 2024-05-08 ENCOUNTER — HOSPITAL ENCOUNTER (OUTPATIENT)
Dept: PHYSICAL THERAPY | Facility: HOSPITAL | Age: 79
Discharge: HOME | End: 2024-05-08
Payer: MEDICARE

## 2024-05-08 DIAGNOSIS — M62.89 TENSOR FASCIA LATA SYNDROME: ICD-10-CM

## 2024-05-08 DIAGNOSIS — M25.552 BILATERAL HIP PAIN: Primary | ICD-10-CM

## 2024-05-08 DIAGNOSIS — M25.551 BILATERAL HIP PAIN: Primary | ICD-10-CM

## 2024-05-08 PROCEDURE — 97110 THERAPEUTIC EXERCISES: CPT

## 2024-05-10 ENCOUNTER — HOSPITAL ENCOUNTER (OUTPATIENT)
Dept: PHYSICAL THERAPY | Facility: HOSPITAL | Age: 79
Discharge: HOME | End: 2024-05-10
Payer: MEDICARE

## 2024-05-10 DIAGNOSIS — M25.552 BILATERAL HIP PAIN: Primary | ICD-10-CM

## 2024-05-10 DIAGNOSIS — M62.89 TENSOR FASCIA LATA SYNDROME: ICD-10-CM

## 2024-05-10 DIAGNOSIS — M25.551 BILATERAL HIP PAIN: Primary | ICD-10-CM

## 2024-05-10 PROCEDURE — 97530 THERAPEUTIC ACTIVITIES: CPT | Performed by: PHYSICAL THERAPIST

## 2024-05-15 ENCOUNTER — HOSPITAL ENCOUNTER (OUTPATIENT)
Dept: PHYSICAL THERAPY | Facility: HOSPITAL | Age: 79
Discharge: HOME | End: 2024-05-15
Admitting: INTERNAL MEDICINE
Payer: MEDICARE

## 2024-05-15 DIAGNOSIS — M25.551 BILATERAL HIP PAIN: Primary | ICD-10-CM

## 2024-05-15 DIAGNOSIS — M62.89 TENSOR FASCIA LATA SYNDROME: ICD-10-CM

## 2024-05-15 DIAGNOSIS — M25.552 BILATERAL HIP PAIN: Primary | ICD-10-CM

## 2024-05-15 PROCEDURE — 97110 THERAPEUTIC EXERCISES: CPT

## 2024-05-15 PROCEDURE — 97530 THERAPEUTIC ACTIVITIES: CPT

## 2024-05-15 NOTE — THERAPY PROGRESS REPORT/RE-CERT
Outpatient Physical Therapy Ortho Progress Note  [facility]     Patient Name: Earl Marc  : 1945  MRN: [id number]  Today's Date: 5/15/2024      Visit Date: 05/15/2024    Visit Dx:    ICD-10-CM ICD-9-CM   1. Bilateral hip pain  M25.551 719.45    M25.552    2. Tensor fascia prateek syndrome  M62.89 727.09       Patient Active Problem List   Diagnosis    Bell's palsy    Persistent insomnia    Osteoarthritis of cervical spine    Diabetic peripheral neuropathy    Dyslipidemia    Steatosis of liver    Fibromyalgia    Gastroesophageal reflux disease without esophagitis    Hypertensive kidney disease with stage 3a chronic kidney disease    Hypogonadism in male    Renal insufficiency    Vitamin D deficiency    Benign non-nodular prostatic hyperplasia with lower urinary tract symptoms    S/P drug eluting coronary stent placement    Coronary artery disease involving native coronary artery of native heart    Malignant neoplasm of skin    Type 2 diabetes mellitus with hyperglycemia, with long-term current use of insulin    Diabetes mellitus    Chronic insomnia    Other specified glaucoma    Vertigo    Epigastric pain    Chest pain with high risk for cardiac etiology    Precordial pain    S/P arthroscopy of shoulder    Peripheral neuropathy    Stage 3b chronic kidney disease    Chest pain    Stroke-like symptoms        Past Medical History:   Diagnosis Date    Abnormal cardiovascular stress test     Acid reflux     Arthritis     Asthma     Cancer     skin     Chronic kidney disease     Colon polyp     Congenital heart disease     COPD (chronic obstructive pulmonary disease)     Old age COPD/2D hand Smoke emphysema    Coronary artery disease     Diabetes mellitus     Diabetic neuropathy associated with type 2 diabetes mellitus     Diabetic peripheral neuropathy 03/10/2016    Dyslipidemia     Erectile dysfunction     Fatty liver disease, nonalcoholic     Gastritis     Glaucoma     both eyes    Hyperlipidemia      Hypertension     Hypogonadism male     Infectious viral hepatitis Hep A July 1959    Drank water from broken water line and Grandparents house.    Migraines 09/2023    Myocardial infarction 2018    Trigger point of left shoulder region 03/02/2023    Type 2 diabetes mellitus         Past Surgical History:   Procedure Laterality Date    CARDIAC CATHETERIZATION N/A 03/25/2016    Procedure: Left Heart Cath;  Surgeon: Maria E Gilbert MD;  Location:  [facility];  Service:     CARDIAC CATHETERIZATION N/A 03/25/2016    Procedure: Coronary angiography;  Surgeon: Maria E Gilbert MD;  Location:  [facility];  Service:     CARDIAC CATHETERIZATION N/A 03/28/2016    Procedure: Coronary angiography;  Surgeon: Maria E Gilbert MD;  Location:  [facility];  Service:     CARDIAC CATHETERIZATION N/A 03/28/2016    Procedure: Stent MOISE coronary;  Surgeon: Maria E Gilbert MD;  Location:  [facility];  Service:     CARDIAC CATHETERIZATION N/A 10/18/2018    Procedure: Coronary angiography  no LV gram d/t CKD;  Surgeon: Maria E Gilbert MD;  Location:  [facility];  Service: Cardiology    CARDIAC CATHETERIZATION N/A 10/18/2018    Procedure: Left Heart Cath;  Surgeon: Maria E Gilbert MD;  Location:  [facility];  Service: Cardiology    CARDIAC CATHETERIZATION N/A 10/18/2018    Procedure: Stent MOISE coronary;  Surgeon: Maria E Gilbert MD;  Location:  [facility];  Service: Cardiology    CARDIAC CATHETERIZATION N/A 05/28/2021    Procedure: Coronary angiography;  Surgeon: Maria E Gilbert MD;  Location:  [facility];  Service: Cardiovascular;  Laterality: N/A;    CARDIAC CATHETERIZATION N/A 05/28/2021    Procedure: Left Heart Cath;  Surgeon: Maria E Gilbert MD;  Location:  [facility];  Service: Cardiovascular;  Laterality: N/A;    CARDIAC  CATHETERIZATION N/A 05/28/2021    Procedure: Left ventriculography;  Surgeon: Maria E Gilbert MD;  Location:  [facility];  Service: Cardiovascular;  Laterality: N/A;    CARDIAC CATHETERIZATION N/A 05/28/2021    Procedure: Stent MOISE coronary;  Surgeon: Maria E Gilbert MD;  Location:  [facility];  Service: Cardiovascular;  Laterality: N/A;    CARDIAC CATHETERIZATION N/A 1/19/2024    Procedure: Left Heart Cath;  Surgeon: Maria E Gilbert MD;  Location:  [facility];  Service: Cardiovascular;  Laterality: N/A;    CARDIAC CATHETERIZATION N/A 1/19/2024    Procedure: Coronary angiography;  Surgeon: Maria E Gilbert MD;  Location:  [facility];  Service: Cardiovascular;  Laterality: N/A;    CARDIAC CATHETERIZATION N/A 1/19/2024    Procedure: Left ventriculography;  Surgeon: Maria E Gilbert MD;  Location:  [facility];  Service: Cardiovascular;  Laterality: N/A;    CAROTID STENT      CORONARY ANGIOPLASTY      CORONARY STENT PLACEMENT  3/28/2016    EYE SURGERY  9/2017    NECK EXPLORATION N/A     AZ RT/LT HEART CATHETERS N/A 10/18/2018    Procedure: Percutaneous Coronary Intervention;  Surgeon: Maria E Gilbert MD;  Location:  [facility];  Service: Cardiology        PT Ortho       Row Name 05/15/24 0900       Subjective Pain    Able to rate subjective pain? yes  -ER    Pre-Treatment Pain Level 3  -ER       Myotomal Screen- Lower Quarter Clearing    Hip flexion (L2) Right:;4 (Good);Left:;4- (Good -)  -ER    Knee extension (L3) Right:;4+ (Good +);Left:;4 (Good)  -ER    Ankle DF (L4) Right:;4 (Good);Left:;4- (Good -)  -ER    Ankle PF (S1) Right:;4+ (Good +);Left:;4- (Good -)  -ER    Knee flexion (S2) Bilateral:;4- (Good -)  -ER       MMT (Manual Muscle Testing)    Rt Lower Ext Rt Ankle Dorsiflexion  -ER    Lt Lower Ext Lt Ankle Dorsiflexion  -ER              User Key  (r) = Recorded By,  (t) = Taken By, (c) = Cosigned By      Initials Name Provider Type    ER Jaquelin Mccloud, PT Physical Therapist                                 PT Assessment/Plan       Row Name 05/15/24 1000          PT Assessment    Functional Limitations Impaired locomotion;Limitations in community activities;Performance in leisure activities;Performance in self-care ADL;Performance in work activities  -ER     Impairments Impaired flexibility;Posture;Poor body mechanics;Pain;Muscle strength;Locomotion;Range of motion  -ER     Assessment Comments Earl Marc has been seen for 5 physical therapy sessions for bilateral hip pain.  Treatment has included therapeutic exercise, manual therapy, therapeutic activity, neuro-muscular retraining , gait training, and patient education with home exercise program .  Pt. Ambulates into the clinic without an AD. Overall susan is imapired, with small bilateral step length, an forward flexed posture. Encouraged use of SPC at minimum to aid in balance and promote improved overall activity tolerance w/ reduced risk of falls. Pt. 5x STS previously completed in 29.75 seconds with heavy UE use for support, however this date he is able to perform from a clinic chair with no UE support in 16.11 seconds. Pt. 2 min walk test, previously able to ambulate 106 ft with SPC, however today, ambulating 288.9 ft with no AD. LEFS scoring 35, where previously scoring 17 (0 is severe difficulty). Progress to physical therapy goals is good. Pt has met 3/3 STG and 2/5 LTG. Pt. Making progress towards remaining goals, however demonstrates reduced overall DF strength bilaterally L>R. L DF 4-, R DF 4. He will benefit from continued skilled physical therapy to address remaining impairments and functional limitations.  -ER     Please refer to paper survey for additional self-reported information No  -ER     Rehab Potential Good  -ER     Patient/caregiver participated in establishment of treatment plan and goals Yes  -ER      Patient would benefit from skilled therapy intervention Yes  -ER        PT Plan    PT Frequency 2x/week  -ER     Predicted Duration of Therapy Intervention (PT) 4-6 visits  -ER     Planned CPT's? PT RE-EVAL: 66401;PT THER PROC EA 15 MIN: 35904;PT THER ACT EA 15 MIN: 05432;PT MANUAL THERAPY EA 15 MIN: 13157;PT NEUROMUSC RE-EDUCATION EA 15 MIN: 05146;PT GAIT TRAINING EA 15 MIN: 04320;PT SELF CARE/HOME MGMT/TRAIN EA 15: 90465;PT HOT OR COLD PACK TREAT MCARE;PT ELECTRICAL STIM UNATTEND: ;PT ELECTRICAL STIM ATTD EA 15 MIN: 36371;PT SELF CARE/MGMT/TRAIN 15 MIN: 34168;PT THER SUPP EA 15 MIN;PT TRACTION LUMBAR: 68296  -ER     PT Plan Comments consider exaggerated marching, tandem gait F/B, setp and reach to work step length. ? lateral stepping in next several sessions. Montior fatigue/dizziness (hx of vertigo).  -ER               User Key  (r) = Recorded By, (t) = Taken By, (c) = Cosigned By      Initials Name Provider Type    ER Jaquelin Mccloud, PT Physical Therapist                       OP Exercises       Row Name 05/15/24 0900             Subjective    Subjective Comments I use the cane in the community  -ER         Subjective Pain    Able to rate subjective pain? yes  -ER      Pre-Treatment Pain Level 3  -ER         Total Minutes    58526 - PT Therapeutic Exercise Minutes 30  -ER      85980 - PT Therapeutic Activity Minutes 10  -ER         Exercise 1    Exercise Name 1 NuStep  -ER      Time 1 5 min  -ER         Exercise 2    Exercise Name 2 LTR  -ER      Cueing 2 Verbal  -ER      Reps 2 10 ea  -ER      Time 2 5s  -ER         Exercise 3    Exercise Name 3 piriformis str  -ER      Cueing 3 Verbal  -ER      Reps 3 3 ea  -ER      Time 3 20s  -ER         Exercise 4    Exercise Name 4 fig 4 str  -ER      Cueing 4 Verbal  -ER      Reps 4 3 ea  -ER      Time 4 20s  -ER         Exercise 5    Exercise Name 5 HL hip abd  -ER      Cueing 5 Verbal  -ER      Sets 5 2  -ER      Reps 5 10  -ER      Time 5 RTB  -ER      Additional  Comments unilat  -ER         Exercise 6    Exercise Name 6 HL hip add  -ER      Cueing 6 Verbal  -ER      Sets 6 2  -ER      Reps 6 10  -ER      Time 6 5s  -ER         Exercise 7    Exercise Name 7 Small range glut bridge  -ER      Cueing 7 Verbal;Demo  -ER      Sets 7 2  -ER      Reps 7 10  -ER      Time 7 3 sec  -ER      Additional Comments RTB around knees  -ER         Exercise 8    Exercise Name 8 BKFO  -ER      Cueing 8 Verbal;Demo  -ER      Sets 8 2  -ER      Reps 8 10  -ER      Time 8 RTB  -ER         Exercise 10    Exercise Name 10 TA- 2 min walk, 5x STS, outcome measures  -ER      Time 10 10 min  -ER                User Key  (r) = Recorded By, (t) = Taken By, (c) = Cosigned By      Initials Name Provider Type    ER Jaquelin Mccloud, PT Physical Therapist                                  PT OP Goals       Row Name 05/15/24 0900          PT Short Term Goals    STG Date to Achieve 05/18/24  -ER     STG 1 Pt. will be independent with initial HEP to improve self-management of condition.  -ER     STG 1 Progress Met  -ER     STG 2 Pt. will tolerate 10 minute of standing/walking ther ex in clinic before seated rest break to demonstrate improved activity tolerance.  -ER     STG 2 Progress Met  -ER     STG 3 Pt. will perform 1 STS from chair with only 1 UE to demosntrate improving functional strength.  -ER     STG 3 Progress Met  -ER        Long Term Goals    LTG Date to Achieve 06/17/24  -ER     LTG 1 Pt. will be independent with advanced HEP to improve long term management of condition and independence.  -ER     LTG 1 Progress Ongoing  -ER     LTG 2 Pt will score >/= 40% on LEFS (from 21% on initial evaluation) to indicate improved perception of disability.  -ER     LTG 2 Progress Ongoing;Progressing  -ER     LTG 3 Pt. will demonstrate ability to complete 5xSTS in </= 20 seconds to progress toward falls risk cut off of 15 seconds.  -ER     LTG 3 Progress Met  -ER     LTG 4 Pt. will increase L LE strength >/= 4+/5 to  improve mobility.  -ER     LTG 4 Progress Ongoing;Progressing  -ER     LTG 5 Pt. will increase 2MWT >/= 150 feet with appropriate AD to indicate improved gait speed and mobility.  -ER     LTG 5 Progress Met  -ER               User Key  (r) = Recorded By, (t) = Taken By, (c) = Cosigned By      Initials Name Provider Type    Jaquelin Haynes, PT Physical Therapist                         Outcome Measure Options: Lower Extremity Functional Scale (LEFS), 5x Sit to Stand, 2 Minute Walk Test  2 Minute Walk Test  Gait, Assistive Device: other (see comments) (no AD)  Distance Ambulated in 2 Minutes: 288.9  5 Times Sit to Stand  5 Times Sit to Stand (seconds): 16.11 seconds  5 Times Sit to Stand Comments: no UE use  Lower Extremity Functional Index  Any of your usual work, housework or school activities: A little bit of difficulty  Your usual hobbies, recreational or sporting activities: A little bit of difficulty  Getting into or out of the bath: A little bit of difficulty  Walking between rooms: Moderate difficulty  Putting on your shoes or socks: Moderate difficulty  Squatting: Moderate difficulty  Lifting an object, like a bag of groceries from the floor: A little bit of difficulty  Performing light activities around your home: No difficulty  Performing heavy activities around your home: Moderate difficulty  Getting into or out of a car: Moderate difficulty  Walking 2 blocks: Extreme difficulty or unable to perform activity  Walking a mile: Extreme difficulty or unable to perform activity  Going up or down 10 stairs (about 1 flight of stairs): Quite a bit of difficulty  Standing for 1 hour: A little bit of difficulty  Sitting for 1 hour: Extreme difficulty or unable to perform activity  Running on even ground: Extreme difficulty or unable to perform activity  Running on uneven ground: Extreme difficulty or unable to perform activity  Making sharp turns while running fast: Quite a bit of difficulty  Hopping: Moderate  difficulty  Rolling over in bed: Moderate difficulty  Total: 35  Lower Extremity Functional Index  Any of your usual work, housework or school activities: A little bit of difficulty  Your usual hobbies, recreational or sporting activities: A little bit of difficulty  Getting into or out of the bath: A little bit of difficulty  Walking between rooms: Moderate difficulty  Putting on your shoes or socks: Moderate difficulty  Squatting: Moderate difficulty  Lifting an object, like a bag of groceries from the floor: A little bit of difficulty  Performing light activities around your home: No difficulty  Performing heavy activities around your home: Moderate difficulty  Getting into or out of a car: Moderate difficulty  Walking 2 blocks: Extreme difficulty or unable to perform activity  Walking a mile: Extreme difficulty or unable to perform activity  Going up or down 10 stairs (about 1 flight of stairs): Quite a bit of difficulty  Standing for 1 hour: A little bit of difficulty  Sitting for 1 hour: Extreme difficulty or unable to perform activity  Running on even ground: Extreme difficulty or unable to perform activity  Running on uneven ground: Extreme difficulty or unable to perform activity  Making sharp turns while running fast: Quite a bit of difficulty  Hopping: Moderate difficulty  Rolling over in bed: Moderate difficulty  Total: 35      Time Calculation:   Start Time: 0945  Stop Time: 1025  Time Calculation (min): 40 min  Total Timed Code Minutes- PT: 40 minute(s)  Timed Charges  11951 - PT Therapeutic Exercise Minutes: 30  32292 - PT Therapeutic Activity Minutes: 10  Total Minutes  Timed Charges Total Minutes: 40   Total Minutes: 40  Therapy Charges for Today       Code Description Service Date Service Provider Modifiers Qty    45811176300 HC PT THER PROC EA 15 MIN 5/15/2024 Jaquelin Mccloud, PT GP 2    [id number] HC PT THERAPEUTIC ACT EA 15 MIN 5/15/2024 Jaquelin Mccloud PT GP 1            PT G-Codes  Outcome Measure  Options: Lower Extremity Functional Scale (LEFS), 5x Sit to Stand, 2 Minute Walk Test  Total: 35         Jaquelin Mccloud, PT  5/15/2024

## 2024-05-17 ENCOUNTER — HOSPITAL ENCOUNTER (OUTPATIENT)
Dept: PHYSICAL THERAPY | Facility: HOSPITAL | Age: 79
Discharge: HOME | End: 2024-05-17
Payer: MEDICARE

## 2024-05-17 DIAGNOSIS — M25.552 BILATERAL HIP PAIN: Primary | ICD-10-CM

## 2024-05-17 DIAGNOSIS — M62.89 TENSOR FASCIA LATA SYNDROME: ICD-10-CM

## 2024-05-17 DIAGNOSIS — M25.551 BILATERAL HIP PAIN: Primary | ICD-10-CM

## 2024-05-17 PROCEDURE — 97530 THERAPEUTIC ACTIVITIES: CPT | Performed by: PHYSICAL THERAPIST

## 2024-05-22 ENCOUNTER — HOSPITAL ENCOUNTER (OUTPATIENT)
Dept: PHYSICAL THERAPY | Facility: HOSPITAL | Age: 79
Discharge: HOME | End: 2024-05-22
Admitting: INTERNAL MEDICINE
Payer: MEDICARE

## 2024-05-22 DIAGNOSIS — M25.551 BILATERAL HIP PAIN: Primary | ICD-10-CM

## 2024-05-22 DIAGNOSIS — M25.552 BILATERAL HIP PAIN: Primary | ICD-10-CM

## 2024-05-22 DIAGNOSIS — M62.89 TENSOR FASCIA LATA SYNDROME: ICD-10-CM

## 2024-05-22 PROCEDURE — 97116 GAIT TRAINING THERAPY: CPT

## 2024-05-22 PROCEDURE — 97530 THERAPEUTIC ACTIVITIES: CPT

## 2024-05-22 NOTE — THERAPY TREATMENT NOTE
Outpatient Physical Therapy Ortho Treatment Note  [facility]     Patient Name: Earl Marc  : 1945  MRN: [id number]  Today's Date: 2024      Visit Date: 2024    Visit Dx:    ICD-10-CM ICD-9-CM   1. Bilateral hip pain  M25.551 719.45    M25.552    2. Tensor fascia prateek syndrome  M62.89 727.09       Patient Active Problem List   Diagnosis    Bell's palsy    Persistent insomnia    Osteoarthritis of cervical spine    Diabetic peripheral neuropathy    Dyslipidemia    Steatosis of liver    Fibromyalgia    Gastroesophageal reflux disease without esophagitis    Hypertensive kidney disease with stage 3a chronic kidney disease    Hypogonadism in male    Renal insufficiency    Vitamin D deficiency    Benign non-nodular prostatic hyperplasia with lower urinary tract symptoms    S/P drug eluting coronary stent placement    Coronary artery disease involving native coronary artery of native heart    Malignant neoplasm of skin    Type 2 diabetes mellitus with hyperglycemia, with long-term current use of insulin    Diabetes mellitus    Chronic insomnia    Other specified glaucoma    Vertigo    Epigastric pain    Chest pain with high risk for cardiac etiology    Precordial pain    S/P arthroscopy of shoulder    Peripheral neuropathy    Stage 3b chronic kidney disease    Chest pain    Stroke-like symptoms        Past Medical History:   Diagnosis Date    Abnormal cardiovascular stress test     Acid reflux     Arthritis     Asthma     Cancer     skin     Chronic kidney disease     Colon polyp     Congenital heart disease     COPD (chronic obstructive pulmonary disease)     Old age COPD/2D hand Smoke emphysema    Coronary artery disease     Diabetes mellitus     Diabetic neuropathy associated with type 2 diabetes mellitus     Diabetic peripheral neuropathy 03/10/2016    Dyslipidemia     Erectile dysfunction     Fatty liver disease, nonalcoholic     Gastritis     Glaucoma     both eyes    Hyperlipidemia      Hypertension     Hypogonadism male     Infectious viral hepatitis Hep A July 1959    Drank water from broken water line and Grandparents house.    Migraines 09/2023    Myocardial infarction 2018    Trigger point of left shoulder region 03/02/2023    Type 2 diabetes mellitus         Past Surgical History:   Procedure Laterality Date    CARDIAC CATHETERIZATION N/A 03/25/2016    Procedure: Left Heart Cath;  Surgeon: Maria E Gilbert MD;  Location:  [facility];  Service:     CARDIAC CATHETERIZATION N/A 03/25/2016    Procedure: Coronary angiography;  Surgeon: Maria E Gilbert MD;  Location:  [facility];  Service:     CARDIAC CATHETERIZATION N/A 03/28/2016    Procedure: Coronary angiography;  Surgeon: Maria E Gilbert MD;  Location:  [facility];  Service:     CARDIAC CATHETERIZATION N/A 03/28/2016    Procedure: Stent MOISE coronary;  Surgeon: Maria E Gilbert MD;  Location:  [facility];  Service:     CARDIAC CATHETERIZATION N/A 10/18/2018    Procedure: Coronary angiography  no LV gram d/t CKD;  Surgeon: Maria E Gilbert MD;  Location:  [facility];  Service: Cardiology    CARDIAC CATHETERIZATION N/A 10/18/2018    Procedure: Left Heart Cath;  Surgeon: Maria E Gilbert MD;  Location:  [facility];  Service: Cardiology    CARDIAC CATHETERIZATION N/A 10/18/2018    Procedure: Stent MOISE coronary;  Surgeon: Maria E Gilbert MD;  Location:  [facility];  Service: Cardiology    CARDIAC CATHETERIZATION N/A 05/28/2021    Procedure: Coronary angiography;  Surgeon: Maria E Gilbert MD;  Location:  [facility];  Service: Cardiovascular;  Laterality: N/A;    CARDIAC CATHETERIZATION N/A 05/28/2021    Procedure: Left Heart Cath;  Surgeon: Maria E Gilbert MD;  Location:  [facility];  Service: Cardiovascular;  Laterality: N/A;    CARDIAC  CATHETERIZATION N/A 05/28/2021    Procedure: Left ventriculography;  Surgeon: Maria E Gilbert MD;  Location:  [facility];  Service: Cardiovascular;  Laterality: N/A;    CARDIAC CATHETERIZATION N/A 05/28/2021    Procedure: Stent MOISE coronary;  Surgeon: Maria E Gilbert MD;  Location:  [facility];  Service: Cardiovascular;  Laterality: N/A;    CARDIAC CATHETERIZATION N/A 1/19/2024    Procedure: Left Heart Cath;  Surgeon: Maria E Gilbert MD;  Location:  [facility];  Service: Cardiovascular;  Laterality: N/A;    CARDIAC CATHETERIZATION N/A 1/19/2024    Procedure: Coronary angiography;  Surgeon: Maria E Gilbert MD;  Location:  [facility];  Service: Cardiovascular;  Laterality: N/A;    CARDIAC CATHETERIZATION N/A 1/19/2024    Procedure: Left ventriculography;  Surgeon: Maria E Gilbert MD;  Location:  [facility];  Service: Cardiovascular;  Laterality: N/A;    CAROTID STENT      CORONARY ANGIOPLASTY      CORONARY STENT PLACEMENT  3/28/2016    EYE SURGERY  9/2017    NECK EXPLORATION N/A     ID RT/LT HEART CATHETERS N/A 10/18/2018    Procedure: Percutaneous Coronary Intervention;  Surgeon: Mari aE Gilbert MD;  Location:  [facility];  Service: Cardiology                        PT Assessment/Plan       Row Name 05/22/24 1300          PT Assessment    Functional Limitations Impaired locomotion;Limitations in community activities;Performance in leisure activities;Performance in self-care ADL;Performance in work activities  -GJ (r) VS (t) GJ (c)     Impairments Impaired flexibility;Posture;Poor body mechanics;Pain;Muscle strength;Locomotion;Range of motion  -GJ (r) VS (t) GJ (c)     Assessment Comments Pt returns to clinic with bilateral hip pain that is burning and at a 4 on a 0-10 pain scale, with 10 being worst pain. Pt presents to clinic with forward head posture and slight  thoracic kyphosis, small steps with ambulation and some shuffling. Pt tolerated tx well with side stepping, step ups with hip drive; Pt lost balance with step and reach, but was able to catch himself. Pt also momentarily lost balance during gait training in the hallway. He developed a headache that went away throughout session. Pt was fatigued at the end of the session and required cues to continue to work on heel to toe gait mechanics. Pt continues to be a good candidate for skilled physical therapy to address concerns with gait mechanics, balance, and hip pain.  -GJ (r) VS (t) GJ (c)     Rehab Potential Good  -GJ (r) VS (t) GJ (c)     Patient/caregiver participated in establishment of treatment plan and goals Yes  -GJ (r) VS (t) GJ (c)     Patient would benefit from skilled therapy intervention Yes  -GJ (r) VS (t) GJ (c)        PT Plan    PT Plan Comments focus on standing/functional strength training. Work on gait training to reinforce heel to toe and increase step length. Consider obstacle course to increase knee flexion with ambulation  -GJ (r) VS (t) GJ (c)               User Key  (r) = Recorded By, (t) = Taken By, (c) = Cosigned By      Initials Name Provider Type    GJ Earl Goodrich, PT Physical Therapist    VS Sandrita Ch PT Student PT Student                       OP Exercises       Row Name 05/22/24 1308 05/22/24 1200          Subjective    Subjective Comments -- Pt reports having a burning pain in his hips  -GJ (r) VS (t) GJ (c)        Subjective Pain    Able to rate subjective pain? -- yes  -GJ (r) VS (t) GJ (c)     Pre-Treatment Pain Level -- 4  -GJ (r) VS (t) GJ (c)        Total Minutes    43892 - Gait Training Minutes  15  -GJ (r) VS (t) GJ (c) --     47750 - PT Therapeutic Activity Minutes 30  -GJ (r) VS (t) GJ (c) --        Exercise 1    Exercise Name 1 -- NuStep  -GJ (r) VS (t) GJ (c)     Time 1 -- 5 min  -GJ (r) VS (t) GJ (c)        Exercise 2    Exercise Name 2 -- LTR  -GJ (r) VS (t) GJ  (c)     Cueing 2 -- Verbal  -GJ (r) VS (t) GJ (c)     Reps 2 -- 10 ea  -GJ (r) VS (t) GJ (c)        Exercise 12    Exercise Name 12 -- step up with hip drive  -GJ (r) VS (t) GJ (c)     Cueing 12 -- Verbal;Demo  -GJ (r) VS (t) GJ (c)     Reps 12 -- 2sets x 8 e/ leg  -GJ (r) VS (t) GJ (c)     Additional Comments -- 6in step  -GJ (r) VS (t) GJ (c)        Exercise 15    Exercise Name 15 -- lateral stepping  -GJ (r) VS (t) GJ (c)     Cueing 15 -- Verbal;Demo  -GJ (r) VS (t) GJ (c)     Reps 15 -- 4laps  -GJ (r) VS (t) GJ (c)     Time 15 -- RTB  -GJ (r) VS (t) GJ (c)     Additional Comments -- 2 hands  -GJ (r) VS (t) GJ (c)        Exercise 17    Exercise Name 17 -- step and reach  -GJ (r) VS (t) GJ (c)     Cueing 17 -- Verbal;Demo  -GJ (r) VS (t) GJ (c)     Sets 17 -- 2  -GJ (r) VS (t) GJ (c)     Reps 17 -- 10  -GJ (r) VS (t) GJ (c)     Additional Comments -- partial practice for heel toe gait training  -GJ (r) VS (t) GJ (c)        Exercise 19    Exercise Name 19 -- gait training with reciprocal arm and leg motion  -GJ (r) VS (t) GJ (c)     Sets 19 -- 1 set 50ft down and back  -GJ (r) VS (t) GJ (c)     Additional Comments -- Reciprocal gait pattern thinking heel to toe and exaggerated arm swing. Down and back in hallway  -GJ (r) VS (t) GJ (c)               User Key  (r) = Recorded By, (t) = Taken By, (c) = Cosigned By      Initials Name Provider Type    GJ Earl Goodrich, PT Physical Therapist    VS Sandrita Ch, PT Student PT Student                                  PT OP Goals       Row Name 05/22/24 1300          PT Short Term Goals    STG Date to Achieve 05/18/24  -GJ (r) VS (t) GJ (c)     STG 1 Pt. will be independent with initial HEP to improve self-management of condition.  -GJ (r) VS (t) GJ (c)     STG 1 Progress Met  -GJ (r) VS (t) GJ (c)     STG 2 Pt. will tolerate 10 minute of standing/walking ther ex in clinic before seated rest break to demonstrate improved activity tolerance.  -GJ (r) VS (t) GJ (c)      STG 2 Progress Met  -GJ (r) VS (t) GJ (c)     STG 3 Pt. will perform 1 STS from chair with only 1 UE to demosntrate improving functional strength.  -GJ (r) VS (t) GJ (c)     STG 3 Progress Met  -GJ (r) VS (t) GJ (c)        Long Term Goals    LTG Date to Achieve 06/17/24  -GJ (r) VS (t) GJ (c)     LTG 1 Pt. will be independent with advanced HEP to improve long term management of condition and independence.  -GJ (r) VS (t) GJ (c)     LTG 1 Progress Ongoing  -GJ (r) VS (t) GJ (c)     LTG 2 Pt will score >/= 40% on LEFS (from 21% on initial evaluation) to indicate improved perception of disability.  -GJ (r) VS (t) GJ (c)     LTG 2 Progress Ongoing;Progressing  -GJ (r) VS (t) GJ (c)     LTG 3 Pt. will demonstrate ability to complete 5xSTS in </= 20 seconds to progress toward falls risk cut off of 15 seconds.  -GJ (r) VS (t) GJ (c)     LTG 3 Progress Met  -GJ (r) VS (t) GJ (c)     LTG 4 Pt. will increase L LE strength >/= 4+/5 to improve mobility.  -GJ (r) VS (t) GJ (c)     LTG 4 Progress Ongoing;Progressing  -GJ (r) VS (t) GJ (c)     LTG 5 Pt. will increase 2MWT >/= 150 feet with appropriate AD to indicate improved gait speed and mobility.  -GJ (r) VS (t) GJ (c)     LTG 5 Progress Met  -GJ (r) VS (t) GJ (c)               User Key  (r) = Recorded By, (t) = Taken By, (c) = Cosigned By      Initials Name Provider Type    GJ Earl Goodrich, PT Physical Therapist    VS Sandrita Ch PT Student PT Student                    Therapy Education  Education Details: reviewed proper heel to toe gait mechanics  Given: HEP, Symptoms/condition management, Pain management, Posture/body mechanics, Fall prevention and home safety, Mobility training  Program: Reinforced  How Provided: Verbal, Demonstration  Provided to: Patient  Level of Understanding: Teach back education performed, Verbalized, Demonstrated              Time Calculation:   Start Time: 1000  Stop Time: 1045  Time Calculation (min): 45 min  Timed Charges  64633 - Gait  Training Minutes : 15  29246 - PT Therapeutic Activity Minutes: 30  Total Minutes  Timed Charges Total Minutes: 45   Total Minutes: 45  Therapy Charges for Today       Code Description Service Date Service Provider Modifiers Qty    [id number]  GAIT TRAINING EA 15 MIN 5/22/2024 Sandrita Ch, PT Student GP 1    [id number]  PT THERAPEUTIC ACT EA 15 MIN 5/22/2024 Sandrita Ch, PT Student GP 2                      Sandrita Ch, PT Student  5/22/2024

## 2024-05-24 ENCOUNTER — HOSPITAL ENCOUNTER (OUTPATIENT)
Dept: PHYSICAL THERAPY | Facility: HOSPITAL | Age: 79
Discharge: HOME | End: 2024-05-24
Payer: MEDICARE

## 2024-05-24 DIAGNOSIS — M25.552 BILATERAL HIP PAIN: Primary | ICD-10-CM

## 2024-05-24 DIAGNOSIS — M62.89 TENSOR FASCIA LATA SYNDROME: ICD-10-CM

## 2024-05-24 DIAGNOSIS — M25.551 BILATERAL HIP PAIN: Primary | ICD-10-CM

## 2024-05-24 PROCEDURE — 97110 THERAPEUTIC EXERCISES: CPT

## 2024-05-24 NOTE — THERAPY TREATMENT NOTE
Outpatient Physical Therapy Ortho Treatment Note  [facility]     Patient Name: Earl Marc  : 1945  MRN: [id number]  Today's Date: 2024      Visit Date: 2024    Visit Dx:    ICD-10-CM ICD-9-CM   1. Bilateral hip pain  M25.551 719.45    M25.552    2. Tensor fascia prateek syndrome  M62.89 727.09       Patient Active Problem List   Diagnosis    Bell's palsy    Persistent insomnia    Osteoarthritis of cervical spine    Diabetic peripheral neuropathy    Dyslipidemia    Steatosis of liver    Fibromyalgia    Gastroesophageal reflux disease without esophagitis    Hypertensive kidney disease with stage 3a chronic kidney disease    Hypogonadism in male    Renal insufficiency    Vitamin D deficiency    Benign non-nodular prostatic hyperplasia with lower urinary tract symptoms    S/P drug eluting coronary stent placement    Coronary artery disease involving native coronary artery of native heart    Malignant neoplasm of skin    Type 2 diabetes mellitus with hyperglycemia, with long-term current use of insulin    Diabetes mellitus    Chronic insomnia    Other specified glaucoma    Vertigo    Epigastric pain    Chest pain with high risk for cardiac etiology    Precordial pain    S/P arthroscopy of shoulder    Peripheral neuropathy    Stage 3b chronic kidney disease    Chest pain    Stroke-like symptoms        Past Medical History:   Diagnosis Date    Abnormal cardiovascular stress test     Acid reflux     Arthritis     Asthma     Cancer     skin     Chronic kidney disease     Colon polyp     Congenital heart disease     COPD (chronic obstructive pulmonary disease)     Old age COPD/2D hand Smoke emphysema    Coronary artery disease     Diabetes mellitus     Diabetic neuropathy associated with type 2 diabetes mellitus     Diabetic peripheral neuropathy 03/10/2016    Dyslipidemia     Erectile dysfunction     Fatty liver disease, nonalcoholic     Gastritis     Glaucoma     both eyes    Hyperlipidemia      Hypertension     Hypogonadism male     Infectious viral hepatitis Hep A July 1959    Drank water from broken water line and Grandparents house.    Migraines 09/2023    Myocardial infarction 2018    Trigger point of left shoulder region 03/02/2023    Type 2 diabetes mellitus         Past Surgical History:   Procedure Laterality Date    CARDIAC CATHETERIZATION N/A 03/25/2016    Procedure: Left Heart Cath;  Surgeon: Maria E Gilbert MD;  Location:  [facility];  Service:     CARDIAC CATHETERIZATION N/A 03/25/2016    Procedure: Coronary angiography;  Surgeon: Maria E Gilbert MD;  Location:  [facility];  Service:     CARDIAC CATHETERIZATION N/A 03/28/2016    Procedure: Coronary angiography;  Surgeon: Maria E Gilbert MD;  Location:  [facility];  Service:     CARDIAC CATHETERIZATION N/A 03/28/2016    Procedure: Stent MOISE coronary;  Surgeon: Mraia E Gilbert MD;  Location:  [facility];  Service:     CARDIAC CATHETERIZATION N/A 10/18/2018    Procedure: Coronary angiography  no LV gram d/t CKD;  Surgeon: Maria E Gilbert MD;  Location:  [facility];  Service: Cardiology    CARDIAC CATHETERIZATION N/A 10/18/2018    Procedure: Left Heart Cath;  Surgeon: Maria E Gilbert MD;  Location:  [facility];  Service: Cardiology    CARDIAC CATHETERIZATION N/A 10/18/2018    Procedure: Stent MOISE coronary;  Surgeon: Maria E Gilbert MD;  Location:  [facility];  Service: Cardiology    CARDIAC CATHETERIZATION N/A 05/28/2021    Procedure: Coronary angiography;  Surgeon: Maria E Gilbert MD;  Location:  [facility];  Service: Cardiovascular;  Laterality: N/A;    CARDIAC CATHETERIZATION N/A 05/28/2021    Procedure: Left Heart Cath;  Surgeon: Maria E Gilbert MD;  Location:  [facility];  Service: Cardiovascular;  Laterality: N/A;    CARDIAC  "CATHETERIZATION N/A 05/28/2021    Procedure: Left ventriculography;  Surgeon: Maria E Gilbert MD;  Location:  [facility];  Service: Cardiovascular;  Laterality: N/A;    CARDIAC CATHETERIZATION N/A 05/28/2021    Procedure: Stent MOISE coronary;  Surgeon: Maria E Gilbert MD;  Location:  [facility];  Service: Cardiovascular;  Laterality: N/A;    CARDIAC CATHETERIZATION N/A 1/19/2024    Procedure: Left Heart Cath;  Surgeon: Maria E Gilbert MD;  Location:  [facility];  Service: Cardiovascular;  Laterality: N/A;    CARDIAC CATHETERIZATION N/A 1/19/2024    Procedure: Coronary angiography;  Surgeon: Mari aE Gilbert MD;  Location:  [facility];  Service: Cardiovascular;  Laterality: N/A;    CARDIAC CATHETERIZATION N/A 1/19/2024    Procedure: Left ventriculography;  Surgeon: Maria E Gilbert MD;  [facility];  Service: Cardiovascular;  Laterality: N/A;    CAROTID STENT      CORONARY ANGIOPLASTY      CORONARY STENT PLACEMENT  3/28/2016    EYE SURGERY  9/2017    NECK EXPLORATION N/A     AL RT/LT HEART CATHETERS N/A 10/18/2018    Procedure: Percutaneous Coronary Intervention;  Surgeon: Maria E Gilbert MD;  [facility]: [facility];  Service: Cardiology                        PT Assessment/Plan       Row Name 05/24/24 1200          PT Assessment    Assessment Comments Earl Marc returns for physical therapy treatment session for B hip pain. Today, pt. tolerating all therex well, and reports good results in pain reduction and postural awareness. Added step taps to 6\" to increase time spent on SL, as well as address higher level balance and strengthening. Pt. with some minor LOB, however BUE on rails for support. pt. remains a good candidate for skilled PT.  -ER        PT Plan    PT Plan Comments Consider step with contralateral reach at ballet barre, step tap forward/lat/back " "w/ 1 UE support on barre, thompson walking, toe taps to differing heights  -ER               User Key  (r) = Recorded By, (t) = Taken By, (c) = Cosigned By      Initials Name Provider Type    ER Jaquelin Mccloud, PT Physical Therapist                       OP Exercises       Row Name 05/24/24 0900             Subjective    Subjective Comments I am havin a hard time sleeping. I feel like I am having bad allergies.  -ER         Total Minutes    27625 - PT Therapeutic Exercise Minutes 45  -ER         Exercise 1    Exercise Name 1 NuStep  -ER      Time 1 5 min  -ER         Exercise 2    Exercise Name 2 LTR  -ER      Cueing 2 Verbal  -ER      Reps 2 10 ea  -ER         Exercise 3    Exercise Name 3 piriformis str  -ER      Cueing 3 Verbal  -ER      Reps 3 3 ea  -ER      Time 3 20s  -ER         Exercise 4    Exercise Name 4 fig 4 str  -ER      Cueing 4 Verbal  -ER      Reps 4 3 ea  -ER      Time 4 20s  -ER         Exercise 12    Exercise Name 12 step up with hip drive  -ER      Cueing 12 Verbal;Demo  -ER      Reps 12 2sets x 8 e/ leg  -ER      Time 12 10  -ER      Additional Comments 6 in step  -ER         Exercise 15    Exercise Name 15 lateral stepping  -ER      Cueing 15 Verbal;Demo  -ER      Reps 15 4laps  -ER      Time 15 RTB  -ER      Additional Comments 2 hands  -ER         Exercise 16    Exercise Name 16 Step tap to 6\"  -ER      Cueing 16 Verbal;Demo  -ER      Sets 16 1  -ER      Reps 16 10e  -ER      Time 16 BUE support  -ER                User Key  (r) = Recorded By, (t) = Taken By, (c) = Cosigned By      Initials Name Provider Type    ER Jaquelin Mccloud, PT Physical Therapist                                  PT OP Goals       Row Name 05/24/24 1200          PT Short Term Goals    STG Date to Achieve 05/18/24  -ER     STG 1 Pt. will be independent with initial HEP to improve self-management of condition.  -ER     STG 1 Progress Met  -ER     STG 2 Pt. will tolerate 10 minute of standing/walking ther ex in clinic before " seated rest break to demonstrate improved activity tolerance.  -ER     STG 2 Progress Met  -ER     STG 3 Pt. will perform 1 STS from chair with only 1 UE to demosntrate improving functional strength.  -ER     STG 3 Progress Met  -ER        Long Term Goals    LTG Date to Achieve 06/17/24  -ER     LTG 1 Pt. will be independent with advanced HEP to improve long term management of condition and independence.  -ER     LTG 1 Progress Ongoing  -ER     LTG 2 Pt will score >/= 40% on LEFS (from 21% on initial evaluation) to indicate improved perception of disability.  -ER     LTG 2 Progress Ongoing;Progressing  -ER     LTG 3 Pt. will demonstrate ability to complete 5xSTS in </= 20 seconds to progress toward falls risk cut off of 15 seconds.  -ER     LTG 3 Progress Met  -ER     LTG 4 Pt. will increase L LE strength >/= 4+/5 to improve mobility.  -ER     LTG 4 Progress Ongoing;Progressing  -ER     LTG 5 Pt. will increase 2MWT >/= 150 feet with appropriate AD to indicate improved gait speed and mobility.  -ER     LTG 5 Progress Met  -ER               User Key  (r) = Recorded By, (t) = Taken By, (c) = Cosigned By      Initials Name Provider Type    ER Jaquelin Mccloud, PT Physical Therapist                    Therapy Education  Education Details: reviewed gait mechanics  Given: HEP, Symptoms/condition management, Pain management, Posture/body mechanics, Fall prevention and home safety, Mobility training  Program: Reinforced  How Provided: Verbal, Demonstration  Provided to: Patient  Level of Understanding: Teach back education performed, Verbalized, Demonstrated              Time Calculation:   Start Time: 0945  Stop Time: 1030  Time Calculation (min): 45 min  Total Timed Code Minutes- PT: 45 minute(s)  Timed Charges  21686 - PT Therapeutic Exercise Minutes: 45  Total Minutes  Timed Charges Total Minutes: 45   Total Minutes: 45  Therapy Charges for Today       Code Description Service Date Service Provider Modifiers Qty     [id number]  PT THER PROC EA 15 MIN 5/24/2024 Jaquelin Mccloud, PT GP 3                      Jaquelin Mccloud, PT  5/24/2024

## 2024-05-29 ENCOUNTER — HOSPITAL ENCOUNTER (OUTPATIENT)
Dept: PHYSICAL THERAPY | Facility: HOSPITAL | Age: 79
Discharge: HOME | End: 2024-05-29
Payer: MEDICARE

## 2024-05-29 DIAGNOSIS — M62.89 TENSOR FASCIA LATA SYNDROME: ICD-10-CM

## 2024-05-29 DIAGNOSIS — M25.552 BILATERAL HIP PAIN: Primary | ICD-10-CM

## 2024-05-29 DIAGNOSIS — M25.551 BILATERAL HIP PAIN: Primary | ICD-10-CM

## 2024-05-29 PROCEDURE — 97110 THERAPEUTIC EXERCISES: CPT

## 2024-05-29 NOTE — THERAPY TREATMENT NOTE
Outpatient Physical Therapy Ortho Treatment Note  [facility]     Patient Name: Earl Marc  : 1945  MRN: [id number]  Today's Date: 2024      Visit Date: 2024    Visit Dx:    ICD-10-CM ICD-9-CM   1. Bilateral hip pain  M25.551 719.45    M25.552    2. Tensor fascia prateek syndrome  M62.89 727.09       Patient Active Problem List   Diagnosis    Bell's palsy    Persistent insomnia    Osteoarthritis of cervical spine    Diabetic peripheral neuropathy    Dyslipidemia    Steatosis of liver    Fibromyalgia    Gastroesophageal reflux disease without esophagitis    Hypertensive kidney disease with stage 3a chronic kidney disease    Hypogonadism in male    Renal insufficiency    Vitamin D deficiency    Benign non-nodular prostatic hyperplasia with lower urinary tract symptoms    S/P drug eluting coronary stent placement    Coronary artery disease involving native coronary artery of native heart    Malignant neoplasm of skin    Type 2 diabetes mellitus with hyperglycemia, with long-term current use of insulin    Diabetes mellitus    Chronic insomnia    Other specified glaucoma    Vertigo    Epigastric pain    Chest pain with high risk for cardiac etiology    Precordial pain    S/P arthroscopy of shoulder    Peripheral neuropathy    Stage 3b chronic kidney disease    Chest pain    Stroke-like symptoms        Past Medical History:   Diagnosis Date    Abnormal cardiovascular stress test     Acid reflux     Arthritis     Asthma     Cancer     skin     Chronic kidney disease     Colon polyp     Congenital heart disease     COPD (chronic obstructive pulmonary disease)     Old age COPD/2D hand Smoke emphysema    Coronary artery disease     Diabetes mellitus     Diabetic neuropathy associated with type 2 diabetes mellitus     Diabetic peripheral neuropathy 03/10/2016    Dyslipidemia     Erectile dysfunction     Fatty liver disease, nonalcoholic     Gastritis     Glaucoma     both eyes    Hyperlipidemia      Hypertension     Hypogonadism male     Infectious viral hepatitis Hep A July 1959    Drank water from broken water line and Grandparents house.    Migraines 09/2023    Myocardial infarction 2018    Trigger point of left shoulder region 03/02/2023    Type 2 diabetes mellitus         Past Surgical History:   Procedure Laterality Date    CARDIAC CATHETERIZATION N/A 03/25/2016    Procedure: Left Heart Cath;  Surgeon: Maria E Gilbert MD;  Location:  [facility];  Service:     CARDIAC CATHETERIZATION N/A 03/25/2016    Procedure: Coronary angiography;  Surgeon: Maria E Gilbert MD;  Location:  [facility];  Service:     CARDIAC CATHETERIZATION N/A 03/28/2016    Procedure: Coronary angiography;  Surgeon: Maria E Gilbert MD;  Location:  [facility];  Service:     CARDIAC CATHETERIZATION N/A 03/28/2016    Procedure: Stent MOISE coronary;  Surgeon: Maria E Gilbert MD;  Location:  [facility];  Service:     CARDIAC CATHETERIZATION N/A 10/18/2018    Procedure: Coronary angiography  no LV gram d/t CKD;  Surgeon: Maria E Gilbert MD;  Location:  [facility];  Service: Cardiology    CARDIAC CATHETERIZATION N/A 10/18/2018    Procedure: Left Heart Cath;  Surgeon: Maria E Gilbert MD;  Location:  [facility];  Service: Cardiology    CARDIAC CATHETERIZATION N/A 10/18/2018    Procedure: Stent MOISE coronary;  Surgeon: Maria E Gilbert MD;  Location:  [facility];  Service: Cardiology    CARDIAC CATHETERIZATION N/A 05/28/2021    Procedure: Coronary angiography;  Surgeon: Maria E Gilbert MD;  Location:  [facility];  Service: Cardiovascular;  Laterality: N/A;    CARDIAC CATHETERIZATION N/A 05/28/2021    Procedure: Left Heart Cath;  Surgeon: Maria E Gilbert MD;  Location:  [facility];  Service: Cardiovascular;  Laterality: N/A;    CARDIAC  CATHETERIZATION N/A 05/28/2021    Procedure: Left ventriculography;  Surgeon: Maria E Gilbert MD;  Location:  [facility];  Service: Cardiovascular;  Laterality: N/A;    CARDIAC CATHETERIZATION N/A 05/28/2021    Procedure: Stent MOISE coronary;  Surgeon: Maria E Gilbert MD;  Location:  [facility];  Service: Cardiovascular;  Laterality: N/A;    CARDIAC CATHETERIZATION N/A 1/19/2024    Procedure: Left Heart Cath;  Surgeon: Maria E Gilbert MD;  Location:  [facility];  Service: Cardiovascular;  Laterality: N/A;    CARDIAC CATHETERIZATION N/A 1/19/2024    Procedure: Coronary angiography;  Surgeon: Maria E Gilbert MD;  Location:  [facility];  Service: Cardiovascular;  Laterality: N/A;    CARDIAC CATHETERIZATION N/A 1/19/2024    Procedure: Left ventriculography;  Surgeon: Maria E Gilbert MD;  [facility];  Service: Cardiovascular;  Laterality: N/A;    CAROTID STENT      CORONARY ANGIOPLASTY      CORONARY STENT PLACEMENT  3/28/2016    EYE SURGERY  9/2017    NECK EXPLORATION N/A     LA RT/LT HEART CATHETERS N/A 10/18/2018    Procedure: Percutaneous Coronary Intervention;  Surgeon: Maria E Gilbert MD;  [facility];  Service: Cardiology                        PT Assessment/Plan       Row Name 05/29/24 1100          PT Assessment    Assessment Comments Earl Marc is a 78 year old male returning for physical therapy treatment session for Bilat hip pain, L>R. Pt. reports low pain this date and is demonstrating improvements in activity tolerance and strength. Added lateral step up with focus on eccentric lowering. Pt. with improved form when speed of completion slows. Also added fwd step with contralateral reach. No overt LOB and pt. demonstrates good body mechanics. Pt. remains a good candidate for skilled PT.  -ER        PT Plan    PT Plan Comments step tap  fwd/lat/back (quad burner style?), thompson walking, toe taps various heights  -ER               User Key  (r) = Recorded By, (t) = Taken By, (c) = Cosigned By      Initials Name Provider Type    ER Jaquelin Mccloud, PT Physical Therapist                       OP Exercises       Row Name 05/29/24 0900             Subjective    Subjective Comments I was sick all weekend, I am on the mend.  -ER         Total Minutes    63279 - PT Therapeutic Exercise Minutes 45  -ER         Exercise 1    Exercise Name 1 NuStep  -ER      Time 1 5 min  -ER         Exercise 2    Exercise Name 2 LTR  -ER      Cueing 2 Verbal  -ER      Reps 2 10 ea  -ER         Exercise 3    Exercise Name 3 piriformis str  -ER      Cueing 3 Verbal  -ER      Reps 3 3 ea  -ER      Time 3 20s  -ER         Exercise 4    Exercise Name 4 fig 4 str  -ER      Cueing 4 Verbal  -ER      Reps 4 3 ea  -ER      Time 4 20s  -ER         Exercise 5    Exercise Name 5 lateral step ups with slow eccentric lower  -ER      Cueing 5 Verbal;Demo  -ER      Sets 5 2  -ER      Reps 5 10  -ER      Time 5 BUE support on hand rails  -ER         Exercise 6    Exercise Name 6 fwd step with contralateral reach at ballet barre  -ER      Cueing 6 Verbal;Demo  -ER      Sets 6 2  -ER      Reps 6 10e  -ER      Time 6 at ballet barre  -ER                User Key  (r) = Recorded By, (t) = Taken By, (c) = Cosigned By      Initials Name Provider Type    ER Jaquelin Mccloud, PT Physical Therapist                                  PT OP Goals       Row Name 05/29/24 1100          PT Short Term Goals    STG Date to Achieve 05/18/24  -ER     STG 1 Pt. will be independent with initial HEP to improve self-management of condition.  -ER     STG 1 Progress Met  -ER     STG 2 Pt. will tolerate 10 minute of standing/walking ther ex in clinic before seated rest break to demonstrate improved activity tolerance.  -ER     STG 2 Progress Met  -ER     STG 3 Pt. will perform 1 STS from chair with only 1 UE to demosntrate  improving functional strength.  -ER     STG 3 Progress Met  -ER        Long Term Goals    LTG Date to Achieve 06/17/24  -ER     LTG 1 Pt. will be independent with advanced HEP to improve long term management of condition and independence.  -ER     LTG 1 Progress Ongoing  -ER     LTG 2 Pt will score >/= 40% on LEFS (from 21% on initial evaluation) to indicate improved perception of disability.  -ER     LTG 2 Progress Ongoing;Progressing  -ER     LTG 3 Pt. will demonstrate ability to complete 5xSTS in </= 20 seconds to progress toward falls risk cut off of 15 seconds.  -ER     LTG 3 Progress Met  -ER     LTG 4 Pt. will increase L LE strength >/= 4+/5 to improve mobility.  -ER     LTG 4 Progress Ongoing;Progressing  -ER     LTG 5 Pt. will increase 2MWT >/= 150 feet with appropriate AD to indicate improved gait speed and mobility.  -ER     LTG 5 Progress Met  -ER               User Key  (r) = Recorded By, (t) = Taken By, (c) = Cosigned By      Initials Name Provider Type    ER Jaquelin Mccloud, PT Physical Therapist                    Therapy Education  Education Details: HEP  Given: HEP  Program: Reinforced  How Provided: Verbal, Demonstration  Provided to: Patient  Level of Understanding: Teach back education performed, Verbalized, Demonstrated              Time Calculation:   Start Time: 0945  Stop Time: 1030  Time Calculation (min): 45 min  Total Timed Code Minutes- PT: 45 minute(s)  Timed Charges  81317 - PT Therapeutic Exercise Minutes: 45  Total Minutes  Timed Charges Total Minutes: 45   Total Minutes: 45  Therapy Charges for Today       Code Description Service Date Service Provider Modifiers Qty    09814427514 HC PT THER PROC EA 15 MIN 5/29/2024 Jaquelin Mccloud, PT GP 3                      Jaquelin Mccloud PT  5/29/2024

## 2024-05-31 ENCOUNTER — HOSPITAL ENCOUNTER (OUTPATIENT)
Dept: PHYSICAL THERAPY | Facility: HOSPITAL | Age: 79
Discharge: HOME | End: 2024-05-31
Payer: MEDICARE

## 2024-05-31 DIAGNOSIS — M62.89 TENSOR FASCIA LATA SYNDROME: ICD-10-CM

## 2024-05-31 DIAGNOSIS — M25.552 BILATERAL HIP PAIN: Primary | ICD-10-CM

## 2024-05-31 DIAGNOSIS — M25.551 BILATERAL HIP PAIN: Primary | ICD-10-CM

## 2024-05-31 PROCEDURE — 97530 THERAPEUTIC ACTIVITIES: CPT

## 2024-05-31 PROCEDURE — 97116 GAIT TRAINING THERAPY: CPT

## 2024-05-31 NOTE — THERAPY TREATMENT NOTE
Outpatient Physical Therapy Ortho Treatment Note  [facility]     Patient Name: Earl Marc  : 1945  MRN: [id number]  Today's Date: 2024      Visit Date: 2024    Visit Dx:    ICD-10-CM ICD-9-CM   1. Bilateral hip pain  M25.551 719.45    M25.552    2. Tensor fascia prateek syndrome  M62.89 727.09       Patient Active Problem List   Diagnosis    Bell's palsy    Persistent insomnia    Osteoarthritis of cervical spine    Diabetic peripheral neuropathy    Dyslipidemia    Steatosis of liver    Fibromyalgia    Gastroesophageal reflux disease without esophagitis    Hypertensive kidney disease with stage 3a chronic kidney disease    Hypogonadism in male    Renal insufficiency    Vitamin D deficiency    Benign non-nodular prostatic hyperplasia with lower urinary tract symptoms    S/P drug eluting coronary stent placement    Coronary artery disease involving native coronary artery of native heart    Malignant neoplasm of skin    Type 2 diabetes mellitus with hyperglycemia, with long-term current use of insulin    Diabetes mellitus    Chronic insomnia    Other specified glaucoma    Vertigo    Epigastric pain    Chest pain with high risk for cardiac etiology    Precordial pain    S/P arthroscopy of shoulder    Peripheral neuropathy    Stage 3b chronic kidney disease    Chest pain    Stroke-like symptoms        Past Medical History:   Diagnosis Date    Abnormal cardiovascular stress test     Acid reflux     Arthritis     Asthma     Cancer     skin     Chronic kidney disease     Colon polyp     Congenital heart disease     COPD (chronic obstructive pulmonary disease)     Old age COPD/2D hand Smoke emphysema    Coronary artery disease     Diabetes mellitus     Diabetic neuropathy associated with type 2 diabetes mellitus     Diabetic peripheral neuropathy 03/10/2016    Dyslipidemia     Erectile dysfunction     Fatty liver disease, nonalcoholic     Gastritis     Glaucoma     both eyes    Hyperlipidemia      Hypertension     Hypogonadism male     Infectious viral hepatitis Hep A July 1959    Drank water from broken water line and Grandparents house.    Migraines 09/2023    Myocardial infarction 2018    Trigger point of left shoulder region 03/02/2023    Type 2 diabetes mellitus         Past Surgical History:   Procedure Laterality Date    CARDIAC CATHETERIZATION N/A 03/25/2016    Procedure: Left Heart Cath;  Surgeon: Maria E Gilbert MD;  Location:  [facility];  Service:     CARDIAC CATHETERIZATION N/A 03/25/2016    Procedure: Coronary angiography;  Surgeon: Maria E Gilbert MD;  Location:  [facility];  Service:     CARDIAC CATHETERIZATION N/A 03/28/2016    Procedure: Coronary angiography;  Surgeon: Maria E Gilbert MD;  Location:  [facility];  Service:     CARDIAC CATHETERIZATION N/A 03/28/2016    Procedure: Stent MOISE coronary;  Surgeon: Maria E Gilbert MD;  Location:  [facility];  Service:     CARDIAC CATHETERIZATION N/A 10/18/2018    Procedure: Coronary angiography  no LV gram d/t CKD;  Surgeon: Maria E Gilbert MD;  Location:  [facility];  Service: Cardiology    CARDIAC CATHETERIZATION N/A 10/18/2018    Procedure: Left Heart Cath;  Surgeon: Maria E Gilbert MD;  Location:  [facility];  Service: Cardiology    CARDIAC CATHETERIZATION N/A 10/18/2018    Procedure: Stent MOISE coronary;  Surgeon: Maria E Gilbert MD;  Location:  [facility];  Service: Cardiology    CARDIAC CATHETERIZATION N/A 05/28/2021    Procedure: Coronary angiography;  Surgeon: Maria E Gilbert MD;  Location:  [facility];  Service: Cardiovascular;  Laterality: N/A;    CARDIAC CATHETERIZATION N/A 05/28/2021    Procedure: Left Heart Cath;  Surgeon: Maria E Gilbert MD;  Location:  [facility];  Service: Cardiovascular;  Laterality: N/A;    CARDIAC  CATHETERIZATION N/A 05/28/2021    Procedure: Left ventriculography;  Surgeon: Maria E Gilbert MD;  Location:  [facility];  Service: Cardiovascular;  Laterality: N/A;    CARDIAC CATHETERIZATION N/A 05/28/2021    Procedure: Stent MOISE coronary;  Surgeon: Maria E Gilbert MD;  Location:  [facility];  Service: Cardiovascular;  Laterality: N/A;    CARDIAC CATHETERIZATION N/A 1/19/2024    Procedure: Left Heart Cath;  Surgeon: Maria E Gilbert MD;  Location:  [facility];  Service: Cardiovascular;  Laterality: N/A;    CARDIAC CATHETERIZATION N/A 1/19/2024    Procedure: Coronary angiography;  Surgeon: Maria E Gilbert MD;  Location:  [facility];  Service: Cardiovascular;  Laterality: N/A;    CARDIAC CATHETERIZATION N/A 1/19/2024    Procedure: Left ventriculography;  Surgeon: Maria E Gilbert MD;  [facility];  Service: Cardiovascular;  Laterality: N/A;    CAROTID STENT      CORONARY ANGIOPLASTY      CORONARY STENT PLACEMENT  3/28/2016    EYE SURGERY  9/2017    NECK EXPLORATION N/A     DC RT/LT HEART CATHETERS N/A 10/18/2018    Procedure: Percutaneous Coronary Intervention;  Surgeon: Maria E Gilbert MD;  [facility];  Service: Cardiology                        PT Assessment/Plan       Row Name 05/31/24 1000          PT Assessment    Assessment Comments Pt is a 79 y.o male with B hip pain L>R. This is his 10th session. Pt reported a noticed positive difference in his energy levels. Today we added monster walks forward and backward with a RTB and stepping over cones in a reciprocal gait pattern. Pt required a gait belt for safety during lateral step ups and stepping over cones; he became unsteady on lateral step ups toward the end of the exercise and experienced LOB with stepping over cones. Pt was able to correct LOB. Pt's gait became markedly improved after  stepping over cones and having frequent verbal cues provided. Progressed HEP from HL clamshells with resistance to s/l clamshells with resistance. Pt tolerated tx well even when he became frustrated at losing his balance. Pt was eager to do more and get better. Pt remains a good candidate for skilled physical therapy to address stregth, mobility, and gait deficits. (P)   -VS        PT Plan    PT Plan Comments step tap fwd/lat/backward, marching with weights, continue with stepping over cones to facilitate proper gait mechanics (P)   -VS               User Key  (r) = Recorded By, (t) = Taken By, (c) = Cosigned By      Initials Name Provider Type    VS Sandrita Ch, PT Student PT Student                       OP Exercises       Row Name 05/31/24 1000             Subjective    Subjective Comments Pt reports he has noticed a positive difference in his energy levels. (P)   -VS         Total Minutes    74336 - Gait Training Minutes  12 (P)   -VS      11605 - PT Therapeutic Activity Minutes 38 (P)   -VS         Exercise 1    Exercise Name 1 NuStep (P)   -VS      Time 1 5 min (P)   -VS         Exercise 2    Exercise Name 2 LTR (P)   -VS      Cueing 2 Verbal (P)   -VS      Reps 2 10 ea (P)   -VS         Exercise 3    Exercise Name 3 piriformis str (P)   -VS      Cueing 3 Verbal (P)   -VS      Reps 3 3 ea (P)   -VS      Time 3 20s (P)   -VS         Exercise 4    Exercise Name 4 fig 4 str (P)   -VS      Cueing 4 Verbal (P)   -VS      Reps 4 3 ea (P)   -VS      Time 4 20s (P)   -VS      Additional Comments seated (P)   -VS         Exercise 5    Exercise Name 5 lateral step ups with slow eccentric lower (P)   -VS      Cueing 5 Verbal;Demo (P)   -VS      Sets 5 2 (P)   -VS      Reps 5 10 (P)   -VS      Time 5 BUE support on hand rails (P)   -VS      Additional Comments Cue to step onto 4'' step, staying away from the edge. Cue to look ahead. (P)   -VS         Exercise 6    Exercise Name 6 fwd step with contralateral reach at  ballet barre (P)   -VS      Cueing 6 Verbal;Demo (P)   -VS      Sets 6 2 (P)   -VS      Reps 6 10e (P)   -VS      Time 6 at ballet barre (P)   -VS         Exercise 7    Exercise Name 7 Stepping over cones with reciprocal gait pattern (P)   -VS      Cueing 7 Verbal;Demo (P)   -VS      Sets 7 3 laps of 10ft (P)   -VS      Additional Comments Use gait belt for safety. Lost balance but caught himself. Cue for DF to clear cones and lifting leg higher. Pt became frustrated when he lost balance. (P)   -VS         Exercise 8    Exercise Name 8 monster walks (P)   -VS      Cueing 8 Verbal;Demo (P)   -VS      Sets 8 3 laps of 10ft (P)   -VS      Additional Comments RTB. Forward and backward (P)   -VS         Exercise 9    Exercise Name 9 gait training with reciprocal arm and leg motion (P)   -VS      Cueing 9 Verbal;Demo (P)   -VS      Additional Comments Multiple verbal cues. Focus on foot rollover, DF to clear floor, and knee flexion. 3 laps around clinic. (P)   -VS                User Key  (r) = Recorded By, (t) = Taken By, (c) = Cosigned By      Initials Name Provider Type    VS Sandrita Ch, PT Student PT Student                                  PT OP Goals       Row Name 05/31/24 1400          PT Short Term Goals    STG Date to Achieve 05/18/24 (P)   -VS     STG 1 Pt. will be independent with initial HEP to improve self-management of condition. (P)   -VS     STG 1 Progress Met (P)   -VS     STG 2 Pt. will tolerate 10 minute of standing/walking ther ex in clinic before seated rest break to demonstrate improved activity tolerance. (P)   -VS     STG 2 Progress Met (P)   -VS     STG 3 Pt. will perform 1 STS from chair with only 1 UE to demosntrate improving functional strength. (P)   -VS     STG 3 Progress Met (P)   -VS        Long Term Goals    LTG Date to Achieve 06/17/24 (P)   -VS     LTG 1 Pt. will be independent with advanced HEP to improve long term management of condition and independence. (P)   -VS     LTG 1  Progress Ongoing (P)   -VS     LTG 2 Pt will score >/= 40% on LEFS (from 21% on initial evaluation) to indicate improved perception of disability. (P)   -VS     LTG 2 Progress Ongoing;Progressing (P)   -VS     LTG 3 Pt. will demonstrate ability to complete 5xSTS in </= 20 seconds to progress toward falls risk cut off of 15 seconds. (P)   -VS     LTG 3 Progress Met (P)   -VS     LTG 4 Pt. will increase L LE strength >/= 4+/5 to improve mobility. (P)   -VS     LTG 4 Progress Ongoing;Progressing (P)   -VS     LTG 5 Pt. will increase 2MWT >/= 150 feet with appropriate AD to indicate improved gait speed and mobility. (P)   -VS     LTG 5 Progress Met (P)   -VS               User Key  (r) = Recorded By, (t) = Taken By, (c) = Cosigned By      Initials Name Provider Type    VS Sandrita Ch, PT Student PT Student                    Therapy Education  Education Details: (P) Pt educated on strengthening and use of proper gait mechanics. Progressed HEP  Given: (P) Symptoms/condition management, HEP, Posture/body mechanics  Program: (P) Progressed, Reinforced  How Provided: (P) Verbal, Demonstration  Provided to: (P) Patient  Level of Understanding: (P) Teach back education performed, Verbalized, Demonstrated              Time Calculation:   Start Time: (P) 1000  Stop Time: (P) 1050  Time Calculation (min): (P) 50 min  Timed Charges  22589 - Gait Training Minutes : (P) 12  74328 - PT Therapeutic Activity Minutes: (P) 38  Total Minutes  Timed Charges Total Minutes: (P) 50   Total Minutes: (P) 50  Therapy Charges for Today       Code Description Service Date Service Provider Modifiers Qty    08781289205 HC GAIT TRAINING EA 15 MIN 5/31/2024 Sanrdita Ch PT Student GP 1    44689088357 HC PT THERAPEUTIC ACT EA 15 MIN 5/31/2024 Sandrita Ch PT Student GP 2                      ELIN Bunn  5/31/2024

## 2024-06-05 ENCOUNTER — OFFICE VISIT (OUTPATIENT)
Dept: FAMILY MEDICINE CLINIC | Facility: CLINIC | Age: 79
End: 2024-06-05
Payer: MEDICARE

## 2024-06-05 VITALS
HEART RATE: 61 BPM | BODY MASS INDEX: 29.69 KG/M2 | SYSTOLIC BLOOD PRESSURE: 134 MMHG | DIASTOLIC BLOOD PRESSURE: 78 MMHG | RESPIRATION RATE: 18 BRPM | OXYGEN SATURATION: 98 % | HEIGHT: 73 IN | WEIGHT: 224 LBS

## 2024-06-05 DIAGNOSIS — M62.830 SPASM OF BACK MUSCLES: ICD-10-CM

## 2024-06-05 DIAGNOSIS — M54.59 ACUTE MECHANICAL LOW BACK PAIN WITH DURATION OF LESS THAN SIX WEEKS: Primary | ICD-10-CM

## 2024-06-05 PROCEDURE — 99214 OFFICE O/P EST MOD 30 MIN: CPT | Performed by: INTERNAL MEDICINE

## 2024-06-05 PROCEDURE — 1126F AMNT PAIN NOTED NONE PRSNT: CPT | Performed by: INTERNAL MEDICINE

## 2024-06-05 RX ORDER — BACLOFEN 10 MG/1
10 TABLET ORAL 3 TIMES DAILY
Qty: 30 TABLET | Refills: 1 | Status: SHIPPED | OUTPATIENT
Start: 2024-06-05 | End: 2024-06-10 | Stop reason: HOSPADM

## 2024-06-05 RX ORDER — NALOXONE HYDROCHLORIDE 4 MG/.1ML
SPRAY NASAL
COMMUNITY
Start: 2024-05-13

## 2024-06-05 RX ORDER — TRAMADOL HYDROCHLORIDE 50 MG/1
50 TABLET ORAL EVERY 6 HOURS PRN
Qty: 30 TABLET | Refills: 1 | Status: SHIPPED | OUTPATIENT
Start: 2024-06-05 | End: 2024-06-10 | Stop reason: HOSPADM

## 2024-06-05 NOTE — PROGRESS NOTES
DOS: 2024  NAME: Earl Marc   : 1945  PCP: Avery Velazquez MD  Chief Complaint   Patient presents with    Migrainous aphasia       Chief complaint: Follow-up for confusional spell  Subjective: 79-year-old man seen by me in the hospital for an episode of confusion and speech difficulty followed by headache.  At the time complex migraine was suspected.  He was worked up for TIA.  Imaging was reassuring.  MRI showed mild small vessel disease, CTA showed mild left carotid stenosis.  He was continued on aspirin and a statin.  Since his hospitalization he had a compression fracture.  When he took the narcotic and muscle relaxer he had another spell of confusion but he attributed that to the effects of the medication.  He denies any other episodes or headaches.    Objective:  Vital signs: /72   Wt 98.4 kg (217 lb)   SpO2 97%   BMI 28.63 kg/m²    Exam:  vitals reviewed  MS: oriented x3, recent/remote memory intact, normal attention/concentration, language intact, no neglect.  CN: visual acuity grossly normal, PERRL, EOMI, no facial droop, no dysarthria  Motor: 5/5 throughout upper and lower extremities, normal tone  Sensory: Vibratory sensation distal lower extremities  Reflexes: Absent bilateral lower extremities  Gait: Normal station, no ataxia    Laboratory results:  Lab Results   Component Value Date    LDL 19 2024    LDL 17 2024    LDL 22 01/15/2024         Lab 24  1216   HEMOGLOBIN A1C 8.10*        Review of labs: Most recent LDL was 19, hemoglobin A1c 8.1%    Review and interpretation of imaging: I personally reviewed his MRI and CTA from his hospitalization in preparation for this visit    Diagnoses:  Word finding difficulty  Headache  Left carotid stenosis, asymptomatic    Assessment/comments: No recurrent episodes.  I still suspect his spell was a bit more consistent with migraine but he has no other history of that so it is difficult to say.  I doubt this  represented a TIA but I would recommend continued aspirin and statin for primary stroke prevention given his left carotid stenosis    Plan:  Continue aspirin, statin    Follow-up with me on an as-needed basis    I spent a total of 25 minutes on this clinical encounter including chart/records review, review of laboratory data, personal review of imaging studies, patient counseling, and care coordination.

## 2024-06-05 NOTE — PROGRESS NOTES
Subjective chief complaint is back pain  Earl Marc is a 79 y.o. male.     Back Pain   Earl is here today for complaints of back pain.  This is in the lower thoracic upper lumbar area.  It occurred several days ago when he was moving things around in his closet.  He did have minor twist while holding something and had sudden severe pain.  Since that time he has tried to use some heat and massage but that has not really made anything better.  He does not have any more of the tramadol or muscle relaxant that he was given previously.    The following portions of the patient's history were reviewed and updated as appropriate: allergies, current medications, and problem list.    Review of Systems   Musculoskeletal:  Positive for back pain.     Objective   Physical Exam  Vitals and nursing note reviewed.   Cardiovascular:      Rate and Rhythm: Normal rate.   Pulmonary:      Effort: Pulmonary effort is normal.   Musculoskeletal:      Comments: He has some palpable thoracolumbar spinal tenderness as well as paraspinous muscle tenderness and spasm.   Neurological:      Mental Status: He is alert.       Assessment & Plan   Diagnoses and all orders for this visit:    1. Acute mechanical low back pain with duration of less than six weeks (Primary)    2. Spasm of back muscles    Other orders  -     traMADol (ULTRAM) 50 MG tablet; Take 1 tablet by mouth Every 6 (Six) Hours As Needed for Moderate Pain.  Dispense: 30 tablet; Refill: 1  -     baclofen (LIORESAL) 10 MG tablet; Take 1 tablet by mouth 3 (Three) Times a Day.  Dispense: 30 tablet; Refill: 1    Earl is here today for acute onset of back pain.  He had been doing well in physical therapy but seems to have had a relapse here.  We are going to put him back on some tramadol and baclofen.              1 or 2

## 2024-06-09 ENCOUNTER — HOSPITAL ENCOUNTER (OUTPATIENT)
Facility: HOSPITAL | Age: 79
Setting detail: OBSERVATION
Discharge: HOME | End: 2024-06-10
Attending: STUDENT IN AN ORGANIZED HEALTH CARE EDUCATION/TRAINING PROGRAM | Admitting: STUDENT IN AN ORGANIZED HEALTH CARE EDUCATION/TRAINING PROGRAM
Payer: MEDICARE

## 2024-06-09 ENCOUNTER — APPOINTMENT (OUTPATIENT)
Dept: CT IMAGING | Facility: HOSPITAL | Age: 79
End: 2024-06-09
Payer: MEDICARE

## 2024-06-09 DIAGNOSIS — S22.080A COMPRESSION FRACTURE OF T12 VERTEBRA, INITIAL ENCOUNTER: Primary | ICD-10-CM

## 2024-06-09 DIAGNOSIS — S09.90XA CLOSED HEAD INJURY, INITIAL ENCOUNTER: ICD-10-CM
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
## 2024-06-09 PROBLEM — M54.9 ACUTE BACK PAIN: Status: ACTIVE | Noted: 2024-06-09

## 2024-06-09 LAB
ALBUMIN SERPL-MCNC: 3.8 G/DL (ref 3.5–5.2)
ALBUMIN/GLOB SERPL: 1.2 G/DL
ALP SERPL-CCNC: 82 U/L (ref 39–117)
ALT SERPL W P-5'-P-CCNC: 15 U/L (ref 1–41)
ANION GAP SERPL CALCULATED.3IONS-SCNC: 9 MMOL/L (ref 5–15)
AST SERPL-CCNC: 12 U/L (ref 1–40)
BACTERIA UR QL AUTO: NORMAL /HPF
BASOPHILS # BLD AUTO: 0.05 10*3/MM3 (ref 0–0.2)
BASOPHILS NFR BLD AUTO: 0.5 % (ref 0–1.5)
BILIRUB SERPL-MCNC: 0.7 MG/DL (ref 0–1.2)
BILIRUB UR QL STRIP: NEGATIVE
BUN SERPL-MCNC: 21 MG/DL (ref 8–23)
BUN/CREAT SERPL: 17.1 (ref 7–25)
CALCIUM SPEC-SCNC: 9.8 MG/DL (ref 8.6–10.5)
CHLORIDE SERPL-SCNC: 102 MMOL/L (ref 98–107)
CLARITY UR: CLEAR
CO2 SERPL-SCNC: 23 MMOL/L (ref 22–29)
COLOR UR: YELLOW
CREAT SERPL-MCNC: 1.23 MG/DL (ref 0.76–1.27)
DEPRECATED RDW RBC AUTO: 42.7 FL (ref 37–54)
EGFRCR SERPLBLD CKD-EPI 2021: 59.7 ML/MIN/1.73
EOSINOPHIL # BLD AUTO: 0.15 10*3/MM3 (ref 0–0.4)
EOSINOPHIL NFR BLD AUTO: 1.5 % (ref 0.3–6.2)
ERYTHROCYTE [DISTWIDTH] IN BLOOD BY AUTOMATED COUNT: 13.8 % (ref 12.3–15.4)
GLOBULIN UR ELPH-MCNC: 3.2 GM/DL
GLUCOSE BLDC GLUCOMTR-MCNC: 204 MG/DL (ref 70–130)
GLUCOSE SERPL-MCNC: 207 MG/DL (ref 65–99)
GLUCOSE UR STRIP-MCNC: NEGATIVE MG/DL
HBA1C MFR BLD: 8.1 % (ref 4.8–5.6)
HCT VFR BLD AUTO: 51.5 % (ref 37.5–51)
HGB BLD-MCNC: 16.9 G/DL (ref 13–17.7)
HGB UR QL STRIP.AUTO: NEGATIVE
HYALINE CASTS UR QL AUTO: NORMAL /LPF
IMM GRANULOCYTES # BLD AUTO: 0.04 10*3/MM3 (ref 0–0.05)
IMM GRANULOCYTES NFR BLD AUTO: 0.4 % (ref 0–0.5)
KETONES UR QL STRIP: ABNORMAL
LEUKOCYTE ESTERASE UR QL STRIP.AUTO: NEGATIVE
LYMPHOCYTES # BLD AUTO: 1.78 10*3/MM3 (ref 0.7–3.1)
LYMPHOCYTES NFR BLD AUTO: 18.3 % (ref 19.6–45.3)
MCH RBC QN AUTO: 28 PG (ref 26.6–33)
MCHC RBC AUTO-ENTMCNC: 32.8 G/DL (ref 31.5–35.7)
MCV RBC AUTO: 85.4 FL (ref 79–97)
MONOCYTES # BLD AUTO: 0.75 10*3/MM3 (ref 0.1–0.9)
MONOCYTES NFR BLD AUTO: 7.7 % (ref 5–12)
NEUTROPHILS NFR BLD AUTO: 6.95 10*3/MM3 (ref 1.7–7)
NEUTROPHILS NFR BLD AUTO: 71.6 % (ref 42.7–76)
NITRITE UR QL STRIP: NEGATIVE
NRBC BLD AUTO-RTO: 0 /100 WBC (ref 0–0.2)
PH UR STRIP.AUTO: 6.5 [PH] (ref 5–8)
PLATELET # BLD AUTO: 244 10*3/MM3 (ref 140–450)
PMV BLD AUTO: 9.6 FL (ref 6–12)
POTASSIUM SERPL-SCNC: 5.5 MMOL/L (ref 3.5–5.2)
PROT SERPL-MCNC: 7 G/DL (ref 6–8.5)
PROT UR QL STRIP: ABNORMAL
RBC # BLD AUTO: 6.03 10*6/MM3 (ref 4.14–5.8)
RBC # UR STRIP: NORMAL /HPF
REF LAB TEST METHOD: NORMAL
SODIUM SERPL-SCNC: 134 MMOL/L (ref 136–145)
SP GR UR STRIP: 1.02 (ref 1–1.03)
SQUAMOUS #/AREA URNS HPF: NORMAL /HPF
UROBILINOGEN UR QL STRIP: ABNORMAL
WBC # UR STRIP: NORMAL /HPF
WBC NRBC COR # BLD AUTO: 9.72 10*3/MM3 (ref 3.4–10.8)

## 2024-06-09 PROCEDURE — 96374 THER/PROPH/DIAG INJ IV PUSH: CPT

## 2024-06-09 PROCEDURE — 25010000002 MORPHINE PER 10 MG: Performed by: STUDENT IN AN ORGANIZED HEALTH CARE EDUCATION/TRAINING PROGRAM

## 2024-06-09 PROCEDURE — G0378 HOSPITAL OBSERVATION PER HR: HCPCS

## 2024-06-09 PROCEDURE — 80053 COMPREHEN METABOLIC PANEL: CPT | Performed by: STUDENT IN AN ORGANIZED HEALTH CARE EDUCATION/TRAINING PROGRAM

## 2024-06-09 PROCEDURE — 63710000001 INSULIN GLARGINE PER 5 UNITS

## 2024-06-09 PROCEDURE — 85025 COMPLETE CBC W/AUTO DIFF WBC: CPT | Performed by: STUDENT IN AN ORGANIZED HEALTH CARE EDUCATION/TRAINING PROGRAM

## 2024-06-09 PROCEDURE — 83036 HEMOGLOBIN GLYCOSYLATED A1C: CPT

## 2024-06-09 PROCEDURE — 70450 CT HEAD/BRAIN W/O DYE: CPT

## 2024-06-09 PROCEDURE — 72131 CT LUMBAR SPINE W/O DYE: CPT

## 2024-06-09 PROCEDURE — 36415 COLL VENOUS BLD VENIPUNCTURE: CPT

## 2024-06-09 PROCEDURE — 81001 URINALYSIS AUTO W/SCOPE: CPT | Performed by: STUDENT IN AN ORGANIZED HEALTH CARE EDUCATION/TRAINING PROGRAM

## 2024-06-09 PROCEDURE — 82948 REAGENT STRIP/BLOOD GLUCOSE: CPT

## 2024-06-09 PROCEDURE — 99285 EMERGENCY DEPT VISIT HI MDM: CPT

## 2024-06-09 PROCEDURE — 63710000001 INSULIN LISPRO (HUMAN) PER 5 UNITS

## 2024-06-09 RX ORDER — PANTOPRAZOLE SODIUM 20 MG/1
20 TABLET, DELAYED RELEASE ORAL DAILY
Status: DISCONTINUED | OUTPATIENT
Start: 2024-06-09 | End: 2024-06-09

## 2024-06-09 RX ORDER — NICOTINE POLACRILEX 4 MG
15 LOZENGE BUCCAL
Status: DISCONTINUED | OUTPATIENT
Start: 2024-06-09 | End: 2024-06-10 | Stop reason: HOSPADM

## 2024-06-09 RX ORDER — PANTOPRAZOLE SODIUM 40 MG/1
40 TABLET, DELAYED RELEASE ORAL DAILY
Status: DISCONTINUED | OUTPATIENT
Start: 2024-06-09 | End: 2024-06-10 | Stop reason: HOSPADM

## 2024-06-09 RX ORDER — VENLAFAXINE HYDROCHLORIDE 75 MG/1
75 CAPSULE, EXTENDED RELEASE ORAL DAILY
Status: DISCONTINUED | OUTPATIENT
Start: 2024-06-09 | End: 2024-06-10 | Stop reason: HOSPADM

## 2024-06-09 RX ORDER — METHOCARBAMOL 750 MG/1
750 TABLET, FILM COATED ORAL 4 TIMES DAILY
Status: DISCONTINUED | OUTPATIENT
Start: 2024-06-09 | End: 2024-06-10 | Stop reason: HOSPADM

## 2024-06-09 RX ORDER — BISACODYL 5 MG/1
5 TABLET, DELAYED RELEASE ORAL DAILY PRN
Status: DISCONTINUED | OUTPATIENT
Start: 2024-06-09 | End: 2024-06-10 | Stop reason: HOSPADM

## 2024-06-09 RX ORDER — ATORVASTATIN CALCIUM 20 MG/1
20 TABLET, FILM COATED ORAL NIGHTLY
Status: DISCONTINUED | OUTPATIENT
Start: 2024-06-09 | End: 2024-06-10 | Stop reason: HOSPADM

## 2024-06-09 RX ORDER — INSULIN LISPRO 100 [IU]/ML
2-7 INJECTION, SOLUTION INTRAVENOUS; SUBCUTANEOUS
Status: DISCONTINUED | OUTPATIENT
Start: 2024-06-09 | End: 2024-06-10 | Stop reason: HOSPADM

## 2024-06-09 RX ORDER — LORAZEPAM 1 MG/1
1 TABLET ORAL ONCE
Status: COMPLETED | OUTPATIENT
Start: 2024-06-09 | End: 2024-06-10

## 2024-06-09 RX ORDER — ACETAMINOPHEN 325 MG/1
650 TABLET ORAL EVERY 4 HOURS PRN
Status: DISCONTINUED | OUTPATIENT
Start: 2024-06-09 | End: 2024-06-10 | Stop reason: HOSPADM

## 2024-06-09 RX ORDER — SODIUM CHLORIDE 9 MG/ML
40 INJECTION, SOLUTION INTRAVENOUS AS NEEDED
Status: DISCONTINUED | OUTPATIENT
Start: 2024-06-09 | End: 2024-06-10 | Stop reason: HOSPADM

## 2024-06-09 RX ORDER — OXYCODONE HYDROCHLORIDE AND ACETAMINOPHEN 5; 325 MG/1; MG/1
1 TABLET ORAL ONCE
Status: COMPLETED | OUTPATIENT
Start: 2024-06-09 | End: 2024-06-09

## 2024-06-09 RX ORDER — DEXTROSE MONOHYDRATE 25 G/50ML
25 INJECTION, SOLUTION INTRAVENOUS
Status: DISCONTINUED | OUTPATIENT
Start: 2024-06-09 | End: 2024-06-10 | Stop reason: HOSPADM

## 2024-06-09 RX ORDER — ASPIRIN 81 MG/1
81 TABLET ORAL DAILY
Status: DISCONTINUED | OUTPATIENT
Start: 2024-06-09 | End: 2024-06-10 | Stop reason: HOSPADM

## 2024-06-09 RX ORDER — LIDOCAINE 4 G/G
1 PATCH TOPICAL
Status: DISCONTINUED | OUTPATIENT
Start: 2024-06-09 | End: 2024-06-10 | Stop reason: HOSPADM

## 2024-06-09 RX ORDER — MORPHINE SULFATE 2 MG/ML
4 INJECTION, SOLUTION INTRAMUSCULAR; INTRAVENOUS ONCE
Status: COMPLETED | OUTPATIENT
Start: 2024-06-09 | End: 2024-06-09

## 2024-06-09 RX ORDER — UREA 10 %
12 LOTION (ML) TOPICAL NIGHTLY PRN
Status: DISCONTINUED | OUTPATIENT
Start: 2024-06-09 | End: 2024-06-10 | Stop reason: HOSPADM

## 2024-06-09 RX ORDER — FINASTERIDE 5 MG/1
5 TABLET, FILM COATED ORAL DAILY
Status: DISCONTINUED | OUTPATIENT
Start: 2024-06-09 | End: 2024-06-10 | Stop reason: HOSPADM

## 2024-06-09 RX ORDER — SODIUM CHLORIDE 0.9 % (FLUSH) 0.9 %
10 SYRINGE (ML) INJECTION AS NEEDED
Status: DISCONTINUED | OUTPATIENT
Start: 2024-06-09 | End: 2024-06-10 | Stop reason: HOSPADM

## 2024-06-09 RX ORDER — POLYETHYLENE GLYCOL 3350 17 G/17G
17 POWDER, FOR SOLUTION ORAL DAILY PRN
Status: DISCONTINUED | OUTPATIENT
Start: 2024-06-09 | End: 2024-06-10 | Stop reason: HOSPADM

## 2024-06-09 RX ORDER — IBUPROFEN 600 MG/1
1 TABLET ORAL
Status: DISCONTINUED | OUTPATIENT
Start: 2024-06-09 | End: 2024-06-10 | Stop reason: HOSPADM

## 2024-06-09 RX ORDER — HYDROCODONE BITARTRATE AND ACETAMINOPHEN 5; 325 MG/1; MG/1
1 TABLET ORAL EVERY 4 HOURS PRN
Status: DISCONTINUED | OUTPATIENT
Start: 2024-06-09 | End: 2024-06-10 | Stop reason: HOSPADM

## 2024-06-09 RX ORDER — BISACODYL 10 MG
10 SUPPOSITORY, RECTAL RECTAL DAILY PRN
Status: DISCONTINUED | OUTPATIENT
Start: 2024-06-09 | End: 2024-06-10 | Stop reason: HOSPADM

## 2024-06-09 RX ORDER — SODIUM CHLORIDE 0.9 % (FLUSH) 0.9 %
10 SYRINGE (ML) INJECTION EVERY 12 HOURS SCHEDULED
Status: DISCONTINUED | OUTPATIENT
Start: 2024-06-09 | End: 2024-06-10 | Stop reason: HOSPADM

## 2024-06-09 RX ORDER — AMOXICILLIN 250 MG
2 CAPSULE ORAL 2 TIMES DAILY PRN
Status: DISCONTINUED | OUTPATIENT
Start: 2024-06-09 | End: 2024-06-10 | Stop reason: HOSPADM

## 2024-06-09 RX ADMIN — OXYCODONE AND ACETAMINOPHEN 1 TABLET: 5; 325 TABLET ORAL at 15:38

## 2024-06-09 RX ADMIN — INSULIN LISPRO 3 UNITS: 100 INJECTION, SOLUTION INTRAVENOUS; SUBCUTANEOUS at 21:13

## 2024-06-09 RX ADMIN — MORPHINE SULFATE 4 MG: 2 INJECTION, SOLUTION INTRAMUSCULAR; INTRAVENOUS at 15:42

## 2024-06-09 RX ADMIN — VENLAFAXINE HYDROCHLORIDE 75 MG: 75 CAPSULE, EXTENDED RELEASE ORAL at 22:26

## 2024-06-09 RX ADMIN — INSULIN GLARGINE 38 UNITS: 100 INJECTION, SOLUTION SUBCUTANEOUS at 21:13

## 2024-06-09 RX ADMIN — ATORVASTATIN CALCIUM 20 MG: 20 TABLET, FILM COATED ORAL at 21:13

## 2024-06-09 RX ADMIN — PANTOPRAZOLE SODIUM 40 MG: 40 TABLET, DELAYED RELEASE ORAL at 22:26

## 2024-06-09 RX ADMIN — HYDROCODONE BITARTRATE AND ACETAMINOPHEN 1 TABLET: 5; 325 TABLET ORAL at 21:13

## 2024-06-09 RX ADMIN — METHOCARBAMOL TABLETS 750 MG: 750 TABLET, COATED ORAL at 21:13

## 2024-06-09 RX ADMIN — HYDROCODONE BITARTRATE AND ACETAMINOPHEN 1 TABLET: 5; 325 TABLET ORAL at 16:54

## 2024-06-09 RX ADMIN — LIDOCAINE 1 PATCH: 4 PATCH TOPICAL at 19:39

## 2024-06-09 RX ADMIN — METHOCARBAMOL TABLETS 750 MG: 750 TABLET, COATED ORAL at 16:54

## 2024-06-09 RX ADMIN — ASPIRIN 81 MG: 81 TABLET, COATED ORAL at 19:39

## 2024-06-09 RX ADMIN — LINAGLIPTIN 5 MG: 5 TABLET, FILM COATED ORAL at 19:39

## 2024-06-09 RX ADMIN — PROPRANOLOL HYDROCHLORIDE 30 MG: 20 TABLET ORAL at 22:26

## 2024-06-09 RX ADMIN — Medication 10 ML: at 15:43

## 2024-06-09 RX ADMIN — Medication 10 ML: at 21:14

## 2024-06-09 NOTE — ED NOTES
Patient to ER via car from home for fall out of bed while having a dream this morning  Patient complains of low back pain and headache  May of hit head  Wife states he is confused to her a&ox4 at time of triage   Patient states he is not on blood thinners

## 2024-06-09 NOTE — ED PROVIDER NOTES
EMERGENCY DEPARTMENT ENCOUNTER    Room Number:  108/1  PCP: Avery Velazquez MD  History obtained from: Patient      HPI:  Chief Complaint: Back pain  A complete HPI/ROS/PMH/PSH/SH/FH are unobtainable due to: N/A  Context: Earl Marc is a 79 y.o. male who presents to the ED c/o back pain.  Fell out of bed this morning and back pain has been worse since that time.  Had been having some back pain prior to this.  Not sure if he hit his head, had some mild headache, wife said he was acting unusually earlier and kept falling asleep.  No other recent illness, fever, chills.  Patient is now returned to baseline.  No numbness or weakness in his legs.            PAST MEDICAL HISTORY  Active Ambulatory Problems     Diagnosis Date Noted    Bell's palsy 03/10/2016    Persistent insomnia 03/10/2016    Osteoarthritis of cervical spine 03/10/2016    Diabetic peripheral neuropathy 03/10/2016    Dyslipidemia 03/10/2016    Steatosis of liver 03/10/2016    Fibromyalgia 03/10/2016    Gastroesophageal reflux disease without esophagitis 03/10/2016    Hypertensive kidney disease with stage 3a chronic kidney disease 03/10/2016    Hypogonadism in male 03/10/2016    Renal insufficiency 03/10/2016    Vitamin D deficiency 03/10/2016    Benign non-nodular prostatic hyperplasia with lower urinary tract symptoms 03/10/2016    S/P drug eluting coronary stent placement 04/13/2016    Coronary artery disease involving native coronary artery of native heart 06/21/2016    Malignant neoplasm of skin 10/03/2016    Type 2 diabetes mellitus with hyperglycemia, with long-term current use of insulin 04/04/2017    Diabetes mellitus 04/04/2017    Chronic insomnia 12/20/2017    Other specified glaucoma 04/01/2017    Vertigo 03/14/2018    Epigastric pain 06/11/2018    Chest pain with high risk for cardiac etiology 05/26/2021    Precordial pain 05/25/2021    S/P arthroscopy of shoulder 11/19/2019    Peripheral neuropathy 03/24/2022    Stage 3b  chronic kidney disease 03/24/2022    Chest pain 01/19/2024    Stroke-like symptoms 02/20/2024     Resolved Ambulatory Problems     Diagnosis Date Noted    Hyperuricemia 03/10/2016    Erectile dysfunction of nonorganic origin 03/10/2016    Type 2 diabetes mellitus without complication 03/10/2016    Precordial pain 03/10/2016    Abnormal nuclear stress test 03/25/2016    Coronary artery disease involving native coronary artery with angina pectoris 03/27/2016    S/P coronary angioplasty 04/05/2016    Chest pain 06/14/2016    Dizziness 06/21/2016    Lateral epicondylitis 12/19/2012    Long-term insulin use in type 2 diabetes 10/04/2016    Type II diabetes circulatory disorder causing erectile dysfunction 02/13/2017    Sialadenitis 06/28/2017    Acute bronchitis due to other specified organisms 11/14/2017    Chest pain 10/16/2018    Other forms of angina pectoris 10/16/2018    Trigger point of left shoulder region 03/02/2023     Past Medical History:   Diagnosis Date    Abnormal cardiovascular stress test     Acid reflux     Arthritis     Asthma     Cancer     Chronic kidney disease     Colon polyp     Congenital heart disease     COPD (chronic obstructive pulmonary disease) 2021    Coronary artery disease     Diabetic neuropathy associated with type 2 diabetes mellitus     Erectile dysfunction     Fatty liver disease, nonalcoholic     Gastritis     Glaucoma     Hyperlipidemia     Hypertension     Hypogonadism male     Infectious viral hepatitis Hep A July 1959    Migraines 09/2023    Myocardial infarction 2018    Type 2 diabetes mellitus          PAST SURGICAL HISTORY  Past Surgical History:   Procedure Laterality Date    CARDIAC CATHETERIZATION N/A 03/25/2016    Procedure: Left Heart Cath;  Surgeon: Maria E Gilbert MD;  Location: [facility] INVASIVE LOCATION;  Service:     CARDIAC CATHETERIZATION N/A 03/25/2016    Procedure: Coronary angiography;  Surgeon: Maria E Gilbert MD;  Location: [facility] CATH  INVASIVE LOCATION;  Service:     CARDIAC CATHETERIZATION N/A 03/28/2016    Procedure: Coronary angiography;  Surgeon: Maria E Gilbert MD;  Location:  [facility] CATH INVASIVE LOCATION;  Service:     CARDIAC CATHETERIZATION N/A 03/28/2016    Procedure: Stent MOISE coronary;  Surgeon: Maria E Gilbert MD;  Location:  [facility] CATH INVASIVE LOCATION;  Service:     CARDIAC CATHETERIZATION N/A 10/18/2018    Procedure: Coronary angiography  no LV gram d/t CKD;  Surgeon: Maria E Gilbert MD;  Location:  [facility] CATH INVASIVE LOCATION;  Service: Cardiology    CARDIAC CATHETERIZATION N/A 10/18/2018    Procedure: Left Heart Cath;  Surgeon: Maria E Gilbert MD;  Location:  [facility] CATH INVASIVE LOCATION;  Service: Cardiology    CARDIAC CATHETERIZATION N/A 10/18/2018    Procedure: Stent MOISE coronary;  Surgeon: Maria E Gilbert MD;  Location:  [facility] CATH INVASIVE LOCATION;  Service: Cardiology    CARDIAC CATHETERIZATION N/A 05/28/2021    Procedure: Coronary angiography;  Surgeon: Maria E Gilbert MD;  Location:  [facility] CATH INVASIVE LOCATION;  Service: Cardiovascular;  Laterality: N/A;    CARDIAC CATHETERIZATION N/A 05/28/2021    Procedure: Left Heart Cath;  Surgeon: Maria E Gilbert MD;  Location:  [facility] CATH INVASIVE LOCATION;  Service: Cardiovascular;  Laterality: N/A;    CARDIAC CATHETERIZATION N/A 05/28/2021    Procedure: Left ventriculography;  Surgeon: Maria E Gilbert MD;  Location: [facility] CATH INVASIVE LOCATION;  Service: Cardiovascular;  Laterality: N/A;    CARDIAC CATHETERIZATION N/A 05/28/2021    Procedure: Stent MOISE coronary;  Surgeon: Maria E Gilbert MD;  Location: [facility] CATH INVASIVE LOCATION;  Service: Cardiovascular;  Laterality: N/A;    CARDIAC CATHETERIZATION N/A 1/19/2024    Procedure: Left Heart Cath;  Surgeon: Maria E Gilbert MD;  Location: [facility] CATH INVASIVE LOCATION;  Service: Cardiovascular;  Laterality: N/A;    CARDIAC CATHETERIZATION N/A 1/19/2024     Procedure: Coronary angiography;  Surgeon: Maria E Gilbert MD;  Location:  [facility] CATH INVASIVE LOCATION;  Service: Cardiovascular;  Laterality: N/A;    CARDIAC CATHETERIZATION N/A 2024    Procedure: Left ventriculography;  Surgeon: Maria E Gilbert MD;  Location:  [facility] CATH INVASIVE LOCATION;  Service: Cardiovascular;  Laterality: N/A;    CAROTID STENT      CORONARY ANGIOPLASTY      CORONARY STENT PLACEMENT  3/28/2016    EYE SURGERY  2017    NECK EXPLORATION N/A     OH RT/LT HEART CATHETERS N/A 10/18/2018    Procedure: Percutaneous Coronary Intervention;  Surgeon: Maria E Gilbert MD;  Location:  [facility] CATH INVASIVE LOCATION;  Service: Cardiology         FAMILY HISTORY  Family History   Problem Relation Age of Onset    Breast cancer Daughter     Heart attack Mother          10/31/1980    Hypertension Mother     Heart disease Mother     Thyroid disease Mother     Lupus Mother     Early death Mother     Miscarriages / Stillbirths Mother         Miscarriage     Heart failure Mother     Heart attack Brother     Heart disease Brother     Hyperlipidemia Brother     Cancer Brother         Due to Agent [place], [place]    Hypertension Brother     Depression Father     COPD Father     Stroke Maternal Grandmother     Cancer Maternal Grandmother         Lung Cancer non-smoker    Diabetes Brother     Heart disease Brother     Hypertension Brother     Kidney disease Brother     Heart disease Brother     Hypertension Brother          SOCIAL HISTORY  Social History     Socioeconomic History    Marital status:      Spouse name: Ivette    Number of children: 1   Tobacco Use    Smoking status: Never     Passive exposure: Past    Smokeless tobacco: Never    Tobacco comments:     Never   Vaping Use    Vaping status: Never Used   Substance and Sexual Activity    Alcohol use: No    Drug use: Never    Sexual activity: Defer         ALLERGIES  Ace inhibitors, Hydrogenated palm oil glycerides,  Lanolin, Other, Prazosin, and Nsaids        REVIEW OF SYSTEMS    As per HPI      PHYSICAL EXAM  ED Triage Vitals   Temp Heart Rate Resp BP SpO2   06/09/24 1156 06/09/24 1156 06/09/24 1156 06/09/24 1156 06/09/24 1156   97.2 °F (36.2 °C) 65 16 160/75 96 %      Temp src Heart Rate Source Patient Position BP Location FiO2 (%)   06/09/24 1604 06/09/24 1604 06/09/24 1604 06/09/24 1604 --   Oral Monitor Lying Right arm        Physical Exam  Constitutional:       General: He is not in acute distress.  HENT:      Head: Normocephalic and atraumatic.   Cardiovascular:      Rate and Rhythm: Normal rate and regular rhythm.   Pulmonary:      Effort: No respiratory distress.   Abdominal:      General: There is no distension.      Tenderness: There is no abdominal tenderness.   Musculoskeletal:         General: No swelling or deformity.   Skin:     General: Skin is warm and dry.   Neurological:      General: No focal deficit present.      Mental Status: He is alert. Mental status is at baseline.           Vital signs and nursing notes reviewed.          LAB RESULTS  Recent Results (from the past 24 hour(s))   Comprehensive Metabolic Panel    Collection Time: 06/09/24 12:16 PM    Specimen: Arm, Right; Blood   Result Value Ref Range    Glucose 207 (H) 65 - 99 mg/dL    BUN 21 8 - 23 mg/dL    Creatinine 1.23 0.76 - 1.27 mg/dL    Sodium 134 (L) 136 - 145 mmol/L    Potassium 5.5 (H) 3.5 - 5.2 mmol/L    Chloride 102 98 - 107 mmol/L    CO2 23.0 22.0 - 29.0 mmol/L    Calcium 9.8 8.6 - 10.5 mg/dL    Total Protein 7.0 6.0 - 8.5 g/dL    Albumin 3.8 3.5 - 5.2 g/dL    ALT (SGPT) 15 1 - 41 U/L    AST (SGOT) 12 1 - 40 U/L    Alkaline Phosphatase 82 39 - 117 U/L    Total Bilirubin 0.7 0.0 - 1.2 mg/dL    Globulin 3.2 gm/dL    A/G Ratio 1.2 g/dL    BUN/Creatinine Ratio 17.1 7.0 - 25.0    Anion Gap 9.0 5.0 - 15.0 mmol/L    eGFR 59.7 (L) >60.0 mL/min/1.73   CBC Auto Differential    Collection Time: 06/09/24 12:16 PM    Specimen: Arm, Right; Blood    Result Value Ref Range    WBC 9.72 3.40 - 10.80 10*3/mm3    RBC 6.03 (H) 4.14 - 5.80 10*6/mm3    Hemoglobin 16.9 13.0 - 17.7 g/dL    Hematocrit 51.5 (H) 37.5 - 51.0 %    MCV 85.4 79.0 - 97.0 fL    MCH 28.0 26.6 - 33.0 pg    MCHC 32.8 31.5 - 35.7 g/dL    RDW 13.8 12.3 - 15.4 %    RDW-SD 42.7 37.0 - 54.0 fl    MPV 9.6 6.0 - 12.0 fL    Platelets 244 140 - 450 10*3/mm3    Neutrophil % 71.6 42.7 - 76.0 %    Lymphocyte % 18.3 (L) 19.6 - 45.3 %    Monocyte % 7.7 5.0 - 12.0 %    Eosinophil % 1.5 0.3 - 6.2 %    Basophil % 0.5 0.0 - 1.5 %    Immature Grans % 0.4 0.0 - 0.5 %    Neutrophils, Absolute 6.95 1.70 - 7.00 10*3/mm3    Lymphocytes, Absolute 1.78 0.70 - 3.10 10*3/mm3    Monocytes, Absolute 0.75 0.10 - 0.90 10*3/mm3    Eosinophils, Absolute 0.15 0.00 - 0.40 10*3/mm3    Basophils, Absolute 0.05 0.00 - 0.20 10*3/mm3    Immature Grans, Absolute 0.04 0.00 - 0.05 10*3/mm3    nRBC 0.0 0.0 - 0.2 /100 WBC   Urinalysis With Microscopic If Indicated (No Culture) - Urine, Clean Catch    Collection Time: 06/09/24  1:30 PM    Specimen: Urine, Clean Catch   Result Value Ref Range    Color, UA Yellow Yellow, Straw    Appearance, UA Clear Clear    pH, UA 6.5 5.0 - 8.0    Specific Gravity, UA 1.016 1.005 - 1.030    Glucose, UA Negative Negative    Ketones, UA Trace (A) Negative    Bilirubin, UA Negative Negative    Blood, UA Negative Negative    Protein, UA 30 mg/dL (1+) (A) Negative    Leuk Esterase, UA Negative Negative    Nitrite, UA Negative Negative    Urobilinogen, UA 1.0 E.U./dL 0.2 - 1.0 E.U./dL   Urinalysis, Microscopic Only - Urine, Clean Catch    Collection Time: 06/09/24  1:30 PM    Specimen: Urine, Clean Catch   Result Value Ref Range    RBC, UA 0-2 None Seen, 0-2 /HPF    WBC, UA 0-2 None Seen, 0-2 /HPF    Bacteria, UA None Seen None Seen /HPF    Squamous Epithelial Cells, UA 0-2 None Seen, 0-2 /HPF    Hyaline Casts, UA 0-2 None Seen /LPF    Methodology Automated Microscopy        Ordered the above labs and reviewed the  results.        RADIOLOGY  CT Head Without Contrast, CT Lumbar Spine Without Contrast    Result Date: 6/9/2024  EMERGENCY CT SCAN OF THE HEAD AND LUMBAR SPINE WITHOUT CONTRAST ON 06/09/2024  CLINICAL HISTORY: This is a 79-year-old male patient who woke up this morning on floor and complains of headache and low back pain.  HEAD CT TECHNIQUE: Spiral CT images were obtained from the base of the skull to the vertex without intravenous contrast. The images were reformatted and submitted in 3 mm thick axial, sagittal and coronal CT sections with brain algorithm, and 2 mm thick axial CT sections with high-resolution bone algorithm.  This is correlated to a contrast-enhanced CT angiogram of the head and neck, as well as an MRI of the brain on 02/20/2024.  FINDINGS: There is some patchy low-density extending from the periventricular into the subcortical white matter of the cerebral hemispheres, consistent with moderate small vessel disease. The remainder of the brain parenchyma is normal in attenuation. There is diffuse cerebral atrophy. The ventricles are normal in size. I see no focal mass effect. There is no midline shift. No extra-axial fluid collections are identified. There is no evidence of acute intracranial hemorrhage. No acute skull fracture is identified. The calvarium and the skull base are normal in appearance. The paranasal sinuses and the mastoid air cells and the middle ear cavities are clear with the exception of a small 1 cm mucous retention cyst in the inferior medial right maxillary sinus. Bilateral intraocular lens implants are in place in the globes from previous bilateral cataract surgery. Otherwise, the orbits are normal in appearance.      1. No acute intracranial abnormalities identified.  2. There is moderate small vessel disease in the cerebral white matter and there is diffuse cerebral atrophy. There are bilateral intraocular lens implants in the globes from previous bilateral cataract surgery.  The remainder of the head CT is normal. Specifically, no acute skull fracture or intracranial hemorrhage is identified.  LUMBAR SPINE CT TECHNIQUE: Spiral CT images were obtained from the mid body of the T11 thoracic level down to the S2-S3 sacral level. The images were reformatted and are submitted in 3 mm thick axial CT sections with soft tissue algorithm and 1 mm thick axial CT sections with high-resolution bone algorithm and 2 mm thick sagittal and coronal reconstructions were performed and submitted in both bone and soft tissue algorithm.  There are no prior CTs of the lumbar spine for comparison. This study is correlated to an MRI of the lumbar spine on 03/20/2024 and a CT scan of the abdomen and pelvis on 11/10/2022.  FINDINGS: At T11-T12, there is some vacuum disc phenomenon. The posterior disc margin and facets are normal with no canal or foraminal narrowing.  At T12-L1, the disc space and facets are normal in appearance with no canal or foraminal narrowing.  At L1-L2, there is some mild anterior marginal endplate spurring. The posterior disc margin and facets are normal with no canal or foraminal narrowing.  At L2-L3, there is some mild anterior marginal endplate spurring. The posterior disc margin and facets are normal and there is no canal or foraminal narrowing.  At L3-L4, there is a very minimal diffuse posterior disc bulge and facet overgrowth. There is minimal if any canal narrowing and no foraminal narrowing.  At L4-L5, there is mild bilateral facet overgrowth and minimal diffuse posterior disc bulge, and there is mild canal, minimal foraminal narrowing, unchanged since MRI of the lumbar spine on 03/20/2024.  At L5-S1, there is mild left and there is moderate right facet overgrowth. The posterior disc margin is normal. There is no canal or lateral recess or foraminal narrowing.  This patient has a very minimal acute compression fracture involving the central to anterior superior body and endplate of  the T12 thoracic level with some focal concavity involving the superior endplate of T12 with about 10% loss of central and no loss of anterior or posterior vertebral body height. This compression fracture is new when compared to the prior MRI lumbar spine on 03/20/2024. There is some minimal paravertebral edema. No additional fractures seen in the lumbar spine.  IMPRESSION: 1. Since the prior MRI of the lumbar spine on 03/20/2024, the patient has developed a very mild acute compression fracture involving the central to anterior superior body and endplate of the right side of the T12 thoracic level. There is some concavity of central and right side of the superior endplate of T12 with about 10% loss of central, but there is no loss of anterior or posterior vertebral body height at T12. There is minimal paravertebral edema.  2. Otherwise, there has been no change when compared to the lumbar spine MRI 03/20/2024. There are no compression fractures in the lumbar spine and there is very mild lumbar spondylosis with minimal if any canal narrowing at L3-4L and mild canal and minimal foraminal narrowing at L4-L5, and the remainder of the lumbar spine CT is within normal limits.    Radiation dose reduction techniques were utilized, including automated exposure control and exposure modulation based on body size.   This report was finalized on 6/9/2024 2:16 PM by Dr. Brice Ziegler M.D on Workstation: [id number]       Ordered the above noted radiological studies. Reviewed by me in PACS.        MEDICATIONS GIVEN IN ER  Medications   sodium chloride 0.9 % flush 10 mL (has no administration in time range)   sodium chloride 0.9 % flush 10 mL (10 mL Intravenous Given 6/9/24 1543)   sodium chloride 0.9 % infusion 40 mL (has no administration in time range)   acetaminophen (TYLENOL) tablet 650 mg (has no administration in time range)   Potassium Replacement - Follow Nurse / BPA Driven Protocol (has no administration in time range)    Magnesium Standard Dose Replacement - Follow Nurse / BPA Driven Protocol (has no administration in time range)   Phosphorus Replacement - Follow Nurse / BPA Driven Protocol (has no administration in time range)   Calcium Replacement - Follow Nurse / BPA Driven Protocol (has no administration in time range)   sennosides-docusate (PERICOLACE) 8.6-50 MG per tablet 2 tablet (has no administration in time range)     And   polyethylene glycol (MIRALAX) packet 17 g (has no administration in time range)     And   bisacodyl (DULCOLAX) EC tablet 5 mg (has no administration in time range)     And   bisacodyl (DULCOLAX) suppository 10 mg (has no administration in time range)   HYDROcodone-acetaminophen (NORCO) 5-325 MG per tablet 1 tablet (1 tablet Oral Given 6/9/24 1654)   methocarbamol (ROBAXIN) tablet 750 mg (750 mg Oral Not Given 6/9/24 1706)   oxyCODONE-acetaminophen (PERCOCET) 5-325 MG per tablet 1 tablet (1 tablet Oral Given 6/9/24 1538)   morphine injection 4 mg (4 mg Intravenous Given 6/9/24 1542)               MEDICAL DECISION MAKING, PROGRESS, and CONSULTS    MDM: Patient presented emergency department with worsening low back pain and fall, otherwise well-appearing, vitals otherwise stable.  CT demonstrates new T12 endplate fracture.  Labs and imaging otherwise reassuring.  Patient has uncontrolled pain in the ER, will admit overnight for pain control and further evaluation as needed.    All labs have been independently reviewed by me.  All radiology studies have been reviewed by me and I have also reviewed the radiology report.   EKG's independently viewed and interpreted by me.  Discussion below represents my analysis of pertinent findings related to patient's condition, differential diagnosis, treatment plan and final disposition.      Additional sources:  - Discussed/ obtained information from independent historians: Patient's wife    - External (non-ED) record review:     - Chronic or social conditions  impacting care: Low back pain, hip pain    - Shared decision making: Discussed plan for admission, patient agrees.      Orders placed during this visit:  Orders Placed This Encounter   Procedures    CT Head Without Contrast    CT Lumbar Spine Without Contrast    Comprehensive Metabolic Panel    Urinalysis With Microscopic If Indicated (No Culture) - Urine, Clean Catch    CBC Auto Differential    Urinalysis, Microscopic Only - Urine, Clean Catch    CBC (No Diff)    Comprehensive Metabolic Panel    Diet: Regular/House; Fluid Consistency: Thin (IDDSI 0)    NPO Diet NPO Type: Strict NPO    Up With Assistance    Intake & Output    Weigh Patient    Oral Care    Saline Lock & Maintain IV Access    Place Sequential Compression Device    Maintain Sequential Compression Device    Neurosurgery (on-call MD unless specified)    Inpatient Neurosurgery Consult    PT Consult: Eval & Treat Functional Mobility Below Baseline    Insert Peripheral IV    Initiate ED Observation Status    CBC & Differential         Additional orders considered but not ordered:  Considered CT abdomen pelvis however patient has no abdominal pain or other symptoms to suggest referred pain        Differential diagnosis includes but is not limited to:    Lumbar spine fracture, intracranial hemorrhage, closed head injury, electrolyte abnormality, dehydration, kidney injury, medication side effect      Independent interpretation of labs, radiology studies, and discussions with consultants:  ED Course as of 06/09/24 1712   Sun Jun 09, 2024   1313 CT head interpreted myself:  No hemorrhage or midline shift. [FS]      ED Course User Index  [FS] Cedric Max MD           DIAGNOSIS  Final diagnoses:   Compression fracture of T12 vertebra, initial encounter   Closed head injury, initial encounter   Fall from bed, initial encounter         DISPOSITION  Admitted to observation        Latest Documented Vital Signs:  As of 17:12 EDT  BP- 176/79 HR- 69 Temp- 97.7 °F  (36.5 °C) (Oral) O2 sat- 97%              --    Please note that portions of this were completed with a voice recognition program.       Note Disclaimer: At [facility], we believe that sharing information builds trust and better relationships. You are receiving this note because you are receiving care at [facility] or recently visited. It is possible you will see health information before a provider has talked with you about it. This kind of information can be easy to misunderstand. To help you fully understand what it means for your health, we urge you to discuss this note with your provider.             Cedric Max MD  06/09/24 5141

## 2024-06-09 NOTE — H&P
[facility]   HISTORY AND PHYSICAL    Patient Name: Earl Marc  : 1945  MRN: [id number]  Primary Care Physician:  Avery Velazquez MD  Date of admission: 2024    Subjective   Subjective     Chief Complaint:   Chief Complaint   Patient presents with    Back Pain    Fall    Headache         HPI:    Earl Marc is a 79 y.o. male, with a past medical history including, but not limited to, diabetes, chronic back pain, fibromyalgia, GERD, hypertension, hyperlipidemia, chronic kidney disease, and COPD, presented to the emergency department with a complaint of back pain after falling out of bed this a.m.  He states that he was having a dream and found himself on the floor.  He states that he was having back pain that was limiting his mobility at that time.  However, he was able to get back into bed and slept for a few more hours.  When he woke up pain was not any better.  States that he feels okay when he is laying down flat however when he goes to move or walk the pain is severe.  He is currently in physical therapy for his bilateral hip pain and back pain but he states that this pain is much worse than his daily pain.  He denies any urinary or fecal incontinence or retention.  MRI thoracic spine is ordered at this time.  Neurosurgery's been consulted to see the patient in the AM.    Review of Systems   All systems were reviewed and negative except for: What was mentioned above in the HPI    Personal History     Past Medical History:   Diagnosis Date    Abnormal cardiovascular stress test     Acid reflux     Arthritis     Asthma     Cancer     skin     Chronic kidney disease     Colon polyp     Congenital heart disease     COPD (chronic obstructive pulmonary disease)     Old age COPD/2D hand Smoke emphysema    Coronary artery disease     Diabetes mellitus     Diabetic neuropathy associated with type 2 diabetes mellitus     Diabetic peripheral neuropathy 03/10/2016    Dyslipidemia      Erectile dysfunction     Fatty liver disease, nonalcoholic     Gastritis     Glaucoma     both eyes    Hyperlipidemia     Hypertension     Hypogonadism male     Infectious viral hepatitis Hep A July 1959    Drank water from broken water line and Grandparents house.    Migraines 09/2023    Myocardial infarction 2018    Trigger point of left shoulder region 03/02/2023    Type 2 diabetes mellitus        Past Surgical History:   Procedure Laterality Date    CARDIAC CATHETERIZATION N/A 03/25/2016    Procedure: Left Heart Cath;  Surgeon: Maria E Gilbert MD;  Location: [facility] CATH [facility]AS[facility] LOC[facility];  Service:     CARDIAC CATHETERIZATION N/A 03/25/2016    Procedure: Coronary angiography;  Surgeon: Maria E Gilbert MD;  Location:  [facility] CATH INVASIVE LOCATION;  Service:     CARDIAC CATHETERIZATION N/A 03/28/2016    Procedure: Coronary angiography;  Surgeon: Maria E Gilbert MD;  Location:  [facility] CATH INVASIVE LOCATION;  Service:     CARDIAC CATHETERIZATION N/A 03/28/2016    Procedure: Stent MOISE coronary;  Surgeon: Maria E Gilbert MD;  Location: [facility] CATH [facility] LOC[facility];  Service:     CARDIAC CATHETERIZATION N/A 10/18/2018    Procedure: Coronary angiography  no LV gram d/t CKD;  Surgeon: Maria E Gilbert MD;  Location: [facility] CATH [facility] LOC[facility];  Service: Cardiology    CARDIAC CATHETERIZATION N/A 10/18/2018    Procedure: Left Heart Cath;  Surgeon: Maria E Gilbert MD;  Location: [facility] CATH [facility] LOC[facility];  Service: Cardiology    CARDIAC CATHETERIZATION N/A 10/18/2018    Procedure: Stent MOISE coronary;  Surgeon: Maria E Gilbert MD;  Location: [facility] CATH [facility] LOC[facility];  Service: Cardiology    CARDIAC CATHETERIZATION N/A 05/28/2021    Procedure: Coronary angiography;  Surgeon: Maria E Gilbert MD;  Location: [facility] CATH [facility] LOC[facility];  Service: Cardiovascular;  Laterality: N/A;    CARDIAC CATHETERIZATION N/A 05/28/2021    Procedure: Left Heart Cath;  Surgeon:  Maria E Gilbert MD;  Location:  [facility] CATH INVASIVE LOCATION;  Service: Cardiovascular;  Laterality: N/A;    CARDIAC CATHETERIZATION N/A 05/28/2021    Procedure: Left ventriculography;  Surgeon: Maria E Gilbert MD;  Location:  [facility] CATH INVASIVE LOCATION;  Service: Cardiovascular;  Laterality: N/A;    CARDIAC CATHETERIZATION N/A 05/28/2021    Procedure: Stent MOISE coronary;  Surgeon: Maria E Gilbert MD;  Location:  [facility] CATH INVASIVE LOCATION;  Service: Cardiovascular;  Laterality: N/A;    CARDIAC CATHETERIZATION N/A 1/19/2024    Procedure: Left Heart Cath;  Surgeon: Mraia E Gilbert MD;  Location:  [facility] CATH INVASIVE LOCATION;  Service: Cardiovascular;  Laterality: N/A;    CARDIAC CATHETERIZATION N/A 1/19/2024    Procedure: Coronary angiography;  Surgeon: Maria E Gilbert MD;  [facility];  Service: Cardiovascular;  Laterality: N/A;    CARDIAC CATHETERIZATION N/A 1/19/2024    Procedure: Left ventriculography;  Surgeon: Maria E Gilbert MD;  [facility];  Service: Cardiovascular;  Laterality: N/A;    CAROTID STENT      CORONARY ANGIOPLASTY      CORONARY STENT PLACEMENT  3/28/2016    EYE SURGERY  9/2017    NECK EXPLORATION N/A     W[place] RT/LT HEART CATHETERS N/A 10/18/2018    Procedure: Percutaneous Coronary Intervention;  Surgeon: Maria E Gilbert MD;  [facility];  Service: Cardiology       Family History: family history includes Breast cancer in his daughter; COPD in his father; Cancer in his brother and maternal grandmother; Depression in his father; Diabetes in his brother; Early death in his mother; Heart attack in his brother and mother; Heart disease in his brother, brother, brother, and mother; Heart failure in his mother; Hyperlipidemia in his brother; Hypertension in his brother, brother, brother, and mother; Kidney disease in his brother; Lupus in his mother; Miscarriages /  Stillbirths in his mother; Stroke in his maternal grandmother; Thyroid disease in his mother. Otherwise pertinent FHx was reviewed and not pertinent to current issue.    Social History:  reports that he has never smoked. He has been exposed to tobacco smoke. He has never used smokeless tobacco. He reports that he does not drink alcohol and does not use drugs.    Home Medications:  Advocate Alcohol Prep Pads, Calcium Carbonate, Finasteride, Glucose, Insulin Glargine, Insulin Pen Needle, Lutein, Magnesium, Melatonin, Multiple Vitamins-Minerals, NON FORMULARY, Patiromer Sorbitex Calcium, Riboflavin, Testosterone Cypionate, Vitamin B-12, ammonium lactate, ascorbic acid, aspirin, atorvastatin, baclofen, ciclopirox, fluticasone, glucose, hydrocortisone, insulin aspart, latanoprost, lidocaine, linagliptin, mupirocin, naloxone, niacin, nitroglycerin, pantoprazole, propranolol LA, sildenafil, traMADol, venlafaxine XR, and vitamin E    Allergies:  Allergies   Allergen Reactions    Ace Inhibitors Other (See Comments)     Other reaction(s): Cough  Other reaction(s): Cough      Hydrogenated Palm Oil Glycerides Unknown - Low Severity    Lanolin Unknown - Low Severity    Other      Lotions- anything with palm oil or lanolin per pt (verified 6/15/16 1910)    Prazosin     Nsaids Other (See Comments)     CKD Stage 3       Objective   Objective     Vitals:   Temp:  [97.2 °F (36.2 °C)-97.7 °F (36.5 °C)] 97.7 °F (36.5 °C)  Heart Rate:  [65-69] 69  Resp:  [16-18] 18  BP: (153-207)/(75-87) 176/79  Physical Exam    Constitutional: Awake, alert   Eyes: PERRLA   HENT: NCAT, mucous membranes moist   Neck: Supple   Respiratory: Clear to auscultation bilaterally, nonlabored respirations    Cardiovascular: regular rate, palpable pedal pulses bilaterally   Gastrointestinal: Positive bowel sounds, soft, nontender, nondistended   Musculoskeletal: No bilateral ankle edema, strength equal bilaterally  Psychiatric: Appropriate affect, cooperative    Neurologic: Oriented x 3, speech clear   Skin: No rashes      Result Review    Result Review:  I have personally reviewed the results from the time of this admission to 6/9/2024 19:12 EDT and agree with these findings:  [x]  Laboratory list / accordion  []  Microbiology  [x]  Radiology  []  EKG/Telemetry   []  Cardiology/Vascular   []  Pathology  [x]  Old records  []  Other:    Initial workup in emergency department shows a sodium 134, potassium 5.5, GFR 59.7, glucose 2 0, all other lab work is at baseline.  Urinalysis shows no signs of infection.  CT head without contrast shows no acute intercranial abnormalities identified.  CT lumbar spine shows Since the prior MRI of the lumbar spine on 03/20/2024, the patient has developed a very mild acute compression fracture involving the central to anterior superior body and endplate of the right side of the  T12 thoracic level. There is some concavity of central and right side of  the superior endplate of T12 with about 10% loss of central, but there is no loss of anterior or posterior vertebral body height at T12. There s minimal paravertebral edema.      Assessment & Plan   Assessment / Plan     Brief Patient Summary:  Earl Marc is a 79 y.o. male who was admitted to the observation unit for further evaluation and treatment of his back pain.    Active Hospital Problems:  Active Hospital Problems    Diagnosis     **Acute back pain      Plan:   Back pain  -Vital signs every 4 hours  -Continuous pulse ox  -Robaxin-750 milligrams p.o. 4 times daily  -Lidocaine patch to back  -As needed pain medication  -MRI thoracic spine pending  -Neurosurgery to see in a.m.  -PT to eval and treat    Chronic kidney disease  -Avoid nephrotoxic agents    Diabetes  -Accu-Cheks AC  -Adult subcutaneous insulin management-low dose  -Hemoglobin A1c -8.1      DVT prophylaxis:  Mechanical DVT prophylaxis orders are present.        CODE STATUS:       Admission Status:  I believe this patient  meets observation status.    78 minutes have been spent by [facility] providers in the care of this patient while under observation status.      Appropriate PPE worn during patient encounter.  Hand hygeine performed before and after seeing the patient.      Electronically signed by JL Arroyo, 06/09/24, 7:12 PM EDT.

## 2024-06-09 NOTE — PLAN OF CARE
Goal Outcome Evaluation:      Patient request no caffeine products related to Rastafari restriction. He is alert, oriented, assist x1 with walker. Bedrest if possible.     BP has been high. Pain management discussed. Wife in room.

## 2024-06-10 ENCOUNTER — APPOINTMENT (OUTPATIENT)
Dept: MRI IMAGING | Facility: HOSPITAL | Age: 79
End: 2024-06-10
Payer: MEDICARE

## 2024-06-10 ENCOUNTER — TELEPHONE (OUTPATIENT)
Dept: NEUROSURGERY | Facility: CLINIC | Age: 79
End: 2024-06-10

## 2024-06-10 ENCOUNTER — READMISSION MANAGEMENT (OUTPATIENT)
Dept: CALL CENTER | Facility: HOSPITAL | Age: 79
End: 2024-06-10
Payer: MEDICARE

## 2024-06-10 VITALS
OXYGEN SATURATION: 97 % | SYSTOLIC BLOOD PRESSURE: 179 MMHG | HEART RATE: 59 BPM | BODY MASS INDEX: 28.89 KG/M2 | TEMPERATURE: 97.3 F | WEIGHT: 218 LBS | RESPIRATION RATE: 18 BRPM | DIASTOLIC BLOOD PRESSURE: 97 MMHG | HEIGHT: 73 IN

## 2024-06-10 DIAGNOSIS — S22.000A THORACIC COMPRESSION FRACTURE, CLOSED, INITIAL ENCOUNTER: Primary | ICD-10-CM

## 2024-06-10 LAB
ALBUMIN SERPL-MCNC: 4.1 G/DL (ref 3.5–5.2)
ALBUMIN/GLOB SERPL: 1.3 G/DL
ALP SERPL-CCNC: 79 U/L (ref 39–117)
ALT SERPL W P-5'-P-CCNC: 13 U/L (ref 1–41)
ANION GAP SERPL CALCULATED.3IONS-SCNC: 12.4 MMOL/L (ref 5–15)
AST SERPL-CCNC: 14 U/L (ref 1–40)
BILIRUB SERPL-MCNC: 0.6 MG/DL (ref 0–1.2)
BUN SERPL-MCNC: 18 MG/DL (ref 8–23)
BUN/CREAT SERPL: 15.7 (ref 7–25)
CALCIUM SPEC-SCNC: 9.6 MG/DL (ref 8.6–10.5)
CHLORIDE SERPL-SCNC: 99 MMOL/L (ref 98–107)
CO2 SERPL-SCNC: 24.6 MMOL/L (ref 22–29)
CREAT SERPL-MCNC: 1.15 MG/DL (ref 0.76–1.27)
DEPRECATED RDW RBC AUTO: 44 FL (ref 37–54)
EGFRCR SERPLBLD CKD-EPI 2021: 64.7 ML/MIN/1.73
ERYTHROCYTE [DISTWIDTH] IN BLOOD BY AUTOMATED COUNT: 14.5 % (ref 12.3–15.4)
GLOBULIN UR ELPH-MCNC: 3.1 GM/DL
GLUCOSE BLDC GLUCOMTR-MCNC: 117 MG/DL (ref 70–130)
GLUCOSE BLDC GLUCOMTR-MCNC: 118 MG/DL (ref 70–130)
GLUCOSE BLDC GLUCOMTR-MCNC: 121 MG/DL (ref 70–130)
GLUCOSE BLDC GLUCOMTR-MCNC: 71 MG/DL (ref 70–130)
GLUCOSE SERPL-MCNC: 129 MG/DL (ref 65–99)
HCT VFR BLD AUTO: 52.5 % (ref 37.5–51)
HGB BLD-MCNC: 16.9 G/DL (ref 13–17.7)
MCH RBC QN AUTO: 27.3 PG (ref 26.6–33)
MCHC RBC AUTO-ENTMCNC: 32.2 G/DL (ref 31.5–35.7)
MCV RBC AUTO: 84.8 FL (ref 79–97)
PLATELET # BLD AUTO: 246 10*3/MM3 (ref 140–450)
PMV BLD AUTO: 9.7 FL (ref 6–12)
POTASSIUM SERPL-SCNC: 4 MMOL/L (ref 3.5–5.2)
PROT SERPL-MCNC: 7.2 G/DL (ref 6–8.5)
RBC # BLD AUTO: 6.19 10*6/MM3 (ref 4.14–5.8)
SODIUM SERPL-SCNC: 136 MMOL/L (ref 136–145)
WBC NRBC COR # BLD AUTO: 8.26 10*3/MM3 (ref 3.4–10.8)

## 2024-06-10 PROCEDURE — 80053 COMPREHEN METABOLIC PANEL: CPT | Performed by: STUDENT IN AN ORGANIZED HEALTH CARE EDUCATION/TRAINING PROGRAM

## 2024-06-10 PROCEDURE — 63710000001 INSULIN GLARGINE PER 5 UNITS

## 2024-06-10 PROCEDURE — 72146 MRI CHEST SPINE W/O DYE: CPT

## 2024-06-10 PROCEDURE — G0378 HOSPITAL OBSERVATION PER HR: HCPCS

## 2024-06-10 PROCEDURE — 97530 THERAPEUTIC ACTIVITIES: CPT

## 2024-06-10 PROCEDURE — 82948 REAGENT STRIP/BLOOD GLUCOSE: CPT

## 2024-06-10 PROCEDURE — 99213 OFFICE O/P EST LOW 20 MIN: CPT | Performed by: NURSE PRACTITIONER

## 2024-06-10 PROCEDURE — 97161 PT EVAL LOW COMPLEX 20 MIN: CPT

## 2024-06-10 PROCEDURE — 85027 COMPLETE CBC AUTOMATED: CPT | Performed by: STUDENT IN AN ORGANIZED HEALTH CARE EDUCATION/TRAINING PROGRAM

## 2024-06-10 RX ORDER — LIDOCAINE 50 MG/G
1 PATCH TOPICAL EVERY 24 HOURS
Qty: 30 PATCH | Refills: 0 | Status: SHIPPED | OUTPATIENT
Start: 2024-06-10 | End: 2024-06-19 | Stop reason: SDUPTHER

## 2024-06-10 RX ORDER — HYDROCODONE BITARTRATE AND ACETAMINOPHEN 5; 325 MG/1; MG/1
1 TABLET ORAL EVERY 4 HOURS PRN
Qty: 12 TABLET | Refills: 0 | Status: SHIPPED | OUTPATIENT
Start: 2024-06-10 | End: 2024-06-12 | Stop reason: SDUPTHER

## 2024-06-10 RX ORDER — METHOCARBAMOL 750 MG/1
750 TABLET, FILM COATED ORAL 4 TIMES DAILY
Qty: 56 TABLET | Refills: 0 | Status: SHIPPED | OUTPATIENT
Start: 2024-06-10 | End: 2024-06-24

## 2024-06-10 RX ORDER — LIDOCAINE 50 MG/G
1 PATCH TOPICAL EVERY 24 HOURS
Qty: 30 PATCH | Refills: 0 | Status: SHIPPED | OUTPATIENT
Start: 2024-06-10

## 2024-06-10 RX ADMIN — SENNOSIDES AND DOCUSATE SODIUM 2 TABLET: 50; 8.6 TABLET ORAL at 09:06

## 2024-06-10 RX ADMIN — INSULIN GLARGINE 38 UNITS: 100 INJECTION, SOLUTION SUBCUTANEOUS at 13:53

## 2024-06-10 RX ADMIN — LINAGLIPTIN 5 MG: 5 TABLET, FILM COATED ORAL at 13:53

## 2024-06-10 RX ADMIN — Medication 10 ML: at 08:49

## 2024-06-10 RX ADMIN — ASPIRIN 81 MG: 81 TABLET, COATED ORAL at 08:48

## 2024-06-10 RX ADMIN — LIDOCAINE 1 PATCH: 4 PATCH TOPICAL at 08:47

## 2024-06-10 RX ADMIN — METHOCARBAMOL TABLETS 750 MG: 750 TABLET, COATED ORAL at 13:53

## 2024-06-10 RX ADMIN — METHOCARBAMOL TABLETS 750 MG: 750 TABLET, COATED ORAL at 08:48

## 2024-06-10 RX ADMIN — LORAZEPAM 1 MG: 1 TABLET ORAL at 03:34

## 2024-06-10 RX ADMIN — HYDROCODONE BITARTRATE AND ACETAMINOPHEN 1 TABLET: 5; 325 TABLET ORAL at 04:53

## 2024-06-10 RX ADMIN — VENLAFAXINE HYDROCHLORIDE 75 MG: 75 CAPSULE, EXTENDED RELEASE ORAL at 08:48

## 2024-06-10 RX ADMIN — FINASTERIDE 5 MG: 5 TABLET, FILM COATED ORAL at 08:47

## 2024-06-10 RX ADMIN — PROPRANOLOL HYDROCHLORIDE 30 MG: 20 TABLET ORAL at 08:47

## 2024-06-10 RX ADMIN — HYDROCODONE BITARTRATE AND ACETAMINOPHEN 1 TABLET: 5; 325 TABLET ORAL at 10:06

## 2024-06-10 NOTE — DISCHARGE INSTRUCTIONS
No lift, push, pull more than 5 pounds, no swimming, no bending or twisting. No exertional or impact activity- walking is OK. Wear brace when sitting higher than 45 degrees, standing, walking. OK to remove brace for showers.

## 2024-06-10 NOTE — CONSULTS
[facility] NEUROSURGERY CONSULT NOTE    Patient name: Earl Marc  Referring Provider: Geneva Holley PA-C  Reason for Consultation: T12 fracture     Patient Care Team:  Avery Velazquez MD as PCP - General (Internal Medicine)    Chief complaint: Back pain    Subjective .     History of present illness:    Patient is a 79 y.o.  male presents to ER with complaints of acute on subacute back pain after fall out of bed on the day of presentation.  He initially began to experience lower thoracic/upper back pain in early June after moving some items in his closet.  He noticed an acute severe pain while twisting.  He tried heat and massage with no relief.  His PCP prescribed muscle relaxant and tramadol.  He was feeling much better.  He has recently been doing physical therapy and due to gait issues.  He was making progress.  He presented to our ER yesterday with worsening back pain after fall out of bed when he had a nightmare.  No associated numbness tingling or weakness of lower extremities.  Back pain is worse with movement.    Non-smoker.  History of coronary artery disease.  On aspirin 81 mg daily.  History of skin cancer.  Prior cervical fusion but no thoracic or lumbar surgery.  He reports severe low testosterone, on replacement.  No prior workup for osteoporosis    Review of Systems  Review of Systems   Genitourinary:  Negative for enuresis.   Musculoskeletal:  Positive for back pain.   Neurological:  Negative for weakness and numbness.       History  PAST MEDICAL HISTORY  Past Medical History:   Diagnosis Date    Abnormal cardiovascular stress test     Acid reflux     Arthritis     Asthma     Cancer     skin     Chronic kidney disease     Colon polyp     Congenital heart disease     COPD (chronic obstructive pulmonary disease) 2021    Old age COPD/2D hand Smoke emphysema    Coronary artery disease     Diabetes mellitus     Diabetic neuropathy associated with type 2 diabetes mellitus     Diabetic  peripheral neuropathy 03/10/2016    Dyslipidemia     Erectile dysfunction     Fatty liver disease, nonalcoholic     Gastritis     Glaucoma     both eyes    Hyperlipidemia     Hypertension     Hypogonadism male     Infectious viral hepatitis Hep A July 1959    Drank water from broken water line and Grandparents house.    Migraines 09/2023    Myocardial infarction 2018    Trigger point of left shoulder region 03/02/2023    Type 2 diabetes mellitus        PAST SURGICAL HISTORY  Past Surgical History:   Procedure Laterality Date    CARDIAC CATHETERIZATION N/A 03/25/2016    Procedure: Left Heart Cath;  Surgeon: Maria E Gilbert MD;  Location:  [facility] CATH INVASIVE [facility];  Service:     CARDIAC CATHETERIZATION N/A 03/25/2016    Procedure: Coronary angiography;  Surgeon: Maria E Gilbert MD;  Location:  [facility];  Service:     CARDIAC CATHETERIZATION N/A 03/28/2016    Procedure: Coronary angiography;  Surgeon: Maria E Gilbert MD;  Location:  [facility];  Service:     CARDIAC CATHETERIZATION N/A 03/28/2016    Procedure: Stent MOISE coronary;  Surgeon: Maria E Gilbert MD;  Location: [facility] CATH INVAS[facility] LOC[facility];  Service:     CARDIAC CATHETERIZATION N/A 10/18/2018    Procedure: Coronary angiography  no LV gram d/t CKD;  Surgeon: Maria E Gilbert MD;  Location:  [facility] CATH INVASIVE LOCATION;  Service: Cardiology    CARDIAC CATHETERIZATION N/A 10/18/2018    Procedure: Left Heart Cath;  Surgeon: Maria E Gilbert MD;  Location: [facility] CATH [facility];  Service: Cardiology    CARDIAC CATHETERIZATION N/A 10/18/2018    Procedure: Stent MOISE coronary;  Surgeon: Maria E Gilbert MD;  Location: [facility] CATH [facility];  Service: Cardiology    CARDIAC CATHETERIZATION N/A 05/28/2021    Procedure: Coronary angiography;  Surgeon: Maria E Gilbert MD;  Location: [facility] C[facility];  Service: Cardiovascular;  Laterality: N/A;     CARDIAC CATHETERIZATION N/A 2021    Procedure: Left Heart Cath;  Surgeon: Maria E Gilbert MD;  Location:  [facility] INVASIVE LOC[facility];  Service: Cardiovascular;  Laterality: N/A;    CARDIAC CATHETERIZATION N/A 2021    Procedure: Left ventriculography;  Surgeon: Maria E Gilbert MD;  Location:  [facility]IVE [facility];  Service: Cardiovascular;  Laterality: N/A;    CARDIAC CATHETERIZATION N/A 2021    Procedure: Stent MOISE coronary;  Surgeon: Maria E Gilbert MD;  Location:  [facility] INV[facility]IVE [facility];  Service: Cardiovascular;  Laterality: N/A;    CARDIAC CATHETERIZATION N/A 2024    Procedure: Left Heart Cath;  Surgeon: Maria E Gilbert MD;  Location:  [facility]IVE LOC[facility];  Service: Cardiovascular;  Laterality: N/A;    CARDIAC CATHETERIZATION N/A 2024    Procedure: Coronary angiography;  Surgeon: Maria E Gilbert MD;  [facility];  Service: Cardiovascular;  Laterality: N/A;    CARDIAC CATHETERIZATION N/A 2024    Procedure: Left ventriculography;  Surgeon: Maria E Gilbert MD;  Location:  [facility];  Service: Cardiovascular;  Laterality: N/A;    CAROTID STENT      CORONARY ANGIOPLASTY      CORONARY STENT PLACEMENT  3/28/2016    EYE SURGERY  2017    NECK EXPLORATION N/A     MS RT/LT HEART CATHETERS N/A 10/18/2018    Procedure: Percutaneous Coronary Intervention;  Surgeon: Maria E Gilbert MD;  [facility];  Service: Cardiology       FAMILY HISTORY  Family History   Problem Relation Age of Onset    Breast cancer Daughter     Heart attack Mother          10/31/1980    Hypertension Mother     Heart disease Mother     Thyroid disease Mother     Lupus Mother     Early death Mother     Miscarriages / Stillbirths Mother         Miscarriage 1944    Heart failure Mother     Heart attack Brother     Heart disease Brother     Hyperlipidemia Brother     Cancer  Brother         Due to [place], [place]    Hypertension Brother     Depression Father     COPD Father     Stroke Maternal Grandmother     Cancer Maternal Grandmother         Lung Cancer non-smoker    Diabetes Brother     Heart disease Brother     Hypertension Brother     Kidney disease Brother     Heart disease Brother     Hypertension Brother        SOCIAL HISTORY  Social History     Tobacco Use    Smoking status: Never     Passive exposure: Past    Smokeless tobacco: Never    Tobacco comments:     Never   Vaping Use    Vaping status: Never Used   Substance Use Topics    Alcohol use: No    Drug use: Never       retired  Lives with wife    Allergies:  Ace inhibitors, Hydrogenated palm oil glycerides, Lanolin, Other, Prazosin, and Nsaids    MEDICATIONS:  Medications Prior to Admission   Medication Sig Dispense Refill Last Dose    Alcohol Swabs (Advocate Alcohol Prep Pads) 70 % pads        ammonium lactate (AmLactin) 12 % lotion Apply 1 Application topically to the appropriate area as directed As Needed for Dry Skin.       ascorbic acid (VITAMIN C) 1000 MG tablet Take 2 tablets by mouth Daily.       aspirin 81 MG EC tablet Take 1 tablet by mouth Daily. 90 tablet 3     atorvastatin (LIPITOR) 20 MG tablet Take 1 tablet by mouth Every Night. 90 tablet 1     baclofen (LIORESAL) 10 MG tablet Take 1 tablet by mouth 3 (Three) Times a Day. 30 tablet 1     Calcium Carbonate (CALCIUM 600 PO) Take 1 tablet by mouth Daily.       ciclopirox (LOPROX) 1 % shampoo Apply 1 Application topically to the appropriate area as directed 3 (Three) Times a Week.       Cyanocobalamin (Vitamin B-12) 1000 MCG sublingual tablet Place 1 tablet under the tongue Daily.       Dextrose, Diabetic Use, (Glucose) 1 g chewable tablet Chew 1 tablet As Needed.       FINASTERIDE PO Take 4 mg by mouth Daily.       fluticasone (FLONASE) 50 MCG/ACT nasal spray 2 sprays into the nostril(s) as directed by provider Daily As Needed for Allergies. 48 g 1      glucose (DEX4) 4 GM chewable tablet Chew 1 tablet.       hydrocortisone 2.5 % ointment Apply 1 Application topically to the appropriate area as directed 2 (Two) Times a Day.       insulin aspart (NovoLOG FlexPen) 100 UNIT/ML solution pen-injector sc pen Inject 5-6 Units under the skin into the appropriate area as directed 3 (Three) Times a Day With Meals. 15 mL 1     Insulin Glargine (Lantus SoloStar) 100 UNIT/ML injection pen Inject 38 Units under the skin into the appropriate area as directed Daily. 36 mL 1     Insulin Pen Needle (B-D UF III MINI PEN NEEDLES) 31G X 5 MM misc For use with pen device up to 4 times a day. Can substitute based on availability and insurance. 500 each 2     latanoprost (XALATAN) 0.005 % ophthalmic solution Administer 1 drop to both eyes Every Night.       lidocaine (LIDODERM) 5 % Place 1 patch on the skin as directed by provider Daily. Remove & Discard patch within 12 hours or as directed by MD 6 patch 0     linagliptin (Tradjenta) 5 MG tablet tablet Take 1 tablet by mouth Daily. 90 tablet 1     Lutein 20 MG tablet Take 1 tablet by mouth Daily.       Magnesium 250 MG tablet Take 1 tablet by mouth Every Night.       Melatonin 12 MG tablet Take 1 tablet by mouth At Night As Needed.       Multiple Vitamins-Minerals (ZINC PO) Take 50 mg by mouth Daily.       mupirocin (BACTROBAN) 2 % ointment Apply 1 Application topically to the appropriate area as directed 2 (Two) Times a Day.       Narcan 4 MG/0.1ML nasal spray        niacin 500 MG tablet Take 1 tablet by mouth As Needed.       nitroglycerin (Nitrostat) 0.4 MG SL tablet Place 1 tablet under the tongue Every 5 (Five) Minutes As Needed for Chest Pain. Indications: Acute Angina Pectoris 25 tablet 3     NON FORMULARY Ketamin 4%/Gabapentin 6%/clonidine 0.2%/nifedipine 2%  1-2 PUMPS to affected area 3-4 times daily.       NON FORMULARY Fluconazole/terbinafine/Ibuprofen/DMSO 2/3/3/50% with vitamin D 86684 Iu/100ml   1 Application to toe nails  2 times daily       pantoprazole (PROTONIX) 20 MG EC tablet Take 1 tablet by mouth Daily. 90 tablet 1     Patiromer Sorbitex Calcium (Veltassa) 8.4 g pack Take 1 packet by mouth Daily.       propranolol LA (INDERAL LA) 60 MG 24 hr capsule Take 1 capsule by mouth Daily. 90 capsule 1     Riboflavin 400 MG tablet Take 1 tablet by mouth Every Night.       Testosterone Cypionate (DEPOTESTOTERONE CYPIONATE) 200 MG/ML injection Inject 1 mL into the appropriate muscle as directed by prescriber Every 14 (Fourteen) Days.       traMADol (ULTRAM) 50 MG tablet Take 1 tablet by mouth Every 6 (Six) Hours As Needed for Moderate Pain. 30 tablet 1     venlafaxine XR (EFFEXOR-XR) 37.5 MG 24 hr capsule Take 1 capsule by mouth Daily for 165 days. (Patient taking differently: Take 2 capsules by mouth Daily.) 30 capsule 1     VIAGRA 100 MG tablet Take 1 tablet by mouth As Needed for erectile dysfunction (1 tablet prior to planned intercourse.). 24 tablet 3     VITAMIN E 400 UNIT capsule Take 1 capsule by mouth Daily.        Aspirin 81 mg p.o. daily  Lidocaine patch every 24 hours  Robaxin-750 milligram p.o. 4 times daily  Norco 5 mg p.o. every 4 as needed-4 doses    Objective     Results Review:  LABS:  Results from last 7 days   Lab Units 06/10/24  0458 06/09/24  1216   WBC 10*3/mm3 8.26 9.72   HEMOGLOBIN g/dL 16.9 16.9   HEMATOCRIT % 52.5* 51.5*   PLATELETS 10*3/mm3 246 244     Results from last 7 days   Lab Units 06/10/24  0458 06/09/24  1216   SODIUM mmol/L 136 134*   POTASSIUM mmol/L 4.0 5.5*   CHLORIDE mmol/L 99 102   CO2 mmol/L 24.6 23.0   BUN mg/dL 18 21   CREATININE mg/dL 1.15 1.23   CALCIUM mg/dL 9.6 9.8   BILIRUBIN mg/dL 0.6 0.7   ALK PHOS U/L 79 82   ALT (SGPT) U/L 13 15   AST (SGOT) U/L 14 12   GLUCOSE mg/dL 129* 207*     Urinalysis trace ketone, 1+ protein otherwise unremarkable    DIAGNOSTICS:  CT lumbar spine reveals new acute appearing, mild superior endplate compression deformity of T12 when compared to prior lumbar  spine from March 20, 2024.      MRI thoracic spine reveals acute to subacute T12 superior endplate fracture with minimal height loss and no retropulsion.  Otherwise no evidence of other fractures and alignment is within normal limits.  There is evidence of prior C5-7 anterior cervical surgery.    Results Review:   I reviewed the patient's new clinical results.  I personally viewed and interpreted the patient's CT and MRI lumbar spine.  Also reviewed by Dr. Ho    Vital Signs   Temp:  [97.3 °F (36.3 °C)-98.4 °F (36.9 °C)] 97.3 °F (36.3 °C)  Heart Rate:  [59-69] 59  Resp:  [18-20] 18  BP: (147-207)/(72-97) 179/97    Physical Exam:  Physical Exam  Vitals reviewed.   Constitutional:       Appearance: Normal appearance.   Pulmonary:      Effort: Pulmonary effort is normal.   Musculoskeletal:      Thoracic back: Bony tenderness present.      Lumbar back: Negative right straight leg raise test and negative left straight leg raise test.   Neurological:      General: No focal deficit present.      Mental Status: He is alert.      Deep Tendon Reflexes:      Reflex Scores:       Patellar reflexes are 1+ on the right side and 1+ on the left side.  Psychiatric:         Mood and Affect: Mood normal.         Speech: Speech normal.         Thought Content: Thought content normal.       Neurologic Exam     Mental Status   Speech: speech is normal   Level of consciousness: alert  Knowledge: good.   Normal comprehension.     Motor Exam   Muscle bulk: normal  Overall muscle tone: normal  5/5 bilateral lower extremity     Sensory Exam   Right leg light touch: normal  Left leg light touch: normal    Gait, Coordination, and Reflexes     Gait  Gait: (Not tested.  Awaiting PT eval and brace)    Reflexes   Right patellar: 1+  Left patellar: 1+  Right ankle clonus: absent  Left ankle clonus: absent      Assessment & Plan       Acute back pain    Thoracic 12 compression fracture, closed, initial encounter      Problem List Items Addressed  This Visit    None  Visit Diagnoses       Compression fracture of T12 vertebra, initial encounter    -  Primary    Closed head injury, initial encounter        Fall from bed, initial encounter                 COMORBID CONDITIONS:  Diabetes Type 2 and Hypertension    Patient presents for evaluation of acute back pain which worsened after a fall the morning of admission.  He felt a sharp pain in his back earlier in the month when he was twisting and pick something up.  He saw PCP and was given medications and continued physical therapy and was making progress.  2 nights ago he was having a bad dream and apparently was moving around of the bed and fell out.  His back pain severely worsened.  He is not having any radicular symptoms or weakness.  He is tender at the thoracolumbar junction.  We discussed treatment options including bracing versus kyphoplasty.  He has a history of coronary artery disease on aspirin.  He would require cardiac clearance for surgical intervention.  He would like to try conservative management first.  Will get him a TLSO brace and have PT see him.  If he is up and mobile and pain is controlled, okay for discharge and follow-up in 3 weeks with x-rays.  If he is still here in the morning we will check on him then.  Patient does report that he has very low testosterone level (in the single digits).  He states he is on testosterone replacement.  I cannot find any record of any recent Testosterone levels but likely this is the source of his fracture as he probably has osteoporosis which will need further outpatient workup and treatment through his PCP    Plan:   Okay for diet  TLSO brace  PT yonasal  Okay for discharge home if able to ambulate and pain controlled  Follow-up neurosurgery 3 weeks with x-rays  Recommend outpatient workup for osteoporosis with PCP    A TLSO brace has been ordered due to diagnosis of T12 VCF.  The purpose of the brace is to support weak muscles, stabilize and restrict  "movement of the trunk to aid in healing and pain relief of back pain.      I discussed the patient's findings and my recommendations with patient, family, primary care team, and Dr. Ho    During patient visit, I utilized appropriate personal protective equipment including gloves. Appropriate PPE was worn during the entire visit.  Hand hygiene was completed before and after.     Keri Salazar, APRN  06/10/24  13:56 EDT    \"Dictated utilizing Dragon dictation\".      "

## 2024-06-10 NOTE — PLAN OF CARE
Goal Outcome Evaluation:      No changes overnight, patient A&Ox4, hypertensive, up with stand by assist, MRI done, NPO for neurosurgery consult this morning.

## 2024-06-10 NOTE — OUTREACH NOTE
Prep Survey      Flowsheet Row Responses   [facility] patient discharged from? [facility]   Is LACE score < 7 ? No   Eligibility [facility]   Date of Admission 06/09/24   Date of Discharge 06/10/24   Discharge Disposition Home or Self Care   Discharge diagnosis Acute back pain   Does the patient have one of the following disease processes/diagnoses(primary or secondary)? Other   Does the patient have Home health ordered? No   Is there a DME ordered? No   Prep survey completed? Yes            Diann DASILVA - Registered Nurse

## 2024-06-10 NOTE — TELEPHONE ENCOUNTER
----- Message from Keri Salazar sent at 6/10/2024  2:02 PM EDT -----  Regarding: Hospital follow-up  Patient with T12 fracture needs follow-up in 3 weeks with lumbar x-rays AP/lateral upright in brace please put in the comments to include through the T11 vertebral body

## 2024-06-10 NOTE — THERAPY DISCHARGE NOTE
Patient Name: Earl Marc  : 1945    MRN: [id number]                              Today's Date: 6/10/2024       Admit Date: 2024    Visit Dx:     ICD-10-CM ICD-9-CM   1. Compression fracture of T12 vertebra, initial encounter  S22.080A 805.2   2. Closed head injury, initial encounter  S09.90XA 959.01   3. Fall from bed, initial encounter  W06.XXXA E884.4     Patient Active Problem List   Diagnosis    Bell's palsy    Persistent insomnia    Osteoarthritis of cervical spine    Diabetic peripheral neuropathy    Dyslipidemia    Steatosis of liver    Fibromyalgia    Gastroesophageal reflux disease without esophagitis    Hypertensive kidney disease with stage 3a chronic kidney disease    Hypogonadism in male    Renal insufficiency    Vitamin D deficiency    Benign non-nodular prostatic hyperplasia with lower urinary tract symptoms    S/P drug eluting coronary stent placement    Coronary artery disease involving native coronary artery of native heart    Malignant neoplasm of skin    Type 2 diabetes mellitus with hyperglycemia, with long-term current use of insulin    Diabetes mellitus    Chronic insomnia    Other specified glaucoma    Vertigo    Epigastric pain    Chest pain with high risk for cardiac etiology    Precordial pain    S/P arthroscopy of shoulder    Peripheral neuropathy    Stage 3b chronic kidney disease    Chest pain    Stroke-like symptoms    Acute back pain    Thoracic 12 compression fracture, closed, initial encounter     Past Medical History:   Diagnosis Date    Abnormal cardiovascular stress test     Acid reflux     Arthritis     Asthma     Cancer     skin     Chronic kidney disease     Colon polyp     Congenital heart disease     COPD (chronic obstructive pulmonary disease)     Old age COPD/2D hand Smoke emphysema    Coronary artery disease     Diabetes mellitus     Diabetic neuropathy associated with type 2 diabetes mellitus     Diabetic peripheral neuropathy 03/10/2016     Dyslipidemia     Erectile dysfunction     Fatty liver disease, nonalcoholic     Gastritis     Glaucoma     both eyes    Hyperlipidemia     Hypertension     Hypogonadism male     Infectious viral hepatitis Hep A July 1959    Drank water from broken water line and Grandparents house.    Migraines 09/2023    Myocardial infarction 2018    Trigger point of left shoulder region 03/02/2023    Type 2 diabetes mellitus      Past Surgical History:   Procedure Laterality Date    CARDIAC CATHETERIZATION N/A 03/25/2016    Procedure: Left Heart Cath;  Surgeon: Maria E Gilbert MD;  [facility];  Service:     CARDIAC CATHETERIZATION N/A 03/25/2016    Procedure: Coronary angiography;  Surgeon: Maria E Gilbert MD;  Location:  [facility] CATH INVASIVE LOCATION;  Service:     CARDIAC CATHETERIZATION N/A 03/28/2016    Procedure: Coronary angiography;  Surgeon: Maria E Gilbert MD;  Location:  [facility] CATH INVASIVE LOCATION;  Service:     CARDIAC CATHETERIZATION N/A 03/28/2016    Procedure: Stent MOISE coronary;  Surgeon: Maria E Gilbert MD;  [facility];  Service:     CARDIAC CATHETERIZATION N/A 10/18/2018    Procedure: Coronary angiography  no LV gram d/t CKD;  Surgeon: Maria E Gilbert MD;  [facility];  Service: Cardiology    CARDIAC CATHETERIZATION N/A 10/18/2018    Procedure: Left Heart Cath;  Surgeon: Maria E Gilbert MD;  [facility];  Service: Cardiology    CARDIAC CATHETERIZATION N/A 10/18/2018    Procedure: Stent MOISE coronary;  Surgeon: Maria E Gilbert MD;  [facility];  Service: Cardiology    CARDIAC CATHETERIZATION N/A 05/28/2021    Procedure: Coronary angiography;  Surgeon: Maria E Gilbert MD;  [facility];  Service: Cardiovascular;  Laterality: N/A;    CARDIAC CATHETERIZATION N/A 05/28/2021    Procedure: Left Heart  Cath;  Surgeon: Maria E Gilbert MD;  Location:  [facility] CATH INVASIVE LOCATION;  Service: Cardiovascular;  Laterality: N/A;    CARDIAC CATHETERIZATION N/A 05/28/2021    Procedure: Left ventriculography;  Surgeon: Maria E Gilbert MD;  Location:  [facility] CATH INVASIVE LOCATION;  Service: Cardiovascular;  Laterality: N/A;    CARDIAC CATHETERIZATION N/A 05/28/2021    Procedure: Stent MOISE coronary;  Surgeon: Maria E Gilbert MD;  Location:  [facility] CATH INVASIVE LOCATION;  Service: Cardiovascular;  Laterality: N/A;    CARDIAC CATHETERIZATION N/A 1/19/2024    Procedure: Left Heart Cath;  Surgeon: Maria E Gilbert MD;  Location:  [facility] CATH INVASIVE LOCATION;  Service: Cardiovascular;  Laterality: N/A;    CARDIAC CATHETERIZATION N/A 1/19/2024    Procedure: Coronary angiography;  Surgeon: Maria E Gilbert MD;  [facility];  Service: Cardiovascular;  Laterality: N/A;    CARDIAC CATHETERIZATION N/A 1/19/2024    Procedure: Left ventriculography;  Surgeon: Maria E Gilbert MD;  [facility];  Service: Cardiovascular;  Laterality: N/A;    CAROTID STENT      CORONARY ANGIOPLASTY      CORONARY STENT PLACEMENT  3/28/2016    EYE SURGERY  9/2017    NECK EXPLORATION N/A     OH RT/LT HEART CATHETERS N/A 10/18/2018    Procedure: Percutaneous Coronary Intervention;  Surgeon: Maria E Gilbert MD;  Location: [facility]ATH IN[facility]AS[facility];  Service: Cardiology      General Information       Row Name 06/10/24 1536          Physical Therapy Time and Intention    Document Type discharge evaluation/summary  -EF     Mode of Treatment individual therapy;physical therapy  -EF       Row Name 06/10/24 1536          General Information    Patient Profile Reviewed yes  -EF     Prior Level of Function independent:;all household mobility;community mobility  rwx  -EF     Existing Precautions/Restrictions fall;spinal;lifting;TLSO  TLSO when OOB or elevated > 45  deg. No pushing, pulling, lifting > 5 lbs.  -EF     Barriers to Rehab none identified  -EF       Row Name 06/10/24 1536          Living Environment    People in Home spouse  -EF       Row Name 06/10/24 1536          Home Main Entrance    Number of Stairs, Main Entrance none  -EF       Row Name 06/10/24 1536          Stairs Within Home, Primary    Stairs, Within Home, Primary bedroom upstairs, states he has been sleeping in recliner on first floor  -EF     Number of Stairs, Within Home, Primary twelve  -EF       Row Name 06/10/24 1536          Cognition    Orientation Status (Cognition) oriented x 4  -EF       Row Name 06/10/24 1536          Safety Issues, Functional Mobility    Impairments Affecting Function (Mobility) pain;strength;endurance/activity tolerance;balance  -EF               User Key  (r) = Recorded By, (t) = Taken By, (c) = Cosigned By      Initials Name Provider Type    EF Sandra Graves, PT Physical Therapist                   Mobility       Row Name 06/10/24 1537          Bed Mobility    Bed Mobility sit-supine  -EF     Sit-Supine [place] (Bed Mobility) independent  -EF     Comment, (Bed Mobility) sit to supine not tested; pt sitting EOB with TLSO donned upon PT arrival  -EF       Row Name 06/10/24 1537          Sit-Stand Transfer    Sit-Stand [place] (Transfers) supervision;verbal cues  -EF     Assistive Device (Sit-Stand Transfers) walker, front-wheeled  -EF     Comment, (Sit-Stand Transfer) cues for hand placement  -EF       Row Name 06/10/24 1537          Gait/Stairs (Locomotion)    [place] Level (Gait) standby assist;verbal cues  -EF     Assistive Device (Gait) walker, front-wheeled  -EF     Distance in Feet (Gait) 150  -EF     Deviations/Abnormal Patterns (Gait) susan decreased;stride length decreased  -EF     Bilateral Gait Deviations heel strike decreased;forward flexed posture  -EF     Comment, (Gait/Stairs) Cues for upright posture, increased stride length and  increased heel strike. No LOB or difficulty negotiating turns in hallway.  -EF               User Key  (r) = Recorded By, (t) = Taken By, (c) = Cosigned By      Initials Name Provider Type    EF Sandra Graves PT Physical Therapist                   Obj/Interventions       Row Name 06/10/24 1538          Range of Motion Comprehensive    General Range of Motion bilateral lower extremity ROM WFL  -EF       Row Name 06/10/24 1538          Strength Comprehensive (MMT)    General Manual Muscle Testing (MMT) Assessment other (see comments)  -EF     Comment, General Manual Muscle Testing (MMT) Assessment generalized weakness noted functionally; B LEs appear grossly >= 3+/5  -EF       Row Name 06/10/24 1538          Balance    Balance Assessment sitting static balance;standing static balance;standing dynamic balance  -EF     Static Sitting Balance independent  -EF     Position, Sitting Balance unsupported;sitting edge of bed  -EF     Static Standing Balance modified independence  -EF     Dynamic Standing Balance supervision  -EF     Position/Device Used, Standing Balance supported;walker, front-wheeled  -EF       Row Name 06/10/24 1538          Sensory Assessment (Somatosensory)    Sensory Assessment (Somatosensory) not tested  -EF               User Key  (r) = Recorded By, (t) = Taken By, (c) = Cosigned By      Initials Name Provider Type    Sandra Barnett PT Physical Therapist                   Goals/Plan    No documentation.                  Clinical Impression       Row Name 06/10/24 1539          Pain    Pretreatment Pain Rating 4/10  -EF     Posttreatment Pain Rating 6/10  -EF     Pain Location posterior  -EF     Pain Location - back  -EF     Pain Intervention(s) Repositioned;Rest;Medication (See MAR)  -EF       Row Name 06/10/24 1539          Plan of Care Review    Plan of Care Reviewed With patient;spouse  -EF     Outcome Evaluation Pt is a 78 yo male who presents after falling from bed with acute T12  compression fx. MARQUES recommends conservative management with TLSO when OOB. Prior to admission, pt was living at home with spouse and independent with mobility using rwx. Pt is current with OP PT here at [facility]. Upon exam, pt c/o moderate back pain and demos generalized weakness and decreased endurance. Pt was sitting EOB with TLSO donned upon PT arrival. Pt stood with supervision and rwx and was able to ambulate 150' with rwx and SBA. Reinforced education on brace use as well as spinal precautions and pt verbalized understanding. No further acute PT indicated; will sign off. Recommend continuing with PT at [facility] and pt stated his preference to continue with OP PT.  -EF       Row Name 06/10/24 1539          Therapy Assessment/Plan (PT)    Criteria for Skilled Interventions Met (PT) other (see comments)  no further acute PT indicated; recommend follow up with OP PT  -EF     Therapy Frequency (PT) evaluation only  -EF       Row Name 06/10/24 1539          Positioning and Restraints    Pre-Treatment Position in bed  -EF     Post Treatment Position bed  -EF     In Bed fowlers;call light within reach;encouraged to call for assist;notified nsg;with family/caregiver  -EF               User Key  (r) = Recorded By, (t) = Taken By, (c) = Cosigned By      Initials Name Provider Type    Sandra Barnett, PT Physical Therapist                   Outcome Measures       Row Name 06/10/24 1543 06/10/24 0800       How much help from another person do you currently need...    Turning from your back to your side while in flat bed without using bedrails? 4  -EF 4  -LL    Moving from lying on back to sitting on the side of a flat bed without bedrails? 3  -EF 4  -LL    Moving to and from a bed to a chair (including a wheelchair)? 3  -EF 3  -LL    Standing up from a chair using your arms (e.g., wheelchair, bedside chair)? 3  -EF 3  -LL    Climbing 3-5 steps with a railing? 3  -EF 3  -LL    To walk in hospital room? 3  -EF 3  -LL     AM-PAC 6 Clicks Score (PT) 19  -EF 20  -LL    Highest Level of Mobility Goal 6 --> Walk 10 steps or more  -EF 6 --> Walk 10 steps or more  -LL              User Key  (r) = Recorded By, (t) = Taken By, (c) = Cosigned By      Initials Name Provider Type    EF Sandra Graves, ELIN Physical Therapist    Victor M Herring RN Registered Nurse                  Physical Therapy Education       Title: PT OT SLP Therapies (Resolved)       Topic: Physical Therapy (Resolved)       Point: Mobility training (Resolved)       Learning Progress Summary             Patient Acceptance, E,TB,D, VU,DU by  at 6/10/2024 1543                         Point: Home exercise program (Resolved)       Learner Progress:  Not documented in this visit.              Point: Body mechanics (Resolved)       Learning Progress Summary             Patient Acceptance, E,TB,D, VU,DU by  at 6/10/2024 1543                         Point: Precautions (Resolved)       Learning Progress Summary             Patient Acceptance, E,TB,D, VU,DU by  at 6/10/2024 1543                                         User Key       Initials Effective Dates Name Provider Type Discipline     05/31/24 -  Sandra Graves PT Physical Therapist PT                  PT Recommendation and Plan     Plan of Care Reviewed With: patient, spouse  Outcome Evaluation: Pt is a 80 yo male who presents after falling from bed with acute T12 compression fx. MARQUES recommends conservative management with TLSO when OOB. Prior to admission, pt was living at home with spouse and independent with mobility using rwx. Pt is current with OP PT here at [facility]. Upon exam, pt c/o moderate back pain and demos generalized weakness and decreased endurance. Pt was sitting EOB with TLSO donned upon PT arrival. Pt stood with supervision and rwx and was able to ambulate 150' with rwx and SBA. Reinforced education on brace use as well as spinal precautions and pt verbalized understanding. No further acute PT  indicated; will sign off. Recommend continuing with PT at [facility] and pt stated his preference to continue with OP PT.     Time Calculation:         PT Charges       Row Name 06/10/24 1544             Time Calculation    Start Time 1510  -EF      Stop Time 1530  -EF      Time Calculation (min) 20 min  -EF      PT Received On 06/10/24  -EF         Time Calculation- PT    Total Timed Code Minutes- PT 10 minute(s)  -EF         Timed Charges    20491 - PT Therapeutic Activity Minutes 10  -EF         Total Minutes    Timed Charges Total Minutes 10  -EF       Total Minutes 10  -EF                User Key  (r) = Recorded By, (t) = Taken By, (c) = Cosigned By      Initials Name Provider Type    EF Sandra Graevs, PT Physical Therapist                  Therapy Charges for Today       Code Description Service Date Service Provider Modifiers Qty    48354259948 HC PT EVAL LOW COMPLEXITY 2 6/10/2024 Sandra Graves, PT GP 1    [id number]  PT THERAPEUTIC ACT EA 15 MIN 6/10/2024 Sandra Graves, PT GP 1            PT G-Codes  AM-PAC 6 Clicks Score (PT): 19    PT Discharge Summary  Anticipated Discharge Disposition (PT): home with outpatient therapy services    Sandra Graves PT  6/10/2024

## 2024-06-10 NOTE — PLAN OF CARE
Goal Outcome Evaluation:  Plan of Care Reviewed With: patient, spouse        Progress: improving  Outcome Evaluation: pt cleared to DC home with TLSO brace and outpt physical therapy. meds sent to provided pharmacy.         Problem: Adult Inpatient Plan of Care  Goal: Plan of Care Review  Outcome: Met  Flowsheets (Taken 6/10/2024 1726)  Progress: improving  Plan of Care Reviewed With:   patient   spouse  Outcome Evaluation: pt cleared to DC home with TLSO brace and outpt physical therapy. meds sent to provided pharmacy.  Goal: Patient-Specific Goal (Individualized)  Outcome: Met  Goal: Absence of Hospital-Acquired Illness or Injury  Outcome: Met  Intervention: Identify and Manage Fall Risk  Recent Flowsheet Documentation  Taken 6/10/2024 1409 by Victor M Contreras RN  Safety Promotion/Fall Prevention:   safety round/check completed   room organization consistent   lighting adjusted   nonskid shoes/slippers when out of bed   activity supervised   clutter free environment maintained  Taken 6/10/2024 1212 by Victor M Contreras, RN  Safety Promotion/Fall Prevention:   safety round/check completed   room organization consistent   lighting adjusted   nonskid shoes/slippers when out of bed   activity supervised   clutter free environment maintained  Taken 6/10/2024 1006 by Victor M Contreras, RN  Safety Promotion/Fall Prevention:   nonskid shoes/slippers when out of bed   room organization consistent   safety round/check completed   clutter free environment maintained   fall prevention program maintained   lighting adjusted   activity supervised  Taken 6/10/2024 0800 by Victor M Contreras, RN  Safety Promotion/Fall Prevention:   nonskid shoes/slippers when out of bed   room organization consistent   safety round/check completed   clutter free environment maintained   fall prevention program maintained   lighting adjusted   activity supervised  Intervention: Prevent Skin Injury  Recent Flowsheet Documentation  Taken 6/10/2024 1409 by Diane  JAZMYN Dow  Body Position: position changed independently  Taken 6/10/2024 1212 by Victor M Contreras RN  Body Position: position changed independently  Taken 6/10/2024 1006 by Victor M Contreras RN  Body Position:   supine, legs elevated   weight shifting  Taken 6/10/2024 0800 by Victor M Contreras RN  Body Position:   supine, legs elevated   weight shifting  Skin Protection: adhesive use limited  Intervention: Prevent and Manage VTE (Venous Thromboembolism) Risk  Recent Flowsheet Documentation  Taken 6/10/2024 1409 by Victor M Contreras RN  Activity Management:   sitting, edge of bed   activity encouraged  Taken 6/10/2024 1212 by Victor M Contreras RN  Activity Management: activity encouraged  Taken 6/10/2024 1006 by Victor M Contreras RN  Activity Management: up in chair  Taken 6/10/2024 0800 by Victor M Contreras RN  Activity Management:   ambulated to bathroom   ambulated outside room  Range of Motion: active ROM (range of motion) encouraged  Intervention: Prevent Infection  Recent Flowsheet Documentation  Taken 6/10/2024 1409 by Victor M Contreras RN  Infection Prevention:   rest/sleep promoted   single patient room provided  Taken 6/10/2024 1212 by Victor M Contreras RN  Infection Prevention:   rest/sleep promoted   single patient room provided  Taken 6/10/2024 1006 by Victor M Contreras RN  Infection Prevention:   rest/sleep promoted   single patient room provided  Taken 6/10/2024 0800 by Victor M Contreras RN  Infection Prevention:   rest/sleep promoted   single patient room provided  Goal: Optimal Comfort and Wellbeing  Outcome: Met  Intervention: Provide Person-Centered Care  Recent Flowsheet Documentation  Taken 6/10/2024 0800 by Victor M Contreras RN  Trust Relationship/Rapport:   care explained   empathic listening provided   questions answered   thoughts/feelings acknowledged  Goal: Readiness for Transition of Care  Outcome: Met     Problem: Skin Injury Risk Increased  Goal: Skin Health and Integrity  Outcome: Met  Intervention: Optimize Skin Protection  Recent  Flowsheet Documentation  Taken 6/10/2024 1006 by Victor M Contreras RN  Head of Bed ([facility]) Positioning: HOB at 20-30 degrees  Taken 6/10/2024 0800 by Victor M Contreras RN  Head of Bed (HOB) Positioning: HOB at 20-30 degrees  Skin Protection: adhesive use limited     Problem: Pain Acute  Goal: Acceptable Pain Control and Functional Ability  Outcome: Met  Intervention: Prevent or Manage Pain  Recent Flowsheet Documentation  Taken 6/10/2024 1409 by Victor M Contreras RN  Medication Review/Management: medications reviewed  Taken 6/10/2024 1212 by Victor M Contreras RN  Medication Review/Management: medications reviewed  Taken 6/10/2024 1006 by Victor M Contreras RN  Medication Review/Management: medications reviewed  Taken 6/10/2024 0800 by Victor M Contreras RN  Medication Review/Management: medications reviewed  Intervention: Optimize Psychosocial Wellbeing  Recent Flowsheet Documentation  Taken 6/10/2024 0800 by Victor M Contreras RN  Supportive Measures:   active listening utilized   self-care encouraged  Diversional Activities:   smartphone   television     Problem: Fall Injury Risk  Goal: Absence of Fall and Fall-Related Injury  Outcome: Met  Intervention: Identify and Manage Contributors  Recent Flowsheet Documentation  Taken 6/10/2024 1409 by Victor M Contreras RN  Medication Review/Management: medications reviewed  Taken 6/10/2024 1212 by Victor M Contreras RN  Medication Review/Management: medications reviewed  Taken 6/10/2024 1006 by Victor M Contreras RN  Medication Review/Management: medications reviewed  Taken 6/10/2024 0800 by Victor M Contreras RN  Medication Review/Management: medications reviewed  Intervention: Promote Injury-Free Environment  Recent Flowsheet Documentation  Taken 6/10/2024 1409 by Victor M Contreras, RN  Safety Promotion/Fall Prevention:   safety round/check completed   room organization consistent   lighting adjusted   nonskid shoes/slippers when out of bed   activity supervised   clutter free environment maintained  Taken 6/10/2024 1212 by  Victor M Contreras, RN  Safety Promotion/Fall Prevention:   safety round/check completed   room organization consistent   lighting adjusted   nonskid shoes/slippers when out of bed   activity supervised   clutter free environment maintained  Taken 6/10/2024 1006 by Victor M Contreras, RN  Safety Promotion/Fall Prevention:   nonskid shoes/slippers when out of bed   room organization consistent   safety round/check completed   clutter free environment maintained   fall prevention program maintained   lighting adjusted   activity supervised  Taken 6/10/2024 0800 by Victor M Contreras, RN  Safety Promotion/Fall Prevention:   nonskid shoes/slippers when out of bed   room organization consistent   safety round/check completed   clutter free environment maintained   fall prevention program maintained   lighting adjusted   activity supervised

## 2024-06-10 NOTE — DISCHARGE SUMMARY
ED OBSERVATION PROGRESS/DISCHARGE SUMMARY    Date of Admission: 6/9/2024   LOS: 0 days   PCP: Avery Velazquez MD    Final Diagnosis T12 compression fracture      Subjective     Hospital Outcome: Earl Marc is a 79 y.o. male, with a past medical history including, but not limited to, diabetes, chronic back pain, fibromyalgia, GERD, hypertension, hyperlipidemia, chronic kidney disease, and COPD, presented to the emergency department with a complaint of back pain after falling out of bed this a.m.  He states that he was having a dream and found himself on the floor.  He states that he was having back pain that was limiting his mobility at that time.  However, he was able to get back into bed and slept for a few more hours.  When he woke up pain was not any better.  States that he feels okay when he is laying down flat however when he goes to move or walk the pain is severe.  He is currently in physical therapy for his bilateral hip pain and back pain but he states that this pain is much worse than his daily pain.  He denies any urinary or fecal incontinence or retention.  MRI thoracic spine is ordered at this time.  Neurosurgery's been consulted to see the patient in the AM.    6/10/2024: MRI thoracic spine without showed acute compression fracture of T12 with maximal height loss approximately 30% and no significant retropulsion.  Neurosurgery saw and consulted the patient and started a TLSO brace and wanted PT to work with the patient after the brace was in place if able to ambulate with the brace then okay to discharge home.  They recommend follow-up with neurosurgery in 3 weeks with repeat imaging.  Neurosurgery does also recommend that patient follow-up with PCP for further outpatient workup of osteoporosis.  Patient worked with physical therapy and was able to ambulate with the brace, he states that he is already established with physical therapy and does not need further referral and he is already  called to schedule his next PT appointment.    ROS:  General: no fevers, chills  Respiratory: no cough, dyspnea  Cardiovascular: no chest pain, palpitations  Abdomen: No abdominal pain, nausea, vomiting, or diarrhea  Neurologic: No focal weakness    Objective   Physical Exam:  I have reviewed the vital signs.  Temp:  [97.2 °F (36.2 °C)-98.4 °F (36.9 °C)] 97.3 °F (36.3 °C)  Heart Rate:  [61-69] 64  Resp:  [16-20] 18  BP: (147-207)/(72-95) 175/89  General Appearance:    Alert, cooperative, no distress  Head:    Normocephalic, atraumatic, normal hearing  Eyes:    Sclerae anicteric, EOMI  Neck:   Supple, nontender  Lungs: Clear to auscultation bilaterally, respirations unlabored  Heart: Regular rate and rhythm, S1 and S2 normal, no murmur  Abdomen:  Soft, non-tender, bowel sounds active, nondistended  Extremities: No clubbing, cyanosis, or edema to lower extremities; wearing brace  Pulses:  2+ and symmetric in distal lower extremities  Skin: No rashes   Neurologic: Oriented x3, Normal strength to extremities    Results Review:    I have reviewed the labs, radiology results and diagnostic studies.    Results from last 7 days   Lab Units 06/10/24  0458   WBC 10*3/mm3 8.26   HEMOGLOBIN g/dL 16.9   HEMATOCRIT % 52.5*   PLATELETS 10*3/mm3 246     Results from last 7 days   Lab Units 06/10/24  0458 06/09/24  1216   SODIUM mmol/L 136 134*   POTASSIUM mmol/L 4.0 5.5*   CHLORIDE mmol/L 99 102   CO2 mmol/L 24.6 23.0   BUN mg/dL 18 21   CREATININE mg/dL 1.15 1.23   CALCIUM mg/dL 9.6 9.8   BILIRUBIN mg/dL 0.6 0.7   ALK PHOS U/L 79 82   ALT (SGPT) U/L 13 15   AST (SGOT) U/L 14 12   GLUCOSE mg/dL 129* 207*     Imaging Results (Last 24 Hours)       Procedure Component Value Units Date/Time    MRI Thoracic Spine Without Contrast [[id number]] Collected: 06/10/24 0509     Updated: 06/10/24 0527    Narrative:      THORACIC SPINE MRI WITHOUT GADOLINIUM     HISTORY: Intractable back pain; S22.080A-Wedge compression fracture of  T11-T12  vertebra, initial encounter for closed fracture;  S09.90XA-Unspecified injury of head, initial encounter; W06.XXXA-Fall  from bed, initial encounter     COMPARISON: March 20, 2024.     FINDINGS:  Multiplanar images of the thoracic spine obtained without  gadolinium.  Axial images obtained from T1-S1.     Study confirms the presence of an acute compression fracture involving  T12. Height loss is relatively minimal, measuring up to 30%. There is no  significant retropulsion. No additional fractures are seen.  Intervertebral disc spaces appear well-maintained. Marrow signal is  otherwise normal. No cord signal abnormalities are seen. I do not see  any significant canal stenosis or neuroforaminal narrowing at any level.  Mild stranding is noted within the right paraspinous fat at this level.       Impression:      1. Acute compression fracture of T12, with maximum height loss  approximately 30%, and no significant retropulsion.           This report was finalized on 6/10/2024 5:24 AM by Dr. Elenoora Schaffer M.D on Workstation: [id number]       CT Head Without Contrast [[id number]] Collected: 06/09/24 1336     Updated: 06/09/24 1419    Narrative:      EMERGENCY CT SCAN OF THE HEAD AND LUMBAR SPINE WITHOUT CONTRAST ON  06/09/2024     CLINICAL HISTORY: This is a 79-year-old male patient who woke up this  morning on floor and complains of headache and low back pain.     HEAD CT TECHNIQUE: Spiral CT images were obtained from the base of the  skull to the vertex without intravenous contrast. The images were  reformatted and submitted in 3 mm thick axial, sagittal and coronal CT  sections with brain algorithm, and 2 mm thick axial CT sections with  high-resolution bone algorithm.     This is correlated to a contrast-enhanced CT angiogram of the head and  neck, as well as an MRI of the brain on 02/20/2024.     FINDINGS: There is some patchy low-density extending from the  periventricular into the subcortical white matter of  the cerebral  hemispheres, consistent with moderate small vessel disease. The  remainder of the brain parenchyma is normal in attenuation. There is  diffuse cerebral atrophy. The ventricles are normal in size. I see no  focal mass effect. There is no midline shift. No extra-axial fluid  collections are identified. There is no evidence of acute intracranial  hemorrhage. No acute skull fracture is identified. The calvarium and the  skull base are normal in appearance. The paranasal sinuses and the  mastoid air cells and the middle ear cavities are clear with the  exception of a small 1 cm mucous retention cyst in the inferior medial  right maxillary sinus. Bilateral intraocular lens implants are in place  in the globes from previous bilateral cataract surgery. Otherwise, the  orbits are normal in appearance.       Impression:      1. No acute intracranial abnormalities identified.     2. There is moderate small vessel disease in the cerebral white matter  and there is diffuse cerebral atrophy. There are bilateral intraocular  lens implants in the globes from previous bilateral cataract surgery.  The remainder of the head CT is normal. Specifically, no acute skull  fracture or intracranial hemorrhage is identified.     LUMBAR SPINE CT TECHNIQUE: Spiral CT images were obtained from the mid  body of the T11 thoracic level down to the S2-S3 sacral level. The  images were reformatted and are submitted in 3 mm thick axial CT  sections with soft tissue algorithm and 1 mm thick axial CT sections  with high-resolution bone algorithm and 2 mm thick sagittal and coronal  reconstructions were performed and submitted in both bone and soft  tissue algorithm.     There are no prior CTs of the lumbar spine for comparison. This study is  correlated to an MRI of the lumbar spine on 03/20/2024 and a CT scan of  the abdomen and pelvis on 11/10/2022.     FINDINGS:  At T11-T12, there is some vacuum disc phenomenon. The posterior  disc  margin and facets are normal with no canal or foraminal narrowing.     At T12-L1, the disc space and facets are normal in appearance with no  canal or foraminal narrowing.     At L1-L2, there is some mild anterior marginal endplate spurring. The  posterior disc margin and facets are normal with no canal or foraminal  narrowing.     At L2-L3, there is some mild anterior marginal endplate spurring. The  posterior disc margin and facets are normal and there is no canal or  foraminal narrowing.     At L3-L4, there is a very minimal diffuse posterior disc bulge and facet  overgrowth. There is minimal if any canal narrowing and no foraminal  narrowing.     At L4-L5, there is mild bilateral facet overgrowth and minimal diffuse  posterior disc bulge, and there is mild canal, minimal foraminal  narrowing, unchanged since MRI of the lumbar spine on 03/20/2024.     At L5-S1, there is mild left and there is moderate right facet  overgrowth. The posterior disc margin is normal. There is no canal or  lateral recess or foraminal narrowing.     This patient has a very minimal acute compression fracture involving the  central to anterior superior body and endplate of the T12 thoracic level  with some focal concavity involving the superior endplate of T12 with  about 10% loss of central and no loss of anterior or posterior vertebral  body height. This compression fracture is new when compared to the prior  MRI lumbar spine on 03/20/2024. There is some minimal paravertebral  edema. No additional fractures seen in the lumbar spine.     IMPRESSION:  1. Since the prior MRI of the lumbar spine on 03/20/2024, the patient  has developed a very mild acute compression fracture involving the  central to anterior superior body and endplate of the right side of the  T12 thoracic level. There is some concavity of central and right side of  the superior endplate of T12 with about 10% loss of central, but there  is no loss of anterior or  posterior vertebral body height at T12. There  is minimal paravertebral edema.     2. Otherwise, there has been no change when compared to the lumbar spine  MRI 03/20/2024. There are no compression fractures in the lumbar spine  and there is very mild lumbar spondylosis with minimal if any canal  narrowing at L3-4L and mild canal and minimal foraminal narrowing at  L4-L5, and the remainder of the lumbar spine CT is within normal limits.           Radiation dose reduction techniques were utilized, including automated  exposure control and exposure modulation based on body size.        This report was finalized on 6/9/2024 2:16 PM by Dr. Brice Ziegler M.D on  Workstation: [id number]       CT Lumbar Spine Without Contrast [[id number]] Collected: 06/09/24 1336     Updated: 06/09/24 1419    Narrative:      EMERGENCY CT SCAN OF THE HEAD AND LUMBAR SPINE WITHOUT CONTRAST ON  06/09/2024     CLINICAL HISTORY: This is a 79-year-old male patient who woke up this  morning on floor and complains of headache and low back pain.     HEAD CT TECHNIQUE: Spiral CT images were obtained from the base of the  skull to the vertex without intravenous contrast. The images were  reformatted and submitted in 3 mm thick axial, sagittal and coronal CT  sections with brain algorithm, and 2 mm thick axial CT sections with  high-resolution bone algorithm.     This is correlated to a contrast-enhanced CT angiogram of the head and  neck, as well as an MRI of the brain on 02/20/2024.     FINDINGS: There is some patchy low-density extending from the  periventricular into the subcortical white matter of the cerebral  hemispheres, consistent with moderate small vessel disease. The  remainder of the brain parenchyma is normal in attenuation. There is  diffuse cerebral atrophy. The ventricles are normal in size. I see no  focal mass effect. There is no midline shift. No extra-axial fluid  collections are identified. There is no evidence of acute  intracranial  hemorrhage. No acute skull fracture is identified. The calvarium and the  skull base are normal in appearance. The paranasal sinuses and the  mastoid air cells and the middle ear cavities are clear with the  exception of a small 1 cm mucous retention cyst in the inferior medial  right maxillary sinus. Bilateral intraocular lens implants are in place  in the globes from previous bilateral cataract surgery. Otherwise, the  orbits are normal in appearance.       Impression:      1. No acute intracranial abnormalities identified.     2. There is moderate small vessel disease in the cerebral white matter  and there is diffuse cerebral atrophy. There are bilateral intraocular  lens implants in the globes from previous bilateral cataract surgery.  The remainder of the head CT is normal. Specifically, no acute skull  fracture or intracranial hemorrhage is identified.     LUMBAR SPINE CT TECHNIQUE: Spiral CT images were obtained from the mid  body of the T11 thoracic level down to the S2-S3 sacral level. The  images were reformatted and are submitted in 3 mm thick axial CT  sections with soft tissue algorithm and 1 mm thick axial CT sections  with high-resolution bone algorithm and 2 mm thick sagittal and coronal  reconstructions were performed and submitted in both bone and soft  tissue algorithm.     There are no prior CTs of the lumbar spine for comparison. This study is  correlated to an MRI of the lumbar spine on 03/20/2024 and a CT scan of  the abdomen and pelvis on 11/10/2022.     FINDINGS:  At T11-T12, there is some vacuum disc phenomenon. The posterior disc  margin and facets are normal with no canal or foraminal narrowing.     At T12-L1, the disc space and facets are normal in appearance with no  canal or foraminal narrowing.     At L1-L2, there is some mild anterior marginal endplate spurring. The  posterior disc margin and facets are normal with no canal or foraminal  narrowing.     At L2-L3, there  is some mild anterior marginal endplate spurring. The  posterior disc margin and facets are normal and there is no canal or  foraminal narrowing.     At L3-L4, there is a very minimal diffuse posterior disc bulge and facet  overgrowth. There is minimal if any canal narrowing and no foraminal  narrowing.     At L4-L5, there is mild bilateral facet overgrowth and minimal diffuse  posterior disc bulge, and there is mild canal, minimal foraminal  narrowing, unchanged since MRI of the lumbar spine on 03/20/2024.     At L5-S1, there is mild left and there is moderate right facet  overgrowth. The posterior disc margin is normal. There is no canal or  lateral recess or foraminal narrowing.     This patient has a very minimal acute compression fracture involving the  central to anterior superior body and endplate of the T12 thoracic level  with some focal concavity involving the superior endplate of T12 with  about 10% loss of central and no loss of anterior or posterior vertebral  body height. This compression fracture is new when compared to the prior  MRI lumbar spine on 03/20/2024. There is some minimal paravertebral  edema. No additional fractures seen in the lumbar spine.     IMPRESSION:  1. Since the prior MRI of the lumbar spine on 03/20/2024, the patient  has developed a very mild acute compression fracture involving the  central to anterior superior body and endplate of the right side of the  T12 thoracic level. There is some concavity of central and right side of  the superior endplate of T12 with about 10% loss of central, but there  is no loss of anterior or posterior vertebral body height at T12. There  is minimal paravertebral edema.     2. Otherwise, there has been no change when compared to the lumbar spine  MRI 03/20/2024. There are no compression fractures in the lumbar spine  and there is very mild lumbar spondylosis with minimal if any canal  narrowing at L3-4L and mild canal and minimal foraminal  narrowing at  L4-L5, and the remainder of the lumbar spine CT is within normal limits.           Radiation dose reduction techniques were utilized, including automated  exposure control and exposure modulation based on body size.        This report was finalized on 6/9/2024 2:16 PM by Dr. Brice Ziegler M.D on  Workstation: [id number]               I have reviewed the medications.  ---------------------------------------------------------------------------------------------  Assessment & Plan   Assessment/Problem List    Acute back pain      Plan:  Back pain  - MRI thoracic spine without showed acute compression fracture of T12 with maximal height loss approximately 30% and no significant retropulsion.    -Neurosurgery saw and consulted the patient and started a TLSO brace and wanted PT to work with the patient after the brace was in place if able to ambulate with the brace then okay to discharge home.  They recommend follow-up with neurosurgery in 3 weeks with repeat imaging.    -Neurosurgery does also recommend that patient follow-up with PCP for further outpatient workup of osteoporosis.    -Patient worked with physical therapy and was able to ambulate with the brace, he states that he is already established with physical therapy and does not need further referral and he is already called to schedule his next PT appointment.  -Will discharge home with Lidoderm patch, Robaxin, and as needed pain medicine     Chronic kidney disease  -Avoid nephrotoxic agents     Diabetes  -Accu-Cheks AC  -Adult subcutaneous insulin management-low dose  -Hemoglobin A1c -8.1    Disposition: Discharged home    Follow-up after Discharge: PCP in 1 to 2 weeks, neurosurgery in 3 weeks    This note will serve as a discharge summary    Geneva Holley PA-C 06/10/24 07:29 EDT    I have worn appropriate PPE during this patient encounter, sanitized my hands both with entering and exiting patient's room.      34 minutes has been spent by  [facility] providers in the care of this patient while under observation status

## 2024-06-10 NOTE — PROGRESS NOTES
TANNER BILLINGSLEY ATTESTATION NOTE    The JOCELYN and I have discussed this patient's history, physical exam, and treatment plan.  I have reviewed the documentation and personally had a face to face interaction with the patient. I affirm the documentation and agree with the treatment and plan.  The attached note describes my personal findings.      I provided a substantive portion of the care of this patient. I personally performed the physical exam, in its entirety.    Earl Marc is a 79 y.o. male who presented to the emergency department yesterday complaining of a fall.  He was found to have a T12 compression fracture.  MRI was performed which showed a 30% loss of height.  Neurosurgery and physical therapy have been consulted.  He has been placed on Robaxin, Lidoderm and as needed pain medicine.  He reports persistent pain.      On exam:  GENERAL: Awake, alert, no acute distress  SKIN: Warm, dry  HENT: Normocephalic, atraumatic  EYES: no scleral icterus  CV: regular rhythm, regular rate  RESPIRATORY: normal effort, lungs clear  ABDOMEN: soft, nontender, nondistended  MUSCULOSKELETAL: no deformity  NEURO: alert, moves all extremities, follows commands        Labs  Recent Results (from the past 24 hour(s))   Comprehensive Metabolic Panel    Collection Time: 06/09/24 12:16 PM    Specimen: Arm, Right; Blood   Result Value Ref Range    Glucose 207 (H) 65 - 99 mg/dL    BUN 21 8 - 23 mg/dL    Creatinine 1.23 0.76 - 1.27 mg/dL    Sodium 134 (L) 136 - 145 mmol/L    Potassium 5.5 (H) 3.5 - 5.2 mmol/L    Chloride 102 98 - 107 mmol/L    CO2 23.0 22.0 - 29.0 mmol/L    Calcium 9.8 8.6 - 10.5 mg/dL    Total Protein 7.0 6.0 - 8.5 g/dL    Albumin 3.8 3.5 - 5.2 g/dL    ALT (SGPT) 15 1 - 41 U/L    AST (SGOT) 12 1 - 40 U/L    Alkaline Phosphatase 82 39 - 117 U/L    Total Bilirubin 0.7 0.0 - 1.2 mg/dL    Globulin 3.2 gm/dL    A/G Ratio 1.2 g/dL    BUN/Creatinine Ratio 17.1 7.0 - 25.0    Anion Gap 9.0 5.0 - 15.0 mmol/L    eGFR 59.7 (L) >60.0  mL/min/1.73   CBC Auto Differential    Collection Time: 06/09/24 12:16 PM    Specimen: Arm, Right; Blood   Result Value Ref Range    WBC 9.72 3.40 - 10.80 10*3/mm3    RBC 6.03 (H) 4.14 - 5.80 10*6/mm3    Hemoglobin 16.9 13.0 - 17.7 g/dL    Hematocrit 51.5 (H) 37.5 - 51.0 %    MCV 85.4 79.0 - 97.0 fL    MCH 28.0 26.6 - 33.0 pg    MCHC 32.8 31.5 - 35.7 g/dL    RDW 13.8 12.3 - 15.4 %    RDW-SD 42.7 37.0 - 54.0 fl    MPV 9.6 6.0 - 12.0 fL    Platelets 244 140 - 450 10*3/mm3    Neutrophil % 71.6 42.7 - 76.0 %    Lymphocyte % 18.3 (L) 19.6 - 45.3 %    Monocyte % 7.7 5.0 - 12.0 %    Eosinophil % 1.5 0.3 - 6.2 %    Basophil % 0.5 0.0 - 1.5 %    Immature Grans % 0.4 0.0 - 0.5 %    Neutrophils, Absolute 6.95 1.70 - 7.00 10*3/mm3    Lymphocytes, Absolute 1.78 0.70 - 3.10 10*3/mm3    Monocytes, Absolute 0.75 0.10 - 0.90 10*3/mm3    Eosinophils, Absolute 0.15 0.00 - 0.40 10*3/mm3    Basophils, Absolute 0.05 0.00 - 0.20 10*3/mm3    Immature Grans, Absolute 0.04 0.00 - 0.05 10*3/mm3    nRBC 0.0 0.0 - 0.2 /100 WBC   Hemoglobin A1c    Collection Time: 06/09/24 12:16 PM    Specimen: Arm, Right; Blood   Result Value Ref Range    Hemoglobin A1C 8.10 (H) 4.80 - 5.60 %   Urinalysis With Microscopic If Indicated (No Culture) - Urine, Clean Catch    Collection Time: 06/09/24  1:30 PM    Specimen: Urine, Clean Catch   Result Value Ref Range    Color, UA Yellow Yellow, Straw    Appearance, UA Clear Clear    pH, UA 6.5 5.0 - 8.0    Specific Gravity, UA 1.016 1.005 - 1.030    Glucose, UA Negative Negative    Ketones, UA Trace (A) Negative    Bilirubin, UA Negative Negative    Blood, UA Negative Negative    Protein, UA 30 mg/dL (1+) (A) Negative    Leuk Esterase, UA Negative Negative    Nitrite, UA Negative Negative    Urobilinogen, UA 1.0 E.U./dL 0.2 - 1.0 E.U./dL   Urinalysis, Microscopic Only - Urine, Clean Catch    Collection Time: 06/09/24  1:30 PM    Specimen: Urine, Clean Catch   Result Value Ref Range    RBC, UA 0-2 None Seen, 0-2 /HPF     WBC, UA 0-2 None Seen, 0-2 /HPF    Bacteria, UA None Seen None Seen /HPF    Squamous Epithelial Cells, UA 0-2 None Seen, 0-2 /HPF    Hyaline Casts, UA 0-2 None Seen /LPF    Methodology Automated Microscopy    POC Glucose Once    Collection Time: 06/09/24  8:30 PM    Specimen: Blood   Result Value Ref Range    Glucose 204 (H) 70 - 130 mg/dL   POC Glucose Once    Collection Time: 06/10/24 12:35 AM    Specimen: Blood   Result Value Ref Range    Glucose 71 70 - 130 mg/dL   POC Glucose Once    Collection Time: 06/10/24  1:36 AM    Specimen: Blood   Result Value Ref Range    Glucose 121 70 - 130 mg/dL   CBC (No Diff)    Collection Time: 06/10/24  4:58 AM    Specimen: Blood   Result Value Ref Range    WBC 8.26 3.40 - 10.80 10*3/mm3    RBC 6.19 (H) 4.14 - 5.80 10*6/mm3    Hemoglobin 16.9 13.0 - 17.7 g/dL    Hematocrit 52.5 (H) 37.5 - 51.0 %    MCV 84.8 79.0 - 97.0 fL    MCH 27.3 26.6 - 33.0 pg    MCHC 32.2 31.5 - 35.7 g/dL    RDW 14.5 12.3 - 15.4 %    RDW-SD 44.0 37.0 - 54.0 fl    MPV 9.7 6.0 - 12.0 fL    Platelets 246 140 - 450 10*3/mm3   Comprehensive Metabolic Panel    Collection Time: 06/10/24  4:58 AM    Specimen: Blood   Result Value Ref Range    Glucose 129 (H) 65 - 99 mg/dL    BUN 18 8 - 23 mg/dL    Creatinine 1.15 0.76 - 1.27 mg/dL    Sodium 136 136 - 145 mmol/L    Potassium 4.0 3.5 - 5.2 mmol/L    Chloride 99 98 - 107 mmol/L    CO2 24.6 22.0 - 29.0 mmol/L    Calcium 9.6 8.6 - 10.5 mg/dL    Total Protein 7.2 6.0 - 8.5 g/dL    Albumin 4.1 3.5 - 5.2 g/dL    ALT (SGPT) 13 1 - 41 U/L    AST (SGOT) 14 1 - 40 U/L    Alkaline Phosphatase 79 39 - 117 U/L    Total Bilirubin 0.6 0.0 - 1.2 mg/dL    Globulin 3.1 gm/dL    A/G Ratio 1.3 g/dL    BUN/Creatinine Ratio 15.7 7.0 - 25.0    Anion Gap 12.4 5.0 - 15.0 mmol/L    eGFR 64.7 >60.0 mL/min/1.73   POC Glucose Once    Collection Time: 06/10/24  8:45 AM    Specimen: Blood   Result Value Ref Range    Glucose 117 70 - 130 mg/dL       Radiology  MRI Thoracic Spine Without  Contrast    Result Date: 6/10/2024  THORACIC SPINE MRI WITHOUT GADOLINIUM  HISTORY: Intractable back pain; S22.080A-Wedge compression fracture of T11-T12 vertebra, initial encounter for closed fracture; S09.90XA-Unspecified injury of head, initial encounter; W06.XXXA-Fall from bed, initial encounter  COMPARISON: March 20, 2024.  FINDINGS:  Multiplanar images of the thoracic spine obtained without gadolinium.  Axial images obtained from T1-S1.  Study confirms the presence of an acute compression fracture involving T12. Height loss is relatively minimal, measuring up to 30%. There is no significant retropulsion. No additional fractures are seen. Intervertebral disc spaces appear well-maintained. Marrow signal is otherwise normal. No cord signal abnormalities are seen. I do not see any significant canal stenosis or neuroforaminal narrowing at any level. Mild stranding is noted within the right paraspinous fat at this level.      1. Acute compression fracture of T12, with maximum height loss approximately 30%, and no significant retropulsion.    This report was finalized on 6/10/2024 5:24 AM by Dr. Eleonora Schaffer M.D on Workstation: [id number]      CT Head Without Contrast, CT Lumbar Spine Without Contrast    Result Date: 6/9/2024  EMERGENCY CT SCAN OF THE HEAD AND LUMBAR SPINE WITHOUT CONTRAST ON 06/09/2024  CLINICAL HISTORY: This is a 79-year-old male patient who woke up this morning on floor and complains of headache and low back pain.  HEAD CT TECHNIQUE: Spiral CT images were obtained from the base of the skull to the vertex without intravenous contrast. The images were reformatted and submitted in 3 mm thick axial, sagittal and coronal CT sections with brain algorithm, and 2 mm thick axial CT sections with high-resolution bone algorithm.  This is correlated to a contrast-enhanced CT angiogram of the head and neck, as well as an MRI of the brain on 02/20/2024.  FINDINGS: There is some patchy low-density  extending from the periventricular into the subcortical white matter of the cerebral hemispheres, consistent with moderate small vessel disease. The remainder of the brain parenchyma is normal in attenuation. There is diffuse cerebral atrophy. The ventricles are normal in size. I see no focal mass effect. There is no midline shift. No extra-axial fluid collections are identified. There is no evidence of acute intracranial hemorrhage. No acute skull fracture is identified. The calvarium and the skull base are normal in appearance. The paranasal sinuses and the mastoid air cells and the middle ear cavities are clear with the exception of a small 1 cm mucous retention cyst in the inferior medial right maxillary sinus. Bilateral intraocular lens implants are in place in the globes from previous bilateral cataract surgery. Otherwise, the orbits are normal in appearance.      1. No acute intracranial abnormalities identified.  2. There is moderate small vessel disease in the cerebral white matter and there is diffuse cerebral atrophy. There are bilateral intraocular lens implants in the globes from previous bilateral cataract surgery. The remainder of the head CT is normal. Specifically, no acute skull fracture or intracranial hemorrhage is identified.  LUMBAR SPINE CT TECHNIQUE: Spiral CT images were obtained from the mid body of the T11 thoracic level down to the S2-S3 sacral level. The images were reformatted and are submitted in 3 mm thick axial CT sections with soft tissue algorithm and 1 mm thick axial CT sections with high-resolution bone algorithm and 2 mm thick sagittal and coronal reconstructions were performed and submitted in both bone and soft tissue algorithm.  There are no prior CTs of the lumbar spine for comparison. This study is correlated to an MRI of the lumbar spine on 03/20/2024 and a CT scan of the abdomen and pelvis on 11/10/2022.  FINDINGS: At T11-T12, there is some vacuum disc phenomenon. The  posterior disc margin and facets are normal with no canal or foraminal narrowing.  At T12-L1, the disc space and facets are normal in appearance with no canal or foraminal narrowing.  At L1-L2, there is some mild anterior marginal endplate spurring. The posterior disc margin and facets are normal with no canal or foraminal narrowing.  At L2-L3, there is some mild anterior marginal endplate spurring. The posterior disc margin and facets are normal and there is no canal or foraminal narrowing.  At L3-L4, there is a very minimal diffuse posterior disc bulge and facet overgrowth. There is minimal if any canal narrowing and no foraminal narrowing.  At L4-L5, there is mild bilateral facet overgrowth and minimal diffuse posterior disc bulge, and there is mild canal, minimal foraminal narrowing, unchanged since MRI of the lumbar spine on 03/20/2024.  At L5-S1, there is mild left and there is moderate right facet overgrowth. The posterior disc margin is normal. There is no canal or lateral recess or foraminal narrowing.  This patient has a very minimal acute compression fracture involving the central to anterior superior body and endplate of the T12 thoracic level with some focal concavity involving the superior endplate of T12 with about 10% loss of central and no loss of anterior or posterior vertebral body height. This compression fracture is new when compared to the prior MRI lumbar spine on 03/20/2024. There is some minimal paravertebral edema. No additional fractures seen in the lumbar spine.  IMPRESSION: 1. Since the prior MRI of the lumbar spine on 03/20/2024, the patient has developed a very mild acute compression fracture involving the central to anterior superior body and endplate of the right side of the T12 thoracic level. There is some concavity of central and right side of the superior endplate of T12 with about 10% loss of central, but there is no loss of anterior or posterior vertebral body height at T12.  There is minimal paravertebral edema.  2. Otherwise, there has been no change when compared to the lumbar spine MRI 03/20/2024. There are no compression fractures in the lumbar spine and there is very mild lumbar spondylosis with minimal if any canal narrowing at L3-4L and mild canal and minimal foraminal narrowing at L4-L5, and the remainder of the lumbar spine CT is within normal limits.    Radiation dose reduction techniques were utilized, including automated exposure control and exposure modulation based on body size.   This report was finalized on 6/9/2024 2:16 PM by Dr. Brice Ziegler M.D on Workstation: [id number]             Note Disclaimer: At [facility], we believe that sharing information builds trust and better relationships. You are receiving this note because you recently visited [facility]. It is possible you will see health information before a provider has talked with you about it. This kind of information can be easy to misunderstand. To help you fully understand what it means for your health, we urge you to discuss this note with your provider.

## 2024-06-10 NOTE — PLAN OF CARE
Goal Outcome Evaluation:  Plan of Care Reviewed With: patient, spouse           Outcome Evaluation: Pt is a 80 yo male who presents after falling from bed with acute T12 compression fx. MARQUES recommends conservative management with TLSO when OOB. Prior to admission, pt was living at home with spouse and independent with mobility using rwx. Pt is current with OP PT here at [facility]. Upon exam, pt c/o moderate back pain and demos generalized weakness and decreased endurance. Pt was sitting EOB with TLSO donned upon PT arrival. Pt stood with supervision and rwx and was able to ambulate 150' with rwx and SBA. Reinforced education on brace use as well as spinal precautions and pt verbalized understanding. No further acute PT indicated; will sign off. Recommend continuing with PT at [facility] and pt stated his preference to continue with OP PT.      Anticipated Discharge Disposition (PT): home with outpatient therapy services

## 2024-06-11 ENCOUNTER — TELEPHONE (OUTPATIENT)
Dept: NEUROSURGERY | Facility: CLINIC | Age: 79
End: 2024-06-11
Payer: MEDICARE

## 2024-06-11 ENCOUNTER — TRANSITIONAL CARE MANAGEMENT TELEPHONE ENCOUNTER (OUTPATIENT)
Dept: CALL CENTER | Facility: HOSPITAL | Age: 79
End: 2024-06-11
Payer: MEDICARE

## 2024-06-11 NOTE — OUTREACH NOTE
Call Center TCM Note      Flowsheet Row Responses   [facility] patient discharged from? [place]   Does the patient have one of the following disease processes/diagnoses(primary or secondary)? Other   TCM attempt successful? Yes   Call start time 1619   Call end time 1623   Discharge diagnosis Acute back pain   Person spoke with today (if not patient) and relationship spouse   Meds reviewed with patient/caregiver? Yes   Is the patient having any side effects they believe may be caused by any medication additions or changes? Yes   Side effects comments  Feels alittle weird from side effects from pain medication and muscle relaxer. He has had a active day today and has not had much time to rest either.   Does the patient have all medications ordered at discharge? Yes   Is the patient taking all medications as directed (includes completed medication regime)? Yes   Comments Has a followup with neurology on 6/14   Does the patient have an appointment with their PCP within 7-14 days of discharge? Other   Nursing Interventions Patient desires to follow up with specialty only, Patient declined scheduling/rescheduling appointment at this time   Psychosocial issues? No   Did the patient receive a copy of their discharge instructions? Yes   Nursing interventions Reviewed instructions with patient   What is the patient's perception of their health status since discharge? Improving   Is the patient/caregiver able to teach back signs and symptoms related to disease process for when to call PCP? Yes   Is the patient/caregiver able to teach back signs and symptoms related to disease process for when to call 911? Yes   Is the patient/caregiver able to teach back the hierarchy of who to call/visit for symptoms/problems? PCP, Specialist, Home health nurse, Urgent Care, ED, 911 Yes   If the patient is a current smoker, are they able to teach back resources for cessation? Not a smoker   TCM call completed? Yes   Call end time  1623   Would this patient benefit from a Referral to [facility] Social Work? No   Is the patient interested in additional calls from an ambulatory ? No            Kyle Schulz RN    6/11/2024, 16:25 EDT

## 2024-06-11 NOTE — TELEPHONE ENCOUNTER
Informed patient of appt on 07/01/2024 at 8:30am. Gave new address and phone number. Also instructed patient to get xrays 1-2 days prior, gave location to imaging at [facility] patient expressed understanding

## 2024-06-11 NOTE — TELEPHONE ENCOUNTER
----- Message from Gianna RAMOS sent at 6/10/2024  2:21 PM EDT -----  Regarding: FW: Hospital follow-up  Order is placed. Can be scheduled.  ----- Message -----  From: Keri Salazar APRN  Sent: 6/10/2024   2:02 PM EDT  To: Gianna Carmen MA  Subject: Hospital follow-up                               Patient with T12 fracture needs follow-up in 3 weeks with lumbar x-rays AP/lateral upright in brace please put in the comments to include through the T11 vertebral body

## 2024-06-12 ENCOUNTER — TELEPHONE (OUTPATIENT)
Dept: FAMILY MEDICINE CLINIC | Facility: CLINIC | Age: 79
End: 2024-06-12

## 2024-06-12 ENCOUNTER — TELEPHONE (OUTPATIENT)
Dept: FAMILY MEDICINE CLINIC | Facility: CLINIC | Age: 79
End: 2024-06-12
Payer: MEDICARE

## 2024-06-12 DIAGNOSIS — S22.080A COMPRESSION FRACTURE OF T12 VERTEBRA, INITIAL ENCOUNTER: ICD-10-CM

## 2024-06-12 RX ORDER — HYDROCODONE BITARTRATE AND ACETAMINOPHEN 5; 325 MG/1; MG/1
1 TABLET ORAL EVERY 4 HOURS PRN
Qty: 30 TABLET | Refills: 0 | Status: SHIPPED | OUTPATIENT
Start: 2024-06-12

## 2024-06-12 NOTE — TELEPHONE ENCOUNTER
Patient called the office stating that he would like to see if you could refill his prescription for HYDROcodone- acetaminophen (NORCO)325mg. The patient was recently in the hospital and the patient stated that since its an controlled substance the hospital stated that  since its an controlled substance that you his pcp would have to prescribe with this, can you  please assist with this.    I saw the patient on the fifth.  His back pain never got better.  He went to the emergency room where he was found to have a compression fracture.  They did not do any type of acute kyphoplasty.  The tramadol was not helping his pain.  He is now on some hydrocodone but he is run out of that.  I did advise that I would prescribe him somebody will need to see me next week

## 2024-06-12 NOTE — TELEPHONE ENCOUNTER
Caller: Earl Marc    Relationship to patient: Self    Best call back number: [phone]    New or established patient?  [] New  [x] Established    Date of discharge: 06/11/24    Facility discharged from: [facility]    Diagnosis/Symptoms: FX SPINE DUE TO FALL OUT OF BED    Length of stay (If applicable):      Specialty Only: Did you see a [facility] provider?    [] Yes  [] No  If so, who?        PLEASE CALL PATIENT TO SCHEDULE, DUE TO HUB DID NOT HAVE ANYTHING WITHIN 7 DAYS OF 6/11/24 DISCHARGE.

## 2024-06-14 ENCOUNTER — OFFICE VISIT (OUTPATIENT)
Dept: NEUROLOGY | Facility: CLINIC | Age: 79
End: 2024-06-14
Payer: MEDICARE

## 2024-06-14 VITALS
OXYGEN SATURATION: 97 % | DIASTOLIC BLOOD PRESSURE: 72 MMHG | BODY MASS INDEX: 28.63 KG/M2 | WEIGHT: 217 LBS | SYSTOLIC BLOOD PRESSURE: 158 MMHG

## 2024-06-14 DIAGNOSIS — R47.89 WORD FINDING DIFFICULTY: Primary | ICD-10-CM

## 2024-06-18 ENCOUNTER — HOSPITAL ENCOUNTER (OUTPATIENT)
Dept: PHYSICAL THERAPY | Facility: HOSPITAL | Age: 79
Setting detail: THERAPIES SERIES
Discharge: HOME | End: 2024-06-18
Payer: MEDICARE

## 2024-06-18 DIAGNOSIS — M25.552 BILATERAL HIP PAIN: Primary | ICD-10-CM

## 2024-06-18 DIAGNOSIS — M25.551 BILATERAL HIP PAIN: Primary | ICD-10-CM

## 2024-06-18 PROCEDURE — 97530 THERAPEUTIC ACTIVITIES: CPT

## 2024-06-18 PROCEDURE — 97110 THERAPEUTIC EXERCISES: CPT
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
## 2024-06-19 ENCOUNTER — OFFICE VISIT (OUTPATIENT)
Dept: ENDOCRINOLOGY | Age: 79
End: 2024-06-19
Payer: MEDICARE

## 2024-06-19 VITALS
HEART RATE: 62 BPM | OXYGEN SATURATION: 94 % | DIASTOLIC BLOOD PRESSURE: 84 MMHG | TEMPERATURE: 96.9 F | HEIGHT: 73 IN | SYSTOLIC BLOOD PRESSURE: 124 MMHG | WEIGHT: 228.2 LBS | BODY MASS INDEX: 30.24 KG/M2

## 2024-06-19 DIAGNOSIS — Z79.4 TYPE 2 DIABETES MELLITUS WITH HYPERGLYCEMIA, WITH LONG-TERM CURRENT USE OF INSULIN: Primary | ICD-10-CM

## 2024-06-19 DIAGNOSIS — E78.5 DYSLIPIDEMIA: ICD-10-CM

## 2024-06-19 DIAGNOSIS — N18.31 HYPERTENSIVE KIDNEY DISEASE WITH STAGE 3A CHRONIC KIDNEY DISEASE: ICD-10-CM

## 2024-06-19 DIAGNOSIS — I12.9 HYPERTENSIVE KIDNEY DISEASE WITH STAGE 3A CHRONIC KIDNEY DISEASE: ICD-10-CM

## 2024-06-19 DIAGNOSIS — E11.65 TYPE 2 DIABETES MELLITUS WITH HYPERGLYCEMIA, WITH LONG-TERM CURRENT USE OF INSULIN: Primary | ICD-10-CM

## 2024-06-19 PROCEDURE — 95251 CONT GLUC MNTR ANALYSIS I&R: CPT | Performed by: NURSE PRACTITIONER

## 2024-06-19 PROCEDURE — 99214 OFFICE O/P EST MOD 30 MIN: CPT | Performed by: NURSE PRACTITIONER

## 2024-06-19 RX ORDER — INSULIN GLARGINE 100 [IU]/ML
32 INJECTION, SOLUTION SUBCUTANEOUS DAILY
Qty: 30 ML | Refills: 1 | Status: SHIPPED | OUTPATIENT
Start: 2024-06-19

## 2024-06-19 RX ORDER — BIMATOPROST 0.1 MG/ML
SOLUTION/ DROPS OPHTHALMIC
COMMUNITY
Start: 2024-06-17

## 2024-06-19 RX ORDER — BLOOD SUGAR DIAGNOSTIC
1 STRIP MISCELLANEOUS 4 TIMES DAILY
Qty: 400 EACH | Refills: 3 | Status: SHIPPED | OUTPATIENT
Start: 2024-06-19
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
## 2024-06-19 NOTE — PATIENT INSTRUCTIONS
Decrease Lantus 32 units at bedtime  Continue with Novolog 6 units before each meal   Continue with Tradjenta 5 mg daily

## 2024-06-20 ENCOUNTER — OFFICE VISIT (OUTPATIENT)
Dept: FAMILY MEDICINE CLINIC | Facility: CLINIC | Age: 79
End: 2024-06-20
Payer: MEDICARE

## 2024-06-20 VITALS
DIASTOLIC BLOOD PRESSURE: 64 MMHG | SYSTOLIC BLOOD PRESSURE: 132 MMHG | HEIGHT: 73 IN | OXYGEN SATURATION: 97 % | BODY MASS INDEX: 30.22 KG/M2 | WEIGHT: 228 LBS | RESPIRATION RATE: 18 BRPM | HEART RATE: 58 BPM

## 2024-06-20 DIAGNOSIS — S22.080A COMPRESSION FRACTURE OF T12 VERTEBRA, INITIAL ENCOUNTER: ICD-10-CM

## 2024-06-20 DIAGNOSIS — K21.9 GASTROESOPHAGEAL REFLUX DISEASE, UNSPECIFIED WHETHER ESOPHAGITIS PRESENT: ICD-10-CM

## 2024-06-20 DIAGNOSIS — S22.080D COMPRESSION FRACTURE OF T12 VERTEBRA WITH ROUTINE HEALING, SUBSEQUENT ENCOUNTER: Primary | ICD-10-CM

## 2024-06-20 PROCEDURE — 1111F DSCHRG MED/CURRENT MED MERGE: CPT | Performed by: INTERNAL MEDICINE

## 2024-06-20 PROCEDURE — 99495 TRANSJ CARE MGMT MOD F2F 14D: CPT | Performed by: INTERNAL MEDICINE

## 2024-06-20 PROCEDURE — 1126F AMNT PAIN NOTED NONE PRSNT: CPT | Performed by: INTERNAL MEDICINE

## 2024-06-20 RX ORDER — HYDROCODONE BITARTRATE AND ACETAMINOPHEN 5; 325 MG/1; MG/1
1 TABLET ORAL EVERY 6 HOURS PRN
Qty: 30 TABLET | Refills: 0 | Status: SHIPPED | OUTPATIENT
Start: 2024-06-20

## 2024-06-20 RX ORDER — PANTOPRAZOLE SODIUM 20 MG/1
20 TABLET, DELAYED RELEASE ORAL DAILY
Qty: 90 TABLET | Refills: 1 | Status: SHIPPED | OUTPATIENT
Start: 2024-06-20

## 2024-06-20 NOTE — PROGRESS NOTES
"Transitional Care Follow Up Visit  Subjective     Earl Marc is a 79 y.o. male who presents for a transitional care management visit.    Within 48 business hours after discharge our office contacted him via telephone to coordinate his care and needs.      I reviewed and discussed the details of that call along with the discharge summary, hospital problems, inpatient lab results, inpatient diagnostic studies, and consultation reports with [facility].     Current outpatient and discharge medications have been reconciled for the patient.  Reviewed by: Avery Vleazquez MD          6/10/2024     6:57 PM   Date of TCM Phone Call   [facility]   Date of Admission 6/9/2024   Date of Discharge 6/10/2024   Discharge Disposition Home or Self Care     Risk for Readmission (LACE) Score: 10 (6/10/2024  6:00 AM)      History of Present Illness   Course During Hospital Stay: Earl is here today for hospital follow-up.  He was admitted to [facility] on 9 June.  He had fallen out of bed.  He tried to get back into bed he experienced extreme pain.  In the hospital he had a MRI scan showing a 30% compression fracture of T12.  He was seen by neurosurgery.  A TLSO brace and physical therapy were recommended.  Although he did not hit his head he did have a CT scan of the head performed.  It did show small vessel disease but no subdural hematoma.  His CBC looked okay.  His blood sugar was elevated but his A1c had actually improved     The following portions of the patient's history were reviewed and updated as appropriate: allergies, current medications, and problem list.    Review of Systems   Respiratory:          He does note that the brace sometimes constricts his breathing.  When that happens he on does it.   Musculoskeletal:  Positive for back pain.       Objective   /64   Pulse 58   Resp 18   Ht 185.4 cm (72.99\")   Wt 103 kg (228 lb)   SpO2 97%   BMI 30.09 kg/m²   Physical " Exam  Cardiovascular:      Rate and Rhythm: Normal rate.   Pulmonary:      Effort: Pulmonary effort is normal.      Breath sounds: No wheezing, rhonchi or rales.   Neurological:      Mental Status: He is alert.     Assessment & Plan   Diagnoses and all orders for this visit:    1. Compression fracture of T12 vertebra with routine healing, subsequent encounter (Primary)    2. Gastroesophageal reflux disease, unspecified whether esophagitis present  -     pantoprazole (PROTONIX) 20 MG EC tablet; Take 1 tablet by mouth Daily.  Dispense: 90 tablet; Refill: 1    3. Compression fracture of T12 vertebra, initial encounter  -     HYDROcodone-acetaminophen (NORCO) 5-325 MG per tablet; Take 1 tablet by mouth Every 6 (Six) Hours As Needed for Moderate Pain for up to 30 doses.  Dispense: 30 tablet; Refill: 0    Earl is here today for follow-up.  He is still having some pain from the compression fracture.  His initial prescription was written for every 4 hours if needed on the Norco and he was using it approximately 4 times daily.  I did advise to start trying to back off on the frequency of this.  I have changed the prescription now to read every 6 hours prn.  He was given a prescription for Narcan at discharge and has not had to use that.

## 2024-06-21 NOTE — PROGRESS NOTES
Subjective   Patient ID: Earl Marc is a 79 y.o. male is here today for hospital follow-up for T12 VCF with lumbar spine xray done on 6/28/24.       History of Present Illness    Patient presents today for 3-week hospital follow-up on a T12 VCF after a fall.  Patient has his TLSO brace on and is tolerating that well.  He is using a walker due to balance issues that he reports have been ongoing for several years.  He is taking 1 hydrocodone in the morning and at night, his primary care provider is prescribing these for him.  He does report pain is continuing to improve each day.  He denies lower extremity weakness, numbness or tingling in his lower extremities, loss of bowel or bladder function.      The following portions of the patient's history were reviewed and updated as appropriate: allergies, current medications, past family history, past medical history, past social history, past surgical history, and problem list.    Review of Systems   Gastrointestinal:  Negative for constipation.   Genitourinary:  Negative for difficulty urinating and enuresis.   Musculoskeletal:  Positive for back pain and gait problem.   Neurological:  Positive for weakness. Negative for numbness.   Psychiatric/Behavioral:  Positive for sleep disturbance.          Objective     Vitals:    07/01/24 0847   BP: 130/70   Pulse: 67   SpO2: 97%   Weight: 103 kg (227 lb)     Body mass index is 29.96 kg/m².    Tobacco Use: Low Risk  (7/1/2024)    Patient History     Smoking Tobacco Use: Never     Smokeless Tobacco Use: Never     Passive Exposure: Past          Physical Exam  Vitals reviewed.   Constitutional:       Appearance: Normal appearance.   Pulmonary:      Effort: Pulmonary effort is normal.   Musculoskeletal:      Cervical back: Normal.      Thoracic back: Bony tenderness present.      Lumbar back: Bony tenderness present. Negative right straight leg raise test and negative left straight leg raise test.   Skin:     General: Skin is  warm and dry.   Neurological:      Mental Status: He is alert and oriented to person, place, and time.      Gait: Gait is intact.      Deep Tendon Reflexes:      Reflex Scores:       Patellar reflexes are 2+ on the right side and 2+ on the left side.       Achilles reflexes are 2+ on the right side and 2+ on the left side.  Psychiatric:         Speech: Speech normal.       Neurologic Exam     Mental Status   Oriented to person, place, and time.   Speech: speech is normal   Level of consciousness: alert  Knowledge: good.     Motor Exam   Muscle bulk: normal  Overall muscle tone: normal  5/5 strength to bilateral lower extremities     Sensory Exam   Light touch normal.     Gait, Coordination, and Reflexes     Gait  Gait: normal    Reflexes   Right patellar: 2+  Left patellar: 2+  Right achilles: 2+  Left achilles: 2+  Steady gait, patient using a walker           Assessment & Plan   Independent Review of Radiographic Studies:      I personally reviewed the images from the following studies.    Lumbar spine x-ray performed 2024: No compression deformity noted in the lumbar spine.  Mild superior T12 endplate depression similar to previous MRI.      Medical Decision Makin-year-old who presents for 3-week hospital follow-up on T12 VCF he sustained in a fall.  Overall he seems to be doing very well, still having some lower thoracic upper lumbar pain.  Wearing TLSO brace and tolerating that well.  He is doing physical therapy 2 days a week.  His PMD is prescribing his hydrocodone.  He is requesting a refill on his muscle relaxers and we will refill that for him today.  Overall he seems to be getting around very well with the use of his walker.  He should continue to wear the TLSO brace for another 5 weeks.  No follow-up needed unless something comes up.  He can call the office with any questions or concerns.    Diagnoses and all orders for this visit:    1. Thoracic 12 compression fracture, closed, initial  encounter (Primary)    Other orders  -     cyclobenzaprine (FLEXERIL) 10 MG tablet; Take 1 tablet by mouth 3 (Three) Times a Day As Needed for Muscle Spasms.  Dispense: 30 tablet; Refill: 1      Return if symptoms worsen or fail to improve.

## 2024-06-24 ENCOUNTER — HOSPITAL ENCOUNTER (OUTPATIENT)
Dept: PHYSICAL THERAPY | Facility: HOSPITAL | Age: 79
Setting detail: THERAPIES SERIES
Discharge: HOME | End: 2024-06-24
Payer: MEDICARE

## 2024-06-24 DIAGNOSIS — M25.551 BILATERAL HIP PAIN: Primary | ICD-10-CM

## 2024-06-24 DIAGNOSIS — M25.552 BILATERAL HIP PAIN: Primary | ICD-10-CM

## 2024-06-24 DIAGNOSIS — M62.89 TENSOR FASCIA LATA SYNDROME: ICD-10-CM

## 2024-06-24 PROCEDURE — 97110 THERAPEUTIC EXERCISES: CPT

## 2024-06-24 NOTE — THERAPY TREATMENT NOTE
Outpatient Physical Therapy Ortho Treatment Note  [facility]     Patient Name: Earl Marc  : 1945  MRN: [id number]  Today's Date: 2024      Visit Date: 2024    Visit Dx:    ICD-10-CM ICD-9-CM   1. Bilateral hip pain  M25.551 719.45    M25.552    2. Tensor fascia prateek syndrome  M62.89 727.09       Patient Active Problem List   Diagnosis    Bell's palsy    Persistent insomnia    Osteoarthritis of cervical spine    Diabetic peripheral neuropathy    Dyslipidemia    Steatosis of liver    Fibromyalgia    Gastroesophageal reflux disease without esophagitis    Hypertensive kidney disease with stage 3a chronic kidney disease    Hypogonadism in male    Renal insufficiency    Vitamin D deficiency    Benign non-nodular prostatic hyperplasia with lower urinary tract symptoms    S/P drug eluting coronary stent placement    Coronary artery disease involving native coronary artery of native heart    Malignant neoplasm of skin    Type 2 diabetes mellitus with hyperglycemia, with long-term current use of insulin    Diabetes mellitus    Chronic insomnia    Other specified glaucoma    Vertigo    Epigastric pain    Chest pain with high risk for cardiac etiology    Precordial pain    S/P arthroscopy of shoulder    Peripheral neuropathy    Stage 3b chronic kidney disease    Chest pain    Stroke-like symptoms    Acute back pain    Thoracic 12 compression fracture, closed, initial encounter        Past Medical History:   Diagnosis Date    Abnormal cardiovascular stress test     Acid reflux     Arthritis     Asthma     Cancer     skin     Chronic kidney disease     Colon polyp     Congenital heart disease     COPD (chronic obstructive pulmonary disease)     Old age COPD/2D hand Smoke emphysema    Coronary artery disease     Diabetes mellitus     Diabetic neuropathy associated with type 2 diabetes mellitus     Diabetic peripheral neuropathy 03/10/2016    Dyslipidemia     Erectile dysfunction     Fatty liver  disease, nonalcoholic     Gastritis     Glaucoma     both eyes    Hyperlipidemia     Hypertension     Hypogonadism male     Infectious viral hepatitis Hep A July 1959    Drank water from broken water line and Grandparents house.    Migraines 09/2023    Myocardial infarction 2018    Trigger point of left shoulder region 03/02/2023    Type 2 diabetes mellitus         Past Surgical History:   Procedure Laterality Date    CARDIAC CATHETERIZATION N/A 03/25/2016    Procedure: Left Heart Cath;  Surgeon: Maria E Gilbert MD;  [facility];  Service:     CARDIAC CATHETERIZATION N/A 03/25/2016    Procedure: Coronary angiography;  Surgeon: Maria E Gilbert MD;  [facility];  Service:     CARDIAC CATHETERIZATION N/A 03/28/2016    Procedure: Coronary angiography;  Surgeon: Maria E Gilbert MD;  [facility];  Service:     CARDIAC CATHETERIZATION N/A 03/28/2016    Procedure: Stent MOISE coronary;  Surgeon: Maria E Gilbert MD;  Location: [facility] CATH INVASIVE LOCATION;  Service:     CARDIAC CATHETERIZATION N/A 10/18/2018    Procedure: Coronary angiography  no LV gram d/t CKD;  Surgeon: Maria E Gilbert MD;  Location: [facility] CATH INVASIVE LOCATION;  Service: Cardiology    CARDIAC CATHETERIZATION N/A 10/18/2018    Procedure: Left Heart Cath;  Surgeon: Maria E Gilbert MD;  [facility];  Service: Cardiology    CARDIAC CATHETERIZATION N/A 10/18/2018    Procedure: Stent MOISE coronary;  Surgeon: Maria E Gilbert MD;  Location: [facility] CATH INVASIVE LOCATION;  Service: Cardiology    CARDIAC CATHETERIZATION N/A 05/28/2021    Procedure: Coronary angiography;  Surgeon: Maria E Gilbert MD;  Location: [facility] CATH INVASIVE LOCATION;  Service: Cardiovascular;  Laterality: N/A;    CARDIAC CATHETERIZATION N/A 05/28/2021    Procedure: Left Heart Cath;  Surgeon: Maria E Gilbert MD;  Location:   [facility] CATH INVASIVE LOCATION;  Service: Cardiovascular;  Laterality: N/A;    CARDIAC CATHETERIZATION N/A 05/28/2021    Procedure: Left ventriculography;  Surgeon: Maria E Gilbert MD;  Location:  [facility] CATH INVASIVE LOCATION;  Service: Cardiovascular;  Laterality: N/A;    CARDIAC CATHETERIZATION N/A 05/28/2021    Procedure: Stent MOISE coronary;  Surgeon: Maria E Gilbert MD;  [facility];  Service: Cardiovascular;  Laterality: N/A;    CARDIAC CATHETERIZATION N/A 1/19/2024    Procedure: Left Heart Cath;  Surgeon: Maria E Gilbert MD;  [facility];  Service: Cardiovascular;  Laterality: N/A;    CARDIAC CATHETERIZATION N/A 1/19/2024    Procedure: Coronary angiography;  Surgeon: Maria E Gilbert MD;  [facility];  Service: Cardiovascular;  Laterality: N/A;    CARDIAC CATHETERIZATION N/A 1/19/2024    Procedure: Left ventriculography;  Surgeon: Maria E Gilbert MD;  Location: [facility] CATH INVASIVE LOCATION;  Service: Cardiovascular;  Laterality: N/A;    CAROTID STENT      CORONARY ANGIOPLASTY      CORONARY STENT PLACEMENT  3/28/2016    EYE SURGERY  9/2017    NECK EXPLORATION N/A     MD RT/LT HEART CATHETERS N/A 10/18/2018    Procedure: Percutaneous Coronary Intervention;  Surgeon: Maria E Gilbert MD;  Location: [facility] CATH INVASIVE LOCATION;  Service: Cardiology                        PT Assessment/Plan       Row Name 06/24/24 1400          PT Assessment    Assessment Comments Mr. Marc returns to PT continuing to ambulate with rwx, very slowed susan, TLSO donned. He reports a 3/10 pain in his hip, where yesterday they did not bother him at all. Spent time reviewing updated HEP from last visit, all performed with good mechanics. Added supine PPT, hip adduction, hip abduction, seated piriformis stretch and figure 4 stretch. Slowly progressing pt as appropriate to avoid exacerbating symptoms and be  mindful of current condition with T12 fracture. At this time, pt remains appropriate for skilled PT.  -DR        PT Plan    PT Plan Comments tolerance to last visit? schedule pt out another 3-4 weeks if he brought calendar. consider adding gentle AR press, PPT with march if tolerated well, seated hip IR with ball between legs?  -DR               User Key  (r) = Recorded By, (t) = Taken By, (c) = Cosigned By      Initials Name Provider Type    Hector Freitas, PT Physical Therapist                       OP Exercises       Row Name 06/24/24 1400             Subjective    Subjective Comments My hips were okay yesterday, but seem to be bothering me today.  -DR         Subjective Pain    Able to rate subjective pain? yes  -DR      Pre-Treatment Pain Level 3  -DR         Total Minutes    60544 - PT Therapeutic Exercise Minutes 40  -DR         Exercise 1    Exercise Name 1 NuStep  -DR      Time 1 5 min  -DR         Exercise 2    Exercise Name 2 seated HS stretch  -DR      Cueing 2 Demo  -DR      Reps 2 3e  -DR      Time 2 20s  -DR         Exercise 3    Exercise Name 3 h/l hip add/hip abd  -DR      Cueing 3 Verbal;Demo  -DR      Sets 3 1, 2  -DR      Reps 3 15, 10  -DR      Time 3 5s, RTB  -DR      Additional Comments ball, unilateral  -DR         Exercise 4    Exercise Name 4 fig 4 str/piriformis str  -DR      Cueing 4 Verbal  -DR      Reps 4 3 ea  -DR      Time 4 15s  -DR      Additional Comments seated  -DR         Exercise 5    Exercise Name 5 PPT supine  -DR      Cueing 5 Verbal;Demo  -DR      Reps 5 15  -DR      Time 5 5s  -DR         Exercise 6    Exercise Name 6 LAQ  -DR      Cueing 6 Verbal;Demo  -DR      Sets 6 1e  -DR      Reps 6 15  -DR         Exercise 7    Exercise Name 7 standing HR  -DR      Cueing 7 Verbal;Demo  -DR      Sets 7 2  -DR      Reps 7 10  -DR         Exercise 8    Exercise Name 8 standing marches  -DR      Cueing 8 Verbal;Demo  -DR      Sets 8 2  -DR      Reps 8 10  -DR      Time 8 alt  -DR          Exercise 9    Exercise Name 9 side steps  -      Cueing 9 Verbal;Demo  -      Reps 9 3 laps  -      Time 9 YTB at thighs  -                User Key  (r) = Recorded By, (t) = Taken By, (c) = Cosigned By      Initials Name Provider Type    Hector Freitas, PT Physical Therapist                                  PT OP Goals       Row Name 06/24/24 1400          PT Short Term Goals    STG Date to Achieve 05/18/24  -     STG 1 Pt. will be independent with initial HEP to improve self-management of condition.  -     STG 1 Progress Met  -     STG 2 Pt. will tolerate 10 minute of standing/walking ther ex in clinic before seated rest break to demonstrate improved activity tolerance.  -     STG 2 Progress Met  -     STG 3 Pt. will perform 1 STS from chair with only 1 UE to demosntrate improving functional strength.  -     STG 3 Progress Met  -        Long Term Goals    LTG Date to Achieve 06/17/24  -     LTG 1 Pt. will be independent with advanced HEP to improve long term management of condition and independence.  -     LTG 1 Progress Ongoing  -     LTG 2 Pt will score >/= 40% on LEFS (from 21% on initial evaluation) to indicate improved perception of disability.  -     LTG 2 Progress Ongoing;Progressing  -     LTG 3 Pt. will demonstrate ability to complete 5xSTS in </= 20 seconds to progress toward falls risk cut off of 15 seconds.  -     LTG 3 Progress Met  -     LTG 4 Pt. will increase L LE strength >/= 4+/5 to improve mobility.  -     LTG 4 Progress Ongoing;Progressing  -     LTG 5 Pt. will increase 2MWT >/= 150 feet with appropriate AD to indicate improved gait speed and mobility.  -     LTG 5 Progress Met  -               User Key  (r) = Recorded By, (t) = Taken By, (c) = Cosigned By      Initials Name Provider Type    Hector Freitas, PT Physical Therapist                    Therapy Education  Education Details: updated HEP with PPT- discussed daily  Given: HEP,  Symptoms/condition management  Program: Reinforced, Progressed  How Provided: Verbal, Demonstration, Written  Provided to: Patient  Level of Understanding: Teach back education performed, Verbalized, Demonstrated              Time Calculation:   Start Time: 1430  Stop Time: 1510  Time Calculation (min): 40 min  Timed Charges  85168 - PT Therapeutic Exercise Minutes: 40  Total Minutes  Timed Charges Total Minutes: 40   Total Minutes: 40  Therapy Charges for Today       Code Description Service Date Service Provider Modifiers Qty    [id number] HC PT THER PROC EA 15 MIN 6/24/2024 Hector Moise, PT GP 3                      Hector Moise, PT  6/24/2024

## 2024-06-26 ENCOUNTER — HOSPITAL ENCOUNTER (OUTPATIENT)
Dept: PHYSICAL THERAPY | Facility: HOSPITAL | Age: 79
Setting detail: THERAPIES SERIES
Discharge: HOME | End: 2024-06-26
Payer: MEDICARE

## 2024-06-26 DIAGNOSIS — M25.552 BILATERAL HIP PAIN: Primary | ICD-10-CM

## 2024-06-26 DIAGNOSIS — M25.551 BILATERAL HIP PAIN: Primary | ICD-10-CM

## 2024-06-26 PROCEDURE — 97110 THERAPEUTIC EXERCISES: CPT

## 2024-06-26 NOTE — THERAPY TREATMENT NOTE
Outpatient Physical Therapy Ortho Treatment Note  [facility]     Patient Name: Earl Marc  : 1945  MRN: [id number]  Today's Date: 2024      Visit Date: 2024    Visit Dx:    ICD-10-CM ICD-9-CM   1. Bilateral hip pain  M25.551 719.45    M25.552        Patient Active Problem List   Diagnosis    Bell's palsy    Persistent insomnia    Osteoarthritis of cervical spine    Diabetic peripheral neuropathy    Dyslipidemia    Steatosis of liver    Fibromyalgia    Gastroesophageal reflux disease without esophagitis    Hypertensive kidney disease with stage 3a chronic kidney disease    Hypogonadism in male    Renal insufficiency    Vitamin D deficiency    Benign non-nodular prostatic hyperplasia with lower urinary tract symptoms    S/P drug eluting coronary stent placement    Coronary artery disease involving native coronary artery of native heart    Malignant neoplasm of skin    Type 2 diabetes mellitus with hyperglycemia, with long-term current use of insulin    Diabetes mellitus    Chronic insomnia    Other specified glaucoma    Vertigo    Epigastric pain    Chest pain with high risk for cardiac etiology    Precordial pain    S/P arthroscopy of shoulder    Peripheral neuropathy    Stage 3b chronic kidney disease    Chest pain    Stroke-like symptoms    Acute back pain    Thoracic 12 compression fracture, closed, initial encounter        Past Medical History:   Diagnosis Date    Abnormal cardiovascular stress test     Acid reflux     Arthritis     Asthma     Cancer     skin     Chronic kidney disease     Colon polyp     Congenital heart disease     COPD (chronic obstructive pulmonary disease)     Old age COPD/2D hand Smoke emphysema    Coronary artery disease     Diabetes mellitus     Diabetic neuropathy associated with type 2 diabetes mellitus     Diabetic peripheral neuropathy 03/10/2016    Dyslipidemia     Erectile dysfunction     Fatty liver disease, nonalcoholic     Gastritis     Glaucoma      both eyes    Hyperlipidemia     Hypertension     Hypogonadism male     Infectious viral hepatitis Hep A July 1959    Drank water from broken water line and Grandparents house.    Migraines 09/2023    Myocardial infarction 2018    Trigger point of left shoulder region 03/02/2023    Type 2 diabetes mellitus         Past Surgical History:   Procedure Laterality Date    CARDIAC CATHETERIZATION N/A 03/25/2016    Procedure: Left Heart Cath;  Surgeon: Maria E Gilbert MD;  Location:  [facility] CATH INVASIVE LOCATION;  Service:     CARDIAC CATHETERIZATION N/A 03/25/2016    Procedure: Coronary angiography;  Surgeon: Maria E Gilbert MD;  Location:  [facility] CATH INVASIVE LOCATION;  Service:     CARDIAC CATHETERIZATION N/A 03/28/2016    Procedure: Coronary angiography;  Surgeon: Maria E Gilbert MD;  Location:  [facility] CATH INVASIVE LOCATION;  Service:     CARDIAC CATHETERIZATION N/A 03/28/2016    Procedure: Stent MOISE coronary;  Surgeon: Maria E Gilbert MD;  [facility];  Service:     CARDIAC CATHETERIZATION N/A 10/18/2018    Procedure: Coronary angiography  no LV gram d/t CKD;  Surgeon: Maria E Gilbert MD;  [facility];  Service: Cardiology    CARDIAC CATHETERIZATION N/A 10/18/2018    Procedure: Left Heart Cath;  Surgeon: Maria E Gilbert MD;  Location:  [facility] CATH INVASIVE LOCATION;  Service: Cardiology    CARDIAC CATHETERIZATION N/A 10/18/2018    Procedure: Stent MOISE coronary;  Surgeon: Maira E Gilbert MD;  [facility];  Service: Cardiology    CARDIAC CATHETERIZATION N/A 05/28/2021    Procedure: Coronary angiography;  Surgeon: Maria E Gilbert MD;  [facility];  Service: Cardiovascular;  Laterality: N/A;    CARDIAC CATHETERIZATION N/A 05/28/2021    Procedure: Left Heart Cath;  Surgeon: Maria E Gilbert MD;  [facility];  Service:  Cardiovascular;  Laterality: N/A;    CARDIAC CATHETERIZATION N/A 05/28/2021    Procedure: Left ventriculography;  Surgeon: Maria E Gilbert MD;  Location:  [facility] CATH INVASIVE LOCATION;  Service: Cardiovascular;  Laterality: N/A;    CARDIAC CATHETERIZATION N/A 05/28/2021    Procedure: Stent MOISE coronary;  Surgeon: Maria E Gilbert MD;  Location:  [facility] CATH INVASIVE LOCATION;  Service: Cardiovascular;  Laterality: N/A;    CARDIAC CATHETERIZATION N/A 1/19/2024    Procedure: Left Heart Cath;  Surgeon: Maria E Gilbert MD;  Location:  [facility] CATH INVASIVE LOCATION;  Service: Cardiovascular;  Laterality: N/A;    CARDIAC CATHETERIZATION N/A 1/19/2024    Procedure: Coronary angiography;  Surgeon: Maria E Gilbert MD;  [facility];  Service: Cardiovascular;  Laterality: N/A;    CARDIAC CATHETERIZATION N/A 1/19/2024    Procedure: Left ventriculography;  Surgeon: Maria E Gilbert MD;  [facility];  Service: Cardiovascular;  Laterality: N/A;    CAROTID STENT      CORONARY ANGIOPLASTY      CORONARY STENT PLACEMENT  3/28/2016    EYE SURGERY  9/2017    NECK EXPLORATION N/A     [place]I RT/LT HEART CATHETERS N/A 10/18/2018    Procedure: Percutaneous Coronary Intervention;  Surgeon: Maria E Gilbert MD;  Location: [facility] CATH INVASIVE LOCATION;  Service: Cardiology                        PT Assessment/Plan       Row Name 06/26/24 1500          PT Assessment    Assessment Comments Earl Marc is a 79 year old male seen for physical therapy treatment session for bilat hip pain, complicated by the fact that he recently fx his T12 vertebrae falling out of bed. He ambulates into the clinic this afternoon with a RW and TLSO donned. He notes some bilat hip soreness this date rating it 4/10 pain, and back rating 2/10. Added RTB around knees for additional challenge with lateral stepping. Pt. Remains a good candidate for skilled PT.  -ER        PT Plan     PT Plan Comments seated IR w/ ball between legs (maybe with light resistance band at ankles), PPT with march if tolerated, AR press gentle  -ER               User Key  (r) = Recorded By, (t) = Taken By, (c) = Cosigned By      Initials Name Provider Type    ER Jaquelin Mccloud, PT Physical Therapist                       OP Exercises       Row Name 06/26/24 1400             Subjective    Subjective Comments My hips are bothering me  -ER         Subjective Pain    Able to rate subjective pain? yes  -ER      Pre-Treatment Pain Level 2  -ER      Subjective Pain Comment hips 4, back 2  -ER         Total Minutes    74653 - PT Therapeutic Exercise Minutes 40  -ER         Exercise 1    Exercise Name 1 NuStep  -ER      Time 1 5 min  -ER         Exercise 2    Exercise Name 2 seated HS stretch  -ER      Cueing 2 Demo  -ER      Reps 2 3e  -ER      Time 2 20s  -ER         Exercise 3    Exercise Name 3 h/l hip add/hip abd  -ER      Cueing 3 Verbal;Demo  -ER      Sets 3 1, 2  -ER      Reps 3 15, 10  -ER      Time 3 5s, RTB  -ER      Additional Comments ball, unilateral  -ER         Exercise 4    Exercise Name 4 fig 4 str/piriformis str  -ER      Cueing 4 Verbal  -ER      Reps 4 3 ea  -ER      Time 4 15s  -ER      Additional Comments seated  -ER         Exercise 5    Exercise Name 5 PPT supine  -ER      Cueing 5 Verbal;Demo  -ER      Reps 5 15  -ER      Time 5 5s  -ER         Exercise 6    Exercise Name 6 LAQ  -ER      Cueing 6 Verbal;Demo  -ER      Sets 6 1e  -ER      Reps 6 15  -ER         Exercise 7    Exercise Name 7 standing HR  -ER      Cueing 7 Verbal;Demo  -ER      Sets 7 2  -ER      Reps 7 10  -ER         Exercise 8    Exercise Name 8 standing marches  -ER      Cueing 8 Verbal;Demo  -ER      Sets 8 2  -ER      Reps 8 10  -ER      Time 8 alt  -ER         Exercise 9    Exercise Name 9 side steps  -ER      Cueing 9 Verbal;Demo  -ER      Reps 9 3 laps  -ER      Time 9 RTB at thighs  -ER                User Key  (r) = Recorded  By, (t) = Taken By, (c) = Cosigned By      Initials Name Provider Type    Jaquelin Haynes, ELIN Physical Therapist                                  PT OP Goals       Row Name 06/26/24 1500          PT Short Term Goals    STG Date to Achieve 05/18/24  -ER     STG 1 Pt. will be independent with initial HEP to improve self-management of condition.  -ER     STG 1 Progress Met  -ER     STG 2 Pt. will tolerate 10 minute of standing/walking ther ex in clinic before seated rest break to demonstrate improved activity tolerance.  -ER     STG 2 Progress Met  -ER     STG 3 Pt. will perform 1 STS from chair with only 1 UE to demosntrate improving functional strength.  -ER     STG 3 Progress Met  -ER        Long Term Goals    LTG Date to Achieve 06/17/24  -ER     LTG 1 Pt. will be independent with advanced HEP to improve long term management of condition and independence.  -ER     LTG 1 Progress Ongoing  -ER     LTG 2 Pt will score >/= 40% on LEFS (from 21% on initial evaluation) to indicate improved perception of disability.  -ER     LTG 2 Progress Ongoing;Progressing  -ER     LTG 3 Pt. will demonstrate ability to complete 5xSTS in </= 20 seconds to progress toward falls risk cut off of 15 seconds.  -ER     LTG 3 Progress Met  -ER     LTG 4 Pt. will increase L LE strength >/= 4+/5 to improve mobility.  -ER     LTG 4 Progress Ongoing;Progressing  -ER     LTG 5 Pt. will increase 2MWT >/= 150 feet with appropriate AD to indicate improved gait speed and mobility.  -ER     LTG 5 Progress Met  -ER               User Key  (r) = Recorded By, (t) = Taken By, (c) = Cosigned By      Initials Name Provider Type    Jaquelin Haynes PT Physical Therapist                    Therapy Education  Education Details: falls prevention  Given: HEP, Symptoms/condition management, Fall prevention and home safety  Program: Reinforced  How Provided: Verbal, Demonstration  Provided to: Patient  Level of Understanding: Teach back education performed,  Verbalized, Demonstrated              Time Calculation:   Start Time: 1430  Stop Time: 1510  Time Calculation (min): 40 min  Total Timed Code Minutes- PT: 40 minute(s)  Timed Charges  67442 - PT Therapeutic Exercise Minutes: 40  Total Minutes  Timed Charges Total Minutes: 40   Total Minutes: 40  Therapy Charges for Today       Code Description Service Date Service Provider Modifiers Qty    38148957517 HC PT THER PROC EA 15 MIN 6/26/2024 Jaquelin Mccloud, PT GP 3                      Jaquelin Mccloud PT  6/26/2024

## 2024-06-28 ENCOUNTER — HOSPITAL ENCOUNTER (OUTPATIENT)
Dept: GENERAL RADIOLOGY | Facility: HOSPITAL | Age: 79
Discharge: HOME | End: 2024-06-28
Payer: MEDICARE

## 2024-06-28 DIAGNOSIS — S22.000A THORACIC COMPRESSION FRACTURE, CLOSED, INITIAL ENCOUNTER: ICD-10-CM

## 2024-06-28 PROCEDURE — 72100 X-RAY EXAM L-S SPINE 2/3 VWS: CPT

## 2024-07-01 ENCOUNTER — OFFICE VISIT (OUTPATIENT)
Dept: NEUROSURGERY | Facility: CLINIC | Age: 79
End: 2024-07-01
Payer: MEDICARE

## 2024-07-01 VITALS
HEART RATE: 67 BPM | OXYGEN SATURATION: 97 % | BODY MASS INDEX: 29.96 KG/M2 | WEIGHT: 227 LBS | DIASTOLIC BLOOD PRESSURE: 70 MMHG | SYSTOLIC BLOOD PRESSURE: 130 MMHG

## 2024-07-01 DIAGNOSIS — S22.000A THORACIC COMPRESSION FRACTURE, CLOSED, INITIAL ENCOUNTER: Primary | ICD-10-CM

## 2024-07-01 RX ORDER — CYCLOBENZAPRINE HCL 10 MG
10 TABLET ORAL 3 TIMES DAILY PRN
Qty: 30 TABLET | Refills: 1 | Status: SHIPPED | OUTPATIENT
Start: 2024-07-01

## 2024-07-01 RX ORDER — SILODOSIN 4 MG/1
4 CAPSULE ORAL
COMMUNITY

## 2024-07-02 ENCOUNTER — HOSPITAL ENCOUNTER (OUTPATIENT)
Dept: PHYSICAL THERAPY | Facility: HOSPITAL | Age: 79
Setting detail: THERAPIES SERIES
Discharge: HOME | End: 2024-07-02
Payer: MEDICARE

## 2024-07-02 DIAGNOSIS — M25.551 BILATERAL HIP PAIN: Primary | ICD-10-CM

## 2024-07-02 DIAGNOSIS — M25.552 BILATERAL HIP PAIN: Primary | ICD-10-CM

## 2024-07-02 PROCEDURE — 97110 THERAPEUTIC EXERCISES: CPT

## 2024-07-02 NOTE — THERAPY TREATMENT NOTE
Outpatient Physical Therapy Ortho Treatment Note  [facility]     Patient Name: Earl Marc  : 1945  MRN: [id number]  Today's Date: 2024      Visit Date: 2024    Visit Dx:    ICD-10-CM ICD-9-CM   1. Bilateral hip pain  M25.551 719.45    M25.552        Patient Active Problem List   Diagnosis    Bell's palsy    Persistent insomnia    Osteoarthritis of cervical spine    Diabetic peripheral neuropathy    Dyslipidemia    Steatosis of liver    Fibromyalgia    Gastroesophageal reflux disease without esophagitis    Hypertensive kidney disease with stage 3a chronic kidney disease    Hypogonadism in male    Renal insufficiency    Vitamin D deficiency    Benign non-nodular prostatic hyperplasia with lower urinary tract symptoms    S/P drug eluting coronary stent placement    Coronary artery disease involving native coronary artery of native heart    Malignant neoplasm of skin    Type 2 diabetes mellitus with hyperglycemia, with long-term current use of insulin    Diabetes mellitus    Chronic insomnia    Other specified glaucoma    Vertigo    Epigastric pain    Chest pain with high risk for cardiac etiology    Precordial pain    S/P arthroscopy of shoulder    Peripheral neuropathy    Stage 3b chronic kidney disease    Chest pain    Stroke-like symptoms    Acute back pain    Thoracic 12 compression fracture, closed, initial encounter        Past Medical History:   Diagnosis Date    Abnormal cardiovascular stress test     Acid reflux     Arthritis     Asthma     Cancer     skin     Chronic kidney disease     Colon polyp     Congenital heart disease     COPD (chronic obstructive pulmonary disease)     Old age COPD/2D hand Smoke emphysema    Coronary artery disease     Diabetes mellitus     Diabetic neuropathy associated with type 2 diabetes mellitus     Diabetic peripheral neuropathy 03/10/2016    Dyslipidemia     Erectile dysfunction     Fatty liver disease, nonalcoholic     Gastritis     Glaucoma      both eyes    Hyperlipidemia     Hypertension     Hypogonadism male     Infectious viral hepatitis Hep A July 1959    Drank water from broken water line and Grandparents house.    Migraines 09/2023    Myocardial infarction 2018    Trigger point of left shoulder region 03/02/2023    Type 2 diabetes mellitus         Past Surgical History:   Procedure Laterality Date    CARDIAC CATHETERIZATION N/A 03/25/2016    Procedure: Left Heart Cath;  Surgeon: Maria E Gilbetr MD;  Location:  [facility];  Service:     CARDIAC CATHETERIZATION N/A 03/25/2016    Procedure: Coronary angiography;  Surgeon: Maria E Gilbert MD;  Location:  [facility];  Service:     CARDIAC CATHETERIZATION N/A 03/28/2016    Procedure: Coronary angiography;  Surgeon: Maria E Gilbert MD;  Location:  [facility];  Service:     CARDIAC CATHETERIZATION N/A 03/28/2016    Procedure: Stent MOISE coronary;  Surgeon: Maria E Gilbert MD;  [facility];  Service:     CARDIAC CATHETERIZATION N/A 10/18/2018    Procedure: Coronary angiography  no LV gram d/t CKD;  Surgeon: Maria E Gilbert MD;  [facility];  Service: Cardiology    CARDIAC CATHETERIZATION N/A 10/18/2018    Procedure: Left Heart Cath;  Surgeon: Maria E Gilbert MD;  Location:  [facility];  Service: Cardiology    CARDIAC CATHETERIZATION N/A 10/18/2018    Procedure: Stent MOISE coronary;  Surgeon: Maria E Gilbert MD;  [facility];  Service: Cardiology    CARDIAC CATHETERIZATION N/A 05/28/2021    Procedure: Coronary angiography;  Surgeon: Maria E Gilbert MD;  [facility];  Service: Cardiovascular;  Laterality: N/A;    CARDIAC CATHETERIZATION N/A 05/28/2021    Procedure: Left Heart Cath;  Surgeon: Maria E Gilbert MD;  [facility];  Service:  Cardiovascular;  Laterality: N/A;    CARDIAC CATHETERIZATION N/A 05/28/2021    Procedure: Left ventriculography;  Surgeon: Maria E Gilbert MD;  Location:  [facility];  Service: Cardiovascular;  Laterality: N/A;    CARDIAC CATHETERIZATION N/A 05/28/2021    Procedure: Stent MOISE coronary;  Surgeon: Maria E Gilbert MD;  Location:  [facility];  Service: Cardiovascular;  Laterality: N/A;    CARDIAC CATHETERIZATION N/A 1/19/2024    Procedure: Left Heart Cath;  Surgeon: Maria E Gilbert MD;  Location:  [facility];  Service: Cardiovascular;  Laterality: N/A;    CARDIAC CATHETERIZATION N/A 1/19/2024    Procedure: Coronary angiography;  Surgeon: Maria E Gilbert MD;  Location:  [facility];  Service: Cardiovascular;  Laterality: N/A;    CARDIAC CATHETERIZATION N/A 1/19/2024    Procedure: Left ventriculography;  Surgeon: Maria E Gilbert MD;  [facility];  Service: Cardiovascular;  Laterality: N/A;    CAROTID STENT      CORONARY ANGIOPLASTY      CORONARY STENT PLACEMENT  3/28/2016    EYE SURGERY  9/2017    NECK EXPLORATION N/A     MD RT/LT HEART CATHETERS N/A 10/18/2018    Procedure: Percutaneous Coronary Intervention;  Surgeon: Maria E Gilbert MD;  Location: [facility] CATH INVASIVE LOCATION;  Service: Cardiology                        PT Assessment/Plan       Row Name 07/02/24 1500          PT Assessment    Assessment Comments Earl Marc is a 79 year old male returning for physical therapy treatment of bilateral hip pain. W/ pt.'s recent fall out of bed, resulting in a T12 fx, pt. Has been wearing TLSO. He notes that he is able to take the TLSO off in 5 weeks, and return to driving as soon as he is finished taking pain medication. Pt. Also ambulating with a SPC this date instead of RW. Discussed safety with AD. Today, returned to supine piriformis/fig 4 stretching with good tolerance. Also  continued to progress hip strengthening and range with addition of seated IR with adduction into small ball. Also added PPT with march with good tolerance. Pt. Needing cues for breath control as he tends to bear down. Once verbalized, pt. Able to maintain equal breaths. Pt. Remains a good candidate for skilled PT.  -ER        PT Plan    PT Plan Comments add TB to seated IR, AR press gentle  -ER               User Key  (r) = Recorded By, (t) = Taken By, (c) = Cosigned By      Initials Name Provider Type    ER Jaquelin Mccloud, PT Physical Therapist                       OP Exercises       Row Name 07/02/24 1400             Subjective    Subjective Comments I am actually feeling pretty good. I saw my neurosurgeon and I can take my brace off in 5 weeks.  -ER         Subjective Pain    Able to rate subjective pain? yes  -ER      Pre-Treatment Pain Level 2  -ER         Total Minutes    34558 - PT Therapeutic Exercise Minutes 40  -ER         Exercise 1    Exercise Name 1 NuStep  -ER      Time 1 5 min  -ER         Exercise 2    Exercise Name 2 seated HS stretch  -ER      Cueing 2 Demo  -ER      Reps 2 3e  -ER      Time 2 20s  -ER         Exercise 3    Exercise Name 3 h/l hip add/hip abd  -ER      Cueing 3 Verbal;Demo  -ER      Sets 3 1, 2  -ER      Reps 3 15, 10  -ER      Time 3 5s, RTB  -ER      Additional Comments ball, unilateral  -ER         Exercise 4    Exercise Name 4 fig 4 str/piriformis str  -ER      Cueing 4 Verbal  -ER      Reps 4 3 ea  -ER      Time 4 15s  -ER      Additional Comments supine  -ER         Exercise 5    Exercise Name 5 PPT supine  -ER      Cueing 5 Verbal;Demo  -ER      Reps 5 15  -ER      Time 5 5s  -ER         Exercise 10    Exercise Name 10 PPT with march  -ER      Cueing 10 Verbal;Demo  -ER      Sets 10 1  -ER      Reps 10 10e  -ER      Time 10 cues for breath control and range  -ER         Exercise 11    Exercise Name 11 Seated IR w/ small ball  -ER      Cueing 11 Verbal;Demo  -ER      Sets 11 1   -ER      Reps 11 10  -ER      Time 11 small ball between knees  -ER      Additional Comments pink ball, cues for TrA  -ER                User Key  (r) = Recorded By, (t) = Taken By, (c) = Cosigned By      Initials Name Provider Type    aJquelin Haynes, ELIN Physical Therapist                                  PT OP Goals       Row Name 07/02/24 1500          PT Short Term Goals    STG Date to Achieve 05/18/24  -ER     STG 1 Pt. will be independent with initial HEP to improve self-management of condition.  -ER     STG 1 Progress Met  -ER     STG 2 Pt. will tolerate 10 minute of standing/walking ther ex in clinic before seated rest break to demonstrate improved activity tolerance.  -ER     STG 2 Progress Met  -ER     STG 3 Pt. will perform 1 STS from chair with only 1 UE to demosntrate improving functional strength.  -ER     STG 3 Progress Met  -ER        Long Term Goals    LTG Date to Achieve 06/17/24  -ER     LTG 1 Pt. will be independent with advanced HEP to improve long term management of condition and independence.  -ER     LTG 1 Progress Ongoing  -ER     LTG 2 Pt will score >/= 40% on LEFS (from 21% on initial evaluation) to indicate improved perception of disability.  -ER     LTG 2 Progress Ongoing;Progressing  -ER     LTG 3 Pt. will demonstrate ability to complete 5xSTS in </= 20 seconds to progress toward falls risk cut off of 15 seconds.  -ER     LTG 3 Progress Met  -ER     LTG 4 Pt. will increase L LE strength >/= 4+/5 to improve mobility.  -ER     LTG 4 Progress Ongoing;Progressing  -ER     LTG 5 Pt. will increase 2MWT >/= 150 feet with appropriate AD to indicate improved gait speed and mobility.  -ER     LTG 5 Progress Met  -ER               User Key  (r) = Recorded By, (t) = Taken By, (c) = Cosigned By      Initials Name Provider Type    Jaquelin Haynes PT Physical Therapist                    Therapy Education  Education Details: Reinforced use of SPC for support with ambulating  Given: HEP, Fall prevention  and home safety  Program: Reinforced  How Provided: Verbal, Demonstration  Provided to: Patient  Level of Understanding: Teach back education performed, Verbalized, Demonstrated              Time Calculation:   Start Time: 1430  Stop Time: 1510  Time Calculation (min): 40 min  Total Timed Code Minutes- PT: 40 minute(s)  Timed Charges  56659 - PT Therapeutic Exercise Minutes: 40  Total Minutes  Timed Charges Total Minutes: 40   Total Minutes: 40  Therapy Charges for Today       Code Description Service Date Service Provider Modifiers Qty    25709447941 HC PT THER PROC EA 15 MIN 7/2/2024 Jaquelin Mccloud, PT GP 3                      Jaquelin Mccloud, PT  7/2/2024

## 2024-07-05 ENCOUNTER — HOSPITAL ENCOUNTER (OUTPATIENT)
Dept: PHYSICAL THERAPY | Facility: HOSPITAL | Age: 79
Setting detail: THERAPIES SERIES
Discharge: HOME | End: 2024-07-05
Payer: MEDICARE

## 2024-07-05 DIAGNOSIS — M25.552 BILATERAL HIP PAIN: Primary | ICD-10-CM

## 2024-07-05 DIAGNOSIS — M25.551 BILATERAL HIP PAIN: Primary | ICD-10-CM

## 2024-07-05 PROCEDURE — 97110 THERAPEUTIC EXERCISES: CPT

## 2024-07-05 NOTE — THERAPY TREATMENT NOTE
Outpatient Physical Therapy Ortho Treatment Note  [facility]     Patient Name: Earl Marc  : 1945  MRN: [id number]  Today's Date: 2024      Visit Date: 2024    Visit Dx:    ICD-10-CM ICD-9-CM   1. Bilateral hip pain  M25.551 719.45    M25.552        Patient Active Problem List   Diagnosis    Bell's palsy    Persistent insomnia    Osteoarthritis of cervical spine    Diabetic peripheral neuropathy    Dyslipidemia    Steatosis of liver    Fibromyalgia    Gastroesophageal reflux disease without esophagitis    Hypertensive kidney disease with stage 3a chronic kidney disease    Hypogonadism in male    Renal insufficiency    Vitamin D deficiency    Benign non-nodular prostatic hyperplasia with lower urinary tract symptoms    S/P drug eluting coronary stent placement    Coronary artery disease involving native coronary artery of native heart    Malignant neoplasm of skin    Type 2 diabetes mellitus with hyperglycemia, with long-term current use of insulin    Diabetes mellitus    Chronic insomnia    Other specified glaucoma    Vertigo    Epigastric pain    Chest pain with high risk for cardiac etiology    Precordial pain    S/P arthroscopy of shoulder    Peripheral neuropathy    Stage 3b chronic kidney disease    Chest pain    Stroke-like symptoms    Acute back pain    Thoracic 12 compression fracture, closed, initial encounter        Past Medical History:   Diagnosis Date    Abnormal cardiovascular stress test     Acid reflux     Arthritis     Asthma     Cancer     skin     Chronic kidney disease     Colon polyp     Congenital heart disease     COPD (chronic obstructive pulmonary disease)     Old age COPD/2D hand Smoke emphysema    Coronary artery disease     Diabetes mellitus     Diabetic neuropathy associated with type 2 diabetes mellitus     Diabetic peripheral neuropathy 03/10/2016    Dyslipidemia     Erectile dysfunction     Fatty liver disease, nonalcoholic     Gastritis     Glaucoma      both eyes    Hyperlipidemia     Hypertension     Hypogonadism male     Infectious viral hepatitis Hep A July 1959    Drank water from broken water line and Grandparents house.    Migraines 09/2023    Myocardial infarction 2018    Trigger point of left shoulder region 03/02/2023    Type 2 diabetes mellitus         Past Surgical History:   Procedure Laterality Date    CARDIAC CATHETERIZATION N/A 03/25/2016    Procedure: Left Heart Cath;  Surgeon: Mari aE Gilbert MD;  Location:  [facility];  Service:     CARDIAC CATHETERIZATION N/A 03/25/2016    Procedure: Coronary angiography;  Surgeon: Maria E Gilbert MD;  Location:  [facility];  Service:     CARDIAC CATHETERIZATION N/A 03/28/2016    Procedure: Coronary angiography;  Surgeon: Maria E Gilbert MD;  Location:  [facility];  Service:     CARDIAC CATHETERIZATION N/A 03/28/2016    Procedure: Stent MOISE coronary;  Surgeon: Maria E Gilbert MD;  [facility];  Service:     CARDIAC CATHETERIZATION N/A 10/18/2018    Procedure: Coronary angiography  no LV gram d/t CKD;  Surgeon: Maria E Gilbert MD;  [facility];  Service: Cardiology    CARDIAC CATHETERIZATION N/A 10/18/2018    Procedure: Left Heart Cath;  Surgeon: Maria E Gilbert MD;  Location:  [facility];  Service: Cardiology    CARDIAC CATHETERIZATION N/A 10/18/2018    Procedure: Stent MOISE coronary;  Surgeon: Maria E Gilbert MD;  [facility];  Service: Cardiology    CARDIAC CATHETERIZATION N/A 05/28/2021    Procedure: Coronary angiography;  Surgeon: Maria E Gilbert MD;  [facility];  Service: Cardiovascular;  Laterality: N/A;    CARDIAC CATHETERIZATION N/A 05/28/2021    Procedure: Left Heart Cath;  Surgeon: Maria E Gilbert MD;  [facility];  Service:  Cardiovascular;  Laterality: N/A;    CARDIAC CATHETERIZATION N/A 05/28/2021    Procedure: Left ventriculography;  Surgeon: Maria E Gilbert MD;  Location:  [facility];  Service: Cardiovascular;  Laterality: N/A;    CARDIAC CATHETERIZATION N/A 05/28/2021    Procedure: Stent OMISE coronary;  Surgeon: Maria E Gilbert MD;  Location:  [facility];  Service: Cardiovascular;  Laterality: N/A;    CARDIAC CATHETERIZATION N/A 1/19/2024    Procedure: Left Heart Cath;  Surgeon: Maria E Gilbert MD;  Location:  [facility];  Service: Cardiovascular;  Laterality: N/A;    CARDIAC CATHETERIZATION N/A 1/19/2024    Procedure: Coronary angiography;  Surgeon: Maria E Gilbert MD;  [facility];  Service: Cardiovascular;  Laterality: N/A;    CARDIAC CATHETERIZATION N/A 1/19/2024    Procedure: Left ventriculography;  Surgeon: Maria E Gilbert MD;  [facility];  Service: Cardiovascular;  Laterality: N/A;    CAROTID STENT      CORONARY ANGIOPLASTY      CORONARY STENT PLACEMENT  3/28/2016    EYE SURGERY  9/2017    NECK EXPLORATION N/A     MA RT/LT HEART CATHETERS N/A 10/18/2018    Procedure: Percutaneous Coronary Intervention;  Surgeon: Maria E Gilbert MD;  Location: [facility] CATH INVASIVE LOCATION;  Service: Cardiology                        PT Assessment/Plan       Row Name 07/05/24 1500          PT Assessment    Assessment Comments Earl Marc is a 79 year old male returning for physical therapy treatment of bilateral hip pain. Pt. Ambulates into the clinic without an AD. Encouraged to use AD for extra assist/balance for safety due to recent T12 comp fx, with chronic bilateral hip pain. Pt. Ambulates with a slow gait speed, w/ small step length bilaterally, and guarded posture (reduced arm swing). Continuing to focus on attention to posture/body mechanics with bilateral hip strengthening, while  "also normalizing gait pattern. Added standing hip ABD with cues to reduce trunk sway as a compensation. Pt. Tolerated well body weight and is able to maintain erect posture with use of mirror for visual aid. Ended session with 5 min treadmill walk as pt. Has personal goal to return to the gym to remain active. Tolerated well, with cues to clear bilateral feet (some shuffling present). Pt. Remains a good candidate for skilled PT.  -ER        PT Plan    PT Plan Comments AR press gentle, TM warm up vs at end? exaggerated march walk? step up at staircase/ on 4\" step at ballet barre, glut set?  -ER               User Key  (r) = Recorded By, (t) = Taken By, (c) = Cosigned By      Initials Name Provider Type    ER Jaquelin Mccloud, PT Physical Therapist                       OP Exercises       Row Name 07/05/24 1400             Subjective    Subjective Comments I didnt do my exercises yesterday, I was tired  -ER         Subjective Pain    Able to rate subjective pain? yes  -ER      Pre-Treatment Pain Level 2  -ER         Total Minutes    54360 - PT Therapeutic Exercise Minutes 45  -ER         Exercise 1    Exercise Name 1 NuStep  -ER      Time 1 5 min  -ER         Exercise 2    Exercise Name 2 seated HS stretch  -ER      Cueing 2 Demo  -ER      Reps 2 3e  -ER      Time 2 20s  -ER         Exercise 3    Exercise Name 3 h/l hip add/hip abd  -ER      Cueing 3 Verbal;Demo  -ER      Sets 3 1, 2  -ER      Reps 3 15, 10  -ER      Time 3 5s, RTB  -ER      Additional Comments ball, unilateral  -ER         Exercise 4    Exercise Name 4 fig 4 str/piriformis str  -ER      Cueing 4 Verbal  -ER      Reps 4 3 ea  -ER      Time 4 15s  -ER      Additional Comments supine  -ER         Exercise 5    Exercise Name 5 PPT supine  -ER      Cueing 5 Verbal;Demo  -ER      Reps 5 15  -ER      Time 5 5s  -ER         Exercise 6    Exercise Name 6 TM walk  -ER      Cueing 6 Verbal  -ER      Time 6 5 minutes  -ER      Additional Comments at end  -ER         " Exercise 10    Exercise Name 10 PPT with march  -ER      Cueing 10 Verbal;Demo  -ER      Sets 10 1  -ER      Reps 10 10e  -ER      Time 10 cues for breath control and range  -ER         Exercise 12    Exercise Name 12 standing hip ABD  -ER      Cueing 12 Verbal;Demo  -ER      Reps 12 1  -ER      Time 12 10e  -ER      Additional Comments at ballet barre  -ER                User Key  (r) = Recorded By, (t) = Taken By, (c) = Cosigned By      Initials Name Provider Type    ER Jaquelin Mccloud, PT Physical Therapist                                  PT OP Goals       Row Name 07/05/24 1500          PT Short Term Goals    STG Date to Achieve 05/18/24  -ER     STG 1 Pt. will be independent with initial HEP to improve self-management of condition.  -ER     STG 1 Progress Met  -ER     STG 2 Pt. will tolerate 10 minute of standing/walking ther ex in clinic before seated rest break to demonstrate improved activity tolerance.  -ER     STG 2 Progress Met  -ER     STG 3 Pt. will perform 1 STS from chair with only 1 UE to demosntrate improving functional strength.  -ER     STG 3 Progress Met  -ER        Long Term Goals    LTG Date to Achieve 06/17/24  -ER     LTG 1 Pt. will be independent with advanced HEP to improve long term management of condition and independence.  -ER     LTG 1 Progress Ongoing  -ER     LTG 2 Pt will score >/= 40% on LEFS (from 21% on initial evaluation) to indicate improved perception of disability.  -ER     LTG 2 Progress Ongoing;Progressing  -ER     LTG 3 Pt. will demonstrate ability to complete 5xSTS in </= 20 seconds to progress toward falls risk cut off of 15 seconds.  -ER     LTG 3 Progress Met  -ER     LTG 4 Pt. will increase L LE strength >/= 4+/5 to improve mobility.  -ER     LTG 4 Progress Ongoing;Progressing  -ER     LTG 5 Pt. will increase 2MWT >/= 150 feet with appropriate AD to indicate improved gait speed and mobility.  -ER     LTG 5 Progress Met  -ER               User Key  (r) = Recorded By, (t) =  Taken By, (c) = Cosigned By      Initials Name Provider Type    ER Jaquelin Mccloud, PT Physical Therapist                    Therapy Education  Education Details: safety with treadmill use, AD use, gait mechanics  Given: HEP, Fall prevention and home safety, Mobility training  Program: Reinforced  How Provided: Verbal  Provided to: Patient  Level of Understanding: Teach back education performed, Verbalized, Demonstrated              Time Calculation:   Start Time: 1430  Stop Time: 1515  Time Calculation (min): 45 min  Total Timed Code Minutes- PT: 45 minute(s)  Timed Charges  82564 - PT Therapeutic Exercise Minutes: 45  Total Minutes  Timed Charges Total Minutes: 45   Total Minutes: 45  Therapy Charges for Today       Code Description Service Date Service Provider Modifiers Qty    76170972938 HC PT THER PROC EA 15 MIN 7/5/2024 Jaquelin Mccloud, PT GP 3                      Jaquelin Mccloud PT  7/5/2024

## 2024-07-08 ENCOUNTER — APPOINTMENT (OUTPATIENT)
Dept: CT IMAGING | Facility: HOSPITAL | Age: 79
End: 2024-07-08
Payer: MEDICARE

## 2024-07-08 ENCOUNTER — HOSPITAL ENCOUNTER (EMERGENCY)
Facility: HOSPITAL | Age: 79
Discharge: HOME | End: 2024-07-08
Attending: EMERGENCY MEDICINE | Admitting: EMERGENCY MEDICINE
Payer: MEDICARE

## 2024-07-08 ENCOUNTER — OFFICE VISIT (OUTPATIENT)
Dept: CARDIOLOGY | Facility: CLINIC | Age: 79
End: 2024-07-08
Payer: MEDICARE

## 2024-07-08 VITALS
TEMPERATURE: 98.1 F | SYSTOLIC BLOOD PRESSURE: 167 MMHG | DIASTOLIC BLOOD PRESSURE: 74 MMHG | HEART RATE: 62 BPM | RESPIRATION RATE: 20 BRPM | OXYGEN SATURATION: 94 %

## 2024-07-08 VITALS
HEART RATE: 61 BPM | WEIGHT: 229.6 LBS | BODY MASS INDEX: 30.43 KG/M2 | SYSTOLIC BLOOD PRESSURE: 159 MMHG | HEIGHT: 73 IN | DIASTOLIC BLOOD PRESSURE: 81 MMHG

## 2024-07-08 DIAGNOSIS — I25.10 CORONARY ARTERY DISEASE INVOLVING NATIVE CORONARY ARTERY OF NATIVE HEART WITHOUT ANGINA PECTORIS: Primary | ICD-10-CM

## 2024-07-08 DIAGNOSIS — E78.2 MIXED HYPERLIPIDEMIA: ICD-10-CM

## 2024-07-08 DIAGNOSIS — I10 PRIMARY HYPERTENSION: ICD-10-CM

## 2024-07-08 DIAGNOSIS — R51.9 ACUTE NONINTRACTABLE HEADACHE, UNSPECIFIED HEADACHE TYPE: Primary | ICD-10-CM

## 2024-07-08 LAB
ALBUMIN SERPL-MCNC: 4 G/DL (ref 3.5–5.2)
ALBUMIN/GLOB SERPL: 1.5 G/DL
ALP SERPL-CCNC: 87 U/L (ref 39–117)
ALT SERPL W P-5'-P-CCNC: 20 U/L (ref 1–41)
ANION GAP SERPL CALCULATED.3IONS-SCNC: 10 MMOL/L (ref 5–15)
AST SERPL-CCNC: 19 U/L (ref 1–40)
BASOPHILS # BLD AUTO: 0.05 10*3/MM3 (ref 0–0.2)
BASOPHILS NFR BLD AUTO: 0.5 % (ref 0–1.5)
BILIRUB SERPL-MCNC: 0.7 MG/DL (ref 0–1.2)
BUN SERPL-MCNC: 13 MG/DL (ref 8–23)
BUN/CREAT SERPL: 10.7 (ref 7–25)
CALCIUM SPEC-SCNC: 9.5 MG/DL (ref 8.6–10.5)
CHLORIDE SERPL-SCNC: 101 MMOL/L (ref 98–107)
CO2 SERPL-SCNC: 27 MMOL/L (ref 22–29)
CREAT SERPL-MCNC: 1.21 MG/DL (ref 0.76–1.27)
DEPRECATED RDW RBC AUTO: 42.7 FL (ref 37–54)
EGFRCR SERPLBLD CKD-EPI 2021: 60.9 ML/MIN/1.73
EOSINOPHIL # BLD AUTO: 0.17 10*3/MM3 (ref 0–0.4)
EOSINOPHIL NFR BLD AUTO: 1.6 % (ref 0.3–6.2)
ERYTHROCYTE [DISTWIDTH] IN BLOOD BY AUTOMATED COUNT: 13.9 % (ref 12.3–15.4)
ERYTHROCYTE [SEDIMENTATION RATE] IN BLOOD: 9 MM/HR (ref 0–20)
GLOBULIN UR ELPH-MCNC: 2.6 GM/DL
GLUCOSE SERPL-MCNC: 112 MG/DL (ref 65–99)
HCT VFR BLD AUTO: 50.3 % (ref 37.5–51)
HGB BLD-MCNC: 16.4 G/DL (ref 13–17.7)
IMM GRANULOCYTES # BLD AUTO: 0.05 10*3/MM3 (ref 0–0.05)
IMM GRANULOCYTES NFR BLD AUTO: 0.5 % (ref 0–0.5)
LYMPHOCYTES # BLD AUTO: 1.82 10*3/MM3 (ref 0.7–3.1)
LYMPHOCYTES NFR BLD AUTO: 17.1 % (ref 19.6–45.3)
MCH RBC QN AUTO: 27.7 PG (ref 26.6–33)
MCHC RBC AUTO-ENTMCNC: 32.6 G/DL (ref 31.5–35.7)
MCV RBC AUTO: 85.1 FL (ref 79–97)
MONOCYTES # BLD AUTO: 0.81 10*3/MM3 (ref 0.1–0.9)
MONOCYTES NFR BLD AUTO: 7.6 % (ref 5–12)
NEUTROPHILS NFR BLD AUTO: 7.74 10*3/MM3 (ref 1.7–7)
NEUTROPHILS NFR BLD AUTO: 72.7 % (ref 42.7–76)
NRBC BLD AUTO-RTO: 0 /100 WBC (ref 0–0.2)
PLATELET # BLD AUTO: 191 10*3/MM3 (ref 140–450)
PMV BLD AUTO: 9.3 FL (ref 6–12)
POTASSIUM SERPL-SCNC: 3.8 MMOL/L (ref 3.5–5.2)
PROT SERPL-MCNC: 6.6 G/DL (ref 6–8.5)
RBC # BLD AUTO: 5.91 10*6/MM3 (ref 4.14–5.8)
SODIUM SERPL-SCNC: 138 MMOL/L (ref 136–145)
WBC NRBC COR # BLD AUTO: 10.64 10*3/MM3 (ref 3.4–10.8)

## 2024-07-08 PROCEDURE — 3079F DIAST BP 80-89 MM HG: CPT | Performed by: INTERNAL MEDICINE

## 2024-07-08 PROCEDURE — 99213 OFFICE O/P EST LOW 20 MIN: CPT | Performed by: INTERNAL MEDICINE

## 2024-07-08 PROCEDURE — 25010000002 PROCHLORPERAZINE 10 MG/2ML SOLUTION: Performed by: PHYSICIAN ASSISTANT

## 2024-07-08 PROCEDURE — 85652 RBC SED RATE AUTOMATED: CPT | Performed by: EMERGENCY MEDICINE

## 2024-07-08 PROCEDURE — 96365 THER/PROPH/DIAG IV INF INIT: CPT

## 2024-07-08 PROCEDURE — 80053 COMPREHEN METABOLIC PANEL: CPT | Performed by: PHYSICIAN ASSISTANT

## 2024-07-08 PROCEDURE — 25010000002 MAGNESIUM SULFATE 2 GM/50ML SOLUTION: Performed by: PHYSICIAN ASSISTANT

## 2024-07-08 PROCEDURE — 3077F SYST BP >= 140 MM HG: CPT | Performed by: INTERNAL MEDICINE

## 2024-07-08 PROCEDURE — 99284 EMERGENCY DEPT VISIT MOD MDM: CPT

## 2024-07-08 PROCEDURE — 85025 COMPLETE CBC W/AUTO DIFF WBC: CPT | Performed by: PHYSICIAN ASSISTANT

## 2024-07-08 PROCEDURE — 96375 TX/PRO/DX INJ NEW DRUG ADDON: CPT

## 2024-07-08 PROCEDURE — 25010000002 DIPHENHYDRAMINE PER 50 MG: Performed by: PHYSICIAN ASSISTANT

## 2024-07-08 PROCEDURE — 70450 CT HEAD/BRAIN W/O DYE: CPT

## 2024-07-08 PROCEDURE — 25810000003 SODIUM CHLORIDE 0.9 % SOLUTION: Performed by: PHYSICIAN ASSISTANT

## 2024-07-08 RX ORDER — DIPHENHYDRAMINE HYDROCHLORIDE 50 MG/ML
25 INJECTION INTRAMUSCULAR; INTRAVENOUS ONCE
Status: COMPLETED | OUTPATIENT
Start: 2024-07-08 | End: 2024-07-08

## 2024-07-08 RX ORDER — SODIUM CHLORIDE 0.9 % (FLUSH) 0.9 %
10 SYRINGE (ML) INJECTION AS NEEDED
Status: DISCONTINUED | OUTPATIENT
Start: 2024-07-08 | End: 2024-07-08 | Stop reason: HOSPADM

## 2024-07-08 RX ORDER — MAGNESIUM SULFATE HEPTAHYDRATE 40 MG/ML
2 INJECTION, SOLUTION INTRAVENOUS ONCE
Status: COMPLETED | OUTPATIENT
Start: 2024-07-08 | End: 2024-07-08

## 2024-07-08 RX ORDER — PROCHLORPERAZINE EDISYLATE 5 MG/ML
10 INJECTION INTRAMUSCULAR; INTRAVENOUS ONCE
Status: COMPLETED | OUTPATIENT
Start: 2024-07-08 | End: 2024-07-08

## 2024-07-08 RX ADMIN — DIPHENHYDRAMINE HYDROCHLORIDE 25 MG: 50 INJECTION, SOLUTION INTRAMUSCULAR; INTRAVENOUS at 13:47

## 2024-07-08 RX ADMIN — PROCHLORPERAZINE EDISYLATE 10 MG: 5 INJECTION INTRAMUSCULAR; INTRAVENOUS at 13:47

## 2024-07-08 RX ADMIN — MAGNESIUM SULFATE HEPTAHYDRATE 2 G: 2 INJECTION, SOLUTION INTRAVENOUS at 15:33

## 2024-07-08 RX ADMIN — SODIUM CHLORIDE 500 ML: 9 INJECTION, SOLUTION INTRAVENOUS at 13:42

## 2024-07-08 NOTE — PROGRESS NOTES
Subjective:        Earl Marc is a 79 y.o. male who here for follow up    CC  Follow-up coronary artery disease hypertension hyperlipidemia  HPI  79-year-old male with coronary artery disease hypertension hyperlipidemia here for the follow-up with no complaints of chest pains tightness heaviness or the pressure sensation     Problems Addressed this Visit          Cardiac and Vasculature    Coronary artery disease involving native coronary artery of native heart - Primary     Other Visit Diagnoses       Primary hypertension        Mixed hyperlipidemia              Diagnoses         Codes Comments    Coronary artery disease involving native coronary artery of native heart without angina pectoris    -  Primary ICD-10-CM: I25.10  ICD-9-CM: 414.01     Primary hypertension     ICD-10-CM: I10  ICD-9-CM: 401.9     Mixed hyperlipidemia     ICD-10-CM: E78.2  ICD-9-CM: 272.2           .    The following portions of the patient's history were reviewed and updated as appropriate: allergies, current medications, past family history, past medical history, past social history, past surgical history and problem list.    Past Medical History:   Diagnosis Date    Abnormal cardiovascular stress test     Acid reflux     Arthritis     Asthma     Cancer     skin     Chronic kidney disease     Colon polyp     Congenital heart disease     COPD (chronic obstructive pulmonary disease) 2021    Old age COPD/2D hand Smoke emphysema    Coronary artery disease     Diabetes mellitus     Diabetic neuropathy associated with type 2 diabetes mellitus     Diabetic peripheral neuropathy 03/10/2016    Dyslipidemia     Erectile dysfunction     Fatty liver disease, nonalcoholic     Gastritis     Glaucoma     both eyes    Hyperlipidemia     Hypertension     Hypogonadism male     Infectious viral hepatitis Hep A July 1959    Drank water from broken water line and Grandparents house.    Migraines 09/2023    Myocardial infarction 2018    Trigger point of  "left shoulder region 2023    Type 2 diabetes mellitus      reports that he has never smoked. He has been exposed to tobacco smoke. He has never used smokeless tobacco. He reports that he does not drink alcohol and does not use drugs.   Family History   Problem Relation Age of Onset    Breast cancer Daughter     Heart attack Mother          10/31/1980    Hypertension Mother     Heart disease Mother     Thyroid disease Mother     Lupus Mother     Early death Mother     Miscarriages / Stillbirths Mother         Miscarriage     Heart failure Mother     Heart attack Brother     Heart disease Brother     Hyperlipidemia Brother     Cancer Brother         Due to Agent [place], [place]    Hypertension Brother     Depression Father     COPD Father     Stroke Maternal Grandmother     Cancer Maternal Grandmother         Lung Cancer non-smoker    Diabetes Brother     Heart disease Brother     Hypertension Brother     Kidney disease Brother     Heart disease Brother     Hypertension Brother        Review of Systems  Constitutional: No wt loss, fever, fatigue  Gastrointestinal: No nausea, abdominal pain  Behavioral/Psych: No insomnia or anxiety   Cardiovascular no chest pains or tightness in the chest  Objective:       Physical Exam  /81 (BP Location: Right arm, Patient Position: Sitting, Cuff Size: Adult)   Pulse 61   Ht 185.4 cm (72.99\")   Wt 104 kg (229 lb 9.6 oz)   BMI 30.30 kg/m²   General appearance: No acute changes   Neck: Trachea midline; NECK, supple, no thyromegaly or lymphadenopathy   Lungs: Normal size and shape, normal breath sounds, equal distribution of air, no rales and rhonchi   CV: S1-S2 regular, no murmurs, no rub, no gallop   Abdomen: Soft, nontender; no masses , no abnormal abdominal sounds   Extremities: No deformity , normal color , no peripheral edema   Skin: Normal temperature, turgor and texture; no rash, ulcers          Procedures      Echocardiogram:    Results for orders placed " in visit on 10/27/23    Adult Transthoracic Echo Complete W/ Cont if Necessary Per Protocol    Interpretation Summary    Left ventricular ejection fraction appears to be 61 - 65%.    Left ventricular diastolic function was normal.    Estimated right ventricular systolic pressure from tricuspid regurgitation is normal (<35 mmHg).          Current Outpatient Medications:     Alcohol Swabs (Advocate Alcohol Prep Pads) 70 % pads, Use 1 each 4 (Four) Times a Day., Disp: 400 each, Rfl: 3    ammonium lactate (AmLactin) 12 % lotion, Apply 1 Application topically to the appropriate area as directed As Needed for Dry Skin., Disp: , Rfl:     ascorbic acid (VITAMIN C) 1000 MG tablet, Take 2 tablets by mouth Daily., Disp: , Rfl:     aspirin 81 MG EC tablet, Take 1 tablet by mouth Daily., Disp: 90 tablet, Rfl: 3    atorvastatin (LIPITOR) 20 MG tablet, Take 1 tablet by mouth Every Night., Disp: 90 tablet, Rfl: 1    Calcium Carbonate (CALCIUM 600 PO), Take 1 tablet by mouth Daily., Disp: , Rfl:     ciclopirox (LOPROX) 1 % shampoo, Apply 1 Application topically to the appropriate area as directed 3 (Three) Times a Week., Disp: , Rfl:     Cyanocobalamin (Vitamin B-12) 1000 MCG sublingual tablet, Place 1 tablet under the tongue Daily., Disp: , Rfl:     cyclobenzaprine (FLEXERIL) 10 MG tablet, Take 1 tablet by mouth 3 (Three) Times a Day As Needed for Muscle Spasms., Disp: 30 tablet, Rfl: 1    Dextrose, Diabetic Use, (Glucose) 1 g chewable tablet, Chew 1 tablet As Needed., Disp: , Rfl:     FINASTERIDE PO, Take 4 mg by mouth Daily., Disp: , Rfl:     fluticasone (FLONASE) 50 MCG/ACT nasal spray, 2 sprays into the nostril(s) as directed by provider Daily As Needed for Allergies., Disp: 48 g, Rfl: 1    HYDROcodone-acetaminophen (NORCO) 5-325 MG per tablet, Take 1 tablet by mouth Every 6 (Six) Hours As Needed for Moderate Pain for up to 30 doses., Disp: 30 tablet, Rfl: 0    hydrocortisone 2.5 % ointment, Apply 1 Application topically to the  appropriate area as directed 2 (Two) Times a Day., Disp: , Rfl:     insulin aspart (NovoLOG FlexPen) 100 UNIT/ML solution pen-injector sc pen, Inject 5-6 Units under the skin into the appropriate area as directed 3 (Three) Times a Day With Meals., Disp: 15 mL, Rfl: 1    Insulin Pen Needle (B-D UF III MINI PEN NEEDLES) 31G X 5 MM misc, For use with pen device up to 4 times a day. Can substitute based on availability and insurance., Disp: 500 each, Rfl: 2    latanoprost (XALATAN) 0.005 % ophthalmic solution, Administer 1 drop to both eyes Every Night., Disp: , Rfl:     linagliptin (Tradjenta) 5 MG tablet tablet, Take 1 tablet by mouth Daily., Disp: 90 tablet, Rfl: 1    Lumigan 0.01 % ophthalmic drops, , Disp: , Rfl:     Lutein 20 MG tablet, Take 1 tablet by mouth Daily., Disp: , Rfl:     Magnesium 250 MG tablet, Take 1 tablet by mouth Every Night., Disp: , Rfl:     Multiple Vitamins-Minerals (ZINC PO), Take 50 mg by mouth Daily., Disp: , Rfl:     nitroglycerin (Nitrostat) 0.4 MG SL tablet, Place 1 tablet under the tongue Every 5 (Five) Minutes As Needed for Chest Pain. Indications: Acute Angina Pectoris, Disp: 25 tablet, Rfl: 3    NON FORMULARY, Ketamin 4%/Gabapentin 6%/clonidine 0.2%/nifedipine 2% 1-2 PUMPS to affected area 3-4 times daily., Disp: , Rfl:     NON FORMULARY, Fluconazole/terbinafine/Ibuprofen/DMSO 2/3/3/50% with vitamin D 95429 Iu/100ml  1 Application to toe nails 2 times daily, Disp: , Rfl:     pantoprazole (PROTONIX) 20 MG EC tablet, Take 1 tablet by mouth Daily., Disp: 90 tablet, Rfl: 1    Patiromer Sorbitex Calcium (Veltassa) 8.4 g pack, Take 1 packet by mouth Daily., Disp: , Rfl:     propranolol LA (INDERAL LA) 60 MG 24 hr capsule, Take 1 capsule by mouth Daily., Disp: 90 capsule, Rfl: 1    Riboflavin 400 MG tablet, Take 1 tablet by mouth Every Night., Disp: , Rfl:     silodosin (RAPAFLO) 4 MG capsule capsule, Take 1 capsule by mouth Daily With Breakfast., Disp: , Rfl:     Testosterone Cypionate  (DEPOTESTOTERONE CYPIONATE) 200 MG/ML injection, Inject 1 mL into the appropriate muscle as directed by prescriber Every 14 (Fourteen) Days., Disp: , Rfl:     venlafaxine XR (EFFEXOR-XR) 37.5 MG 24 hr capsule, Take 1 capsule by mouth Daily for 165 days. (Patient taking differently: Take 2 capsules by mouth Daily.), Disp: 30 capsule, Rfl: 1    VIAGRA 100 MG tablet, Take 1 tablet by mouth As Needed for erectile dysfunction (1 tablet prior to planned intercourse.)., Disp: 24 tablet, Rfl: 3    VITAMIN E 400 UNIT capsule, Take 1 capsule by mouth Daily., Disp: , Rfl:     Insulin Glargine (Lantus SoloStar) 100 UNIT/ML injection pen, Inject 38 Units under the skin into the appropriate area as directed Daily., Disp: , Rfl:     rivastigmine (EXELON) 4.6 MG/24HR patch, Place 1 patch on the skin as directed by provider Daily., Disp: 30 patch, Rfl: 0   Assessment:                Plan:          ICD-10-CM ICD-9-CM   1. Coronary artery disease involving native coronary artery of native heart without angina pectoris  I25.10 414.01   2. Primary hypertension  I10 401.9   3. Mixed hyperlipidemia  E78.2 272.2     1. Coronary artery disease involving native coronary artery of native heart without angina pectoris  No angina pectoris    2. Primary hypertension  Patient understands importance of blood pressure check at home which patient does regularly and the blood pressures are well under control to the level of less than 140/90      3. Mixed hyperlipidemia  Continue current treatment      1 yr  COUNSELING:    Earl Hernández was given to patient for the following topics: diagnostic results, risk factor reductions, impressions, risks and benefits of treatment options and importance of treatment compliance .       SMOKING COUNSELING:        Dictated using Dragon dictation

## 2024-07-08 NOTE — ED PROVIDER NOTES
EMERGENCY DEPARTMENT MD ATTESTATION NOTE    Room Number:  44/44  PCP: Avery Velazquez MD  Independent Historians: Patient and Family    HPI:  A complete HPI/ROS/PMH/PSH/SH/FH are unobtainable due to: None    Chronic or social conditions impacting patient care (Social Determinants of Health): None      Context: Earl Marc is a 79 y.o. male with a medical history of diabetes, asthma, hyperlipidemia who presents to the ED c/o acute headache.  The patient reports he has been having left-sided headaches.  He reports retro-orbital headache on the left for the last 3 days.  He states that he has been seeing neurology as an outpatient.  He went to the cardiologist office today and persistent headache.  He went to urgent care and was directed here.  He denies any weakness or numbness.  He does report sometimes when he has headaches he does not see all of the words on the page.  He states his neurologist is aware of that.        Review of prior external notes (non-ED) -and- Review of prior external test results outside of this encounter:  Neurology note 6/14/2024 with asymptomatic left carotid stenosis, word finding difficulty and headaches.    Prescription drug monitoring program review:           PHYSICAL EXAM    I have reviewed the triage vital signs and nursing notes.    ED Triage Vitals [07/08/24 1304]   Temp Heart Rate Resp BP SpO2   98.1 °F (36.7 °C) 60 20 (!) 192/92 97 %      Temp src Heart Rate Source Patient Position BP Location FiO2 (%)   Tympanic Monitor -- -- --       Physical Exam  GENERAL: Awake, alert, no acute distress  SKIN: Warm, dry  HENT: Normocephalic, atraumatic  EYES: no scleral icterus  CV: regular rhythm, regular rate  RESPIRATORY: normal effort, lungs clear  ABDOMEN: soft, nontender, nondistended  MUSCULOSKELETAL: no deformity  NEURO: alert, moves all extremities, follows commands, no facial asymmetry            MEDICATIONS GIVEN IN ER  Medications   prochlorperazine (COMPAZINE)  injection 10 mg (10 mg Intravenous Given 7/8/24 1347)   diphenhydrAMINE (BENADRYL) injection 25 mg (25 mg Intravenous Given 7/8/24 1347)   sodium chloride 0.9 % bolus 500 mL (0 mL Intravenous Stopped 7/8/24 1436)   magnesium sulfate 2g/50 mL (PREMIX) infusion (0 g Intravenous Stopped 7/8/24 1618)         ORDERS PLACED DURING THIS VISIT:  Orders Placed This Encounter   Procedures    CT Head Without Contrast    Comprehensive Metabolic Panel    CBC Auto Differential    Sedimentation Rate    CBC & Differential         PROCEDURES  Procedures            PROGRESS, DATA ANALYSIS, CONSULTS, AND MEDICAL DECISION MAKING  All labs have been independently interpreted by me.  All radiology studies have been reviewed by me. All EKG's have been independently viewed and interpreted by me.  Discussion below represents my analysis of pertinent findings related to patient's condition, differential diagnosis, treatment plan and final disposition.    Differential diagnosis includes but is not limited to stroke, TIA, intracranial hemorrhage, migraine headache, temporal arteritis.    Clinical Scores:                   ED Course as of 07/09/24 0757   Mon Jul 08, 2024   1422 WBC: 10.64 [DC]   1422 Hemoglobin: 16.4 [DC]   1422 Hematocrit: 50.3 [DC]   1430 Creatinine: 1.21 [DC]   1430 Sodium: 138 [DC]   1430 Potassium: 3.8 [DC]   1430 Glucose(!): 112 [DC]   1438 CT Head Without Contrast  My independent interpretation of the imaging study is no acute hemorrhage [TR]   1452 Sed Rate: 9 [DC]   1509 Patient reevaluated.  He reports some improvement of his headache but not completely resolved.  Discussed lab and imaging results with patient and spouse who is at bedside. [DC]   1633 Pt reports feeling improved and ready for discharge home. [DC]      ED Course User Index  [DC] Stacie Soto PA  [TR] Joesph Wilkinson MD       MDM: Plan to obtain chemistries and blood counts as well as CT of the head.  Will add sed rate to rule out temporal  arteritis.  His symptoms sound chronic although he has had an exacerbation over the last 3 days.  Plan to give him a migraine cocktail and reevaluate.      COMPLEXITY OF CARE      Please note that portions of this document were completed with a voice recognition program.    Note Disclaimer: At [facility], we believe that sharing information builds trust and better relationships. You are receiving this note because you recently visited [facility]. It is possible you will see health information before a provider has talked with you about it. This kind of information can be easy to misunderstand. To help you fully understand what it means for your health, we urge you to discuss this note with your provider.         Joesph Wilkinson MD  07/08/24 [id number]       Joesph Wilkinson MD  07/09/24 0646

## 2024-07-08 NOTE — ED PROVIDER NOTES
EMERGENCY DEPARTMENT ENCOUNTER      PCP: Avery Velazquez MD  Patient Care Team:  Avery Velazquez MD as PCP - General (Internal Medicine)   Independent Historians: Patient, spouse at bedside    HPI:  Chief Complaint: Headache  A complete HPI/ROS/PMH/PSH/SH/FH are unobtainable due to: None    Chronic or social conditions impacting patient care (social determinants of health): None    Context: Earl Marc is a 79 y.o. male who presents to the ED c/o headache x 3 days.  He was diagnosed with migraines about 6 months ago.  He takes Tylenol at home for headache.  He is not on any preventative or acute medications specifically for migraines.  He denies any fall or head injury.  He does not take any blood thinners.  Patient states this morning his headache seem to be improved and he went to follow-up with his cardiologist but began having a headache at that time that has persistently been worsening over the past several hours.  He attempted to go to urgent care but was referred to the ER for further evaluation.  Denies numbness, weakness, increased speech difficulty or acute vision changes.    Review of prior external notes and/or external test results outside of this encounter: Reviewed consult note from Dr. Mcclellan, neurology on 2/21/2024.  Note states suspicion of migrainous aphasia but ultimately the cause of his acute confusion was unknown.  He did have reassuring MRI and CTA.      PAST MEDICAL HISTORY  Active Ambulatory Problems     Diagnosis Date Noted    Bell's palsy 03/10/2016    Persistent insomnia 03/10/2016    Osteoarthritis of cervical spine 03/10/2016    Diabetic peripheral neuropathy 03/10/2016    Dyslipidemia 03/10/2016    Steatosis of liver 03/10/2016    Fibromyalgia 03/10/2016    Gastroesophageal reflux disease without esophagitis 03/10/2016    Hypertensive kidney disease with stage 3a chronic kidney disease 03/10/2016    Hypogonadism in male 03/10/2016    Renal insufficiency  03/10/2016    Vitamin D deficiency 03/10/2016    Benign non-nodular prostatic hyperplasia with lower urinary tract symptoms 03/10/2016    S/P drug eluting coronary stent placement 04/13/2016    Coronary artery disease involving native coronary artery of native heart 06/21/2016    Malignant neoplasm of skin 10/03/2016    Type 2 diabetes mellitus with hyperglycemia, with long-term current use of insulin 04/04/2017    Diabetes mellitus 04/04/2017    Chronic insomnia 12/20/2017    Other specified glaucoma 04/01/2017    Vertigo 03/14/2018    Epigastric pain 06/11/2018    Chest pain with high risk for cardiac etiology 05/26/2021    Precordial pain 05/25/2021    S/P arthroscopy of shoulder 11/19/2019    Peripheral neuropathy 03/24/2022    Stage 3b chronic kidney disease 03/24/2022    Chest pain 01/19/2024    Stroke-like symptoms 02/20/2024    Acute back pain 06/09/2024    Thoracic 12 compression fracture, closed, initial encounter 06/10/2024     Resolved Ambulatory Problems     Diagnosis Date Noted    Hyperuricemia 03/10/2016    Erectile dysfunction of nonorganic origin 03/10/2016    Type 2 diabetes mellitus without complication 03/10/2016    Precordial pain 03/10/2016    Abnormal nuclear stress test 03/25/2016    Coronary artery disease involving native coronary artery with angina pectoris 03/27/2016    S/P coronary angioplasty 04/05/2016    Chest pain 06/14/2016    Dizziness 06/21/2016    Lateral epicondylitis 12/19/2012    Long-term insulin use in type 2 diabetes 10/04/2016    Type II diabetes circulatory disorder causing erectile dysfunction 02/13/2017    Sialadenitis 06/28/2017    Acute bronchitis due to other specified organisms 11/14/2017    Chest pain 10/16/2018    Other forms of angina pectoris 10/16/2018    Trigger point of left shoulder region 03/02/2023     Past Medical History:   Diagnosis Date    Abnormal cardiovascular stress test     Acid reflux     Arthritis     Asthma     Cancer     Chronic kidney disease      Colon polyp     Congenital heart disease     COPD (chronic obstructive pulmonary disease) 2021    Coronary artery disease     Diabetic neuropathy associated with type 2 diabetes mellitus     Erectile dysfunction     Fatty liver disease, nonalcoholic     Gastritis     Glaucoma     Hyperlipidemia     Hypertension     Hypogonadism male     Infectious viral hepatitis Hep A July 1959    Migraines 09/2023    Myocardial infarction 2018    Type 2 diabetes mellitus        The patient has started, but not completed, their COVID-19 vaccination series.    PAST SURGICAL HISTORY  Past Surgical History:   Procedure Laterality Date    CARDIAC CATHETERIZATION N/A 03/25/2016    Procedure: Left Heart Cath;  Surgeon: Maria E Gilbert MD;  Location:  [facility];  Service:     CARDIAC CATHETERIZATION N/A 03/25/2016    Procedure: Coronary angiography;  Surgeon: Maria E Gilbert MD;  Location:  [facility];  Service:     CARDIAC CATHETERIZATION N/A 03/28/2016    Procedure: Coronary angiography;  Surgeon: Maria E Gilbert MD;  Location:  [facility];  Service:     CARDIAC CATHETERIZATION N/A 03/28/2016    Procedure: Stent MOISE coronary;  Surgeon: Maria E Gilbert MD;  [facility];  Service:     CARDIAC CATHETERIZATION N/A 10/18/2018    Procedure: Coronary angiography  no LV gram d/t CKD;  Surgeon: Maria E Gilbert MD;  Location:  [facility]ATH INV[facility];  Service: Cardiology    CARDIAC CATHETERIZATION N/A 10/18/2018    Procedure: Left Heart Cath;  Surgeon: Maria E Gilbert MD;  Location:  [facility];  Service: Cardiology    CARDIAC CATHETERIZATION N/A 10/18/2018    Procedure: Stent MOISE coronary;  Surgeon: Maria E Gilbert MD;  [facility]: [facility];  Service: Cardiology    CARDIAC CATHETERIZATION N/A 05/28/2021    Procedure: Coronary angiography;  Surgeon: Maria E Gilbert,  MD;  Location: [facility] CATH INVASIVE LOCATION;  Service: Cardiovascular;  Laterality: N/A;    CARDIAC CATHETERIZATION N/A 2021    Procedure: Left Heart Cath;  Surgeon: Maria E Gilbert MD;  [facility];  Service: Cardiovascular;  Laterality: N/A;    CARDIAC CATHETERIZATION N/A 2021    Procedure: Left ventriculography;  Surgeon: Maria E Gilbert MD;  Location[facility] C[facility];  Service: Cardiovascular;  Laterality: N/A;    CARDIAC CATHETERIZATION N/A 2021    Procedure: Stent MOISE coronary;  Surgeon: Maria E Gilbert MD;  Location: [facility] CATH INVASIVE LOCATION;  Service: Cardiovascular;  Laterality: N/A;    CARDIAC CATHETERIZATION N/A 2024    Procedure: Left Heart Cath;  Surgeon: Maria E Gilbert MD;  Location: [facility] CATH INVASIVE LOCATION;  Service: Cardiovascular;  Laterality: N/A;    CARDIAC CATHETERIZATION N/A 2024    Procedure: Coronary angiography;  Surgeon: Maria E Gilbert MD;  Location: [facility] CATH INVASIVE LOCATION;  Service: Cardiovascular;  Laterality: N/A;    CARDIAC CATHETERIZATION N/A 2024    Procedure: Left ventriculography;  Surgeon: Maria E Gilbert MD;  Location: [facility] CATH INVASIVE LOCATION;  Service: Cardiovascular;  Laterality: N/A;    CAROTID STENT      CORONARY ANGIOPLASTY      CORONARY STENT PLACEMENT  3/28/2016    EYE SURGERY  2017    NECK EXPLORATION N/A     MD RT/LT HEART CATHETERS N/A 10/18/2018    Procedure: Percutaneous Coronary Intervention;  Surgeon: Maria E Gilbert MD;  Location: [facility] CATH INVASIVE LOCATION;  Service: Cardiology         FAMILY HISTORY  Family History   Problem Relation Age of Onset    Breast cancer Daughter     Heart attack Mother          10/31/1980    Hypertension Mother     Heart disease Mother     Thyroid disease Mother     Lupus Mother     Early death Mother     Miscarriages / Stillbirths Mother         Miscarriage 1944    Heart failure  Mother     Heart attack Brother     Heart disease Brother     Hyperlipidemia Brother     Cancer Brother         Due to Agent [place], [place]    Hypertension Brother     Depression Father     COPD Father     Stroke Maternal Grandmother     Cancer Maternal Grandmother         Lung Cancer non-smoker    Diabetes Brother     Heart disease Brother     Hypertension Brother     Kidney disease Brother     Heart disease Brother     Hypertension Brother          SOCIAL HISTORY  Social History     Socioeconomic History    Marital status:      Spouse name: Ivette    Number of children: 1   Tobacco Use    Smoking status: Never     Passive exposure: Past    Smokeless tobacco: Never    Tobacco comments:     Never   Vaping Use    Vaping status: Never Used   Substance and Sexual Activity    Alcohol use: No    Drug use: Never    Sexual activity: Defer         ALLERGIES  Ace inhibitors, Hydrogenated palm oil glycerides, Lanolin, Other, Prazosin, and Nsaids        REVIEW OF SYSTEMS  Review of Systems   Constitutional:  Negative for fever.   Respiratory:  Negative for shortness of breath.    Cardiovascular:  Negative for chest pain.   Gastrointestinal:  Negative for abdominal pain.   Neurological:  Positive for headaches. Negative for facial asymmetry, weakness and numbness.        All systems reviewed and negative except for those discussed in HPI.       PHYSICAL EXAM    I have reviewed the triage vital signs and nursing notes.    ED Triage Vitals [07/08/24 1304]   Temp Heart Rate Resp BP SpO2   98.1 °F (36.7 °C) 60 20 (!) 192/92 97 %      Temp src Heart Rate Source Patient Position BP Location FiO2 (%)   Tympanic Monitor -- -- --       Physical Exam  GENERAL: alert, no acute distress  SKIN: Warm, dry  HENT: Normocephalic, atraumatic  EYES: no scleral icterus  CV: regular rhythm, regular rate  RESPIRATORY: normal effort, lungs clear  ABDOMEN: soft, nontender, nondistended  MUSCULOSKELETAL: no deformity  NEURO: Alert and  oriented, no facial droop, speech clear, no dysarthria or aphasia, moves all extremities well, sensation intact light touch all extremities, no focal deficits            LAB RESULTS  Recent Results (from the past 24 hour(s))   Comprehensive Metabolic Panel    Collection Time: 07/08/24  1:40 PM    Specimen: Blood   Result Value Ref Range    Glucose 112 (H) 65 - 99 mg/dL    BUN 13 8 - 23 mg/dL    Creatinine 1.21 0.76 - 1.27 mg/dL    Sodium 138 136 - 145 mmol/L    Potassium 3.8 3.5 - 5.2 mmol/L    Chloride 101 98 - 107 mmol/L    CO2 27.0 22.0 - 29.0 mmol/L    Calcium 9.5 8.6 - 10.5 mg/dL    Total Protein 6.6 6.0 - 8.5 g/dL    Albumin 4.0 3.5 - 5.2 g/dL    ALT (SGPT) 20 1 - 41 U/L    AST (SGOT) 19 1 - 40 U/L    Alkaline Phosphatase 87 39 - 117 U/L    Total Bilirubin 0.7 0.0 - 1.2 mg/dL    Globulin 2.6 gm/dL    A/G Ratio 1.5 g/dL    BUN/Creatinine Ratio 10.7 7.0 - 25.0    Anion Gap 10.0 5.0 - 15.0 mmol/L    eGFR 60.9 >60.0 mL/min/1.73   CBC Auto Differential    Collection Time: 07/08/24  1:40 PM    Specimen: Blood   Result Value Ref Range    WBC 10.64 3.40 - 10.80 10*3/mm3    RBC 5.91 (H) 4.14 - 5.80 10*6/mm3    Hemoglobin 16.4 13.0 - 17.7 g/dL    Hematocrit 50.3 37.5 - 51.0 %    MCV 85.1 79.0 - 97.0 fL    MCH 27.7 26.6 - 33.0 pg    MCHC 32.6 31.5 - 35.7 g/dL    RDW 13.9 12.3 - 15.4 %    RDW-SD 42.7 37.0 - 54.0 fl    MPV 9.3 6.0 - 12.0 fL    Platelets 191 140 - 450 10*3/mm3    Neutrophil % 72.7 42.7 - 76.0 %    Lymphocyte % 17.1 (L) 19.6 - 45.3 %    Monocyte % 7.6 5.0 - 12.0 %    Eosinophil % 1.6 0.3 - 6.2 %    Basophil % 0.5 0.0 - 1.5 %    Immature Grans % 0.5 0.0 - 0.5 %    Neutrophils, Absolute 7.74 (H) 1.70 - 7.00 10*3/mm3    Lymphocytes, Absolute 1.82 0.70 - 3.10 10*3/mm3    Monocytes, Absolute 0.81 0.10 - 0.90 10*3/mm3    Eosinophils, Absolute 0.17 0.00 - 0.40 10*3/mm3    Basophils, Absolute 0.05 0.00 - 0.20 10*3/mm3    Immature Grans, Absolute 0.05 0.00 - 0.05 10*3/mm3    nRBC 0.0 0.0 - 0.2 /100 WBC    Sedimentation Rate    Collection Time: 07/08/24  1:40 PM    Specimen: Blood   Result Value Ref Range    Sed Rate 9 0 - 20 mm/hr       Ordered the above labs and independently reviewed and interpreted the results.        RADIOLOGY  CT Head Without Contrast    Result Date: 7/8/2024  CT HEAD WITHOUT CONTRAST  HISTORY: Headache for 3 days.  COMPARISON: CT head 6/9/2024.  FINDINGS: The brain and ventricles are symmetrical. There is moderate small vessel ischemic disease. There is mild diffuse atrophy with mild secondary ventriculomegaly. Mild to moderate vascular calcification is noted. There is no evidence of acute infarction or of intracranial hemorrhage. Further evaluation could be performed with a MRI examination of the brain as indicated.    Radiation dose reduction techniques were utilized, including automated exposure control and exposure modulation based on body size.   TThis report was finalized on 7/8/2024 2:46 PM by Dr. Howard Flood M.D on Workstation: [id number]       I ordered the above noted radiological studies. Independently reviewed and interpreted by me.  See dictation for official radiology interpretation.      PROCEDURES    Procedures      MEDICATIONS GIVEN IN ER    Medications   prochlorperazine (COMPAZINE) injection 10 mg (10 mg Intravenous Given 7/8/24 1347)   diphenhydrAMINE (BENADRYL) injection 25 mg (25 mg Intravenous Given 7/8/24 1347)   sodium chloride 0.9 % bolus 500 mL (0 mL Intravenous Stopped 7/8/24 1436)   magnesium sulfate 2g/50 mL (PREMIX) infusion (0 g Intravenous Stopped 7/8/24 1618)         PROGRESS, DATA ANALYSIS, CONSULTS, AND MEDICAL DECISION MAKING    All labs have been independently reviewed and interpreted by me.  All radiology studies have been independently reviewed and interpreted by me and discussed with radiologist dictating the report.   EKG's independently reviewed and interpreted by me.  Discussion below represents my analysis of pertinent findings related to  patient's condition, differential diagnosis, treatment plan and final disposition.    Differential diagnosis for headache includes but is not limited to:    Migraine, tension headache, cluster headache, meningitis, ICH, acute obstructive hydrocephalus, intracranial mass, CO poisoning, CVA, cerebral venous thrombosis, HTN emergency, giant cell arteritis, idiopathic intracranial hypertension, acute glaucoma, acute sinusitis, cavernous sinus thrombosis, carotid artery dissection, trigeminal neuralgia, post LP headache, dehydration      ED Course as of 07/08/24 1954 Mon Jul 08, 2024   1422 WBC: 10.64 [DC]   1422 Hemoglobin: 16.4 [DC]   1422 Hematocrit: 50.3 [DC]   1430 Creatinine: 1.21 [DC]   1430 Sodium: 138 [DC]   1430 Potassium: 3.8 [DC]   1430 Glucose(!): 112 [DC]   1438 CT Head Without Contrast  My independent interpretation of the imaging study is no acute hemorrhage [TR]   1452 Sed Rate: 9 [DC]   1509 Patient reevaluated.  He reports some improvement of his headache but not completely resolved.  Discussed lab and imaging results with patient and spouse who is at bedside. [DC]   1633 Pt reports feeling improved and ready for discharge home. [DC]      ED Course User Index  [DC] Stacie Soto PA  [TR] Joesph Wilkinson MD             AS OF 19:54 EDT VITALS:    BP - 167/74  HR - 62  TEMP - 98.1 °F (36.7 °C) (Tympanic)  O2 SATS - 94%        DIAGNOSIS  Final diagnoses:   Acute nonintractable headache, unspecified headache type         DISPOSITION  ED Disposition       ED Disposition   Discharge    Condition   Stable    Comment   --                  Note Disclaimer: At [facility], we believe that sharing information builds trust and better relationships. You are receiving this note because you recently visited [facility]. It is possible you will see health information before a provider has talked with you about it. This kind of information can be easy to misunderstand. To help you fully understand what it  means for your health, we urge you to discuss this note with your provider.         Stacie Soto PA  07/08/24 1955

## 2024-07-09 ENCOUNTER — APPOINTMENT (OUTPATIENT)
Dept: PHYSICAL THERAPY | Facility: HOSPITAL | Age: 79
End: 2024-07-09
Payer: MEDICARE

## 2024-07-10 ENCOUNTER — OFFICE VISIT (OUTPATIENT)
Dept: FAMILY MEDICINE CLINIC | Facility: CLINIC | Age: 79
End: 2024-07-10
Payer: MEDICARE

## 2024-07-10 ENCOUNTER — APPOINTMENT (OUTPATIENT)
Dept: CT IMAGING | Facility: HOSPITAL | Age: 79
End: 2024-07-10
Payer: MEDICARE

## 2024-07-10 ENCOUNTER — HOSPITAL ENCOUNTER (OUTPATIENT)
Facility: HOSPITAL | Age: 79
Setting detail: OBSERVATION
Discharge: HOME | End: 2024-07-12
Attending: EMERGENCY MEDICINE | Admitting: INTERNAL MEDICINE
Payer: MEDICARE

## 2024-07-10 VITALS
WEIGHT: 229 LBS | HEIGHT: 73 IN | DIASTOLIC BLOOD PRESSURE: 90 MMHG | OXYGEN SATURATION: 96 % | HEART RATE: 65 BPM | SYSTOLIC BLOOD PRESSURE: 198 MMHG | BODY MASS INDEX: 30.35 KG/M2

## 2024-07-10 DIAGNOSIS — R44.1 VISUAL HALLUCINATIONS: Primary | ICD-10-CM

## 2024-07-10 DIAGNOSIS — I12.9 HYPERTENSIVE KIDNEY DISEASE WITH STAGE 3A CHRONIC KIDNEY DISEASE: ICD-10-CM

## 2024-07-10 DIAGNOSIS — R51.9 SUDDEN ONSET OF SEVERE HEADACHE: Primary | ICD-10-CM

## 2024-07-10 DIAGNOSIS — Z79.4 TYPE 2 DIABETES MELLITUS WITH HYPERGLYCEMIA, WITH LONG-TERM CURRENT USE OF INSULIN: ICD-10-CM

## 2024-07-10 DIAGNOSIS — Z87.81 HISTORY OF COMPRESSION FRACTURE OF SPINE: ICD-10-CM

## 2024-07-10 DIAGNOSIS — E11.65 TYPE 2 DIABETES MELLITUS WITH HYPERGLYCEMIA, WITH LONG-TERM CURRENT USE OF INSULIN: ICD-10-CM

## 2024-07-10 DIAGNOSIS — N18.31 HYPERTENSIVE KIDNEY DISEASE WITH STAGE 3A CHRONIC KIDNEY DISEASE: ICD-10-CM

## 2024-07-10 DIAGNOSIS — I16.0 HYPERTENSIVE URGENCY: ICD-10-CM

## 2024-07-10 DIAGNOSIS — R26.81 UNSTEADY GAIT: ICD-10-CM

## 2024-07-10 DIAGNOSIS — R51.9 ACUTE NONINTRACTABLE HEADACHE, UNSPECIFIED HEADACHE TYPE: ICD-10-CM

## 2024-07-10 LAB
ALBUMIN SERPL-MCNC: 4.1 G/DL (ref 3.5–5.2)
ALBUMIN/GLOB SERPL: 1.5 G/DL
ALP SERPL-CCNC: 95 U/L (ref 39–117)
ALT SERPL W P-5'-P-CCNC: 17 U/L (ref 1–41)
AMPHET+METHAMPHET UR QL: NEGATIVE
ANION GAP SERPL CALCULATED.3IONS-SCNC: 10.8 MMOL/L (ref 5–15)
AST SERPL-CCNC: 19 U/L (ref 1–40)
BARBITURATES UR QL SCN: NEGATIVE
BASOPHILS # BLD AUTO: 0.03 10*3/MM3 (ref 0–0.2)
BASOPHILS NFR BLD AUTO: 0.2 % (ref 0–1.5)
BENZODIAZ UR QL SCN: NEGATIVE
BILIRUB SERPL-MCNC: 0.7 MG/DL (ref 0–1.2)
BILIRUB UR QL STRIP: NEGATIVE
BUN SERPL-MCNC: 13 MG/DL (ref 8–23)
BUN/CREAT SERPL: 11.1 (ref 7–25)
CALCIUM SPEC-SCNC: 9.4 MG/DL (ref 8.6–10.5)
CANNABINOIDS SERPL QL: NEGATIVE
CHLORIDE SERPL-SCNC: 101 MMOL/L (ref 98–107)
CLARITY UR: CLEAR
CO2 SERPL-SCNC: 24.2 MMOL/L (ref 22–29)
COCAINE UR QL: NEGATIVE
COLOR UR: YELLOW
CREAT SERPL-MCNC: 1.17 MG/DL (ref 0.76–1.27)
DEPRECATED RDW RBC AUTO: 41.6 FL (ref 37–54)
EGFRCR SERPLBLD CKD-EPI 2021: 63.4 ML/MIN/1.73
EOSINOPHIL # BLD AUTO: 0.13 10*3/MM3 (ref 0–0.4)
EOSINOPHIL NFR BLD AUTO: 1 % (ref 0.3–6.2)
ERYTHROCYTE [DISTWIDTH] IN BLOOD BY AUTOMATED COUNT: 13.5 % (ref 12.3–15.4)
ETHANOL BLD-MCNC: <10 MG/DL (ref 0–10)
ETHANOL UR QL: <0.01 %
FENTANYL UR-MCNC: NEGATIVE NG/ML
GEN 5 2HR TROPONIN T REFLEX: 13 NG/L
GLOBULIN UR ELPH-MCNC: 2.8 GM/DL
GLUCOSE BLDC GLUCOMTR-MCNC: 183 MG/DL (ref 70–130)
GLUCOSE BLDC GLUCOMTR-MCNC: 278 MG/DL (ref 70–130)
GLUCOSE SERPL-MCNC: 228 MG/DL (ref 65–99)
GLUCOSE UR STRIP-MCNC: NEGATIVE MG/DL
HCT VFR BLD AUTO: 53.2 % (ref 37.5–51)
HGB BLD-MCNC: 17.5 G/DL (ref 13–17.7)
HGB UR QL STRIP.AUTO: NEGATIVE
HOLD SPECIMEN: NORMAL
HOLD SPECIMEN: NORMAL
IMM GRANULOCYTES # BLD AUTO: 0.06 10*3/MM3 (ref 0–0.05)
IMM GRANULOCYTES NFR BLD AUTO: 0.5 % (ref 0–0.5)
INR PPP: 0.94 (ref 0.9–1.1)
KETONES UR QL STRIP: NEGATIVE
LEUKOCYTE ESTERASE UR QL STRIP.AUTO: NEGATIVE
LYMPHOCYTES # BLD AUTO: 1.69 10*3/MM3 (ref 0.7–3.1)
LYMPHOCYTES NFR BLD AUTO: 13.3 % (ref 19.6–45.3)
MAGNESIUM SERPL-MCNC: 2.8 MG/DL (ref 1.6–2.4)
MCH RBC QN AUTO: 28 PG (ref 26.6–33)
MCHC RBC AUTO-ENTMCNC: 32.9 G/DL (ref 31.5–35.7)
MCV RBC AUTO: 85.3 FL (ref 79–97)
METHADONE UR QL SCN: NEGATIVE
MONOCYTES # BLD AUTO: 0.69 10*3/MM3 (ref 0.1–0.9)
MONOCYTES NFR BLD AUTO: 5.4 % (ref 5–12)
NEUTROPHILS NFR BLD AUTO: 10.14 10*3/MM3 (ref 1.7–7)
NEUTROPHILS NFR BLD AUTO: 79.6 % (ref 42.7–76)
NITRITE UR QL STRIP: NEGATIVE
NRBC BLD AUTO-RTO: 0 /100 WBC (ref 0–0.2)
OPIATES UR QL: NEGATIVE
OXYCODONE UR QL SCN: NEGATIVE
PH UR STRIP.AUTO: 7.5 [PH] (ref 5–8)
PLATELET # BLD AUTO: 211 10*3/MM3 (ref 140–450)
PMV BLD AUTO: 10.7 FL (ref 6–12)
POTASSIUM SERPL-SCNC: 3.9 MMOL/L (ref 3.5–5.2)
PROT SERPL-MCNC: 6.9 G/DL (ref 6–8.5)
PROT UR QL STRIP: ABNORMAL
PROTHROMBIN TIME: 12.8 SECONDS (ref 11.7–14.2)
QT INTERVAL: 368 MS
QTC INTERVAL: 392 MS
RBC # BLD AUTO: 6.24 10*6/MM3 (ref 4.14–5.8)
SODIUM SERPL-SCNC: 136 MMOL/L (ref 136–145)
SP GR UR STRIP: 1.01 (ref 1–1.03)
T4 FREE SERPL-MCNC: 1.21 NG/DL (ref 0.92–1.68)
TROPONIN T DELTA: -1 NG/L
TROPONIN T SERPL HS-MCNC: 14 NG/L
TSH SERPL DL<=0.05 MIU/L-ACNC: 1.98 UIU/ML (ref 0.27–4.2)
UROBILINOGEN UR QL STRIP: ABNORMAL
WBC NRBC COR # BLD AUTO: 12.74 10*3/MM3 (ref 3.4–10.8)
WHOLE BLOOD HOLD COAG: NORMAL
WHOLE BLOOD HOLD SPECIMEN: NORMAL

## 2024-07-10 PROCEDURE — 80307 DRUG TEST PRSMV CHEM ANLYZR: CPT | Performed by: EMERGENCY MEDICINE

## 2024-07-10 PROCEDURE — 1160F RVW MEDS BY RX/DR IN RCRD: CPT | Performed by: NURSE PRACTITIONER

## 2024-07-10 PROCEDURE — 96365 THER/PROPH/DIAG IV INF INIT: CPT

## 2024-07-10 PROCEDURE — 84439 ASSAY OF FREE THYROXINE: CPT | Performed by: EMERGENCY MEDICINE

## 2024-07-10 PROCEDURE — 82077 ASSAY SPEC XCP UR&BREATH IA: CPT | Performed by: EMERGENCY MEDICINE

## 2024-07-10 PROCEDURE — 36415 COLL VENOUS BLD VENIPUNCTURE: CPT

## 2024-07-10 PROCEDURE — 84443 ASSAY THYROID STIM HORMONE: CPT | Performed by: EMERGENCY MEDICINE

## 2024-07-10 PROCEDURE — 25010000002 LABETALOL 5 MG/ML SOLUTION: Performed by: EMERGENCY MEDICINE

## 2024-07-10 PROCEDURE — 82948 REAGENT STRIP/BLOOD GLUCOSE: CPT

## 2024-07-10 PROCEDURE — G0378 HOSPITAL OBSERVATION PER HR: HCPCS

## 2024-07-10 PROCEDURE — 85025 COMPLETE CBC W/AUTO DIFF WBC: CPT | Performed by: EMERGENCY MEDICINE

## 2024-07-10 PROCEDURE — 63710000001 INSULIN GLARGINE PER 5 UNITS

## 2024-07-10 PROCEDURE — 1159F MED LIST DOCD IN RCRD: CPT | Performed by: NURSE PRACTITIONER

## 2024-07-10 PROCEDURE — 93010 ELECTROCARDIOGRAM REPORT: CPT | Performed by: INTERNAL MEDICINE

## 2024-07-10 PROCEDURE — 63710000001 INSULIN LISPRO (HUMAN) PER 5 UNITS: Performed by: INTERNAL MEDICINE

## 2024-07-10 PROCEDURE — 99213 OFFICE O/P EST LOW 20 MIN: CPT | Performed by: NURSE PRACTITIONER

## 2024-07-10 PROCEDURE — 1126F AMNT PAIN NOTED NONE PRSNT: CPT | Performed by: NURSE PRACTITIONER

## 2024-07-10 PROCEDURE — 25010000002 DIPHENHYDRAMINE PER 50 MG: Performed by: EMERGENCY MEDICINE

## 2024-07-10 PROCEDURE — 25810000003 SODIUM CHLORIDE 0.9 % SOLUTION: Performed by: INTERNAL MEDICINE

## 2024-07-10 PROCEDURE — 70450 CT HEAD/BRAIN W/O DYE: CPT

## 2024-07-10 PROCEDURE — 99291 CRITICAL CARE FIRST HOUR: CPT

## 2024-07-10 PROCEDURE — 85610 PROTHROMBIN TIME: CPT | Performed by: EMERGENCY MEDICINE

## 2024-07-10 PROCEDURE — 84484 ASSAY OF TROPONIN QUANT: CPT | Performed by: EMERGENCY MEDICINE

## 2024-07-10 PROCEDURE — 25010000002 PROCHLORPERAZINE 10 MG/2ML SOLUTION: Performed by: EMERGENCY MEDICINE

## 2024-07-10 PROCEDURE — 99213 OFFICE O/P EST LOW 20 MIN: CPT

## 2024-07-10 PROCEDURE — 93005 ELECTROCARDIOGRAM TRACING: CPT | Performed by: EMERGENCY MEDICINE

## 2024-07-10 PROCEDURE — 3077F SYST BP >= 140 MM HG: CPT | Performed by: NURSE PRACTITIONER

## 2024-07-10 PROCEDURE — 25010000002 MAGNESIUM SULFATE IN D5W 1G/100ML (PREMIX) 1-5 GM/100ML-% SOLUTION: Performed by: EMERGENCY MEDICINE

## 2024-07-10 PROCEDURE — 96375 TX/PRO/DX INJ NEW DRUG ADDON: CPT

## 2024-07-10 PROCEDURE — 83735 ASSAY OF MAGNESIUM: CPT | Performed by: EMERGENCY MEDICINE

## 2024-07-10 PROCEDURE — 3079F DIAST BP 80-89 MM HG: CPT | Performed by: NURSE PRACTITIONER

## 2024-07-10 PROCEDURE — 80053 COMPREHEN METABOLIC PANEL: CPT | Performed by: EMERGENCY MEDICINE

## 2024-07-10 PROCEDURE — 81003 URINALYSIS AUTO W/O SCOPE: CPT | Performed by: EMERGENCY MEDICINE

## 2024-07-10 RX ORDER — ONDANSETRON 4 MG/1
4 TABLET, ORALLY DISINTEGRATING ORAL EVERY 6 HOURS PRN
Status: DISCONTINUED | OUTPATIENT
Start: 2024-07-10 | End: 2024-07-12 | Stop reason: HOSPADM

## 2024-07-10 RX ORDER — ACETAMINOPHEN 160 MG/5ML
650 SOLUTION ORAL EVERY 4 HOURS PRN
Status: DISCONTINUED | OUTPATIENT
Start: 2024-07-10 | End: 2024-07-12 | Stop reason: HOSPADM

## 2024-07-10 RX ORDER — MAGNESIUM SULFATE 1 G/100ML
1 INJECTION INTRAVENOUS ONCE
Status: COMPLETED | OUTPATIENT
Start: 2024-07-10 | End: 2024-07-10

## 2024-07-10 RX ORDER — AMOXICILLIN 250 MG
2 CAPSULE ORAL 2 TIMES DAILY PRN
Status: DISCONTINUED | OUTPATIENT
Start: 2024-07-10 | End: 2024-07-12 | Stop reason: HOSPADM

## 2024-07-10 RX ORDER — RIBOFLAVIN (VITAMIN B2) 100 MG
400 TABLET ORAL NIGHTLY
Status: DISCONTINUED | OUTPATIENT
Start: 2024-07-10 | End: 2024-07-12 | Stop reason: HOSPADM

## 2024-07-10 RX ORDER — POLYETHYLENE GLYCOL 3350 17 G/17G
17 POWDER, FOR SOLUTION ORAL DAILY PRN
Status: DISCONTINUED | OUTPATIENT
Start: 2024-07-10 | End: 2024-07-12 | Stop reason: HOSPADM

## 2024-07-10 RX ORDER — TAMSULOSIN HYDROCHLORIDE 0.4 MG/1
0.4 CAPSULE ORAL NIGHTLY
Status: DISCONTINUED | OUTPATIENT
Start: 2024-07-10 | End: 2024-07-12 | Stop reason: HOSPADM

## 2024-07-10 RX ORDER — LIDOCAINE 4 G/G
1 PATCH TOPICAL NIGHTLY
Status: DISCONTINUED | OUTPATIENT
Start: 2024-07-10 | End: 2024-07-12 | Stop reason: HOSPADM

## 2024-07-10 RX ORDER — ACETAMINOPHEN 325 MG/1
650 TABLET ORAL EVERY 4 HOURS PRN
Status: DISCONTINUED | OUTPATIENT
Start: 2024-07-10 | End: 2024-07-12 | Stop reason: HOSPADM

## 2024-07-10 RX ORDER — FINASTERIDE 5 MG/1
4 TABLET, FILM COATED ORAL DAILY
Status: DISCONTINUED | OUTPATIENT
Start: 2024-07-11 | End: 2024-07-10

## 2024-07-10 RX ORDER — UREA 10 %
2.5 LOTION (ML) TOPICAL NIGHTLY PRN
Status: DISCONTINUED | OUTPATIENT
Start: 2024-07-10 | End: 2024-07-10

## 2024-07-10 RX ORDER — HYDRALAZINE HYDROCHLORIDE 20 MG/ML
10 INJECTION INTRAMUSCULAR; INTRAVENOUS EVERY 6 HOURS PRN
Status: DISCONTINUED | OUTPATIENT
Start: 2024-07-10 | End: 2024-07-12 | Stop reason: HOSPADM

## 2024-07-10 RX ORDER — FLUTICASONE PROPIONATE 50 MCG
2 SPRAY, SUSPENSION (ML) NASAL DAILY PRN
Status: DISCONTINUED | OUTPATIENT
Start: 2024-07-10 | End: 2024-07-12 | Stop reason: HOSPADM

## 2024-07-10 RX ORDER — LATANOPROST 50 UG/ML
1 SOLUTION/ DROPS OPHTHALMIC NIGHTLY
Status: DISCONTINUED | OUTPATIENT
Start: 2024-07-10 | End: 2024-07-12 | Stop reason: HOSPADM

## 2024-07-10 RX ORDER — METHOCARBAMOL 750 MG/1
1 TABLET, FILM COATED ORAL 3 TIMES DAILY
COMMUNITY
Start: 2024-06-10 | End: 2024-07-12 | Stop reason: HOSPADM

## 2024-07-10 RX ORDER — ACETAMINOPHEN 650 MG/1
650 SUPPOSITORY RECTAL EVERY 4 HOURS PRN
Status: DISCONTINUED | OUTPATIENT
Start: 2024-07-10 | End: 2024-07-12 | Stop reason: HOSPADM

## 2024-07-10 RX ORDER — DEXTROSE MONOHYDRATE 25 G/50ML
25 INJECTION, SOLUTION INTRAVENOUS
Status: DISCONTINUED | OUTPATIENT
Start: 2024-07-10 | End: 2024-07-12 | Stop reason: HOSPADM

## 2024-07-10 RX ORDER — INSULIN LISPRO 100 [IU]/ML
6 INJECTION, SOLUTION INTRAVENOUS; SUBCUTANEOUS
Status: DISCONTINUED | OUTPATIENT
Start: 2024-07-11 | End: 2024-07-12 | Stop reason: HOSPADM

## 2024-07-10 RX ORDER — LEVOFLOXACIN 500 MG/1
500 TABLET, FILM COATED ORAL DAILY
COMMUNITY
Start: 2024-06-25 | End: 2024-07-12 | Stop reason: HOSPADM

## 2024-07-10 RX ORDER — ONDANSETRON 2 MG/ML
4 INJECTION INTRAMUSCULAR; INTRAVENOUS EVERY 6 HOURS PRN
Status: DISCONTINUED | OUTPATIENT
Start: 2024-07-10 | End: 2024-07-12 | Stop reason: HOSPADM

## 2024-07-10 RX ORDER — FINASTERIDE 5 MG/1
5 TABLET, FILM COATED ORAL DAILY
Status: DISCONTINUED | OUTPATIENT
Start: 2024-07-11 | End: 2024-07-12 | Stop reason: HOSPADM

## 2024-07-10 RX ORDER — BISACODYL 10 MG
10 SUPPOSITORY, RECTAL RECTAL DAILY PRN
Status: DISCONTINUED | OUTPATIENT
Start: 2024-07-10 | End: 2024-07-12 | Stop reason: HOSPADM

## 2024-07-10 RX ORDER — BISACODYL 5 MG/1
5 TABLET, DELAYED RELEASE ORAL DAILY PRN
Status: DISCONTINUED | OUTPATIENT
Start: 2024-07-10 | End: 2024-07-12 | Stop reason: HOSPADM

## 2024-07-10 RX ORDER — IBUPROFEN 600 MG/1
1 TABLET ORAL
Status: DISCONTINUED | OUTPATIENT
Start: 2024-07-10 | End: 2024-07-12 | Stop reason: HOSPADM

## 2024-07-10 RX ORDER — ASPIRIN 81 MG/1
81 TABLET ORAL DAILY
Status: DISCONTINUED | OUTPATIENT
Start: 2024-07-11 | End: 2024-07-12 | Stop reason: HOSPADM

## 2024-07-10 RX ORDER — PROCHLORPERAZINE EDISYLATE 5 MG/ML
10 INJECTION INTRAMUSCULAR; INTRAVENOUS ONCE
Status: COMPLETED | OUTPATIENT
Start: 2024-07-10 | End: 2024-07-10

## 2024-07-10 RX ORDER — CYCLOBENZAPRINE HCL 10 MG
10 TABLET ORAL 3 TIMES DAILY PRN
Status: DISCONTINUED | OUTPATIENT
Start: 2024-07-10 | End: 2024-07-12 | Stop reason: HOSPADM

## 2024-07-10 RX ORDER — ASCORBIC ACID 500 MG
2000 TABLET ORAL DAILY
Status: DISCONTINUED | OUTPATIENT
Start: 2024-07-11 | End: 2024-07-12 | Stop reason: HOSPADM

## 2024-07-10 RX ORDER — NICOTINE POLACRILEX 4 MG
15 LOZENGE BUCCAL
Status: DISCONTINUED | OUTPATIENT
Start: 2024-07-10 | End: 2024-07-12 | Stop reason: HOSPADM

## 2024-07-10 RX ORDER — PANTOPRAZOLE SODIUM 40 MG/1
40 TABLET, DELAYED RELEASE ORAL DAILY
Status: DISCONTINUED | OUTPATIENT
Start: 2024-07-11 | End: 2024-07-12 | Stop reason: HOSPADM

## 2024-07-10 RX ORDER — LATANOPROST 50 UG/ML
1 SOLUTION/ DROPS OPHTHALMIC NIGHTLY
Status: DISCONTINUED | OUTPATIENT
Start: 2024-07-10 | End: 2024-07-10 | Stop reason: SDUPTHER

## 2024-07-10 RX ORDER — INSULIN LISPRO 100 [IU]/ML
2-7 INJECTION, SOLUTION INTRAVENOUS; SUBCUTANEOUS
Status: DISCONTINUED | OUTPATIENT
Start: 2024-07-10 | End: 2024-07-12 | Stop reason: HOSPADM

## 2024-07-10 RX ORDER — VENLAFAXINE HYDROCHLORIDE 75 MG/1
75 CAPSULE, EXTENDED RELEASE ORAL DAILY
Status: DISCONTINUED | OUTPATIENT
Start: 2024-07-11 | End: 2024-07-12 | Stop reason: HOSPADM

## 2024-07-10 RX ORDER — LABETALOL HYDROCHLORIDE 5 MG/ML
20 INJECTION, SOLUTION INTRAVENOUS ONCE
Status: COMPLETED | OUTPATIENT
Start: 2024-07-10 | End: 2024-07-10

## 2024-07-10 RX ORDER — SODIUM CHLORIDE 0.9 % (FLUSH) 0.9 %
10 SYRINGE (ML) INJECTION AS NEEDED
Status: DISCONTINUED | OUTPATIENT
Start: 2024-07-10 | End: 2024-07-12 | Stop reason: HOSPADM

## 2024-07-10 RX ORDER — HYDROCODONE BITARTRATE AND ACETAMINOPHEN 5; 325 MG/1; MG/1
1 TABLET ORAL EVERY 6 HOURS PRN
Status: DISCONTINUED | OUTPATIENT
Start: 2024-07-10 | End: 2024-07-12 | Stop reason: HOSPADM

## 2024-07-10 RX ORDER — CALCIUM CARBONATE 500 MG/1
1 TABLET, CHEWABLE ORAL DAILY
Status: DISCONTINUED | OUTPATIENT
Start: 2024-07-11 | End: 2024-07-12 | Stop reason: HOSPADM

## 2024-07-10 RX ORDER — SODIUM CHLORIDE 9 MG/ML
75 INJECTION, SOLUTION INTRAVENOUS CONTINUOUS
Status: DISCONTINUED | OUTPATIENT
Start: 2024-07-10 | End: 2024-07-11

## 2024-07-10 RX ORDER — DIPHENHYDRAMINE HYDROCHLORIDE 50 MG/ML
25 INJECTION INTRAMUSCULAR; INTRAVENOUS ONCE
Status: COMPLETED | OUTPATIENT
Start: 2024-07-10 | End: 2024-07-10

## 2024-07-10 RX ORDER — ATORVASTATIN CALCIUM 20 MG/1
20 TABLET, FILM COATED ORAL NIGHTLY
Status: DISCONTINUED | OUTPATIENT
Start: 2024-07-10 | End: 2024-07-12 | Stop reason: HOSPADM

## 2024-07-10 RX ORDER — LEVOFLOXACIN 500 MG/1
500 TABLET, FILM COATED ORAL DAILY
Qty: 5 TABLET | Refills: 0 | Status: DISCONTINUED | OUTPATIENT
Start: 2024-07-11 | End: 2024-07-11

## 2024-07-10 RX ORDER — VITAMIN E 268 MG
400 CAPSULE ORAL DAILY
Status: DISCONTINUED | OUTPATIENT
Start: 2024-07-11 | End: 2024-07-12 | Stop reason: HOSPADM

## 2024-07-10 RX ADMIN — LATANOPROST 1 DROP: 50 SOLUTION OPHTHALMIC at 21:37

## 2024-07-10 RX ADMIN — Medication 400 MG: at 21:38

## 2024-07-10 RX ADMIN — INSULIN GLARGINE 30 UNITS: 100 INJECTION, SOLUTION SUBCUTANEOUS at 23:03

## 2024-07-10 RX ADMIN — PROPRANOLOL HYDROCHLORIDE 30 MG: 20 TABLET ORAL at 21:38

## 2024-07-10 RX ADMIN — MAGNESIUM SULFATE HEPTAHYDRATE 1 G: 1 INJECTION, SOLUTION INTRAVENOUS at 12:42

## 2024-07-10 RX ADMIN — LABETALOL HYDROCHLORIDE 20 MG: 5 INJECTION, SOLUTION INTRAVENOUS at 14:43

## 2024-07-10 RX ADMIN — PROCHLORPERAZINE EDISYLATE 10 MG: 5 INJECTION INTRAMUSCULAR; INTRAVENOUS at 12:42

## 2024-07-10 RX ADMIN — LIDOCAINE 1 PATCH: 4 PATCH TOPICAL at 21:41

## 2024-07-10 RX ADMIN — TAMSULOSIN HYDROCHLORIDE 0.4 MG: 0.4 CAPSULE ORAL at 21:38

## 2024-07-10 RX ADMIN — ATORVASTATIN CALCIUM 20 MG: 20 TABLET, FILM COATED ORAL at 21:38

## 2024-07-10 RX ADMIN — SODIUM CHLORIDE 75 ML/HR: 9 INJECTION, SOLUTION INTRAVENOUS at 21:54

## 2024-07-10 RX ADMIN — INSULIN LISPRO 4 UNITS: 100 INJECTION, SOLUTION INTRAVENOUS; SUBCUTANEOUS at 21:38

## 2024-07-10 RX ADMIN — DIPHENHYDRAMINE HYDROCHLORIDE 25 MG: 50 INJECTION, SOLUTION INTRAMUSCULAR; INTRAVENOUS at 12:42

## 2024-07-10 NOTE — PROGRESS NOTES
"Chief Complaint  Headache (Pt states that when he close his eyes he is seeing people and smoke and stars pt been having symptoms for 6 days )    Subjective        HPI   History of Present Illness      Earl presents with wife to [facility]ARE patient of Dr. Velazquez, new to me, here with complaint of severe headache pain that came on like a \"fire storm\" today.  He reports going to [facility] ED on 7/8/24 and was diagnosed with Acute Non-Intractable Headache:     Hospital Course:    HPI:  Chief Complaint: Headache  A complete HPI/ROS/PMH/PSH/SH/FH are unobtainable due to: None     Chronic or social conditions impacting patient care (social determinants of health): None     Context: Earl Marc is a 79 y.o. male who presents to the ED c/o headache x 3 days.  He was diagnosed with migraines about 6 months ago.  He takes Tylenol at home for headache.  He is not on any preventative or acute medications specifically for migraines.  He denies any fall or head injury.  He does not take any blood thinners.  Patient states this morning his headache seem to be improved and he went to follow-up with his cardiologist but began having a headache at that time that has persistently been worsening over the past several hours.  He attempted to go to urgent care but was referred to the ER for further evaluation.  Denies numbness, weakness, increased speech difficulty or acute vision changes.     Review of prior external notes and/or external test results outside of this encounter: Reviewed consult note from Dr. Mcclellan, neurology on 2/21/2024.  Note states suspicion of migrainous aphasia but ultimately the cause of his acute confusion was unknown.  He did have reassuring MRI and CTA.     Copied text on this note has been reviewed and revised by me on 7/10/24.      Today, he reports going to [facility] ED on Monday this week and said they gave him an injection of Benadryl, Compazine and Magnesium.   " "After he left hospital same day he went home and by yesterday morning when he woke up his headache subsided.  Then yesterday later in the morning  between 9a-10am pain started as dull pain and he took a muscle relaxor (Baclofen) and tylenol and applied ice to head around 4pm and started to feel better.   Today at home, his headache came on suddenly \"like a fire storm\" on top of head and left side of head.  His pain felt like throbbing/pressure pain, pain level 8-9/10.  He also reports feeling dizziness, with memory loss, confusion, and nausea for the past 6 days. During evaluation today, his pain feels like severe pressure on top of his head and left side of his head, pain level is 7/10.  When he moves, his pain shoots up to 9-10/10.  Nothing makes it better.  He reports having nausea, dry heaves this morning with sheer black shadows in his vision.  When reading today, he admits half of the words on the page were missing and then reappeared.  He also admits hearing music and alarm sounds.  Last night, while his eyes were closed, he started seeing smoke rising with shooting stripes, human cartoon characters on a piece of art.  He saw a women leaning on him with a man and boy in the background that appeared and disappeared and denies it was a dream.  He has history of falling out of bed about 5 weeks ago.  He denies personal and family history of headaches/migraines.  Checked his blood sugar today in office and it was 129.     Objective   Vital Signs:   Vitals:    07/10/24 1043 07/10/24 1056   BP: (!) 202/88 (!) 198/90  Comment: re-checked   BP Location: Right arm Right arm   Patient Position: Sitting Sitting   Cuff Size: Adult Adult   Pulse: 65    SpO2: 96%    Weight: 104 kg (229 lb)    Height: 185.4 cm (72.99\")             1/24/2024    10:55 AM   PHQ-2/PHQ-9 Depression Screening   Little Interest or Pleasure in Doing Things 0-->not at all   Feeling Down, Depressed or Hopeless 0-->not at all   Trouble Falling or " Staying Asleep, or Sleeping Too Much 0-->not at all   Feeling Tired or Having Little Energy 0-->not at all   Poor Appetite or Overeating 0-->not at all   Feeling Bad about Yourself - or that You are a Failure or Have Let Yourself or Your Family Down 0-->not at all   Trouble Concentrating on Things, Such as Reading the Newspaper or Watching Television 0-->not at all   Moving or Speaking So Slowly that Other People Could Have Noticed? Or the Opposite - Being So Fidgety 0-->not at all   Thoughts that You Would be Better Off Dead or of Hurting Yourself in Some Way 0-->not at all   PHQ-9: Brief Depression Severity Measure Score 0   If You Checked Off Any Problems, How Difficult Have These Problems Made It For You to Do Your Work, Take Care of Things at Home, or Get Along with Other People? not difficult at all        Physical Exam  Vitals and nursing note reviewed.   Constitutional:       General: He is not in acute distress.     Appearance: He is ill-appearing. He is not diaphoretic.   HENT:      Head: Normocephalic and atraumatic. No abrasion, contusion or masses.   Eyes:      Extraocular Movements: Extraocular movements intact.      Conjunctiva/sclera: Conjunctivae normal.      Pupils: Pupils are equal, round, and reactive to light.   Cardiovascular:      Rate and Rhythm: Normal rate and regular rhythm.      Heart sounds: Normal heart sounds. No murmur heard.  Pulmonary:      Effort: Pulmonary effort is normal. No respiratory distress.      Breath sounds: Normal breath sounds. No wheezing or rales.   Skin:     General: Skin is warm.      Comments: Facial flushing   Neurological:      General: No focal deficit present.      Mental Status: He is alert.      Cranial Nerves: No cranial nerve deficit.      Motor: No weakness.      Gait: Gait abnormal.      Comments: Alert and oriented to person, , year, president; unsteady gait - ambulates with cane   Psychiatric:         Mood and Affect: Mood normal.         Behavior:  "Behavior normal.          Result Review :     The following data was reviewed by: JL Yañez on 07/10/2024:      CBC & Differential (07/08/2024 13:40)  Comprehensive Metabolic Panel (07/08/2024 3:40)  Sedimentation Rate (07/08/2024 13:40)  CT Head Without Contrast (07/08/2024 14:33)      Assessment and Plan    Assessment & Plan  Sudden onset of severe headache    Hypertensive urgency    Unsteady gait    Recommended patient be transported by Ambulance due to hypertension urgency with sudden onset headache like \"fire storm\" with changes in vision,  feeling dizziness, with memory loss, confusion, and nausea for the past 6 days.  Patient agreed to be transported by Ambulance to [facility] today.  [facility] arrived and transported patient to [facility].      Follow Up   Return for Follow up with  after hospitalization.  Patient was given instructions and counseling regarding his condition or for health maintenance advice. Please see specific information pulled into the AVS if appropriate.  "

## 2024-07-10 NOTE — ED NOTES
Nursing report ED to floor  Earl Marc  79 y.o.  male    HPI :  HPI (Adult)  Stated Reason for Visit: Patient with hx of recent fall presents with AMS, hallucinations since 5am.  History Obtained From: patient, EMS    Chief Complaint  Chief Complaint   Patient presents with    Altered Mental Status       Admitting doctor:   Estephania Escobar MD    Admitting diagnosis:   The primary encounter diagnosis was Visual hallucinations. Diagnoses of Acute nonintractable headache, unspecified headache type, Hypertensive urgency, Type 2 diabetes mellitus with hyperglycemia, with long-term current use of insulin, Hypertensive kidney disease with stage 3a chronic kidney disease, and History of compression fracture of spine were also pertinent to this visit.    Code status:   Current Code Status       Date Active Code Status Order ID Comments User Context       Prior            Allergies:   Ace inhibitors, Hydrogenated palm oil glycerides, Lanolin, Other, Prazosin, and Nsaids    Isolation:   No active isolations    Intake and Output    Intake/Output Summary (Last 24 hours) at 7/10/2024 1541  Last data filed at 7/10/2024 1337  Gross per 24 hour   Intake 100 ml   Output 700 ml   Net -600 ml       Weight:       07/10/24  1203   Weight: 105 kg (231 lb)       Most recent vitals:   Vitals:    07/10/24 1331 07/10/24 1333 07/10/24 1401 07/10/24 1432   BP: 174/80  (!) 181/82 156/77   Pulse:  67 69 71   Resp:       Temp:       TempSrc:       SpO2:  91% 91% 95%   Weight:       Height:           Active LDAs/IV Access:   Lines, Drains & Airways       Active LDAs       Name Placement date Placement time Site Days    Peripheral IV 07/10/24 1238 Posterior;Proximal;Right Forearm 07/10/24  1238  Forearm  less than 1                    Labs (abnormal labs have a star):   Labs Reviewed   COMPREHENSIVE METABOLIC PANEL - Abnormal; Notable for the following components:       Result Value    Glucose 228 (*)     All other components within  normal limits    Narrative:     GFR Normal >60  Chronic Kidney Disease <60  Kidney Failure <15    The GFR formula is only valid for adults with stable renal function between ages 18 and 70.   URINALYSIS W/ MICROSCOPIC IF INDICATED (NO CULTURE) - Abnormal; Notable for the following components:    Protein, UA Trace (*)     All other components within normal limits    Narrative:     Urine microscopic not indicated.   MAGNESIUM - Abnormal; Notable for the following components:    Magnesium 2.8 (*)     All other components within normal limits   CBC WITH AUTO DIFFERENTIAL - Abnormal; Notable for the following components:    WBC 12.74 (*)     RBC 6.24 (*)     Hematocrit 53.2 (*)     Neutrophil % 79.6 (*)     Lymphocyte % 13.3 (*)     Neutrophils, Absolute 10.14 (*)     Immature Grans, Absolute 0.06 (*)     All other components within normal limits   POCT GLUCOSE FINGERSTICK - Abnormal; Notable for the following components:    Glucose 183 (*)     All other components within normal limits   PROTIME-INR - Normal   URINE DRUG SCREEN - Normal    Narrative:     Negative Thresholds Per Drugs Screened:    Amphetamines                 500 ng/ml  Barbiturates                 200 ng/ml  Benzodiazepines              100 ng/ml  Cocaine                      300 ng/ml  Methadone                    300 ng/ml  Opiates                      300 ng/ml  Oxycodone                    100 ng/ml  THC                           50 ng/ml  Fentanyl                       5 ng/ml      The Normal Value for all drugs tested is negative. This report includes final unconfirmed screening results to be used for medical treatment purposes only. Unconfirmed results must not be used for non-medical purposes such as employment or legal testing. Clinical consideration should be applied to any drug of abuse test, particularly when unconfirmed results are used.           TSH - Normal   T4, FREE - Normal   TROPONIN - Normal    Narrative:     High Sensitive Troponin  T Reference Range:  <14.0 ng/L- Negative Female for AMI  <22.0 ng/L- Negative Male for AMI  >=14 - Abnormal Female indicating possible myocardial injury.  >=22 - Abnormal Male indicating possible myocardial injury.   Clinicians would have to utilize clinical acumen, EKG, Troponin, and serial changes to determine if it is an Acute Myocardial Infarction or myocardial injury due to an underlying chronic condition.        RAINBOW DRAW    Narrative:     The following orders were created for panel order [place] Draw.  Procedure                               Abnormality         Status                     ---------                               -----------         ------                     Green Top (Gel)[[id number]]                                  Final result               Lavender Top[[id number]]                                     Final result               Gold Top - SST[[id number]]                                   Final result               Light Blue Top[[id number]]                                   Final result                 Please view results for these tests on the individual orders.   ETHANOL   HIGH SENSITIVITIY TROPONIN T 2HR   GREEN TOP   LAVENDER TOP   GOLD TOP - SST   LIGHT BLUE TOP   CBC AND DIFFERENTIAL    Narrative:     The following orders were created for panel order CBC & Differential.  Procedure                               Abnormality         Status                     ---------                               -----------         ------                     CBC Auto Differential[[id number]]        Abnormal            Final result                 Please view results for these tests on the individual orders.       EKG:   ECG 12 Lead Stroke Evaluation   Preliminary Result   HEART RATE=68  bpm   RR [place]=292  ms   SD [place]=485  ms   P Horizontal Axis=-51  deg   P Front Axis=39  deg   QRSD Interval=80  ms   QT Slqsbqtl=748  ms   DErD=791  ms   QRS Axis=-43  deg   T Wave Axis=34  deg   - ABNORMAL ECG -    Sinus rhythm   Abnormal R-wave progression, early transition   Left ventricular hypertrophy   Inferior infarct, old   Date and Time of Study:2024-07-10 12:28:03          Meds given in ED:   Medications   sodium chloride 0.9 % flush 10 mL (has no administration in time range)   prochlorperazine (COMPAZINE) injection 10 mg (10 mg Intravenous Given 7/10/24 1242)   diphenhydrAMINE (BENADRYL) injection 25 mg (25 mg Intravenous Given 7/10/24 1242)   magnesium sulfate in D5W 1g/100mL (PREMIX) (0 g Intravenous Stopped 7/10/24 1337)   labetalol (NORMODYNE,TRANDATE) injection 20 mg (20 mg Intravenous Given 7/10/24 1443)       Imaging results:  No radiology results for the last day    Ambulatory status:   - slide    Social issues:   Social History     Socioeconomic History    Marital status:      Spouse name: Ivette    Number of children: 1   Tobacco Use    Smoking status: Never     Passive exposure: Past    Smokeless tobacco: Never    Tobacco comments:     Never   Vaping Use    Vaping status: Never Used   Substance and Sexual Activity    Alcohol use: No    Drug use: Never    Sexual activity: Defer       Peripheral Neurovascular  Peripheral Neurovascular (Adult)  Peripheral Neurovascular WDL: .WDL except, neurovascular assessment upper, neurovascular assessment lower  LUE Neurovascular Assessment  Temperature LUE: warm  Color LUE: no discoloration  Sensation LUE: numbness present, tingling present, no tenderness  RUE Neurovascular Assessment  Temperature RUE: warm  Color RUE: no discoloration  Sensation RUE: no numbness, no tenderness, no tingling  LLE Neurovascular Assessment  Temperature LLE: warm  Color LLE: no discoloration  Sensation LLE: no numbness, no tenderness, no tingling  RLE Neurovascular Assessment  Temperature RLE: warm  Color RLE: no discoloration  Sensation RLE: no numbness, no tenderness, no tingling    Neuro Cognitive  Neuro Cognitive (Adult)  Cognitive/Neuro/Behavioral WDL: .WDL  except  Orientation: oriented x 4  Last Known Well Date: 07/10/24  Last Known Well Time: 0500  Additional Documentation: Headache Assessment (Group)  Headache Assessment  Headache Location: occipital  Severity Rating (0-10): 5  Associated Signs/Symptoms: sensory changes  NIH Stroke Scale  Interval: baseline  1a. Level of Consciousness: 0-->Alert, keenly responsive  1b. LOC Questions: 0-->Answers both questions correctly  1c. LOC Commands: 0-->Performs both tasks correctly  2. Best Gaze: 0-->Normal  3. Visual: 0-->No visual loss  4. Facial Palsy: 0-->Normal symmetrical movements  5a. Motor Arm, Left: 0-->No drift, limb holds 90 (or 45) degrees for full 10 secs  5b. Motor Arm, Right: 0-->No drift, limb holds 90 (or 45) degrees for full 10 secs  6a. Motor Leg, Left: 0-->No drift, leg holds 30 degree position for full 5 secs  6b. Motor Leg, Right: 0-->No drift, leg holds 30 degree position for full 5 secs  7. Limb Ataxia: 0-->Absent  8. Sensory: 1-->Mild-to-moderate sensory loss, patient feels pinprick is less sharp or is dull on the affected side, or there is a loss of superficial pain with pinprick, but patient is aware of being touched  9. Best Language: 0-->No aphasia, normal  10. Dysarthria: 0-->Normal  11. Extinction and Inattention (formerly Neglect): 0-->No abnormality  Total (NIH Stroke Scale): 1    Learning  Learning Assessment (Adult)  Learning Readiness and Ability: no barriers identified  Education Provided  Person Taught: patient  Teaching Method: verbal instruction  Teaching Focus: symptom/problem overview  Education Outcome Evaluation: eager to learn, acceptance expressed, verbalizes understanding    Respiratory  Respiratory (Adult)  Airway WDL: WDL  Respiratory WDL  Respiratory WDL: WDL, rhythm/pattern  Rhythm/Pattern, Respiratory: no shortness of breath reported, depth regular, pattern regular, unlabored    Abdominal Pain       Pain Assessments  Pain (Adult)  (0-10) Pain Rating: Rest: 4  Pain Location:  head    NIH Stroke Scale  NIH Stroke Scale  Interval: baseline  1a. Level of Consciousness: 0-->Alert, keenly responsive  1b. LOC Questions: 0-->Answers both questions correctly  1c. LOC Commands: 0-->Performs both tasks correctly  2. Best Gaze: 0-->Normal  3. Visual: 0-->No visual loss  4. Facial Palsy: 0-->Normal symmetrical movements  5a. Motor Arm, Left: 0-->No drift, limb holds 90 (or 45) degrees for full 10 secs  5b. Motor Arm, Right: 0-->No drift, limb holds 90 (or 45) degrees for full 10 secs  6a. Motor Leg, Left: 0-->No drift, leg holds 30 degree position for full 5 secs  6b. Motor Leg, Right: 0-->No drift, leg holds 30 degree position for full 5 secs  7. Limb Ataxia: 0-->Absent  8. Sensory: 1-->Mild-to-moderate sensory loss, patient feels pinprick is less sharp or is dull on the affected side, or there is a loss of superficial pain with pinprick, but patient is aware of being touched  9. Best Language: 0-->No aphasia, normal  10. Dysarthria: 0-->Normal  11. Extinction and Inattention (formerly Neglect): 0-->No abnormality  Total (NIH Stroke Scale): 1    Malena Nguyen RN  07/10/24 15:41 EDT

## 2024-07-10 NOTE — CONSULTS
Neurology Consult Note    Consult Date: 7/10/2024    Referring MD: Estephania Escobar, *    Reason for Consult I have been asked to see the patient in neurological consultation to render advice and opinion regarding headaches and hallucinations.     Earl Marc is a 79 y.o. male with a PMH of migraines, myocardial infarction, type 2 diabetes, HLD, HTN, COPD, persistent insomnia, h/o skin cancer presents to the ED with recurrent headache x 6 days and hallucinations. Patient states he has been having intermittent headaches that are left sided starting behind his eye and radiate to his parieto-occipital region for 6 days now. He admits to associated photophobia, phonophobia, and N/V. He denies worsening headache with position changes or bearing down. He denies any fever, chills or recent illness. He states that he has taken tylenol extra strength 6-8 pills daily for the past 6 days for headaches which barely helps. He has received a migraine cocktail since being in the ED and had resolution of his headache.     Patient states that he stopped taking his pain medications for his thoracic compression fracture sometime last week and pain is worse so it is making it more difficulty to sleep when he already has chronic insomnia. Patient woke up last night at 2 am with visual hallucinations of cartoon characters at his right side and leaning over him which he knew were not real. He then got up to take melatonin gummies. Then at 5am he woke up to the fire alarm going off and he got up to turn it off but it turned off on its own before he got the chance to. The wife lives at home with him and says she never heard the fire alarm go off. The patient denies hearing voices from the visual hallucinations. He states his most recent hallucination was around 3 pm when he saw a red wall with a purple puzzle piece moving over it. Patient denies any alcohol or illicit drug use. His blood pressure on arrival was 202/88 mmHg and  pulse was 65. Blood glucose was 228 on arrival. Patient wife states he recently had about 4 spots removed from head and neck concerning for skin cancer.    Patient was seen 2 days ago in the ER for similar headache and given migraine cocktail which improved symptoms so he went home. He also saw Dr. Danielle in February for migrainous aphasia. Imaging was reassuring. MRI demonstrated mild small vessel disease and CTA showed mild left carotid stenosis at the time. He had follow up with Dr. Danielle on  June 14 and not had any recurrent headaches at that time.     Past Medical/Surgical Hx:  Past Medical History:   Diagnosis Date    Abnormal cardiovascular stress test     Acid reflux     Arthritis     Asthma     Cancer     skin     Chronic kidney disease     Colon polyp     Congenital heart disease     COPD (chronic obstructive pulmonary disease) 2021    Old age COPD/2D hand Smoke emphysema    Coronary artery disease     Diabetes mellitus     Diabetic neuropathy associated with type 2 diabetes mellitus     Diabetic peripheral neuropathy 03/10/2016    Dyslipidemia     Erectile dysfunction     Fatty liver disease, nonalcoholic     Gastritis     Glaucoma     both eyes    Hyperlipidemia     Hypertension     Hypogonadism male     Infectious viral hepatitis Hep A July 1959    Drank water from broken water line and Grandparents house.    Migraines 09/2023    Myocardial infarction 2018    Trigger point of left shoulder region 03/02/2023    Type 2 diabetes mellitus      Past Surgical History:   Procedure Laterality Date    CARDIAC CATHETERIZATION N/A 03/25/2016    Procedure: Left Heart Cath;  Surgeon: Maria E Gilbert MD;  Location:  [facility];  Service:     CARDIAC CATHETERIZATION N/A 03/25/2016    Procedure: Coronary angiography;  Surgeon: Maria E Gilbert MD;  Location:  [facility];  Service:     CARDIAC CATHETERIZATION N/A 03/28/2016    Procedure: Coronary angiography;  Surgeon:  Maria E Gilbert MD;  Location:  [facility];  Service:     CARDIAC CATHETERIZATION N/A 03/28/2016    Procedure: Stent MOISE coronary;  Surgeon: Maria E Gilbert MD;  Location:  [facility];  Service:     CARDIAC CATHETERIZATION N/A 10/18/2018    Procedure: Coronary angiography  no LV gram d/t CKD;  Surgeon: Maria E Gilbert MD;  Location:  [facility];  Service: Cardiology    CARDIAC CATHETERIZATION N/A 10/18/2018    Procedure: Left Heart Cath;  Surgeon: Maria E Gilbert MD;  Location:  [facility];  Service: Cardiology    CARDIAC CATHETERIZATION N/A 10/18/2018    Procedure: Stent MOISE coronary;  Surgeon: Maria E Gilbert MD;  Location:  [facility];  Service: Cardiology    CARDIAC CATHETERIZATION N/A 05/28/2021    Procedure: Coronary angiography;  Surgeon: Maria E Gilbert MD;  [facility];  Service: Cardiovascular;  Laterality: N/A;    CARDIAC CATHETERIZATION N/A 05/28/2021    Procedure: Left Heart Cath;  Surgeon: Maria E Gilbert MD;  [facility];  Service: Cardiovascular;  Laterality: N/A;    CARDIAC CATHETERIZATION N/A 05/28/2021    Procedure: Left ventriculography;  Surgeon: Maria E Gilbert MD;  [facility];  Service: Cardiovascular;  Laterality: N/A;    CARDIAC CATHETERIZATION N/A 05/28/2021    Procedure: Stent MOISE coronary;  Surgeon: Maria E Gilbert MD;  [facility];  Service: Cardiovascular;  Laterality: N/A;    CARDIAC CATHETERIZATION N/A 1/19/2024    Procedure: Left Heart Cath;  Surgeon: Maria E Gilbert MD;  Location:  [facility];  Service: Cardiovascular;  Laterality: N/A;    CARDIAC CATHETERIZATION N/A 1/19/2024    Procedure: Coronary angiography;  Surgeon: Maria E Gilbert MD;  Location:  [facility];  Service:  Cardiovascular;  Laterality: N/A;    CARDIAC CATHETERIZATION N/A 1/19/2024    Procedure: Left ventriculography;  Surgeon: Maria E Gilbert MD;  Location:  [facility];  Service: Cardiovascular;  Laterality: N/A;    CAROTID STENT      CORONARY ANGIOPLASTY      CORONARY STENT PLACEMENT  3/28/2016    EYE SURGERY  9/2017    NECK EXPLORATION N/A     RI RT/LT HEART CATHETERS N/A 10/18/2018    Procedure: Percutaneous Coronary Intervention;  Surgeon: Maria E Gilbert MD;  Location:  [facility];  Service: Cardiology       Medications On Admission  Medications Prior to Admission   Medication Sig Dispense Refill Last Dose    Alcohol Swabs (Advocate Alcohol Prep Pads) 70 % pads Use 1 each 4 (Four) Times a Day. 400 each 3 7/10/2024    ammonium lactate (AmLactin) 12 % lotion Apply 1 Application topically to the appropriate area as directed As Needed for Dry Skin.   7/10/2024    ascorbic acid (VITAMIN C) 1000 MG tablet Take 2 tablets by mouth Daily.   7/10/2024    aspirin 81 MG EC tablet Take 1 tablet by mouth Daily. 90 tablet 3 7/10/2024    atorvastatin (LIPITOR) 20 MG tablet Take 1 tablet by mouth Every Night. 90 tablet 1 7/10/2024    Calcium Carbonate (CALCIUM 600 PO) Take 1 tablet by mouth Daily.   7/10/2024    ciclopirox (LOPROX) 1 % shampoo Apply 1 Application topically to the appropriate area as directed 3 (Three) Times a Week.   7/10/2024    Cyanocobalamin (Vitamin B-12) 1000 MCG sublingual tablet Place 1 tablet under the tongue Daily.   7/10/2024    cyclobenzaprine (FLEXERIL) 10 MG tablet Take 1 tablet by mouth 3 (Three) Times a Day As Needed for Muscle Spasms. 30 tablet 1 7/10/2024    Dextrose, Diabetic Use, (Glucose) 1 g chewable tablet Chew 1 tablet As Needed.   7/10/2024    FINASTERIDE PO Take 4 mg by mouth Daily.   7/9/2024    fluticasone (FLONASE) 50 MCG/ACT nasal spray 2 sprays into the nostril(s) as directed by provider Daily As Needed for Allergies. 48 g 1 7/10/2024     HYDROcodone-acetaminophen (NORCO) 5-325 MG per tablet Take 1 tablet by mouth Every 6 (Six) Hours As Needed for Moderate Pain for up to 30 doses. 30 tablet 0 7/10/2024    hydrocortisone 2.5 % ointment Apply 1 Application topically to the appropriate area as directed 2 (Two) Times a Day.   7/10/2024    insulin aspart (NovoLOG FlexPen) 100 UNIT/ML solution pen-injector sc pen Inject 5-6 Units under the skin into the appropriate area as directed 3 (Three) Times a Day With Meals. 15 mL 1 7/9/2024    Insulin Glargine (Lantus SoloStar) 100 UNIT/ML injection pen Inject 32 Units under the skin into the appropriate area as directed Daily. (Patient taking differently: Inject 38 Units under the skin into the appropriate area as directed Daily.) 30 mL 1 7/9/2024    Insulin Pen Needle (B-D UF III MINI PEN NEEDLES) 31G X 5 MM misc For use with pen device up to 4 times a day. Can substitute based on availability and insurance. 500 each 2 7/10/2024    latanoprost (XALATAN) 0.005 % ophthalmic solution Administer 1 drop to both eyes Every Night.   7/9/2024    levoFLOXacin (LEVAQUIN) 500 MG tablet Take 1 tablet by mouth Daily.   7/10/2024    linagliptin (Tradjenta) 5 MG tablet tablet Take 1 tablet by mouth Daily. 90 tablet 1 7/9/2024    Lumigan 0.01 % ophthalmic drops    7/10/2024    Lutein 20 MG tablet Take 1 tablet by mouth Daily.   7/9/2024    Magnesium 250 MG tablet Take 1 tablet by mouth Every Night.   7/9/2024    Melatonin 12 MG tablet Take 1 tablet by mouth At Night As Needed.   7/9/2024    methocarbamol (ROBAXIN) 750 MG tablet Take 1 tablet by mouth 3 (Three) Times a Day.   7/10/2024    Multiple Vitamins-Minerals (ZINC PO) Take 50 mg by mouth Daily.   7/10/2024    nitroglycerin (Nitrostat) 0.4 MG SL tablet Place 1 tablet under the tongue Every 5 (Five) Minutes As Needed for Chest Pain. Indications: Acute Angina Pectoris 25 tablet 3 7/10/2024    NON FORMULARY Ketamin 4%/Gabapentin 6%/clonidine 0.2%/nifedipine 2%  1-2 PUMPS to  affected area 3-4 times daily.   7/10/2024    NON FORMULARY Fluconazole/terbinafine/Ibuprofen/DMSO 2/3/3/50% with vitamin D 73181 Iu/100ml   1 Application to toe nails 2 times daily   7/10/2024    pantoprazole (PROTONIX) 20 MG EC tablet Take 1 tablet by mouth Daily. 90 tablet 1 7/9/2024    Patiromer Sorbitex Calcium (Veltassa) 8.4 g pack Take 1 packet by mouth Daily.   7/10/2024    propranolol LA (INDERAL LA) 60 MG 24 hr capsule Take 1 capsule by mouth Daily. 90 capsule 1 7/10/2024    Riboflavin 400 MG tablet Take 1 tablet by mouth Every Night.   7/9/2024    silodosin (RAPAFLO) 4 MG capsule capsule Take 1 capsule by mouth Daily With Breakfast.   7/10/2024    Testosterone Cypionate (DEPOTESTOTERONE CYPIONATE) 200 MG/ML injection Inject 1 mL into the appropriate muscle as directed by prescriber Every 14 (Fourteen) Days.   7/10/2024    venlafaxine XR (EFFEXOR-XR) 37.5 MG 24 hr capsule Take 1 capsule by mouth Daily for 165 days. (Patient taking differently: Take 2 capsules by mouth Daily.) 30 capsule 1 7/10/2024    VIAGRA 100 MG tablet Take 1 tablet by mouth As Needed for erectile dysfunction (1 tablet prior to planned intercourse.). 24 tablet 3 7/10/2024    VITAMIN E 400 UNIT capsule Take 1 capsule by mouth Daily.   7/10/2024       Allergies:  Allergies   Allergen Reactions    Ace Inhibitors Other (See Comments)     Other reaction(s): Cough  Other reaction(s): Cough      Hydrogenated Palm Oil Glycerides Unknown - Low Severity    Lanolin Unknown - Low Severity    Other      Lotions- anything with palm oil or lanolin per pt (verified 6/15/16 1910)    Prazosin     Nsaids Other (See Comments)     CKD Stage 3       Social Hx:  Social History     Socioeconomic History    Marital status:      Spouse name: Ivette    Number of children: 1   Tobacco Use    Smoking status: Never     Passive exposure: Past    Smokeless tobacco: Never    Tobacco comments:     Never   Vaping Use    Vaping status: Never Used   Substance and  "Sexual Activity    Alcohol use: No    Drug use: Never    Sexual activity: Defer       Family Hx:  Family History   Problem Relation Age of Onset    Breast cancer Daughter     Heart attack Mother          10/31/1980    Hypertension Mother     Heart disease Mother     Thyroid disease Mother     Lupus Mother     Early death Mother     Miscarriages / Stillbirths Mother         Miscarriage 194    Heart failure Mother     Heart attack Brother     Heart disease Brother     Hyperlipidemia Brother     Cancer Brother         Due to Agent [place], [place]    Hypertension Brother     Depression Father     COPD Father     Stroke Maternal Grandmother     Cancer Maternal Grandmother         Lung Cancer non-smoker    Diabetes Brother     Heart disease Brother     Hypertension Brother     Kidney disease Brother     Heart disease Brother     Hypertension Brother        Exam    /82   Pulse 67   Temp 98.2 °F (36.8 °C) (Oral)   Resp 18   Ht 185.4 cm (73\")   Wt 105 kg (231 lb)   SpO2 94%   BMI 30.48 kg/m²   gen: NAD, vitals reviewed  MS: oriented x3, recent/remote memory intact, normal attention/concentration, language intact, no neglect, normal fund of knowledge  CN: visual acuity grossly normal, visual fields full, PERRL, EOMI, facial sensation equal, no facial droop, hearing symmetric, palate elevates symmetrically, shoulder shrug equal, tongue midline  Motor: 5/5 throughout upper and lower extremities, normal tone  Sensation: intact to vibration and cold temperature throughout  Coordination: no dysmetria with finger to nose bilaterally  Gait: no ataxia, normal station    DATA:    Lab Results   Component Value Date    GLUCOSE 228 (H) 07/10/2024    CALCIUM 9.4 07/10/2024     07/10/2024    K 3.9 07/10/2024    CO2 24.2 07/10/2024     07/10/2024    BUN 13 07/10/2024    CREATININE 1.17 07/10/2024    EGFRIFAFRI 63 10/06/2021    EGFRIFNONA 52 (L) 10/06/2021    BCR 11.1 07/10/2024    ANIONGAP 10.8 07/10/2024 "     Lab Results   Component Value Date    WBC 12.74 (H) 07/10/2024    HGB 17.5 07/10/2024    HCT 53.2 (H) 07/10/2024    MCV 85.3 07/10/2024     07/10/2024     Lab Results   Component Value Date    LDL 19 04/22/2024    LDL 17 02/20/2024    LDL 22 01/15/2024     Lab Results   Component Value Date    HGBA1C 8.10 (H) 06/09/2024     Lab Results   Component Value Date    INR 0.94 07/10/2024    INR 1.09 05/28/2021    INR 1.12 (H) 10/18/2018    PROTIME 12.8 07/10/2024    PROTIME 13.9 05/28/2021    PROTIME 14.2 10/18/2018       Lab review:   Glucose: 228  Magnesium 2.8  TSH 1.980  T4 1.21  WBC: 12.74  UA: trace protein, rest WNL  Ethanol <0.010  UDS unremarkable    Imaging review:   CTH without contrast:  FINDINGS: The brain and ventricles are symmetrical. Moderate small  vessel ischemic disease is noted. There is no evidence of intracranial  hemorrhage, or of hydrocephalus. No focal area of decreased attenuation  to suggest acute infarction is identified. Further evaluation could be  performed with a MRI examination of the brain as indicated    Diagnoses:  Migraine headache, intractable, non-status migrainous   Visual hallucinations  Auditory hallucinations?  Persistent Insomnia  Thoracic Compression fracture  History of skin cancer with multiple spots recently removed    Comment: Patient constellation of symptoms in regards to the headache does align with migraine picture and migraines very rarely are associated with auditory and visual hallucinations. Patient has not been sleeping well since coming off pain medications for thoracic compression fracture in setting where patient has persistent insomnia at baseline. Patient did also take melatonin gummies after visual hallucinations began which can make hallucinations worse so patient should not continue to take melatonin. Head CT  reassuring, with moderate small vessel disease noted. Will obtain Brain MRI with and without contrast with patient history of skin cancer  with several spots reportedly removed 2 weeks ago in head and neck region. EEG also ordered. Patient can continue to receive migraine cocktail PRN. Psychiatry consulted.     PLAN:   Brain MRI with and without contrast  EEG  Migraine cocktail as needed  Psychiatry consult  Symptomatic pain management for thoracic compression fracture will defer to primary care team.     Recommendations discussed with Dr. Isaac and she is in agreement with plan.

## 2024-07-11 ENCOUNTER — APPOINTMENT (OUTPATIENT)
Dept: NEUROLOGY | Facility: HOSPITAL | Age: 79
End: 2024-07-11
Payer: MEDICARE

## 2024-07-11 ENCOUNTER — APPOINTMENT (OUTPATIENT)
Dept: MRI IMAGING | Facility: HOSPITAL | Age: 79
End: 2024-07-11
Payer: MEDICARE

## 2024-07-11 LAB
ANION GAP SERPL CALCULATED.3IONS-SCNC: 14.1 MMOL/L (ref 5–15)
BUN SERPL-MCNC: 12 MG/DL (ref 8–23)
BUN/CREAT SERPL: 10.6 (ref 7–25)
CALCIUM SPEC-SCNC: 8.8 MG/DL (ref 8.6–10.5)
CHLORIDE SERPL-SCNC: 104 MMOL/L (ref 98–107)
CO2 SERPL-SCNC: 19.9 MMOL/L (ref 22–29)
CREAT SERPL-MCNC: 1.13 MG/DL (ref 0.76–1.27)
DEPRECATED RDW RBC AUTO: 42 FL (ref 37–54)
EGFRCR SERPLBLD CKD-EPI 2021: 66.1 ML/MIN/1.73
ERYTHROCYTE [DISTWIDTH] IN BLOOD BY AUTOMATED COUNT: 13.7 % (ref 12.3–15.4)
GLUCOSE BLDC GLUCOMTR-MCNC: 102 MG/DL (ref 70–130)
GLUCOSE BLDC GLUCOMTR-MCNC: 114 MG/DL (ref 70–130)
GLUCOSE BLDC GLUCOMTR-MCNC: 129 MG/DL (ref 70–130)
GLUCOSE BLDC GLUCOMTR-MCNC: 164 MG/DL (ref 70–130)
GLUCOSE SERPL-MCNC: 123 MG/DL (ref 65–99)
HCT VFR BLD AUTO: 50 % (ref 37.5–51)
HGB BLD-MCNC: 16.4 G/DL (ref 13–17.7)
MCH RBC QN AUTO: 28 PG (ref 26.6–33)
MCHC RBC AUTO-ENTMCNC: 32.8 G/DL (ref 31.5–35.7)
MCV RBC AUTO: 85.5 FL (ref 79–97)
PLATELET # BLD AUTO: 182 10*3/MM3 (ref 140–450)
PMV BLD AUTO: 9.8 FL (ref 6–12)
POTASSIUM SERPL-SCNC: 4.1 MMOL/L (ref 3.5–5.2)
RBC # BLD AUTO: 5.85 10*6/MM3 (ref 4.14–5.8)
SODIUM SERPL-SCNC: 138 MMOL/L (ref 136–145)
WBC NRBC COR # BLD AUTO: 9.19 10*3/MM3 (ref 3.4–10.8)

## 2024-07-11 PROCEDURE — 95819 EEG AWAKE AND ASLEEP: CPT

## 2024-07-11 PROCEDURE — G0378 HOSPITAL OBSERVATION PER HR: HCPCS

## 2024-07-11 PROCEDURE — 70553 MRI BRAIN STEM W/O & W/DYE: CPT

## 2024-07-11 PROCEDURE — 82948 REAGENT STRIP/BLOOD GLUCOSE: CPT

## 2024-07-11 PROCEDURE — A9577 INJ MULTIHANCE: HCPCS | Performed by: STUDENT IN AN ORGANIZED HEALTH CARE EDUCATION/TRAINING PROGRAM

## 2024-07-11 PROCEDURE — 95819 EEG AWAKE AND ASLEEP: CPT | Performed by: PSYCHIATRY & NEUROLOGY

## 2024-07-11 PROCEDURE — 0 GADOBENATE DIMEGLUMINE 529 MG/ML SOLUTION: Performed by: STUDENT IN AN ORGANIZED HEALTH CARE EDUCATION/TRAINING PROGRAM

## 2024-07-11 PROCEDURE — 80048 BASIC METABOLIC PNL TOTAL CA: CPT | Performed by: INTERNAL MEDICINE

## 2024-07-11 PROCEDURE — 63710000001 INSULIN GLARGINE PER 5 UNITS

## 2024-07-11 PROCEDURE — 85027 COMPLETE CBC AUTOMATED: CPT | Performed by: INTERNAL MEDICINE

## 2024-07-11 PROCEDURE — 63710000001 INSULIN LISPRO (HUMAN) PER 5 UNITS: Performed by: INTERNAL MEDICINE

## 2024-07-11 PROCEDURE — 36415 COLL VENOUS BLD VENIPUNCTURE: CPT | Performed by: INTERNAL MEDICINE

## 2024-07-11 RX ORDER — LORAZEPAM 1 MG/1
1 TABLET ORAL ONCE
Status: COMPLETED | OUTPATIENT
Start: 2024-07-11 | End: 2024-07-11

## 2024-07-11 RX ORDER — RIVASTIGMINE 4.6 MG/24H
1 PATCH, EXTENDED RELEASE TRANSDERMAL DAILY
Status: DISCONTINUED | OUTPATIENT
Start: 2024-07-11 | End: 2024-07-12 | Stop reason: HOSPADM

## 2024-07-11 RX ADMIN — ATORVASTATIN CALCIUM 20 MG: 20 TABLET, FILM COATED ORAL at 21:08

## 2024-07-11 RX ADMIN — INSULIN LISPRO 6 UNITS: 100 INJECTION, SOLUTION INTRAVENOUS; SUBCUTANEOUS at 11:32

## 2024-07-11 RX ADMIN — Medication 400 UNITS: at 08:06

## 2024-07-11 RX ADMIN — LATANOPROST 1 DROP: 50 SOLUTION OPHTHALMIC at 21:09

## 2024-07-11 RX ADMIN — PROPRANOLOL HYDROCHLORIDE 30 MG: 20 TABLET ORAL at 08:05

## 2024-07-11 RX ADMIN — ACETAMINOPHEN 650 MG: 325 TABLET ORAL at 06:45

## 2024-07-11 RX ADMIN — PANTOPRAZOLE SODIUM 40 MG: 40 TABLET, DELAYED RELEASE ORAL at 08:11

## 2024-07-11 RX ADMIN — ASPIRIN 81 MG: 81 TABLET, COATED ORAL at 08:05

## 2024-07-11 RX ADMIN — GADOBENATE DIMEGLUMINE 20 ML: 529 INJECTION, SOLUTION INTRAVENOUS at 10:42

## 2024-07-11 RX ADMIN — Medication 400 MG: at 21:12

## 2024-07-11 RX ADMIN — OXYCODONE HYDROCHLORIDE AND ACETAMINOPHEN 2000 MG: 500 TABLET ORAL at 08:06

## 2024-07-11 RX ADMIN — FINASTERIDE 5 MG: 5 TABLET, FILM COATED ORAL at 08:05

## 2024-07-11 RX ADMIN — INSULIN GLARGINE 30 UNITS: 100 INJECTION, SOLUTION SUBCUTANEOUS at 21:08

## 2024-07-11 RX ADMIN — HYDROCODONE BITARTRATE AND ACETAMINOPHEN 1 TABLET: 5; 325 TABLET ORAL at 21:13

## 2024-07-11 RX ADMIN — RIVASTIGMINE 1 PATCH: 4.6 PATCH TRANSDERMAL at 21:11

## 2024-07-11 RX ADMIN — CYCLOBENZAPRINE 10 MG: 10 TABLET, FILM COATED ORAL at 21:13

## 2024-07-11 RX ADMIN — LIDOCAINE 1 PATCH: 4 PATCH TOPICAL at 21:09

## 2024-07-11 RX ADMIN — ANTACID TABLETS 1 TABLET: 500 TABLET, CHEWABLE ORAL at 08:05

## 2024-07-11 RX ADMIN — LEVOFLOXACIN 500 MG: 500 TABLET, FILM COATED ORAL at 11:32

## 2024-07-11 RX ADMIN — PROPRANOLOL HYDROCHLORIDE 30 MG: 20 TABLET ORAL at 21:10

## 2024-07-11 RX ADMIN — LORAZEPAM 1 MG: 1 TABLET ORAL at 09:04

## 2024-07-11 RX ADMIN — TAMSULOSIN HYDROCHLORIDE 0.4 MG: 0.4 CAPSULE ORAL at 21:11

## 2024-07-11 RX ADMIN — VENLAFAXINE HYDROCHLORIDE 75 MG: 75 CAPSULE, EXTENDED RELEASE ORAL at 08:06

## 2024-07-11 RX ADMIN — INSULIN LISPRO 6 UNITS: 100 INJECTION, SOLUTION INTRAVENOUS; SUBCUTANEOUS at 16:44

## 2024-07-11 RX ADMIN — POLYETHYLENE GLYCOL 3350 17 G: 17 POWDER, FOR SOLUTION ORAL at 21:30

## 2024-07-11 RX ADMIN — MAGNESIUM OXIDE 400 MG (241.3 MG MAGNESIUM) TABLET 400 MG: TABLET at 08:06

## 2024-07-11 RX ADMIN — INSULIN LISPRO 6 UNITS: 100 INJECTION, SOLUTION INTRAVENOUS; SUBCUTANEOUS at 08:05

## 2024-07-11 RX ADMIN — INSULIN LISPRO 2 UNITS: 100 INJECTION, SOLUTION INTRAVENOUS; SUBCUTANEOUS at 11:32

## 2024-07-11 NOTE — PLAN OF CARE
Problem: Adult Inpatient Plan of Care  Goal: Plan of Care Review  7/11/2024 0616 by Amor Neely RN  Outcome: Ongoing, Progressing  7/11/2024 0023 by Amor Neely RN  Outcome: Ongoing, Progressing  7/11/2024 0007 by Amor Neely RN  Outcome: Ongoing, Progressing  Goal: Patient-Specific Goal (Individualized)  7/11/2024 0616 by Amor Neely RN  Outcome: Ongoing, Progressing  7/11/2024 0023 by Amor Neely RN  Outcome: Ongoing, Progressing  7/11/2024 0007 by Amor Neely RN  Outcome: Ongoing, Progressing  Goal: Absence of Hospital-Acquired Illness or Injury  7/11/2024 0616 by Amor Neely RN  Outcome: Ongoing, Progressing  7/11/2024 0023 by Amor Neely RN  Outcome: Ongoing, Progressing  7/11/2024 0007 by Amor Neely RN  Outcome: Ongoing, Progressing  Intervention: Identify and Manage Fall Risk  Recent Flowsheet Documentation  Taken 7/11/2024 0415 by Amor Neely RN  Safety Promotion/Fall Prevention:   clutter free environment maintained   safety round/check completed  Taken 7/11/2024 0200 by Amor Neely RN  Safety Promotion/Fall Prevention: clutter free environment maintained  Taken 7/11/2024 0000 by Amor Neely RN  Safety Promotion/Fall Prevention:   clutter free environment maintained   nonskid shoes/slippers when out of bed   safety round/check completed  Taken 7/10/2024 2212 by Amor Neely RN  Safety Promotion/Fall Prevention:   clutter free environment maintained   nonskid shoes/slippers when out of bed   safety round/check completed  Taken 7/10/2024 2050 by Amor Neely RN  Safety Promotion/Fall Prevention: activity supervised  Intervention: Prevent Skin Injury  Recent Flowsheet Documentation  Taken 7/11/2024 0415 by Amor Neely RN  Body Position: position changed independently  Taken 7/11/2024 0200 by Amor Neely RN  Body Position: position changed independently  Taken 7/11/2024 0000 by Amor Neely RN  Body Position: position changed  independently  Taken 7/10/2024 2212 by Amor Neely RN  Body Position: position changed independently  Taken 7/10/2024 2050 by Amor Neely RN  Body Position: position changed independently  Intervention: Prevent Infection  Recent Flowsheet Documentation  Taken 7/11/2024 0415 by Amor Neely RN  Infection Prevention:   hand hygiene promoted   rest/sleep promoted   single patient room provided  Taken 7/11/2024 0200 by Amor Neely RN  Infection Prevention:   hand hygiene promoted   visitors restricted/screened   rest/sleep promoted  Taken 7/11/2024 0000 by Amor Neely RN  Infection Prevention:   hand hygiene promoted   rest/sleep promoted  Taken 7/10/2024 2212 by Amor Neely RN  Infection Prevention:   rest/sleep promoted   single patient room provided  Taken 7/10/2024 2050 by Amor Neely RN  Infection Prevention:   hand hygiene promoted   rest/sleep promoted   single patient room provided  Goal: Optimal Comfort and Wellbeing  7/11/2024 0616 by Amor Neely RN  Outcome: Ongoing, Progressing  7/11/2024 0023 by Amor Neely RN  Outcome: Ongoing, Progressing  7/11/2024 0007 by Amor Neely RN  Outcome: Ongoing, Progressing  Intervention: Monitor Pain and Promote Comfort  Recent Flowsheet Documentation  Taken 7/10/2024 2050 by Amor Neely RN  Pain Management Interventions:   pillow support provided   position adjusted  Intervention: Provide Person-Centered Care  Recent Flowsheet Documentation  Taken 7/10/2024 2050 by Amor Neely RN  Trust Relationship/Rapport:   care explained   choices provided  Goal: Readiness for Transition of Care  7/11/2024 0616 by Amor Neely RN  Outcome: Ongoing, Progressing  7/11/2024 0023 by Amor Neely RN  Outcome: Ongoing, Progressing  7/11/2024 0007 by Amor Neely RN  Outcome: Ongoing, Progressing   Goal Outcome Evaluation:

## 2024-07-11 NOTE — CONSULTS
"IDENTIFYING INFORMATION: The patient is a 79-year-old white male admitted with complaints of headache and visual hallucinations.    CHIEF COMPLAINT: None given    INFORMANT: Patient and chart    RELIABILITY: Good    HISTORY OF PRESENT ILLNESS: The patient is a 79-year-old white male with history of treatment for depression with low-dose Effexor XR.  He was admitted on 7/10/2024 with complaints of migraine headache and visual hallucinations.  The patient does not report that the hallucinations are particularly troubling to him stating that at times they are \"beautiful\".  He reports that he is seeing fully formed humans and \"paintings\".  The patient was admitted with complaints of severe migraine headache but is currently reporting that his pain is well controlled.  He is currently undergoing an MRI.  He is pleasant cooperative during today's interview denying suicidal or homicidal ideation or other psychotic symptoms.  He denies any history of psychiatric treatment apart from his prescription for Effexor XR by his primary care physician.  He denies abuse of any psychoactive substances.  He does report a history of glaucoma treatment.    PAST PSYCHIATRIC HISTORY: The patient is on low-dose Effexor XR for depression next field significant for migraine headache    PAST MEDICAL HISTORY: Significant for migraine headache, acid reflux, asthma, arthritis, fatty liver disease, dyslipidemia, glaucoma, hyperlipidemia, hypertension    MEDICATIONS:   Current Facility-Administered Medications   Medication Dose Route Frequency Provider Last Rate Last Admin    acetaminophen (TYLENOL) tablet 650 mg  650 mg Oral Q4H PRN Estephania Escobar MD   650 mg at 07/11/24 0645    Or    acetaminophen (TYLENOL) 160 MG/5ML oral solution 650 mg  650 mg Oral Q4H PRN Estephania Escobar MD        Or    acetaminophen (TYLENOL) suppository 650 mg  650 mg Rectal Q4H PRN Estephania Escobar MD        ascorbic acid (VITAMIN C) tablet 2,000 mg  " 2,000 mg Oral Daily Estephania Escobar MD   2,000 mg at 07/11/24 0806    aspirin EC tablet 81 mg  81 mg Oral Daily Estephania Escobar MD   81 mg at 07/11/24 0805    atorvastatin (LIPITOR) tablet 20 mg  20 mg Oral Nightly Estephania Escobar MD   20 mg at 07/10/24 2138    sennosides-docusate (PERICOLACE) 8.6-50 MG per tablet 2 tablet  2 tablet Oral BID PRN Estephania Escobar MD        And    polyethylene glycol (MIRALAX) packet 17 g  17 g Oral Daily PRN Estephania Escobar MD        And    bisacodyl (DULCOLAX) EC tablet 5 mg  5 mg Oral Daily PRN Estephania Escobar MD        And    bisacodyl (DULCOLAX) suppository 10 mg  10 mg Rectal Daily PRN Estephania Escobar MD        calcium carbonate (TUMS) chewable tablet 500 mg (200 mg elemental)  1 tablet Oral Daily Estephania Escoabr MD   1 tablet at 07/11/24 0805    cyclobenzaprine (FLEXERIL) tablet 10 mg  10 mg Oral TID PRN Estephania Escobar MD        dextrose (D50W) (25 g/50 mL) IV injection 25 g  25 g Intravenous Q15 Min PRN Estephania Escobar MD        dextrose (GLUTOSE) oral gel 15 g  15 g Oral Q15 Min PRN Estephania Escobar MD        finasteride (PROSCAR) tablet 5 mg  5 mg Oral Daily Estephania Escobar MD   5 mg at 07/11/24 0805    fluticasone (FLONASE) 50 MCG/ACT nasal spray 2 spray  2 spray Nasal Daily PRN Estephania Escobar MD        glucagon (GLUCAGEN) injection 1 mg  1 mg Intramuscular Q15 Min PRN Estephania Escobar MD        hydrALAZINE (APRESOLINE) injection 10 mg  10 mg Intravenous Q6H PRN Estephania Escobar MD        HYDROcodone-acetaminophen (NORCO) 5-325 MG per tablet 1 tablet  1 tablet Oral Q6H PRN Estephania Escobar MD        insulin glargine (LANTUS, SEMGLEE) injection 30 Units  30 Units Subcutaneous Nightly Teena Mayers APRN   30 Units at 07/10/24 2303    insulin lispro (HUMALOG/ADMELOG) injection 2-7 Units  2-7 Units Subcutaneous 4x Daily AC & at Bedtime Estephania Escobar MD   2  Units at 07/11/24 1132    insulin lispro (HUMALOG/ADMELOG) injection 6 Units  6 Units Subcutaneous TID With Meals Estephania Escobar MD   6 Units at 07/11/24 1132    latanoprost (XALATAN) 0.005 % ophthalmic solution 1 drop  1 drop Both Eyes Nightly Estephania Escobar MD   1 drop at 07/10/24 2137    levoFLOXacin (LEVAQUIN) tablet 500 mg  500 mg Oral Daily Estephania Escobar MD   500 mg at 07/11/24 1132    Lidocaine 4 % 1 patch  1 patch Transdermal Nightly Estephania Escobar MD   1 patch at 07/10/24 2141    magnesium oxide (MAG-OX) tablet 400 mg  400 mg Oral Daily Estephania Escobar MD   400 mg at 07/11/24 0806    ondansetron ODT (ZOFRAN-ODT) disintegrating tablet 4 mg  4 mg Oral Q6H PRN Estephania Escobar MD        Or    ondansetron (ZOFRAN) injection 4 mg  4 mg Intravenous Q6H PRN Estephania Escobar MD        pantoprazole (PROTONIX) EC tablet 40 mg  40 mg Oral Daily Estephania Escobar MD   40 mg at 07/11/24 0811    Patiromer Sorbitex Calcium (VELTASSA) 1 packet  8.4 g Oral Daily Estephania Escobar MD        propranolol (INDERAL) tablet 30 mg  30 mg Oral Q12H Estephania Escobar MD   30 mg at 07/11/24 0805    sodium chloride 0.9 % flush 10 mL  10 mL Intravenous PRN Junito Espitia MD        sodium chloride 0.9 % infusion  75 mL/hr Intravenous Continuous Estephania Escobar MD 75 mL/hr at 07/10/24 2154 75 mL/hr at 07/10/24 2154    tamsulosin (FLOMAX) 24 hr capsule 0.4 mg  0.4 mg Oral Nightly Estephania Escobar MD   0.4 mg at 07/10/24 2138    venlafaxine XR (EFFEXOR-XR) 24 hr capsule 75 mg  75 mg Oral Daily Estephania Escobar MD   75 mg at 07/11/24 0806    Vitamin B-2 (RIBOFLAVIN) tablet 400 mg  400 mg Oral Nightly Estephania Escobar MD   400 mg at 07/10/24 2138    vitamin E capsule 400 Units  400 Units Oral Daily Estephania Escobar MD   400 Units at 07/11/24 0806         ALLERGIES: The patient lists 6 medical allergies which can be obtained from the  chart    FAMILY HISTORY: Noncontributory    SOCIAL HISTORY: Patient denies abuse of any psychoactive substances.  He lives with his wife.    MENTAL STATUS EXAM: Patient is a well-developed well-nourished white male appearing stated age.  He has no apparent physical distress at the time of examination.  He is awake alert and oriented spheres.  His mood is euthymic his affect full range.  Speech is generally relevant and coherent.  There are no gross deficits in memory or cognition noted.  Intelligence is judged to be in the average range based on fund of knowledge, the patient is cooperative throughout the interview.  He denies current suicidal or homicidal ideation or psychotic features.  Judgment and insight are intact.    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  He does not seem particularly distressed by these hallucinations and I would not recommend initiation of antipsychotic medication at this point.  Another possibility is that his hallucinations may be related to his migraine headache though this seems less likely.  I will continue to follow with you.

## 2024-07-11 NOTE — PROGRESS NOTES
Name: Earl Marc ADMIT: 7/10/2024   : 1945  PCP: Avery Velazquez MD    MRN: [id number] LOS: 0 days   AGE/SEX: 79 y.o. male  ROOM: [facility]     Subjective   Subjective   Reports visual hallucinations when eyes are closed. No other problems.    Objective   Objective   Vital Signs  Temp:  [97.7 °F (36.5 °C)-98.2 °F (36.8 °C)] 97.7 °F (36.5 °C)  Heart Rate:  [61-69] 61  Resp:  [16-18] 16  BP: (139-167)/(60-76) 155/74  SpO2:  [93 %-97 %] 93 %  on   ;   Device (Oxygen Therapy): room air  Body mass index is 28.65 kg/m².  Physical Exam  Pulmonary:      Effort: Pulmonary effort is normal. No respiratory distress.      Breath sounds: No stridor.   Abdominal:      General: There is no distension.      Tenderness: There is no abdominal tenderness.   Skin:     Coloration: Skin is not pale.   Neurological:      General: No focal deficit present.      Mental Status: He is alert and oriented to person, place, and time.      Cranial Nerves: No cranial nerve deficit.   Psychiatric:         Mood and Affect: Mood normal.         Behavior: Behavior normal.         Results Review     I reviewed the patient's new clinical results.  Results from last 7 days   Lab Units 24  0614 07/10/24  1215 24  1340   WBC 10*3/mm3 9.19 12.74* 10.64   HEMOGLOBIN g/dL 16.4 17.5 16.4   PLATELETS 10*3/mm3 182 211 191     Results from last 7 days   Lab Units 24  0614 07/10/24  1340 24  1340   SODIUM mmol/L 138 136 138   POTASSIUM mmol/L 4.1 3.9 3.8   CHLORIDE mmol/L 104 101 101   CO2 mmol/L 19.9* 24.2 27.0   BUN mg/dL 12 13 13   CREATININE mg/dL 1.13 1.17 1.21   GLUCOSE mg/dL 123* 228* 112*   EGFR mL/min/1.73 66.1 63.4 60.9     Results from last 7 days   Lab Units 07/10/24  1340 24  1340   ALBUMIN g/dL 4.1 4.0   BILIRUBIN mg/dL 0.7 0.7   ALK PHOS U/L 95 87   AST (SGOT) U/L 19 19   ALT (SGPT) U/L 17 20     Results from last 7 days   Lab Units 24  0614 07/10/24  1340 24  1340   CALCIUM mg/dL 8.8  9.4 9.5   ALBUMIN g/dL  --  4.1 4.0   MAGNESIUM mg/dL  --  2.8*  --        Glucose   Date/Time Value Ref Range Status   07/11/2024 1616 102 70 - 130 mg/dL Final   07/11/2024 1107 164 (H) 70 - 130 mg/dL Final   07/11/2024 0613 114 70 - 130 mg/dL Final   07/10/2024 2056 278 (H) 70 - 130 mg/dL Final   07/10/2024 1205 183 (H) 70 - 130 mg/dL Final       MRI Brain With & Without Contrast    Result Date: 7/11/2024  Mild to moderate small vessel ischemic disease is noted. There is no evidence of acute infarction, mass or of abnormal enhancement.  This report was finalized on 7/11/2024 12:14 PM by Dr. Howard Flood M.D on Workstation: [id number]     Scheduled Medications  ascorbic acid, 2,000 mg, Oral, Daily  aspirin, 81 mg, Oral, Daily  atorvastatin, 20 mg, Oral, Nightly  calcium carbonate, 1 tablet, Oral, Daily  finasteride, 5 mg, Oral, Daily  insulin glargine, 30 Units, Subcutaneous, Nightly  insulin lispro, 2-7 Units, Subcutaneous, 4x Daily AC & at Bedtime  insulin lispro, 6 Units, Subcutaneous, TID With Meals  latanoprost, 1 drop, Both Eyes, Nightly  levoFLOXacin, 500 mg, Oral, Daily  Lidocaine, 1 patch, Transdermal, Nightly  magnesium oxide, 400 mg, Oral, Daily  pantoprazole, 40 mg, Oral, Daily  Patiromer Sorbitex Calcium, 8.4 g, Oral, Daily  propranolol, 30 mg, Oral, Q12H  rivastigmine, 1 patch, Transdermal, Daily  tamsulosin, 0.4 mg, Oral, Nightly  venlafaxine XR, 75 mg, Oral, Daily  Vitamin B-2, 400 mg, Oral, Nightly  vitamin E, 400 Units, Oral, Daily    Infusions   Diet  Diet: Cardiac; Healthy Heart (2-3 Na+); Fluid Consistency: Thin (IDDSI 0)       Assessment/Plan     Active Hospital Problems    Diagnosis  POA    **Visual hallucinations [R44.1]  Yes    Type 2 diabetes mellitus with hyperglycemia, with long-term current use of insulin [E11.65, Z79.4]  Not Applicable    Coronary artery disease involving native coronary artery of native heart [I25.10]  Yes    Dyslipidemia [E78.5]  Yes    Hypertensive kidney  disease with stage 3a chronic kidney disease [I12.9, N18.31]  Yes      Resolved Hospital Problems   No resolved problems to display.       79 y.o. male admitted with Visual hallucinations.      07/11/24  Dc Levaquin, unclear reason why initiated. Likely dc in am.    Visual hallucinations  -Psychiatry eval'd- suspect possibly 2/2 Lucho Floyd syndrome; no APD recommended    Migraine  -Neuro eval'd  -MRI brain no acute abnormality  -PRN migraine cocktail    CAD  -ASA, statin, propranolol    DM2  -glargine, SSI; monitor BG         DVT prophylaxis: SCDs  Discussed with patient and care team on multidisciplinary rounds.  Anticipated discharge home, 1-2 days            Brock Huang MD  [facility]  07/11/24  19:02 EDT

## 2024-07-11 NOTE — PLAN OF CARE
Problem: Adult Inpatient Plan of Care  Goal: Plan of Care Review  Outcome: Ongoing, Progressing   Goal Outcome Evaluation:  Patient reports visual hallucination. Alert and oriented x4. Denies blurred, double vision. Brain imaging ordered.

## 2024-07-11 NOTE — PLAN OF CARE
Problem: Adult Inpatient Plan of Care  Goal: Plan of Care Review  Outcome: Ongoing, Progressing  Flowsheets (Taken 7/11/2024 1802)  Progress: improving  Plan of Care Reviewed With: patient  Goal: Patient-Specific Goal (Individualized)  Outcome: Ongoing, Progressing  Goal: Absence of Hospital-Acquired Illness or Injury  Outcome: Ongoing, Progressing  Intervention: Identify and Manage Fall Risk  Recent Flowsheet Documentation  Taken 7/11/2024 1800 by Crystal Dunlap RN  Safety Promotion/Fall Prevention: safety round/check completed  Taken 7/11/2024 1600 by Crystal Dunlap RN  Safety Promotion/Fall Prevention: safety round/check completed  Taken 7/11/2024 1420 by Crystal Dunlap RN  Safety Promotion/Fall Prevention: safety round/check completed  Taken 7/11/2024 1200 by Crystal Dunlap RN  Safety Promotion/Fall Prevention: safety round/check completed  Taken 7/11/2024 1000 by Crystal Dunlap RN  Safety Promotion/Fall Prevention: patient off unit  Taken 7/11/2024 0815 by Crystal Dunlap RN  Safety Promotion/Fall Prevention: safety round/check completed  Intervention: Prevent Skin Injury  Recent Flowsheet Documentation  Taken 7/11/2024 1800 by Crystal Dunlap RN  Body Position: position changed independently  Taken 7/11/2024 1600 by Crystal Dunlap RN  Body Position: position changed independently  Taken 7/11/2024 1420 by Crystal Dunlap RN  Body Position: position changed independently  Taken 7/11/2024 1200 by Crystal Dunlap RN  Body Position: position changed independently  Taken 7/11/2024 0815 by Crystal Dunlap RN  Body Position: position changed independently  Intervention: Prevent and Manage VTE (Venous Thromboembolism) Risk  Recent Flowsheet Documentation  Taken 7/11/2024 1800 by Crystal Dunlap RN  Activity Management: activity encouraged  Taken 7/11/2024 1600 by Crystal Dunlap RN  Activity Management: up ad veronica  Taken 7/11/2024 1420 by Crystal Dunlap RN  Activity Management: up ad veronica  Taken 7/11/2024 1200 by Crystal Dunlap RN  Activity  Management: activity encouraged  Taken 7/11/2024 0815 by Crystal Dunlap, RN  Activity Management: activity encouraged  Goal: Optimal Comfort and Wellbeing  Outcome: Ongoing, Progressing  Intervention: Monitor Pain and Promote Comfort  Recent Flowsheet Documentation  Taken 7/11/2024 0815 by Crystal Dunlap, RN  Pain Management Interventions:   care clustered   relaxation techniques promoted  Intervention: Provide Person-Centered Care  Recent Flowsheet Documentation  Taken 7/11/2024 0815 by Crystal Dunlap, RN  Trust Relationship/Rapport: care explained  Goal: Readiness for Transition of Care  Outcome: Ongoing, Progressing   Goal Outcome Evaluation:  Plan of Care Reviewed With: patient        Progress: improving

## 2024-07-11 NOTE — H&P
HISTORY AND PHYSICAL   [facility]        Date of Admission: 7/10/2024  Patient Identification:  Name: Earl Marc  Age: 79 y.o.  Sex: male  :  1945  MRN: [id number]                     Primary Care Physician: Avery Velazquez MD    Chief Complaint:  79 year old gentleman presented to the emergency room with a headache and visual hallucinations for the last few days; he was seen in the ED two days ago for a headache as well; he has a history of migraines; denies fever or chills; he had a recent fall resulting in a thoracic compression fracture; he has had pain and has not been sleeping well; he denies focal numbness, weakness or tingling; he continues to have visual hallucinations; he thought he was wearing a red sleeve and is not;     History of Present Illness:   As above    Past Medical History:  Past Medical History:   Diagnosis Date    Abnormal cardiovascular stress test     Acid reflux     Arthritis     Asthma     Cancer     skin     Chronic kidney disease     Colon polyp     Congenital heart disease     COPD (chronic obstructive pulmonary disease)     Old age COPD/2D hand Smoke emphysema    Coronary artery disease     Diabetes mellitus     Diabetic neuropathy associated with type 2 diabetes mellitus     Diabetic peripheral neuropathy 03/10/2016    Dyslipidemia     Erectile dysfunction     Fatty liver disease, nonalcoholic     Gastritis     Glaucoma     both eyes    Hyperlipidemia     Hypertension     Hypogonadism male     Infectious viral hepatitis Hep A 1959    Drank water from broken water line and Grandparents house.    Migraines 2023    Myocardial infarction 2018    Trigger point of left shoulder region 2023    Type 2 diabetes mellitus      Past Surgical History:  Past Surgical History:   Procedure Laterality Date    CARDIAC CATHETERIZATION N/A 2016    Procedure: Left Heart Cath;  Surgeon: Maria E Gilbert MD;  Location: [facility] INVASIVE  LOCATION;  Service:     CARDIAC CATHETERIZATION N/A 03/25/2016    Procedure: Coronary angiography;  Surgeon: Maria E Gilbert MD;  Location:  [facility] INVASIVE LOCATION;  Service:     CARDIAC CATHETERIZATION N/A 03/28/2016    Procedure: Coronary angiography;  Surgeon: Maria E Gilbert MD;  Location:  [facility] INVASIVE LOCATION;  Service:     CARDIAC CATHETERIZATION N/A 03/28/2016    Procedure: Stent MOISE coronary;  Surgeon: Maria E Gilbert MD;  Location:  [facility] INV[facility] LOCATION;  Service:     CARDIAC CATHETERIZATION N/A 10/18/2018    Procedure: Coronary angiography  no LV gram d/t CKD;  Surgeon: Maria E Gilbert MD;  Location:  [facility] INV[facility] LOCATION;  Service: Cardiology    CARDIAC CATHETERIZATION N/A 10/18/2018    Procedure: Left Heart Cath;  Surgeon: Maria E Gilbert MD;  Location:  [facility] INV[facility] LOCATION;  Service: Cardiology    CARDIAC CATHETERIZATION N/A 10/18/2018    Procedure: Stent MOISE coronary;  Surgeon: Maria E Gilbert MD;  Location:  [facility];  Service: Cardiology    CARDIAC CATHETERIZATION N/A 05/28/2021    Procedure: Coronary angiography;  Surgeon: Maria E Gilbert MD;  Location:  [facility];  Service: Cardiovascular;  Laterality: N/A;    CARDIAC CATHETERIZATION N/A 05/28/2021    Procedure: Left Heart Cath;  Surgeon: Maria E Gilbert MD;  Location:  [facility];  Service: Cardiovascular;  Laterality: N/A;    CARDIAC CATHETERIZATION N/A 05/28/2021    Procedure: Left ventriculography;  Surgeon: Maria E Gilbert MD;  Location:  [facility];  Service: Cardiovascular;  Laterality: N/A;    CARDIAC CATHETERIZATION N/A 05/28/2021    Procedure: Stent MOISE coronary;  Surgeon: Maria E Gilbert MD;  Location:  [facility];  Service: Cardiovascular;  Laterality: N/A;    CARDIAC CATHETERIZATION N/A 1/19/2024    Procedure: Left Heart Cath;  Surgeon:  Maria E Gilbert MD;  Location:  [facility];  Service: Cardiovascular;  Laterality: N/A;    CARDIAC CATHETERIZATION N/A 1/19/2024    Procedure: Coronary angiography;  Surgeon: Maria E Gilbert MD;  Location:  [facility];  Service: Cardiovascular;  Laterality: N/A;    CARDIAC CATHETERIZATION N/A 1/19/2024    Procedure: Left ventriculography;  Surgeon: Maria E Gilbert MD;  Location:  [facility];  Service: Cardiovascular;  Laterality: N/A;    CAROTID STENT      CORONARY ANGIOPLASTY      CORONARY STENT PLACEMENT  3/28/2016    EYE SURGERY  9/2017    NECK EXPLORATION N/A     NC RT/LT HEART CATHETERS N/A 10/18/2018    Procedure: Percutaneous Coronary Intervention;  Surgeon: Maria E Gilbert MD;  Location:  [facility];  Service: Cardiology      Home Meds:  Medications Prior to Admission   Medication Sig Dispense Refill Last Dose    Alcohol Swabs (Advocate Alcohol Prep Pads) 70 % pads Use 1 each 4 (Four) Times a Day. 400 each 3 7/10/2024    ammonium lactate (AmLactin) 12 % lotion Apply 1 Application topically to the appropriate area as directed As Needed for Dry Skin.   7/10/2024    ascorbic acid (VITAMIN C) 1000 MG tablet Take 2 tablets by mouth Daily.   7/10/2024    aspirin 81 MG EC tablet Take 1 tablet by mouth Daily. 90 tablet 3 7/10/2024    atorvastatin (LIPITOR) 20 MG tablet Take 1 tablet by mouth Every Night. 90 tablet 1 7/10/2024    Calcium Carbonate (CALCIUM 600 PO) Take 1 tablet by mouth Daily.   7/10/2024    ciclopirox (LOPROX) 1 % shampoo Apply 1 Application topically to the appropriate area as directed 3 (Three) Times a Week.   7/10/2024    Cyanocobalamin (Vitamin B-12) 1000 MCG sublingual tablet Place 1 tablet under the tongue Daily.   7/10/2024    cyclobenzaprine (FLEXERIL) 10 MG tablet Take 1 tablet by mouth 3 (Three) Times a Day As Needed for Muscle Spasms. 30 tablet 1 7/10/2024    Dextrose, Diabetic Use,  (Glucose) 1 g chewable tablet Chew 1 tablet As Needed.   7/10/2024    FINASTERIDE PO Take 4 mg by mouth Daily.   7/9/2024    fluticasone (FLONASE) 50 MCG/ACT nasal spray 2 sprays into the nostril(s) as directed by provider Daily As Needed for Allergies. 48 g 1 7/10/2024    HYDROcodone-acetaminophen (NORCO) 5-325 MG per tablet Take 1 tablet by mouth Every 6 (Six) Hours As Needed for Moderate Pain for up to 30 doses. 30 tablet 0 7/10/2024    hydrocortisone 2.5 % ointment Apply 1 Application topically to the appropriate area as directed 2 (Two) Times a Day.   7/10/2024    insulin aspart (NovoLOG FlexPen) 100 UNIT/ML solution pen-injector sc pen Inject 5-6 Units under the skin into the appropriate area as directed 3 (Three) Times a Day With Meals. 15 mL 1 7/9/2024    Insulin Glargine (Lantus SoloStar) 100 UNIT/ML injection pen Inject 32 Units under the skin into the appropriate area as directed Daily. (Patient taking differently: Inject 38 Units under the skin into the appropriate area as directed Daily.) 30 mL 1 7/9/2024    Insulin Pen Needle (B-D UF III MINI PEN NEEDLES) 31G X 5 MM misc For use with pen device up to 4 times a day. Can substitute based on availability and insurance. 500 each 2 7/10/2024    latanoprost (XALATAN) 0.005 % ophthalmic solution Administer 1 drop to both eyes Every Night.   7/9/2024    levoFLOXacin (LEVAQUIN) 500 MG tablet Take 1 tablet by mouth Daily.   7/10/2024    linagliptin (Tradjenta) 5 MG tablet tablet Take 1 tablet by mouth Daily. 90 tablet 1 7/9/2024    Lumigan 0.01 % ophthalmic drops    7/10/2024    Lutein 20 MG tablet Take 1 tablet by mouth Daily.   7/9/2024    Magnesium 250 MG tablet Take 1 tablet by mouth Every Night.   7/9/2024    Melatonin 12 MG tablet Take 1 tablet by mouth At Night As Needed.   7/9/2024    methocarbamol (ROBAXIN) 750 MG tablet Take 1 tablet by mouth 3 (Three) Times a Day.   7/10/2024    Multiple Vitamins-Minerals (ZINC PO) Take 50 mg by mouth Daily.    7/10/2024    nitroglycerin (Nitrostat) 0.4 MG SL tablet Place 1 tablet under the tongue Every 5 (Five) Minutes As Needed for Chest Pain. Indications: Acute Angina Pectoris 25 tablet 3 7/10/2024    NON FORMULARY Ketamin 4%/Gabapentin 6%/clonidine 0.2%/nifedipine 2%  1-2 PUMPS to affected area 3-4 times daily.   7/10/2024    NON FORMULARY Fluconazole/terbinafine/Ibuprofen/DMSO 2/3/3/50% with vitamin D 40369 Iu/100ml   1 Application to toe nails 2 times daily   7/10/2024    pantoprazole (PROTONIX) 20 MG EC tablet Take 1 tablet by mouth Daily. 90 tablet 1 7/9/2024    Patiromer Sorbitex Calcium (Veltassa) 8.4 g pack Take 1 packet by mouth Daily.   7/10/2024    propranolol LA (INDERAL LA) 60 MG 24 hr capsule Take 1 capsule by mouth Daily. 90 capsule 1 7/10/2024    Riboflavin 400 MG tablet Take 1 tablet by mouth Every Night.   7/9/2024    silodosin (RAPAFLO) 4 MG capsule capsule Take 1 capsule by mouth Daily With Breakfast.   7/10/2024    Testosterone Cypionate (DEPOTESTOTERONE CYPIONATE) 200 MG/ML injection Inject 1 mL into the appropriate muscle as directed by prescriber Every 14 (Fourteen) Days.   7/10/2024    venlafaxine XR (EFFEXOR-XR) 37.5 MG 24 hr capsule Take 1 capsule by mouth Daily for 165 days. (Patient taking differently: Take 2 capsules by mouth Daily.) 30 capsule 1 7/10/2024    VIAGRA 100 MG tablet Take 1 tablet by mouth As Needed for erectile dysfunction (1 tablet prior to planned intercourse.). 24 tablet 3 7/10/2024    VITAMIN E 400 UNIT capsule Take 1 capsule by mouth Daily.   7/10/2024       Allergies:  Allergies   Allergen Reactions    Ace Inhibitors Other (See Comments)     Other reaction(s): Cough  Other reaction(s): Cough      Hydrogenated Palm Oil Glycerides Unknown - Low Severity    Lanolin Unknown - Low Severity    Other      Lotions- anything with palm oil or lanolin per pt (verified 6/15/16 1910)    Prazosin     Nsaids Other (See Comments)     CKD Stage 3     Immunizations:  Immunization History    Administered Date(s) Administered    Arexvy (RSV, Adults 60+ yrs) 10/13/2023    COVID-19 (PFIZER) BIVALENT 12+YRS 2022    COVID-19 (PFIZER) Purple Cap Monovalent 2021, 2021, 10/12/2021, 2022    COVID-19 F23 (PFIZER) 12YRS+ (COMIRNATY) 2023    Covid-19 (Pfizer) Gray Cap Monovalent 2022    Flu Vaccine Quad PF >36MO 2006, 2008    Flu Vaccine Split Quad 10/01/2022    Fluad Quad 65+ 2023    Fluzone (or Fluarix & Flulaval for VFC) >6mos 10/12/2018    Influenza LAIV (Nasal) 2008    Influenza Whole 10/31/2009    Influenza, Unspecified 10/02/2000, 2001, 10/10/2001, 2020    Pneumococcal Conjugate 20-Valent (PCV20) 2024    Pneumococcal Polysaccharide (PPSV23) 2006, 2014    Pneumococcal, Unspecified 2000    Shingrix 01/15/2014    Td, Unspecified 1997    Tdap 2015, 2015    Tetanus Toxoid, Unspecified 1998    Zostavax 01/15/2014    influenza Split 2006, 10/29/2007, 2008     Social History:   Social History     Social History Narrative    Not on file     Social History     Socioeconomic History    Marital status:      Spouse name: Ivette    Number of children: 1   Tobacco Use    Smoking status: Never     Passive exposure: Past    Smokeless tobacco: Never    Tobacco comments:     Never   Vaping Use    Vaping status: Never Used   Substance and Sexual Activity    Alcohol use: No    Drug use: Never    Sexual activity: Defer       Family History:  Family History   Problem Relation Age of Onset    Breast cancer Daughter     Heart attack Mother          10/31/1980    Hypertension Mother     Heart disease Mother     Thyroid disease Mother     Lupus Mother     Early death Mother     Miscarriages / Stillbirths Mother         Miscarriage 1944    Heart failure Mother     Heart attack Brother     Heart disease Brother     Hyperlipidemia Brother     Cancer Brother         Due to Agent Orange, [place]  "   Hypertension Brother     Depression Father     COPD Father     Stroke Maternal Grandmother     Cancer Maternal Grandmother         Lung Cancer non-smoker    Diabetes Brother     Heart disease Brother     Hypertension Brother     Kidney disease Brother     Heart disease Brother     Hypertension Brother         Review of Systems  See history of present illness and past medical history.  Patient denies   dizziness, syncope, falls, trauma, change in vision, change in hearing, change in taste, changes in weight, changes in appetite, focal weakness, numbness, or paresthesia.  Patient denies chest pain, palpitations, dyspnea, orthopnea, PND, cough, sinus pressure, rhinorrhea, epistaxis, hemoptysis, nausea, vomiting,hematemesis, diarrhea, constipation or hematochezia.  Denies cold or heat intolerance, polydipsia, polyuria, polyphagia. Denies hematuria, pyuria, dysuria, hesitancy, frequency or urgency. Denies consumption of raw and under cooked meats foods or change in water source.       Objective:  T Max 24 hrs: Temp (24hrs), Av.9 °F (36.6 °C), Min:97.7 °F (36.5 °C), Max:98.2 °F (36.8 °C)    Vitals Ranges:   Temp:  [97.7 °F (36.5 °C)-98.2 °F (36.8 °C)] 97.7 °F (36.5 °C)  Heart Rate:  [62-72] 69  Resp:  [18] 18  BP: (138-202)/(60-92) 151/60      Exam:  /60 (BP Location: Right arm, Patient Position: Lying)   Pulse 69   Temp 97.7 °F (36.5 °C) (Oral)   Resp 18   Ht 185.4 cm (73\")   Wt 105 kg (231 lb)   SpO2 93%   BMI 30.48 kg/m²     General Appearance:    Alert, cooperative, no distress, appears stated age   Head:    Normocephalic, without obvious abnormality, atraumatic   Eyes:    PERRL, conjunctivae/corneas clear, EOM's intact, both eyes   Ears:    Normal external ear canals, both ears   Nose:   Nares normal, septum midline, mucosa normal, no drainage    or sinus tenderness   Throat:   Lips, mucosa, and tongue normal   Neck:   Supple, symmetrical, trachea midline, no adenopathy;     thyroid:  no " enlargement/tenderness/nodules; no carotid    bruit or JVD   Back:     Symmetric, no curvature, ROM normal, no CVA tenderness   Lungs:     Clear to auscultation bilaterally, respirations unlabored   Chest Wall:    No tenderness or deformity    Heart:    Regular rate and rhythm, S1 and S2 normal, no murmur, rub   or gallop   Abdomen:     Soft, nontender, bowel sounds active all four quadrants,     no masses, no hepatomegaly, no splenomegaly   Extremities:   Extremities normal, atraumatic, no cyanosis or edema                       .    Data Review:  Labs in chart were reviewed.  WBC   Date Value Ref Range Status   07/10/2024 12.74 (H) 3.40 - 10.80 10*3/mm3 Final     Hemoglobin   Date Value Ref Range Status   07/10/2024 17.5 13.0 - 17.7 g/dL Final     Hematocrit   Date Value Ref Range Status   07/10/2024 53.2 (H) 37.5 - 51.0 % Final     Platelets   Date Value Ref Range Status   07/10/2024 211 140 - 450 10*3/mm3 Final     Sodium   Date Value Ref Range Status   07/10/2024 136 136 - 145 mmol/L Final     Potassium   Date Value Ref Range Status   07/10/2024 3.9 3.5 - 5.2 mmol/L Final     Chloride   Date Value Ref Range Status   07/10/2024 101 98 - 107 mmol/L Final     CO2   Date Value Ref Range Status   07/10/2024 24.2 22.0 - 29.0 mmol/L Final     BUN   Date Value Ref Range Status   07/10/2024 13 8 - 23 mg/dL Final     Creatinine   Date Value Ref Range Status   07/10/2024 1.17 0.76 - 1.27 mg/dL Final     Glucose   Date Value Ref Range Status   07/10/2024 228 (H) 65 - 99 mg/dL Final     Calcium   Date Value Ref Range Status   07/10/2024 9.4 8.6 - 10.5 mg/dL Final     Magnesium   Date Value Ref Range Status   07/10/2024 2.8 (H) 1.6 - 2.4 mg/dL Final       Results from last 7 days   Lab Units 07/10/24  1340   TSH uIU/mL 1.980   FREE T4 ng/dL 1.21          Imaging Results (All)       Procedure Component Value Units Date/Time    CT Head Without Contrast [[id number]] Collected: 07/10/24 1345     Updated: 07/10/24 1349     Narrative:      CT HEAD WITHOUT CONTRAST     HISTORY: Hallucinations.     COMPARISON: CT head 7/8/2024.     FINDINGS: The brain and ventricles are symmetrical. Moderate small  vessel ischemic disease is noted. There is no evidence of intracranial  hemorrhage, or of hydrocephalus. No focal area of decreased attenuation  to suggest acute infarction is identified. Further evaluation could be  performed with a MRI examination of the brain as indicated           Radiation dose reduction techniques were utilized, including automated  exposure control and exposure modulation based on body size.        This report was finalized on 7/10/2024 1:46 PM by Dr. Howard Flood M.D  on Workstation: [id number]                 Assessment:  Active Hospital Problems    Diagnosis  POA    **Visual hallucinations [R44.1]  Yes      Resolved Hospital Problems   No resolved problems to display.   Headache  Diabetes  Hypertension  Ckd3  copd    Plan:  Will ask psychiatry to see him  Appreciate neurology input  Mri ordered  Accu checks, sliding scale  Monitor on telemetry  Trend labs  Stop melatonin as recommended    Estephania Escobar MD  7/10/2024  20:41 EDT

## 2024-07-11 NOTE — SIGNIFICANT NOTE
07/11/24 1422   OTHER   Discipline physical therapist   Rehab Time/Intention   Session Not Performed other (see comments)  (Patient NNEKA at EEG. PT will f/u.)   Recommendation   PT - Next Appointment 07/12/24

## 2024-07-11 NOTE — PROGRESS NOTES
Discharge Planning Assessment  [facility]     Patient Name: Earl Marc  MRN: [id number]  Today's Date: 7/11/2024    Admit Date: 7/10/2024    Plan: Home no needs   Discharge Needs Assessment    No documentation.                  Discharge Plan       Row Name 07/11/24 1623       Plan    Plan Home no needs    Plan Comments Spoke with patient at bedside, he will return home with spouse at discharge and continue with outpatient physcial therapy. He uses a cane, walker and back brace at home.                  Continued Care and Services - Admitted Since 7/10/2024    No active coordination exists for this encounter.       Expected Discharge Date and Time       Expected Discharge Date Expected Discharge Time    Jul 12, 2024            Demographic Summary    No documentation.                  Functional Status    No documentation.                  Psychosocial    No documentation.                  Abuse/Neglect    No documentation.                  Legal    No documentation.                  Substance Abuse    No documentation.                  Patient Forms    No documentation.                     Kenya Gama, RN

## 2024-07-11 NOTE — PLAN OF CARE
Problem: Adult Inpatient Plan of Care  Goal: Plan of Care Review  7/11/2024 0023 by Amor Neely RN  Outcome: Ongoing, Progressing  7/11/2024 0007 by Amor Neely RN  Outcome: Ongoing, Progressing  Goal: Patient-Specific Goal (Individualized)  7/11/2024 0023 by Amor Neely RN  Outcome: Ongoing, Progressing  7/11/2024 0007 by Amor Neely RN  Outcome: Ongoing, Progressing  Goal: Absence of Hospital-Acquired Illness or Injury  7/11/2024 0023 by Amor Neely RN  Outcome: Ongoing, Progressing  7/11/2024 0007 by Amor Neely RN  Outcome: Ongoing, Progressing  Intervention: Identify and Manage Fall Risk  Recent Flowsheet Documentation  Taken 7/10/2024 2212 by Amor Neely RN  Safety Promotion/Fall Prevention:   clutter free environment maintained   nonskid shoes/slippers when out of bed   safety round/check completed  Taken 7/10/2024 2050 by Amor Neely RN  Safety Promotion/Fall Prevention: activity supervised  Intervention: Prevent Skin Injury  Recent Flowsheet Documentation  Taken 7/10/2024 2212 by Amor Neely RN  Body Position: position changed independently  Taken 7/10/2024 2050 by Amor Neely RN  Body Position: position changed independently  Intervention: Prevent Infection  Recent Flowsheet Documentation  Taken 7/10/2024 2212 by Amor Neely RN  Infection Prevention:   rest/sleep promoted   single patient room provided  Taken 7/10/2024 2050 by Amor Neely RN  Infection Prevention:   hand hygiene promoted   rest/sleep promoted   single patient room provided  Goal: Optimal Comfort and Wellbeing  7/11/2024 0023 by Amor Neely RN  Outcome: Ongoing, Progressing  7/11/2024 0007 by Amor Neely RN  Outcome: Ongoing, Progressing  Intervention: Monitor Pain and Promote Comfort  Recent Flowsheet Documentation  Taken 7/10/2024 2050 by Amor Neely RN  Pain Management Interventions:   pillow support provided   position adjusted  Intervention: Provide Person-Centered  Care  Recent Flowsheet Documentation  Taken 7/10/2024 2050 by Amor Neely RN  Trust Relationship/Rapport:   care explained   choices provided  Goal: Readiness for Transition of Care  7/11/2024 0023 by Amor Neely RN  Outcome: Ongoing, Progressing  7/11/2024 0007 by Amor Neely RN  Outcome: Ongoing, Progressing   Goal Outcome Evaluation:

## 2024-07-12 ENCOUNTER — READMISSION MANAGEMENT (OUTPATIENT)
Dept: CALL CENTER | Facility: HOSPITAL | Age: 79
End: 2024-07-12
Payer: MEDICARE

## 2024-07-12 VITALS
TEMPERATURE: 97.7 F | SYSTOLIC BLOOD PRESSURE: 156 MMHG | WEIGHT: 216.05 LBS | HEIGHT: 73 IN | HEART RATE: 63 BPM | OXYGEN SATURATION: 98 % | DIASTOLIC BLOOD PRESSURE: 74 MMHG | BODY MASS INDEX: 28.63 KG/M2 | RESPIRATION RATE: 16 BRPM

## 2024-07-12 LAB
GLUCOSE BLDC GLUCOMTR-MCNC: 189 MG/DL (ref 70–130)
GLUCOSE BLDC GLUCOMTR-MCNC: 94 MG/DL (ref 70–130)

## 2024-07-12 PROCEDURE — 97161 PT EVAL LOW COMPLEX 20 MIN: CPT

## 2024-07-12 PROCEDURE — 63710000001 INSULIN LISPRO (HUMAN) PER 5 UNITS: Performed by: INTERNAL MEDICINE

## 2024-07-12 PROCEDURE — 99213 OFFICE O/P EST LOW 20 MIN: CPT | Performed by: NURSE PRACTITIONER

## 2024-07-12 PROCEDURE — G0378 HOSPITAL OBSERVATION PER HR: HCPCS

## 2024-07-12 PROCEDURE — 82948 REAGENT STRIP/BLOOD GLUCOSE: CPT

## 2024-07-12 RX ORDER — RIVASTIGMINE 4.6 MG/24H
1 PATCH, EXTENDED RELEASE TRANSDERMAL DAILY
Qty: 30 PATCH | Refills: 0 | Status: SHIPPED | OUTPATIENT
Start: 2024-07-13 | End: 2024-07-12

## 2024-07-12 RX ORDER — RIVASTIGMINE 4.6 MG/24H
1 PATCH, EXTENDED RELEASE TRANSDERMAL DAILY
Qty: 30 PATCH | Refills: 0 | Status: SHIPPED | OUTPATIENT
Start: 2024-07-13

## 2024-07-12 RX ORDER — INSULIN GLARGINE 100 [IU]/ML
38 INJECTION, SOLUTION SUBCUTANEOUS DAILY
Start: 2024-07-12

## 2024-07-12 RX ADMIN — INSULIN LISPRO 2 UNITS: 100 INJECTION, SOLUTION INTRAVENOUS; SUBCUTANEOUS at 11:42

## 2024-07-12 RX ADMIN — ANTACID TABLETS 1 TABLET: 500 TABLET, CHEWABLE ORAL at 08:38

## 2024-07-12 RX ADMIN — PROPRANOLOL HYDROCHLORIDE 30 MG: 20 TABLET ORAL at 08:38

## 2024-07-12 RX ADMIN — RIVASTIGMINE 1 PATCH: 4.6 PATCH TRANSDERMAL at 08:38

## 2024-07-12 RX ADMIN — INSULIN LISPRO 6 UNITS: 100 INJECTION, SOLUTION INTRAVENOUS; SUBCUTANEOUS at 11:41

## 2024-07-12 RX ADMIN — VENLAFAXINE HYDROCHLORIDE 75 MG: 75 CAPSULE, EXTENDED RELEASE ORAL at 08:39

## 2024-07-12 RX ADMIN — OXYCODONE HYDROCHLORIDE AND ACETAMINOPHEN 2000 MG: 500 TABLET ORAL at 08:38

## 2024-07-12 RX ADMIN — Medication 400 UNITS: at 08:39

## 2024-07-12 RX ADMIN — MAGNESIUM OXIDE 400 MG (241.3 MG MAGNESIUM) TABLET 400 MG: TABLET at 08:39

## 2024-07-12 RX ADMIN — INSULIN LISPRO 6 UNITS: 100 INJECTION, SOLUTION INTRAVENOUS; SUBCUTANEOUS at 07:22

## 2024-07-12 RX ADMIN — FINASTERIDE 5 MG: 5 TABLET, FILM COATED ORAL at 08:39

## 2024-07-12 RX ADMIN — ASPIRIN 81 MG: 81 TABLET, COATED ORAL at 08:39

## 2024-07-12 RX ADMIN — PANTOPRAZOLE SODIUM 40 MG: 40 TABLET, DELAYED RELEASE ORAL at 08:39

## 2024-07-12 NOTE — PLAN OF CARE
Problem: Adult Inpatient Plan of Care  Goal: Absence of Hospital-Acquired Illness or Injury  Intervention: Identify and Manage Fall Risk  Recent Flowsheet Documentation  Taken 7/11/2024 2000 by Martita Guerra RN  Safety Promotion/Fall Prevention: safety round/check completed  Intervention: Prevent Skin Injury  Recent Flowsheet Documentation  Taken 7/11/2024 2000 by Martita Guerra RN  Body Position: position changed independently  Intervention: Prevent and Manage VTE (Venous Thromboembolism) Risk  Recent Flowsheet Documentation  Taken 7/11/2024 2000 by Martita Guerra RN  Activity Management:   ambulated in room   back to bed  Intervention: Prevent Infection  Recent Flowsheet Documentation  Taken 7/11/2024 2000 by Martita Guerra RN  Infection Prevention: rest/sleep promoted  Goal: Optimal Comfort and Wellbeing  Outcome: Ongoing, Progressing  Intervention: Monitor Pain and Promote Comfort  Flowsheets (Taken 7/12/2024 0134)  Pain Management Interventions:   care clustered   quiet environment facilitated   Goal Outcome Evaluation:

## 2024-07-12 NOTE — PROGRESS NOTES
"DOS: 2024  NAME: Earl Marc   : 1945  PCP: Avery Velazquez MD  Chief Complaint   Patient presents with    Altered Mental Status     Patient seen in follow-up today; new to me      Neurology     Subjective: No acute events overnight.  Patient reports \"I feel better today than I have been weeks\".  Continues to report visual hallucination to include seeing handwriting and \"different language\", math equations, waterfall and colored lines in vision on a wall.  Denies any other complaints or concerns at time of my evaluation.    No visitors present at time of exam.    Objective:  Vital signs: /68 (BP Location: Left arm, Patient Position: Sitting)   Pulse 70   Temp 97.3 °F (36.3 °C) (Oral)   Resp 16   Ht 185.4 cm (73\")   Wt 98 kg (216 lb 0.8 oz)   SpO2 98%   BMI 28.50 kg/m²       HEENT: Normocephalic, atraumatic   COR: RRR  Resp: Even and unlabored  Extremities: Equal pulses, nondistal embolization    Neurological:   MS: AO. Language normal. No neglect. Higher integrative function normal  CN: II-XII normal  Motor: 5/5, normal tone  Sensory: Intact-to light touch  Coordination: Normal-finger-to-nose    Laboratory results:  Lab Results   Component Value Date    GLUCOSE 123 (H) 2024    CALCIUM 8.8 2024     2024    K 4.1 2024    CO2 19.9 (L) 2024     2024    BUN 12 2024    CREATININE 1.13 2024    EGFRIFAFRI 63 10/06/2021    EGFRIFNONA 52 (L) 10/06/2021    BCR 10.6 2024    ANIONGAP 14.1 2024     Lab Results   Component Value Date    WBC 9.19 2024    HGB 16.4 2024    HCT 50.0 2024    MCV 85.5 2024     2024     Lab Results   Component Value Date    CHOL 79 2024    CHOL 75 2021    CHOL 111 10/19/2018     Lab Results   Component Value Date    HDL 35 (L) 2024    HDL 39 (L) 2024    HDL 41 01/15/2024     Lab Results   Component Value Date    LDL 19 2024    LDL " 17 02/20/2024    LDL 22 01/15/2024     Lab Results   Component Value Date    TRIG 165 (H) 04/22/2024    TRIG 130 02/20/2024    TRIG 130 01/15/2024          Review and interpretation of imaging:  Imaging discussed below reviewed independent      Impression: This is a 79-year-old male with past medical history of MI, diabetes mellitus-type II, hypertension, hyperlipidemia, COPD, migraine headaches, persistent insomnia, skin cancer who presented to [facility] due to complaint of recurrent headaches x 6 days with associated visual hallucinations for which our service was consulted.  Headaches were associated with photophobia/phonophobia and nausea and vomiting.  Headaches not worsened by position changes and/or bearing down he had no other associated symptoms.  He reports taking Tylenol 6 to 8 pills/day for the past 6 days with minimal improvement/response.  Patient received migraine cocktail in the emergency department and had resolution of headache.  Of note patient on narcotic pain medication until 1 week ago for thoracic compression fracture due to pain patient has had difficulty sleeping with known history of chronic insomnia.  On date of admission he awoke with visual hallucinations described as cartoon characters as described a red wall with a purple puzzle piece moving over it.  Denies any EtOH or drug use/abuse on arrival patient was fairly hypertensive at 202/88 and blood glucose was 228.     Since admission, CT of the head negative for acute findings.  MRI of the the brain was negative as well for no acute stroke/mass/infarct/abnormal enhancement.  Evidence of mild to moderate small vessel ischemic disease noted.  EEG read as normal without evidence of epileptiform discharges/seizure activity/focal slowing present. No further neurology workup indicated. We will sign off and see again upon request.        Diagnosis:  1.  Headache; intractable without status migrainosus improved by migraine  cocktail  2.  Visual hallucinations questionable auditory hallucination-psychiatry team consult.  Exelon patch initiated some concern for possible early symptoms of Lewy body disease will defer further management to outpatient neurology  3.  Persistent insomnia  4.  Recent thoracic compression fracture resulting in worsening sleep due to pain more so since discontinuation of narcotic pain medication 1 week ago  5.  History of skin cancer with multiple recent excisions    Plan:  Exelon patch 4.6 mg every 24 hours  Follow-up with Dr. Paul Mcclellan in 4 to 6 weeks    Case reviewed with attending neurologist Dr. Isaac and she agrees with treatment plan above.    JL Monroe

## 2024-07-12 NOTE — PLAN OF CARE
Problem: Adult Inpatient Plan of Care  Goal: Plan of Care Review  Outcome: Met  Flowsheets  Taken 7/12/2024 1200 by Crystal Dunlap RN  Progress: improving  Outcome Evaluation: dc today  Taken 7/12/2024 1001 by Lynda Guajardo PT  Plan of Care Reviewed With: patient  Goal: Patient-Specific Goal (Individualized)  Outcome: Met  Goal: Absence of Hospital-Acquired Illness or Injury  Outcome: Met  Intervention: Identify and Manage Fall Risk  Recent Flowsheet Documentation  Taken 7/12/2024 1200 by Crystal Dunlap RN  Safety Promotion/Fall Prevention: safety round/check completed  Taken 7/12/2024 1004 by Crystal Dunlap RN  Safety Promotion/Fall Prevention: safety round/check completed  Taken 7/12/2024 0835 by Crystal Dunlap RN  Safety Promotion/Fall Prevention: safety round/check completed  Intervention: Prevent Skin Injury  Recent Flowsheet Documentation  Taken 7/12/2024 1200 by Crystal Dunlap RN  Body Position: position changed independently  Taken 7/12/2024 1004 by Crystal Dunlap RN  Body Position: position changed independently  Taken 7/12/2024 0835 by Crystal Dunlap RN  Body Position: position changed independently  Intervention: Prevent and Manage VTE (Venous Thromboembolism) Risk  Recent Flowsheet Documentation  Taken 7/12/2024 1200 by Crystal Dunlap RN  Activity Management: up ad veronica  Taken 7/12/2024 1004 by Crystal Dunlap RN  Activity Management: up ad veronica  Taken 7/12/2024 0835 by Crystal Dunlap RN  Activity Management: ambulated outside room  Goal: Optimal Comfort and Wellbeing  Outcome: Met  Goal: Readiness for Transition of Care  Outcome: Met     Problem: Pain Acute  Goal: Acceptable Pain Control and Functional Ability  Outcome: Met  Intervention: Prevent or Manage Pain  Recent Flowsheet Documentation  Taken 7/12/2024 0835 by Crystal Dunlap RN  Medication Review/Management: medications reviewed  Intervention: Optimize Psychosocial Wellbeing  Recent Flowsheet Documentation  Taken 7/12/2024 0835 by Crystal Dunlap  RN  Diversional Activities: television     Problem: Fall Injury Risk  Goal: Absence of Fall and Fall-Related Injury  Outcome: Met  Intervention: Identify and Manage Contributors  Recent Flowsheet Documentation  Taken 7/12/2024 0835 by Crystal Dunlap RN  Medication Review/Management: medications reviewed  Intervention: Promote Injury-Free Environment  Recent Flowsheet Documentation  Taken 7/12/2024 1200 by Crystal Dunlap RN  Safety Promotion/Fall Prevention: safety round/check completed  Taken 7/12/2024 1004 by Crystal Dunlap RN  Safety Promotion/Fall Prevention: safety round/check completed  Taken 7/12/2024 0835 by Crystal Dunlap RN  Safety Promotion/Fall Prevention: safety round/check completed   Goal Outcome Evaluation:  Plan of Care Reviewed With: patient        Progress: improving  Outcome Evaluation: dc today

## 2024-07-12 NOTE — PLAN OF CARE
Problem: Adult Inpatient Plan of Care  Goal: Absence of Hospital-Acquired Illness or Injury  Outcome: Ongoing, Progressing  Intervention: Identify and Manage Fall Risk  Flowsheets  Taken 7/12/2024 0423  Safety Promotion/Fall Prevention: activity supervised  Taken 7/12/2024 0200  Safety Promotion/Fall Prevention: activity supervised  Taken 7/11/2024 2200  Safety Promotion/Fall Prevention: activity supervised  Taken 7/11/2024 2000  Safety Promotion/Fall Prevention: safety round/check completed  Intervention: Prevent Skin Injury  Recent Flowsheet Documentation  Taken 7/12/2024 0200 by Martita Guerra RN  Body Position: position changed independently  Taken 7/11/2024 2000 by Martita Guerra RN  Body Position: position changed independently  Intervention: Prevent and Manage VTE (Venous Thromboembolism) Risk  Recent Flowsheet Documentation  Taken 7/11/2024 2000 by Martita Guerra RN  Activity Management:   ambulated in room   back to bed  Intervention: Prevent Infection  Recent Flowsheet Documentation  Taken 7/12/2024 0200 by Martita Guerra RN  Infection Prevention: hand hygiene promoted  Taken 7/11/2024 2200 by Martita Guerra RN  Infection Prevention: single patient room provided  Taken 7/11/2024 2000 by Martita Guerra RN  Infection Prevention: rest/sleep promoted  Goal: Optimal Comfort and Wellbeing  Outcome: Ongoing, Progressing  Intervention: Monitor Pain and Promote Comfort  Flowsheets (Taken 7/12/2024 0134)  Pain Management Interventions:   care clustered   quiet environment facilitated   Goal Outcome Evaluation:

## 2024-07-12 NOTE — PLAN OF CARE
Goal Outcome Evaluation:  Plan of Care Reviewed With: patient           Outcome Evaluation: Patient is a 79 y.o male who presented to [facility] with visual hallucinations. Patient recently seen about 1 month ago due to T12 compression fracture. Patient wears TLSO when OOB and has been attending OP PT. Patient AOx4 supine in bed upon arrival this date. Patient completed all bed mobilitly via log roll independently this date. Patient assisted with donning TLSO. Patient ambulated 300ft around unit with no AD and SV. Gait steady with no overt LOB. Patient wished to continue ambulating around unit at end of session. Educated on staying on unit  and notify nsg with any needs. No further skilled acute PT needs at this time. PT will sign off. Patient plans to return home with spouse and return to OP PT at d/c.      Anticipated Discharge Disposition (PT): home with outpatient therapy services

## 2024-07-12 NOTE — OUTREACH NOTE
Prep Survey      Flowsheet Row Responses   [facility] patient discharged from? [place]   Is LACE score < 7 ? No   Eligibility [facility]   Date of Admission 07/10/24   Date of Discharge 07/12/24   Discharge Disposition Home or Self Care   Discharge diagnosis Visual hallucinations   Does the patient have one of the following disease processes/diagnoses(primary or secondary)? Other   Does the patient have Home health ordered? No   Is there a DME ordered? No   Prep survey completed? Yes            Harper DELA CRUZ - Registered Nurse

## 2024-07-12 NOTE — DISCHARGE SUMMARY
NAME: Earl Marc ADMIT: 7/10/2024   : 1945  PCP: Avery Velazquez MD    MRN: [id number] LOS: 0 days   AGE/SEX: 79 y.o. male  ROOM: [facility]     Date of Admission:  7/10/2024  Date of Discharge:  2024    PCP: Avery Velazquez MD    CHIEF COMPLAINT  Altered Mental Status      DISCHARGE DIAGNOSIS  Active Hospital Problems    Diagnosis  POA    **Visual hallucinations [R44.1]  Yes    Type 2 diabetes mellitus with hyperglycemia, with long-term current use of insulin [E11.65, Z79.4]  Not Applicable    Coronary artery disease involving native coronary artery of native heart [I25.10]  Yes    Dyslipidemia [E78.5]  Yes    Hypertensive kidney disease with stage 3a chronic kidney disease [I12.9, N18.31]  Yes      Resolved Hospital Problems   No resolved problems to display.       SECONDARY DIAGNOSES  Past Medical History:   Diagnosis Date    Abnormal cardiovascular stress test     Acid reflux     Arthritis     Asthma     Cancer     skin     Chronic kidney disease     Colon polyp     Congenital heart disease     COPD (chronic obstructive pulmonary disease)     Old age COPD/2D hand Smoke emphysema    Coronary artery disease     Diabetes mellitus     Diabetic neuropathy associated with type 2 diabetes mellitus     Diabetic peripheral neuropathy 03/10/2016    Dyslipidemia     Erectile dysfunction     Fatty liver disease, nonalcoholic     Gastritis     Glaucoma     both eyes    Hyperlipidemia     Hypertension     Hypogonadism male     Infectious viral hepatitis Hep A 1959    Drank water from broken water line and Grandparents house.    Migraines 2023    Myocardial infarction 2018    Trigger point of left shoulder region 2023    Type 2 diabetes mellitus        CONSULTS   Psychiatry  Neurology     HOSPITAL COURSE  Patient is a 79 y.o. male with history of DM2, HLD, CAD, CKD who presented with hallucinations and headace.   MRI/EEG was unremarkable. Psychiatry wondered if    hallucinations may be related to his history of glaucoma and a related Lucho Floyd syndrome.     Neurology thought this could be related to migrainous phenomena, early symptoms of Lewy body disease. They started rivastigmine patch and said could have  outpatient workup for LBD including DaTscan or FDopa PET.      He will follow up with Neuro and Opthomalogy outpatient       DIAGNOSTICS     MRI Brain With & Without Contrast [[id number]] Travis as Reviewed   Order Status: Completed Collected: 07/11/24 1206    Updated: 07/11/24 1217   Narrative:     MRI EXAMINATION OF THE BRAIN WITH AND WITHOUT CONTRAST     HISTORY: Altered mental status, hallucinations, multiple skin cancer  lesions are removed recently.     COMPARISON: CT head 7/10/2024. MRI brain 2/20/2024..     FINDINGS: There is no evidence of restricted diffusion to suggest acute  infarction. There is expected flow void in the basilar artery and in the  distal aspect of the internal carotid arteries bilaterally on the axial  T2 sequence. Mild mucosal thickening involving the sphenoid sinus is  noted. After contrast administration there was no evidence of abnormal  enhancement. The axial T2 FLAIR sequence demonstrates mild to moderate  small vessel ischemic disease.      Impression:     Mild to moderate small vessel ischemic disease is noted.  There is no evidence of acute infarction, mass or of abnormal  enhancement.     This report was finalized on 7/11/2024 12:14 PM by Dr. Howard Flood M.D on Workstation: [id number]       CT Head Without Contrast [[id number]] Travis as Reviewed   Order Status: Completed Collected: 07/10/24 1345    Updated: 07/10/24 1349   Narrative:     CT HEAD WITHOUT CONTRAST     HISTORY: Hallucinations.     COMPARISON: CT head 7/8/2024.     FINDINGS: The brain and ventricles are symmetrical. Moderate small  vessel ischemic disease is noted. There is no evidence of intracranial  hemorrhage, or of hydrocephalus. No focal area of decreased  attenuation  to suggest acute infarction is identified. Further evaluation could be  performed with a MRI examination of the brain as indicated              Collected Updated Procedure Result Status    07/11/2024 0614 07/11/2024 0749 Basic Metabolic Panel [[id number]]    (Abnormal)   Blood    Final result Component Value Units   Glucose 123 High  mg/dL   BUN 12 mg/dL   Creatinine 1.13 mg/dL   Sodium 138 mmol/L   Potassium 4.1  mmol/L   Chloride 104 mmol/L   CO2 19.9 Low  mmol/L   Calcium 8.8 mg/dL   BUN/Creatinine Ratio 10.6    Anion Gap 14.1 mmol/L   eGFR 66.1 mL/min/1.73          07/11/2024 0614 07/11/2024 0643 CBC (No Diff) [[id number]]   (Abnormal)   Blood    Final result Component Value Units   WBC 9.19 10*3/mm3   RBC 5.85 High  10*6/mm3   Hemoglobin 16.4 g/dL   Hematocrit 50.0 %   MCV 85.5 fL   MCH 28.0 pg   MCHC 32.8 g/dL   RDW 13.7 %   RDW-SD 42.0 fl   MPV 9.8 fL   Platelets 182 10*3/mm3          07/10/2024 1557 07/10/2024 1625 High Sensitivity Troponin T 2Hr [[id number]]    Blood    Final result Component Value Units   HS Troponin T 13 ng/L   Troponin T Delta -1 ng/L        07/10/2024 1340 07/10/2024 1420 TSH [[id number]]   Blood    Final result Component Value Units   TSH 1.980 uIU/mL          07/10/2024 1340 07/10/2024 1420 T4, Free [[id number]]   Blood    Final result Component Value Units   Free T4 1.21 ng/dL          07/10/2024 1340 07/10/2024 1420 High Sensitivity Troponin T [[id number]]    Blood    Final result Component Value Units   HS Troponin T 14 ng/L          07/10/2024 1220 07/10/2024 1238 Urinalysis With Microscopic If Indicated (No Culture) - Urine, Clean Catch [[id number]]    (Abnormal)   Urine, Clean Catch    Final result Component Value   Color, UA Yellow   Appearance, UA Clear   pH, UA 7.5   Specific Gravity, UA 1.008   Glucose, UA Negative   Ketones, UA Negative   Bilirubin, UA Negative   Blood, UA Negative   Protein, UA Trace Abnormal    Leuk Esterase, UA Negative   Nitrite, UA Negative  "  Urobilinogen, UA 0.2 E.U./dL          07/10/2024 1220 07/10/2024 1255 Urine Drug Screen - Urine, Clean Catch [[id number]]    Urine, Clean Catch    Final result Component Value   Amphet/Methamphet, Screen Negative   Barbiturates Screen, Urine Negative   Benzodiazepine Screen, Urine Negative   Cocaine Screen, Urine Negative   Opiate Screen Negative   THC, Screen, Urine Negative   Methadone Screen, Urine Negative   Oxycodone Screen, Urine Negative          PHYSICAL EXAM  Objective:  Vital signs: (most recent): Blood pressure 126/68, pulse 70, temperature 97.3 °F (36.3 °C), temperature source Oral, resp. rate 16, height 185.4 cm (73\"), weight 98 kg (216 lb 0.8 oz), SpO2 98%.                Alert  nad  No resp distress  elderly    CONDITION ON DISCHARGE  Stable.      DISCHARGE DISPOSITION   Home or Self Care      DISCHARGE MEDICATIONS       Your medication list        START taking these medications        Instructions Last Dose Given Next Dose Due   rivastigmine 4.6 MG/24HR patch  Commonly known as: EXELON  Start taking on: July 13, 2024      Place 1 patch on the skin as directed by provider Daily.              CONTINUE taking these medications        Instructions Last Dose Given Next Dose Due   Advocate Alcohol Prep Pads 70 % pads      Use 1 each 4 (Four) Times a Day.       ammonium lactate 12 % lotion  Commonly known as: AmLactin      Apply 1 Application topically to the appropriate area as directed As Needed for Dry Skin.       ascorbic acid 1000 MG tablet  Commonly known as: VITAMIN C      Take 2 tablets by mouth Daily.       aspirin 81 MG EC tablet      Take 1 tablet by mouth Daily.       atorvastatin 20 MG tablet  Commonly known as: LIPITOR      Take 1 tablet by mouth Every Night.       B-D UF III MINI PEN NEEDLES 31G X 5 MM misc  Generic drug: Insulin Pen Needle      For use with pen device up to 4 times a day. Can substitute based on availability and insurance.       CALCIUM 600 PO      Take 1 tablet by mouth " Daily.       ciclopirox 1 % shampoo  Commonly known as: LOPROX      Apply 1 Application topically to the appropriate area as directed 3 (Three) Times a Week.       cyclobenzaprine 10 MG tablet  Commonly known as: FLEXERIL      Take 1 tablet by mouth 3 (Three) Times a Day As Needed for Muscle Spasms.       FINASTERIDE PO      Take 4 mg by mouth Daily.       fluticasone 50 MCG/ACT nasal spray  Commonly known as: FLONASE      2 sprays into the nostril(s) as directed by provider Daily As Needed for Allergies.       Glucose 1 g chewable tablet      Chew 1 tablet As Needed.       HYDROcodone-acetaminophen 5-325 MG per tablet  Commonly known as: NORCO      Take 1 tablet by mouth Every 6 (Six) Hours As Needed for Moderate Pain for up to 30 doses.       hydrocortisone 2.5 % ointment      Apply 1 Application topically to the appropriate area as directed 2 (Two) Times a Day.       Lantus SoloStar 100 UNIT/ML injection pen  Generic drug: Insulin Glargine      Inject 38 Units under the skin into the appropriate area as directed Daily.       latanoprost 0.005 % ophthalmic solution  Commonly known as: XALATAN      Administer 1 drop to both eyes Every Night.       linagliptin 5 MG tablet tablet  Commonly known as: Tradjenta      Take 1 tablet by mouth Daily.       Lumigan 0.01 % ophthalmic drops  Generic drug: bimatoprost           Lutein 20 MG tablet      Take 1 tablet by mouth Daily.       Magnesium 250 MG tablet      Take 1 tablet by mouth Every Night.       nitroglycerin 0.4 MG SL tablet  Commonly known as: Nitrostat      Place 1 tablet under the tongue Every 5 (Five) Minutes As Needed for Chest Pain. Indications: Acute Angina Pectoris       NON FORMULARY      Ketamin 4%/Gabapentin 6%/clonidine 0.2%/nifedipine 2%  1-2 PUMPS to affected area 3-4 times daily.       NON FORMULARY      Fluconazole/terbinafine/Ibuprofen/DMSO 2/3/3/50% with vitamin D 51203 Iu/100ml   1 Application to toe nails 2 times daily       NovoLOG FlexPen 100  UNIT/ML solution pen-injector sc pen  Generic drug: insulin aspart      Inject 5-6 Units under the skin into the appropriate area as directed 3 (Three) Times a Day With Meals.       pantoprazole 20 MG EC tablet  Commonly known as: PROTONIX      Take 1 tablet by mouth Daily.       propranolol LA 60 MG 24 hr capsule  Commonly known as: INDERAL LA      Take 1 capsule by mouth Daily.       Riboflavin 400 MG tablet      Take 1 tablet by mouth Every Night.       silodosin 4 MG capsule capsule  Commonly known as: RAPAFLO      Take 1 capsule by mouth Daily With Breakfast.       Testosterone Cypionate 200 MG/ML injection  Commonly known as: DEPOTESTOTERONE CYPIONATE      Inject 1 mL into the appropriate muscle as directed by prescriber Every 14 (Fourteen) Days.       Veltassa 8.4 g pack  Generic drug: Patiromer Sorbitex Calcium      Take 1 packet by mouth Daily.       venlafaxine XR 37.5 MG 24 hr capsule  Commonly known as: EFFEXOR-XR      Take 1 capsule by mouth Daily for 165 days.       Viagra 100 MG tablet  Generic drug: sildenafil      Take 1 tablet by mouth As Needed for erectile dysfunction (1 tablet prior to planned intercourse.).       Vitamin B-12 1000 MCG sublingual tablet      Place 1 tablet under the tongue Daily.       vitamin E 400 UNIT capsule      Take 1 capsule by mouth Daily.       ZINC PO      Take 50 mg by mouth Daily.              STOP taking these medications      levoFLOXacin 500 MG tablet  Commonly known as: LEVAQUIN        Melatonin 12 MG tablet        methocarbamol 750 MG tablet  Commonly known as: ROBAXIN                  Where to Get Your Medications        These medications were sent to [facility] - [place], [place] - [address] - [phone]  - [phone]   [address] [facility] [address]      Phone: [phone]   rivastigmine 4.6 MG/24HR patch       Information about where to get these medications is not yet available    Ask your nurse or doctor about these  medications  Lantus SoloStar 100 UNIT/ML injection pen          Future Appointments   Date Time Provider Department Center   7/16/2024  8:45 AM Hector Moise, PT [facility]   7/19/2024  1:30 PM Brittny Adler, PT [facility]   7/23/2024 10:00 AM Noy Flores, PT [facility]   7/24/2024  3:00 PM Avery Velazquez MD [facility]   7/25/2024  1:15 PM Noy Flores, PT [facility]   8/1/2024 11:00 AM Avery Velazquez MD [facility]   9/12/2024 10:00 AM [facility]  [facility]   9/19/2024  1:30 PM Margi Loredo APRN [facility]  [facility]   7/7/2025 10:15 AM Maria E Gilbert MD [facility]     Additional Instructions for the Follow-ups that You Need to Schedule       Discharge Follow-up with Specialty: PCP in 1-2 weeks, Neurology in 2-4 weeks, Eye doctor call for appointment   As directed      Specialty: PCP in 1-2 weeks, Neurology in 2-4 weeks, Eye doctor call for appointment               Follow-up Information       Avery Velazquez MD .    Specialty: Internal Medicine  Contact information:  [address] [facility] [address]  [phone]                             TEST  RESULTS PENDING AT DISCHARGE         Primo Franco MD  [facility]  07/12/24  12:40 EDT      Time: greater than 32 minutes on discharge  It was a pleasure taking care of this patient while in the hospital.

## 2024-07-12 NOTE — THERAPY EVALUATION
Patient Name: Earl Marc  : 1945    MRN: [id number]                              Today's Date: 2024       Admit Date: 7/10/2024    Visit Dx:     ICD-10-CM ICD-9-CM   1. Visual hallucinations  R44.1 368.16   2. Acute nonintractable headache, unspecified headache type  R51.9 784.0   3. Hypertensive urgency  I16.0 401.9   4. Type 2 diabetes mellitus with hyperglycemia, with long-term current use of insulin  E11.65 250.00    Z79.4 790.29     V58.67   5. Hypertensive kidney disease with stage 3a chronic kidney disease  I12.9 403.90    N18.31 585.3   6. History of compression fracture of spine  Z87.81 V15.51     Patient Active Problem List   Diagnosis    Bell's palsy    Persistent insomnia    Osteoarthritis of cervical spine    Diabetic peripheral neuropathy    Dyslipidemia    Steatosis of liver    Fibromyalgia    Gastroesophageal reflux disease without esophagitis    Hypertensive kidney disease with stage 3a chronic kidney disease    Hypogonadism in male    Renal insufficiency    Vitamin D deficiency    Benign non-nodular prostatic hyperplasia with lower urinary tract symptoms    S/P drug eluting coronary stent placement    Coronary artery disease involving native coronary artery of native heart    Malignant neoplasm of skin    Type 2 diabetes mellitus with hyperglycemia, with long-term current use of insulin    Diabetes mellitus    Chronic insomnia    Other specified glaucoma    Vertigo    Epigastric pain    Chest pain with high risk for cardiac etiology    Precordial pain    S/P arthroscopy of shoulder    Peripheral neuropathy    Stage 3b chronic kidney disease    Chest pain    Stroke-like symptoms    Acute back pain    Thoracic 12 compression fracture, closed, initial encounter    Hypertensive urgency    Sudden onset of severe headache    Visual hallucinations     Past Medical History:   Diagnosis Date    Abnormal cardiovascular stress test     Acid reflux     Arthritis     Asthma     Cancer     skin      Chronic kidney disease     Colon polyp     Congenital heart disease     COPD (chronic obstructive pulmonary disease) 2021    Old age COPD/2D hand [place] emphysema    Coronary artery disease     Diabetes mellitus     Diabetic neuropathy associated with type 2 diabetes mellitus     Diabetic peripheral neuropathy 03/10/2016    Dyslipidemia     Erectile dysfunction     Fatty liver disease, nonalcoholic     Gastritis     Glaucoma     both eyes    Hyperlipidemia     Hypertension     Hypogonadism male     Infectious viral hepatitis Hep A July 1959    Drank water from broken water line and Grandparents house.    Migraines 09/2023    Myocardial infarction 2018    Trigger point of left shoulder region 03/02/2023    Type 2 diabetes mellitus      Past Surgical History:   Procedure Laterality Date    CARDIAC CATHETERIZATION N/A 03/25/2016    Procedure: Left Heart Cath;  Surgeon: Maria E Gilbert MD;  Location:  [facility]VAS[facility];  Service:     CARDIAC CATHETERIZATION N/A 03/25/2016    Procedure: Coronary angiography;  Surgeon: Maria E Gilbert MD;  Location:  [facility];  Service:     CARDIAC CATHETERIZATION N/A 03/28/2016    Procedure: Coronary angiography;  Surgeon: Maria E Gilbert MD;  Location: [facility] C[facility];  Service:     CARDIAC CATHETERIZATION N/A 03/28/2016    Procedure: Stent MOISE coronary;  Surgeon: Maria E Gilbert MD;  Location:  [facility];  Service:     CARDIAC CATHETERIZATION N/A 10/18/2018    Procedure: Coronary angiography  no LV gram d/t CKD;  Surgeon: Maria E Gilbert MD;  Location:  [facility];  Service: Cardiology    CARDIAC CATHETERIZATION N/A 10/18/2018    Procedure: Left Heart Cath;  Surgeon: Maria E Gilbert MD;  Location: [facility];  Service: Cardiology    CARDIAC CATHETERIZATION N/A 10/18/2018    Procedure: Stent MOISE coronary;  Surgeon: Maria E Gilbert MD;   Location:  [facility];  Service: Cardiology    CARDIAC CATHETERIZATION N/A 05/28/2021    Procedure: Coronary angiography;  Surgeon: Maria E Gilbert MD;  Location:  [facility];  Service: Cardiovascular;  Laterality: N/A;    CARDIAC CATHETERIZATION N/A 05/28/2021    Procedure: Left Heart Cath;  Surgeon: Maria E Gilbert MD;  Location:  [facility];  Service: Cardiovascular;  Laterality: N/A;    CARDIAC CATHETERIZATION N/A 05/28/2021    Procedure: Left ventriculography;  Surgeon: Maria E Gilbert MD;  Location:  [facility];  Service: Cardiovascular;  Laterality: N/A;    CARDIAC CATHETERIZATION N/A 05/28/2021    Procedure: Stent MOISE coronary;  Surgeon: Maria E Gilbert MD;  [facility];  Service: Cardiovascular;  Laterality: N/A;    CARDIAC CATHETERIZATION N/A 1/19/2024    Procedure: Left Heart Cath;  Surgeon: Maria E Gilbert MD;  Location: [facility] CATH INVASIVE LOCATION;  Service: Cardiovascular;  Laterality: N/A;    CARDIAC CATHETERIZATION N/A 1/19/2024    Procedure: Coronary angiography;  Surgeon: Maria E Gilbert MD;  Location: [facility] CATH INVASIVE LOCATION;  Service: Cardiovascular;  Laterality: N/A;    CARDIAC CATHETERIZATION N/A 1/19/2024    Procedure: Left ventriculography;  Surgeon: Maria E Gilbert MD;  Location: [facility] CATH INVASIVE LOCATION;  Service: Cardiovascular;  Laterality: N/A;    CAROTID STENT      CORONARY ANGIOPLASTY      CORONARY STENT PLACEMENT  3/28/2016    EYE SURGERY  9/2017    NECK EXPLORATION N/A     WI RT/LT HEART CATHETERS N/A 10/18/2018    Procedure: Percutaneous Coronary Intervention;  Surgeon: Maria E Gilbert MD;  [facility];  Service: Cardiology      General Information       Row Name 07/12/24 1000          Physical Therapy Time and Intention    Document Type evaluation  -SM     Mode of Treatment individual  therapy;physical therapy  -[facility] Name 07/12/24 1000          General Information    Patient Profile Reviewed yes  -     Prior Level of Function independent:  -     Existing Precautions/Restrictions fall;TLSO  -[facility] Name 07/12/24 1000          Living Environment    People in Home spouse  -[facility] Name 07/12/24 1000          Cognition    Orientation Status (Cognition) oriented x 4  -               User Key  (r) = Recorded By, (t) = Taken By, (c) = Cosigned By      Initials Name Provider Type     Lynda Guajardo PT Physical Therapist                   Mobility       [facility] Name 07/12/24 1000          Bed Mobility    Bed Mobility supine-sit  -     Supine-Sit [place] (Bed Mobility) independent  -     Assistive Device (Bed Mobility) bed rails  -     Comment, (Bed Mobility) log roll  -[facility] Name 07/12/24 1000          Sit-Stand Transfer    Sit-Stand [place] (Transfers) independent  -SM       Row Name 07/12/24 1000          Gait/Stairs (Locomotion)    [place] Level (Gait) supervision  -     Assistive Device (Gait) --  no AD  -     Distance in Feet (Gait) 300  -     Comment, (Gait/Stairs) Gait steady with no overt LOB noted.  -               User Key  (r) = Recorded By, (t) = Taken By, (c) = Cosigned By      Initials Name Provider Type     Lynda Guajardo PT Physical Therapist                   Obj/Interventions       [facility] Name 07/12/24 1001          Range of Motion Comprehensive    General Range of Motion bilateral lower extremity ROM WFL  -[facility] Name 07/12/24 1001          Strength Comprehensive (MMT)    Comment, General Manual Muscle Testing (MMT) Assessment Generalized B LE weakness  -[facility] Name 07/12/24 1001          Balance    Balance Assessment sitting static balance;sitting dynamic balance;sit to stand dynamic balance;standing static balance;standing dynamic balance  -     Static Sitting Balance independent  -     Dynamic Sitting  Balance independent  -     Position, Sitting Balance sitting edge of bed  -     Sit to Stand Dynamic Balance independent  -     Static Standing Balance independent  -     Dynamic Standing Balance supervision  -     Position/Device Used, Standing Balance unsupported  -     Balance Interventions sitting;standing;sit to stand;static;dynamic  -               User Key  (r) = Recorded By, (t) = Taken By, (c) = Cosigned By      Initials Name Provider Type     Lynda Guajardo, PT Physical Therapist                   Goals/Plan    No documentation.                  Clinical Impression       Row Name 07/12/24 1001          Pain    Pretreatment Pain Rating 0/10 - no pain  -     Posttreatment Pain Rating 0/10 - no pain  -       Row Name 07/12/24 1001          Plan of Care Review    Plan of Care Reviewed With patient  -     Outcome Evaluation Patient is a 79 y.o male who presented to [facility] with visual hallucinations. Patient recently seen about 1 month ago due to T12 compression fracture. Patient wears TLSO when OOB and has been attending OP PT. Patient AOx4 supine in bed upon arrival this date. Patient completed all bed mobilitly via log roll independently this date. Patient assisted with donning TLSO. Patient ambulated 300ft around unit with no AD and SV. Gait steady with no overt LOB. Patient wished to continue ambulating around unit at end of session. Educated on staying on unit  and notify nsg with any needs. No further skilled acute PT needs at this time. PT will sign off. Patient plans to return home with spouse and return to OP PT at d/c.  -       Row Name 07/12/24 1001          Therapy Assessment/Plan (PT)    Criteria for Skilled Interventions Met (PT) no;no problems identified which require skilled intervention  -     Therapy Frequency (PT) evaluation only  -       Row Name 07/12/24 1001          Vital Signs    Pre Patient Position Supine  -SM     Intra Patient Position Standing  -      Post Patient Position Standing  -       Row Name 07/12/24 1001          Positioning and Restraints    Pre-Treatment Position in bed  -     Post Treatment Position other  -SM     Other Position return to room independently  Ambulating around ns station on unit  -               User Key  (r) = Recorded By, (t) = Taken By, (c) = Cosigned By      Initials Name Provider Type    Lynda Brock PT Physical Therapist                   Outcome Measures       Row Name 07/12/24 1004 07/12/24 0835       How much help from another person do you currently need...    Turning from your back to your side while in flat bed without using bedrails? 4  -SM 4  -LW    Moving from lying on back to sitting on the side of a flat bed without bedrails? 4  - 4  -LW    Moving to and from a bed to a chair (including a wheelchair)? 4  - 4  -LW    Standing up from a chair using your arms (e.g., wheelchair, bedside chair)? 4  - 4  -LW    Climbing 3-5 steps with a railing? 4  - 4  -LW    To walk in hospital room? 4  - 4  -LW    AM-PAC 6 Clicks Score (PT) 24  -SM 24  -LW    Highest Level of Mobility Goal 8 --> Walked 250 feet or more  - 8 --> Walked 250 feet or more  -      Row Name 07/12/24 1004          Functional Assessment    Outcome Measure Options AM-PAC 6 Clicks Basic Mobility (PT)  -               User Key  (r) = Recorded By, (t) = Taken By, (c) = Cosigned By      Initials Name Provider Type    Crystal Lorenzo RN Registered Nurse    Lynda Brock, ELIN Physical Therapist                                 Physical Therapy Education       Title: PT OT SLP Therapies (Done)       Topic: Physical Therapy (Done)       Point: Mobility training (Done)       Learning Progress Summary             Patient Acceptance, E, VU by  at 7/12/2024 1005                         Point: Home exercise program (Done)       Learning Progress Summary             Patient Acceptance, E, VU by  at 7/12/2024 1005                          Point: Body mechanics (Done)       Learning Progress Summary             Patient Acceptance, E, VU by  at 7/12/2024 1005                         Point: Precautions (Done)       Learning Progress Summary             Patient Acceptance, E, VU by  at 7/12/2024 1005                                         User Key       Initials Effective Dates Name Provider Type Discipline     05/02/22 -  Lynda Guajardo PT Physical Therapist PT                  PT Recommendation and Plan     Plan of Care Reviewed With: patient  Outcome Evaluation: Patient is a 79 y.o male who presented to [facility] with visual hallucinations. Patient recently seen about 1 month ago due to T12 compression fracture. Patient wears TLSO when OOB and has been attending OP PT. Patient AOx4 supine in bed upon arrival this date. Patient completed all bed mobilitly via log roll independently this date. Patient assisted with donning TLSO. Patient ambulated 300ft around unit with no AD and SV. Gait steady with no overt LOB. Patient wished to continue ambulating around unit at end of session. Educated on staying on unit  and notify nsg with any needs. No further skilled acute PT needs at this time. PT will sign off. Patient plans to return home with spouse and return to OP PT at d/c.     Time Calculation:         PT Charges       Row Name 07/12/24 1005             Time Calculation    Start Time 0838  -      Stop Time 0851  -      Time Calculation (min) 13 min  -      PT Received On 07/12/24  -                User Key  (r) = Recorded By, (t) = Taken By, (c) = Cosigned By      Initials Name Provider Type     Lynda Guajardo PT Physical Therapist                  Therapy Charges for Today       Code Description Service Date Service Provider Modifiers Qty    [id number] HC PT EVAL LOW COMPLEXITY 3 7/12/2024 Lynda Guajardo PT GP 1            PT G-Codes  Outcome Measure Options: AM-PAC 6 Clicks Basic Mobility (PT)  AM-PAC 6 Clicks Score (PT): 24  PT  Discharge Summary  Anticipated Discharge Disposition (PT): home with outpatient therapy services    Lynda Guajardo, PT  7/12/2024

## 2024-07-12 NOTE — PROGRESS NOTES
Discharge Planning Assessment  [facility]     Patient Name: Earl Marc  MRN: [id number]  Today's Date: 7/12/2024    Admit Date: 7/10/2024    Plan: Home   Discharge Needs Assessment    No documentation.                  Discharge Plan       Row Name 07/12/24 1629       Plan    Plan Home    Final Discharge Disposition Code 01 - home or self-care    Final Note Home                  Continued Care and Services - Discharged on 7/12/2024 Admission date: 7/10/2024 - Discharge disposition: Home or Self Care   No active coordination exists for this encounter.       Expected Discharge Date and Time       Expected Discharge Date Expected Discharge Time    Jul 12, 2024            Demographic Summary    No documentation.                  Functional Status    No documentation.                  Psychosocial    No documentation.                  Abuse/Neglect    No documentation.                  Legal    No documentation.                  Substance Abuse    No documentation.                  Patient Forms    No documentation.                     Kenya Gama RN

## 2024-07-15 ENCOUNTER — TELEPHONE (OUTPATIENT)
Dept: NEUROLOGY | Facility: CLINIC | Age: 79
End: 2024-07-15
Payer: MEDICARE

## 2024-07-15 ENCOUNTER — TRANSITIONAL CARE MANAGEMENT TELEPHONE ENCOUNTER (OUTPATIENT)
Dept: CALL CENTER | Facility: HOSPITAL | Age: 79
End: 2024-07-15
Payer: MEDICARE

## 2024-07-15 NOTE — TELEPHONE ENCOUNTER
Caller: JANESSA Estes call back number: [phone] CAN LEAVE DETAILED MESS IF GET V.M     New or established patient?  [] New  [x] Established    Date of discharge: 07.12.24    Facility discharged from: [facility]     Diagnosis/Symptoms: VISUAL HALLUCINATIONS     Length of stay (If applicable): 2 DAYS     Specialty Only: Did you see a [facility] provider?    [x] Yes  [] No  If so, who? ECTOR CHRISTINE  SAW PT / HE IS IST WITH DR ALONZO      Schedule an appointment with Ector Christine APRN (Neurology) in 2 weeks (7/26/2024); Call the office to schedule a follow up for 2-4 weeks    Subjective   Patient ID: Lamberto is a 4 year old male.    Chief Complaint   Patient presents with   • Cough     Patient has been experiencing cough and runny nose since starting school last Tuesday. Grandmother tested positive for COVID-19 today.      Cough  This is a new problem. The current episode started in the past 7 days. The problem occurs daily. The problem has been unchanged. Associated symptoms include congestion, coughing and fatigue. Pertinent negatives include no abdominal pain, anorexia, arthralgias, change in bowel habit, chest pain, chills, diaphoresis, fever, headaches, joint swelling, myalgias, nausea, neck pain, numbness, rash, sore throat, swollen glands, urinary symptoms, vertigo, visual change, vomiting or weakness. Nothing aggravates the symptoms.         Past Medical History:   Diagnosis Date   • Eczema 2/4/2021       MEDICATIONS:  Current Outpatient Medications   Medication Sig   • triamcinolone (ARISTOCORT) 0.1 % ointment Apply topically 2 times daily.   • PEDIATRIC MULTIPLE VITAMINS PO      No current facility-administered medications for this visit.       ALLERGIES:  ALLERGIES:  No Known Allergies    PAST SURGICAL HISTORY:  Past Surgical History:   Procedure Laterality Date   • Circumcision, primary         FAMILY HISTORY:  Family History   Problem Relation Age of Onset   • Eczema Mother        SOCIAL HISTORY:         Patient's medications, allergies, past medical, surgical, and social history  were reviewed and updated as appropriate.    Review of Systems   Constitutional: Positive for fatigue. Negative for chills, diaphoresis and fever.   HENT: Positive for congestion and rhinorrhea. Negative for sore throat.    Respiratory: Positive for cough. Negative for apnea, choking, wheezing and stridor.    Cardiovascular: Negative for chest pain.   Gastrointestinal: Negative for abdominal pain, anorexia, change in bowel habit, nausea and vomiting.   Musculoskeletal: Negative for arthralgias, joint  swelling, myalgias and neck pain.   Skin: Negative for rash.   Neurological: Negative for vertigo, weakness, numbness and headaches.   All other systems reviewed and are negative.      Objective   Physical Exam  Vitals and nursing note reviewed.   Constitutional:       General: He is active. He is not in acute distress.     Appearance: Normal appearance. He is well-developed. He is not toxic-appearing.   HENT:      Head: Normocephalic.      Right Ear: Tympanic membrane, ear canal and external ear normal. There is no impacted cerumen. Tympanic membrane is not erythematous or bulging.      Left Ear: Tympanic membrane, ear canal and external ear normal. There is no impacted cerumen. Tympanic membrane is not erythematous or bulging.      Nose: Nose normal. No congestion or rhinorrhea.      Mouth/Throat:      Mouth: Mucous membranes are moist.      Pharynx: Oropharynx is clear. No oropharyngeal exudate or posterior oropharyngeal erythema.   Cardiovascular:      Rate and Rhythm: Normal rate and regular rhythm.      Pulses: Normal pulses.      Heart sounds: Normal heart sounds.   Pulmonary:      Effort: Pulmonary effort is normal.      Breath sounds: Normal breath sounds.   Skin:     General: Skin is warm and dry.      Capillary Refill: Capillary refill takes less than 2 seconds.   Neurological:      General: No focal deficit present.      Mental Status: He is alert and oriented for age.       Visit Vitals  /69 (BP Location: LUE - Left upper extremity, Patient Position: Sitting)   Pulse 95   Temp 98.6 °F (37 °C) (Tympanic)   Resp 22   Wt 19.8 kg (43 lb 9.6 oz)   SpO2 98%       Assessment   Problem List Items Addressed This Visit    None     Visit Diagnoses     COVID-19 virus infection    -  Primary    Acute cough        Relevant Orders    POCT COVID/FLU/RSV PANEL (Completed)          This is a 4 year old year-old male who presents with stated pmhx as seen above with concerns for cough and runny nose x a week. He has  been exposed to covid by his grandma, he has also been + for covid within the last 30 days. Advised Mom he can test + for up to 90 days. Can no definitively say he has been reinfected as she is concerned for. He recently started school in the last 2 weeks. Diff on exam is viral syndrome. Advised supportive care.     Instructions provided as documented in the AVS.    Thank you for visiting [facility].  Please follow up with your PCP 1 week.       I have formulated a discharge action plan for this patient:     1) I have given the patient comprehensive discharge instructions and instructed them on the use of any OTC or RX medications involved in their discharge care.   2) I have carefully and comprehensively answered any questions and addressed any concerns that the patient had regarding their medical illness today and their discharge care and follow up.   3) I have carefully and repeatedly discussed with the patient that the patient needs follow up with a primary care provider or specialist, and as necessary I have given this information to the patient.   4)the patient feels comfortable going home at this time, the patient verbally expresses that the understand the discharge action plan and clearly understands to return, proceed to the ER or call 911 if symptoms worsen, and to absolutely follow up as described and directed above.     The provider verified that the discharge instructions and medications documented on this After Visit Summary report were reviewed with patient and/or caregiver.     The patient has appropriate transport home and has verbalized understanding of instructions given.

## 2024-07-15 NOTE — OUTREACH NOTE
Call Center TCM Note      Flowsheet Row Responses   [facility] patient discharged from? [facility]   Does the patient have one of the following disease processes/diagnoses(primary or secondary)? Other   TCM attempt successful? Yes   Call start time 1150   Call end time 1200   Discharge diagnosis Visual hallucinations   Meds reviewed with patient/caregiver? Yes   Is the patient having any side effects they believe may be caused by any medication additions or changes? No   Does the patient have all medications ordered at discharge? Yes   Prescription comments New rx Rivastigmine in place.   Is the patient taking all medications as directed (includes completed medication regime)? Yes   Comments TCM APPT WITH PCP DR HAMPTON IS 07/24/2024   Does the patient have an appointment with their PCP within 7-14 days of discharge? Yes   Has home health visited the patient within 72 hours of discharge? N/A   Psychosocial issues? No   Did the patient receive a copy of their discharge instructions? Yes   Nursing interventions Reviewed instructions with patient   What is the patient's perception of their health status since discharge? Improving   Is the patient/caregiver able to teach back signs and symptoms related to disease process for when to call PCP? Yes   Is the patient/caregiver able to teach back signs and symptoms related to disease process for when to call 911? Yes   Is the patient/caregiver able to teach back the hierarchy of who to call/visit for symptoms/problems? PCP, Specialist, Home health nurse, Urgent Care, ED, 911 Yes   If the patient is a current smoker, are they able to teach back resources for cessation? Not a smoker   TCM call completed? Yes   Wrap up additional comments D/C DX: visual hallucinations - Pt is feeling a little better today, tired. No questions at this time. Pt will be calling to get scheduled with his Neurologist and Opthtamologist. There is concern for possible early LBD. TCM APPT with  PCP Dr Avery Velazquez is 07/24/2024.   Call end time 1200            Enedina Li MA    7/15/2024, 12:27 EDT

## 2024-07-16 ENCOUNTER — HOSPITAL ENCOUNTER (OUTPATIENT)
Dept: PHYSICAL THERAPY | Facility: HOSPITAL | Age: 79
Setting detail: THERAPIES SERIES
Discharge: HOME | End: 2024-07-16
Payer: MEDICARE

## 2024-07-16 DIAGNOSIS — M25.551 BILATERAL HIP PAIN: Primary | ICD-10-CM

## 2024-07-16 DIAGNOSIS — M25.552 BILATERAL HIP PAIN: Primary | ICD-10-CM

## 2024-07-16 DIAGNOSIS — M62.89 TENSOR FASCIA LATA SYNDROME: ICD-10-CM

## 2024-07-16 PROCEDURE — 97110 THERAPEUTIC EXERCISES: CPT

## 2024-07-16 NOTE — THERAPY TREATMENT NOTE
Outpatient Physical Therapy Ortho Treatment Note  [facility]     Patient Name: Earl Marc  : 1945  MRN: [id number]  Today's Date: 2024      Visit Date: 2024    Visit Dx:    ICD-10-CM ICD-9-CM   1. Bilateral hip pain  M25.551 719.45    M25.552    2. Tensor fascia prateek syndrome  M62.89 727.09       Patient Active Problem List   Diagnosis    Bell's palsy    Persistent insomnia    Osteoarthritis of cervical spine    Diabetic peripheral neuropathy    Dyslipidemia    Steatosis of liver    Fibromyalgia    Gastroesophageal reflux disease without esophagitis    Hypertensive kidney disease with stage 3a chronic kidney disease    Hypogonadism in male    Renal insufficiency    Vitamin D deficiency    Benign non-nodular prostatic hyperplasia with lower urinary tract symptoms    S/P drug eluting coronary stent placement    Coronary artery disease involving native coronary artery of native heart    Malignant neoplasm of skin    Type 2 diabetes mellitus with hyperglycemia, with long-term current use of insulin    Diabetes mellitus    Chronic insomnia    Other specified glaucoma    Vertigo    Epigastric pain    Chest pain with high risk for cardiac etiology    Precordial pain    S/P arthroscopy of shoulder    Peripheral neuropathy    Stage 3b chronic kidney disease    Chest pain    Stroke-like symptoms    Acute back pain    Thoracic 12 compression fracture, closed, initial encounter    Hypertensive urgency    Sudden onset of severe headache    Visual hallucinations        Past Medical History:   Diagnosis Date    Abnormal cardiovascular stress test     Acid reflux     Arthritis     Asthma     Cancer     skin     Chronic kidney disease     Colon polyp     Congenital heart disease     COPD (chronic obstructive pulmonary disease)     Old age COPD/2D hand Smoke emphysema    Coronary artery disease     Diabetes mellitus     Diabetic neuropathy associated with type 2 diabetes mellitus     Diabetic  peripheral neuropathy 03/10/2016    Dyslipidemia     Erectile dysfunction     Fatty liver disease, nonalcoholic     Gastritis     Glaucoma     both eyes    Hyperlipidemia     Hypertension     Hypogonadism male     Infectious viral hepatitis Hep A July 1959    Drank water from broken water line and Grandparents house.    Migraines 09/2023    Myocardial infarction 2018    Trigger point of left shoulder region 03/02/2023    Type 2 diabetes mellitus         Past Surgical History:   Procedure Laterality Date    CARDIAC CATHETERIZATION N/A 03/25/2016    Procedure: Left Heart Cath;  Surgeon: Maria E Gilbert MD;  [facility];  Service:     CARDIAC CATHETERIZATION N/A 03/25/2016    Procedure: Coronary angiography;  Surgeon: Maria E Gilbert MD;  Location:  [facility] CATH INVASIVE LOCATION;  Service:     CARDIAC CATHETERIZATION N/A 03/28/2016    Procedure: Coronary angiography;  Surgeon: Maria E Gilbert MD;  Location:  [facility] CATH INVASIVE LOCATION;  Service:     CARDIAC CATHETERIZATION N/A 03/28/2016    Procedure: Stent MOISE coronary;  Surgeon: Maria E Gilbert MD;  [facility];  Service:     CARDIAC CATHETERIZATION N/A 10/18/2018    Procedure: Coronary angiography  no LV gram d/t CKD;  Surgeon: Maria E Gilbert MD;  Location:  [facility] CATH INVASIVE LOCATION;  Service: Cardiology    CARDIAC CATHETERIZATION N/A 10/18/2018    Procedure: Left Heart Cath;  Surgeon: Maria E Gilbert MD;  [facility];  Service: Cardiology    CARDIAC CATHETERIZATION N/A 10/18/2018    Procedure: Stent MOISE coronary;  Surgeon: Maria E Gilbert MD;  [facility];  Service: Cardiology    CARDIAC CATHETERIZATION N/A 05/28/2021    Procedure: Coronary angiography;  Surgeon: Maria E Gilbert MD;  [facility];  Service: Cardiovascular;  Laterality: N/A;    CARDIAC CATHETERIZATION N/A  05/28/2021    Procedure: Left Heart Cath;  Surgeon: Maria E Gilbert MD;  Location:  [facility] CATH INVASIVE LOCATION;  Service: Cardiovascular;  Laterality: N/A;    CARDIAC CATHETERIZATION N/A 05/28/2021    Procedure: Left ventriculography;  Surgeon: Maria E Gilbert MD;  Location:  [facility] CATH INVASIVE LOCATION;  Service: Cardiovascular;  Laterality: N/A;    CARDIAC CATHETERIZATION N/A 05/28/2021    Procedure: Stent MOISE coronary;  Surgeon: Maria E Gilbert MD;  Location:  [facility] CATH INVASIVE LOCATION;  Service: Cardiovascular;  Laterality: N/A;    CARDIAC CATHETERIZATION N/A 1/19/2024    Procedure: Left Heart Cath;  Surgeon: Maria E Gilbert MD;  Location:  [facility] CATH INVASIVE LOCATION;  Service: Cardiovascular;  Laterality: N/A;    CARDIAC CATHETERIZATION N/A 1/19/2024    Procedure: Coronary angiography;  Surgeon: Maria E Gilbert MD;  [facility];  Service: Cardiovascular;  Laterality: N/A;    CARDIAC CATHETERIZATION N/A 1/19/2024    Procedure: Left ventriculography;  Surgeon: Maria E Gilbert MD;  Location:  [facility] CATH INVASIVE LOCATION;  Service: Cardiovascular;  Laterality: N/A;    CAROTID STENT      CORONARY ANGIOPLASTY      CORONARY STENT PLACEMENT  3/28/2016    EYE SURGERY  9/2017    NECK EXPLORATION N/A     AZ RT/LT HEART CATHETERS N/A 10/18/2018    Procedure: Percutaneous Coronary Intervention;  Surgeon: Maria E Gilbert MD;  Location: [facility] CATH INVASIVE LOC[facility];  Service: Cardiology                        PT Assessment/Plan       Row Name 07/16/24 0800          PT Assessment    Assessment Comments Mr. Marc returns to PT with increased hip pain, recently admitted into [facility] for visual hallucinations/migraines (from 7/8-7/12). TLSO braced donned at time of arrival and ambulating with no AD, slowed susan and shortened stride length bilaterally. Began with treadmill for dynamic warm up, which pt did well at slower pace, cues for fully  "picking feet up. Progressed with adding tandem walking for balance/stability challenge and  4\" step ups for LE strengthening in multiplanar directions. Moderate c/o pain when doing mat activities, with most discomfort during all bed mobility. Pt remains appropriate for skilled PT at this time.  -DR        PT Plan    PT Plan Comments continue TM warm up; pt may benefit from manual to hip mm or STM to bilat ES? challenge balance with NBOS shoulder extension with TB, step reach, STS with glute squeeze at top, AR press?  -DR               User Key  (r) = Recorded By, (t) = Taken By, (c) = Cosigned By      Initials Name Provider Type    Hector Freitas, PT Physical Therapist                       OP Exercises       Row Name 07/16/24 0800             Subjective    Subjective Comments Hips hurt. Feel like I cant get better because it is one thing after another.  -DR         Subjective Pain    Able to rate subjective pain? yes  -DR      Pre-Treatment Pain Level 2  -DR         Total Minutes    90137 - PT Therapeutic Exercise Minutes 44  -DR         Exercise 1    Exercise Name 1 TM warm up  -DR      Time 1 5 min; speed 1.2 mph, incline 1%  -DR         Exercise 2    Exercise Name 2 seated HS stretch  -DR      Cueing 2 Demo  -DR      Reps 2 3e  -DR      Time 2 20s  -DR         Exercise 4    Exercise Name 4 fig 4 str/piriformis str  -DR      Cueing 4 Verbal  -DR      Reps 4 3 ea  -DR      Time 4 20s  -DR      Additional Comments supine  -DR         Exercise 5    Exercise Name 5 PPT supine  -DR      Cueing 5 Verbal;Demo  -DR      Reps 5 10  -DR      Time 5 5s  -DR         Exercise 6    Exercise Name 6 4\" step ups  -DR      Cueing 6 Verbal;Demo  -DR      Sets 6 1e  -DR      Reps 6 10  -DR      Time 6 fwd/lateral  -DR         Exercise 7    Exercise Name 7 standing HR  -DR      Cueing 7 Verbal;Demo  -DR      Sets 7 2  -DR      Reps 7 15  -DR         Exercise 8    Exercise Name 8 tandem walking  -DR      Cueing 8 Verbal;Demo  -DR "      Reps 8 3 laps  -DR      Time 8 fwd/bkwd; intermittent UE use as needed  -DR         Exercise 9    Exercise Name 9 side steps  -DR      Cueing 9 Verbal;Demo  -DR      Reps 9 3 laps  -DR      Time 9 RTB at thighs  -DR         Exercise 10    Exercise Name 10 PPT with march  -DR      Cueing 10 Verbal;Demo  -DR      Sets 10 1  -DR      Reps 10 10e  -DR      Time 10 cues for breath control and range  -DR         Exercise 12    Exercise Name 12 standing hip ABD  -DR      Cueing 12 Verbal;Demo  -DR      Reps 12 2  -DR      Time 12 10e  -DR      Additional Comments cues to dec hip hiking  -DR                User Key  (r) = Recorded By, (t) = Taken By, (c) = Cosigned By      Initials Name Provider Type    Hector Freitas, PT Physical Therapist                                  PT OP Goals       Row Name 07/16/24 0800          PT Short Term Goals    STG Date to Achieve 05/18/24  -     STG 1 Pt. will be independent with initial HEP to improve self-management of condition.  -     STG 1 Progress Met  -     STG 2 Pt. will tolerate 10 minute of standing/walking ther ex in clinic before seated rest break to demonstrate improved activity tolerance.  -     STG 2 Progress Met  -     STG 3 Pt. will perform 1 STS from chair with only 1 UE to demosntrate improving functional strength.  -     STG 3 Progress Met  -        Long Term Goals    LTG Date to Achieve 06/17/24  -     LTG 1 Pt. will be independent with advanced HEP to improve long term management of condition and independence.  -     LTG 1 Progress Ongoing  -     LTG 2 Pt will score >/= 40% on LEFS (from 21% on initial evaluation) to indicate improved perception of disability.  -     LTG 2 Progress Ongoing;Progressing  -     LTG 3 Pt. will demonstrate ability to complete 5xSTS in </= 20 seconds to progress toward falls risk cut off of 15 seconds.  -     LTG 3 Progress Met  -     LTG 4 Pt. will increase L LE strength >/= 4+/5 to improve mobility.   -     LTG 4 Progress Ongoing;Progressing  -DR MYERSG 5 Pt. will increase 2MWT >/= 150 feet with appropriate AD to indicate improved gait speed and mobility.  -DR NAIR 5 Progress Met  -               User Key  (r) = Recorded By, (t) = Taken By, (c) = Cosigned By      Initials Name Provider Type    Hector Freitas, PT Physical Therapist                    Therapy Education  Education Details: continued reinforcement of fall prevention using AD and pacing with ambulation  Given: Symptoms/condition management, Pain management, Posture/body mechanics, Fall prevention and home safety  Program: Reinforced  How Provided: Verbal, Demonstration  Provided to: Patient  Level of Understanding: Teach back education performed, Verbalized              Time Calculation:   Start Time: 0846  Stop Time: 0930  Time Calculation (min): 44 min  Timed Charges  12672 - PT Therapeutic Exercise Minutes: 44  Total Minutes  Timed Charges Total Minutes: 44   Total Minutes: 44  Therapy Charges for Today       Code Description Service Date Service Provider Modifiers Qty    42630728385 HC PT THER PROC EA 15 MIN 7/16/2024 Hector Moise, PT GP 3                      Hector Moise, PT  7/16/2024

## 2024-07-19 ENCOUNTER — HOSPITAL ENCOUNTER (OUTPATIENT)
Dept: PHYSICAL THERAPY | Facility: HOSPITAL | Age: 79
Setting detail: THERAPIES SERIES
Discharge: HOME | End: 2024-07-19
Payer: MEDICARE

## 2024-07-19 DIAGNOSIS — M62.89 TENSOR FASCIA LATA SYNDROME: ICD-10-CM

## 2024-07-19 DIAGNOSIS — M25.552 BILATERAL HIP PAIN: Primary | ICD-10-CM

## 2024-07-19 DIAGNOSIS — M25.551 BILATERAL HIP PAIN: Primary | ICD-10-CM

## 2024-07-19 PROCEDURE — 97110 THERAPEUTIC EXERCISES: CPT

## 2024-07-23 ENCOUNTER — HOSPITAL ENCOUNTER (OUTPATIENT)
Dept: PHYSICAL THERAPY | Facility: HOSPITAL | Age: 79
Setting detail: THERAPIES SERIES
Discharge: HOME | End: 2024-07-23
Payer: MEDICARE

## 2024-07-23 DIAGNOSIS — M25.551 BILATERAL HIP PAIN: Primary | ICD-10-CM

## 2024-07-23 DIAGNOSIS — M25.552 BILATERAL HIP PAIN: Primary | ICD-10-CM

## 2024-07-23 DIAGNOSIS — M62.89 TENSOR FASCIA LATA SYNDROME: ICD-10-CM

## 2024-07-23 PROCEDURE — 97530 THERAPEUTIC ACTIVITIES: CPT

## 2024-07-23 PROCEDURE — 97110 THERAPEUTIC EXERCISES: CPT

## 2024-07-23 NOTE — THERAPY TREATMENT NOTE
Outpatient Physical Therapy Ortho Treatment Note  [facility]     Patient Name: Earl Marc  : 1945  MRN: [id number]  Today's Date: 2024      Visit Date: 2024    Visit Dx:    ICD-10-CM ICD-9-CM   1. Bilateral hip pain  M25.551 719.45    M25.552    2. Tensor fascia prateek syndrome  M62.89 727.09       Patient Active Problem List   Diagnosis    Bell's palsy    Persistent insomnia    Osteoarthritis of cervical spine    Diabetic peripheral neuropathy    Dyslipidemia    Steatosis of liver    Fibromyalgia    Gastroesophageal reflux disease without esophagitis    Hypertensive kidney disease with stage 3a chronic kidney disease    Hypogonadism in male    Renal insufficiency    Vitamin D deficiency    Benign non-nodular prostatic hyperplasia with lower urinary tract symptoms    S/P drug eluting coronary stent placement    Coronary artery disease involving native coronary artery of native heart    Malignant neoplasm of skin    Type 2 diabetes mellitus with hyperglycemia, with long-term current use of insulin    Diabetes mellitus    Chronic insomnia    Other specified glaucoma    Vertigo    Epigastric pain    Chest pain with high risk for cardiac etiology    Precordial pain    S/P arthroscopy of shoulder    Peripheral neuropathy    Stage 3b chronic kidney disease    Chest pain    Stroke-like symptoms    Acute back pain    Thoracic 12 compression fracture, closed, initial encounter    Hypertensive urgency    Sudden onset of severe headache    Visual hallucinations        Past Medical History:   Diagnosis Date    Abnormal cardiovascular stress test     Acid reflux     Arthritis     Asthma     Cancer     skin     Chronic kidney disease     Colon polyp     Congenital heart disease     COPD (chronic obstructive pulmonary disease)     Old age COPD/2D hand Smoke emphysema    Coronary artery disease     Diabetes mellitus     Diabetic neuropathy associated with type 2 diabetes mellitus     Diabetic  peripheral neuropathy 03/10/2016    Dyslipidemia     Erectile dysfunction     Fatty liver disease, nonalcoholic     Gastritis     Glaucoma     both eyes    Hyperlipidemia     Hypertension     Hypogonadism male     Infectious viral hepatitis Hep A July 1959    Drank water from broken water line and Grandparents house.    Migraines 09/2023    Myocardial infarction 2018    Trigger point of left shoulder region 03/02/2023    Type 2 diabetes mellitus         Past Surgical History:   Procedure Laterality Date    CARDIAC CATHETERIZATION N/A 03/25/2016    Procedure: Left Heart Cath;  Surgeon: Maria E Gilbert MD;  [facility];  Service:     CARDIAC CATHETERIZATION N/A 03/25/2016    Procedure: Coronary angiography;  Surgeon: Maria E Gilbert MD;  Location:  [facility] CATH INVASIVE LOCATION;  Service:     CARDIAC CATHETERIZATION N/A 03/28/2016    Procedure: Coronary angiography;  Surgeon: Maria E Gilbert MD;  Location:  [facility] CATH INVASIVE LOCATION;  Service:     CARDIAC CATHETERIZATION N/A 03/28/2016    Procedure: Stent MOISE coronary;  Surgeon: Maria E Gilbert MD;  [facility];  Service:     CARDIAC CATHETERIZATION N/A 10/18/2018    Procedure: Coronary angiography  no LV gram d/t CKD;  Surgeon: Maria E Gilbert MD;  Location:  [facility] CATH INVASIVE LOCATION;  Service: Cardiology    CARDIAC CATHETERIZATION N/A 10/18/2018    Procedure: Left Heart Cath;  Surgeon: Maria E Gilbert MD;  [facility];  Service: Cardiology    CARDIAC CATHETERIZATION N/A 10/18/2018    Procedure: Stent MOISE coronary;  Surgeon: Maria E Gilbert MD;  [facility];  Service: Cardiology    CARDIAC CATHETERIZATION N/A 05/28/2021    Procedure: Coronary angiography;  Surgeon: Maria E Gilbert MD;  [facility];  Service: Cardiovascular;  Laterality: N/A;    CARDIAC CATHETERIZATION N/A  05/28/2021    Procedure: Left Heart Cath;  Surgeon: Maria E Gilbert MD;  Location:  [facility] CATH INVASIVE LOCATION;  Service: Cardiovascular;  Laterality: N/A;    CARDIAC CATHETERIZATION N/A 05/28/2021    Procedure: Left ventriculography;  Surgeon: Maria E Gilbert MD;  Location:  [facility] CATH INVASIVE LOCATION;  Service: Cardiovascular;  Laterality: N/A;    CARDIAC CATHETERIZATION N/A 05/28/2021    Procedure: Stent MOISE coronary;  Surgeon: Maria E Gilbert MD;  Location:  [facility] CATH INVASIVE LOCATION;  Service: Cardiovascular;  Laterality: N/A;    CARDIAC CATHETERIZATION N/A 1/19/2024    Procedure: Left Heart Cath;  Surgeon: Maria E Gilbert MD;  Location:  [facility] CATH INVASIVE LOCATION;  Service: Cardiovascular;  Laterality: N/A;    CARDIAC CATHETERIZATION N/A 1/19/2024    Procedure: Coronary angiography;  Surgeon: Maria E Gilbert MD;  [facility];  Service: Cardiovascular;  Laterality: N/A;    CARDIAC CATHETERIZATION N/A 1/19/2024    Procedure: Left ventriculography;  Surgeon: Maria E Giblert MD;  Location:  [facility] CATH INVASIVE LOCATION;  Service: Cardiovascular;  Laterality: N/A;    CAROTID STENT      CORONARY ANGIOPLASTY      CORONARY STENT PLACEMENT  3/28/2016    EYE SURGERY  9/2017    NECK EXPLORATION N/A     SC RT/LT HEART CATHETERS N/A 10/18/2018    Procedure: Percutaneous Coronary Intervention;  Surgeon: Maria E Gilbert MD;  [facility]: [facility] INVASIVE LOC[facility];  Service: Cardiology                        PT Assessment/Plan       Row Name 07/23/24 1000          PT Assessment    Assessment Comments Pt continues to report well controlled pain levels and good tolerance for exercise. He is feeling fatigued and slightly unsteady due to medication change/eating spoiled fruit. Focused on standing activities this date with 2 short seated, active rest breaks used for LE stretching. Initiated STS, NBOS, and shoulder ext with TA all with good  "tolerance with intermittent cues for upright posture and appropriate technique. Pt remains appropriate for skilled PT and is motivated to participate.  -RS        PT Plan    PT Plan Comments Discussed continued brace wear, per previous neurosurgery note pt needs to wear brace for additional 5 weeks from 7/1 (discontinue brace august 5). COnsider nicolas, AR press, inc step height  -RS               User Key  (r) = Recorded By, (t) = Taken By, (c) = Cosigned By      Initials Name Provider Type    RS Noy Flores, PT Physical Therapist                       OP Exercises       Row Name 07/23/24 1000             Subjective    Subjective Comments pt reports he is feeling a little unsteady on his feet becuase of a new medication patch he has started and his stomach is mildly upset because he ate some bad fruit  -RS         Total Minutes    70842 - PT Therapeutic Exercise Minutes 30  -RS      89388 - PT Therapeutic Activity Minutes 10  -RS         Exercise 1    Exercise Name 1 TM warm up  -RS      Time 1 5 min; speed 1.4 mph, incline 2%  -RS         Exercise 2    Exercise Name 2 seated HS stretch  -RS      Cueing 2 Demo  -RS      Reps 2 3e  -RS      Time 2 20s  -RS         Exercise 3    Exercise Name 3 shoulder ext with TA  -RS      Cueing 3 Verbal;Demo  -RS      Reps 3 15  -RS      Time 3 RTB  -RS         Exercise 4    Exercise Name 4 fig 4 str/piriformis str  -RS      Cueing 4 Verbal  -RS      Reps 4 3 ea  -RS      Time 4 20s  -RS      Additional Comments seated  -RS         Exercise 5    Exercise Name 5 NBOS foam with head turns  -RS      Cueing 5 Verbal;Demo  -RS      Sets 5 2  -RS      Reps 5 30s  -RS      Time 5 --  -RS         Exercise 6    Exercise Name 6 4\" step ups  -RS      Cueing 6 Verbal;Demo  -RS      Sets 6 1e  -RS      Reps 6 10  -RS      Time 6 fwd/lateral  -RS      Additional Comments knee   -RS         Exercise 7    Exercise Name 7 standing HR  -RS      Cueing 7 Verbal;Demo  -RS      Sets 7 2 "  -RS      Reps 7 15  -RS         Exercise 8    Exercise Name 8 tandem walking  -RS      Cueing 8 Verbal;Demo  -RS      Reps 8 3 laps  -RS      Time 8 fwd/bkwd; intermittent UE use as needed  -RS         Exercise 9    Exercise Name 9 side steps  -RS      Cueing 9 Verbal;Demo  -RS      Reps 9 3 laps  -RS      Time 9 RTB at thighs  -RS         Exercise 10    Exercise Name 10 STS no UE clinic chair  -RS      Cueing 10 Verbal;Demo  -RS      Sets 10 2  -RS      Reps 10 10  -RS      Time 10 --  -RS         Exercise 12    Exercise Name 12 standing hip ABD  -RS      Cueing 12 --  -RS      Reps 12 --  -RS      Time 12 --  -RS      Additional Comments --  -RS                User Key  (r) = Recorded By, (t) = Taken By, (c) = Cosigned By      Initials Name Provider Type    RS Noy Flores, PT Physical Therapist                                  PT OP Goals       Row Name 07/23/24 1000          PT Short Term Goals    STG Date to Achieve 05/18/24  -RS     STG 1 Pt. will be independent with initial HEP to improve self-management of condition.  -RS     STG 1 Progress Met  -RS     STG 2 Pt. will tolerate 10 minute of standing/walking ther ex in clinic before seated rest break to demonstrate improved activity tolerance.  -RS     STG 2 Progress Met  -RS     STG 3 Pt. will perform 1 STS from chair with only 1 UE to demosntrate improving functional strength.  -RS     STG 3 Progress Met  -RS        Long Term Goals    LTG Date to Achieve 06/17/24  -RS     LTG 1 Pt. will be independent with advanced HEP to improve long term management of condition and independence.  -RS     LTG 1 Progress Ongoing  -RS     LTG 2 Pt will score >/= 40% on LEFS (from 21% on initial evaluation) to indicate improved perception of disability.  -RS     LTG 2 Progress Ongoing;Progressing  -RS     LTG 3 Pt. will demonstrate ability to complete 5xSTS in </= 20 seconds to progress toward falls risk cut off of 15 seconds.  -RS     LTG 3 Progress Met  -RS     LTG 4  Pt. will increase L LE strength >/= 4+/5 to improve mobility.  -RS     LTG 4 Progress Ongoing;Progressing  -RS     LTG 5 Pt. will increase 2MWT >/= 150 feet with appropriate AD to indicate improved gait speed and mobility.  -RS     LTG 5 Progress Met  -RS               User Key  (r) = Recorded By, (t) = Taken By, (c) = Cosigned By      Initials Name Provider Type    RS Noy Flores PT Physical Therapist                    Therapy Education  Given: HEP  Program: Reinforced  How Provided: Verbal, Demonstration  Provided to: Patient  Level of Understanding: Verbalized, Demonstrated              Time Calculation:   Start Time: 1000  Stop Time: 1043  Time Calculation (min): 43 min  Timed Charges  68988 - PT Therapeutic Exercise Minutes: 30  36270 - PT Therapeutic Activity Minutes: 10  Total Minutes  Timed Charges Total Minutes: 40   Total Minutes: 40  Therapy Charges for Today       Code Description Service Date Service Provider Modifiers Qty    72159916591  PT THER PROC EA 15 MIN 7/23/2024 Noy Flores, PT GP 2    [id number]  PT THERAPEUTIC ACT EA 15 MIN 7/23/2024 Noy Flores, PT GP 1                      Noy Flores PT  7/23/2024

## 2024-07-24 ENCOUNTER — OFFICE VISIT (OUTPATIENT)
Dept: FAMILY MEDICINE CLINIC | Facility: CLINIC | Age: 79
End: 2024-07-24
Payer: MEDICARE

## 2024-07-24 VITALS
BODY MASS INDEX: 29.55 KG/M2 | WEIGHT: 223 LBS | HEIGHT: 73 IN | OXYGEN SATURATION: 95 % | RESPIRATION RATE: 16 BRPM | HEART RATE: 76 BPM | SYSTOLIC BLOOD PRESSURE: 126 MMHG | DIASTOLIC BLOOD PRESSURE: 74 MMHG

## 2024-07-24 DIAGNOSIS — Z09 HOSPITAL DISCHARGE FOLLOW-UP: Primary | ICD-10-CM

## 2024-07-24 DIAGNOSIS — G43.809 OTHER MIGRAINE WITHOUT STATUS MIGRAINOSUS, NOT INTRACTABLE: ICD-10-CM

## 2024-07-24 DIAGNOSIS — R44.1 VISUAL HALLUCINATIONS: ICD-10-CM

## 2024-07-24 PROCEDURE — 3074F SYST BP LT 130 MM HG: CPT | Performed by: INTERNAL MEDICINE

## 2024-07-24 PROCEDURE — 99495 TRANSJ CARE MGMT MOD F2F 14D: CPT | Performed by: INTERNAL MEDICINE

## 2024-07-24 PROCEDURE — 1111F DSCHRG MED/CURRENT MED MERGE: CPT | Performed by: INTERNAL MEDICINE

## 2024-07-24 PROCEDURE — 3078F DIAST BP <80 MM HG: CPT | Performed by: INTERNAL MEDICINE

## 2024-07-24 PROCEDURE — 1126F AMNT PAIN NOTED NONE PRSNT: CPT | Performed by: INTERNAL MEDICINE

## 2024-07-24 NOTE — PROGRESS NOTES
Transitional Care Follow Up Visit  Subjective     Earl Marc is a 79 y.o. male who presents for a transitional care management visit.    Within 48 business hours after discharge our office contacted him via telephone to coordinate his care and needs.      I reviewed and discussed the details of that call along with the discharge summary, hospital problems, inpatient lab results, inpatient diagnostic studies, and consultation reports with Earl.     Current outpatient and discharge medications have been reconciled for the patient.  Reviewed by: Avery Velazquez MD          7/12/2024     5:44 PM   Date of TCM Phone Call   [facility]   Date of Admission 7/10/2024   Date of Discharge 7/12/2024   Discharge Disposition Home or Self Care     Risk for Readmission (LACE) Score: 12 (7/12/2024  6:00 AM)      History of Present Illness   Course During Hospital Stay: Earl is here today for hospital follow-up.  He was seen in the emergency room on 8 July.  He was having fairly severe migraine headache.  He was treated then and released.  He returned to the emergency room on 10 July.  He was having headache again but he was also having visual hallucinations.  He describes vividly seeing people in [employer] clothing.  When he would close his eyes he would see rolling smoke.  He was not having any auditory hallucinations.  His MRI scan of the brain showed some chronic small vessel disease.  There were no acute stroke.  He was seen in consultation by psychiatry who initially thought he might have Lucho Bonnet syndrome.  However his eye doctor recently saw him and said his vision is too good to have experience that.  The neurologist thought this was all part of the migrainous phenomenon or possibly due to parkinsonism.  He does have a little bit of a pill-rolling right thumb tremor.  It was also wondered whether he could be experiencing some early Lewy body dementia.  They did put him on a  "rivastigmine patch.     The following portions of the patient's history were reviewed and updated as appropriate: allergies, current medications, and problem list.    Review of Systems   Neurological:  Negative for headaches.   Psychiatric/Behavioral:  Negative for hallucinations.      Objective   /74   Pulse 76   Resp 16   Ht 185.4 cm (72.99\")   Wt 101 kg (223 lb) Comment: with back brace on  SpO2 95%   BMI 29.43 kg/m²   Physical Exam  Vitals and nursing note reviewed.   Constitutional:       Appearance: Normal appearance.   Neck:      Vascular: No carotid bruit.   Cardiovascular:      Rate and Rhythm: Normal rate and regular rhythm.   Pulmonary:      Effort: Pulmonary effort is normal.      Breath sounds: No wheezing, rhonchi or rales.   Neurological:      General: No focal deficit present.      Mental Status: He is alert. Mental status is at baseline.      Cranial Nerves: No cranial nerve deficit.   Psychiatric:         Behavior: Behavior normal.     Assessment & Plan   Diagnoses and all orders for this visit:    1. Hospital discharge follow-up (Primary)    2. Other migraine without status migrainosus, not intractable    3. Visual hallucinations    Earl is here today for follow-up.  He does have a follow-up appointment with his neurologist at which time they will do some further scans to check into parkinsonism.  For now I am not going to make any changes in his medication.  I am going to see him back in 3 months.               "

## 2024-07-25 ENCOUNTER — HOSPITAL ENCOUNTER (OUTPATIENT)
Dept: PHYSICAL THERAPY | Facility: HOSPITAL | Age: 79
Setting detail: THERAPIES SERIES
Discharge: HOME | End: 2024-07-25
Payer: MEDICARE

## 2024-07-25 DIAGNOSIS — M62.89 TENSOR FASCIA LATA SYNDROME: ICD-10-CM

## 2024-07-25 DIAGNOSIS — M25.552 BILATERAL HIP PAIN: Primary | ICD-10-CM

## 2024-07-25 DIAGNOSIS — M25.551 BILATERAL HIP PAIN: Primary | ICD-10-CM

## 2024-07-25 PROCEDURE — 97530 THERAPEUTIC ACTIVITIES: CPT

## 2024-07-25 PROCEDURE — 97110 THERAPEUTIC EXERCISES: CPT

## 2024-07-25 NOTE — THERAPY TREATMENT NOTE
Outpatient Physical Therapy Ortho Treatment Note  [facility]     Patient Name: Earl Marc  : 1945  MRN: [id number]  Today's Date: 2024      Visit Date: 2024    Visit Dx:    ICD-10-CM ICD-9-CM   1. Bilateral hip pain  M25.551 719.45    M25.552    2. Tensor fascia prateek syndrome  M62.89 727.09       Patient Active Problem List   Diagnosis    Bell's palsy    Persistent insomnia    Osteoarthritis of cervical spine    Diabetic peripheral neuropathy    Dyslipidemia    Steatosis of liver    Fibromyalgia    Gastroesophageal reflux disease without esophagitis    Hypertensive kidney disease with stage 3a chronic kidney disease    Hypogonadism in male    Renal insufficiency    Vitamin D deficiency    Benign non-nodular prostatic hyperplasia with lower urinary tract symptoms    S/P drug eluting coronary stent placement    Coronary artery disease involving native coronary artery of native heart    Malignant neoplasm of skin    Type 2 diabetes mellitus with hyperglycemia, with long-term current use of insulin    Diabetes mellitus    Chronic insomnia    Other specified glaucoma    Vertigo    Epigastric pain    Chest pain with high risk for cardiac etiology    Precordial pain    S/P arthroscopy of shoulder    Peripheral neuropathy    Stage 3b chronic kidney disease    Chest pain    Stroke-like symptoms    Acute back pain    Thoracic 12 compression fracture, closed, initial encounter    Hypertensive urgency    Sudden onset of severe headache    Visual hallucinations        Past Medical History:   Diagnosis Date    Abnormal cardiovascular stress test     Acid reflux     Arthritis     Asthma     Cancer     skin     Chronic kidney disease     Colon polyp     Congenital heart disease     COPD (chronic obstructive pulmonary disease)     Old age COPD/2D hand Smoke emphysema    Coronary artery disease     Diabetes mellitus     Diabetic neuropathy associated with type 2 diabetes mellitus     Diabetic  peripheral neuropathy 03/10/2016    Dyslipidemia     Erectile dysfunction     Fatty liver disease, nonalcoholic     Gastritis     Glaucoma     both eyes    Hyperlipidemia     Hypertension     Hypogonadism male     Infectious viral hepatitis Hep A July 1959    Drank water from broken water line and Grandparents house.    Migraines 09/2023    Myocardial infarction 2018    Trigger point of left shoulder region 03/02/2023    Type 2 diabetes mellitus         Past Surgical History:   Procedure Laterality Date    CARDIAC CATHETERIZATION N/A 03/25/2016    Procedure: Left Heart Cath;  Surgeon: Maria E Gilbert MD;  Location: [facility];  Service:     CARDIAC CATHETERIZATION N/A 03/25/2016    Procedure: Coronary angiography;  Surgeon: Maria E Gilbert MD;  Location:  [facility] CATH INVASIVE LOCATION;  Service:     CARDIAC CATHETERIZATION N/A 03/28/2016    Procedure: Coronary angiography;  Surgeon: Maria E Gilbert MD;  Location:  [facility] CATH INVASIVE LOCATION;  Service:     CARDIAC CATHETERIZATION N/A 03/28/2016    Procedure: Stent MOISE coronary;  Surgeon: Maria E Gilbert MD;  [facility]: [facility];  Service:     CARDIAC CATHETERIZATION N/A 10/18/2018    Procedure: Coronary angiography  no LV gram d/t CKD;  Surgeon: Maria E Gilbert MD;  Location:  [facility] CATH INVASIVE LOCATION;  Service: Cardiology    CARDIAC CATHETERIZATION N/A 10/18/2018    Procedure: Left Heart Cath;  Surgeon: Maria E Gilbert MD;  Location: [facility];  Service: Cardiology    CARDIAC CATHETERIZATION N/A 10/18/2018    Procedure: Stent MOISE coronary;  Surgeon: Maria E Gilbert MD;  Location: [facility];  Service: Cardiology    CARDIAC CATHETERIZATION N/A 05/28/2021    Procedure: Coronary angiography;  Surgeon: Maria E Gilbert MD;  Location: [facility];  Service: Cardiovascular;  Laterality: N/A;    CARDIAC CATHETERIZATION N/A  05/28/2021    Procedure: Left Heart Cath;  Surgeon: Maria E Gilbert MD;  Location:  [facility] CATH INVASIVE LOCATION;  Service: Cardiovascular;  Laterality: N/A;    CARDIAC CATHETERIZATION N/A 05/28/2021    Procedure: Left ventriculography;  Surgeon: Maria E Gilbert MD;  Location:  [facility] CATH INVASIVE LOCATION;  Service: Cardiovascular;  Laterality: N/A;    CARDIAC CATHETERIZATION N/A 05/28/2021    Procedure: Stent MOISE coronary;  Surgeon: Maria E Gilbert MD;  Location:  [facility] CATH INVASIVE LOCATION;  Service: Cardiovascular;  Laterality: N/A;    CARDIAC CATHETERIZATION N/A 1/19/2024    Procedure: Left Heart Cath;  Surgeon: Maria E Gilbert MD;  [facility];  Service: Cardiovascular;  Laterality: N/A;    CARDIAC CATHETERIZATION N/A 1/19/2024    Procedure: Coronary angiography;  Surgeon: Maria E Gilbert MD;  [facility];  Service: Cardiovascular;  Laterality: N/A;    CARDIAC CATHETERIZATION N/A 1/19/2024    Procedure: Left ventriculography;  Surgeon: Maria E Gilbert MD;  [facility]: [facility];  Service: Cardiovascular;  Laterality: N/A;    CAROTID STENT      CORONARY ANGIOPLASTY      CORONARY STENT PLACEMENT  3/28/2016    EYE SURGERY  9/2017    NECK EXPLORATION N/A     TX RT/LT HEART CATHETERS N/A 10/18/2018    Procedure: Percutaneous Coronary Intervention;  Surgeon: Marai E Gilbert MD;  Location: [facility] CATH INVASIVE LOCATION;  Service: Cardiology                        PT Assessment/Plan       Row Name 07/25/24 1400          PT Assessment    Assessment Comments Mr. Marc reporting being seen by his PCP yesterday and was informed his MD feels he is showing early signs of Lewy body dementia.  He does have a follow-up appointment with his neurologist at which time they will do some further scans to check into parkinsonism. He demonstrates increased unsteadiness this date with frequent posterior lean  "with standing tasks. Therefore, SBA performed throughout session. Increase step height to 6in and added walking marches and AR press outs with good tolerance. No changes made to HEP at this time. Earl Marc remains a candidate for skilled PT.  -JS        PT Plan    PT Plan Comments response to last session. Update HEP, consider step reach with STS and lateral step reach onto foam with SBA/CGA  -JS               User Key  (r) = Recorded By, (t) = Taken By, (c) = Cosigned By      Initials Name Provider Type    Brittny eTrrell, PT Physical Therapist                       OP Exercises       Row Name 07/25/24 1300             Subjective    Subjective Comments My doctor thinks I have a form of parkinsons so they are doing further testing. That is what my new medicine is for and I am starting to feel a little better and not so dizzy  -JS         Total Minutes    08002 - PT Therapeutic Exercise Minutes 30  -JS      80662 - PT Therapeutic Activity Minutes 10  -JS         Exercise 1    Exercise Name 1 TM warm up  -JS      Time 1 5 min; speed 1.4 mph, incline 2%  -JS         Exercise 3    Exercise Name 3 shoulder ext with TA  -JS      Cueing 3 Verbal;Demo  -JS      Reps 3 15  -JS      Time 3 RTB  -JS         Exercise 5    Exercise Name 5 NBOS foam with head turns  -JS      Additional Comments resume next visit  -JS         Exercise 6    Exercise Name 6 6\" step ups  -JS      Cueing 6 Verbal;Demo  -JS      Sets 6 1e  -JS      Reps 6 10  -JS      Time 6 fwd/lateral  -JS      Additional Comments knee   -JS         Exercise 7    Exercise Name 7 standing HR  -JS      Cueing 7 Verbal;Demo  -JS      Sets 7 2  -JS      Reps 7 15  -JS         Exercise 8    Exercise Name 8 tandem walking  -JS      Cueing 8 Verbal;Demo  -JS      Reps 8 3 laps  -JS      Time 8 fwd/bkwd; intermittent UE use as needed  -JS         Exercise 9    Exercise Name 9 side steps  -JS      Cueing 9 Verbal;Demo  -JS      Reps 9 3 laps  -JS      Time 9 " RTB at thighs  -JS         Exercise 10    Exercise Name 10 STS no UE clinic chair  -JS      Cueing 10 Verbal;Demo  -JS      Sets 10 2  -JS      Reps 10 10  -JS         Exercise 11    Exercise Name 11 walking marches  -JS      Cueing 11 Verbal;Demo  -JS      Reps 11 3 laps  -JS      Time 11 cues for step length, core activation and wide KAREEM  -JS         Exercise 13    Exercise Name 13 AR press  -JS      Cueing 13 Verbal;Demo  -JS      Sets 13 1  -JS      Reps 13 15e  -JS      Time 13 RTB  -                User Key  (r) = Recorded By, (t) = Taken By, (c) = Cosigned By      Initials Name Provider Type    Brittny Terrell, PT Physical Therapist                                  PT OP Goals       Row Name 07/25/24 1300          PT Short Term Goals    STG Date to Achieve 05/18/24  -     STG 1 Pt. will be independent with initial HEP to improve self-management of condition.  -     STG 1 Progress Met  -     STG 2 Pt. will tolerate 10 minute of standing/walking ther ex in clinic before seated rest break to demonstrate improved activity tolerance.  -     STG 2 Progress Met  -     STG 3 Pt. will perform 1 STS from chair with only 1 UE to demosntrate improving functional strength.  -     STG 3 Progress Met  -        Long Term Goals    LTG Date to Achieve 06/17/24  -     LTG 1 Pt. will be independent with advanced HEP to improve long term management of condition and independence.  -     LTG 1 Progress Ongoing  -     LTG 2 Pt will score >/= 40% on LEFS (from 21% on initial evaluation) to indicate improved perception of disability.  -     LTG 2 Progress Ongoing;Progressing  -JS     LTG 3 Pt. will demonstrate ability to complete 5xSTS in </= 20 seconds to progress toward falls risk cut off of 15 seconds.  -     LTG 3 Progress Met  -JS     LTG 4 Pt. will increase L LE strength >/= 4+/5 to improve mobility.  -JS     LTG 4 Progress Ongoing;Progressing  -JS     LTG 5 Pt. will increase 2MWT >/= 150 feet with  appropriate AD to indicate improved gait speed and mobility.  -TORY     LTG 5 Progress Met  -JS               User Key  (r) = Recorded By, (t) = Taken By, (c) = Cosigned By      Initials Name Provider Type    Brittny Terrell, PT Physical Therapist                                   Time Calculation:   Start Time: 1330  Stop Time: 1410  Time Calculation (min): 40 min  Timed Charges  30307 - PT Therapeutic Exercise Minutes: 30  33291 - PT Therapeutic Activity Minutes: 10  Total Minutes  Timed Charges Total Minutes: 40   Total Minutes: 40  Therapy Charges for Today       Code Description Service Date Service Provider Modifiers Qty    16461911534  PT THER PROC EA 15 MIN 7/25/2024 Brittny Adler, PT GP 2    [id number]  PT THERAPEUTIC ACT EA 15 MIN 7/25/2024 Brittny Adler, PT GP 1                      Brittny Adler PT  7/25/2024

## 2024-07-31 ENCOUNTER — HOSPITAL ENCOUNTER (OUTPATIENT)
Dept: PHYSICAL THERAPY | Facility: HOSPITAL | Age: 79
Setting detail: THERAPIES SERIES
Discharge: HOME | End: 2024-07-31
Payer: MEDICARE

## 2024-07-31 DIAGNOSIS — M25.551 BILATERAL HIP PAIN: Primary | ICD-10-CM

## 2024-07-31 DIAGNOSIS — M62.89 TENSOR FASCIA LATA SYNDROME: ICD-10-CM

## 2024-07-31 DIAGNOSIS — M25.552 BILATERAL HIP PAIN: Primary | ICD-10-CM

## 2024-07-31 PROCEDURE — 97530 THERAPEUTIC ACTIVITIES: CPT

## 2024-07-31 PROCEDURE — 97110 THERAPEUTIC EXERCISES: CPT

## 2024-07-31 NOTE — THERAPY TREATMENT NOTE
Outpatient Physical Therapy Ortho Treatment Note  [facility]     Patient Name: Earl Marc  : 1945  MRN: [id number]  Today's Date: 2024      Visit Date: 2024    Visit Dx:    ICD-10-CM ICD-9-CM   1. Bilateral hip pain  M25.551 719.45    M25.552    2. Tensor fascia prateek syndrome  M62.89 727.09       Patient Active Problem List   Diagnosis    Bell's palsy    Persistent insomnia    Osteoarthritis of cervical spine    Diabetic peripheral neuropathy    Dyslipidemia    Steatosis of liver    Fibromyalgia    Gastroesophageal reflux disease without esophagitis    Hypertensive kidney disease with stage 3a chronic kidney disease    Hypogonadism in male    Renal insufficiency    Vitamin D deficiency    Benign non-nodular prostatic hyperplasia with lower urinary tract symptoms    S/P drug eluting coronary stent placement    Coronary artery disease involving native coronary artery of native heart    Malignant neoplasm of skin    Type 2 diabetes mellitus with hyperglycemia, with long-term current use of insulin    Diabetes mellitus    Chronic insomnia    Other specified glaucoma    Vertigo    Epigastric pain    Chest pain with high risk for cardiac etiology    Precordial pain    S/P arthroscopy of shoulder    Peripheral neuropathy    Stage 3b chronic kidney disease    Chest pain    Stroke-like symptoms    Acute back pain    Thoracic 12 compression fracture, closed, initial encounter    Hypertensive urgency    Sudden onset of severe headache    Visual hallucinations        Past Medical History:   Diagnosis Date    Abnormal cardiovascular stress test     Acid reflux     Arthritis     Asthma     Cancer     skin     Chronic kidney disease     Colon polyp     Congenital heart disease     COPD (chronic obstructive pulmonary disease)     Old age COPD/2D hand Smoke emphysema    Coronary artery disease     Diabetes mellitus     Diabetic neuropathy associated with type 2 diabetes mellitus     Diabetic  peripheral neuropathy 03/10/2016    Dyslipidemia     Erectile dysfunction     Fatty liver disease, nonalcoholic     Gastritis     Glaucoma     both eyes    Hyperlipidemia     Hypertension     Hypogonadism male     Infectious viral hepatitis Hep A July 1959    Drank water from broken water line and Grandparents house.    Migraines 09/2023    Myocardial infarction 2018    Trigger point of left shoulder region 03/02/2023    Type 2 diabetes mellitus         Past Surgical History:   Procedure Laterality Date    CARDIAC CATHETERIZATION N/A 03/25/2016    Procedure: Left Heart Cath;  Surgeon: Maria E Gilbert MD;  Location[facility];  Service:     CARDIAC CATHETERIZATION N/A 03/25/2016    Procedure: Coronary angiography;  Surgeon: Maria E Gilbert MD;  Location:  [facility] CATH INVASIVE LOCATION;  Service:     CARDIAC CATHETERIZATION N/A 03/28/2016    Procedure: Coronary angiography;  Surgeon: Maria E Gilbert MD;  Location:  [facility] CATH INVASIVE LOCATION;  Service:     CARDIAC CATHETERIZATION N/A 03/28/2016    Procedure: Stent MOISE coronary;  Surgeon: Maria E Gilbert MD;  [facility]: [facility];  Service:     CARDIAC CATHETERIZATION N/A 10/18/2018    Procedure: Coronary angiography  no LV gram d/t CKD;  Surgeon: Maria E Gilbert MD;  Location:  [facility] CATH INVASIVE LOCATION;  Service: Cardiology    CARDIAC CATHETERIZATION N/A 10/18/2018    Procedure: Left Heart Cath;  Surgeon: Maria E Gilbert MD;  [facility]: [facility];  Service: Cardiology    CARDIAC CATHETERIZATION N/A 10/18/2018    Procedure: Stent MOISE coronary;  Surgeon: Maria E Gilbert MD;  [facility]: [facility];  Service: Cardiology    CARDIAC CATHETERIZATION N/A 05/28/2021    Procedure: Coronary angiography;  Surgeon: Maria E Gilbert MD;  Location: [facility];  Service: Cardiovascular;  Laterality: N/A;    CARDIAC CATHETERIZATION N/A  05/28/2021    Procedure: Left Heart Cath;  Surgeon: Maria E Gilbert MD;  Location:  [facility] CATH INVASIVE LOCATION;  Service: Cardiovascular;  Laterality: N/A;    CARDIAC CATHETERIZATION N/A 05/28/2021    Procedure: Left ventriculography;  Surgeon: Maria E Gilbert MD;  Location:  [facility] CATH INVASIVE LOCATION;  Service: Cardiovascular;  Laterality: N/A;    CARDIAC CATHETERIZATION N/A 05/28/2021    Procedure: Stent MOISE coronary;  Surgeon: Maria E Gilbert MD;  Location:  [facility] CATH INVASIVE LOCATION;  Service: Cardiovascular;  Laterality: N/A;    CARDIAC CATHETERIZATION N/A 1/19/2024    Procedure: Left Heart Cath;  Surgeon: Maria E Gilbert MD;  Location:  [facility] CATH INVASIVE LOCATION;  Service: Cardiovascular;  Laterality: N/A;    CARDIAC CATHETERIZATION N/A 1/19/2024    Procedure: Coronary angiography;  Surgeon: Maria E Gilbert MD;  [facility];  Service: Cardiovascular;  Laterality: N/A;    CARDIAC CATHETERIZATION N/A 1/19/2024    Procedure: Left ventriculography;  Surgeon: Maria E Gilbert MD;  Location:  [facility] CATH INVASIVE LOCATION;  Service: Cardiovascular;  Laterality: N/A;    CAROTID STENT      CORONARY ANGIOPLASTY      CORONARY STENT PLACEMENT  3/28/2016    EYE SURGERY  9/2017    NECK EXPLORATION N/A     WA RT/LT HEART CATHETERS N/A 10/18/2018    Procedure: Percutaneous Coronary Intervention;  Surgeon: Maria E Gilbert MD;  Location: [facility] CATH INVASIVE LOCATION;  Service: Cardiology                        PT Assessment/Plan       Row Name 07/31/24 1700          PT Assessment    Assessment Comments Mr. Marc returns to PT this date continuing to report mild dizziness secondary to medication changes however denies any changes in hip pain/discomfort. Added forward step and reach with weighted ball to sit to stand to further challenge dynamic stability as well as stagger stance on 8in step with D2 chop. Additionally progressed lateral  "walking and AR press to GTB all with great tolerance. Much improved balance this date from last, no overt LOB noted throughout session. Will plan to assess tolerance to progressed exercises at next session.  -MO        PT Plan    PT Plan Comments how was last session? update HEP. Lateral step + reach  -MO               User Key  (r) = Recorded By, (t) = Taken By, (c) = Cosigned By      Initials Name Provider Type    Enedina Stanton, PT Physical Therapist                       OP Exercises       Row Name 07/31/24 1600             Subjective    Subjective Comments Just still a little dizzy from this medicine but nothing else new  -MO         Total Minutes    27864 - PT Therapeutic Exercise Minutes 30  -MO      93938 - PT Therapeutic Activity Minutes 13  -MO         Exercise 1    Exercise Name 1 TM warm up  -MO      Time 1 5 min; speed 1.6 mph, incline 2%  -MO         Exercise 3    Exercise Name 3 shoulder ext with TA  -MO      Cueing 3 Verbal;Demo  -MO      Sets 3 1  -MO      Reps 3 15  -MO      Time 3 RTB  -MO      Additional Comments in stagger stance  -MO         Exercise 6    Exercise Name 6 6\" step ups  -MO      Cueing 6 Verbal  -MO      Sets 6 1e  -MO      Reps 6 10  -MO      Time 6 fwd/lateral  -MO      Additional Comments knee   -MO         Exercise 9    Exercise Name 9 side steps  -MO      Cueing 9 Verbal  -MO      Reps 9 4 laps  -MO      Time 9 GTB at ankles  -MO         Exercise 10    Exercise Name 10 STS w/ fwd step and reach  -MO      Cueing 10 Verbal;Tactile;Demo  -MO      Sets 10 4 (2B)  -MO      Reps 10 5  -MO      Time 10 lvl 2 ball for reach  -MO         Exercise 11    Exercise Name 11 walking marches  -MO      Cueing 11 Verbal  -MO      Reps 11 3 laps  -MO      Time 11 cues for step length, core activation and wide KAREEM  -MO         Exercise 13    Exercise Name 13 AR press  -MO      Cueing 13 Verbal  -MO      Sets 13 2B  -MO      Reps 13 10  -MO      Time 13 GTB  -MO         Exercise 14    " Exercise Name 14 8in stagger stance with D2 chop  -MO      Cueing 14 Verbal;Demo  -MO      Sets 14 2B  -MO      Reps 14 10  -MO      Time 14 lvl 1 ball  -MO                User Key  (r) = Recorded By, (t) = Taken By, (c) = Cosigned By      Initials Name Provider Type    Enedina Stanton, PT Physical Therapist                                                    Time Calculation:   Start Time: 1645  Stop Time: 1728  Time Calculation (min): 43 min  Timed Charges  36012 - PT Therapeutic Exercise Minutes: 30  22192 - PT Therapeutic Activity Minutes: 13  Total Minutes  Timed Charges Total Minutes: 43   Total Minutes: 43  Therapy Charges for Today       Code Description Service Date Service Provider Modifiers Qty    [id number]  PT THER PROC EA 15 MIN 7/31/2024 Enedina Sargent, PT GP 2    [id number]  PT THERAPEUTIC ACT EA 15 MIN 7/31/2024 Enedina Sargent, PT GP 1                      Enedina Sargent PT  7/31/2024

## 2024-08-02 ENCOUNTER — HOSPITAL ENCOUNTER (OUTPATIENT)
Dept: PHYSICAL THERAPY | Facility: HOSPITAL | Age: 79
Setting detail: THERAPIES SERIES
Discharge: HOME | End: 2024-08-02
Payer: MEDICARE

## 2024-08-02 DIAGNOSIS — M25.552 BILATERAL HIP PAIN: Primary | ICD-10-CM

## 2024-08-02 DIAGNOSIS — M25.551 BILATERAL HIP PAIN: Primary | ICD-10-CM

## 2024-08-02 DIAGNOSIS — M62.89 TENSOR FASCIA LATA SYNDROME: ICD-10-CM

## 2024-08-02 PROCEDURE — 97110 THERAPEUTIC EXERCISES: CPT

## 2024-08-02 PROCEDURE — 97530 THERAPEUTIC ACTIVITIES: CPT

## 2024-08-02 NOTE — THERAPY TREATMENT NOTE
Outpatient Physical Therapy Ortho Treatment Note  [facility]     Patient Name: Earl Marc  : 1945  MRN: [id number]  Today's Date: 2024      Visit Date: 2024    Visit Dx:    ICD-10-CM ICD-9-CM   1. Bilateral hip pain  M25.551 719.45    M25.552    2. Tensor fascia prateek syndrome  M62.89 727.09       Patient Active Problem List   Diagnosis    Bell's palsy    Persistent insomnia    Osteoarthritis of cervical spine    Diabetic peripheral neuropathy    Dyslipidemia    Steatosis of liver    Fibromyalgia    Gastroesophageal reflux disease without esophagitis    Hypertensive kidney disease with stage 3a chronic kidney disease    Hypogonadism in male    Renal insufficiency    Vitamin D deficiency    Benign non-nodular prostatic hyperplasia with lower urinary tract symptoms    S/P drug eluting coronary stent placement    Coronary artery disease involving native coronary artery of native heart    Malignant neoplasm of skin    Type 2 diabetes mellitus with hyperglycemia, with long-term current use of insulin    Diabetes mellitus    Chronic insomnia    Other specified glaucoma    Vertigo    Epigastric pain    Chest pain with high risk for cardiac etiology    Precordial pain    S/P arthroscopy of shoulder    Peripheral neuropathy    Stage 3b chronic kidney disease    Chest pain    Stroke-like symptoms    Acute back pain    Thoracic 12 compression fracture, closed, initial encounter    Hypertensive urgency    Sudden onset of severe headache    Visual hallucinations        Past Medical History:   Diagnosis Date    Abnormal cardiovascular stress test     Acid reflux     Arthritis     Asthma     Cancer     skin     Chronic kidney disease     Colon polyp     Congenital heart disease     COPD (chronic obstructive pulmonary disease)     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    Migraines 09/2023    Myocardial infarction 2018    Trigger point of left shoulder region 03/02/2023    Type 2 diabetes mellitus         Past Surgical History:   Procedure Laterality Date    CARDIAC CATHETERIZATION N/A 03/25/2016    Procedure: Left Heart Cath;  Surgeon: Maria E Gilbert MD;  Location:  [facility] C[facility] INVASIVE [facility];  Service:     CARDIAC CATHETERIZATION N/A 03/25/2016    Procedure: Coronary angiography;  Surgeon: Maria E Gilbert MD;  Location:  [facility];  Service:     CARDIAC CATHETERIZATION N/A 03/28/2016    Procedure: Coronary angiography;  Surgeon: Maria E Gilbert MD;  Location:  [facility];  Service:     CARDIAC CATHETERIZATION N/A 03/28/2016    Procedure: Stent MOISE coronary;  Surgeon: Maria E Gilbert MD;  [facility];  Service:     CARDIAC CATHETERIZATION N/A 10/18/2018    Procedure: Coronary angiography  no LV gram d/t CKD;  Surgeon: Maria E Gilbert MD;  Location:  [facility] CATH INVASIVE LOC[facility];  Service: Cardiology    CARDIAC CATHETERIZATION N/A 10/18/2018    Procedure: Left Heart Cath;  Surgeon: Maria E Gilbert MD;  Location:  [facility] INVAS[facility];  Service: Cardiology    CARDIAC CATHETERIZATION N/A 10/18/2018    Procedure: Stent MOISE coronary;  Surgeon: Maria E Gilbert MD;  [facility];  Service: Cardiology    CARDIAC CATHETERIZATION N/A 05/28/2021    Procedure: Coronary angiography;  Surgeon: Maria E Gilbert MD;  [facility];  Service: Cardiovascular;  Laterality: N/A;    CARDIAC CATHETERIZATION N/A 05/28/2021     Procedure: Left Heart Cath;  Surgeon: Maria E Gilbert MD;  Location:  [facility] CATH INVASIVE LOC[facility];  Service: Cardiovascular;  Laterality: N/A;    CARDIAC CATHETERIZATION N/A 05/28/2021    Procedure: Left ventriculography;  Surgeon: Maria E Gilbert MD;  Location:  [facility] INVASIVE [facility];  Service: Cardiovascular;  Laterality: N/A;    CARDIAC CATHETERIZATION N/A 05/28/2021    Procedure: Stent MOISE coronary;  Surgeon: Maria E Gilbert MD;  Location:  [facility] INVASIVE [facility];  Service: Cardiovascular;  Laterality: N/A;    CARDIAC CATHETERIZATION N/A 1/19/2024    Procedure: Left Heart Cath;  Surgeon: Maria E Gilbert MD;  Location:  [facility] INV[facility];  Service: Cardiovascular;  Laterality: N/A;    CARDIAC CATHETERIZATION N/A 1/19/2024    Procedure: Coronary angiography;  Surgeon: Maria E Gilbert MD;  Location:  [facility];  Service: Cardiovascular;  Laterality: N/A;    CARDIAC CATHETERIZATION N/A 1/19/2024    Procedure: Left ventriculography;  Surgeon: Maria E Gilbert MD;  Location:  [facility] INV[facility];  Service: Cardiovascular;  Laterality: N/A;    CAROTID STENT      CORONARY ANGIOPLASTY      CORONARY STENT PLACEMENT  3/28/2016    EYE SURGERY  9/2017    NECK EXPLORATION N/A     IN RT/LT HEART CATHETERS N/A 10/18/2018    Procedure: Percutaneous Coronary Intervention;  Surgeon: Maria E Gilbert MD;  [facility];  Service: Cardiology                        PT Assessment/Plan       Row Name 08/02/24 1100          PT Assessment    Assessment Comments Pt reports realtively good tolerance for previous session with well controlled pain in his back. Discussed gradually weaning from TLSO (per neurosurgeon note on 7/10, pt to DC TLSO in 5 weeks), pt receptive. COntinued with current program focused on improved functional activity tolerance and strength, provided intermittent cues for appropriate technique  "and supervision for safety. Updated HEP and reviewed with pt who reports understanding and remains appropriate for skilled PT.  -RS        PT Plan    PT Plan Comments How is pt feeling now that he is weaning out of brace?, consider step and reach  -RS               User Key  (r) = Recorded By, (t) = Taken By, (c) = Cosigned By      Initials Name Provider Type    RS Noy Flores, PT Physical Therapist                       OP Exercises       Row Name 08/02/24 1000             Subjective    Subjective Comments Pt reports mild sorenes sinhis back, he is hopefully taking the brace off will help  -RS         Total Minutes    40952 - PT Therapeutic Exercise Minutes 15  -RS      78571 - PT Therapeutic Activity Minutes 25  -RS         Exercise 1    Exercise Name 1 TM warm up  -RS      Time 1 5 min; speed 1.6 mph, incline 2%  -RS         Exercise 3    Exercise Name 3 shoulder ext with TA  -RS      Cueing 3 Verbal;Demo  -RS      Sets 3 1  -RS      Reps 3 20  -RS      Time 3 GTB  -RS      Additional Comments in stagger stance  -RS         Exercise 6    Exercise Name 6 6\" step ups  -RS      Cueing 6 Verbal  -RS      Sets 6 1e  -RS      Reps 6 10  -RS      Time 6 fwd/lateral  -RS      Additional Comments knee  cue for knee ext in stance LE  -RS         Exercise 9    Exercise Name 9 side steps  -RS      Cueing 9 Verbal  -RS      Reps 9 4 laps  -RS      Time 9 GTB at ankles  -RS         Exercise 10    Exercise Name 10 STS w/ fwd step and reach  -RS      Cueing 10 Verbal;Tactile;Demo  -RS      Sets 10 4 (2B)  -RS      Reps 10 5  -RS      Time 10 lvl 2 ball for reach  -RS         Exercise 11    Exercise Name 11 walking marches  -RS      Cueing 11 Verbal  -RS      Reps 11 3 laps  -RS      Time 11 cues for step length, core activation and wide KAREEM  -RS         Exercise 12    Exercise Name 12 tandem walking  -RS      Cueing 12 Verbal;Demo  -RS      Time 12 3 laps  -RS         Exercise 13    Exercise Name 13 AR press  -RS   "    Cueing 13 Verbal  -RS      Sets 13 2B  -RS      Reps 13 10  -RS      Time 13 GTB  -RS         Exercise 14    Exercise Name 14 8in stagger stance with D2 chop  -RS      Cueing 14 Verbal;Demo  -RS      Sets 14 2B  -RS      Reps 14 10  -RS      Time 14 lvl 1 ball  -RS                User Key  (r) = Recorded By, (t) = Taken By, (c) = Cosigned By      Initials Name Provider Type    RS Noy Flores, PT Physical Therapist                                  PT OP Goals       Row Name 08/02/24 1000          PT Short Term Goals    STG Date to Achieve 05/18/24  -RS     STG 1 Pt. will be independent with initial HEP to improve self-management of condition.  -RS     STG 1 Progress Met  -RS     STG 2 Pt. will tolerate 10 minute of standing/walking ther ex in clinic before seated rest break to demonstrate improved activity tolerance.  -RS     STG 2 Progress Met  -RS     STG 3 Pt. will perform 1 STS from chair with only 1 UE to demosntrate improving functional strength.  -RS     STG 3 Progress Met  -RS        Long Term Goals    LTG Date to Achieve 06/17/24  -RS     LTG 1 Pt. will be independent with advanced HEP to improve long term management of condition and independence.  -RS     LTG 1 Progress Ongoing  -RS     LTG 2 Pt will score >/= 40% on LEFS (from 21% on initial evaluation) to indicate improved perception of disability.  -RS     LTG 2 Progress Ongoing;Progressing  -RS     LTG 3 Pt. will demonstrate ability to complete 5xSTS in </= 20 seconds to progress toward falls risk cut off of 15 seconds.  -RS     LTG 3 Progress Met  -RS     LTG 4 Pt. will increase L LE strength >/= 4+/5 to improve mobility.  -RS     LTG 4 Progress Ongoing;Progressing  -RS     LTG 5 Pt. will increase 2MWT >/= 150 feet with appropriate AD to indicate improved gait speed and mobility.  -RS     LTG 5 Progress Met  -RS               User Key  (r) = Recorded By, (t) = Taken By, (c) = Cosigned By      Initials Name Provider Type    GUERO Flores  ELIN Villafuerte Physical Therapist                    Therapy Education  Given: HEP  Program: Reinforced, Progressed  How Provided: Verbal, Demonstration, Written  Provided to: Patient  Level of Understanding: Verbalized, Demonstrated              Time Calculation:   Start Time: 1045  Stop Time: 1127  Time Calculation (min): 42 min  Timed Charges  08173 - PT Therapeutic Exercise Minutes: 15  43399 - PT Therapeutic Activity Minutes: 25  Total Minutes  Timed Charges Total Minutes: 40   Total Minutes: 40  Therapy Charges for Today       Code Description Service Date Service Provider Modifiers Qty    83876237541  PT THER PROC EA 15 MIN 8/2/2024 Noy Flores, PT GP 1    [id number]  PT THERAPEUTIC ACT EA 15 MIN 8/2/2024 Noy Flores, PT GP 2                      Noy Flores PT  8/2/2024

## 2024-08-05 ENCOUNTER — HOSPITAL ENCOUNTER (OUTPATIENT)
Dept: PHYSICAL THERAPY | Facility: HOSPITAL | Age: 79
Setting detail: THERAPIES SERIES
Discharge: HOME | End: 2024-08-05
Payer: MEDICARE

## 2024-08-05 DIAGNOSIS — M25.551 BILATERAL HIP PAIN: Primary | ICD-10-CM

## 2024-08-05 DIAGNOSIS — M25.552 BILATERAL HIP PAIN: Primary | ICD-10-CM

## 2024-08-05 PROCEDURE — 97110 THERAPEUTIC EXERCISES: CPT

## 2024-08-05 NOTE — THERAPY TREATMENT NOTE
Outpatient Physical Therapy Ortho Treatment Note  [facility]     Patient Name: Earl Marc  : 1945  MRN: [id number]  Today's Date: 2024      Visit Date: 2024    Visit Dx:    ICD-10-CM ICD-9-CM   1. Bilateral hip pain  M25.551 719.45    M25.552        Patient Active Problem List   Diagnosis    Bell's palsy    Persistent insomnia    Osteoarthritis of cervical spine    Diabetic peripheral neuropathy    Dyslipidemia    Steatosis of liver    Fibromyalgia    Gastroesophageal reflux disease without esophagitis    Hypertensive kidney disease with stage 3a chronic kidney disease    Hypogonadism in male    Renal insufficiency    Vitamin D deficiency    Benign non-nodular prostatic hyperplasia with lower urinary tract symptoms    S/P drug eluting coronary stent placement    Coronary artery disease involving native coronary artery of native heart    Malignant neoplasm of skin    Type 2 diabetes mellitus with hyperglycemia, with long-term current use of insulin    Diabetes mellitus    Chronic insomnia    Other specified glaucoma    Vertigo    Epigastric pain    Chest pain with high risk for cardiac etiology    Precordial pain    S/P arthroscopy of shoulder    Peripheral neuropathy    Stage 3b chronic kidney disease    Chest pain    Stroke-like symptoms    Acute back pain    Thoracic 12 compression fracture, closed, initial encounter    Hypertensive urgency    Sudden onset of severe headache    Visual hallucinations        Past Medical History:   Diagnosis Date    Abnormal cardiovascular stress test     Acid reflux     Arthritis     Asthma     Cancer     skin     Chronic kidney disease     Colon polyp     Congenital heart disease     COPD (chronic obstructive pulmonary disease)     Old age COPD/2D hand [place] emphysema    Coronary artery disease     Diabetes mellitus     Diabetic neuropathy associated with type 2 diabetes mellitus     Diabetic peripheral neuropathy 03/10/2016    Dyslipidemia      Erectile dysfunction     Fatty liver disease, nonalcoholic     Gastritis     Glaucoma     both eyes    Hyperlipidemia     Hypertension     Hypogonadism male     Infectious viral hepatitis Hep A July 1959    Drank water from broken water line and Grandparents house.    Migraines 09/2023    Myocardial infarction 2018    Trigger point of left shoulder region 03/02/2023    Type 2 diabetes mellitus         Past Surgical History:   Procedure Laterality Date    CARDIAC CATHETERIZATION N/A 03/25/2016    Procedure: Left Heart Cath;  Surgeon: Maria E Gilbert MD;  [facility];  Service:     CARDIAC CATHETERIZATION N/A 03/25/2016    Procedure: Coronary angiography;  Surgeon: Maria E Gilbert MD;  Location:  [facility] INV[facility];  Service:     CARDIAC CATHETERIZATION N/A 03/28/2016    Procedure: Coronary angiography;  Surgeon: Maria E Gilbert MD;  Location:  [facility]ATH INVASIVE LOC[facility];  Service:     CARDIAC CATHETERIZATION N/A 03/28/2016    Procedure: Stent MOISE coronary;  Surgeon: Maria E Gilbert MD;  [facility];  Service:     CARDIAC CATHETERIZATION N/A 10/18/2018    Procedure: Coronary angiography  no LV gram d/t CKD;  Surgeon: Maria E Gilbert MD;  [facility];  Service: Cardiology    CARDIAC CATHETERIZATION N/A 10/18/2018    Procedure: Left Heart Cath;  Surgeon: Maria E Gilbert MD;  [facility];  Service: Cardiology    CARDIAC CATHETERIZATION N/A 10/18/2018    Procedure: Stent MOISE coronary;  Surgeon: Maria E Gilbert MD;  [facility];  Service: Cardiology    CARDIAC CATHETERIZATION N/A 05/28/2021    Procedure: Coronary angiography;  Surgeon: Maria E Gilbert MD;  [facility];  Service: Cardiovascular;  Laterality: N/A;    CARDIAC CATHETERIZATION N/A 05/28/2021    Procedure: Left Heart Cath;  Surgeon:  Maria E Gilbert MD;  Location:  [facility]ATH INVASIVE [facility];  Service: Cardiovascular;  Laterality: N/A;    CARDIAC CATHETERIZATION N/A 05/28/2021    Procedure: Left ventriculography;  Surgeon: Maria E Gilbert MD;  Location:  [facility] INVAS[facility];  Service: Cardiovascular;  Laterality: N/A;    CARDIAC CATHETERIZATION N/A 05/28/2021    Procedure: Stent MOISE coronary;  Surgeon: Maria E Gilbert MD;  Location:  [facility] IN[facility];  Service: Cardiovascular;  Laterality: N/A;    CARDIAC CATHETERIZATION N/A 1/19/2024    Procedure: Left Heart Cath;  Surgeon: Maria E Gilbert MD;  Location:  [facility] INV[facility];  Service: Cardiovascular;  Laterality: N/A;    CARDIAC CATHETERIZATION N/A 1/19/2024    Procedure: Coronary angiography;  Surgeon: Maria E Gilbert MD;  Location:  [facility] INV[facility];  Service: Cardiovascular;  Laterality: N/A;    CARDIAC CATHETERIZATION N/A 1/19/2024    Procedure: Left ventriculography;  Surgeon: Maria E Gilbert MD;  [facility];  Service: Cardiovascular;  Laterality: N/A;    CAROTID STENT      CORONARY ANGIOPLASTY      CORONARY STENT PLACEMENT  3/28/2016    EYE SURGERY  9/2017    NECK EXPLORATION N/A     ID RT/LT HEART CATHETERS N/A 10/18/2018    Procedure: Percutaneous Coronary Intervention;  Surgeon: Maria E Gilbert MD;  Location: [facility] CATH INVASIVE LOCATION;  Service: Cardiology                        PT Assessment/Plan       Row Name 08/05/24 0900          PT Assessment    Assessment Comments Earl Marc is a 79 year old male returning for physical therapy treatment of bilateral hip pain. Today, he ambulates into the clinic with no TLSO this date. He notes that he is beginning the weaning process over the next 5 weeks. Discussed frequency of use of TLSO and pt. Is receptive. Pt. Has follow up with neurologist 8/12, with hopes to address BUE/LE tremors, with concern for PD.  "Discussed in detail mobility safety/use of AD if needed for balance, especially when weaning out of brace. One mild LOB this date when completing turn during high march walking. Recovered with no overt LOB, however discussed safety with turns. Today, added OH reach with step up on 6\" to further challenge balance. CGA provided for safety as well as verbal cueing for knee extension in stance leg. Pt. Remains a good candidate for skilled PT.  -ER        PT Plan    PT Plan Comments step and reach, 3 way star tap quad burner? lunge to wall tap 4 corners?  -ER               User Key  (r) = Recorded By, (t) = Taken By, (c) = Cosigned By      Initials Name Provider Type    ER Jaquelin Mccloud, PT Physical Therapist                       OP Exercises       Row Name 08/05/24 0800             Subjective    Subjective Comments I have mild soreness. I have a follow up with my neurologist. I am also going to do donny chi with my wife 2x a week (M/F).  -ER         Total Minutes    45301 - PT Therapeutic Exercise Minutes 41  -ER         Exercise 1    Exercise Name 1 TM warm up  -ER      Time 1 5 min; speed 1.6 mph, incline 2%  -ER         Exercise 3    Exercise Name 3 shoulder ext with TA  -ER      Cueing 3 Verbal;Demo  -ER      Sets 3 1  -ER      Reps 3 20  -ER      Time 3 GTB  -ER      Additional Comments in stagger stance  -ER         Exercise 6    Exercise Name 6 6\" step ups  -ER      Cueing 6 Verbal  -ER      Sets 6 1e  -ER      Reps 6 10  -ER      Time 6 fwd/lateral  -ER      Additional Comments knee  / cue for knee extension in stance LE, with UE reach (contralat)  -ER         Exercise 9    Exercise Name 9 side steps  -ER      Cueing 9 Verbal  -ER      Reps 9 4 laps  -ER      Time 9 GTB at ankles  -ER         Exercise 10    Exercise Name 10 STS w/ fwd step and reach  -ER      Cueing 10 Verbal;Tactile;Demo  -ER      Sets 10 4 (2B)  -ER      Reps 10 5  -ER      Time 10 lvl 2 ball for reach  -ER         Exercise 11    Exercise " Name 11 walking marches  -ER      Cueing 11 Verbal  -ER      Reps 11 3 laps  -ER      Time 11 cues for step length, core activation and wide KAREEM  -ER         Exercise 12    Exercise Name 12 tandem walking  -ER      Cueing 12 Verbal;Demo  -ER      Time 12 4 laps  -ER         Exercise 13    Exercise Name 13 AR press  -ER      Cueing 13 Verbal  -ER      Sets 13 2B  -ER      Reps 13 10  -ER      Time 13 GTB  -ER         Exercise 14    Exercise Name 14 8in stagger stance with D2 chop  -ER      Cueing 14 Verbal;Demo  -ER      Sets 14 2B  -ER      Reps 14 10  -ER      Time 14 lvl 1 ball  -ER                User Key  (r) = Recorded By, (t) = Taken By, (c) = Cosigned By      Initials Name Provider Type    ER Jaquelin Mccloud, PT Physical Therapist                                  PT OP Goals       Row Name 08/05/24 0900          PT Short Term Goals    STG Date to Achieve 05/18/24  -ER     STG 1 Pt. will be independent with initial HEP to improve self-management of condition.  -ER     STG 1 Progress Met  -ER     STG 2 Pt. will tolerate 10 minute of standing/walking ther ex in clinic before seated rest break to demonstrate improved activity tolerance.  -ER     STG 2 Progress Met  -ER     STG 3 Pt. will perform 1 STS from chair with only 1 UE to demosntrate improving functional strength.  -ER     STG 3 Progress Met  -ER        Long Term Goals    LTG Date to Achieve 06/17/24  -ER     LTG 1 Pt. will be independent with advanced HEP to improve long term management of condition and independence.  -ER     LTG 1 Progress Ongoing  -ER     LTG 2 Pt will score >/= 40% on LEFS (from 21% on initial evaluation) to indicate improved perception of disability.  -ER     LTG 2 Progress Ongoing;Progressing  -ER     LTG 3 Pt. will demonstrate ability to complete 5xSTS in </= 20 seconds to progress toward falls risk cut off of 15 seconds.  -ER     LTG 3 Progress Met  -ER     LTG 4 Pt. will increase L LE strength >/= 4+/5 to improve mobility.  -ER      LTG 4 Progress Ongoing;Progressing  -ER     LTG 5 Pt. will increase 2MWT >/= 150 feet with appropriate AD to indicate improved gait speed and mobility.  -ER     LTG 5 Progress Met  -ER               User Key  (r) = Recorded By, (t) = Taken By, (c) = Cosigned By      Initials Name Provider Type    ER Jaquelin Mccloud, PT Physical Therapist                    Therapy Education  Education Details: Reinforced use of TLSO, gait safety  Given: HEP, Fall prevention and home safety  Program: Reinforced  How Provided: Verbal, Demonstration  Provided to: Patient  Level of Understanding: Teach back education performed, Verbalized, Demonstrated              Time Calculation:   Start Time: 0845  Stop Time: 0926  Time Calculation (min): 41 min  Total Timed Code Minutes- PT: 41 minute(s)  Timed Charges  34064 - PT Therapeutic Exercise Minutes: 41  Total Minutes  Timed Charges Total Minutes: 41   Total Minutes: 41  Therapy Charges for Today       Code Description Service Date Service Provider Modifiers Qty    [id number] HC PT THER PROC EA 15 MIN 8/5/2024 Jaquelin Mccloud, PT GP 3                      Jaquelin Mccloud PT  8/5/2024

## 2024-08-08 ENCOUNTER — HOSPITAL ENCOUNTER (OUTPATIENT)
Dept: PHYSICAL THERAPY | Facility: HOSPITAL | Age: 79
Setting detail: THERAPIES SERIES
Discharge: HOME | End: 2024-08-08
Payer: MEDICARE

## 2024-08-08 DIAGNOSIS — M62.89 TENSOR FASCIA LATA SYNDROME: ICD-10-CM

## 2024-08-08 DIAGNOSIS — M25.552 BILATERAL HIP PAIN: Primary | ICD-10-CM

## 2024-08-08 DIAGNOSIS — M25.551 BILATERAL HIP PAIN: Primary | ICD-10-CM

## 2024-08-08 PROCEDURE — 97110 THERAPEUTIC EXERCISES: CPT

## 2024-08-08 PROCEDURE — 97530 THERAPEUTIC ACTIVITIES: CPT

## 2024-08-08 NOTE — THERAPY TREATMENT NOTE
Outpatient Physical Therapy Ortho Treatment Note  [facility]     Patient Name: Earl Marc  : 1945  MRN: [id number]  Today's Date: 2024      Visit Date: 2024    Visit Dx:    ICD-10-CM ICD-9-CM   1. Bilateral hip pain  M25.551 719.45    M25.552    2. Tensor fascia prateek syndrome  M62.89 727.09       Patient Active Problem List   Diagnosis    Bell's palsy    Persistent insomnia    Osteoarthritis of cervical spine    Diabetic peripheral neuropathy    Dyslipidemia    Steatosis of liver    Fibromyalgia    Gastroesophageal reflux disease without esophagitis    Hypertensive kidney disease with stage 3a chronic kidney disease    Hypogonadism in male    Renal insufficiency    Vitamin D deficiency    Benign non-nodular prostatic hyperplasia with lower urinary tract symptoms    S/P drug eluting coronary stent placement    Coronary artery disease involving native coronary artery of native heart    Malignant neoplasm of skin    Type 2 diabetes mellitus with hyperglycemia, with long-term current use of insulin    Diabetes mellitus    Chronic insomnia    Other specified glaucoma    Vertigo    Epigastric pain    Chest pain with high risk for cardiac etiology    Precordial pain    S/P arthroscopy of shoulder    Peripheral neuropathy    Stage 3b chronic kidney disease    Chest pain    Stroke-like symptoms    Acute back pain    Thoracic 12 compression fracture, closed, initial encounter    Hypertensive urgency    Sudden onset of severe headache    Visual hallucinations        Past Medical History:   Diagnosis Date    Abnormal cardiovascular stress test     Acid reflux     Arthritis     Asthma     Cancer     skin     Chronic kidney disease     Colon polyp     Congenital heart disease     COPD (chronic obstructive pulmonary disease)     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    Migraines 09/2023    Myocardial infarction 2018    Trigger point of left shoulder region 03/02/2023    Type 2 diabetes mellitus         Past Surgical History:   Procedure Laterality Date    CARDIAC CATHETERIZATION N/A 03/25/2016    Procedure: Left Heart Cath;  Surgeon: Maria E Gilbert MD;  Location:  [facility];  Service:     CARDIAC CATHETERIZATION N/A 03/25/2016    Procedure: Coronary angiography;  Surgeon: Maria E Gilbert MD;  Location:  [facility];  Service:     CARDIAC CATHETERIZATION N/A 03/28/2016    Procedure: Coronary angiography;  Surgeon: Maria E Gilbert MD;  Location:  [facility];  Service:     CARDIAC CATHETERIZATION N/A 03/28/2016    Procedure: Stent MOISE coronary;  Surgeon: Maria E Gilbert MD;  [facility];  Service:     CARDIAC CATHETERIZATION N/A 10/18/2018    Procedure: Coronary angiography  no LV gram d/t CKD;  Surgeon: Maria E Gilbert MD;  Location:  [facility];  Service: Cardiology    CARDIAC CATHETERIZATION N/A 10/18/2018    Procedure: Left Heart Cath;  Surgeon: Maria E Gilbert MD;  Location:  [facility];  Service: Cardiology    CARDIAC CATHETERIZATION N/A 10/18/2018    Procedure: Stent MOISE coronary;  Surgeon: Maria E Gilbert MD;  [facility]: [facility];  Service: Cardiology    CARDIAC CATHETERIZATION N/A 05/28/2021    Procedure: Coronary angiography;  Surgeon: Maria E Gilbert MD;  [facility];  Service: Cardiovascular;  Laterality: N/A;    CARDIAC CATHETERIZATION N/A 05/28/2021     Procedure: Left Heart Cath;  Surgeon: Maria E Gilbert MD;  Location:  [facility];  Service: Cardiovascular;  Laterality: N/A;    CARDIAC CATHETERIZATION N/A 05/28/2021    Procedure: Left ventriculography;  Surgeon: Maria E Gilbert MD;  Location:  [facility];  Service: Cardiovascular;  Laterality: N/A;    CARDIAC CATHETERIZATION N/A 05/28/2021    Procedure: Stent MOISE coronary;  Surgeon: Maria E Gilbert MD;  Location:  [facility];  Service: Cardiovascular;  Laterality: N/A;    CARDIAC CATHETERIZATION N/A 1/19/2024    Procedure: Left Heart Cath;  Surgeon: Maria E Gilbert MD;  Location:  [facility];  Service: Cardiovascular;  Laterality: N/A;    CARDIAC CATHETERIZATION N/A 1/19/2024    Procedure: Coronary angiography;  Surgeon: Maria E Gilbert MD;  [facility];  Service: Cardiovascular;  Laterality: N/A;    CARDIAC CATHETERIZATION N/A 1/19/2024    Procedure: Left ventriculography;  Surgeon: Maria E Gilbert MD;  [facility]: [facility];  Service: Cardiovascular;  Laterality: N/A;    CAROTID STENT      CORONARY ANGIOPLASTY      CORONARY STENT PLACEMENT  3/28/2016    EYE SURGERY  9/2017    NECK EXPLORATION N/A     GA RT/LT HEART CATHETERS N/A 10/18/2018    Procedure: Percutaneous Coronary Intervention;  Surgeon: Maria E Gilbert MD;  [facility]: [facility];  Service: Cardiology                        PT Assessment/Plan       Row Name 08/08/24 1200          PT Assessment    Assessment Comments Mr. Marc returns to PT reporting increased physical activity yesterday, participating in about 30 min of cardio at the gym, which felt great. Today, pt presents with no TLSO donned and no soreness reported with inc in activity. Progressed balance and strengthening with adding NBOS trampoline toss, 3 way cone tap in SLS, STS with OH press ups, and  "lateral step ups with driving and IP arm reach. Pt performed all new exercises with excellent form and no LOB, although mild unsteadiness expected. Working towards independent management during remaining visits. Mr. Marc is approrpiate for skilled PT.  -DR        PT Plan    PT Plan Comments begin working towards indep management with last scheduled visit being d/c. consider lunge to wall tap 4 corners?  -DR               User Key  (r) = Recorded By, (t) = Taken By, (c) = Cosigned By      Initials Name Provider Type    Hector Freitas, PT Physical Therapist                       OP Exercises       Row Name 08/08/24 1100             Subjective    Subjective Comments I walked 20 min on treadmill and 10 min on bike yesterday. I felt great after and feel fine today. There is just one part of my back that hurts.  -DR         Total Minutes    46697 - PT Therapeutic Exercise Minutes 20  -DR      22146 - PT Therapeutic Activity Minutes 23  -DR         Exercise 1    Exercise Name 1 TM warm up  -DR      Time 1 5 min; speed 1.6 mph, incline 2%  -DR         Exercise 3    Exercise Name 3 shoulder ext with TA  -DR      Cueing 3 Verbal;Demo  -DR      Sets 3 1  -DR      Reps 3 20  -DR      Time 3 GTB  -DR      Additional Comments kick stance  -DR         Exercise 5    Exercise Name 5 NBOS trampoline toss  -DR      Cueing 5 Verbal;Demo  -DR      Sets 5 2  -DR      Reps 5 10  -DR         Exercise 6    Exercise Name 6 6\" step ups  -DR      Cueing 6 Verbal  -DR      Sets 6 1e  -DR      Reps 6 10  -DR      Time 6 fwd/lateral  -DR      Additional Comments knee  / cue for knee extension in stance LE, with UE reach (contralat)  -DR         Exercise 7    Exercise Name 7 standing HR  -DR      Cueing 7 Verbal;Demo  -DR      Sets 7 2  -DR      Reps 7 15  -DR         Exercise 8    Exercise Name 8 3 way cone tap  -DR      Cueing 8 Verbal;Demo  -DR      Sets 8 1e  -DR      Reps 8 10  -DR      Time 8 SLS  -DR         Exercise 9    " Exercise Name 9 side steps  -DR      Cueing 9 Verbal  -DR      Reps 9 4 laps  -DR      Time 9 GTB at ankles  -DR         Exercise 11    Exercise Name 11 walking marches  -DR      Cueing 11 Verbal  -DR      Reps 11 3 laps  -DR      Time 11 cues for step length, core activation and wide KAREEM  -DR         Exercise 12    Exercise Name 12 tandem walking  -DR      Cueing 12 Verbal;Demo  -DR      Time 12 4 laps  -DR      Additional Comments fwd/bkwd  -DR         Exercise 13    Exercise Name 13 AR press  -DR      Cueing 13 Verbal  -DR      Sets 13 2B  -DR      Reps 13 10  -DR      Time 13 GTB  -DR         Exercise 15    Exercise Name 15 STS with OH press ups  -DR      Cueing 15 Verbal;Demo  -DR      Sets 15 2  -DR      Reps 15 10  -DR      Time 15 L1 ball  -DR                User Key  (r) = Recorded By, (t) = Taken By, (c) = Cosigned By      Initials Name Provider Type    Hector Freitas, PT Physical Therapist                                  PT OP Goals       Row Name 08/08/24 1100          PT Short Term Goals    STG Date to Achieve 05/18/24  -     STG 1 Pt. will be independent with initial HEP to improve self-management of condition.  -     STG 1 Progress Met  -     STG 2 Pt. will tolerate 10 minute of standing/walking ther ex in clinic before seated rest break to demonstrate improved activity tolerance.  -     STG 2 Progress Met  -     STG 3 Pt. will perform 1 STS from chair with only 1 UE to demosntrate improving functional strength.  -     STG 3 Progress Met  -        Long Term Goals    LTG Date to Achieve 06/17/24  -     LTG 1 Pt. will be independent with advanced HEP to improve long term management of condition and independence.  -     LTG 1 Progress Ongoing  -     LTG 2 Pt will score >/= 40% on LEFS (from 21% on initial evaluation) to indicate improved perception of disability.  -     LTG 2 Progress Ongoing;Progressing  -     LTG 3 Pt. will demonstrate ability to complete 5xSTS in </= 20  seconds to progress toward falls risk cut off of 15 seconds.  -     LTG 3 Progress Met  -     LTG 4 Pt. will increase L LE strength >/= 4+/5 to improve mobility.  -DR MYERSG 4 Progress Ongoing;Progressing  -DR MYERSG 5 Pt. will increase 2MWT >/= 150 feet with appropriate AD to indicate improved gait speed and mobility.  -DR MYERSG 5 Progress Met  -               User Key  (r) = Recorded By, (t) = Taken By, (c) = Cosigned By      Initials Name Provider Type    Hector Freitas, PT Physical Therapist                    Therapy Education  Given: HEP, Fall prevention and home safety  Program: Reinforced  How Provided: Verbal, Demonstration  Provided to: Patient  Level of Understanding: Teach back education performed, Verbalized, Demonstrated              Time Calculation:   Start Time: 1145  Stop Time: 1228  Time Calculation (min): 43 min  Timed Charges  02392 - PT Therapeutic Exercise Minutes: 20  64486 - PT Therapeutic Activity Minutes: 23  Total Minutes  Timed Charges Total Minutes: 43   Total Minutes: 43  Therapy Charges for Today       Code Description Service Date Service Provider Modifiers Qty    [id number]  PT THER PROC EA 15 MIN 8/8/2024 Hector Moise, PT GP 1    [id number]  PT THERAPEUTIC ACT EA 15 MIN 8/8/2024 Hector Moise, PT GP 2                      Hector Moise PT  8/8/2024

## 2024-08-12 ENCOUNTER — OFFICE VISIT (OUTPATIENT)
Dept: NEUROLOGY | Facility: CLINIC | Age: 79
End: 2024-08-12
Payer: MEDICARE

## 2024-08-12 ENCOUNTER — HOSPITAL ENCOUNTER (OUTPATIENT)
Dept: PHYSICAL THERAPY | Facility: HOSPITAL | Age: 79
Setting detail: THERAPIES SERIES
Discharge: HOME | End: 2024-08-12
Payer: MEDICARE

## 2024-08-12 VITALS
DIASTOLIC BLOOD PRESSURE: 66 MMHG | HEIGHT: 73 IN | SYSTOLIC BLOOD PRESSURE: 128 MMHG | BODY MASS INDEX: 29.42 KG/M2 | HEART RATE: 67 BPM | OXYGEN SATURATION: 98 % | RESPIRATION RATE: 20 BRPM | WEIGHT: 222 LBS

## 2024-08-12 DIAGNOSIS — G47.52 REM BEHAVIORAL DISORDER: ICD-10-CM

## 2024-08-12 DIAGNOSIS — M25.552 BILATERAL HIP PAIN: Primary | ICD-10-CM

## 2024-08-12 DIAGNOSIS — R44.1 VISUAL HALLUCINATIONS: ICD-10-CM

## 2024-08-12 DIAGNOSIS — Z91.81 AT LOW RISK FOR FALL: ICD-10-CM

## 2024-08-12 DIAGNOSIS — G20.C ATYPICAL PARKINSONISM: Primary | ICD-10-CM

## 2024-08-12 DIAGNOSIS — I95.1 ORTHOSTATIC HYPOTENSION: ICD-10-CM

## 2024-08-12 DIAGNOSIS — M25.551 BILATERAL HIP PAIN: Primary | ICD-10-CM

## 2024-08-12 PROCEDURE — 97110 THERAPEUTIC EXERCISES: CPT

## 2024-08-12 PROCEDURE — 3074F SYST BP LT 130 MM HG: CPT | Performed by: PSYCHIATRY & NEUROLOGY

## 2024-08-12 PROCEDURE — 97530 THERAPEUTIC ACTIVITIES: CPT

## 2024-08-12 PROCEDURE — 99215 OFFICE O/P EST HI 40 MIN: CPT | Performed by: PSYCHIATRY & NEUROLOGY

## 2024-08-12 PROCEDURE — 3078F DIAST BP <80 MM HG: CPT | Performed by: PSYCHIATRY & NEUROLOGY

## 2024-08-12 RX ORDER — VENLAFAXINE HYDROCHLORIDE 75 MG/1
CAPSULE, EXTENDED RELEASE ORAL
COMMUNITY
Start: 2024-07-29

## 2024-08-12 RX ORDER — RIVASTIGMINE 4.6 MG/24H
1 PATCH, EXTENDED RELEASE TRANSDERMAL DAILY
Qty: 30 PATCH | Refills: 6 | Status: SHIPPED | OUTPATIENT
Start: 2024-08-12 | End: 2024-08-14 | Stop reason: SDUPTHER

## 2024-08-12 NOTE — THERAPY PROGRESS REPORT/RE-CERT
Outpatient Physical Therapy Ortho Progress Note  [facility]     Patient Name: Earl Marc  : 1945  MRN: [id number]  Today's Date: 2024      Visit Date: 2024    Visit Dx:    ICD-10-CM ICD-9-CM   1. Bilateral hip pain  M25.551 719.45    M25.552        Patient Active Problem List   Diagnosis    Bell's palsy    Persistent insomnia    Osteoarthritis of cervical spine    Diabetic peripheral neuropathy    Dyslipidemia    Steatosis of liver    Fibromyalgia    Gastroesophageal reflux disease without esophagitis    Hypertensive kidney disease with stage 3a chronic kidney disease    Hypogonadism in male    Renal insufficiency    Vitamin D deficiency    Benign non-nodular prostatic hyperplasia with lower urinary tract symptoms    S/P drug eluting coronary stent placement    Coronary artery disease involving native coronary artery of native heart    Malignant neoplasm of skin    Type 2 diabetes mellitus with hyperglycemia, with long-term current use of insulin    Diabetes mellitus    Chronic insomnia    Other specified glaucoma    Vertigo    Epigastric pain    Chest pain with high risk for cardiac etiology    Precordial pain    S/P arthroscopy of shoulder    Peripheral neuropathy    Stage 3b chronic kidney disease    Chest pain    Stroke-like symptoms    Acute back pain    Thoracic 12 compression fracture, closed, initial encounter    Hypertensive urgency    Sudden onset of severe headache    Visual hallucinations        Past Medical History:   Diagnosis Date    Abnormal cardiovascular stress test     Acid reflux     Arthritis     Asthma     Cancer     skin     Chronic kidney disease     Colon polyp     Congenital heart disease     COPD (chronic obstructive pulmonary disease)     Old age COPD/2D hand Smoke emphysema    Coronary artery disease     Diabetes mellitus     Diabetic neuropathy associated with type 2 diabetes mellitus     Diabetic peripheral neuropathy 03/10/2016    Dyslipidemia      Erectile dysfunction     Fatty liver disease, nonalcoholic     Gastritis     Glaucoma     both eyes    Hyperlipidemia     Hypertension     Hypogonadism male     Infectious viral hepatitis Hep A July 1959    Drank water from broken water line and Grandparents house.    Migraines 09/2023    Myocardial infarction 2018    Trigger point of left shoulder region 03/02/2023    Type 2 diabetes mellitus         Past Surgical History:   Procedure Laterality Date    CARDIAC CATHETERIZATION N/A 03/25/2016    Procedure: Left Heart Cath;  Surgeon: Maria E Gilbert MD;  [facility];  Service:     CARDIAC CATHETERIZATION N/A 03/25/2016    Procedure: Coronary angiography;  Surgeon: Maria E Gilbert MD;  Location:  [facility] CATH INVASIVE LOCATION;  Service:     CARDIAC CATHETERIZATION N/A 03/28/2016    Procedure: Coronary angiography;  Surgeon: Maria E Gilbert MD;  Location:  [facility] CATH INVASIVE LOCATION;  Service:     CARDIAC CATHETERIZATION N/A 03/28/2016    Procedure: Stent MOISE coronary;  Surgeon: Maria E Gilbert MD;  [facility];  Service:     CARDIAC CATHETERIZATION N/A 10/18/2018    Procedure: Coronary angiography  no LV gram d/t CKD;  Surgeon: Maria E Gilbert MD;  [facility];  Service: Cardiology    CARDIAC CATHETERIZATION N/A 10/18/2018    Procedure: Left Heart Cath;  Surgeon: Maria E Gilbert MD;  [facility];  Service: Cardiology    CARDIAC CATHETERIZATION N/A 10/18/2018    Procedure: Stent MOISE coronary;  Surgeon: Maria E Gilbert MD;  [facility];  Service: Cardiology    CARDIAC CATHETERIZATION N/A 05/28/2021    Procedure: Coronary angiography;  Surgeon: Maria E Gilbert MD;  [facility];  Service: Cardiovascular;  Laterality: N/A;    CARDIAC CATHETERIZATION N/A 05/28/2021    Procedure: Left Heart Cath;  Surgeon:  Maria E Gilbert MD;  Location:  [facility] CATH INVASIVE LOCATION;  Service: Cardiovascular;  Laterality: N/A;    CARDIAC CATHETERIZATION N/A 05/28/2021    Procedure: Left ventriculography;  Surgeon: Maria E Gilbert MD;  Location:  [facility] CATH INVASIVE LOCATION;  Service: Cardiovascular;  Laterality: N/A;    CARDIAC CATHETERIZATION N/A 05/28/2021    Procedure: Stent MOISE coronary;  Surgeon: Maria E Gilbert MD;  Location:  [facility] CATH INVASIVE LOCATION;  Service: Cardiovascular;  Laterality: N/A;    CARDIAC CATHETERIZATION N/A 1/19/2024    Procedure: Left Heart Cath;  Surgeon: Maria E Gilbert MD;  Location:  [facility] CATH INVASIVE LOCATION;  Service: Cardiovascular;  Laterality: N/A;    CARDIAC CATHETERIZATION N/A 1/19/2024    Procedure: Coronary angiography;  Surgeon: Maria E Gilbert MD;  Location:  [facility] CATH INVASIVE LOCATION;  Service: Cardiovascular;  Laterality: N/A;    CARDIAC CATHETERIZATION N/A 1/19/2024    Procedure: Left ventriculography;  Surgeon: Maria E Gilbert MD;  [facility];  Service: Cardiovascular;  Laterality: N/A;    CAROTID STENT      CORONARY ANGIOPLASTY      CORONARY STENT PLACEMENT  3/28/2016    EYE SURGERY  9/2017    NECK EXPLORATION N/A     UT RT/LT HEART CATHETERS N/A 10/18/2018    Procedure: Percutaneous Coronary Intervention;  Surgeon: Maria E Gilbert MD;  [facility];  Service: Cardiology                        PT Assessment/Plan       Row Name 08/12/24 0900          PT Assessment    Assessment Comments Earl Marc has been seen for 24 physical therapy sessions for bilateral hip pain, complicated by spinal compression fx leading to pt. Requiring TLSO for several weeks.  Treatment has included therapeutic exercise, manual therapy, therapeutic activity, neuro-muscular retraining , gait training, and patient education with home exercise program. Today, pt. Performs 5x STS test with no AD and  no UE support off of low mat in 8 seconds,(previously 20 seconds), and also ambulated 326 ft on 2 minute walk test. Previously able to ambulate  168 and required a walker. Pt. Notes that he has felt 75% improvement in symptoms. Pt.is still weaning out of his TLSO, noting that he wears it for 1-2 hours a day as needed. Progress to physical therapy goals is good. Pt has met 3/3 STG and 3/5 LTG. Pt. LEFS scores 52 where 80 is no difficulty. He will benefit from continued skilled physical therapy to address remaining impairments and functional limitations.  -ER        PT Plan    PT Plan Comments begin working towards indep management with last scheduled visit being d/c. consider lunge to wall tap 4 corners?  -ER               User Key  (r) = Recorded By, (t) = Taken By, (c) = Cosigned By      Initials Name Provider Type    ER Jaquelin Mccloud PT Physical Therapist                       OP Exercises       Row Name 08/12/24 0900             Total Minutes    67087 - PT Therapeutic Exercise Minutes 15  -ER      80564 - PT Therapeutic Activity Minutes 15  -ER         Exercise 1    Exercise Name 1 TA- outcome measure testing  -ER         Exercise 3    Exercise Name 3 shoulder ext with TA  -ER      Cueing 3 Verbal;Demo  -ER      Sets 3 1  -ER      Reps 3 20  -ER      Time 3 GTB  -ER      Additional Comments kickstance  -ER         Exercise 8    Exercise Name 8 3 way cone tap  -ER      Cueing 8 Verbal;Demo  -ER      Sets 8 1e  -ER      Reps 8 10  -ER      Time 8 SLS  -ER         Exercise 9    Exercise Name 9 side steps  -ER      Cueing 9 Verbal  -ER      Reps 9 4 laps  -ER      Time 9 GTB at ankles  -ER         Exercise 11    Exercise Name 11 walking marches  -ER      Cueing 11 Verbal  -ER      Reps 11 3 laps  -ER      Time 11 cues for step length, core activation and wide KAREEM  -ER                User Key  (r) = Recorded By, (t) = Taken By, (c) = Cosigned By      Initials Name Provider Type    ER Jaquelin Mccloud PT Physical Therapist                                   PT OP Goals       Row Name 08/12/24 0900          PT Short Term Goals    STG Date to Achieve 05/18/24  -ER     STG 1 Pt. will be independent with initial HEP to improve self-management of condition.  -ER     STG 1 Progress Met  -ER     STG 2 Pt. will tolerate 10 minute of standing/walking ther ex in clinic before seated rest break to demonstrate improved activity tolerance.  -ER     STG 2 Progress Met  -ER     STG 3 Pt. will perform 1 STS from chair with only 1 UE to demosntrate improving functional strength.  -ER     STG 3 Progress Met  -ER        Long Term Goals    LTG Date to Achieve 06/17/24  -ER     LTG 1 Pt. will be independent with advanced HEP to improve long term management of condition and independence.  -ER     LTG 1 Progress Ongoing  -ER     LTG 2 Pt will score >/= 40% on LEFS (from 21% on initial evaluation) to indicate improved perception of disability.  -ER     LTG 2 Progress Met  -ER     LTG 3 Pt. will demonstrate ability to complete 5xSTS in </= 20 seconds to progress toward falls risk cut off of 15 seconds.  -ER     LTG 3 Progress Met  -ER     LTG 4 Pt. will increase L LE strength >/= 4+/5 to improve mobility.  -ER     LTG 4 Progress Ongoing;Progressing  -ER     LTG 5 Pt. will increase 2MWT >/= 150 feet with appropriate AD to indicate improved gait speed and mobility.  -ER     LTG 5 Progress Met  -ER               User Key  (r) = Recorded By, (t) = Taken By, (c) = Cosigned By      Initials Name Provider Type    Jaquelin Haynes PT Physical Therapist                    Therapy Education  Education Details: Reinforced HEp  Given: HEP  Program: Reinforced  How Provided: Verbal, Demonstration  Provided to: Patient  Level of Understanding: Teach back education performed, Verbalized, Demonstrated    2 Minute Walk Test  Gait, Assistive Device: other (see comments) (no AD)  Distance Ambulated in 2 Minutes: 326  5 Times Sit to Stand  5 Times Sit to Stand (seconds): 8.88  seconds  5 Times Sit to Stand Comments: No AD this date  Lower Extremity Functional Index  Any of your usual work, housework or school activities: No difficulty  Your usual hobbies, recreational or sporting activities: A little bit of difficulty  Getting into or out of the bath: No difficulty  Walking between rooms: No difficulty  Putting on your shoes or socks: A little bit of difficulty  Squatting: A little bit of difficulty  Lifting an object, like a bag of groceries from the floor: No difficulty  Performing light activities around your home: No difficulty  Performing heavy activities around your home: Moderate difficulty  Getting into or out of a car: A little bit of difficulty  Walking 2 blocks: No difficulty  Walking a mile: Quite a bit of difficulty  Going up or down 10 stairs (about 1 flight of stairs): Moderate difficulty  Standing for 1 hour: No difficulty  Sitting for 1 hour: Extreme difficulty or unable to perform activity  Running on even ground: Extreme difficulty or unable to perform activity  Running on uneven ground: Extreme difficulty or unable to perform activity  Making sharp turns while running fast: Moderate difficulty  Hopping: Moderate difficulty  Rolling over in bed: A little bit of difficulty  Total: 52  Lower Extremity Functional Index  Any of your usual work, housework or school activities: No difficulty  Your usual hobbies, recreational or sporting activities: A little bit of difficulty  Getting into or out of the bath: No difficulty  Walking between rooms: No difficulty  Putting on your shoes or socks: A little bit of difficulty  Squatting: A little bit of difficulty  Lifting an object, like a bag of groceries from the floor: No difficulty  Performing light activities around your home: No difficulty  Performing heavy activities around your home: Moderate difficulty  Getting into or out of a car: A little bit of difficulty  Walking 2 blocks: No difficulty  Walking a mile: Quite a bit of  difficulty  Going up or down 10 stairs (about 1 flight of stairs): Moderate difficulty  Standing for 1 hour: No difficulty  Sitting for 1 hour: Extreme difficulty or unable to perform activity  Running on even ground: Extreme difficulty or unable to perform activity  Running on uneven ground: Extreme difficulty or unable to perform activity  Making sharp turns while running fast: Moderate difficulty  Hopping: Moderate difficulty  Rolling over in bed: A little bit of difficulty  Total: 52      Time Calculation:   Start Time: 0945  Stop Time: 1015  Time Calculation (min): 30 min  Total Timed Code Minutes- PT: 30 minute(s)  Timed Charges  38854 - PT Therapeutic Exercise Minutes: 15  43831 - PT Therapeutic Activity Minutes: 15  Total Minutes  Timed Charges Total Minutes: 30   Total Minutes: 30  Therapy Charges for Today       Code Description Service Date Service Provider Modifiers Qty    23448872951 HC PT THERAPEUTIC ACT EA 15 MIN 8/12/2024 Jaquelin Mccloud, PT GP 1    05635669424 HC PT THER PROC EA 15 MIN 8/12/2024 Jaquelin Mccloud, PT GP 1            PT G-Codes  Total: 52         Jaquelin Mccloud PT  8/12/2024

## 2024-08-12 NOTE — PATIENT INSTRUCTIONS
Fall Prevention in the Home, Adult  Falls can cause injuries and affect people of all ages. There are many simple things that you can do to make your home safe and to help prevent falls.  If you need it, ask for help making these changes.  What actions can I take to prevent falls?  General information  Use good lighting in all rooms. Make sure to:  Replace any light bulbs that burn out.  Turn on lights if it is dark and use night-lights.  Keep items that you use often in easy-to-reach places. Lower the shelves around your home if needed.  Move furniture so that there are clear paths around it.  Do not keep throw rugs or other things on the floor that can make you trip.  If any of your floors are uneven, fix them.  Add color or contrast paint or tape to clearly clark and help you see:  Grab bars or handrails.  First and last steps of staircases.  Where the edge of each step is.  If you use a ladder or stepladder:  Make sure that it is fully opened. Do not climb a closed ladder.  Make sure the sides of the ladder are locked in place.  Have someone hold the ladder while you use it.  Know where your pets are as you move through your home.  What can I do in the bathroom?         Keep the floor dry. Clean up any water that is on the floor right away.  Remove soap buildup in the bathtub or shower. Buildup makes bathtubs and showers slippery.  Use non-skid mats or decals on the floor of the bathtub or shower.  Attach bath mats securely with double-sided, non-slip rug tape.  If you need to sit down while you are in the shower, use a non-slip stool.  Install grab bars by the toilet and in the bathtub and shower. Do not use towel bars as grab bars.  What can I do in the bedroom?  Make sure that you have a light by your bed that is easy to reach.  Do not use any sheets or blankets on your bed that hang to the floor.  Have a firm bench or chair with side arms that you can use for support when you get dressed.  What can I do in  the kitchen?  Clean up any spills right away.  If you need to reach something above you, use a sturdy step stool that has a grab bar.  Keep electrical cables out of the way.  Do not use floor polish or wax that makes floors slippery.  What can I do with my stairs?  Do not leave anything on the stairs.  Make sure that you have a light switch at the top and the bottom of the stairs. Have them installed if you do not have them.  Make sure that there are handrails on both sides of the stairs. Fix handrails that are broken or loose. Make sure that handrails are as long as the staircases.  Install non-slip stair treads on all stairs in your home if they do not have carpet.  Avoid having throw rugs at the top or bottom of stairs, or secure the rugs with carpet tape to prevent them from moving.  Choose a carpet design that does not hide the edge of steps on the stairs. Make sure that carpet is firmly attached to the stairs. Fix any carpet that is loose or worn.  What can I do on the outside of my home?  Use bright outdoor lighting.  Repair the edges of walkways and driveways and fix any cracks. Clear paths of anything that can make you trip, such as tools or rocks.  Add color or contrast paint or tape to clearly clark and help you see high doorway thresholds.  Trim any bushes or trees on the main path into your home.  Check that handrails are securely fastened and in good repair. Both sides of all steps should have handrails.  Install guardrails along the edges of any raised decks or porches.  Have leaves, snow, and ice cleared regularly. Use sand, salt, or ice melt on walkways during winter months if you live where there is ice and snow.  In the garage, clean up any spills right away, including grease or oil spills.  What other actions can I take?  Review your medicines with your health care provider. Some medicines can make you confused or feel dizzy. This can increase your chance of falling.  Wear closed-toe shoes that  fit well and support your feet. Wear shoes that have rubber soles and low heels.  Use a cane, walker, scooter, or crutches that help you move around if needed.  Talk with your provider about other ways that you can decrease your risk of falls. This may include seeing a physical therapist to learn to do exercises to improve movement and strength.  Where to find more information  Centers for Disease Control and Prevention, [place]: cdc.gov  National [place] on Aging: nohelia.nih.gov  National [place] on Aging: nohelia.nih.gov  Contact a health care provider if:  You are afraid of falling at home.  You feel weak, drowsy, or dizzy at home.  You fall at home.  Get help right away if you:  Lose consciousness or have trouble moving after a fall.  Have a fall that causes a head injury.  These symptoms may be an emergency. Get help right away. Call 911.  Do not wait to see if the symptoms will go away.  Do not drive yourself to the hospital.  This information is not intended to replace advice given to you by your health care provider. Make sure you discuss any questions you have with your health care provider.  Document Revised: 08/21/2023 Document Reviewed: 08/21/2023  Elsevier Patient Education © 2024 [employer]

## 2024-08-12 NOTE — PROGRESS NOTES
"DOS: 2024  NAME: Earl Marc   : 1945  PCP: Avery Velazquez MD  Chief Complaint   Patient presents with    Hallucinations       Chief complaint: Hallucinations, tremor, shuffling gait  Subjective: 79-year-old man originally seen by me in the observation unit for confusion and aphasia with associated headache thought to be potentially migrainous.  He was seen again in the hospital in July.  He had had some vivid visual hallucinations of people in Victorian clothing and a purple haze like smoke.  This was not particularly disturbing to him and he was aware that this was a hallucination.  Over a longer period of time he has had issues with moving in his sleep including acting out a dream where he ended up falling out of the bed and breaking his back.  His wife complains that he frequently hits her while he is straining.  He continues to have intermittent headaches although these have been only occasional once every 2 to 3 weeks.  In the hospital Dr. Isaac was concerned about atypical parkinsonism and possibly DLB on the basis of the symptoms and recommended a DaTscan.  The patient is very interested in this and continues to be concerned about previous agent orange exposure when he was in the .    He was started on Exelon patches daily which has significantly helped with his orthostatic dizziness.  He would like to continue that if possible.    He has never had a sleep study.    Objective:  Vital signs: Resp 20   Ht 185.4 cm (72.99\")   Wt 101 kg (222 lb)   BMI 29.30 kg/m²    Exam:  vitals reviewed  MS: oriented x3, recent/remote memory intact, normal attention/concentration, language intact, no neglect.  CN: visual acuity grossly normal, PERRL, EOMI, no facial droop, no dysarthria  Motor: 5/5 throughout upper and lower extremities, mildly increased tone, mild bradykinesia  Sensory: Minutes to cold temperature and vibration distal lower extremities  Gait: Normal station, no ataxia, " somewhat shuffling quality to the gait    Laboratory results:  Lab Results   Component Value Date    LDL 19 04/22/2024    LDL 17 02/20/2024    LDL 22 01/15/2024            Review of labs: Most recent LDL was 19    Review and interpretation of imaging: MRI brain during his observation stay showed mild small vessel disease.  CTA showed mild left ICA stenosis.    Diagnoses:  Parkinsonism  Visual hallucinations  Concern for REM behavioral disorder  Migraine with aura, without status migrainosus, not intractable  The static hypotension    Assessment/comments: Concern for atypical parkinsonism, possible Lewy body disease.  Given the  level concern but lack of clear-cut findings for Parkinson's I do think a DaTscan is worth consideration and the patient is interested in proceeding.  I would avoid starting Sinemet for now.    Given concern for REM behavioral disorder and his recent difficulty with headaches I think a sleep study would be extremely helpful both to rule out sleep apnea and potentially rule in REM behavioral disorder    Plan:  1.  DaTscan for atypical parkinsonism  2.  Sleep medicine referral for possible REM behavioral disorder and concern for obstructive sleep apnea  3.  Continue Exelon patches for orthostatic hypotension, seems to have markedly improved his symptoms    I spent a total of 40 minutes on this clinical encounter including chart/records review, review of laboratory data, personal review of imaging studies, patient counseling, and care coordination.

## 2024-08-13 ENCOUNTER — TELEPHONE (OUTPATIENT)
Dept: FAMILY MEDICINE CLINIC | Facility: CLINIC | Age: 79
End: 2024-08-13
Payer: MEDICARE

## 2024-08-13 DIAGNOSIS — I95.1 ORTHOSTATIC HYPOTENSION: ICD-10-CM

## 2024-08-13 RX ORDER — RIVASTIGMINE 4.6 MG/24H
PATCH, EXTENDED RELEASE TRANSDERMAL
Qty: 30 PATCH | Refills: 6 | OUTPATIENT
Start: 2024-08-13

## 2024-08-13 NOTE — TELEPHONE ENCOUNTER
Caller: Earl Marc    Relationship: Self    Best call back number: [phone]     What medication are you requesting: LIDOCAINE PATCHES     If a prescription is needed, what is your preferred pharmacy and phone number: [facility] - [address] - [phone] [facility] [phone] FX     Additional notes:    I spoke to the patient.  He has used these in the past and we have prescribed them for him.  I am going to go ahead and send a prescription to the pharmacy.

## 2024-08-14 ENCOUNTER — TELEPHONE (OUTPATIENT)
Dept: NEUROLOGY | Facility: CLINIC | Age: 79
End: 2024-08-14
Payer: MEDICARE

## 2024-08-14 ENCOUNTER — APPOINTMENT (OUTPATIENT)
Dept: PHYSICAL THERAPY | Facility: HOSPITAL | Age: 79
End: 2024-08-14
Payer: MEDICARE

## 2024-08-14 DIAGNOSIS — I95.1 ORTHOSTATIC HYPOTENSION: ICD-10-CM

## 2024-08-14 RX ORDER — LIDOCAINE 50 MG/G
1 PATCH TOPICAL EVERY 24 HOURS
Qty: 90 PATCH | Refills: 1 | Status: SHIPPED | OUTPATIENT
Start: 2024-08-14

## 2024-08-14 RX ORDER — RIVASTIGMINE 4.6 MG/24H
1 PATCH, EXTENDED RELEASE TRANSDERMAL DAILY
Qty: 30 PATCH | Refills: 6 | Status: SHIPPED | OUTPATIENT
Start: 2024-08-14

## 2024-08-14 NOTE — TELEPHONE ENCOUNTER
Caller: Tari Earl LUCIUS    Relationship: Self    Best call back number: [phone]    Requested Prescriptions:   Requested Prescriptions     Pending Prescriptions Disp Refills    rivastigmine (EXELON) 4.6 MG/24HR patch 30 patch 6     Sig: Place 1 patch on the skin as directed by provider Daily.        Pharmacy where request should be sent: [facility][id number] [address] AT [facility] [phone] [facility] [phone] FX     Last office visit with prescribing clinician: 8/12/2024   Last telemedicine visit with prescribing clinician: Visit date not found   Next office visit with prescribing clinician: 10/21/2024     Additional details provided by patient: THIS RX WAS SENT TO [facility] AND THEY DO NOT CARRY THIS.  PLEASE SEND TO [facility].  PT ONLY  HAS 1 LEFT    Does the patient have less than a 3 day supply:  [x] Yes  [] No    Would you like a call back once the refill request has been completed: [x] Yes [] No    If the office needs to give you a call back, can they leave a voicemail: [x] Yes [] No    Arely Black Rep   08/14/24 11:37 EDT

## 2024-08-21 ENCOUNTER — TELEPHONE (OUTPATIENT)
Dept: NEUROLOGY | Facility: CLINIC | Age: 79
End: 2024-08-21
Payer: MEDICARE

## 2024-08-21 NOTE — TELEPHONE ENCOUNTER
Caller: JANESSA     [place] call back number: [phone]       What was the call regarding: PT HAS NOT BEEN CALLED TO [facility]     Is it okay if the provider responds through MyChart: CALL     PLEASE ADVISE.

## 2024-08-21 NOTE — TELEPHONE ENCOUNTER
PATIENT CALLING REGARDING TRISTA SCAN    HE SAYS [facility] CAN GET HIM IN RIGHT AWAY.    PLEASE FAX A REFERRAL TO THEM AT:    [phone]    THANK YOU    I have personally seen and examined this patient.  I have fully participated in the care of this patient. I have reviewed all pertinent clinical information, including history, physical exam, plan and the Resident’s note and agree except as noted.

## 2024-08-21 NOTE — TELEPHONE ENCOUNTER
Patient is scheduled for his DatScan on 9/4 arriving at 9:00 am. The injection part of the procedure will be at 1:30, and the initial scan will be start at 1:30. This will be performed at [facility].

## 2024-08-22 ENCOUNTER — HOSPITAL ENCOUNTER (OUTPATIENT)
Dept: PHYSICAL THERAPY | Facility: HOSPITAL | Age: 79
Setting detail: THERAPIES SERIES
Discharge: HOME | End: 2024-08-22
Payer: MEDICARE

## 2024-08-22 DIAGNOSIS — I99.8 BLOOD PRESSURE INSTABILITY: ICD-10-CM

## 2024-08-22 DIAGNOSIS — R26.89 BALANCE PROBLEM: ICD-10-CM

## 2024-08-22 DIAGNOSIS — M25.551 BILATERAL HIP PAIN: Primary | ICD-10-CM

## 2024-08-22 DIAGNOSIS — M25.552 BILATERAL HIP PAIN: Primary | ICD-10-CM

## 2024-08-22 DIAGNOSIS — R29.898 WEAKNESS OF LOWER EXTREMITY, UNSPECIFIED LATERALITY: ICD-10-CM

## 2024-08-22 PROCEDURE — 97530 THERAPEUTIC ACTIVITIES: CPT

## 2024-08-22 NOTE — THERAPY DISCHARGE NOTE
Outpatient Physical Therapy Ortho Treatment Note/Discharge Summary  [facility]     Patient Name: Earl Marc  : 1945  MRN: [id number]  Today's Date: 2024      Visit Date: 2024    Visit Dx:    ICD-10-CM ICD-9-CM   1. Bilateral hip pain  M25.551 719.45    M25.552    2. Blood pressure instability  I99.8 796.4   3. Balance problem  R26.89 781.99   4. Weakness of lower extremity, unspecified laterality  R29.898 729.89       Patient Active Problem List   Diagnosis    Bell's palsy    Persistent insomnia    Osteoarthritis of cervical spine    Diabetic peripheral neuropathy    Dyslipidemia    Steatosis of liver    Fibromyalgia    Gastroesophageal reflux disease without esophagitis    Hypertensive kidney disease with stage 3a chronic kidney disease    Hypogonadism in male    Renal insufficiency    Vitamin D deficiency    Benign non-nodular prostatic hyperplasia with lower urinary tract symptoms    S/P drug eluting coronary stent placement    Coronary artery disease involving native coronary artery of native heart    Malignant neoplasm of skin    Type 2 diabetes mellitus with hyperglycemia, with long-term current use of insulin    Diabetes mellitus    Chronic insomnia    Other specified glaucoma    Vertigo    Epigastric pain    Chest pain with high risk for cardiac etiology    Precordial pain    S/P arthroscopy of shoulder    Peripheral neuropathy    Stage 3b chronic kidney disease    Chest pain    Stroke-like symptoms    Acute back pain    Thoracic 12 compression fracture, closed, initial encounter    Hypertensive urgency    Sudden onset of severe headache    Visual hallucinations        Past Medical History:   Diagnosis Date    Abnormal cardiovascular stress test     Acid reflux     Arthritis     Asthma     Bell palsy     In [place]    Cancer     skin     Chronic kidney disease     Colon polyp     Congenital heart disease     COPD (chronic obstructive pulmonary disease)     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    Migraines 09/2023    Myocardial infarction 2018    Trigger point of left shoulder region 03/02/2023    Type 2 diabetes mellitus         Past Surgical History:   Procedure Laterality Date    CARDIAC CATHETERIZATION N/A 03/25/2016    Procedure: Left Heart Cath;  Surgeon: Maria E Gilbert MD;  [facility];  Service:     CARDIAC CATHETERIZATION N/A 03/25/2016    Procedure: Coronary angiography;  Surgeon: Maria E Gilbert MD;  [facility];  Service:     CARDIAC CATHETERIZATION N/A 03/28/2016    Procedure: Coronary angiography;  Surgeon: Maria E Gilbert MD;  [facility];  Service:     CARDIAC CATHETERIZATION N/A 03/28/2016    Procedure: Stent MOISE coronary;  Surgeon: Maria E Gilbert MD;  [facility];  Service:     CARDIAC CATHETERIZATION N/A 10/18/2018    Procedure: Coronary angiography  no LV gram d/t CKD;  Surgeon: Maria E Gilbert MD;  [facility];  Service: Cardiology    CARDIAC CATHETERIZATION N/A 10/18/2018    Procedure: Left Heart Cath;  Surgeon: Maria E Gilbert MD;  [facility];  Service: Cardiology    CARDIAC CATHETERIZATION N/A 10/18/2018    Procedure: Stent MOISE coronary;  Surgeon: Maria E Gilbert MD;  Location: [facility] CATH INVASIVE LOCATION;  Service: Cardiology    CARDIAC  CATHETERIZATION N/A 05/28/2021    Procedure: Coronary angiography;  Surgeon: Maria E Gilbert MD;  Location:  [facility] CATH INVASIVE LOCATION;  Service: Cardiovascular;  Laterality: N/A;    CARDIAC CATHETERIZATION N/A 05/28/2021    Procedure: Left Heart Cath;  Surgeon: Maria E Gilbert MD;  Location:  [facility] CATH INVASIVE LOCATION;  Service: Cardiovascular;  Laterality: N/A;    CARDIAC CATHETERIZATION N/A 05/28/2021    Procedure: Left ventriculography;  Surgeon: Maria E Gilbert MD;  Location:  [facility] CATH INVASIVE LOCATION;  Service: Cardiovascular;  Laterality: N/A;    CARDIAC CATHETERIZATION N/A 05/28/2021    Procedure: Stent MOISE coronary;  Surgeon: Maria E Gilbert MD;  [facility];  Service: Cardiovascular;  Laterality: N/A;    CARDIAC CATHETERIZATION N/A 01/19/2024    Procedure: Left Heart Cath;  Surgeon: Maria E Gilbert MD;  [facility];  Service: Cardiovascular;  Laterality: N/A;    CARDIAC CATHETERIZATION N/A 01/19/2024    Procedure: Coronary angiography;  Surgeon: Maria E Gilbert MD;  Location:  [facility] CATH INVASIVE LOCATION;  Service: Cardiovascular;  Laterality: N/A;    CARDIAC CATHETERIZATION N/A 01/19/2024    Procedure: Left ventriculography;  Surgeon: Maria E Gilbert MD;  [facility];  Service: Cardiovascular;  Laterality: N/A;    CAROTID STENT      CORONARY ANGIOPLASTY      CORONARY STENT PLACEMENT  3/28/2016    EYE SURGERY  9/2017    NECK EXPLORATION N/A     NE RT/LT HEART CATHETERS N/A 10/18/2018    Procedure: Percutaneous Coronary Intervention;  Surgeon: Maria E Gilbert MD;  [facility];  Service: Cardiology        PT Ortho       Row Name 08/22/24 1300       MMT (Manual Muscle Testing)    Rt Lower Ext Rt Knee Extension;Rt Knee Flexion;Rt Hip Flexion  -ER    Lt Lower Ext Lt Hip Flexion;Lt Knee Extension;Lt Knee Flexion  -ER       MMT Right  Lower Ext    Rt Hip Flexion MMT, Gross Movement (4+/5) good plus  -ER    Rt Knee Extension MMT, Gross Movement (4+/5) good plus  -ER    Rt Knee Flexion MMT, Gross Movement (4+/5) good plus  -ER       MMT Left Lower Ext    Lt Hip Flexion MMT, Gross Movement (4+/5) good plus  -ER    Lt Knee Extension MMT, Gross Movement (4+/5) good plus  -ER    Lt Knee Flexion MMT, Gross Movement (4+/5) good plus  -ER              User Key  (r) = Recorded By, (t) = Taken By, (c) = Cosigned By      Initials Name Provider Type    ER Jaquelin Mccloud, PT Physical Therapist                                 PT Assessment/Plan       Row Name 08/22/24 1300          PT Assessment    Assessment Comments Earl Marc was seen for 24 physical therapy sessions for bilateral hip pain/ thoracic compression fx.  Pt. Reports that he is near baseline, returning to [employer] and rowing exercises with no pain. He reports that he can row for 30 minutes. Discussed safety with forward flexion/quick movements when rowing, and pt. Verbalizes understanding. Treatment included therapeutic exercise, therapeutic activity, neuro-muscular retraining , gait training, and patient education with home exercise program . Pt. Reports feeling 80% improvement in symptoms. Progress to physical therapy goals was excellent. Pt met 3/3 STG and 5/5 LTG. Time spent discussing advanced HEP and appropriate progressions/modifications to make based on response following discharge and optimal frequency/duration to complete HEP over next several weeks-months. Patient verbalized understanding. He was discharged to an independent HEP and provided patient education to self-manage condition.  -ER        PT Plan    PT Plan Comments D/C  -ER               User Key  (r) = Recorded By, (t) = Taken By, (c) = Cosigned By      Initials Name Provider Type    ER Jaquelin Mccloud, PT Physical Therapist                         OP Exercises       Row Name 08/22/24 1300             Subjective    Subjective  Comments I feel good.  -ER         Total Minutes    91927 - PT Therapeutic Activity Minutes 15  -ER         Exercise 1    Exercise Name 1 TA- discharge assessment  -ER                User Key  (r) = Recorded By, (t) = Taken By, (c) = Cosigned By      Initials Name Provider Type    ER Jaquelin Mccloud, PT Physical Therapist                                    PT OP Goals       Row Name 08/22/24 1300          PT Short Term Goals    STG Date to Achieve 05/18/24  -ER     STG 1 Pt. will be independent with initial HEP to improve self-management of condition.  -ER     STG 1 Progress Met  -ER     STG 2 Pt. will tolerate 10 minute of standing/walking ther ex in clinic before seated rest break to demonstrate improved activity tolerance.  -ER     STG 2 Progress Met  -ER     STG 3 Pt. will perform 1 STS from chair with only 1 UE to demosntrate improving functional strength.  -ER     STG 3 Progress Met  -ER        Long Term Goals    LTG Date to Achieve 06/17/24  -ER     LTG 1 Pt. will be independent with advanced HEP to improve long term management of condition and independence.  -ER     LTG 1 Progress Met  -ER     LTG 2 Pt will score >/= 40% on LEFS (from 21% on initial evaluation) to indicate improved perception of disability.  -ER     LTG 2 Progress Met  -ER     LTG 3 Pt. will demonstrate ability to complete 5xSTS in </= 20 seconds to progress toward falls risk cut off of 15 seconds.  -ER     LTG 3 Progress Met  -ER     LTG 4 Pt. will increase L LE strength >/= 4+/5 to improve mobility.  -ER     LTG 4 Progress Met  -ER     LTG 5 Pt. will increase 2MWT >/= 150 feet with appropriate AD to indicate improved gait speed and mobility.  -ER     LTG 5 Progress Met  -ER               User Key  (r) = Recorded By, (t) = Taken By, (c) = Cosigned By      Initials Name Provider Type    Jaquelin Haynes, ELIN Physical Therapist                    Therapy Education  Education Details: Full review of HEP. Educated pt on strength training guidelines  with need to complete exercises 2-3x's per week, 2-3 sets at 8-12 reps. Discussed several ways to progress and modify each exercise as needed.  Given: HEP  Program: New, Reinforced  How Provided: Verbal, Written  Provided to: Patient  Level of Understanding: Verbalized    Outcome Measure Options: Lower Extremity Functional Scale (LEFS), 5x Sit to Stand, 2 Minute Walk Test  Lower Extremity Functional Index  Any of your usual work, housework or school activities: No difficulty  Your usual hobbies, recreational or sporting activities: No difficulty  Getting into or out of the bath: No difficulty  Walking between rooms: No difficulty  Putting on your shoes or socks: A little bit of difficulty  Squatting: A little bit of difficulty  Lifting an object, like a bag of groceries from the floor: No difficulty  Performing light activities around your home: No difficulty  Performing heavy activities around your home: A little bit of difficulty  Getting into or out of a car: A little bit of difficulty  Walking 2 blocks: A little bit of difficulty  Walking a mile: A little bit of difficulty  Going up or down 10 stairs (about 1 flight of stairs): A little bit of difficulty  Standing for 1 hour: A little bit of difficulty  Sitting for 1 hour: No difficulty  Running on even ground: Moderate difficulty  Running on uneven ground: Moderate difficulty  Making sharp turns while running fast: Moderate difficulty  Hopping: Moderate difficulty  Rolling over in bed: No difficulty  Total: 64  Lower Extremity Functional Index  Any of your usual work, housework or school activities: No difficulty  Your usual hobbies, recreational or sporting activities: No difficulty  Getting into or out of the bath: No difficulty  Walking between rooms: No difficulty  Putting on your shoes or socks: A little bit of difficulty  Squatting: A little bit of difficulty  Lifting an object, like a bag of groceries from the floor: No difficulty  Performing light  activities around your home: No difficulty  Performing heavy activities around your home: A little bit of difficulty  Getting into or out of a car: A little bit of difficulty  Walking 2 blocks: A little bit of difficulty  Walking a mile: A little bit of difficulty  Going up or down 10 stairs (about 1 flight of stairs): A little bit of difficulty  Standing for 1 hour: A little bit of difficulty  Sitting for 1 hour: No difficulty  Running on even ground: Moderate difficulty  Running on uneven ground: Moderate difficulty  Making sharp turns while running fast: Moderate difficulty  Hopping: Moderate difficulty  Rolling over in bed: No difficulty  Total: 64      Time Calculation:   Start Time: 1315  Stop Time: 1330  Time Calculation (min): 15 min  Total Timed Code Minutes- PT: 15 minute(s)  Timed Charges  57154 - PT Therapeutic Activity Minutes: 15  Total Minutes  Timed Charges Total Minutes: 15   Total Minutes: 15  Therapy Charges for Today       Code Description Service Date Service Provider Modifiers Qty    [id number]  PT THERAPEUTIC ACT EA 15 MIN 8/22/2024 Jaquelin Mccloud, PT GP 1            PT G-Codes  Outcome Measure Options: Lower Extremity Functional Scale (LEFS), 5x Sit to Stand, 2 Minute Walk Test  Total: 64     OP PT Discharge Summary  Reason for Discharge: All goals achieved  Outcomes Achieved: Able to achieve all goals within established timeline  Discharge Destination: Home with home program      Jaquelin Mccloud, PT  8/22/2024

## 2024-09-03 ENCOUNTER — TELEPHONE (OUTPATIENT)
Dept: ENDOCRINOLOGY | Age: 79
End: 2024-09-03
Payer: MEDICARE

## 2024-09-03 DIAGNOSIS — E11.65 TYPE 2 DIABETES MELLITUS WITH HYPERGLYCEMIA, WITH LONG-TERM CURRENT USE OF INSULIN: ICD-10-CM

## 2024-09-03 DIAGNOSIS — Z79.4 TYPE 2 DIABETES MELLITUS WITH HYPERGLYCEMIA, WITH LONG-TERM CURRENT USE OF INSULIN: ICD-10-CM

## 2024-09-03 DIAGNOSIS — E78.5 DYSLIPIDEMIA: Primary | ICD-10-CM

## 2024-09-12 DIAGNOSIS — E78.5 DYSLIPIDEMIA: ICD-10-CM

## 2024-09-12 DIAGNOSIS — E11.65 TYPE 2 DIABETES MELLITUS WITH HYPERGLYCEMIA, WITH LONG-TERM CURRENT USE OF INSULIN: ICD-10-CM

## 2024-09-12 DIAGNOSIS — Z79.4 TYPE 2 DIABETES MELLITUS WITH HYPERGLYCEMIA, WITH LONG-TERM CURRENT USE OF INSULIN: ICD-10-CM

## 2024-09-12 LAB
ALBUMIN SERPL-MCNC: 4.5 G/DL (ref 3.5–5.2)
ALBUMIN/GLOB SERPL: 1.7 G/DL
ALP SERPL-CCNC: 82 U/L (ref 39–117)
ALT SERPL-CCNC: 18 U/L (ref 1–41)
AST SERPL-CCNC: 19 U/L (ref 1–40)
BILIRUB SERPL-MCNC: 1 MG/DL (ref 0–1.2)
BUN SERPL-MCNC: 13 MG/DL (ref 8–23)
BUN/CREAT SERPL: 11 (ref 7–25)
CALCIUM SERPL-MCNC: 9.8 MG/DL (ref 8.6–10.5)
CHLORIDE SERPL-SCNC: 104 MMOL/L (ref 98–107)
CHOLEST SERPL-MCNC: 85 MG/DL (ref 0–200)
CO2 SERPL-SCNC: 24.2 MMOL/L (ref 22–29)
CREAT SERPL-MCNC: 1.18 MG/DL (ref 0.76–1.27)
EGFRCR SERPLBLD CKD-EPI 2021: 62.8 ML/MIN/1.73
GLOBULIN SER CALC-MCNC: 2.7 GM/DL
GLUCOSE SERPL-MCNC: 193 MG/DL (ref 65–99)
HBA1C MFR BLD: 7.9 % (ref 4.8–5.6)
HDLC SERPL-MCNC: 38 MG/DL (ref 40–60)
IMP & REVIEW OF LAB RESULTS: NORMAL
LDLC SERPL CALC-MCNC: 21 MG/DL (ref 0–100)
POTASSIUM SERPL-SCNC: 4.3 MMOL/L (ref 3.5–5.2)
PROT SERPL-MCNC: 7.2 G/DL (ref 6–8.5)
SODIUM SERPL-SCNC: 140 MMOL/L (ref 136–145)
TRIGL SERPL-MCNC: 155 MG/DL (ref 0–150)
VLDLC SERPL CALC-MCNC: 26 MG/DL (ref 5–40)

## 2024-09-19 ENCOUNTER — OFFICE VISIT (OUTPATIENT)
Dept: ENDOCRINOLOGY | Age: 79
End: 2024-09-19
Payer: MEDICARE

## 2024-09-19 VITALS
WEIGHT: 225 LBS | DIASTOLIC BLOOD PRESSURE: 70 MMHG | HEIGHT: 73 IN | OXYGEN SATURATION: 97 % | HEART RATE: 72 BPM | BODY MASS INDEX: 29.82 KG/M2 | SYSTOLIC BLOOD PRESSURE: 134 MMHG

## 2024-09-19 DIAGNOSIS — Z79.4 TYPE 2 DIABETES MELLITUS WITH HYPERGLYCEMIA, WITH LONG-TERM CURRENT USE OF INSULIN: Primary | ICD-10-CM

## 2024-09-19 DIAGNOSIS — N18.31 HYPERTENSIVE KIDNEY DISEASE WITH STAGE 3A CHRONIC KIDNEY DISEASE: ICD-10-CM

## 2024-09-19 DIAGNOSIS — E11.65 TYPE 2 DIABETES MELLITUS WITH HYPERGLYCEMIA, WITH LONG-TERM CURRENT USE OF INSULIN: Primary | ICD-10-CM

## 2024-09-19 DIAGNOSIS — I12.9 HYPERTENSIVE KIDNEY DISEASE WITH STAGE 3A CHRONIC KIDNEY DISEASE: ICD-10-CM

## 2024-09-19 DIAGNOSIS — E78.5 DYSLIPIDEMIA: ICD-10-CM

## 2024-09-19 RX ORDER — INSULIN ASPART 100 [IU]/ML
8 INJECTION, SOLUTION INTRAVENOUS; SUBCUTANEOUS
Qty: 21 ML | Refills: 1 | Status: SHIPPED | OUTPATIENT
Start: 2024-09-19

## 2024-09-19 RX ORDER — ATORVASTATIN CALCIUM 20 MG/1
20 TABLET, FILM COATED ORAL NIGHTLY
Qty: 90 TABLET | Refills: 1 | Status: SHIPPED | OUTPATIENT
Start: 2024-09-19

## 2024-10-15 ENCOUNTER — OFFICE VISIT (OUTPATIENT)
Dept: SLEEP MEDICINE | Facility: HOSPITAL | Age: 79
End: 2024-10-15
Payer: MEDICARE

## 2024-10-15 VITALS — HEART RATE: 66 BPM | WEIGHT: 229.4 LBS | BODY MASS INDEX: 30.4 KG/M2 | OXYGEN SATURATION: 98 % | HEIGHT: 73 IN

## 2024-10-15 DIAGNOSIS — Z95.5 S/P DRUG ELUTING CORONARY STENT PLACEMENT: ICD-10-CM

## 2024-10-15 DIAGNOSIS — I25.10 CORONARY ARTERY DISEASE INVOLVING NATIVE CORONARY ARTERY OF NATIVE HEART, UNSPECIFIED WHETHER ANGINA PRESENT: ICD-10-CM

## 2024-10-15 DIAGNOSIS — G47.00 PERSISTENT INSOMNIA: ICD-10-CM

## 2024-10-15 DIAGNOSIS — E78.5 DYSLIPIDEMIA: ICD-10-CM

## 2024-10-15 DIAGNOSIS — G47.52 REM BEHAVIORAL DISORDER: Primary | ICD-10-CM

## 2024-10-15 DIAGNOSIS — I12.9 HYPERTENSIVE KIDNEY DISEASE WITH STAGE 3A CHRONIC KIDNEY DISEASE: ICD-10-CM

## 2024-10-15 DIAGNOSIS — H53.16 CHARLES BONNET SYNDROME: ICD-10-CM

## 2024-10-15 DIAGNOSIS — E11.65 TYPE 2 DIABETES MELLITUS WITH HYPERGLYCEMIA, WITH LONG-TERM CURRENT USE OF INSULIN: ICD-10-CM

## 2024-10-15 DIAGNOSIS — N18.31 HYPERTENSIVE KIDNEY DISEASE WITH STAGE 3A CHRONIC KIDNEY DISEASE: ICD-10-CM

## 2024-10-15 DIAGNOSIS — Z79.4 TYPE 2 DIABETES MELLITUS WITH HYPERGLYCEMIA, WITH LONG-TERM CURRENT USE OF INSULIN: ICD-10-CM

## 2024-10-15 DIAGNOSIS — I16.0 HYPERTENSIVE URGENCY: ICD-10-CM

## 2024-10-15 DIAGNOSIS — R44.1 VISUAL HALLUCINATIONS: ICD-10-CM

## 2024-10-15 DIAGNOSIS — G47.33 OSA (OBSTRUCTIVE SLEEP APNEA): ICD-10-CM

## 2024-10-15 PROCEDURE — G0463 HOSPITAL OUTPT CLINIC VISIT: HCPCS

## 2024-10-15 NOTE — PROGRESS NOTES
Patient Care Team:  Avery Velazquez MD as PCP - General (Internal Medicine)  Claudette Hein MD, MPH as Consulting Physician (Sleep Medicine)        History of Present Illness  The patient presents for evaluation of multiple medical concerns.    He has been experiencing balance issues and dizziness for the past few years, leading to several falls. Three months ago, he fell out of bed and fractured his back, which has since healed. A few years prior, a fall resulted in a twisted muscle that required surgical intervention. Recently, he experienced confusion while driving home, prompting a hospital visit where a stroke was suspected. Despite normal x-rays and CT scans, he began experiencing hallucinations, seeing people dressed in Victorian attire. He also reported seeing smoke and fog, as well as museum-like paintings when he closed his eyes. These hallucinations occurred once, approximately four months ago.    He has also been experiencing night terrors for several years, during which he moves around and has even hit his wife. These episodes occur once or twice a year. He has developed claustrophobia and has been snoring more frequently. He underwent a sleep study about 10 years ago and was prescribed Ambien, but did not receive a CPAP machine. He is currently taking melatonin gummies at the lowest dose. He has sinus issues and uses nasal strips and menthol cough drops to aid his sleep. He has been using these aids for about a month and finds them helpful. He stopped taking melatonin for a while but resumed about 2.5 to 3 weeks ago. He prefers not to use Benadryl unless his sinuses are severely congested.    He underwent a DaTscan last week and is scheduled to see Dr. Mcclellan next week. He believes his Parkinson's disease may be due to exposure to Agent Orange.    He has heart disease and kidney disease.    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 He finally retired at [employer] in [employer].       [place]: 10    Data Reviewed: Reviewed the medical chart including notes from neurology, the TRISTA scan and the patient's questionnaire      PMH:  Past Medical History:   Diagnosis Date    Abnormal cardiovascular stress test     Acid reflux     Arthritis     Asthma     Bell palsy 1978    In [place]    Cancer     skin     Chronic kidney disease     Colon polyp     Congenital heart disease     COPD (chronic obstructive pulmonary disease) 2021    Old age COPD/2D hand Smoke emphysema    Coronary artery disease     Diabetes mellitus     Diabetic neuropathy associated with type 2 diabetes mellitus     Diabetic peripheral neuropathy 03/10/2016    Difficulty walking     Dyslipidemia     Erectile dysfunction     Fatty liver disease, nonalcoholic     Fibromyalgia, primary     Gastritis     Glaucoma     both eyes    HL (hearing loss)     Hyperlipidemia     Hypertension     Hypogonadism male     Infectious viral hepatitis Hep A July 1959    Drank water from broken water line and Grandparents house.    Migraines 09/2023    Myocardial infarction 2018    Testosterone deficiency 2001    Low testosterone    Trigger point of left shoulder region 03/02/2023    Type 2 diabetes mellitus           Allergies:  Ace inhibitors, Hydrogenated palm oil glycerides, Lanolin, Other, Prazosin, and Nsaids     Medication Review:   Current Outpatient Medications on File Prior to Visit   Medication Sig Dispense Refill    Alcohol Swabs (Advocate Alcohol Prep Pads) 70 % pads Use 1 each 4 (Four) Times a Day. 400 each 3    ammonium lactate (AmLactin) 12 % lotion Apply 1 Application topically to the appropriate area as directed As Needed for Dry Skin.      ascorbic acid (VITAMIN C) 1000 MG tablet Take 2 tablets by mouth Daily.      ASHWAGANDHA 35 PO Take  by mouth.      aspirin 81 MG EC tablet Take 1 tablet by mouth Daily. 90 tablet 3    atorvastatin (LIPITOR) 20 MG  tablet Take 1 tablet by mouth Every Night. 90 tablet 1    Calcium Carbonate (CALCIUM 600 PO) Take 1 tablet by mouth Daily.      ciclopirox (LOPROX) 1 % shampoo Apply 1 Application topically to the appropriate area as directed 3 (Three) Times a Week.      Cyanocobalamin (Vitamin B-12) 1000 MCG sublingual tablet Place 1 tablet under the tongue Daily.      cyclobenzaprine (FLEXERIL) 10 MG tablet Take 1 tablet by mouth 3 (Three) Times a Day As Needed for Muscle Spasms. 30 tablet 1    Dextrose, Diabetic Use, (Glucose) 1 g chewable tablet Chew 1 tablet As Needed.      FINASTERIDE PO Take 4 mg by mouth Daily.      fluticasone (FLONASE) 50 MCG/ACT nasal spray 2 sprays into the nostril(s) as directed by provider Daily As Needed for Allergies. 48 g 1    hydrocortisone 2.5 % ointment Apply 1 Application topically to the appropriate area as directed 2 (Two) Times a Day.      insulin aspart (NovoLOG FlexPen) 100 UNIT/ML solution pen-injector sc pen Inject 8 Units under the skin into the appropriate area as directed 3 (Three) Times a Day With Meals. 21 mL 1    Insulin Glargine (Lantus SoloStar) 100 UNIT/ML injection pen Inject 38 Units under the skin into the appropriate area as directed Daily. (Patient taking differently: Inject 32 Units under the skin into the appropriate area as directed Daily.)      Insulin Pen Needle (B-D UF III MINI PEN NEEDLES) 31G X 5 MM misc For use with pen device up to 4 times a day. Can substitute based on availability and insurance. 500 each 2    latanoprost (XALATAN) 0.005 % ophthalmic solution Administer 1 drop to both eyes Every Night.      lidocaine (LIDODERM) 5 % Place 1 patch on the skin as directed by provider Daily. Remove & Discard patch within 12 hours or as directed by MD Lara patch 1    linagliptin (Tradjenta) 5 MG tablet tablet Take 1 tablet by mouth Daily. 90 tablet 1    Lumigan 0.01 % ophthalmic drops       Lutein 20 MG tablet Take 1 tablet by mouth Daily.      Magnesium 250 MG tablet  "Take 1 tablet by mouth Every Night.      Multiple Vitamins-Minerals (ZINC PO) Take 50 mg by mouth Daily.      nitroglycerin (Nitrostat) 0.4 MG SL tablet Place 1 tablet under the tongue Every 5 (Five) Minutes As Needed for Chest Pain. Indications: Acute Angina Pectoris 25 tablet 3    NON FORMULARY Ketamin 4%/Gabapentin 6%/clonidine 0.2%/nifedipine 2%  1-2 PUMPS to affected area 3-4 times daily.      NON FORMULARY Fluconazole/terbinafine/Ibuprofen/DMSO 2/3/3/50% with vitamin D 07027 Iu/100ml   1 Application to toe nails 2 times daily      pantoprazole (PROTONIX) 20 MG EC tablet Take 1 tablet by mouth Daily. 90 tablet 1    Patiromer Sorbitex Calcium (Veltassa) 8.4 g pack Take 1 packet by mouth Daily. (Patient not taking: Reported on 9/19/2024)      propranolol LA (INDERAL LA) 60 MG 24 hr capsule Take 1 capsule by mouth Daily. 90 capsule 1    Riboflavin 400 MG tablet Take 1 tablet by mouth Every Night.      rivastigmine (EXELON) 4.6 MG/24HR patch Place 1 patch on the skin as directed by provider Daily. 30 patch 6    silodosin (RAPAFLO) 4 MG capsule capsule Take 1 capsule by mouth Daily With Breakfast. (Patient not taking: Reported on 9/19/2024)      Testosterone Cypionate (DEPOTESTOTERONE CYPIONATE) 200 MG/ML injection Inject 1 mL into the appropriate muscle as directed by prescriber Every 14 (Fourteen) Days.      venlafaxine XR (EFFEXOR-XR) 75 MG 24 hr capsule       VIAGRA 100 MG tablet Take 1 tablet by mouth As Needed for erectile dysfunction (1 tablet prior to planned intercourse.). 24 tablet 3    VITAMIN E 400 UNIT capsule Take 1 capsule by mouth Daily.       No current facility-administered medications on file prior to visit.         Vital Signs:    Vitals:    10/15/24 0900   Pulse: 66   SpO2: 98%   Weight: 104 kg (229 lb 6.4 oz)   Height: 185.4 cm (73\")        Body mass index is 30.27 kg/m².  Neck Circumference: 17 inches  .BMIFOLLOWUP         Physical Exam:    Constitutional:  Well developed 79 y.o. male that " appears in no apparent distress.  Awake & oriented times 3.  Normal mood with normal recent and remote memory and normal judgement.  Eyes:  Conjunctivae normal.  Oropharynx: Moist mucous membranes without exudate and Mallampati 3  Neck: Trachea midline  Respiratory: Effort is not labored  Cardiovascular: Radial pulse regular  Musculoskeletal: Gait appears normal, no digital clubbing evident, no pre-tibial edema        Impression:   Encounter Diagnoses   Name Primary?    REM behavioral disorder Yes    Hypertensive urgency     Coronary artery disease involving native coronary artery of native heart, unspecified whether angina present     Dyslipidemia     Hypertensive kidney disease with stage 3a chronic kidney disease     Type 2 diabetes mellitus with hyperglycemia, with long-term current use of insulin     Visual hallucinations     Persistent insomnia     S/P drug eluting coronary stent placement     ALEXANDER (obstructive sleep apnea)     Lucho Bonnet syndrome      Patient's BMI is Body mass index is 30.27 kg/m².          Assessment & Plan  1. Sleep disturbances.  He reports experiencing night terrors and occasional physical movements during sleep, including hitting his wife. These symptoms are consistent with REM behavior disorder. A sleep study will be conducted to assess for any abnormalities in brain waves and potential sleep apnea. Melatonin, which he has been taking at a low dose, will be continued and the dosage may be increased if necessary.    2. Parkinson's disease.  His DaTscan results showed a slight asymmetry of uptake on the right side compared to the left, which could indicate decreased uptake associated with Parkinson's disease, though the significance is unclear. He will continue to follow up with his neurologist, Dr. Mcclellan, next week for further evaluation and management.    #3 the episode of visual hallucinations while lying down at night might have been an episode of Lucho Bonnet syndrome but  this hospital to tell with only limited number of such events.    An in-lab polysomnogram will help us look at his REM sleep atonia and we can see whether he loses atonia during his REM sleep and manifests any enactment behavior.    The patient should practice good sleep hygiene measures.      Weight loss might be beneficial in this patient who has a Body mass index is 30.27 kg/m².      Pathophysiology of ALEXANDER described to the patient.  Cardiovascular complications of untreated ALEXANDER also reviewed.      The patient was cautioned about the dangers of drowsy driving.    Patient or patient representative verbalized consent for the use of Ambient Listening during the visit with  Paul Hein MD for chart documentation. 10/15/2024  10:13 EDT     Paul Hein MD  Sleep Medicine  10/15/24  10:11 EDT

## 2024-10-21 ENCOUNTER — OFFICE VISIT (OUTPATIENT)
Dept: NEUROLOGY | Facility: CLINIC | Age: 79
End: 2024-10-21
Payer: MEDICARE

## 2024-10-21 VITALS
DIASTOLIC BLOOD PRESSURE: 74 MMHG | SYSTOLIC BLOOD PRESSURE: 130 MMHG | OXYGEN SATURATION: 98 % | BODY MASS INDEX: 29.95 KG/M2 | HEIGHT: 73 IN | WEIGHT: 226 LBS | HEART RATE: 56 BPM

## 2024-10-21 DIAGNOSIS — G20.C PRIMARY PARKINSONISM: ICD-10-CM

## 2024-10-21 DIAGNOSIS — H81.11 BENIGN PAROXYSMAL POSITIONAL VERTIGO OF RIGHT EAR: ICD-10-CM

## 2024-10-21 DIAGNOSIS — I95.1 ORTHOSTATIC HYPOTENSION: ICD-10-CM

## 2024-10-21 DIAGNOSIS — G43.809 VESTIBULAR MIGRAINE: Primary | ICD-10-CM

## 2024-10-21 PROCEDURE — 3075F SYST BP GE 130 - 139MM HG: CPT | Performed by: PSYCHIATRY & NEUROLOGY

## 2024-10-21 PROCEDURE — 99214 OFFICE O/P EST MOD 30 MIN: CPT | Performed by: PSYCHIATRY & NEUROLOGY

## 2024-10-21 PROCEDURE — 3078F DIAST BP <80 MM HG: CPT | Performed by: PSYCHIATRY & NEUROLOGY

## 2024-10-21 RX ORDER — VENLAFAXINE HYDROCHLORIDE 75 MG/1
75 CAPSULE, EXTENDED RELEASE ORAL DAILY
Qty: 90 CAPSULE | Refills: 3 | Status: SHIPPED | OUTPATIENT
Start: 2024-10-21

## 2024-10-21 RX ORDER — MULTIVITAMIN WITH IRON
250 TABLET ORAL NIGHTLY
Qty: 90 TABLET | Refills: 3 | Status: SHIPPED | OUTPATIENT
Start: 2024-10-21

## 2024-10-21 RX ORDER — RIBOFLAVIN (VITAMIN B2) 400 MG
1 TABLET ORAL NIGHTLY
Qty: 90 TABLET | Refills: 3 | Status: SHIPPED | OUTPATIENT
Start: 2024-10-21

## 2024-10-21 NOTE — PROGRESS NOTES
"DOS: 10/21/2024  NAME: Earl Marc   : 1945  PCP: Avery Velzaquez MD  Chief Complaint   Patient presents with    Parkinson's Disease       Chief complaint: Follow-up for parkinsonism, hallucinations, tremor  Subjective: Since last visit he underwent a DaTscan at [facility] which was negative.  He had an appointment with Dr. Hein with sleep and has an upcoming sleep study.  He is also suspicious about REM behavioral disorder.  He has not had recent issues with his sleep however and denies recurrent hallucinations.  He does endorse persistent and may be progressive tremor.  He notices some impaired coordination of the hands because of this.  He complains of worsening episodic positional vertigo.  At times he has some horizontal binocular diplopia when looking to the right side.  He saw another neurologist through [facility] who suspected vestibular migraine.  They prescribed him Effexor 75 mg which he thinks has been helpful.  He had stopped it recently but noticed a worsening of his symptoms without it and would like to restart it.  He continues on B2 and magnesium also started by them which he thinks has been helpful.  He recently underwent PT for some orthopedic issues but has not tried vestibular therapy before.    MoCA was 22 out of 30 today    Objective:  Vital signs: /74   Pulse 56   Ht 185.4 cm (73\")   Wt 103 kg (226 lb)   SpO2 98%   BMI 29.82 kg/m²    Exam:  vitals reviewed  MS: oriented x3, recent/remote memory intact, normal attention/concentration, language intact, no neglect.  CN: visual acuity grossly normal, PERRL, EOMI with positive head impulse test to the right, negative test of skew, may be some occasional downward nystagmus, slight difficulty with downgaze, subjectively positive [place]-Hallpike to the right side but no rotatory nystagmus identified, no facial droop, no dysarthria  Motor: 5/5 throughout upper and lower extremities, slightly increased tone, mild left greater than " right postural tremor with more notable intention tremor on the left side.  Negative spiral test with the dominant hand  Gait: Normal station, no ataxia    Laboratory results:  Lab Results   Component Value Date    LDL 21 09/12/2024    LDL 19 04/22/2024    LDL 17 02/20/2024            Review of labs: Most recent LDL was 21    Review and interpretation of imaging: I reviewed the report from his DaTscan at [facility].  They document some slight asymmetry in the uptake of the tracer on the right side but no definite abnormality.  Per the report of the scan could be repeated in the future if there is persistent clinical suspicion    Diagnoses:  REM behavioral disorder  Previous visual hallucinations  Episodic positional vertigo  Horizontal binocular diplopia  Parkinsonism    Assessment/comments: Concern remains that he is developing a neurodegenerative disease.  His DaTscan however was reassuring and despite the slight asymmetry I would consider this a negative result.  I do not see a definite progression of his symptoms today except for his more prominent vestibular symptoms.  I am hopeful that this may represent something like BPPV and we will pursue a vestibular referral to see how he does.  Given his apparent REM behavioral disorder there is still concern for an alpha-synucleionopathy or another neurological condition and I think he will need close follow-up long-term    Plan:  1.  Continue Exelon patch for memory symptoms, orthostatic hypotension  2.  Continue B2 and magnesium which I will refill today  3.  Restart Effexor 75 mg for vertigo, possible vestibular migraine  4.  Referral to vestibular PT for possible BPPV on the right    I spent a total of 35 minutes on this clinical encounter including chart/records review, review of laboratory data, personal review of imaging studies, patient counseling, and care coordination.

## 2024-10-24 ENCOUNTER — OFFICE VISIT (OUTPATIENT)
Dept: FAMILY MEDICINE CLINIC | Facility: CLINIC | Age: 79
End: 2024-10-24
Payer: MEDICARE

## 2024-10-24 VITALS
WEIGHT: 226 LBS | SYSTOLIC BLOOD PRESSURE: 148 MMHG | RESPIRATION RATE: 18 BRPM | HEART RATE: 67 BPM | OXYGEN SATURATION: 97 % | DIASTOLIC BLOOD PRESSURE: 76 MMHG | BODY MASS INDEX: 29.95 KG/M2 | HEIGHT: 73 IN

## 2024-10-24 DIAGNOSIS — G20.C ATYPICAL PARKINSONISM: Primary | ICD-10-CM

## 2024-10-24 DIAGNOSIS — Z79.4 TYPE 2 DIABETES MELLITUS WITH HYPERGLYCEMIA, WITH LONG-TERM CURRENT USE OF INSULIN: ICD-10-CM

## 2024-10-24 DIAGNOSIS — E11.65 TYPE 2 DIABETES MELLITUS WITH HYPERGLYCEMIA, WITH LONG-TERM CURRENT USE OF INSULIN: ICD-10-CM

## 2024-10-24 PROCEDURE — 3077F SYST BP >= 140 MM HG: CPT | Performed by: INTERNAL MEDICINE

## 2024-10-24 PROCEDURE — 99213 OFFICE O/P EST LOW 20 MIN: CPT | Performed by: INTERNAL MEDICINE

## 2024-10-24 PROCEDURE — 1126F AMNT PAIN NOTED NONE PRSNT: CPT | Performed by: INTERNAL MEDICINE

## 2024-10-24 PROCEDURE — 3078F DIAST BP <80 MM HG: CPT | Performed by: INTERNAL MEDICINE

## 2024-10-24 RX ORDER — FLUTICASONE PROPIONATE 50 UG/1
2 SPRAY, METERED NASAL DAILY PRN
Qty: 48 G | Refills: 1 | Status: SHIPPED | OUTPATIENT
Start: 2024-10-24

## 2024-10-24 NOTE — PROGRESS NOTES
Subjective chief complaint is to discuss a number of issues  Earl Marc is a 79 y.o. male.     History of Present Illness Earl is here today to discuss a number of issues.  Since his last visit he did see the neurologist.  His TRISTA scan did not show convincing evidence of parkinsonism but the neurologist felt he might have some atypical parkinsonism.  He has been experiencing some dizziness again.  The neurologist is going to have him do some physical therapy for that.  He has been seeing a urologist.  For some reason there urologist put him on testosterone replacement.  He has been having some relationship problems.  We did give him the name of the [facility] connection but he has not utilized that.  He recently saw his diabetologist and his A1c was down to 7.9.  The following portions of the patient's history were reviewed and updated as appropriate: allergies, current medications, and problem list.    Review of Systems   Musculoskeletal:  Positive for gait problem.   Neurological:  Positive for dizziness.     Objective   Physical Exam  Vitals and nursing note reviewed.   Constitutional:       Appearance: Normal appearance.   Cardiovascular:      Rate and Rhythm: Normal rate and regular rhythm.   Pulmonary:      Effort: Pulmonary effort is normal.      Breath sounds: No wheezing, rhonchi or rales.   Musculoskeletal:      Right lower leg: No edema.      Left lower leg: No edema.   Neurological:      Mental Status: He is alert.     Assessment & Plan   Diagnoses and all orders for this visit:    1. Atypical parkinsonism (Primary)    2. Type 2 diabetes mellitus with hyperglycemia, with long-term current use of insulin    Other orders  -     fluticasone (FLONASE) 50 MCG/ACT nasal spray; Administer 2 sprays into the nostril(s) as directed by provider Daily As Needed for Allergies.  Dispense: 48 g; Refill: 1

## 2024-10-28 ENCOUNTER — HOSPITAL ENCOUNTER (OUTPATIENT)
Dept: PHYSICAL THERAPY | Facility: HOSPITAL | Age: 79
Discharge: HOME | End: 2024-10-28
Admitting: PSYCHIATRY & NEUROLOGY
Payer: MEDICARE

## 2024-10-28 DIAGNOSIS — R42 DIZZINESS: ICD-10-CM

## 2024-10-28 DIAGNOSIS — R26.89 IMBALANCE: Primary | ICD-10-CM

## 2024-10-28 DIAGNOSIS — G43.809 VESTIBULAR MIGRAINE: ICD-10-CM

## 2024-10-28 DIAGNOSIS — Z91.81 HISTORY OF FALLING: ICD-10-CM

## 2024-10-28 PROCEDURE — 97112 NEUROMUSCULAR REEDUCATION: CPT

## 2024-10-28 PROCEDURE — 97162 PT EVAL MOD COMPLEX 30 MIN: CPT

## 2024-10-28 NOTE — THERAPY EVALUATION
.Outpatient Physical Therapy Neuro Initial Evaluation  [facility]     Patient Name: Earl Marc  : 1945  MRN: [id number]  Today's Date: 10/28/2024      Visit Date: 10/28/2024    Patient Active Problem List   Diagnosis    Bell's palsy    Persistent insomnia    Osteoarthritis of cervical spine    Diabetic peripheral neuropathy    Dyslipidemia    Steatosis of liver    Fibromyalgia    Gastroesophageal reflux disease without esophagitis    Hypertensive kidney disease with stage 3a chronic kidney disease    Hypogonadism in male    Renal insufficiency    Vitamin D deficiency    Benign non-nodular prostatic hyperplasia with lower urinary tract symptoms    S/P drug eluting coronary stent placement    Coronary artery disease involving native coronary artery of native heart    Malignant neoplasm of skin    Type 2 diabetes mellitus with hyperglycemia, with long-term current use of insulin    Diabetes mellitus    Chronic insomnia    Other specified glaucoma    Vertigo    Epigastric pain    Chest pain with high risk for cardiac etiology    Precordial pain    S/P arthroscopy of shoulder    Peripheral neuropathy    Stage 3b chronic kidney disease    Chest pain    Stroke-like symptoms    Acute back pain    Thoracic 12 compression fracture, closed, initial encounter    Hypertensive urgency    Sudden onset of severe headache    Visual hallucinations    RBD (REM behavioral disorder)    Lucho Bonnet syndrome        Past Medical History:   Diagnosis Date    Abnormal cardiovascular stress test     Acid reflux     Arthritis     Asthma     Bell palsy     In [place]    Cancer     skin     Chronic kidney disease     Colon polyp     Congenital heart disease     COPD (chronic obstructive pulmonary disease)     Old age COPD/2D hand Smoke emphysema    Coronary artery disease     Diabetes mellitus     Diabetic neuropathy associated with type 2 diabetes mellitus     Diabetic peripheral neuropathy 03/10/2016    Difficulty  walking     Dyslipidemia     Erectile dysfunction     Fatty liver disease, nonalcoholic     Fibromyalgia, primary     Gastritis     Glaucoma     both eyes    HL (hearing loss)     Hyperlipidemia     Hypertension     Hypogonadism male     Infectious viral hepatitis Hep A July 1959    Drank water from broken water line and Grandparents house.    Migraines 09/2023    Myocardial infarction 2018    Testosterone deficiency 2001    Low testosterone    Trigger point of left shoulder region 03/02/2023    Type 2 diabetes mellitus         Past Surgical History:   Procedure Laterality Date    CARDIAC CATHETERIZATION N/A 03/25/2016    Procedure: Left Heart Cath;  Surgeon: Maria E Gilbert MD;  [facility];  Service:     CARDIAC CATHETERIZATION N/A 03/25/2016    Procedure: Coronary angiography;  Surgeon: Maria E Gilbert MD;  [facility];  Service:     CARDIAC CATHETERIZATION N/A 03/28/2016    Procedure: Coronary angiography;  Surgeon: Maria E Gilbert MD;  [facility];  Service:     CARDIAC CATHETERIZATION N/A 03/28/2016    Procedure: Stent MOISE coronary;  Surgeon: Maria E Gilbert MD;  [facility];  Service:     CARDIAC CATHETERIZATION N/A 10/18/2018    Procedure: Coronary angiography  no LV gram d/t CKD;  Surgeon: Maria E Gilbert MD;  [facility];  Service: Cardiology    CARDIAC CATHETERIZATION N/A 10/18/2018    Procedure: Left Heart Cath;  Surgeon: Maria E Gilbert MD;  [facility];  Service: Cardiology    CARDIAC CATHETERIZATION N/A 10/18/2018    Procedure: Stent MOISE coronary;  Surgeon: Maria E Gilbert MD;  [facility];  Service: Cardiology    CARDIAC CATHETERIZATION N/A 05/28/2021    Procedure: Coronary angiography;  Surgeon: Maria E Gilbert MD;  Location: [facility] CATH INVASIVE LOCATION;   Service: Cardiovascular;  Laterality: N/A;    CARDIAC CATHETERIZATION N/A 05/28/2021    Procedure: Left Heart Cath;  Surgeon: Maria E Gilbert MD;  Location:  [facility] CATH INVASIVE LOCATION;  Service: Cardiovascular;  Laterality: N/A;    CARDIAC CATHETERIZATION N/A 05/28/2021    Procedure: Left ventriculography;  Surgeon: Maria E Gilbert MD;  Location:  [facility] CATH INVASIVE LOCATION;  Service: Cardiovascular;  Laterality: N/A;    CARDIAC CATHETERIZATION N/A 05/28/2021    Procedure: Stent MOISE coronary;  Surgeon: Maria E Gilbert MD;  Location:  [facility] CATH INVASIVE LOCATION;  Service: Cardiovascular;  Laterality: N/A;    CARDIAC CATHETERIZATION N/A 01/19/2024    Procedure: Left Heart Cath;  Surgeon: Maria E Gilbert MD;  [facility];  Service: Cardiovascular;  Laterality: N/A;    CARDIAC CATHETERIZATION N/A 01/19/2024    Procedure: Coronary angiography;  Surgeon: Maria E Gilbert MD;  [facility];  Service: Cardiovascular;  Laterality: N/A;    CARDIAC CATHETERIZATION N/A 01/19/2024    Procedure: Left ventriculography;  Surgeon: Maria E Gilbert MD;  [facility];  Service: Cardiovascular;  Laterality: N/A;    CAROTID STENT      CORONARY ANGIOPLASTY      CORONARY STENT PLACEMENT  3/28/2016    EYE SURGERY  9/2017    NECK EXPLORATION N/A     OH RT/LT HEART CATHETERS N/A 10/18/2018    Procedure: Percutaneous Coronary Intervention;  Surgeon: Maria E Gilbert MD;  Location: [facility] CATH INVASIVE LOCATION;  Service: Cardiology         Visit Dx:     ICD-10-CM ICD-9-CM   1. Imbalance  R26.89 781.2   2. Dizziness  R42 780.4   3. History of falling  Z91.81 V15.88   4. Vestibular migraine  G43.809 346.80        Patient History       Row Name 10/28/24 1014             History    Chief Complaint Balance Problems;Dizziness;Difficulty with daily activities;Difficulty Walking;Falls/history of falls  -JUANI      Date  Current Problem(s) Began 10/21/24  Referral date  -JUANI      Brief Description of Current Complaint Patient is referred to physical therapy with history of vestibular migraines and taking medicine which seems to be helping.  He also was referred to PT with BPPV right ear.  Patient complains of dizziness but describes it more as  imbalance and reports history of falls.  Patient has had workup by ENT and audiology in 2023 which were negative for any type of vestibular weakness or positional vertigo.  -JUANI      Previous treatment for THIS PROBLEM --  Of note, pt has been seen by PT in July/August 2024 to work on strength, pain and balance.  -JUANI      Patient/Caregiver Goals Relief from dizziness;Improve mobility  -JUANI      Hand Dominance right-handed  -JUANI      Occupation/sports/leisure activities Works out at gym  -JUANI         Pain     Pain Location Back  -JUANI      Pain at Present 7  -JUANI         Fall Risk Assessment    Any falls in the past year: Yes  -JUANI      Number of falls reported in the last 12 months 6-7  -JUANI      Factors that contributed to the fall: Lost balance;Other (comment)  fell out of bed ~ June 2024 with T12 compression fracture.  -JUANI         Daily Activities    Primary Language English  -JUANI      Are you able to read Yes  -JUANI      Are you able to write Yes  -JUANI      Patient is concerned about/has problems with Climbing Stairs;Difficulty with self care (i.e. bathing, dressing, toileting:;Performing home management (household chores, shopping, care of dependents);Walking  -JUANI      Pt Participated in POC and Goals Yes  -JUANI         Safety    Are you being hurt, hit, or frightened by anyone at home or in your life? No  -JUANI      Are you being neglected by a caregiver No  -JUANI                User Key  (r) = Recorded By, (t) = Taken By, (c) = Cosigned By      Initials Name Provider Type    Lakeisha Taylor PT Physical Therapist                         PT Neuro       Row Name 10/28/24 1014             Subjective     "Subjective Comments Pt describes his dizziness as more of off balance vs spinning while walking. He reports decline in activity over past few weeks, not going to gym to work out as much. Pt used walking stick during session today and stated he \"felt better\" balance.  -JUANI         Precautions and Contraindications    Precautions/Limitations fall precautions;insensate limb  -JUANI         Subjective Pain    Able to rate subjective pain? yes  -JUANI      Pre-Treatment Pain Level 7  -JUANI      Post-Treatment Pain Level 7  -JUANI      Subjective Pain Comment chronic low back pain  -JUANI         Home Living    Current Living Arrangements home  lives with wife  -JUANI      Home Equipment Cane;Rolling walker  walking stick  -JUANI         Vision-Basic Assessment    Current Vision Wears glasses for distance only  -JUANI      Visual History Cataracts;Glaucoma;Corrective eye surgery  -JUANI      Patient Visual Report Diplopia  per pt looking to R \"double vision\" for several years  -JUANI         Cognition    Overall Cognitive Status WFL  -JUANI      Memory Appears intact  -JUANI      Orientation Level Oriented X4  -JUANI      Safety Judgment Decreased awareness of need for safety  not using an AD  -JUANI      Deficits Fully aware of deficits  -JUANI         Sensation    Additional Comments pt reports he has peripheral neuropathy and decreased sensation plantar surface of feet.  -JUANI         Posture/Observations    Alignment Options Forward head;Rounded shoulders  -JUANI      Forward Head Moderate  -JUANI      Rounded Shoulders Moderate  -JUANI      Posture/Observations Comments Pt arrived ambulating with no AD, guarded and slow gait, but independent. Drove himself today.  -JUANI         General ROM    GENERAL ROM COMMENTS overall WFLs all E's  -JUANI         MMT (Manual Muscle Testing)    General MMT Comments overall 4+/5 except L hip 4/5 due to c/o hip pain during MMT  -JUANI         Bed Mobility    Bed Mobility bed mobility (all) activities  -JUANI      All Activities, Anderson (Bed " "Mobility) modified independence  -JUANI      Comment, (Bed Mobility) no c/o spinning  -JUANI         Transfers    Sit-Stand Max (Transfers) independent  -JUANI      Stand-Sit Max (Transfers) independent  -JUANI      Comment, (Transfers) to stand and to sit without UE assist  -JUANI         Gait/Stairs (Locomotion)    Max Level (Gait) verbal cues;supervision;modified independence  -JUANI      Assistive Device (Gait) other (see comments);cane, straight  no AD, walking stick  -JUANI      Distance in Feet (Gait) --  70' x 2 no device; cane and walking stick each 80' -- pt reported feeling more secure using device  -JUANI      Deviations/Abnormal Patterns (Gait) bilateral deviations;base of support, wide;stride length decreased;weight shifting decreased;festinating/shuffling  -JUANI      Bilateral Gait Deviations heel strike decreased;forward flexed posture;decreased arm swing  -JUANI      Gait Assessment/Intervention increased step length and heel strike when using walking stick/cane  -JUANI      Max Level (Stairs) supervision  -JUANI      Handrail Location (Stairs) right side (ascending);right side (descending)  -JUANI      Number of Steps (Stairs) 4  -JUANI      Ascending Technique (Stairs) step-over-step  -JUANI      Descending Technique (Stairs) step-over-step  leaned backwards and 1x stated he \"felt like he was being pushed backward\"; reports falls down stairs in past  -JUANI      Stairs, Impairments impaired balance;sensation decreased  -JUANI      Comment, (Gait/Stairs) see FGA  -JUANI         Balance Skills Training    Balance Comments see HESS  -JUANI                User Key  (r) = Recorded By, (t) = Taken By, (c) = Cosigned By      Initials Name Provider Type    JUANI Lakeisha Hull, PT Physical Therapist                       Cindy Smiley       Row Name 10/28/24 1014             Symptom Behavior    Type of Dizziness Imbalance;Unsteady with head/body turns  -JUANI      Frequency of Dizziness Several Times a Day  -JUANI      Duration " of Dizziness --  while up walking  -JUANI      Aggravating Factors Looking up to the ceiling;Turning body quickly;Walking in a crowd;Forward bending  see Dizziness Handicap Inventory  -JUANI      Relieving Factors Rest;Slow movements  -JUANI         Occulomotor Exam Fixation Present    Spontaneous Nystagmus Absent  -JUANI      Head Shaking Horizontal Absent  c/o feeling like wall moving back and forth once stopped moving head  -JUANI      Head Shaking Vertical Absent  c/o feeling like wall moving back and forth once stopped moving head but not as strong symptoms as horizontal head shake  -JUANI      Smooth Pursuit Normal  -JUANI      Saccades Intact  -JUANI         Vestibulo-Occular Reflex (VOR)    VOR to Slow Head Movement Normal  -JUANI      VOR to Fast Head Movement/Head Thrust Test Normal  -JUANI         Positional Testing    Positional Testing With infrared goggles  -JUANI      Vertebrobasilar Artery Screen - Right Negative  -JUANI      Vertebrobasilar Artery Screen - Left Negative  -JUANI      Luna-Hallpike Right No nystagmus  no symptoms  -JUANI      [place]-Hallpike Left No nystagmus  no symptoms  -JUANI      Horizontal Roll Test Right No nystagmus  no symptoms  -JUANI      Horizontal Roll Test Left No nystagmus  no symptoms  -JUANI                User Key  (r) = Recorded By, (t) = Taken By, (c) = Cosigned By      Initials Name Provider Type    Lakeisha Taylor, PT Physical Therapist                      Therapy Education  Education Details: Discussed PT POC. Trial gait with walking stick and cane and recommended pt start using one of these devices more to prevent falls.  Given: Fall prevention and home safety, Mobility training  Program: New  How Provided: Verbal, Demonstration  Provided to: Patient  Level of Understanding: Teach back education performed, Verbalized, Demonstrated     PT OP Goals       Row Name 10/28/24 1014          PT Short Term Goals    STG Date to Achieve 11/25/24  -JUANI     STG 1 Pt. will be independent with initial HEP to improve balance.  " -JUANI     STG 2 Pt will be able to ambulate independently with head turns and head nods 30' x 2 each without LOB.  -JUANI        Long Term Goals    LTG Date to Achieve 12/09/24  -JUANI     LTG 1 Pt. will be independent with advanced HEP to improve long term management of condition and independence.  -JUANI     LTG 2 Pt will score < or = 18/20 on the FGA to indicate improved balance and decreased risk for falls.  -JUANI     LTG 3 Pt will ambulate with AD at home and community and report no falls or LOB.  -JUANI     LTG 4 Pt will score < or = to 25 on the Dizziness Handicap Inventory to indicate reduction feelings of off balance and increased tolerance to movement.  -JUANI        Time Calculation    PT Goal Re-Cert Due Date 11/25/24  -JUANI               User Key  (r) = Recorded By, (t) = Taken By, (c) = Cosigned By      Initials Name Provider Type    Lakeisha Taylor, PT Physical Therapist                     PT Assessment/Plan       Row Name 10/28/24 3706          PT Assessment    Functional Limitations Decreased safety during functional activities;Impaired gait;Limitation in home management;Limitations in community activities;Limitations in functional capacity and performance;Performance in self-care ADL;Performance in leisure activities  -     Impairments Balance;Endurance;Gait;Muscle strength;Sensation  -     Assessment Comments Pt is a 80 y/o male who is referred to PT with diagnosis of vestibular migraines that seem to be being managed by medication per pt and also BPPV. Pt's past medical history includes diabetes, COPD, hypertension, asthma, CAD, cervical spine surgery in past, recent T12 compression fracture from fall earlier this year, per Dr. Mcclellan notes atypical parkinsonism.  Patient has complained of having \"dizziness\" for couple years and has had workup with ENT which indicated no weakness of ears or positional vertigo.  Score on the dizziness handicap inventory indicating mild perception.  Today's vestibular " "evaluation was also consistent for negative positional vertigo with Luna-Hallpike negative bilaterally as well as roll test negative bilaterally.  Patient did not have issues with VOR.  He describes his symptoms of\" dizziness\" as more of imbalance while walking.  Patient has history of falls.  Score on the Mcintosh balance test was 47 and 56 but the FGA was 15 out of 30 indicating high risk for falls.  Today, PT had patient using a cane and a walking stick which he both at home.  He liked the walking stick and reported it did make him feel more steady/stable using the device.  PT encouraged patient to use this device which improved patient's gait-longer strides and heel strike.  Patient has recently finished working with physical therapy approximately 2 months ago following his fall which included strengthening and balance.  Patient will benefit from PT 1 time per week to address assistive devices, safety with gait, gait quality and HEP.  -JUANI     Please refer to paper survey for additional self-reported information Yes  -JUANI     Rehab Potential Good  -JUANI     Patient/caregiver participated in establishment of treatment plan and goals Yes  -JUANI     Patient would benefit from skilled therapy intervention Yes  -JUANI        PT Plan    PT Frequency 1x/week  -JUANI     Predicted Duration of Therapy Intervention (PT) 4-6 weeks  -JUANI     Planned CPT's? PT EVAL MOD COMPLELITY: 06707;PT THER PROC EA 15 MIN: 15658;PT THER ACT EA 15 MIN: 88017;PT NEUROMUSC RE-EDUCATION EA 15 MIN: 45158;PT GAIT TRAINING EA 15 MIN: 34758  -JUANI     Physical Therapy Interventions (Optional Details) balance training;gait training;home exercise program;patient/family education  -JUANI               User Key  (r) = Recorded By, (t) = Taken By, (c) = Cosigned By      Initials Name Provider Type    Lakeisha Taylor PT Physical Therapist                        OP Exercises       Row Name 10/28/24 1402 10/28/24 1019          Subjective    Subjective Comments -- Pt " "describes his dizziness as more of off balance vs spinning while walking. He reports decline in activity over past few weeks, not going to gym to work out as much. Pt used walking stick during session today and stated he \"felt better\" balance.  -JUANI        Subjective Pain    Able to rate subjective pain? -- yes  -JAUNI     Pre-Treatment Pain Level -- 7  -JUANI     Post-Treatment Pain Level -- 7  -JUANI     Subjective Pain Comment -- chronic low back pain  -JUANI        Total Minutes    93448 -  PT Neuromuscular Reeducation Minutes 15  -JUANI --               User Key  (r) = Recorded By, (t) = Taken By, (c) = Cosigned By      Initials Name Provider Type    Lakeisha Taylor, PT Physical Therapist                                Outcome Measure Options: Mcintosh Balance, FGA (Functional Gait Assessment), Dizziness Handicap Inventory  Mcintosh Balance Scale  Sitting to Standing: able to stand without using hands and stabilize independently  Standing Unsupported: able to stand safely for 2 minutes  Sitting with Back Unsupported but Feet Supported on Floor or on Stool: able to sit safely and securely for 2 minutes  Standing to Sitting: sits safely with minimal use of hands  Transfers: able to transfer safely with minor use of hands  Standing Unsupported with Eyes Closed: able to stand 10 seconds safely  Standing Unsupported with Feet Together: able to place feet together independently and stand 1 minute safely  Reaching Forward with Outstretched Arm While Standing: can reach forward confidently 25 cm (10 inches)   Object From the Floor From a Standing Position: able to  object safely and easily  Turning to Look Behind Over Left and Right Shoulders While Standing: looks behind from both sides and weight shifts well  Turn 360 Degrees: able to turn 360 degrees safely but slowly  Place Alternate Foot on Step or Stool While Standing Unsupported: able to complete 4 steps without aid with supervision  Standing Unsupported with One Foot " in Front: able to take small step independently and hold 30 seconds  Standing on One Leg: tries to lift leg unable to hold 3 seconds but remains standing independently  Mcintosh Total Score: 47  Dizziness Handicap Inventory  Dizziness Handicap Inventory Comments: indicates mild handicap  Functional Gait Assessment (FGA)  Gait Level Surface: Mild Impairment  Change in Gait Speed: Mild Impairment  Gait with Horizontal Head Turns: Moderate Impairment  Gait with Vertical Head Turns: Mild Impairment  Gait and Pivot Turn: Mild Impairment  Step Over Obstacle: Moderate Impairment  Gait with Narrow Base of Support: Severe Impairment  Gait with Eyes Closed: Moderate Impairment  Ambulating Backwards: Mild Impairment  Steps: Mild Impairment  FGA Total Score: 15  FGA Comments: no device    Time Calculation:   Start Time: 1014  Stop Time: 1114  Time Calculation (min): 60 min  Total Timed Code Minutes- PT: 60 minute(s)  Timed Charges  31484 -  PT Neuromuscular Reeducation Minutes: 15  Untimed Charges  PT Eval/Re-eval Minutes: 45  Total Minutes  Timed Charges Total Minutes: 15  Untimed Charges Total Minutes: 45   Total Minutes: 60   Therapy Charges for Today       Code Description Service Date Service Provider Modifiers Qty    28031967165 HC PT NEUROMUSC RE EDUCATION EA 15 MIN 10/28/2024 Lakeisha Hull, PT KX, GP 1    06146295334 HC PT EVAL MOD COMPLEXITY 4 10/28/2024 Lakeisha Hull, PT KX, GP 1            PT G-Codes  Outcome Measure Options: Mcintosh Balance, FGA (Functional Gait Assessment), Dizziness Handicap Inventory  Mcintosh Total Score: 47  FGA Total Score: 15         Lakeisha Hull PT  10/28/2024

## 2024-11-04 ENCOUNTER — HOSPITAL ENCOUNTER (OUTPATIENT)
Dept: PHYSICAL THERAPY | Facility: HOSPITAL | Age: 79
Discharge: HOME | End: 2024-11-04
Admitting: PSYCHIATRY & NEUROLOGY
Payer: MEDICARE

## 2024-11-04 DIAGNOSIS — R42 DIZZINESS: ICD-10-CM

## 2024-11-04 DIAGNOSIS — Z91.81 HISTORY OF FALLING: ICD-10-CM

## 2024-11-04 DIAGNOSIS — R26.89 IMBALANCE: Primary | ICD-10-CM

## 2024-11-04 PROCEDURE — 97112 NEUROMUSCULAR REEDUCATION: CPT

## 2024-11-04 PROCEDURE — 97110 THERAPEUTIC EXERCISES: CPT

## 2024-11-04 PROCEDURE — 97530 THERAPEUTIC ACTIVITIES: CPT

## 2024-11-04 NOTE — THERAPY TREATMENT NOTE
Outpatient Physical Therapy Neuro Treatment Note  [facility]     Patient Name: Earl Marc  : 1945  MRN: [id number]  Today's Date: 2024      Visit Date: 2024    Visit Dx:    ICD-10-CM ICD-9-CM   1. Imbalance  R26.89 781.2   2. Dizziness  R42 780.4   3. History of falling  Z91.81 V15.88       Patient Active Problem List   Diagnosis    Bell's palsy    Persistent insomnia    Osteoarthritis of cervical spine    Diabetic peripheral neuropathy    Dyslipidemia    Steatosis of liver    Fibromyalgia    Gastroesophageal reflux disease without esophagitis    Hypertensive kidney disease with stage 3a chronic kidney disease    Hypogonadism in male    Renal insufficiency    Vitamin D deficiency    Benign non-nodular prostatic hyperplasia with lower urinary tract symptoms    S/P drug eluting coronary stent placement    Coronary artery disease involving native coronary artery of native heart    Malignant neoplasm of skin    Type 2 diabetes mellitus with hyperglycemia, with long-term current use of insulin    Diabetes mellitus    Chronic insomnia    Other specified glaucoma    Vertigo    Epigastric pain    Chest pain with high risk for cardiac etiology    Precordial pain    S/P arthroscopy of shoulder    Peripheral neuropathy    Stage 3b chronic kidney disease    Chest pain    Stroke-like symptoms    Acute back pain    Thoracic 12 compression fracture, closed, initial encounter    Hypertensive urgency    Sudden onset of severe headache    Visual hallucinations    RBD (REM behavioral disorder)    Lucho Bonnet syndrome            PT Neuro       Row Name 24 0230             Subjective    Subjective Comments I feel that I have  a hand behind me pushing me forward  -LB         Precautions and Contraindications    Precautions/Limitations fall precautions  -LB         Subjective Pain    Able to rate subjective pain? yes  -LB      Pre-Treatment Pain Level 7  -LB      Post-Treatment Pain Level 7  -LB          Cognition    Overall Cognitive Status WFL  -LB         Posture/Observations    Posture/Observations Comments Pt walked up to unit with STC.  -LB         Transfers    Sit-Stand Floyd (Transfers) independent;modified independence  -LB      Stand-Sit Floyd (Transfers) independent;modified independence  -LB      Transfers, Sit-Stand-Sit, Assist Device quad tip cane;straight cane  -LB      Comment, (Transfers) pt performed several sit to stands with UE support on the chair  -LB         Gait/Stairs (Locomotion)    Floyd Level (Gait) supervision  -LB      Assistive Device (Gait) cane, quad tip;cane, straight  -LB      Distance in Feet (Gait) 100  50; 60  -LB      Deviations/Abnormal Patterns (Gait) bilateral deviations;base of support, wide;stride length decreased;weight shifting decreased  -LB      Bilateral Gait Deviations heel strike decreased;forward flexed posture;decreased arm swing  -LB      Gait Assessment/Intervention Utilized both the patient's STC and the clinic's quad tip cane.  While using either cane, the patient displayed longer step length and heehstrike but heelstrike was decreased sligtly toward end of sesion when talking and turning his head.  -LB         Balance Skills Training    Static Standing Balance Support Right upper extremity supported;Left upper extremity supported;No upper extremity supported  -LB      Standing-Balance Activities Foam square;Feet together;Standing on 1/2 roll  -LB      Standing Balance # of Minutes While standing onfoam square, performed trunk rotation and BUE flexion to shoulder level.  -LB      Gait Balance-Level of Assistance Close supervision  -LB      Gait Balance Support No upper extremity supported;Right upper extremity supported  advanced from UE to no UE support  -LB      Gait Balance Activities side-stepping;backwards  monster steps forward; all with RTB around thighs  -LB                User Key  (r) = Recorded By, (t) = Taken By, (c) =  Cosigned By      Initials Name Provider Type    Yessica Moreno PT Physical Therapist                             PT Assessment/Plan       Row Name 11/04/24 1000          PT Assessment    Assessment Comments The patient did bring in his cane to therapy session today and reported that he does feel steadier when his cane.  Cane height was adjusted and patient was agreeable and voiced improved ease of advancing his cane set slightly shorter.  Introduced quad tip cane and had patient walk to compare STC and QTC.  Patient felt that the quad tip cane gave him slightly more balance and also discussed the benefit of the cane being able to stand on his own while performing sit to stand transitions.  While using either cane patient did display increased stride length and heel strike.  Patient was slightly off balance when performing alternating toe taps while standing on foam square and decreased coordination with either raising either foot high enough to tap are returning back onto the square.  -LB               User Key  (r) = Recorded By, (t) = Taken By, (c) = Cosigned By      Initials Name Provider Type    Yessica Moreno PT Physical Therapist                        OP Exercises       Row Name 11/04/24 0947 11/04/24 0930          Subjective    Subjective Comments -- I feel that I have  a hand behind me pushing me forward  -LB        Subjective Pain    Able to rate subjective pain? -- yes  -LB     Pre-Treatment Pain Level -- 7  -LB     Post-Treatment Pain Level -- 7  -LB        Total Minutes    50870 - PT Therapeutic Exercise Minutes 20  -LB --     85796 -  PT Neuromuscular Reeducation Minutes 13  -LB --     29583 - PT Therapeutic Activity Minutes 10  -LB --        Exercise 1    Exercise Name 1 seated hamstring curls  -LB --     Reps 1 10  -LB --     Additional Comments RTB  -LB --        Exercise 2    Exercise Name 2 alternating toe taps standing on foam square  -LB --     Reps 2 10  -LB --     Additional Comments 2  to 1 UE support with CGA to Min  -LB --               User Key  (r) = Recorded By, (t) = Taken By, (c) = Cosigned By      Initials Name Provider Type    Yessica Moreno PT Physical Therapist                                    Therapy Education  Education Details: Benefit of QTC;  adjustment of cane height  Given: Mobility training  Program: New  How Provided: Verbal, Demonstration  Provided to: Patient  Level of Understanding: Verbalized, Teach back education performed              Time Calculation:   Start Time: 0847  Stop Time: 0930  Time Calculation (min): 43 min  Timed Charges  81876 - PT Therapeutic Exercise Minutes: 20  09466 -  PT Neuromuscular Reeducation Minutes: 13  75103 - PT Therapeutic Activity Minutes: 10  Total Minutes  Timed Charges Total Minutes: 43   Total Minutes: 43   Therapy Charges for Today       Code Description Service Date Service Provider Modifiers Qty    [id number]  PT NEUROMUSC RE EDUCATION EA 15 MIN 11/4/2024 Yessica Bateman, PT KX, GP 1    [id number]  PT THER PROC EA 15 MIN 11/4/2024 Yessica Bateman, PT KX, GP 1    [id number]  PT THERAPEUTIC ACT EA 15 MIN 11/4/2024 Yessica Bateman, PT KX, GP 1                      Yessica Bateman PT  11/4/2024

## 2024-11-06 ENCOUNTER — FLU SHOT (OUTPATIENT)
Dept: FAMILY MEDICINE CLINIC | Facility: CLINIC | Age: 79
End: 2024-11-06
Payer: MEDICARE

## 2024-11-06 DIAGNOSIS — Z23 FLU VACCINE NEED: Primary | ICD-10-CM

## 2024-11-06 PROCEDURE — 90662 IIV NO PRSV INCREASED AG IM: CPT | Performed by: INTERNAL MEDICINE

## 2024-11-06 PROCEDURE — G0008 ADMIN INFLUENZA VIRUS VAC: HCPCS | Performed by: INTERNAL MEDICINE

## 2024-11-06 NOTE — PROGRESS NOTES
Injection  Injection performed in right deltoid by Martin Berman MA. Patient tolerated the procedure well without complications.  11/06/24   Martin Berman MA

## 2024-11-11 ENCOUNTER — HOSPITAL ENCOUNTER (OUTPATIENT)
Dept: PHYSICAL THERAPY | Facility: HOSPITAL | Age: 79
Discharge: HOME | End: 2024-11-11
Admitting: PSYCHIATRY & NEUROLOGY
Payer: MEDICARE

## 2024-11-11 DIAGNOSIS — Z91.81 HISTORY OF FALLING: ICD-10-CM

## 2024-11-11 DIAGNOSIS — R42 DIZZINESS: ICD-10-CM

## 2024-11-11 DIAGNOSIS — R26.89 IMBALANCE: Primary | ICD-10-CM

## 2024-11-11 DIAGNOSIS — G43.809 VESTIBULAR MIGRAINE: ICD-10-CM

## 2024-11-11 PROCEDURE — 97530 THERAPEUTIC ACTIVITIES: CPT

## 2024-11-11 PROCEDURE — 97112 NEUROMUSCULAR REEDUCATION: CPT

## 2024-11-11 NOTE — THERAPY TREATMENT NOTE
"  Outpatient Physical Therapy Neuro Treatment Note  [facility]     Patient Name: Earl Marc  : 1945  MRN: [id number]  Today's Date: 2024      Visit Date: 2024    Visit Dx:    ICD-10-CM ICD-9-CM   1. Imbalance  R26.89 781.2   2. Dizziness  R42 780.4   3. History of falling  Z91.81 V15.88   4. Vestibular migraine  G43.809 346.80       Patient Active Problem List   Diagnosis    Bell's palsy    Persistent insomnia    Osteoarthritis of cervical spine    Diabetic peripheral neuropathy    Dyslipidemia    Steatosis of liver    Fibromyalgia    Gastroesophageal reflux disease without esophagitis    Hypertensive kidney disease with stage 3a chronic kidney disease    Hypogonadism in male    Renal insufficiency    Vitamin D deficiency    Benign non-nodular prostatic hyperplasia with lower urinary tract symptoms    S/P drug eluting coronary stent placement    Coronary artery disease involving native coronary artery of native heart    Malignant neoplasm of skin    Type 2 diabetes mellitus with hyperglycemia, with long-term current use of insulin    Diabetes mellitus    Chronic insomnia    Other specified glaucoma    Vertigo    Epigastric pain    Chest pain with high risk for cardiac etiology    Precordial pain    S/P arthroscopy of shoulder    Peripheral neuropathy    Stage 3b chronic kidney disease    Chest pain    Stroke-like symptoms    Acute back pain    Thoracic 12 compression fracture, closed, initial encounter    Hypertensive urgency    Sudden onset of severe headache    Visual hallucinations    RBD (REM behavioral disorder)    Lucho Bonnet syndrome            PT Neuro       Row Name 24 1013             Subjective    Subjective Comments Pt bought quad tip base for his cane and really likes it.  Using cane turns out in the community and somewhat in his home.\"  I am just going to have to realize him but had to use a cane for the rest of my life.\"  Patient states that the cane really makes " him feel better balance.  -         Precautions and Contraindications    Precautions/Limitations fall precautions  -         Subjective Pain    Able to rate subjective pain? yes  -JUANI      Pre-Treatment Pain Level 7  -JUANI      Post-Treatment Pain Level 7  -      Subjective Pain Comment &/10 for hips, 9/10 for low back and 5/10 L knee.  -         Cognition    Overall Cognitive Status WFL  -         Posture/Observations    Posture/Observations Comments Pt walked up to unit with quad tip cane.  -JUANI         Transfers    Sit-Stand Home (Transfers) independent;modified independence  -JUANI      Stand-Sit Home (Transfers) independent;modified independence  -JUANI      Transfers, Sit-Stand-Sit, Assist Device quad tip cane  -         Gait/Stairs (Locomotion)    Home Level (Gait) modified independence;verbal cues  -      Assistive Device (Gait) cane, quad tip  -      Distance in Feet (Gait) --  150', 100'  -      Deviations/Abnormal Patterns (Gait) bilateral deviations;base of support, wide;weight shifting decreased  -      Bilateral Gait Deviations heel strike decreased;forward flexed posture;decreased arm swing  -      Gait Assessment/Intervention Cues to place cane a little further out to right side so as patient would not trip over; PT lowered cane 1 inch today as too tall  -         Balance Skills Training    Standing-Level of Assistance Contact guard  -      Static Standing Balance Support Right upper extremity supported;Left upper extremity supported;No upper extremity supported  -      Standing-Balance Activities Foam square  Foam square: Head turns, head nods, alternate raising heels with occasional leaning towards bar for support  -      Gait Balance-Level of Assistance Close supervision  -      Gait Balance Support No upper extremity supported;Right upper extremity supported;assistive device  Cane with head turns and head nods  -      Gait Balance Activities  "backwards;side-stepping;scanning environment R/L  -JUANI                User Key  (r) = Recorded By, (t) = Taken By, (c) = Cosigned By      Initials Name Provider Type    Lakeisha Taylor PT Physical Therapist                             PT Assessment/Plan       Row Name 11/11/24 1434          PT Assessment    Assessment Comments Patient arrived today with quad tip based on his straight cane that he purchased online.  Patient reports quad tip cane since his gait and decreases that feeling being pushed forward sensation.  Patient had no loss of balance while using cane today.  Patient did have one loss of balance while alternating touching colored disc on floor with mild to recover balance.  After this patient complained of a\" spinning\" sensation and requested to sit.  Patient reported his symptoms dissipated after he sat and rested.  PT feels that possibly the spinning sensation is more of a off-balance issue as patient has been cleared from positional vertigo.  Patient did not have any symptoms today while performing target vestibular exercise.  -JUANI               User Key  (r) = Recorded By, (t) = Taken By, (c) = Cosigned By      Initials Name Provider Type    Lakeisha Taylor, PT Physical Therapist                        OP Exercises       Row Name 11/11/24 1437 11/11/24 1013          Subjective    Subjective Comments -- Pt bought quad tip base for his cane and really likes it.  Using cane turns out in the community and somewhat in his home.\"  I am just going to have to realize him but had to use a cane for the rest of my life.\"  Patient states that the cane really makes him feel better balance.  -JUANI        Subjective Pain    Able to rate subjective pain? -- yes  -JUANI     Pre-Treatment Pain Level -- 7  -JUANI     Post-Treatment Pain Level -- 7  -JUANI     Subjective Pain Comment -- &/10 for hips, 9/10 for low back and 5/10 L knee.  -JUANI        Total Minutes    88671 -  PT Neuromuscular Reeducation Minutes 19  -JUANI -- " "    59907 - PT Therapeutic Activity Minutes 28  -JUANI --        Exercise 3    Exercise Name 3 -- STS from mat  -     Cueing 3 -- Verbal;Tactile  -     Sets 3 -- 2  -JUANI     Reps 3 -- 5  -JUANI        Exercise 4    Exercise Name 4 -- Standing: Feet touching colored disc on floor with a strong loss of balance 1 time, mod to recover.  1 time patient complained of \"spinning sensation\" but no change in head position, when this occurred. He requested sit down rest break at this point.  -JUANI        Exercise 5    Exercise Name 5 -- Forward step ups to 6 inch step, 1 hand on rail  -JUANI     Cueing 5 -- Verbal;Tactile;Demo  -JUANI     Reps 5 -- 10 each  -JUANI               User Key  (r) = Recorded By, (t) = Taken By, (c) = Cosigned By      Initials Name Provider Type    Lakeisha Taylor, PT Physical Therapist                                    Therapy Education  Education Details: Provided patient with HEP for forward step ups to replace supine bridges (previously instructed HEP from PT this summer) which causes low back pain.  Gait with quad tip cane.  Explained to patient to focus on a stationary object 5 to 15 feet ahead of him which might help steady his gait.  Given: HEP, Fall prevention and home safety, Mobility training  Program: Progressed  How Provided: Verbal, Demonstration, Written  Provided to: Patient  Level of Understanding: Verbalized, Teach back education performed, Demonstrated              Time Calculation:   Start Time: 1013  Stop Time: 1100  Time Calculation (min): 47 min  Total Timed Code Minutes- PT: 47 minute(s)  Timed Charges  42781 -  PT Neuromuscular Reeducation Minutes: 19  31955 - PT Therapeutic Activity Minutes: 28  Total Minutes  Timed Charges Total Minutes: 47   Total Minutes: 47   Therapy Charges for Today       Code Description Service Date Service Provider Modifiers Qty    [id number]  PT NEUROMUSC RE EDUCATION EA 15 MIN 11/11/2024 Lakeisha Hull, PT KX, GP 1    [id number]  PT THERAPEUTIC " ACT EA 15 MIN 11/11/2024 Lakeisha Hull, PT KX, GP 2                      Lakeisha MILLS. Kurtis, PT  11/11/2024

## 2024-11-18 ENCOUNTER — HOSPITAL ENCOUNTER (OUTPATIENT)
Dept: PHYSICAL THERAPY | Facility: HOSPITAL | Age: 79
Discharge: HOME | End: 2024-11-18
Admitting: PSYCHIATRY & NEUROLOGY
Payer: MEDICARE

## 2024-11-18 DIAGNOSIS — Z91.81 HISTORY OF FALLING: ICD-10-CM

## 2024-11-18 DIAGNOSIS — R26.89 IMBALANCE: Primary | ICD-10-CM

## 2024-11-18 DIAGNOSIS — R42 DIZZINESS: ICD-10-CM

## 2024-11-18 PROCEDURE — 97112 NEUROMUSCULAR REEDUCATION: CPT

## 2024-11-18 PROCEDURE — 97530 THERAPEUTIC ACTIVITIES: CPT

## 2024-11-18 NOTE — THERAPY TREATMENT NOTE
"  Outpatient Physical Therapy Neuro Treatment Note  [facility]     Patient Name: aErl Marc  : 1945  MRN: [id number]  Today's Date: 2024      Visit Date: 2024    Visit Dx:    ICD-10-CM ICD-9-CM   1. Imbalance  R26.89 781.2   2. Dizziness  R42 780.4   3. History of falling  Z91.81 V15.88       Patient Active Problem List   Diagnosis    Bell's palsy    Persistent insomnia    Osteoarthritis of cervical spine    Diabetic peripheral neuropathy    Dyslipidemia    Steatosis of liver    Fibromyalgia    Gastroesophageal reflux disease without esophagitis    Hypertensive kidney disease with stage 3a chronic kidney disease    Hypogonadism in male    Renal insufficiency    Vitamin D deficiency    Benign non-nodular prostatic hyperplasia with lower urinary tract symptoms    S/P drug eluting coronary stent placement    Coronary artery disease involving native coronary artery of native heart    Malignant neoplasm of skin    Type 2 diabetes mellitus with hyperglycemia, with long-term current use of insulin    Diabetes mellitus    Chronic insomnia    Other specified glaucoma    Vertigo    Epigastric pain    Chest pain with high risk for cardiac etiology    Precordial pain    S/P arthroscopy of shoulder    Peripheral neuropathy    Stage 3b chronic kidney disease    Chest pain    Stroke-like symptoms    Acute back pain    Thoracic 12 compression fracture, closed, initial encounter    Hypertensive urgency    Sudden onset of severe headache    Visual hallucinations    RBD (REM behavioral disorder)    Lucho Bonnet syndrome            PT Neuro       Row Name 24 0932             Subjective    Subjective Comments reports no falls; using QTC at all times in the community but not at home due to \"small house\". Pt reports he has been diagnosed with Lewy Body Dementia. He described his \"dizziness\" as an outside force pushing him over. Works out at [facility] -- usually UE machines, treadmill and track  -JUANI         " Precautions and Contraindications    Precautions/Limitations fall precautions  -JUANI         Subjective Pain    Able to rate subjective pain? yes  -JUANI      Pre-Treatment Pain Level 3  -JUANI      Post-Treatment Pain Level 3  -JUANI      Subjective Pain Comment LBP  -JUANI         Cognition    Overall Cognitive Status WFL  -JUANI         Posture/Observations    Posture/Observations Comments Pt walked up to unit with quad tip cane.  -JUANI         Transfers    Sit-Stand [place] (Transfers) independent;modified independence  -JUANI      Stand-Sit [place] (Transfers) independent;modified independence  -JUANI      Transfers, Sit-Stand-Sit, Assist Device quad tip cane  -JUANI         Gait/Stairs (Locomotion)    [place] Level (Gait) modified independence;verbal cues  -JUANI      Assistive Device (Gait) cane, quad tip  -JUANI      Distance in Feet (Gait) --  100' x 3  -JUANI      Deviations/Abnormal Patterns (Gait) bilateral deviations;base of support, wide;weight shifting decreased  -JUANI      Bilateral Gait Deviations heel strike decreased;forward flexed posture;decreased arm swing  -JUANI      Gait Assessment/Intervention Cued pt to look out ahead 5' to 10' to reduce flexed posture and increase step length and heel strike  -JUANI      Reedy Level (Stairs) modified independence;verbal cues  -JUANI      Assistive Device (Stairs) cane, quad tip  -JUANI      Handrail Location (Stairs) left side (ascending);left side (descending)  -JUANI      Number of Steps (Stairs) 12  -JUANI      Ascending Technique (Stairs) step-over-step  -JUANI      Descending Technique (Stairs) step-over-step  -JUANI      Stairs, Safety Issues sequencing ability decreased  using cane  -JUANI         Balance Skills Training    Gait Balance-Level of Assistance Close supervision  -JUANI      Gait Balance Support assistive device  -JUANI      Gait Balance Activities backwards;scanning environment R/L  -JUANI      SLS Hands hovering above counter-2 to 3 seconds  -JUANI                User Key  (r) = Recorded  "By, (t) = Taken By, (c) = Cosigned By      Initials Name Provider Type    Lakeisha Taylor, PT Physical Therapist                             PT Assessment/Plan       Row Name 11/18/24 1359          PT Assessment    Assessment Comments Patient again reiterated that his dizziness or spinning sensation is more of a off-balance feeling.  Patient continues to use quad set cane at all times in the community and some at home.  PT recommended patient also keep a cane by his bed at night for safety.  Patient achieving goals and nearing discharge from PT.  -JUANI               User Key  (r) = Recorded By, (t) = Taken By, (c) = Cosigned By      Initials Name Provider Type    Lakeisha Taylor, PT Physical Therapist                        OP Exercises       Row Name 11/18/24 1400 11/18/24 0932          Subjective    Subjective Comments -- reports no falls; using QTC at all times in the community but not at home due to \"small house\". Pt reports he has been diagnosed with Lewy Body Dementia. He described his \"dizziness\" as an outside force pushing him over. [facility] -- usually UE machines, treadmill and track  -        Subjective Pain    Able to rate subjective pain? -- yes  -JUANI     Pre-Treatment Pain Level -- 3  -JUANI     Post-Treatment Pain Level -- 3  -JUANI     Subjective Pain Comment -- LBP  -JUANI        Total Minutes    46688 -  PT Neuromuscular Reeducation Minutes 18  -JUANI --     79012 - PT Therapeutic Activity Minutes 25  -JUANI --               User Key  (r) = Recorded By, (t) = Taken By, (c) = Cosigned By      Initials Name Provider Type    Lakeisha Taylor PT Physical Therapist                                    Therapy Education  Education Details: Cues to scan 5 to 10 feet ahead of him for more upright posture, longer strides and heel strike.  Cued patient to also focus on a stable object ahead of him which can steady his gait as well.  HEP for single-leg standing at counter.  Discussed patient's progress in " therapy and nearing discharge.  Given: HEP, Fall prevention and home safety, Mobility training  Program: Progressed, Reinforced  How Provided: Verbal, Demonstration, Written  Level of Understanding: Verbalized, Teach back education performed, Demonstrated              Time Calculation:   Start Time: 0932  Stop Time: 1015  Time Calculation (min): 43 min  Total Timed Code Minutes- PT: 43 minute(s)  Timed Charges  58421 -  PT Neuromuscular Reeducation Minutes: 18  51648 - PT Therapeutic Activity Minutes: 25  Total Minutes  Timed Charges Total Minutes: 43   Total Minutes: 43   Therapy Charges for Today       Code Description Service Date Service Provider Modifiers Qty    [id number]  PT NEUROMUSC RE EDUCATION EA 15 MIN 11/18/2024 Lakeisha Hull, PT KX, GP 1    [id number] HC PT THERAPEUTIC ACT EA 15 MIN 11/18/2024 Lakeisha Hull, PT KX, GP 2                      Lakeisha Hull, PT  11/18/2024

## 2024-11-25 ENCOUNTER — HOSPITAL ENCOUNTER (OUTPATIENT)
Dept: PHYSICAL THERAPY | Facility: HOSPITAL | Age: 79
Discharge: HOME | End: 2024-11-25
Admitting: PSYCHIATRY & NEUROLOGY
Payer: MEDICARE

## 2024-11-25 DIAGNOSIS — G43.809 VESTIBULAR MIGRAINE: ICD-10-CM

## 2024-11-25 DIAGNOSIS — R42 DIZZINESS: ICD-10-CM

## 2024-11-25 DIAGNOSIS — R26.89 IMBALANCE: Primary | ICD-10-CM

## 2024-11-25 DIAGNOSIS — Z91.81 HISTORY OF FALLING: ICD-10-CM

## 2024-11-25 PROCEDURE — 97112 NEUROMUSCULAR REEDUCATION: CPT

## 2024-11-25 PROCEDURE — 97530 THERAPEUTIC ACTIVITIES: CPT

## 2024-11-25 NOTE — THERAPY DISCHARGE NOTE
"    Outpatient Physical Therapy Neuro Treatment Note/Discharge Summary  [facility]     Patient Name: Earl Marc  : 1945  MRN: [id number]  Today's Date: 2024      Visit Date: 2024    Visit Dx:    ICD-10-CM ICD-9-CM   1. Imbalance  R26.89 781.2   2. Dizziness  R42 780.4   3. History of falling  Z91.81 V15.88   4. Vestibular migraine  G43.809 346.80       Patient Active Problem List   Diagnosis    Bell's palsy    Persistent insomnia    Osteoarthritis of cervical spine    Diabetic peripheral neuropathy    Dyslipidemia    Steatosis of liver    Fibromyalgia    Gastroesophageal reflux disease without esophagitis    Hypertensive kidney disease with stage 3a chronic kidney disease    Hypogonadism in male    Renal insufficiency    Vitamin D deficiency    Benign non-nodular prostatic hyperplasia with lower urinary tract symptoms    S/P drug eluting coronary stent placement    Coronary artery disease involving native coronary artery of native heart    Malignant neoplasm of skin    Type 2 diabetes mellitus with hyperglycemia, with long-term current use of insulin    Diabetes mellitus    Chronic insomnia    Other specified glaucoma    Vertigo    Epigastric pain    Chest pain with high risk for cardiac etiology    Precordial pain    S/P arthroscopy of shoulder    Peripheral neuropathy    Stage 3b chronic kidney disease    Chest pain    Stroke-like symptoms    Acute back pain    Thoracic 12 compression fracture, closed, initial encounter    Hypertensive urgency    Sudden onset of severe headache    Visual hallucinations    RBD (REM behavioral disorder)    Lucho Bonnet syndrome             PT Neuro       Row Name 24 1017             Subjective    Subjective Comments Pt reports no falls and that using the cane really helps him feel better balance. He reports his \"dizziness\" is more of an off balance feeling. Less off balance since using cane. Using cane at home and community. Has 2 canes and keeps " "one upstairs, one downstairs  -         Precautions and Contraindications    Precautions/Limitations fall precautions  -         Subjective Pain    Able to rate subjective pain? yes  -      Pre-Treatment Pain Level 3  -JUANI      Post-Treatment Pain Level --  increases with flex forward or sitting a while  -      Subjective Pain Comment Pt reports starting on last Thursday pinpoint pain (pt points to spine only) lower thoracic/upper lumbar region. Pt \"feels better\" when PT showed pt use towel roll lumbar spine area while sitting.  -         Vision-Basic Assessment    Current Vision Wears glasses for distance only  -JUANI         Cognition    Overall Cognitive Status WFL  -      Memory Appears intact  -      Orientation Level Oriented X4  -      Safety Judgment Good awareness of safety precautions  using cane at home and community  -      Deficits Fully aware of deficits  -         Posture/Observations    Posture/Observations Comments Pt walked up to unit independently with quad tip cane.  -JUANI         Transfers    Sit-Stand [place] (Transfers) independent;modified independence  -JUANI      Stand-Sit [place] (Transfers) independent;modified independence  -JUANI      Transfers, Sit-Stand-Sit, Assist Device quad tip cane  -      Comment, (Transfers) reminded pt scooting to edge of seat and push up from chair  -JUANI         Gait/Stairs (Locomotion)    [place] Level (Gait) modified independence  -JUANI      Assistive Device (Gait) cane, quad tip  -      Distance in Feet (Gait) --  90' x 2; 20' x 2 no device -- slow and wide KAREEM  -      Bilateral Gait Deviations heel strike decreased;forward flexed posture;decreased arm swing  -      Gait Assessment/Intervention Cued pt to look out ahead 5' to 10' to reduce flexed posture and increase step length and heel strike  -      [place] Level (Stairs) modified [place]  -      Handrail Location (Stairs) left side (ascending);left side " "(descending)  -JUANI      Number of Steps (Stairs) 4  -JUANI      Ascending Technique (Stairs) step-over-step  -JUANI      Descending Technique (Stairs) step-over-step  -JUANI      Comment, (Gait/Stairs) see FGA  -JUANI         Balance Skills Training    SLS Hands hovering above counter ~3 - 4 seconds  -JUANI                User Key  (r) = Recorded By, (t) = Taken By, (c) = Cosigned By      Initials Name Provider Type    Lakeisha Taylor, PT Physical Therapist                             PT Assessment/Plan       Row Name 11/25/24 1239          PT Assessment    Assessment Comments Patient has been seen for total of 5 PT outpatient visits.  He was referred to PT with symptoms of dizziness but after vestibular evaluation which was negative for positional vertigo or with VOR weakness.  Patient actually describes his symptoms as more of a off-balance with an outside force pushing him forward.  Worked with patient on determining safest mobility device. He is using quad tip cane now and reports overall balance improved. His outcome measures improved (FGA and dizziness handicap inventory).  Patient has met all PT goals.  Patient arrived today complaining of pinpoint lower spine pain and patient's recent history of compression fracture at T12.  PT recommended patient contact his PCP.  Discussed precautions patient should follow right now in order to prevent further back discomfort until he sees his PCP.  Patient will be discharged from PT today.  -JUANI               User Key  (r) = Recorded By, (t) = Taken By, (c) = Cosigned By      Initials Name Provider Type    Lakeisha Taylor, PT Physical Therapist                          OP Exercises       Row Name 11/25/24 1249 11/25/24 1017          Subjective    Subjective Comments -- Pt reports no falls and that using the cane really helps him feel better balance. He reports his \"dizziness\" is more of an off balance feeling. Less off balance since using cane. Using cane at home and community. " "Has 2 canes and keeps one upstairs, one downstairs  -        Subjective Pain    Able to rate subjective pain? -- yes  -     Pre-Treatment Pain Level -- 3  -JUANI     Post-Treatment Pain Level -- --  increases with flex forward or sitting a while  -     Subjective Pain Comment -- Pt reports starting on last Thursday pinpoint pain (pt points to spine only) lower thoracic/upper lumbar region. Pt \"feels better\" when PT showed pt use towel roll lumbar spine area while sitting.  -        Total Minutes    19476 -  PT Neuromuscular Reeducation Minutes 18  -JUANI --     80514 - PT Therapeutic Activity Minutes 28  -JUANI --               User Key  (r) = Recorded By, (t) = Taken By, (c) = Cosigned By      Initials Name Provider Type    Lakeisha Taylor, PT Physical Therapist                                   PT OP Goals       Row Name 11/25/24 1017          PT Short Term Goals    STG 1 Pt. will be independent with initial HEP to improve balance.  -     STG 1 Progress Met  -     STG 2 Pt will be able to ambulate independently with head turns and head nods 30' x 2 each without LOB.  -     STG 2 Progress Met  -        Long Term Goals    LTG 1 Pt. will be independent with advanced HEP to improve long term management of condition and independence.  -     LTG 1 Progress Met  -     LTG 2 Pt will score < or = 18/20 on the FGA to indicate improved balance and decreased risk for falls.  -     LT 2 Progress Met  -     LTG 2 Progress Comments 18/20 on retest today  -     LTG 3 Pt will ambulate with AD at home and community and report no falls or LOB.  -     LTG 3 Progress Met  -     LTG 3 Progress Comments using quad tip cane consistently now  -     LTG 4 Pt will score < or = to 25 on the Dizziness Handicap Inventory to indicate reduction feelings of off balance and increased tolerance to movement.  -     LT 4 Progress Met  -     LTG 4 Progress Comments score of 14 today  -               User Key  (r) = " Recorded By, (t) = Taken By, (c) = Cosigned By      Initials Name Provider Type    Lakeisha Taylor, PT Physical Therapist                    Therapy Education  Education Details: Instructed pt he should call PCP about new onset low back pain due to h/o T12 compression fracture June 2024. Instructed pt to not use rowing machine, lift weights with UE, avoid bending over (recommended reacher) until sees or talks to PCP. Okay to use stationary bike at home (only uses LE's) and use towel roll at small of back in sitting to relieve pain. Reviewed previously issued HEP of forward step ups and SLS for pt to continue at home. Reviewed sit to stand and encouraged pt to use UE's to assist.  Given: HEP, Fall prevention and home safety, Mobility training  Program: Reinforced  How Provided: Verbal, Demonstration  Provided to: Patient  Level of Understanding: Verbalized, Teach back education performed, Demonstrated    Outcome Measure Options: FGA (Functional Gait Assessment), Dizziness Handicap Inventory  Dizziness Handicap Inventory  Dizziness Handicap Inventory Comments: Pt describes symptoms of more off balance vs Dizziness. Better overall with cane  Functional Gait Assessment (FGA)  Gait Level Surface: Mild Impairment  Change in Gait Speed: Mild Impairment  Gait with Horizontal Head Turns: Mild Impairment  Gait with Vertical Head Turns: Mild Impairment  Gait and Pivot Turn: Mild Impairment  Step Over Obstacle: Mild Impairment  Gait with Narrow Base of Support: Severe Impairment  Gait with Eyes Closed: Mild Impairment  Ambulating Backwards: Mild Impairment  Steps: Mild Impairment  FGA Total Score: 18  FGA Comments: used cane    Time Calculation:   Start Time: 1017  Stop Time: 1101  Time Calculation (min): 44 min  Total Timed Code Minutes- PT: 44 minute(s)  Timed Charges  43555 -  PT Neuromuscular Reeducation Minutes: 18  43191 - PT Therapeutic Activity Minutes: 28  Total Minutes  Timed Charges Total Minutes: 46   Total  Minutes: 46     Therapy Charges for Today       Code Description Service Date Service Provider Modifiers Qty    91478641626 HC PT NEUROMUSC RE EDUCATION EA 15 MIN 11/25/2024 Lakeisha Hull, PT KX, GP 1    46511220925 HC PT THERAPEUTIC ACT EA 15 MIN 11/25/2024 Lakeisha Hull, PT KX, GP 2            PT G-Codes  Outcome Measure Options: FGA (Functional Gait Assessment), Dizziness Handicap Inventory  FGA Total Score: 18     OP PT Discharge Summary  Date of Discharge: 11/25/24  Reason for Discharge: All goals achieved  Outcomes Achieved: Able to achieve all goals within established timeline  Discharge Destination: Home without follow-up, Home with home program        Lakeisha Hull, PT  11/25/2024

## 2024-11-27 ENCOUNTER — OFFICE VISIT (OUTPATIENT)
Dept: FAMILY MEDICINE CLINIC | Facility: CLINIC | Age: 79
End: 2024-11-27
Payer: MEDICARE

## 2024-11-27 VITALS
WEIGHT: 230 LBS | HEART RATE: 71 BPM | HEIGHT: 73 IN | SYSTOLIC BLOOD PRESSURE: 134 MMHG | OXYGEN SATURATION: 98 % | DIASTOLIC BLOOD PRESSURE: 66 MMHG | RESPIRATION RATE: 16 BRPM | BODY MASS INDEX: 30.48 KG/M2

## 2024-11-27 DIAGNOSIS — M54.9 MECHANICAL BACK PAIN: Primary | ICD-10-CM

## 2024-11-27 RX ORDER — PROPRANOLOL HYDROCHLORIDE 60 MG/1
60 CAPSULE, EXTENDED RELEASE ORAL DAILY
Qty: 90 CAPSULE | Refills: 1 | Status: SHIPPED | OUTPATIENT
Start: 2024-11-27

## 2024-11-27 RX ORDER — PREDNISONE 20 MG/1
20 TABLET ORAL 2 TIMES DAILY
Qty: 10 TABLET | Refills: 0 | Status: SHIPPED | OUTPATIENT
Start: 2024-11-27

## 2024-11-27 RX ORDER — TIZANIDINE 2 MG/1
2 TABLET ORAL EVERY 8 HOURS PRN
Qty: 21 TABLET | Refills: 0 | Status: SHIPPED | OUTPATIENT
Start: 2024-11-27

## 2024-11-27 NOTE — PROGRESS NOTES
Answers submitted by the patient for this visit:  Primary Reason for Visit (Submitted on 11/26/2024)  What is the primary reason for your visit?: Back Pain  Subjective chief complaint is back pain  Earl Marc is a 79 y.o. male.     Back Pain  Associated symptoms include weakness. Pertinent negatives include no abdominal pain, chest pain, dysuria, fever or numbness.  Earl is here today for complaints of back pain.  This is a chronic problem for him but more recently he is having right lower back pain.  His physical therapist advised him to come here.  The pain is worse with certain movements.    The following portions of the patient's history were reviewed and updated as appropriate: He  has a past medical history of Abnormal cardiovascular stress test, Acid reflux, Arthritis, Asthma, Bell palsy (1978), Cancer, Chronic kidney disease, Colon polyp, Congenital heart disease, COPD (chronic obstructive pulmonary disease) (2021), Coronary artery disease, Diabetes mellitus, Diabetic neuropathy associated with type 2 diabetes mellitus, Diabetic peripheral neuropathy (03/10/2016), Difficulty walking, Dyslipidemia, Erectile dysfunction, Fatty liver disease, nonalcoholic, Fibromyalgia, primary, Gastritis, Glaucoma, HL (hearing loss), Hyperlipidemia, Hypertension, Hypogonadism male, Infectious viral hepatitis (Hep A July 1959), Migraines (09/2023), Myocardial infarction (2018), Testosterone deficiency (2001), Trigger point of left shoulder region (03/02/2023), and Type 2 diabetes mellitus.  He does not have any pertinent problems on file.  He is allergic to ace inhibitors, hydrogenated palm oil glycerides, lanolin, other, prazosin, and nsaids..    Review of Systems   Constitutional:  Negative for fever.   Cardiovascular:  Negative for chest pain.   Gastrointestinal:  Negative for abdominal pain.   Genitourinary:  Negative for dysuria.   Musculoskeletal:  Positive for back pain.   Neurological:  Positive for weakness.  Negative for numbness.     Objective   Physical Exam  Vitals and nursing note reviewed.   Constitutional:       Appearance: Normal appearance.   Cardiovascular:      Rate and Rhythm: Normal rate.   Pulmonary:      Effort: Pulmonary effort is normal.   Musculoskeletal:      Comments: He has some tenderness at the musculotendinous insertion onto the posterior superior iliac spine on the right-hand side   Neurological:      Mental Status: He is alert.     Assessment & Plan   Diagnoses and all orders for this visit:    1. Mechanical back pain (Primary)    Other orders  -     predniSONE (DELTASONE) 20 MG tablet; Take 1 tablet by mouth 2 (Two) Times a Day.  Dispense: 10 tablet; Refill: 0  -     tiZANidine (ZANAFLEX) 2 MG tablet; Take 1 tablet by mouth Every 8 (Eight) Hours As Needed for Muscle Spasms (back pain).  Dispense: 21 tablet; Refill: 0  -     propranolol LA (INDERAL LA) 60 MG 24 hr capsule; Take 1 capsule by mouth Daily.  Dispense: 90 capsule; Refill: 1  -     COVID-19 (Pfizer) 12yrs+ (COMIRTYREL)    Earl is here today for back pain seems more mechanical.  I am going to have him use 5 days of prednisone.  I did advise that sugars may run high for a little bit.  We are also going to let him try a little bit of a low-dose muscle relaxant.  We advised to try the first dose at night before he goes to bed.  If it causes drowsiness and he should avoid taking it during the day.

## 2024-12-11 ENCOUNTER — HOSPITAL ENCOUNTER (OUTPATIENT)
Dept: SLEEP MEDICINE | Facility: HOSPITAL | Age: 79
Discharge: HOME | End: 2024-12-11
Admitting: PSYCHIATRY & NEUROLOGY
Payer: MEDICARE

## 2024-12-11 VITALS — HEIGHT: 73 IN | BODY MASS INDEX: 30.39 KG/M2 | WEIGHT: 229.28 LBS

## 2024-12-11 DIAGNOSIS — N18.31 HYPERTENSIVE KIDNEY DISEASE WITH STAGE 3A CHRONIC KIDNEY DISEASE: ICD-10-CM

## 2024-12-11 DIAGNOSIS — I16.0 HYPERTENSIVE URGENCY: ICD-10-CM

## 2024-12-11 DIAGNOSIS — G47.52 REM BEHAVIORAL DISORDER: ICD-10-CM

## 2024-12-11 DIAGNOSIS — R44.1 VISUAL HALLUCINATIONS: ICD-10-CM

## 2024-12-11 DIAGNOSIS — E78.5 DYSLIPIDEMIA: ICD-10-CM

## 2024-12-11 DIAGNOSIS — I12.9 HYPERTENSIVE KIDNEY DISEASE WITH STAGE 3A CHRONIC KIDNEY DISEASE: ICD-10-CM

## 2024-12-11 DIAGNOSIS — G47.00 PERSISTENT INSOMNIA: ICD-10-CM

## 2024-12-11 DIAGNOSIS — G47.33 OSA (OBSTRUCTIVE SLEEP APNEA): ICD-10-CM

## 2024-12-11 PROCEDURE — 95810 POLYSOM 6/> YRS 4/> PARAM: CPT

## 2024-12-12 DIAGNOSIS — E11.65 TYPE 2 DIABETES MELLITUS WITH HYPERGLYCEMIA, WITH LONG-TERM CURRENT USE OF INSULIN: ICD-10-CM

## 2024-12-12 DIAGNOSIS — N18.31 HYPERTENSIVE KIDNEY DISEASE WITH STAGE 3A CHRONIC KIDNEY DISEASE: ICD-10-CM

## 2024-12-12 DIAGNOSIS — Z79.4 TYPE 2 DIABETES MELLITUS WITH HYPERGLYCEMIA, WITH LONG-TERM CURRENT USE OF INSULIN: ICD-10-CM

## 2024-12-12 DIAGNOSIS — I12.9 HYPERTENSIVE KIDNEY DISEASE WITH STAGE 3A CHRONIC KIDNEY DISEASE: ICD-10-CM

## 2024-12-12 DIAGNOSIS — E78.5 DYSLIPIDEMIA: ICD-10-CM

## 2024-12-12 LAB
BUN SERPL-MCNC: 14 MG/DL (ref 8–23)
BUN/CREAT SERPL: 9.8 (ref 7–25)
CALCIUM SERPL-MCNC: 9.7 MG/DL (ref 8.6–10.5)
CHLORIDE SERPL-SCNC: 100 MMOL/L (ref 98–107)
CO2 SERPL-SCNC: 29.8 MMOL/L (ref 22–29)
CREAT SERPL-MCNC: 1.43 MG/DL (ref 0.76–1.27)
EGFRCR SERPLBLD CKD-EPI 2021: 49.8 ML/MIN/1.73
GLUCOSE SERPL-MCNC: 265 MG/DL (ref 65–99)
HBA1C MFR BLD: 8.5 % (ref 4.8–5.6)
POTASSIUM SERPL-SCNC: 4.8 MMOL/L (ref 3.5–5.2)
REPORT: NORMAL
SODIUM SERPL-SCNC: 138 MMOL/L (ref 136–145)

## 2024-12-16 ENCOUNTER — TELEPHONE (OUTPATIENT)
Dept: SLEEP MEDICINE | Facility: HOSPITAL | Age: 79
End: 2024-12-16
Payer: MEDICARE

## 2025-01-02 ENCOUNTER — OFFICE VISIT (OUTPATIENT)
Dept: SLEEP MEDICINE | Facility: HOSPITAL | Age: 80
End: 2025-01-02
Payer: MEDICARE

## 2025-01-02 VITALS — HEART RATE: 78 BPM | OXYGEN SATURATION: 93 % | WEIGHT: 230 LBS | BODY MASS INDEX: 30.48 KG/M2 | HEIGHT: 73 IN

## 2025-01-02 DIAGNOSIS — G47.00 PERSISTENT INSOMNIA: ICD-10-CM

## 2025-01-02 DIAGNOSIS — G47.52 RBD (REM BEHAVIORAL DISORDER): Primary | ICD-10-CM

## 2025-01-02 DIAGNOSIS — R41.3 MEMORY DEFICIT: ICD-10-CM

## 2025-01-02 DIAGNOSIS — R44.1 VISUAL HALLUCINATIONS: ICD-10-CM

## 2025-01-02 PROCEDURE — G0463 HOSPITAL OUTPT CLINIC VISIT: HCPCS

## 2025-01-02 RX ORDER — MELATONIN 5 MG
10 TABLET,CHEWABLE ORAL
Qty: 180 TABLET | Refills: 3 | Status: SHIPPED | OUTPATIENT
Start: 2025-01-02 | End: 2025-12-28

## 2025-01-02 NOTE — PROGRESS NOTES
Follow Up [facility] Note       Primary Care Physician: Avery Velazquez MD    Interval History:   The patient is a 79 y.o. male      History of Present Illness  The patient presents for evaluation of Lewy body dementia.    He was previously evaluated in 10/2024, during which Dr. Mcclellan suspected Parkinson's disease, potentially linked to Agent Orange exposure. However, recent findings suggest a diagnosis of Lewy body dementia, with symptoms closely mirroring those of Parkinson's disease. [facility] Parkinson's as a cause, and ongoing research is being conducted on Lewy body dementia. He reports experiencing significant movement during REM sleep but does not have sleep apnea. He has not had any recent episodes of physical aggression towards his wife during sleep. He is currently taking Gummy Sleep medication which contain melatonin, but is not on clonazepam. He also reports frequent dizziness and imbalance, leading to a fall on New Year's Darlene at his daughter's house. He continues to feel off balance and is concerned about the possibility of further falls. He plans to continue his follow-up with Dr. Mcclellan.     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    MEDICATIONS  Current: Melatonin, Exelon, B12, Effexor         Review of Systems:    A complete review of systems was done and all were negative with the exception of anxiety about the diagnosis of Lewy body dementia.    Social History:    Social History     Socioeconomic History    Marital status:      Spouse name: Ivette    Number of children: 1   Tobacco Use    Smoking status: Never     Passive exposure: Past    Smokeless tobacco: Never    Tobacco comments:     Never   Vaping Use    Vaping status: Never Used   Substance and Sexual Activity    Alcohol use:  "No    Drug use: Never    Sexual activity: Not Currently     Partners: Female     Birth control/protection: Vasectomy, Surgical     Comment: Vasectomy 1974       Allergies:  Ace inhibitors, Hydrogenated palm oil glycerides, Lanolin, Other, Prazosin, and Nsaids     Medication Review:  Reviewed.      Vital Signs:    Vitals:    01/02/25 1357   Pulse: 78   SpO2: 93%   Weight: 104 kg (230 lb)   Height: 185.4 cm (72.99\")     Body mass index is 30.35 kg/m².  .BMIFOLLOWUP    Physical Exam:    Constitutional:  Well developed 79 y.o. male that appears in no apparent distress.  Awake & oriented times 3.  Normal mood with normal recent and remote memory and normal judgement.  Eyes:  Conjunctivae normal.      Self-administered [place] Sleepiness Scale test results: 18  0-5 Lower normal daytime sleepiness  6-10 Higher normal daytime sleepiness  11-12 Mild, 13-15 Moderate, & 16-24 Severe excessive daytime sleepiness         Impression:     Encounter Diagnoses   Name Primary?    RBD (REM behavioral disorder) Yes    Persistent insomnia     Visual hallucinations     Memory deficit          Assessment & Plan  1. Lewy body dementia.  He reports experiencing significant movement during REM sleep but does not have sleep apnea. He is currently taking melatonin, but is not on clonazepam. He also reports frequent dizziness and imbalance, leading to a fall on New Year's Darlene at his daughter's house. He continues to feel off balance and is concerned about the possibility of further falls. He plans to continue his follow-up with Dr. Mcclellan. He is advised to monitor for any episodes of dream enactment behavior, as this may necessitate the addition of Klonopin to his treatment regimen. He will continue his current medications, including Exelon for memory, B12, and Effexor. If there are any changes in his condition, a reevaluation will be conducted.    Follow-up  The patient will follow up in 6 months.         Good sleep hygiene measures should be " maintained.  Weight loss would be beneficial in this patient who obesity class I by Body mass index is 30.35 kg/m².      After evaluating the patient and assessing results available, the patient is benefiting from the treatment being provided.     The patient will continue melatonin.  Potential side effects of PAP therapy reviewed and addressed as needed.  After clinical evaluation and review of downloads, I recommend no changes to the patient's pressures.      I answered all of the patient's questions.  The patient will call the [facility] for any problems and will follow up 6 months.    Patient or patient representative verbalized consent for the use of Ambient Listening during the visit with  Paul Hein MD for chart documentation. 1/2/2025  15:24 EST           Paul Hein MD  Sleep Medicine  01/02/25  15:23 EST

## 2025-01-14 DIAGNOSIS — K21.9 GASTROESOPHAGEAL REFLUX DISEASE, UNSPECIFIED WHETHER ESOPHAGITIS PRESENT: ICD-10-CM

## 2025-01-15 RX ORDER — PANTOPRAZOLE SODIUM 20 MG/1
20 TABLET, DELAYED RELEASE ORAL DAILY
Qty: 90 TABLET | Refills: 1 | Status: SHIPPED | OUTPATIENT
Start: 2025-01-15

## 2025-01-16 ENCOUNTER — OFFICE VISIT (OUTPATIENT)
Dept: ENDOCRINOLOGY | Age: 80
End: 2025-01-16
Payer: MEDICARE

## 2025-01-16 VITALS
HEART RATE: 65 BPM | OXYGEN SATURATION: 97 % | HEIGHT: 73 IN | DIASTOLIC BLOOD PRESSURE: 76 MMHG | BODY MASS INDEX: 31.14 KG/M2 | WEIGHT: 235 LBS | SYSTOLIC BLOOD PRESSURE: 142 MMHG

## 2025-01-16 DIAGNOSIS — E78.5 DYSLIPIDEMIA: ICD-10-CM

## 2025-01-16 DIAGNOSIS — E11.65 TYPE 2 DIABETES MELLITUS WITH HYPERGLYCEMIA, WITH LONG-TERM CURRENT USE OF INSULIN: Primary | ICD-10-CM

## 2025-01-16 DIAGNOSIS — Z79.4 TYPE 2 DIABETES MELLITUS WITH HYPERGLYCEMIA, WITH LONG-TERM CURRENT USE OF INSULIN: Primary | ICD-10-CM

## 2025-01-16 DIAGNOSIS — N18.31 HYPERTENSIVE KIDNEY DISEASE WITH STAGE 3A CHRONIC KIDNEY DISEASE: ICD-10-CM

## 2025-01-16 DIAGNOSIS — I12.9 HYPERTENSIVE KIDNEY DISEASE WITH STAGE 3A CHRONIC KIDNEY DISEASE: ICD-10-CM

## 2025-01-16 RX ORDER — INSULIN GLARGINE 100 [IU]/ML
32 INJECTION, SOLUTION SUBCUTANEOUS DAILY
Qty: 30 ML | Refills: 0 | Status: SHIPPED | OUTPATIENT
Start: 2025-01-16

## 2025-01-16 RX ORDER — INSULIN ASPART 100 [IU]/ML
INJECTION, SOLUTION INTRAVENOUS; SUBCUTANEOUS
Start: 2025-01-16

## 2025-01-16 NOTE — PATIENT INSTRUCTIONS
Continue with Lantus 32 units at bedtime  Change Novolog 8 units before breakfast, 8 units before lunch and 11 units before dinner  Continue with Tradjenta 5 mg daily
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
## 2025-02-13 DIAGNOSIS — I95.1 ORTHOSTATIC HYPOTENSION: ICD-10-CM

## 2025-02-13 RX ORDER — RIVASTIGMINE 4.6 MG/24H
1 PATCH, EXTENDED RELEASE TRANSDERMAL DAILY
Qty: 30 PATCH | Refills: 6 | Status: SHIPPED | OUTPATIENT
Start: 2025-02-13

## 2025-02-21 ENCOUNTER — TELEPHONE (OUTPATIENT)
Dept: NEUROLOGY | Facility: CLINIC | Age: 80
End: 2025-02-21

## 2025-02-21 NOTE — TELEPHONE ENCOUNTER
Caller: Earl Marc    Relationship to patient: Self    Best call back number:   Telephone Information:   Mobile [phone]         Chief complaint: WOULD LIKE A SOONER APPOINTMENT     Type of visit: FOLLOW UP    Requested date: SOON     If rescheduling, when is the original appointment: 4-21-25    Additional notes:PT HAS CONCERNS AND WOULD LIKE TO BE SEEN SOONER IF POSSIBLE, PLEASE REVIEW AND ADVISE

## 2025-03-11 ENCOUNTER — HOSPITAL ENCOUNTER (OUTPATIENT)
Dept: DIABETES SERVICES | Facility: HOSPITAL | Age: 80
Discharge: HOME | End: 2025-03-11
Admitting: NURSE PRACTITIONER
Payer: MEDICARE

## 2025-03-11 PROCEDURE — 97802 MEDICAL NUTRITION INDIV IN: CPT

## 2025-03-11 NOTE — PROGRESS NOTES
Diabetes Education    Patient Name:  Earl Marc  YOB: 1945  MRN: [id number]  Admit Date:  3/11/2025      Mr. Marc met with SUPRIYA RICHARDS for MNT for diabetes A comprehensive assessment/training record has been sent to medical records (see media tab) to associate with this encounter.      Hemoglobin A1c = 8.5%      We discussed meal planning - he was advised to follow ADA plate method to plan meals. We discussed the importance of eating regular meals. We reviewed how to read food labels. We reviewed consuming complex carbohydrates instead of simple carbohydrates. We discussed increased fiber and the impact on blood glucose. We reviewed carbohydrate portion sizes - we discussed importance of “balance meals/snacks”.  Patient verbalized understanding of all information reviewed at today's visit.       Mr. Marc has been encouraged to call our office with questions or additional education needs. Please place referral for additional services or follow-up should need arise.     Thank you for the referral     Libia Moreno RD, ROMERO, SUSIE    Electronically signed by:  Libia Moreno RD  03/11/25 13:28 EDT

## 2025-04-07 ENCOUNTER — LAB (OUTPATIENT)
Dept: ENDOCRINOLOGY | Age: 80
End: 2025-04-07
Payer: MEDICARE

## 2025-04-07 DIAGNOSIS — E78.5 DYSLIPIDEMIA: ICD-10-CM

## 2025-04-07 DIAGNOSIS — Z79.4 TYPE 2 DIABETES MELLITUS WITH HYPERGLYCEMIA, WITH LONG-TERM CURRENT USE OF INSULIN: ICD-10-CM

## 2025-04-07 DIAGNOSIS — I12.9 HYPERTENSIVE KIDNEY DISEASE WITH STAGE 3A CHRONIC KIDNEY DISEASE: ICD-10-CM

## 2025-04-07 DIAGNOSIS — N18.31 HYPERTENSIVE KIDNEY DISEASE WITH STAGE 3A CHRONIC KIDNEY DISEASE: ICD-10-CM

## 2025-04-07 DIAGNOSIS — E11.65 TYPE 2 DIABETES MELLITUS WITH HYPERGLYCEMIA, WITH LONG-TERM CURRENT USE OF INSULIN: ICD-10-CM

## 2025-04-08 LAB
ALBUMIN SERPL-MCNC: 4.4 G/DL (ref 3.5–5.2)
ALBUMIN/CREAT UR: 90 MG/G CREAT (ref 0–29)
ALBUMIN/GLOB SERPL: 1.6 G/DL
ALP SERPL-CCNC: 87 U/L (ref 39–117)
ALT SERPL-CCNC: 20 U/L (ref 1–41)
AST SERPL-CCNC: 20 U/L (ref 1–40)
BILIRUB SERPL-MCNC: 0.7 MG/DL (ref 0–1.2)
BUN SERPL-MCNC: 21 MG/DL (ref 8–23)
BUN/CREAT SERPL: 13.9 (ref 7–25)
CALCIUM SERPL-MCNC: 10.2 MG/DL (ref 8.6–10.5)
CHLORIDE SERPL-SCNC: 102 MMOL/L (ref 98–107)
CHOLEST SERPL-MCNC: 91 MG/DL (ref 0–200)
CO2 SERPL-SCNC: 28.9 MMOL/L (ref 22–29)
CREAT SERPL-MCNC: 1.51 MG/DL (ref 0.76–1.27)
CREAT UR-MCNC: 128.7 MG/DL
EGFRCR SERPLBLD CKD-EPI 2021: 46.7 ML/MIN/1.73
GLOBULIN SER CALC-MCNC: 2.7 GM/DL
GLUCOSE SERPL-MCNC: 155 MG/DL (ref 65–99)
HBA1C MFR BLD: 9 % (ref 4.8–5.6)
HDLC SERPL-MCNC: 47 MG/DL (ref 40–60)
IMP & REVIEW OF LAB RESULTS: NORMAL
LDLC SERPL CALC-MCNC: 16 MG/DL (ref 0–100)
MICROALBUMIN UR-MCNC: 115.2 UG/ML
POTASSIUM SERPL-SCNC: 4.5 MMOL/L (ref 3.5–5.2)
PROT SERPL-MCNC: 7.1 G/DL (ref 6–8.5)
REPORT: NORMAL
SODIUM SERPL-SCNC: 142 MMOL/L (ref 136–145)
TRIGL SERPL-MCNC: 177 MG/DL (ref 0–150)
VLDLC SERPL CALC-MCNC: 28 MG/DL (ref 5–40)

## 2025-04-11 NOTE — PROGRESS NOTES
DOS: 2025  NAME: Earl Marc   : 1945  PCP: Avery Velazquez MD  Chief Complaint   Patient presents with    Parkinson's Disease       Chief complaint: Follow-up for parkinsonism  Subjective: 79-year-old man following with me for suspected neurodegenerative disorder which remains undefined.  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  Since last visit he has seen Dr. Hein with sleep medicine who suspects REM behavioral disorder.  He suggested considering Klonopin if movements become a problem in sleep.  Patient does not have sleep apnea.  He has also seen vestibular physical therapy for 5 visits.  They were unable to find any evidence of a peripheral otologic issue.  His complaints to them seemed more consistent with ataxia.  He was released after 5 visits.  He continues on Exelon for memory and orthostatic hypotension as well as B2, magnesium and Effexor for migraine with suspected vestibular component.    Since the last visit some of his symptoms have progressed but others have remained stable.  He complains of new sharp right lower back pain radiating to the leg.  He has been using a walker more and has been less physically active.  He does have some known difficulty with lumbar spinal stenosis.  He previously had cervical spine disease but had a fixation done on the lower cervical spine.  He endorses persistent balance difficulty and several near misses but no falls.    He has persistent primarily postural and kinetic tremor with minimal resting tremor.  His wife endorses occasional shuffling and difficulty with initiating gait.  He seems to have difficulty navigating the walker through their home.  In open spaces he does a bit better.  He has not had issues with acting out his dreams or any significant visual hallucinations.  Cognitive symptoms remain better on Exelon.  He endorses orthostatic lightheadedness but it does not  "persist.    Headaches are fairly stable. He endorses frequent head pressure \"like a sandbag filling my head.\" No severe migraines. He still has occasional unprovoked vertigo and at times feels like he is being pushed to one side. As above he saw vestibular therapy but did not have any findings of BPPV there.    Glycemic control remains an issue and his last A1C was 9    Objective:  Vital signs: /76   Pulse 76   Ht 185.4 cm (73\")   Wt 103 kg (228 lb)   SpO2 98%   BMI 30.08 kg/m²    Exam:  vitals reviewed  MS: oriented x3, recent/remote memory intact, normal attention/concentration, language intact, no neglect, mildly diminished verbal and semantic fluency, normal attention, impaired calculation.  CN: visual acuity grossly normal, visual fields full, PERRL, EOMI without nystagmus, no facial droop, no dysarthria  Motor: 5/5 throughout upper and lower extremities, normal tone.  Prominent postural and kinetic tremor, improves with rest  Sensory: Markedly diminished to pinprick sensation distal lower extremities in a length dependent pattern.  Diminished vibratory sensation distally as well.  Reflexes: Absent bilateral upper extremities, 2+ bilateral lower extremities, no clonus, no Kanika sign, plantars downgoing.  Gait: Ambulates with a walker, no shuffling or freezing, positive Romberg, negative pull test    Laboratory results:  Lab Results   Component Value Date    LDL 16 04/07/2025    LDL 21 09/12/2024    LDL 19 04/22/2024           Review and interpretation of imaging: I reviewed his prior CTA of the neck from about a year ago which showed only mild spinal stenosis and evidence of surgical changes C6-C8.  Lumbar spine MRI done previously showed mild changes also consistent with spinal stenosis but on my review looked nonsurgical.  Previous brain MRI done summer 2024 shows moderate generalized atrophy more prominent in the bilateral parietal and temporal lobes.    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  In the past I have had concern for a unifying diagnosis like Parkinson's disease or Lewy body dementia but his clinical progression has not really borne that out.  I suspect his symptoms may have a combination of more benign causes including peripheral neuropathy from diabetes, musculoskeletal issues from cervical and lumbar spinal stenosis.  I suspect he has mild migraine occasionally with a vestibular component.  He has not responded to Effexor so we will go ahead and stop that but will continue magnesium and B2.  His cognitive symptoms are stable on Exelon.  He has had fairly little progression of the symptoms but does have some changes on brain MRI that raise the question of late onset Alzheimer's dementia.    Today the main issue is worsening back and leg pain which is most consistent with radiculopathy.  We discussed at length the need to maintain his physical condition with strengthening and cardiovascular exercise.  We discussed using a rollator walker for all ADLs to prevent falls.    Plan:  1.  Physical therapy referral for lumbosacral radiculopathy  2.  Rollator walker for fall prevention  3.  Continue magnesium, B2 for migraine  4.  Stop Effexor due to lack of efficacy for vestibular symptoms  5.  Continue clinical surveillance for cognitive symptoms and parkinsonism.  6.  Continue Exelon patch for memory complaints and orthostatic hypotension    I spent a total of 45 minutes on this clinical encounter  including chart/records review, review of laboratory data, personal review of imaging studies, patient counseling, and care coordination.    Complex neurologic case, patient will follow with us longitudinally for the above neurologic issues. 3 month follow up with me and will arrange an appointment with Dr. Deluna after that to transition care.

## 2025-04-14 ENCOUNTER — OFFICE VISIT (OUTPATIENT)
Dept: ENDOCRINOLOGY | Age: 80
End: 2025-04-14
Payer: MEDICARE

## 2025-04-14 VITALS
DIASTOLIC BLOOD PRESSURE: 86 MMHG | HEIGHT: 73 IN | HEART RATE: 74 BPM | BODY MASS INDEX: 30.24 KG/M2 | OXYGEN SATURATION: 97 % | TEMPERATURE: 97.8 F | SYSTOLIC BLOOD PRESSURE: 140 MMHG | WEIGHT: 228.2 LBS

## 2025-04-14 DIAGNOSIS — I12.9 HYPERTENSIVE KIDNEY DISEASE WITH STAGE 3A CHRONIC KIDNEY DISEASE: ICD-10-CM

## 2025-04-14 DIAGNOSIS — E78.5 DYSLIPIDEMIA: ICD-10-CM

## 2025-04-14 DIAGNOSIS — Z79.4 TYPE 2 DIABETES MELLITUS WITH HYPERGLYCEMIA, WITH LONG-TERM CURRENT USE OF INSULIN: Primary | ICD-10-CM

## 2025-04-14 DIAGNOSIS — E11.65 TYPE 2 DIABETES MELLITUS WITH HYPERGLYCEMIA, WITH LONG-TERM CURRENT USE OF INSULIN: Primary | ICD-10-CM

## 2025-04-14 DIAGNOSIS — N18.31 HYPERTENSIVE KIDNEY DISEASE WITH STAGE 3A CHRONIC KIDNEY DISEASE: ICD-10-CM

## 2025-04-14 PROCEDURE — 3077F SYST BP >= 140 MM HG: CPT | Performed by: NURSE PRACTITIONER

## 2025-04-14 PROCEDURE — 95251 CONT GLUC MNTR ANALYSIS I&R: CPT | Performed by: NURSE PRACTITIONER

## 2025-04-14 PROCEDURE — 99214 OFFICE O/P EST MOD 30 MIN: CPT | Performed by: NURSE PRACTITIONER

## 2025-04-14 PROCEDURE — 3079F DIAST BP 80-89 MM HG: CPT | Performed by: NURSE PRACTITIONER

## 2025-04-14 RX ORDER — ACYCLOVIR 400 MG/1
TABLET ORAL
COMMUNITY

## 2025-04-14 RX ORDER — BLOOD SUGAR DIAGNOSTIC
1 STRIP MISCELLANEOUS 4 TIMES DAILY
Qty: 400 EACH | Refills: 3 | Status: SHIPPED | OUTPATIENT
Start: 2025-04-14

## 2025-04-14 RX ORDER — INSULIN GLARGINE 100 [IU]/ML
32 INJECTION, SOLUTION SUBCUTANEOUS DAILY
Qty: 30 ML | Refills: 0 | Status: SHIPPED | OUTPATIENT
Start: 2025-04-14

## 2025-04-14 RX ORDER — PREGABALIN 50 MG/1
50 CAPSULE ORAL 3 TIMES DAILY
COMMUNITY
Start: 2024-10-24

## 2025-04-14 RX ORDER — INSULIN ASPART 100 [IU]/ML
INJECTION, SOLUTION INTRAVENOUS; SUBCUTANEOUS
Qty: 27 ML | Refills: 1 | Status: SHIPPED | OUTPATIENT
Start: 2025-04-14

## 2025-04-14 RX ORDER — FLURBIPROFEN SODIUM 0.3 MG/ML
SOLUTION/ DROPS OPHTHALMIC
Qty: 500 EACH | Refills: 2 | Status: SHIPPED | OUTPATIENT
Start: 2025-04-14

## 2025-04-14 RX ORDER — BLOOD-GLUCOSE METER
KIT MISCELLANEOUS
COMMUNITY
Start: 2025-01-29

## 2025-04-14 RX ORDER — LANCETS 28 GAUGE
EACH MISCELLANEOUS
COMMUNITY
Start: 2025-01-22

## 2025-04-14 RX ORDER — ATORVASTATIN CALCIUM 20 MG/1
20 TABLET, FILM COATED ORAL NIGHTLY
Qty: 90 TABLET | Refills: 1 | Status: SHIPPED | OUTPATIENT
Start: 2025-04-14

## 2025-04-14 NOTE — PATIENT INSTRUCTIONS
Continue with Lantus 32 units at bedtime  Increase Novolog 8 units before breakfast, 8 units before lunch and 14 units before dinner
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
## 2025-04-21 ENCOUNTER — OFFICE VISIT (OUTPATIENT)
Dept: NEUROLOGY | Facility: CLINIC | Age: 80
End: 2025-04-21
Payer: MEDICARE

## 2025-04-21 VITALS
HEIGHT: 73 IN | DIASTOLIC BLOOD PRESSURE: 76 MMHG | BODY MASS INDEX: 30.22 KG/M2 | WEIGHT: 228 LBS | OXYGEN SATURATION: 98 % | SYSTOLIC BLOOD PRESSURE: 120 MMHG | HEART RATE: 76 BPM

## 2025-04-21 DIAGNOSIS — G20.C ATYPICAL PARKINSONISM: Primary | ICD-10-CM

## 2025-04-21 DIAGNOSIS — G47.52 REM BEHAVIORAL DISORDER: ICD-10-CM

## 2025-04-21 DIAGNOSIS — M54.17 LUMBOSACRAL RADICULOPATHY AT L5: ICD-10-CM

## 2025-04-21 DIAGNOSIS — I95.1 ORTHOSTATIC HYPOTENSION: ICD-10-CM

## 2025-04-21 DIAGNOSIS — R27.8 SENSORY ATAXIA: ICD-10-CM

## 2025-04-21 RX ORDER — CHOLECALCIFEROL (VITAMIN D3) 125 MCG
CAPSULE ORAL
COMMUNITY

## 2025-04-21 NOTE — LETTER
2025     Avery Velazquez MD  [address]  [facility] [id number]    Patient: Earl Marc   YOB: 1945   Date of Visit: 2025     Dear Avery Velazquez MD:       Thank you for referring Earl Marc to me for evaluation. Below are the relevant portions of my assessment and plan of care.    If you have questions, please do not hesitate to call me. I look forward to following Earl along with you.         Sincerely,        Paul Danielle MD        CC: Paul Laboy MD  25 1222  Sign when Signing Visit  DOS: 2025  NAME: Earl Marc   : 1945  PCP: Avery Velazquez MD  Chief Complaint   Patient presents with   • Parkinson's Disease       Chief complaint: Follow-up for parkinsonism  Subjective: 79-year-old man following with me for suspected neurodegenerative disorder which remains undefined.  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  Since last visit he has seen Dr. Hein with sleep medicine who suspects REM behavioral disorder.  He suggested considering Klonopin if movements become a problem in sleep.  Patient does not have sleep apnea.  He has also seen vestibular physical therapy for 5 visits.  They were unable to find any evidence of a peripheral otologic issue.  His complaints to them seemed more consistent with ataxia.  He was released after 5 visits.  He continues on Exelon for memory and orthostatic hypotension as well as B2, magnesium and Effexor for migraine with suspected vestibular component.    Since the last visit some of his symptoms have progressed but others have remained stable.  He complains of new sharp right lower back pain radiating to the leg.  He has been using a walker more and has been less physically active.  He does have some known difficulty with lumbar spinal stenosis.  He previously had cervical spine disease but had a fixation  "done on the lower cervical spine.  He endorses persistent balance difficulty and several near misses but no falls.    He has persistent primarily postural and kinetic tremor with minimal resting tremor.  His wife endorses occasional shuffling and difficulty with initiating gait.  He seems to have difficulty navigating the walker through their home.  In open spaces he does a bit better.  He has not had issues with acting out his dreams or any significant visual hallucinations.  Cognitive symptoms remain better on Exelon.  He endorses orthostatic lightheadedness but it does not persist.    Headaches are fairly stable. He endorses frequent head pressure \"like a sandbag filling my head.\" No severe migraines. He still has occasional unprovoked vertigo and at times feels like he is being pushed to one side. As above he saw vestibular therapy but did not have any findings of BPPV there.    Glycemic control remains an issue and his last A1C was 9    Objective:  Vital signs: /76   Pulse 76   Ht 185.4 cm (73\")   Wt 103 kg (228 lb)   SpO2 98%   BMI 30.08 kg/m²    Exam:  vitals reviewed  MS: oriented x3, recent/remote memory intact, normal attention/concentration, language intact, no neglect, mildly diminished verbal and semantic fluency, normal attention, impaired calculation.  CN: visual acuity grossly normal, visual fields full, PERRL, EOMI without nystagmus, no facial droop, no dysarthria  Motor: 5/5 throughout upper and lower extremities, normal tone.  Prominent postural and kinetic tremor, improves with rest  Sensory: Markedly diminished to pinprick sensation distal lower extremities in a length dependent pattern.  Diminished vibratory sensation distally as well.  Reflexes: Absent bilateral upper extremities, 2+ bilateral lower extremities, no clonus, no Kanika sign, plantars downgoing.  Gait: Ambulates with a walker, no shuffling or freezing, positive Romberg, negative pull test    Laboratory results:  Lab " Results   Component Value Date    LDL 16 04/07/2025    LDL 21 09/12/2024    LDL 19 04/22/2024           Review and interpretation of imaging: I reviewed his prior CTA of the neck from about a year ago which showed only mild spinal stenosis and evidence of surgical changes C6-C8.  Lumbar spine MRI done previously showed mild changes also consistent with spinal stenosis but on my review looked nonsurgical.  Previous brain MRI done summer 2024 shows moderate generalized atrophy more prominent in the bilateral parietal and temporal lobes.    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  In the past I have had concern for a unifying diagnosis like Parkinson's disease or Lewy body dementia but his clinical progression has not really borne that out.  I suspect his symptoms may have a combination of more benign causes including peripheral neuropathy from diabetes, musculoskeletal issues from cervical and lumbar spinal stenosis.  I suspect he has mild migraine occasionally with a vestibular component.  He has not responded to Effexor so we will go ahead and stop that but will continue magnesium and B2.  His cognitive symptoms are stable on Exelon.  He has had fairly little progression of the symptoms but does have some changes on brain MRI that raise the question of late onset Alzheimer's dementia.    Today the main issue is worsening back and leg pain which is most consistent with radiculopathy.  We discussed at length the need to  maintain his physical condition with strengthening and cardiovascular exercise.  We discussed using a rollator walker for all ADLs to prevent falls.    Plan:  1.  Physical therapy referral for lumbosacral radiculopathy  2.  Rollator walker for fall prevention  3.  Continue magnesium, B2 for migraine  4.  Stop Effexor due to lack of efficacy for vestibular symptoms  5.  Continue clinical surveillance for cognitive symptoms and parkinsonism.  6.  Continue Exelon patch for memory complaints and orthostatic hypotension    I spent a total of 45 minutes on this clinical encounter including chart/records review, review of laboratory data, personal review of imaging studies, patient counseling, and care coordination.    Complex neurologic case, patient will follow with us longitudinally for the above neurologic issues. 3 month follow up with me and will arrange an appointment with Dr. Deluna after that to transition care.

## 2025-04-23 ENCOUNTER — HOSPITAL ENCOUNTER (OUTPATIENT)
Dept: PHYSICAL THERAPY | Facility: HOSPITAL | Age: 80
Setting detail: THERAPIES SERIES
Discharge: HOME | End: 2025-04-23
Payer: MEDICARE

## 2025-04-23 DIAGNOSIS — M54.16 LUMBAR RADICULOPATHY: Primary | ICD-10-CM

## 2025-04-23 DIAGNOSIS — G89.29 CHRONIC RIGHT-SIDED LOW BACK PAIN WITH RIGHT-SIDED SCIATICA: ICD-10-CM

## 2025-04-23 DIAGNOSIS — M54.17 LUMBOSACRAL RADICULOPATHY AT L5: ICD-10-CM

## 2025-04-23 DIAGNOSIS — M54.41 CHRONIC RIGHT-SIDED LOW BACK PAIN WITH RIGHT-SIDED SCIATICA: ICD-10-CM

## 2025-04-23 PROCEDURE — 97162 PT EVAL MOD COMPLEX 30 MIN: CPT

## 2025-04-23 PROCEDURE — 97110 THERAPEUTIC EXERCISES: CPT

## 2025-04-23 NOTE — THERAPY EVALUATION
Outpatient Physical Therapy Ortho Initial Evaluation  [facility]     Patient Name: Earl Marc  : 1945  MRN: [id number]  Today's Date: 2025      Visit Date: 2025    Patient Active Problem List   Diagnosis    Bell's palsy    Persistent insomnia    Osteoarthritis of cervical spine    Diabetic peripheral neuropathy    Dyslipidemia    Steatosis of liver    Fibromyalgia    Gastroesophageal reflux disease without esophagitis    Hypertensive kidney disease with stage 3a chronic kidney disease    Hypogonadism in male    Renal insufficiency    Vitamin D deficiency    Benign non-nodular prostatic hyperplasia with lower urinary tract symptoms    S/P drug eluting coronary stent placement    Coronary artery disease involving native coronary artery of native heart    Malignant neoplasm of skin    Type 2 diabetes mellitus with hyperglycemia, with long-term current use of insulin    Diabetes mellitus    Chronic insomnia    Other specified glaucoma    Vertigo    Epigastric pain    Chest pain with high risk for cardiac etiology    Precordial pain    S/P arthroscopy of shoulder    Peripheral neuropathy    Stage 3b chronic kidney disease    Chest pain    Stroke-like symptoms    Acute back pain    Thoracic 12 compression fracture, closed, initial encounter    Hypertensive urgency    Sudden onset of severe headache    Visual hallucinations    RBD (REM behavioral disorder)    Lucho Bonnet syndrome    Memory deficit        Past Medical History:   Diagnosis Date    Abnormal cardiovascular stress test     Acid reflux     Arthritis     Asthma     Bell palsy     In [place]    Benign prostatic hyperplasia 2024    Appt with Dr Mendoza     Cancer     skin     Cataract     Chronic kidney disease     Colon polyp     Congenital heart disease     COPD (chronic obstructive pulmonary disease)     Old age COPD/2D hand [place]oke emphysema    Coronary artery disease     Diabetes mellitus     Diabetic neuropathy  associated with type 2 diabetes mellitus     Diabetic peripheral neuropathy 03/10/2016    Difficulty walking     Dyslipidemia     Erectile dysfunction     Fatty liver disease, nonalcoholic     Fibromyalgia, primary     Gastritis     Glaucoma     both eyes    Gout     HL (hearing loss)     Hyperlipidemia     Hypertension     Hypogonadism male     Infectious viral hepatitis Hep A July 1959    Drank water from broken water line and Grandparents house.    Low back pain     Memory deficit 01/02/2025    Migraines 09/2023    Myocardial infarction 2018    Obesity     Testosterone deficiency 2001    Low testosterone    Tremor     Trigger point of left shoulder region 03/02/2023    Type 2 diabetes mellitus         Past Surgical History:   Procedure Laterality Date    CARDIAC CATHETERIZATION N/A 03/25/2016    Procedure: Left Heart Cath;  Surgeon: Maria E Gilbert MD;  [facility];  Service:     CARDIAC CATHETERIZATION N/A 03/25/2016    Procedure: Coronary angiography;  Surgeon: Maria E Gilbert MD;  Location:  [facility] CATH INVASIVE LOCATION;  Service:     CARDIAC CATHETERIZATION N/A 03/28/2016    Procedure: Coronary angiography;  Surgeon: Maria E Gilbert MD;  [facility];  Service:     CARDIAC CATHETERIZATION N/A 03/28/2016    Procedure: Stent MOISE coronary;  Surgeon: Maria E Gilbert MD;  [facility];  Service:     CARDIAC CATHETERIZATION N/A 10/18/2018    Procedure: Coronary angiography  no LV gram d/t CKD;  Surgeon: Maria E Gilbert MD;  [facility];  Service: Cardiology    CARDIAC CATHETERIZATION N/A 10/18/2018    Procedure: Left Heart Cath;  Surgeon: Maria E Gilbert MD;  Location: [facility] C[facility] INV[facility] LOC[facility];  Service: Cardiology    CARDIAC CATHETERIZATION N/A 10/18/2018    Procedure: Stent MOISE coronary;  Surgeon: Maria E Gilbert MD;  [facility]  LOCATION;  Service: Cardiology    CARDIAC CATHETERIZATION N/A 05/28/2021    Procedure: Coronary angiography;  Surgeon: Maria E Gilbert MD;  Location:  [facility] CATH INVASIVE LOCATION;  Service: Cardiovascular;  Laterality: N/A;    CARDIAC CATHETERIZATION N/A 05/28/2021    Procedure: Left Heart Cath;  Surgeon: Maria E Gilbert MD;  Location:  [facility] CATH INVASIVE LOCATION;  Service: Cardiovascular;  Laterality: N/A;    CARDIAC CATHETERIZATION N/A 05/28/2021    Procedure: Left ventriculography;  Surgeon: Maria E Gilbert MD;  Location:  [facility] CATH INVASIVE LOCATION;  Service: Cardiovascular;  Laterality: N/A;    CARDIAC CATHETERIZATION N/A 05/28/2021    Procedure: Stent MOISE coronary;  Surgeon: Maria E Gilbert MD;  Location:  [facility] CATH INVASIVE LOCATION;  Service: Cardiovascular;  Laterality: N/A;    CARDIAC CATHETERIZATION N/A 01/19/2024    Procedure: Left Heart Cath;  Surgeon: Maria E Gilbert MD;  Location:  [facility] CATH INVASIVE LOCATION;  Service: Cardiovascular;  Laterality: N/A;    CARDIAC CATHETERIZATION N/A 01/19/2024    Procedure: Coronary angiography;  Surgeon: Maria E Gilbert MD;  Location:  [facility] CATH INVASIVE LOCATION;  Service: Cardiovascular;  Laterality: N/A;    CARDIAC CATHETERIZATION N/A 01/19/2024    Procedure: Left ventriculography;  Surgeon: Maria E Gilbert MD;  Location:  [facility] CATH INVASIVE LOCATION;  Service: Cardiovascular;  Laterality: N/A;    CAROTID STENT      COLONOSCOPY      CORONARY ANGIOPLASTY      CORONARY STENT PLACEMENT  3/28/2016    EYE SURGERY  9/2017    NECK EXPLORATION N/A     MA RT/LT HEART CATHETERS N/A 10/18/2018    Procedure: Percutaneous Coronary Intervention;  Surgeon: Maria E Gilbert MD;  Location:  [facility] CATH INVASIVE LOCATION;  Service: Cardiology    VASECTOMY         Visit Dx:     ICD-10-CM ICD-9-CM   1. Lumbar radiculopathy  M54.16 724.4   2. Lumbosacral radiculopathy at L5  M54.17 724.4   3. Chronic right-sided low back  pain with right-sided sciatica  M54.41 724.2    G89.29 724.3     338.29          Patient History       Row Name 04/23/25 1300             History    Chief Complaint Pain  -DR      Type of Pain Back pain  -DR      Date Current Problem(s) Began --  chronic  -      Brief Description of Current Complaint Earl Marc is a 79 y.o. male who presents to physical therapy today with primary compliant of L5 radiculopathy and R hip pain that is chronic in nature. Previous lumbar MRI showing spinal stenosis, MRI of brain in 2024 showing moderate generalized atrophy more prominent in the bilateral parietal and temporal lobes. Pt currently being followed by Dr. Mcclellan for suspicion of neurodegenerative disorder which remains undefined, possibly Lewwy Body Dementia vs Parkinsons Disease. 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 He endorses persistent balance difficulty and several near misses but no falls. He is using Rwx at this time. Earl Marc reports difficulty/increased pain with riding in a small car, walking any distance, standing 5-10 minutes. Pain relieving factors include reclined in chair, using rowing machine. PMH includes [place] Palsy, OA, DPN, dyslipidemia, CKD, BPH, CAD, skin CA, type 2 DM. Earl Marc primary goal for attending skilled physical therapy is to help improve functional mobility with less pain.  -      Previous treatment for THIS PROBLEM Rehabilitation  -DR      Patient/Caregiver Goals Relieve pain;Return to prior level of function;Improve mobility;Improve strength;Know what to do to help the symptoms  -      Hand Dominance right-handed  -      Occupation/sports/leisure activities working out- rowing machine  -      Patient seeing anyone else for problem(s)? Dr. Mcclellan  -      What clinical tests have you had for this problem? MRI  -DR      Results of Clinical Tests Lumbar spine MRI done previously showed mild changes also  consistent with spinal stenosis but on my review looked nonsurgical.  Previous brain MRI done summer 2024 shows moderate generalized atrophy more prominent in the bilateral parietal and temporal lobes.  -DR         Pain     Pain Location Back  -DR      Pain at Present 5  -DR      Pain at Best 4  -DR      Pain at Worst 9  -DR      Pain Frequency Constant/continuous  -DR      Pain Description Aching;Radiating;Sharp  -DR      What Performance Factors Make the Current Problem(s) WORSE? riding in a small car, walking any distance, standing 5-10 minutes  -DR      What Performance Factors Make the Current Problem(s) BETTER? reclined in chair, using rowing machine  -DR      Tolerance Time- Standing 5-10 min  -DR      Tolerance Time- Walking any timeframe  -DR      Difficulties with ADL's? has to be extremely cautious with showering 2' fear of fall and dizziness, does not have shower chair; dressing BLE donning/doffing pants and socks  -DR         Fall Risk Assessment    Any falls in the past year: Yes  -DR      Number of falls reported in the last 12 months 10-15 (considers them close calls b/c never hit floor- however, he has fallen back into his chair or onto his bed or into the wall)  -DR      Factors that contributed to the fall: Lost balance  dizziness  -DR      Does patient have a fear of falling Yes (comment)  -DR         Services    Prior Rehab/Home Health Experiences No  -DR         Daily Activities    Primary Language English  -DR      How does patient learn best? Demonstration;Pictures/Video;Listening;Reading  -DR      Barriers to learning None  -DR      Pt Participated in POC and Goals Yes  -DR         Safety    Are you being hurt, hit, or frightened by anyone at home or in your life? No  -DR      Are you being neglected by a caregiver No  -DR      Have you had any of the following issues with Depression;Anxiety  -DR                User Key  (r) = Recorded By, (t) = Taken By, (c) = Cosigned By      Initials Name  Provider Type    Hector Freitas, PT Physical Therapist                     PT Ortho       Row Name 04/23/25 1300       Posture/Observations    Alignment Options Rounded shoulders;Forward head;Lumbar lordosis;Genu varus  -DR    Forward Head Moderate;Mild  -DR    Rounded Shoulders Moderate  -DR    Lumbar lordosis Decreased  -DR    Genu varus Bilateral:;Mild  -DR       Quarter Clearing    Quarter Clearing Lower Quarter Clearing  -DR       Neural Tension Signs- Lower Quarter Clearing    Slump Bilateral:;Positive  mild, more stretching sensations  -DR       Sensory Screen for Light Touch- Lower Quarter Clearing    L1 (inguinal area) Right:;Diminished  -DR    L2 (anterior mid thigh) Bilateral:;Intact  -DR    L3 (distal anterior thigh) Right:;Diminished  -DR    L4 (medial lower leg/foot) Bilateral:;Intact  -DR    L5 (lateral lower leg/great toe) Bilateral:;Intact  -DR    S1 (bottom of foot) Left:;Diminished  -DR       Myotomal Screen- Lower Quarter Clearing    Hip flexion (L2) Left:;4 (Good);Right:;4- (Good -)  -DR    Knee extension (L3) Bilateral:;4 (Good)  -DR    Ankle DF (L4) Bilateral:;4+ (Good +)  -DR    Knee flexion (S2) Left:;4 (Good);Right:;4- (Good -)  -DR       SI/Hip Screen- Lower Quarter Clearing    Arianne's/Philippe's test Bilateral:;Negative  -DR       Special Tests/Palpation    Special Tests/Palpation Lumbar/SI  -DR       MMT (Manual Muscle Testing)    Rt Lower Ext Rt Hip ABduction;Rt Hip ADduction  -DR    Lt Lower Ext Lt Hip ABduction;Lt Hip ADduction  -DR       MMT Right Lower Ext    Rt Hip ABduction MMT, Gross Movement (4+/5) good plus  -DR    Rt Hip ADduction MMT, Gross Movement (5/5) normal  -DR    Rt Lower Extremity Comments  both tested in hooklying  -DR       MMT Left Lower Ext    Lt Hip ABduction MMT, Gross Movement (4+/5) good plus  -DR    Lt Hip ADduction MMT, Gross Movement (5/5) normal  -DR    Lt Lower Extremity Comments  both tested in h/l  -DR       Sensation    Light Touch Partial deficits  in the RLE;Partial deficits in the LLE  -DR       Flexibility    Flexibility Tested? Lower Extremity  -DR       Lower Extremity Flexibility    Hamstrings Bilateral:;Moderately limited;Severely limited  -DR    Hip External Rotators Bilateral:;Mildly limited  -DR    Hip Internal Rotators Bilateral:;WNL  -DR       Gait/Stairs (Locomotion)    Assistive Device (Gait) walker, front-wheeled  -DR    Patient was able to Ambulate yes  -DR    Deviations/Abnormal Patterns (Gait) festinating/shuffling;gait speed decreased;stride length decreased;bilateral deviations  -DR    Bilateral Gait Deviations forward flexed posture  -DR    Left Sided Gait Deviations decreased knee extension;heel strike decreased  -DR    Right Sided Gait Deviations decreased knee extension;heel strike decreased  -DR    Comment, (Gait/Stairs) decreased stride length bialterally, some shuffing of feet  -DR              User Key  (r) = Recorded By, (t) = Taken By, (c) = Cosigned By      Initials Name Provider Type    Hector Freitas, PT Physical Therapist                                Therapy Education  Education Details: Educated on PT role and POC; discussed expectations/timeframe for healing. Provided HEP, Access Code: [email or web address]; discussed fall risk and safety- ensuring no dizziness at time of standing, using rwx at all times, sit to stand transitions.  Given: HEP, Symptoms/condition management, Pain management, Posture/body mechanics, Fall prevention and home safety, Mobility training  Program: New  How Provided: Verbal, Demonstration, Written  Provided to: Patient  Level of Understanding: Teach back education performed, Verbalized, Demonstrated      PT OP Goals       Row Name 04/23/25 1300          PT Short Term Goals    STG Date to Achieve 05/23/25  -     STG 1 Pt. will be independent with initial HEP to improve self-management of condition.  -DR     STG 1 Progress New  -DR     STG 2 Patient will improve average TUG time from ..  seconds to </= ..  seconds with zero episodes of scissoring gait pattern and with mild deviation from straight patient to show improved gait speed and decrease risk for falls.  -     STG 2 Progress New  -     STG 3 Pt will improve walking tolerance from 5 min to at least 15 minutes with minimal gait deviations leading to improved quality of life.  -     STG 3 Progress New  -     STG 4 Patient able to tandem stance bilaterally with 1 finger hold > 15 seconds without LOB for improved core stabilization and decreased fall risk  -     STG 4 Progress New  -     STG 5 --  -     STG 5 Progress --  -        Long Term Goals    LTG Date to Achieve 06/22/25  -     LTG 1 Pt. will be independent with advanced HEP to improve long term independence with self management of condition.  -     LTG 1 Progress New  -     LTG 2 Pt. will score </= 20/50 on Modified Oswestry to indicate improved perception of disability.  -     LTG 2 Progress New  -     LTG 3 Patient will report zero episodes of falling in last 8 weeks to demonstrate improved balance and decrease risk of falling.  -     LTG 3 Progress New  -     LTG 4 Pt will perform 2 min walk test ambulating over 225ft with/without assistive device with SBA/Supervision without LOB with directional changes for improvements in safety with ambulatory tasks in house or community.  -     LTG 4 Progress New  -     LTG 5 Pt will improve B LE strength to 4/5 MMT.  -     LTG 5 Progress New  -DR        Time Calculation    PT Goal Re-Cert Due Date 07/22/25  -               User Key  (r) = Recorded By, (t) = Taken By, (c) = Cosigned By      Initials Name Provider Type    Hector Freitas, PT Physical Therapist                     PT Assessment/Plan       Row Name 04/23/25 1300          PT Assessment    Functional Limitations Impaired locomotion;Limitations in community activities;Performance in leisure activities;Performance in self-care ADL;Impaired gait;Decreased safety  during functional activities;Limitation in home management;Limitations in functional capacity and performance  -DR     Impairments Impaired flexibility;Posture;Poor body mechanics;Pain;Muscle strength;Locomotion;Range of motion;Gait;Joint mobility;Balance;Endurance;Impaired muscle endurance;Impaired postural alignment;Joint integrity;Sensation;Peripheral nerve integrity  -DR     Assessment Comments Earl Marc is a 79 y.o. year-old male referred to physical therapy for L5 radiculopathy and R hip pain. Previous lumbar MRI showing spinal stenosis, MRI of brain in 2024 showing moderate generalized atrophy more prominent in the bilateral parietal and temporal lobes. Pt currently being followed by Dr. Mcclellan for suspicion of neurodegenerative disorder which remains undefined, possibly Lewwy Body Dementia vs Parkinsons Disease. 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 He endorses persistent balance difficulty and several near misses but no falls. He is using Rwx at this time. Earl Marc reports difficulty/increased pain with riding in a small car, walking any distance, standing 5-10 minutes. He presents with an evolving clinical presentation. Pertinent comorbidities and personal factors that may affect progress in the plan of care include, but are not limited to, [place] Palsy, OA, DPN, dyslipidemia, CKD, BPH, CAD, skin CA, type 2 DM, memory deficits, high fall risks, chronicity of symptoms with learned compensatory habits, increased BMI. Self scored disability measure of ADILIA was a 31/50, where 0 is no perceived disability. He demonstrated impaired postural alignment, abnormal gait pattern, high risk for falls and poor endurance with 2MWT (amb 153ft and 2 LOB instances), impaired BLE and hip girdle strength, partial deficits in BLE, negative FABERs bilaterally, mild positive slump test, and impaired BLE flexibility. Signs and symptoms are consistent with  referring diagnosis. He is appropriate for skilled therapy services at this time to address deficits and improve ease with functional mobility.  -DR     Please refer to paper survey for additional self-reported information No  -DR     Rehab Potential Fair  -DR     Patient/caregiver participated in establishment of treatment plan and goals Yes  -DR     Patient would benefit from skilled therapy intervention Yes  -DR        PT Plan    PT Frequency 2x/week  -     Predicted Duration of Therapy Intervention (PT) 12 visits  -DR     Planned CPT's? PT RE-EVAL: 19929;PT THER PROC EA 15 MIN: 41562;PT THER ACT EA 15 MIN: 58967;PT MANUAL THERAPY EA 15 MIN: 10471;PT NEUROMUSC RE-EDUCATION EA 15 MIN: 21098;PT GAIT TRAINING EA 15 MIN: 45772;PT SELF CARE/HOME MGMT/TRAIN EA 15: 11809;PT HOT OR COLD PACK TREAT MCARE;PT ELECTRICAL STIM UNATTEND: ;PT ELECTRICAL STIM ATTD EA 15 MIN: 50463;PT ULTRASOUND EA 15 MIN: 05861;PT TRACTION LUMBAR: 54951;PT EVAL MOD COMPLELITY: 71581  -DR     PT Plan Comments assess pt tolerance to initial evaluation/compliance with HEP. begin on nustep and review HEP. consider TUG test for turns and fall risks - add goals. consider PPT, bridges with PPT, LAQ, side steps, tandem walking, NBOS and 1/2 tandem stance?  -               User Key  (r) = Recorded By, (t) = Taken By, (c) = Cosigned By      Initials Name Provider Type    Hector Freitas, PT Physical Therapist                       OP Exercises       Row Name 04/23/25 1300             Subjective    Subjective Comments eval  -DR         Total Minutes    56766 - PT Therapeutic Exercise Minutes 8  -DR         Exercise 1    Exercise Name 1 nustep  -      Additional Comments next visit  -DR         Exercise 2    Exercise Name 2 STS  -DR      Cueing 2 Demo  -DR      Reps 2 10  -DR         Exercise 3    Exercise Name 3 standing HR  -DR      Cueing 3 Demo  -      Reps 3 20  -DR         Exercise 4    Exercise Name 4 standing marches  -                 User Key  (r) = Recorded By, (t) = Taken By, (c) = Cosigned By      Initials Name Provider Type    Hector Freitas, PT Physical Therapist                                  Outcome Measure Options: Modified Oswestry, 5x Sit to Stand, 2 Minute Walk Test  2 Minute Walk Test  Gait, Assistive Device: rolling walker  Distance Ambulated in 2 Minutes: 153 (loses balance with turns, had to grab onto wall to regain balance)  5 Times Sit to Stand  5 Times Sit to Stand (seconds): 13.15 seconds  5 Times Sit to Stand Comments: no UE from standard clinic chair  Modified Oswestry  Modified Oswestry Score/Comments: 31/50= 62%      Time Calculation:     Start Time: 1348  Stop Time: 1432  Time Calculation (min): 44 min  Timed Charges  35963 - PT Therapeutic Exercise Minutes: 8  Total Minutes  Timed Charges Total Minutes: 8   Total Minutes: 8     Therapy Charges for Today       Code Description Service Date Service Provider Modifiers Qty    [id number]  PT THER PROC EA 15 MIN 4/23/2025 Hector Moise, PT GP 1    [id number]  PT EVAL MOD COMPLEXITY 2 4/23/2025 Hector Moise, PT GP 1            PT G-Codes  Outcome Measure Options: Modified Oswestry, 5x Sit to Stand, 2 Minute Walk Test  Modified Oswestry Score/Comments: 31/50= 62%         Hector Moise, PT  4/23/2025

## 2025-04-30 ENCOUNTER — OFFICE VISIT (OUTPATIENT)
Dept: FAMILY MEDICINE CLINIC | Facility: CLINIC | Age: 80
End: 2025-04-30
Payer: MEDICARE

## 2025-04-30 ENCOUNTER — APPOINTMENT (OUTPATIENT)
Dept: PHYSICAL THERAPY | Facility: HOSPITAL | Age: 80
End: 2025-04-30
Payer: MEDICARE

## 2025-04-30 VITALS
HEIGHT: 73 IN | OXYGEN SATURATION: 97 % | DIASTOLIC BLOOD PRESSURE: 58 MMHG | SYSTOLIC BLOOD PRESSURE: 140 MMHG | RESPIRATION RATE: 16 BRPM | BODY MASS INDEX: 30.62 KG/M2 | TEMPERATURE: 98.1 F | WEIGHT: 231 LBS | HEART RATE: 63 BPM

## 2025-04-30 DIAGNOSIS — R05.1 ACUTE COUGH: Primary | ICD-10-CM

## 2025-04-30 LAB
EXPIRATION DATE: NORMAL
EXPIRATION DATE: NORMAL
FLUAV AG UPPER RESP QL IA.RAPID: NOT DETECTED
FLUBV AG UPPER RESP QL IA.RAPID: NOT DETECTED
INTERNAL CONTROL: NORMAL
INTERNAL CONTROL: NORMAL
Lab: NORMAL
Lab: NORMAL
S PYO AG THROAT QL: NEGATIVE
SARS-COV-2 AG UPPER RESP QL IA.RAPID: NOT DETECTED

## 2025-04-30 PROCEDURE — 99214 OFFICE O/P EST MOD 30 MIN: CPT | Performed by: INTERNAL MEDICINE

## 2025-04-30 PROCEDURE — 1126F AMNT PAIN NOTED NONE PRSNT: CPT | Performed by: INTERNAL MEDICINE

## 2025-04-30 PROCEDURE — 87428 SARSCOV & INF VIR A&B AG IA: CPT | Performed by: INTERNAL MEDICINE

## 2025-04-30 PROCEDURE — 3078F DIAST BP <80 MM HG: CPT | Performed by: INTERNAL MEDICINE

## 2025-04-30 PROCEDURE — 3077F SYST BP >= 140 MM HG: CPT | Performed by: INTERNAL MEDICINE

## 2025-04-30 PROCEDURE — 87880 STREP A ASSAY W/OPTIC: CPT | Performed by: INTERNAL MEDICINE

## 2025-04-30 RX ORDER — MONTELUKAST SODIUM 10 MG/1
10 TABLET ORAL NIGHTLY
Qty: 30 TABLET | Refills: 0 | Status: SHIPPED | OUTPATIENT
Start: 2025-04-30

## 2025-04-30 RX ORDER — BENZONATATE 100 MG/1
100 CAPSULE ORAL 3 TIMES DAILY
Qty: 30 CAPSULE | Refills: 0 | Status: SHIPPED | OUTPATIENT
Start: 2025-04-30

## 2025-04-30 NOTE — PROGRESS NOTES
Subjective chief complaint is cough  Earl Marc is a 79 y.o. male.     Cough  Associated symptoms: rash    Associated symptoms: no chills and no fever   Earl is here today for complaints of cough.  He does report that his wife's been sick with similar symptoms.  She has been to the [facility] interim doctor.  She was diagnosed with a virus.  She was not given antibiotics.  The patient's symptoms began yesterday.  He is not having fever or chills but he felt like he had a cold.  Today the cough is a little bit worse.    The following portions of the patient's history were reviewed and updated as appropriate: He  has a past medical history of Abnormal cardiovascular stress test, Acid reflux, Arthritis, Asthma, Bell palsy (1978), Benign prostatic hyperplasia (October 2024), Cancer, Cataract, Chronic kidney disease, Colon polyp, Congenital heart disease, COPD (chronic obstructive pulmonary disease) (2021), Coronary artery disease, Diabetes mellitus, Diabetic neuropathy associated with type 2 diabetes mellitus, Diabetic peripheral neuropathy (03/10/2016), Difficulty walking, Dyslipidemia, Erectile dysfunction, Fatty liver disease, nonalcoholic, Fibromyalgia, primary, Gastritis, Glaucoma, Gout, HL (hearing loss), Hyperlipidemia, Hypertension, Hypogonadism male, Infectious viral hepatitis (Hep A July 1959), Low back pain, Memory deficit (01/02/2025), Migraines (09/2023), Myocardial infarction (2018), Obesity, Testosterone deficiency (2001), Tremor, Trigger point of left shoulder region (03/02/2023), and Type 2 diabetes mellitus.  He does not have any pertinent problems on file.  He is allergic to ace inhibitors, hydrogenated palm oil glycerides, lanolin, other, prazosin, and nsaids..    Review of Systems   Constitutional:  Negative for chills and fever.   Respiratory:  Positive for cough.    Skin:  Positive for rash.        He has had a rash on the dorsal surface of his forearms for about a month      Objective   Physical Exam  Vitals and nursing note reviewed.   HENT:      Right Ear: Tympanic membrane and ear canal normal.      Left Ear: Tympanic membrane and ear canal normal.      Nose: Congestion and rhinorrhea present.      Mouth/Throat:      Mouth: Mucous membranes are moist.      Pharynx: Oropharynx is clear. No oropharyngeal exudate or posterior oropharyngeal erythema.   Cardiovascular:      Rate and Rhythm: Normal rate.   Pulmonary:      Effort: Pulmonary effort is normal.      Breath sounds: No wheezing, rhonchi or rales.     Assessment & Plan   Diagnoses and all orders for this visit:    1. Acute cough (Primary)    Other orders  -     montelukast (SINGULAIR) 10 MG tablet; Take 1 tablet by mouth Every Night.  Dispense: 30 tablet; Refill: 0  -     benzonatate (Tessalon Perles) 100 MG capsule; Take 1 capsule by mouth 3 times a day.  Dispense: 30 capsule; Refill: 0    Earl is here today for a cough.  His COVID and influenza testing were negative.  This has the appearance of either allergy or separate virus.  He is already using some Flonase.  He is only 1 day and his symptoms.  I certainly do not see anything that would require antibiotics.  I am going to have him use some Singulair and Tessalon Perles

## 2025-05-01 ENCOUNTER — TELEPHONE (OUTPATIENT)
Dept: FAMILY MEDICINE CLINIC | Facility: CLINIC | Age: 80
End: 2025-05-01
Payer: MEDICARE

## 2025-05-01 NOTE — TELEPHONE ENCOUNTER
Caller: Earl Marc    Relationship: Self    Best call back number: [phone]     Who are you requesting to speak with (clinical staff, provider,  specific staff member): CLINICAL STAFF    What was the call regarding: STARTED COUGHING UP YELLOW MUCUS YESTERDAY WHEN SEEN IT WAS ONLY CLEAR WANTS TO KNOW SHOULD HE GET ON ANTIBIOTICS NOW PLEASE CALL AND ADVISE     PHARMACY: [id number] [address] [place] [address] AT [facility] [phone] [facility] [phone]     Advised that the yellow discoloration does not necessarily indicate bacterial infection.  It can just be inflammation from a viral infection.   Problem: Potential for Falls  Goal: Patient will remain free of falls  INTERVENTIONS:  - Assess patient frequently for physical needs  -  Identify cognitive and physical deficits and behaviors that affect risk of falls    -  [place] fall precautions as indicated by assessment   - Educate patient/family on patient safety including physical limitations  - Instruct patient to call for assistance with activity based on assessment  - Modify environment to reduce risk of injury  - Consider OT/PT consult to assist with strengthening/mobility   Outcome: Progressing      Problem: Prexisting or High Potential for Compromised Skin Integrity  Goal: Skin integrity is maintained or improved  INTERVENTIONS:  - Identify patients at risk for skin breakdown  - Assess and monitor skin integrity  - Assess and monitor nutrition and hydration status  - Monitor labs (i e  albumin)  - Assess for incontinence   - Turn and reposition patient  - Assist with mobility/ambulation  - Relieve pressure over bony prominences  - Avoid friction and shearing  - Provide appropriate hygiene as needed including keeping skin clean and dry  - Evaluate need for skin moisturizer/barrier cream  - Collaborate with interdisciplinary team (i e  Nutrition, Rehabilitation, etc )   - Patient/family teaching   Outcome: Progressing      Problem: PAIN - ADULT  Goal: Verbalizes/displays adequate comfort level or baseline comfort level  Interventions:  - Encourage patient to monitor pain and request assistance  - Assess pain using appropriate pain scale  - Administer analgesics based on type and severity of pain and evaluate response  - Implement non-pharmacological measures as appropriate and evaluate response  - Consider cultural and social influences on pain and pain management  - Notify physician/advanced practitioner if interventions unsuccessful or patient reports new pain   Outcome: Progressing      Problem: INFECTION - ADULT  Goal: Absence or prevention of progression during hospitalization  INTERVENTIONS:  - Assess and monitor for signs and symptoms of infection  - Monitor lab/diagnostic results  - Monitor all insertion sites, i e  indwelling lines, tubes, and drains  - Monitor endotracheal (as able) and nasal secretions for changes in amount and color  - Bates appropriate cooling/warming therapies per order  - Administer medications as ordered  - Instruct and encourage patient and family to use good hand hygiene technique  - Identify and instruct in appropriate isolation precautions for identified infection/condition   Outcome: Progressing      Problem: SAFETY ADULT  Goal: Maintain or return to baseline ADL function  INTERVENTIONS:  -  Assess patient's ability to carry out ADLs; assess patient's baseline for ADL function and identify physical deficits which impact ability to perform ADLs (bathing, care of mouth/teeth, toileting, grooming, dressing, etc )  - Assess/evaluate cause of self-care deficits   - Assess range of motion  - Assess patient's mobility; develop plan if impaired  - Assess patient's need for assistive devices and provide as appropriate  - Encourage maximum independence but intervene and supervise when necessary  ¯ Involve family in performance of ADLs  ¯ Assess for home care needs following discharge   ¯ Request OT consult to assist with ADL evaluation and planning for discharge  ¯ Provide patient education as appropriate   Outcome: Progressing    Goal: Maintain or return mobility status to optimal level  INTERVENTIONS:  - Assess patient's baseline mobility status (ambulation, transfers, stairs, etc )    - Identify cognitive and physical deficits and behaviors that affect mobility  - Identify mobility aids required to assist with transfers and/or ambulation (gait belt, sit-to-stand, lift, walker, cane, etc )  - Bates fall precautions as indicated by assessment  - Record patient progress and toleration of activity level on Mobility SBAR; progress patient to next Phase/Stage  - Instruct patient to call for assistance with activity based on assessment  - Request Rehabilitation consult to assist with strengthening/weightbearing, etc    Outcome: Progressing      Problem: DISCHARGE PLANNING  Goal: Discharge to home or other facility with appropriate resources  INTERVENTIONS:  - Identify barriers to discharge w/patient and caregiver  - Arrange for needed discharge resources and transportation as appropriate  - Identify discharge learning needs (meds, wound care, etc )  - Arrange for interpretive services to assist at discharge as needed  - Refer to Case Management Department for coordinating discharge planning if the patient needs post-hospital services based on physician/advanced practitioner order or complex needs related to functional status, cognitive ability, or social support system   Outcome: Progressing      Problem: Knowledge Deficit  Goal: Patient/family/caregiver demonstrates understanding of disease process, treatment plan, medications, and discharge instructions  Complete learning assessment and assess knowledge base    Interventions:  - Provide teaching at level of understanding  - Provide teaching via preferred learning methods   Outcome: Progressing      Problem: DISCHARGE PLANNING - CARE MANAGEMENT  Goal: Discharge to post-acute care or home with appropriate resources  INTERVENTIONS:  - Conduct assessment to determine patient/family and health care team treatment goals, and need for post-acute services based on payer coverage, community resources, and patient preferences, and barriers to discharge  - Address psychosocial, clinical, and financial barriers to discharge as identified in assessment in conjunction with the patient/family and health care team  - Arrange appropriate level of post-acute services according to patient's   needs and preference and payer coverage in collaboration with the physician and health care team  - Communicate with and update the patient/family, physician, and health care team regarding progress on the discharge plan  - Arrange appropriate transportation to post-acute venues   Outcome: Progressing      Problem: Nutrition/Hydration-ADULT  Goal: Nutrient/Hydration intake appropriate for improving, restoring or maintaining nutritional needs  Monitor and assess patient's nutrition/hydration status for malnutrition (ex- brittle hair, bruises, dry skin, pale skin and conjunctiva, muscle wasting, smooth red tongue, and disorientation)  Collaborate with interdisciplinary team and initiate plan and interventions as ordered  Monitor patient's weight and dietary intake as ordered or per policy  Utilize nutrition screening tool and intervene per policy  Determine patient's food preferences and provide high-protein, high-caloric foods as appropriate       INTERVENTIONS:  - Monitor oral intake, urinary output, labs, and treatment plans  - Assess nutrition and hydration status and recommend course of action  - Evaluate amount of meals eaten  - Assist patient with eating if necessary   - Allow adequate time for meals  - Recommend/ encourage appropriate diets, oral nutritional supplements, and vitamin/mineral supplements  - Order, calculate, and assess calorie counts as needed  - Recommend, monitor, and adjust tube feedings and TPN/PPN based on assessed needs  - Assess need for intravenous fluids  - Provide specific nutrition/hydration education as appropriate  - Include patient/family/caregiver in decisions related to nutrition   Outcome: Progressing

## 2025-05-07 ENCOUNTER — HOSPITAL ENCOUNTER (OUTPATIENT)
Dept: PHYSICAL THERAPY | Facility: HOSPITAL | Age: 80
Setting detail: THERAPIES SERIES
Discharge: HOME | End: 2025-05-07
Payer: MEDICARE

## 2025-05-07 DIAGNOSIS — M54.16 LUMBAR RADICULOPATHY: Primary | ICD-10-CM

## 2025-05-07 DIAGNOSIS — R26.89 IMBALANCE: ICD-10-CM

## 2025-05-07 DIAGNOSIS — M54.17 LUMBOSACRAL RADICULOPATHY AT L5: ICD-10-CM

## 2025-05-07 DIAGNOSIS — M54.41 CHRONIC RIGHT-SIDED LOW BACK PAIN WITH RIGHT-SIDED SCIATICA: ICD-10-CM

## 2025-05-07 DIAGNOSIS — G89.29 CHRONIC RIGHT-SIDED LOW BACK PAIN WITH RIGHT-SIDED SCIATICA: ICD-10-CM

## 2025-05-07 PROCEDURE — 97110 THERAPEUTIC EXERCISES: CPT

## 2025-05-07 PROCEDURE — 97530 THERAPEUTIC ACTIVITIES: CPT

## 2025-05-07 NOTE — THERAPY TREATMENT NOTE
Outpatient Physical Therapy Ortho Treatment Note  [facility]     Patient Name: Earl Marc  : 1945  MRN: [id number]  Today's Date: 2025      Visit Date: 2025    Visit Dx:    ICD-10-CM ICD-9-CM   1. Lumbar radiculopathy  M54.16 724.4   2. Lumbosacral radiculopathy at L5  M54.17 724.4   3. Chronic right-sided low back pain with right-sided sciatica  M54.41 724.2    G89.29 724.3     338.29   4. Imbalance  R26.89 781.2       Patient Active Problem List   Diagnosis    Bell's palsy    Persistent insomnia    Osteoarthritis of cervical spine    Diabetic peripheral neuropathy    Dyslipidemia    Steatosis of liver    Fibromyalgia    Gastroesophageal reflux disease without esophagitis    Hypertensive kidney disease with stage 3a chronic kidney disease    Hypogonadism in male    Renal insufficiency    Vitamin D deficiency    Benign non-nodular prostatic hyperplasia with lower urinary tract symptoms    S/P drug eluting coronary stent placement    Coronary artery disease involving native coronary artery of native heart    Malignant neoplasm of skin    Type 2 diabetes mellitus with hyperglycemia, with long-term current use of insulin    Diabetes mellitus    Chronic insomnia    Other specified glaucoma    Vertigo    Epigastric pain    Chest pain with high risk for cardiac etiology    Precordial pain    S/P arthroscopy of shoulder    Peripheral neuropathy    Stage 3b chronic kidney disease    Chest pain    Stroke-like symptoms    Acute back pain    Thoracic 12 compression fracture, closed, initial encounter    Hypertensive urgency    Sudden onset of severe headache    Visual hallucinations    RBD (REM behavioral disorder)    Lucho Bonnet syndrome    Memory deficit        Past Medical History:   Diagnosis Date    Abnormal cardiovascular stress test     Acid reflux     Arthritis     Asthma     Bell palsy     In [place]    Benign prostatic hyperplasia 2024    Appt with Dr Mendoza     Cancer      skin     Cataract     Chronic kidney disease     Colon polyp     Congenital heart disease     COPD (chronic obstructive pulmonary disease) 2021    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    Low back pain     Memory deficit 01/02/2025    Migraines 09/2023    Myocardial infarction 2018    Obesity     Testosterone deficiency 2001    Low testosterone    Tremor     Trigger point of left shoulder region 03/02/2023    Type 2 diabetes mellitus         Past Surgical History:   Procedure Laterality Date    CARDIAC CATHETERIZATION N/A 03/25/2016    Procedure: Left Heart Cath;  Surgeon: Maria E Gilbert MD;  Location:  [facility] CATH INVASIVE LOCATION;  Service:     CARDIAC CATHETERIZATION N/A 03/25/2016    Procedure: Coronary angiography;  Surgeon: Maria E Gilbert MD;  [facility];  Service:     CARDIAC CATHETERIZATION N/A 03/28/2016    Procedure: Coronary angiography;  Surgeon: Maria E Gilbert MD;  [facility];  Service:     CARDIAC CATHETERIZATION N/A 03/28/2016    Procedure: Stent MOISE coronary;  Surgeon: Maria E Gilbert MD;  [facility];  Service:     CARDIAC CATHETERIZATION N/A 10/18/2018    Procedure: Coronary angiography  no LV gram d/t CKD;  Surgeon: Maria E Gilbert MD;  [facility];  Service: Cardiology    CARDIAC CATHETERIZATION N/A 10/18/2018    Procedure: Left  Heart Cath;  Surgeon: Maria E Gilbert MD;  Location:  [facility] CATH INVASIVE LOCATION;  Service: Cardiology    CARDIAC CATHETERIZATION N/A 10/18/2018    Procedure: Stent MOISE coronary;  Surgeon: Maria E Gilbert MD;  Location:  [facility] CATH INVASIVE LOCATION;  Service: Cardiology    CARDIAC CATHETERIZATION N/A 05/28/2021    Procedure: Coronary angiography;  Surgeon: Maria E Gilbert MD;  Location:  [facility] CATH INVASIVE LOCATION;  Service: Cardiovascular;  Laterality: N/A;    CARDIAC CATHETERIZATION N/A 05/28/2021    Procedure: Left Heart Cath;  Surgeon: Maria E Gilbert MD;  Location:  [facility] CATH INVASIVE LOCATION;  Service: Cardiovascular;  Laterality: N/A;    CARDIAC CATHETERIZATION N/A 05/28/2021    Procedure: Left ventriculography;  Surgeon: Maria E Gilbert MD;  Location:  [facility] CATH INVASIVE LOCATION;  Service: Cardiovascular;  Laterality: N/A;    CARDIAC CATHETERIZATION N/A 05/28/2021    Procedure: Stent MOISE coronary;  Surgeon: Maria E Gilbert MD;  Location:  [facility] C[facility] INVASIVE LOCATION;  Service: Cardiovascular;  Laterality: N/A;    CARDIAC CATHETERIZATION N/A 01/19/2024    Procedure: Left Heart Cath;  Surgeon: Maria E Gilbert MD;  Location:  [facility] CATH INVASIVE LOCATION;  Service: Cardiovascular;  Laterality: N/A;    CARDIAC CATHETERIZATION N/A 01/19/2024    Procedure: Coronary angiography;  Surgeon: Maria E Gilbert MD;  Location:  [facility] CATH INVASIVE LOCATION;  Service: Cardiovascular;  Laterality: N/A;    CARDIAC CATHETERIZATION N/A 01/19/2024    Procedure: Left ventriculography;  Surgeon: Maria E Gilbert MD;  [facility];  Service: Cardiovascular;  Laterality: N/A;    CAROTID STENT      COLONOSCOPY      CORONARY ANGIOPLASTY      CORONARY STENT PLACEMENT  3/28/2016    EYE SURGERY  9/2017    NECK EXPLORATION N/A     MS RT/LT HEART CATHETERS N/A 10/18/2018    Procedure: Percutaneous Coronary Intervention;  Surgeon:  Maria E Gilbert MD;  Location: [facility] INVASIVE LOCATION;  Service: Cardiology    VASECTOMY                          PT Assessment/Plan       Row Name 05/07/25 1500          PT Assessment    Assessment Comments Earl returns to PT for first f/u since evaluation for L5 radiculopathy, gait and balance impairments. Pt had to cancel a few appts 2' to being sick, however, he has kept up with HEP. Today, he ambulates into clinic with SPC vs RWx that he used at evaluation. He reports using RWx as needed, specifically when he feels off balance. Began on nustep for dynamic B LE warm up, followed with review of HEP, which he performed well with no cues required. Initiated multiple new exercises today to progress balance, strength, and flexibility. These included tandem walking, side steps, tandem stance for time, NBOS EC, supine piriformis stretch, figure 4 stretch, LTR. Completed TUG test, which pt scored 11.5s on using no AD and no unsteadiness noted. Updated HEP to include LBP stretches and reviewed daily completion. Pt remains appropriate for skilled PT.  -DR        PT Plan    PT Plan Comments PPT, LAQ, bridges? update HEP if good tolerance to first f/u visit.  -               User Key  (r) = Recorded By, (t) = Taken By, (c) = Cosigned By      Initials Name Provider Type    Hector Freitas, PT Physical Therapist                       OP Exercises       Row Name 05/07/25 1400             Subjective    Subjective Comments I had  to cancel on GW twice because I had a pretty bad cold.  -         Subjective Pain    Able to rate subjective pain? yes  -DR      Pre-Treatment Pain Level 3  -DR         Total Minutes    65289 - PT Therapeutic Exercise Minutes 30  -DR      60957 - PT Therapeutic Activity Minutes 10  -DR         Exercise 1    Exercise Name 1 nustep  -      Time 1 5 min  -DR         Exercise 2    Exercise Name 2 STS  -      Cueing 2 Demo  -      Sets 2 2  -DR      Reps 2 10  -          Exercise 3    Exercise Name 3 standing HR  -DR      Cueing 3 Demo  -DR      Reps 3 20  -DR         Exercise 4    Exercise Name 4 standing marches  -DR      Cueing 4 Verbal  -DR      Sets 4 2e  -DR      Reps 4 10  -DR      Time 4 alt  -DR         Exercise 5    Exercise Name 5 tandem walking  -DR      Cueing 5 Verbal;Demo  -DR      Reps 5 3 laps  -DR      Time 5 fwd only  -DR         Exercise 6    Exercise Name 6 TUG test assessment  -DR      Time 6 3 min for instruction and completion + make a goal  -DR         Exercise 7    Exercise Name 7 NBOS EC  -DR      Cueing 7 Verbal;Demo  -DR      Reps 7 3  -DR      Time 7 20s  -DR         Exercise 8    Exercise Name 8 tandem stance  -DR      Cueing 8 Verbal;Demo  -DR      Reps 8 3e  -DR      Time 8 20s  -DR      Additional Comments EO, gait belt on  -DR         Exercise 9    Exercise Name 9 side steps  -DR      Cueing 9 Verbal;Demo  -DR      Reps 9 3 laps  -DR      Time 9 RTB thighs  -DR         Exercise 10    Exercise Name 10 LTR  -DR      Cueing 10 Verbal;Demo  -DR      Reps 10 15e  -DR         Exercise 11    Exercise Name 11 supine piriformis str/figure 4 str  -DR      Cueing 11 Verbal;Demo  -DR      Reps 11 3e  -DR      Time 11 20s  -DR                User Key  (r) = Recorded By, (t) = Taken By, (c) = Cosigned By      Initials Name Provider Type    Hector Freitas, PT Physical Therapist                                  PT OP Goals       Row Name 05/07/25 1400          PT Short Term Goals    STG Date to Achieve 05/23/25  -DR     STG 1 Pt. will be independent with initial HEP to improve self-management of condition.  -     STG 1 Progress Ongoing  -DR     STG 2 Patient will improve average TUG time from 11.5  seconds to </= 10 seconds with zero episodes of scissoring gait pattern and with mild deviation from straight patient to show improved gait speed and decrease risk for falls.  -     STG 2 Progress Ongoing  -DR     STG 3 Pt will improve walking tolerance from 5  min to at least 15 minutes with minimal gait deviations leading to improved quality of life.  -     STG 3 Progress Ongoing  -     STG 4 Patient able to tandem stance bilaterally with 1 finger hold > 15 seconds without LOB for improved core stabilization and decreased fall risk  -     STG 4 Progress Ongoing  -        Long Term Goals    LTG Date to Achieve 06/22/25  -     LTG 1 Pt. will be independent with advanced HEP to improve long term independence with self management of condition.  -     LTG 1 Progress Ongoing  -     LTG 2 Pt. will score </= 20/50 on Modified Oswestry to indicate improved perception of disability.  -     LTG 2 Progress Ongoing  -     LTG 3 Patient will report zero episodes of falling in last 8 weeks to demonstrate improved balance and decrease risk of falling.  -     LTG 3 Progress Ongoing  -     LTG 4 Pt will perform 2 min walk test ambulating over 225ft with/without assistive device with SBA/Supervision without LOB with directional changes for improvements in safety with ambulatory tasks in house or community.  -     LTG 4 Progress Ongoing  -     LTG 5 Pt will improve B LE strength to 4/5 MMT.  -     LTG 5 Progress Ongoing  JOSE               User Key  (r) = Recorded By, (t) = Taken By, (c) = Cosigned By      Initials Name Provider Type    Hector Freitas, PT Physical Therapist                    Therapy Education  Education Details: updated HEP with LBP stretches  Given: HEP, Symptoms/condition management, Pain management, Posture/body mechanics, Fall prevention and home safety, Mobility training  Program: Reinforced, Progressed  How Provided: Verbal, Demonstration, Written  Provided to: Patient  Level of Understanding: Teach back education performed, Verbalized, Demonstrated    Outcome Measure Options: Timed Up and Go (TUG)  Timed Up and Go (TUG)  TUG Test 1: 11.5 seconds  Timed Up and Go Comments: no AD, gait belt donned      Time Calculation:   Start Time:  1432  Stop Time: 1512  Time Calculation (min): 40 min  Timed Charges  79761 - PT Therapeutic Exercise Minutes: 30  48441 - PT Therapeutic Activity Minutes: 10  Total Minutes  Timed Charges Total Minutes: 40   Total Minutes: 40  Therapy Charges for Today       Code Description Service Date Service Provider Modifiers Qty    32502338021  PT THER PROC EA 15 MIN 5/7/2025 Hector Moise, PT GP 2    [id number]  PT THERAPEUTIC ACT EA 15 MIN 5/7/2025 Hector Moise, PT GP 1            PT G-Codes  Outcome Measure Options: Timed Up and Go (TUG)  TUG Test 1: 11.5 seconds         Hector Moise, PT  5/7/2025

## 2025-05-09 ENCOUNTER — HOSPITAL ENCOUNTER (OUTPATIENT)
Dept: PHYSICAL THERAPY | Facility: HOSPITAL | Age: 80
Setting detail: THERAPIES SERIES
Discharge: HOME | End: 2025-05-09
Payer: MEDICARE

## 2025-05-09 DIAGNOSIS — M54.41 CHRONIC RIGHT-SIDED LOW BACK PAIN WITH RIGHT-SIDED SCIATICA: ICD-10-CM

## 2025-05-09 DIAGNOSIS — G89.29 CHRONIC RIGHT-SIDED LOW BACK PAIN WITH RIGHT-SIDED SCIATICA: ICD-10-CM

## 2025-05-09 DIAGNOSIS — R26.89 IMBALANCE: ICD-10-CM

## 2025-05-09 DIAGNOSIS — M54.16 LUMBAR RADICULOPATHY: Primary | ICD-10-CM

## 2025-05-09 DIAGNOSIS — M54.17 LUMBOSACRAL RADICULOPATHY AT L5: ICD-10-CM

## 2025-05-09 PROCEDURE — 97110 THERAPEUTIC EXERCISES: CPT | Performed by: PHYSICAL THERAPIST

## 2025-05-09 NOTE — THERAPY TREATMENT NOTE
Outpatient Physical Therapy Ortho Treatment Note  [facility]     Patient Name: Earl Marc  : 1945  MRN: [id number]  Today's Date: 2025      Visit Date: 2025    Visit Dx:    ICD-10-CM ICD-9-CM   1. Lumbar radiculopathy  M54.16 724.4   2. Lumbosacral radiculopathy at L5  M54.17 724.4   3. Chronic right-sided low back pain with right-sided sciatica  M54.41 724.2    G89.29 724.3     338.29   4. Imbalance  R26.89 781.2       Patient Active Problem List   Diagnosis    Bell's palsy    Persistent insomnia    Osteoarthritis of cervical spine    Diabetic peripheral neuropathy    Dyslipidemia    Steatosis of liver    Fibromyalgia    Gastroesophageal reflux disease without esophagitis    Hypertensive kidney disease with stage 3a chronic kidney disease    Hypogonadism in male    Renal insufficiency    Vitamin D deficiency    Benign non-nodular prostatic hyperplasia with lower urinary tract symptoms    S/P drug eluting coronary stent placement    Coronary artery disease involving native coronary artery of native heart    Malignant neoplasm of skin    Type 2 diabetes mellitus with hyperglycemia, with long-term current use of insulin    Diabetes mellitus    Chronic insomnia    Other specified glaucoma    Vertigo    Epigastric pain    Chest pain with high risk for cardiac etiology    Precordial pain    S/P arthroscopy of shoulder    Peripheral neuropathy    Stage 3b chronic kidney disease    Chest pain    Stroke-like symptoms    Acute back pain    Thoracic 12 compression fracture, closed, initial encounter    Hypertensive urgency    Sudden onset of severe headache    Visual hallucinations    RBD (REM behavioral disorder)    Lucho Bonnet syndrome    Memory deficit        Past Medical History:   Diagnosis Date    Abnormal cardiovascular stress test     Acid reflux     Arthritis     Asthma     Bell palsy     In [place]    Benign prostatic hyperplasia 2024    Appt with Dr Mendoza     Cancer      skin     Cataract     Chronic kidney disease     Colon polyp     Congenital heart disease     COPD (chronic obstructive pulmonary disease) 2021    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    Low back pain     Memory deficit 01/02/2025    Migraines 09/2023    Myocardial infarction 2018    Obesity     Testosterone deficiency 2001    Low testosterone    Tremor     Trigger point of left shoulder region 03/02/2023    Type 2 diabetes mellitus         Past Surgical History:   Procedure Laterality Date    CARDIAC CATHETERIZATION N/A 03/25/2016    Procedure: Left Heart Cath;  Surgeon: Maria E Gilbert MD;  Location:  [facility] CATH INVASIVE LOCATION;  Service:     CARDIAC CATHETERIZATION N/A 03/25/2016    Procedure: Coronary angiography;  Surgeon: Maria E Gilbert MD;  [facility];  Service:     CARDIAC CATHETERIZATION N/A 03/28/2016    Procedure: Coronary angiography;  Surgeon: Maria E Gilbert MD;  [facility];  Service:     CARDIAC CATHETERIZATION N/A 03/28/2016    Procedure: Stent MOISE coronary;  Surgeon: Maria E Gilbert MD;  [facility];  Service:     CARDIAC CATHETERIZATION N/A 10/18/2018    Procedure: Coronary angiography  no LV gram d/t CKD;  Surgeon: Maria E Gilbert MD;  [facility];  Service: Cardiology    CARDIAC CATHETERIZATION N/A 10/18/2018    Procedure: Left  Heart Cath;  Surgeon: Maria E Gilbert MD;  Location:  [facility] INVASIVE [facility]ATION;  Service: Cardiology    CARDIAC CATHETERIZATION N/A 10/18/2018    Procedure: Stent MOISE coronary;  Surgeon: Maria E Gilbert MD;  Location:  [facility] CATH INVASIVE LOCATION;  Service: Cardiology    CARDIAC CATHETERIZATION N/A 05/28/2021    Procedure: Coronary angiography;  Surgeon: Maria E Gilbert MD;  Location:  [facility] CATH INVASIVE [facility];  Service: Cardiovascular;  Laterality: N/A;    CARDIAC CATHETERIZATION N/A 05/28/2021    Procedure: Left Heart Cath;  Surgeon: Maria E Gilbert MD;  Location:  [facility]ATH INVASIVE [facility];  Service: Cardiovascular;  Laterality: N/A;    CARDIAC CATHETERIZATION N/A 05/28/2021    Procedure: Left ventriculography;  Surgeon: Maria E Gilbert MD;  Location:  [facility] C[facility] INVASIVE [facility];  Service: Cardiovascular;  Laterality: N/A;    CARDIAC CATHETERIZATION N/A 05/28/2021    Procedure: Stent MOISE coronary;  Surgeon: Maria E Gilbert MD;  Location:  [facility] INVASIVE LOC[facility];  Service: Cardiovascular;  Laterality: N/A;    CARDIAC CATHETERIZATION N/A 01/19/2024    Procedure: Left Heart Cath;  Surgeon: Maria E Gilbert MD;  Location:  [facility] CATH INVASIVE LOCATION;  Service: Cardiovascular;  Laterality: N/A;    CARDIAC CATHETERIZATION N/A 01/19/2024    Procedure: Coronary angiography;  Surgeon: Maria E Gilbert MD;  Location:  [facility] INVASIVE LOCATION;  Service: Cardiovascular;  Laterality: N/A;    CARDIAC CATHETERIZATION N/A 01/19/2024    Procedure: Left ventriculography;  Surgeon: Maria E Gilbert MD;  [facility];  Service: Cardiovascular;  Laterality: N/A;    CAROTID STENT      COLONOSCOPY      CORONARY ANGIOPLASTY      CORONARY STENT PLACEMENT  3/28/2016    EYE SURGERY  9/2017    NECK EXPLORATION N/A     CT RT/LT HEART CATHETERS N/A 10/18/2018    Procedure: Percutaneous Coronary Intervention;  Surgeon:  Maria E Gilbert MD;  Location: [facility] INVASIVE LOCATION;  Service: Cardiology    VASECTOMY                          PT Assessment/Plan       Row Name 05/09/25 1325          PT Assessment    Assessment Comments Mr. Marc returns to the clinic for his back pain, gait/balance. He reports 6/10 LBP.  He presents ambulating with SC.  He reports losing his balance at least once a day, catching himself on walls.  Discussed benefits of the use of walker especially in terms of safety/energy conservation.  Encouraged him to return to using his walker again.He reports he may be getting a rollator soon. Continued to work on LE strengthening and balancing activities. He performed quite well with balancing activities today, requiring very minimal use of UE assist.  He does report that his back pain is more of a concern then his balance.  He reports good adherence to HEP at this time.  Mr. Marc continues to be a good candidate for skilled physical therpay.  -GJ        PT Plan    PT Plan Comments assess response to previous session, Add PPT, seated HS stretch, ?bridges,  -GJ               User Key  (r) = Recorded By, (t) = Taken By, (c) = Cosigned By      Initials Name Provider Type     Earl Goodrich, PT Physical Therapist                       OP Exercises       Row Name 05/09/25 1312 05/09/25 1300          Subjective    Subjective Comments -- trying to get over this cold  -GJ        Subjective Pain    Pre-Treatment Pain Level -- 6  -GJ     Subjective Pain Comment -- LBP  -GJ        Total Minutes    82107 - PT Therapeutic Exercise Minutes 40  -GJ --        Exercise 1    Exercise Name 1 -- nustep  -GJ     Time 1 -- 5 min  -GJ        Exercise 2    Exercise Name 2 -- STS  -GJ     Cueing 2 -- Demo  -GJ     Sets 2 -- 2  -GJ     Reps 2 -- 10  -GJ     Time 2 -- no hands, 50 cm surface  -GJ        Exercise 3    Exercise Name 3 -- standing HR  -GJ     Cueing 3 -- Demo  -GJ     Reps 3 -- 20  -GJ     Additional Comments --  0-1 UE support  -GJ        Exercise 4    Exercise Name 4 -- standing marches  -GJ     Cueing 4 -- Verbal  -GJ     Sets 4 -- 2e  -GJ     Reps 4 -- 10  -GJ     Time 4 -- alt  -GJ     Additional Comments -- 0 UE's  -GJ        Exercise 5    Exercise Name 5 -- tandem walking  -GJ     Cueing 5 -- Verbal;Demo  -GJ     Reps 5 -- 3 laps  -GJ     Time 5 -- F/B  -GJ     Additional Comments -- intermittent use of UE's  -GJ        Exercise 7    Exercise Name 7 -- NBOS EC  -GJ     Cueing 7 -- Verbal;Demo  -GJ     Reps 7 -- 3  -GJ     Time 7 -- 30s  -GJ     Additional Comments -- first set on ground, set 2, 3 on blue foam  -GJ        Exercise 8    Exercise Name 8 -- tandem stance  -GJ     Cueing 8 -- Verbal;Demo  -GJ     Reps 8 -- 3 each  -GJ     Time 8 -- 30s  -GJ     Additional Comments -- EO  -GJ        Exercise 9    Exercise Name 9 -- side steps  -GJ     Cueing 9 -- Verbal;Demo  -GJ     Reps 9 -- 3 laps  -GJ     Time 9 -- RTB thighs  -GJ     Additional Comments -- no UE's  -GJ               User Key  (r) = Recorded By, (t) = Taken By, (c) = Cosigned By      Initials Name Provider Type    Earl Pitt, PT Physical Therapist                                  PT OP Goals       Row Name 05/09/25 1300          PT Short Term Goals    STG Date to Achieve 05/23/25  -GJ     STG 1 Pt. will be independent with initial HEP to improve self-management of condition.  -GJ     STG 1 Progress Ongoing  -GJ     STG 2 Patient will improve average TUG time from 11.5  seconds to </= 10 seconds with zero episodes of scissoring gait pattern and with mild deviation from straight patient to show improved gait speed and decrease risk for falls.  -GJ     STG 2 Progress Ongoing  -GJ     STG 3 Pt will improve walking tolerance from 5 min to at least 15 minutes with minimal gait deviations leading to improved quality of life.  -GJ     STG 3 Progress Ongoing  -GJ     STG 4 Patient able to tandem stance bilaterally with 1 finger hold > 15 seconds without  LOB for improved core stabilization and decreased fall risk  -GJ     STG 4 Progress Ongoing  -GJ        Long Term Goals    LTG Date to Achieve 06/22/25  -GJ     LTG 1 Pt. will be independent with advanced HEP to improve long term independence with self management of condition.  -GJ     LTG 1 Progress Ongoing  -GJ     LTG 2 Pt. will score </= 20/50 on Modified Oswestry to indicate improved perception of disability.  -GJ     LTG 2 Progress Ongoing  -GJ     LTG 3 Patient will report zero episodes of falling in last 8 weeks to demonstrate improved balance and decrease risk of falling.  -GJ     LTG 3 Progress Ongoing  -GJ     LTG 4 Pt will perform 2 min walk test ambulating over 225ft with/without assistive device with SBA/Supervision without LOB with directional changes for improvements in safety with ambulatory tasks in house or community.  -GJ     LTG 4 Progress Ongoing  -GJ     LTG 5 Pt will improve B LE strength to 4/5 MMT.  -GJ     LTG 5 Progress Ongoing  -GJ               User Key  (r) = Recorded By, (t) = Taken By, (c) = Cosigned By      Initials Name Provider Type     Earl Goodrich, PT Physical Therapist                    Therapy Education  Education Details: reviewed activity modifications  Given: HEP, Symptoms/condition management, Pain management, Posture/body mechanics, Fall prevention and home safety, Mobility training  Program: Reinforced  How Provided: Verbal, Demonstration  Provided to: Patient  Level of Understanding: Teach back education performed, Verbalized, Demonstrated              Time Calculation:   Start Time: 1315  Stop Time: 1358  Time Calculation (min): 43 min  Timed Charges  40133 - PT Therapeutic Exercise Minutes: 40  Total Minutes  Timed Charges Total Minutes: 40   Total Minutes: 40  Therapy Charges for Today       Code Description Service Date Service Provider Modifiers Qty    83906460693 HC PT THER PROC EA 15 MIN 5/9/2025 Earl Goodrich, PT GP 3                      Earl KAN.  Joleen, PT  5/9/2025

## 2025-05-14 ENCOUNTER — HOSPITAL ENCOUNTER (OUTPATIENT)
Dept: PHYSICAL THERAPY | Facility: HOSPITAL | Age: 80
Setting detail: THERAPIES SERIES
Discharge: HOME | End: 2025-05-14
Payer: MEDICARE

## 2025-05-14 DIAGNOSIS — M54.17 LUMBOSACRAL RADICULOPATHY AT L5: ICD-10-CM

## 2025-05-14 DIAGNOSIS — M54.16 LUMBAR RADICULOPATHY: Primary | ICD-10-CM

## 2025-05-14 DIAGNOSIS — R26.89 IMBALANCE: ICD-10-CM

## 2025-05-14 DIAGNOSIS — M54.41 CHRONIC RIGHT-SIDED LOW BACK PAIN WITH RIGHT-SIDED SCIATICA: ICD-10-CM

## 2025-05-14 DIAGNOSIS — G89.29 CHRONIC RIGHT-SIDED LOW BACK PAIN WITH RIGHT-SIDED SCIATICA: ICD-10-CM

## 2025-05-14 PROCEDURE — 97110 THERAPEUTIC EXERCISES: CPT

## 2025-05-14 NOTE — THERAPY TREATMENT NOTE
Outpatient Physical Therapy Ortho Treatment Note  [facility]     Patient Name: Earl Marc  : 1945  MRN: [id number]  Today's Date: 2025      Visit Date: 2025    Visit Dx:    ICD-10-CM ICD-9-CM   1. Lumbar radiculopathy  M54.16 724.4   2. Lumbosacral radiculopathy at L5  M54.17 724.4   3. Chronic right-sided low back pain with right-sided sciatica  M54.41 724.2    G89.29 724.3     338.29   4. Imbalance  R26.89 781.2       Patient Active Problem List   Diagnosis    Bell's palsy    Persistent insomnia    Osteoarthritis of cervical spine    Diabetic peripheral neuropathy    Dyslipidemia    Steatosis of liver    Fibromyalgia    Gastroesophageal reflux disease without esophagitis    Hypertensive kidney disease with stage 3a chronic kidney disease    Hypogonadism in male    Renal insufficiency    Vitamin D deficiency    Benign non-nodular prostatic hyperplasia with lower urinary tract symptoms    S/P drug eluting coronary stent placement    Coronary artery disease involving native coronary artery of native heart    Malignant neoplasm of skin    Type 2 diabetes mellitus with hyperglycemia, with long-term current use of insulin    Diabetes mellitus    Chronic insomnia    Other specified glaucoma    Vertigo    Epigastric pain    Chest pain with high risk for cardiac etiology    Precordial pain    S/P arthroscopy of shoulder    Peripheral neuropathy    Stage 3b chronic kidney disease    Chest pain    Stroke-like symptoms    Acute back pain    Thoracic 12 compression fracture, closed, initial encounter    Hypertensive urgency    Sudden onset of severe headache    Visual hallucinations    RBD (REM behavioral disorder)    Lucho Bonnet syndrome    Memory deficit        Past Medical History:   Diagnosis Date    Abnormal cardiovascular stress test     Acid reflux     Arthritis     Asthma     Bell palsy     In [place]    Benign prostatic hyperplasia 2024    Appt with Dr Mendoza     Cancer      skin     Cataract     Chronic kidney disease     Colon polyp     Congenital heart disease     COPD (chronic obstructive pulmonary disease) 2021    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    Low back pain     Memory deficit 01/02/2025    Migraines 09/2023    Myocardial infarction 2018    Obesity     Testosterone deficiency 2001    Low testosterone    Tremor     Trigger point of left shoulder region 03/02/2023    Type 2 diabetes mellitus         Past Surgical History:   Procedure Laterality Date    CARDIAC CATHETERIZATION N/A 03/25/2016    Procedure: Left Heart Cath;  Surgeon: Maria E Gilbert MD;  Location:  [facility];  Service:     CARDIAC CATHETERIZATION N/A 03/25/2016    Procedure: Coronary angiography;  Surgeon: Maria E Gilbert MD;  [facility];  Service:     CARDIAC CATHETERIZATION N/A 03/28/2016    Procedure: Coronary angiography;  Surgeon: Maria E Gilbert MD;  [facility];  Service:     CARDIAC CATHETERIZATION N/A 03/28/2016    Procedure: Stent MOISE coronary;  Surgeon: Maria E Gilbert MD;  [facility];  Service:     CARDIAC CATHETERIZATION N/A 10/18/2018    Procedure: Coronary angiography  no LV gram d/t CKD;  Surgeon: Maria E Gilbert MD;  [facility];  Service: Cardiology    CARDIAC CATHETERIZATION N/A 10/18/2018    Procedure: Left  Heart Cath;  Surgeon: Maria E Gilbert MD;  Location:  [facility]ATH INVASIVE LOC[facility];  Service: Cardiology    CARDIAC CATHETERIZATION N/A 10/18/2018    Procedure: Stent MOISE coronary;  Surgeon: Maria E Gilbert MD;  Location:  [facility] CATH INVASIVE LOCATION;  Service: Cardiology    CARDIAC CATHETERIZATION N/A 05/28/2021    Procedure: Coronary angiography;  Surgeon: Maria E Gilbert MD;  Location:  [facility] CATH INVASIVE LOC[facility];  Service: Cardiovascular;  Laterality: N/A;    CARDIAC CATHETERIZATION N/A 05/28/2021    Procedure: Left Heart Cath;  Surgeon: Maria E Gilbert MD;  Location:  [facility]ATH INVASIVE LOC[facility];  Service: Cardiovascular;  Laterality: N/A;    CARDIAC CATHETERIZATION N/A 05/28/2021    Procedure: Left ventriculography;  Surgeon: Maria E Gilbert MD;  Location:  [facility] CATH INVASIVE LOC[facility];  Service: Cardiovascular;  Laterality: N/A;    CARDIAC CATHETERIZATION N/A 05/28/2021    Procedure: Stent MOISE coronary;  Surgeon: Maria E Gilbert MD;  Location:  [facility] CATH INVASIVE LOCATION;  Service: Cardiovascular;  Laterality: N/A;    CARDIAC CATHETERIZATION N/A 01/19/2024    Procedure: Left Heart Cath;  Surgeon: Maria E Gilbert MD;  Location:  [facility]ATH INVASIVE LOC[facility];  Service: Cardiovascular;  Laterality: N/A;    CARDIAC CATHETERIZATION N/A 01/19/2024    Procedure: Coronary angiography;  Surgeon: Maria E Gilbert MD;  Location:  [facility]ATH INVASIVE [facility];  Service: Cardiovascular;  Laterality: N/A;    CARDIAC CATHETERIZATION N/A 01/19/2024    Procedure: Left ventriculography;  Surgeon: Maria E Gilbert MD;  [facility];  Service: Cardiovascular;  Laterality: N/A;    CAROTID STENT      COLONOSCOPY      CORONARY ANGIOPLASTY      CORONARY STENT PLACEMENT  3/28/2016    EYE SURGERY  9/2017    NECK EXPLORATION N/A     CO RT/LT HEART CATHETERS N/A 10/18/2018    Procedure: Percutaneous Coronary Intervention;  Surgeon:  Maria E Gilbert MD;  Location:  [facility] CATH INVASIVE LOC[facility];  Service: Cardiology    VASECTOMY                          PT Assessment/Plan       Row Name 05/14/25 1400          PT Assessment    Assessment Comments Earl returns to PT reporting improvements in overall symptoms since beginning PT, demonstrated by great endurance and stamina to all activities today. Began with nustep, followed with static stretches to assist with loosening up lower back region. Progressed strength and balance with adding PPT, bridges, airex pad with march, STS, and HR, and NBOS on airex with shoulder ext. He required less assistance with supervision-SBA for all activities. No pain reported by end of visit. Will continue to progress towards goals as able. Pt remains appropriate for skilled PT at this time.  -DR        PT Plan    PT Plan Comments assess response to previous session, update  HEP if good response. consider s/l mila, monster walks?  -               User Key  (r) = Recorded By, (t) = Taken By, (c) = Cosigned By      Initials Name Provider Type    Hector Freitas, PT Physical Therapist                       OP Exercises       Row Name 05/14/25 1400             Subjective    Subjective Comments I've been doing pretty well. Not too sore at all.  -DR         Subjective Pain    Able to rate subjective pain? yes  -DR      Pre-Treatment Pain Level 2  -DR         Total Minutes    59334 - PT Therapeutic Exercise Minutes 42  -DR         Exercise 1    Exercise Name 1 nustep  -DR      Time 1 5 min  -DR         Exercise 2    Exercise Name 2 STS  -DR      Cueing 2 Demo  -DR      Sets 2 2  -DR      Reps 2 10  -DR      Time 2 no hands, 50 cm surface  -DR      Additional Comments on airex  -DR         Exercise 3    Exercise Name 3 standing HR  -DR      Cueing 3 Demo  -DR      Reps 3 20  -DR      Additional Comments on airex, B 1 finger support  -DR         Exercise 4    Exercise Name 4 standing marches on airex  -DR       Cueing 4 Verbal  -DR      Sets 4 2e  -DR      Reps 4 10  -DR      Time 4 alt  -DR         Exercise 5    Exercise Name 5 tandem walking  -DR      Cueing 5 Verbal;Demo  -DR      Reps 5 3 laps  -DR      Time 5 F/B  -DR      Additional Comments intermittent use of UE's  -DR         Exercise 6    Exercise Name 6 PPT  -DR      Cueing 6 Verbal;Demo  -DR      Sets 6 2  -DR      Reps 6 10  -DR      Time 6 5s  -DR         Exercise 10    Exercise Name 10 LTR  -DR      Cueing 10 Verbal;Demo  -DR      Reps 10 15e  -DR         Exercise 11    Exercise Name 11 supine piriformis str/figure 4 str  -DR      Cueing 11 Verbal;Demo  -DR      Reps 11 3e  -DR      Time 11 20s  -DR         Exercise 12    Exercise Name 12 PPT + bridge  -DR      Cueing 12 Verbal;Demo  -DR      Reps 12 2  -DR      Time 12 10  -DR      Additional Comments 3s  -DR         Exercise 13    Exercise Name 13 NBOS on foam with alt shoulder ext  -DR      Cueing 13 Verbal  -DR      Sets 13 2e  -DR      Reps 13 10  -DR      Time 13 GTB  -DR      Additional Comments CGA  -DR                User Key  (r) = Recorded By, (t) = Taken By, (c) = Cosigned By      Initials Name Provider Type    Hector Freitas, PT Physical Therapist                                  PT OP Goals       Row Name 05/14/25 1400          PT Short Term Goals    STG Date to Achieve 05/23/25  -     STG 1 Pt. will be independent with initial HEP to improve self-management of condition.  -     STG 1 Progress Ongoing  -DR     STG 2 Patient will improve average TUG time from 11.5  seconds to </= 10 seconds with zero episodes of scissoring gait pattern and with mild deviation from straight patient to show improved gait speed and decrease risk for falls.  -     STG 2 Progress Ongoing  -DR     STG 3 Pt will improve walking tolerance from 5 min to at least 15 minutes with minimal gait deviations leading to improved quality of life.  -     STG 3 Progress Ongoing  -DR     STG 4 Patient able to tandem  stance bilaterally with 1 finger hold > 15 seconds without LOB for improved core stabilization and decreased fall risk  -     STG 4 Progress Ongoing  -DR        Long Term Goals    LTG Date to Achieve 06/22/25  -     LTG 1 Pt. will be independent with advanced HEP to improve long term independence with self management of condition.  -     LTG 1 Progress Ongoing  -     LTG 2 Pt. will score </= 20/50 on Modified Oswestry to indicate improved perception of disability.  -     LTG 2 Progress Ongoing  -     LTG 3 Patient will report zero episodes of falling in last 8 weeks to demonstrate improved balance and decrease risk of falling.  -     LTG 3 Progress Ongoing  -     LTG 4 Pt will perform 2 min walk test ambulating over 225ft with/without assistive device with SBA/Supervision without LOB with directional changes for improvements in safety with ambulatory tasks in house or community.  -     LTG 4 Progress Ongoing  -     LTG 5 Pt will improve B LE strength to 4/5 MMT.  -     LTG 5 Progress Ongoing  -               User Key  (r) = Recorded By, (t) = Taken By, (c) = Cosigned By      Initials Name Provider Type    Hector Freitas, PT Physical Therapist                    Therapy Education  Given: HEP, Symptoms/condition management, Pain management, Posture/body mechanics, Fall prevention and home safety, Mobility training  Program: Reinforced  How Provided: Verbal, Demonstration  Provided to: Patient  Level of Understanding: Teach back education performed, Verbalized, Demonstrated              Time Calculation:   Start Time: 1430  Stop Time: 1512  Time Calculation (min): 42 min  Timed Charges  29796 - PT Therapeutic Exercise Minutes: 42  Total Minutes  Timed Charges Total Minutes: 42   Total Minutes: 42  Therapy Charges for Today       Code Description Service Date Service Provider Modifiers Qty    02839266230 HC PT THER PROC EA 15 MIN 5/14/2025 Hector Moise, PT GP 3                      Hector  Suma, PT  5/14/2025

## 2025-05-16 ENCOUNTER — HOSPITAL ENCOUNTER (OUTPATIENT)
Dept: PHYSICAL THERAPY | Facility: HOSPITAL | Age: 80
Setting detail: THERAPIES SERIES
Discharge: HOME | End: 2025-05-16
Payer: MEDICARE

## 2025-05-16 DIAGNOSIS — G89.29 CHRONIC RIGHT-SIDED LOW BACK PAIN WITH RIGHT-SIDED SCIATICA: ICD-10-CM

## 2025-05-16 DIAGNOSIS — M54.16 LUMBAR RADICULOPATHY: Primary | ICD-10-CM

## 2025-05-16 DIAGNOSIS — R26.89 IMBALANCE: ICD-10-CM

## 2025-05-16 DIAGNOSIS — M54.41 CHRONIC RIGHT-SIDED LOW BACK PAIN WITH RIGHT-SIDED SCIATICA: ICD-10-CM

## 2025-05-16 DIAGNOSIS — M54.17 LUMBOSACRAL RADICULOPATHY AT L5: ICD-10-CM

## 2025-05-16 PROCEDURE — 97110 THERAPEUTIC EXERCISES: CPT | Performed by: PHYSICAL THERAPIST

## 2025-05-16 NOTE — THERAPY TREATMENT NOTE
Outpatient Physical Therapy Ortho Treatment Note  [facility]     Patient Name: Earl Marc  : 1945  MRN: [id number]  Today's Date: 2025      Visit Date: 2025    Visit Dx:    ICD-10-CM ICD-9-CM   1. Lumbar radiculopathy  M54.16 724.4   2. Lumbosacral radiculopathy at L5  M54.17 724.4   3. Chronic right-sided low back pain with right-sided sciatica  M54.41 724.2    G89.29 724.3     338.29   4. Imbalance  R26.89 781.2       Patient Active Problem List   Diagnosis    Bell's palsy    Persistent insomnia    Osteoarthritis of cervical spine    Diabetic peripheral neuropathy    Dyslipidemia    Steatosis of liver    Fibromyalgia    Gastroesophageal reflux disease without esophagitis    Hypertensive kidney disease with stage 3a chronic kidney disease    Hypogonadism in male    Renal insufficiency    Vitamin D deficiency    Benign non-nodular prostatic hyperplasia with lower urinary tract symptoms    S/P drug eluting coronary stent placement    Coronary artery disease involving native coronary artery of native heart    Malignant neoplasm of skin    Type 2 diabetes mellitus with hyperglycemia, with long-term current use of insulin    Diabetes mellitus    Chronic insomnia    Other specified glaucoma    Vertigo    Epigastric pain    Chest pain with high risk for cardiac etiology    Precordial pain    S/P arthroscopy of shoulder    Peripheral neuropathy    Stage 3b chronic kidney disease    Chest pain    Stroke-like symptoms    Acute back pain    Thoracic 12 compression fracture, closed, initial encounter    Hypertensive urgency    Sudden onset of severe headache    Visual hallucinations    RBD (REM behavioral disorder)    Lucho Bonnet syndrome    Memory deficit        Past Medical History:   Diagnosis Date    Abnormal cardiovascular stress test     Acid reflux     Arthritis     Asthma     Bell palsy     In [place]    Benign prostatic hyperplasia 2024    Appt with Dr Mendoza     Cancer      skin     Cataract     Chronic kidney disease     Colon polyp     Congenital heart disease     COPD (chronic obstructive pulmonary disease) 2021    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    Low back pain     Memory deficit 01/02/2025    Migraines 09/2023    Myocardial infarction 2018    Obesity     Testosterone deficiency 2001    Low testosterone    Tremor     Trigger point of left shoulder region 03/02/2023    Type 2 diabetes mellitus         Past Surgical History:   Procedure Laterality Date    CARDIAC CATHETERIZATION N/A 03/25/2016    Procedure: Left Heart Cath;  Surgeon: Maria E Gilbert MD;  Location:  [facility];  Service:     CARDIAC CATHETERIZATION N/A 03/25/2016    Procedure: Coronary angiography;  Surgeon: Maria E Gilbert MD;  [facility];  Service:     CARDIAC CATHETERIZATION N/A 03/28/2016    Procedure: Coronary angiography;  Surgeon: Maria E Gilbert MD;  [facility];  Service:     CARDIAC CATHETERIZATION N/A 03/28/2016    Procedure: Stent MOISE coronary;  Surgeon: Maria E Gilbert MD;  [facility];  Service:     CARDIAC CATHETERIZATION N/A 10/18/2018    Procedure: Coronary angiography  no LV gram d/t CKD;  Surgeon: Maria E Gilbert MD;  [facility];  Service: Cardiology    CARDIAC CATHETERIZATION N/A 10/18/2018    Procedure: Left  Heart Cath;  Surgeon: Maria E Gilbert MD;  Location:  [facility];  Service: Cardiology    CARDIAC CATHETERIZATION N/A 10/18/2018    Procedure: Stent MOISE coronary;  Surgeon: Maria E Gilbert MD;  Location:  [facility];  Service: Cardiology    CARDIAC CATHETERIZATION N/A 05/28/2021    Procedure: Coronary angiography;  Surgeon: Maria E Gilbert MD;  Location:  [facility];  Service: Cardiovascular;  Laterality: N/A;    CARDIAC CATHETERIZATION N/A 05/28/2021    Procedure: Left Heart Cath;  Surgeon: Maria E Gilbert MD;  Location:  [facility];  Service: Cardiovascular;  Laterality: N/A;    CARDIAC CATHETERIZATION N/A 05/28/2021    Procedure: Left ventriculography;  Surgeon: Maria E Gilbert MD;  Location:  [facility];  Service: Cardiovascular;  Laterality: N/A;    CARDIAC CATHETERIZATION N/A 05/28/2021    Procedure: Stent MOISE coronary;  Surgeon: Maria E Gilbert MD;  Location:  [facility];  Service: Cardiovascular;  Laterality: N/A;    CARDIAC CATHETERIZATION N/A 01/19/2024    Procedure: Left Heart Cath;  Surgeon: Maria E Gilbert MD;  Location:  [facility];  Service: Cardiovascular;  Laterality: N/A;    CARDIAC CATHETERIZATION N/A 01/19/2024    Procedure: Coronary angiography;  Surgeon: Maria E Gilbert MD;  Location:  [facility];  Service: Cardiovascular;  Laterality: N/A;    CARDIAC CATHETERIZATION N/A 01/19/2024    Procedure: Left ventriculography;  Surgeon: Maria E Gilbert MD;  [facility];  Service: Cardiovascular;  Laterality: N/A;    CAROTID STENT      COLONOSCOPY      CORONARY ANGIOPLASTY      CORONARY STENT PLACEMENT  3/28/2016    EYE SURGERY  9/2017    NECK EXPLORATION N/A     TX RT/LT HEART CATHETERS N/A 10/18/2018    Procedure: Percutaneous Coronary Intervention;  Surgeon:  Maria E Gilbert MD;  Location:  [facility];  Service: Cardiology    VASECTOMY          PT Ortho       Row Name 05/16/25 1300       Subjective    Subjective Comments i'm getting better, my back is getting better. I think I found the source of my pain, it might be the bar my that is by my bed.  -GJ              User Key  (r) = Recorded By, (t) = Taken By, (c) = Cosigned By      Initials Name Provider Type    Earl Pitt, PT Physical Therapist                                 PT Assessment/Plan       Row Name 05/16/25 1352          PT Assessment    Assessment Comments Mr. Marc returns to the clinic for his LBP/imbalance. He presents using cane, but does not use in the clinic. He reports decreasing low back pain and improving balance. Continued to work on hip girdle/core strength, progressing sets on several exercises. Added resisted monster walk forward/backward, SL clam with reistsance band. He required notable decrease in the use of his UE's for balance during all standing activities.  Updated his HEP, with flexibility activities being daily and strengthening once every other day. Mr. Marc continues to be a good candidate for skilled physical therapy.  -GJ        PT Plan    PT Plan Comments continue to work on hip girdle/core strength, consider lateral stepping outside of bars, add another set to STS  -GJ               User Key  (r) = Recorded By, (t) = Taken By, (c) = Cosigned By      Initials Name Provider Type    Earl Pitt, PT Physical Therapist                       OP Exercises       Row Name 05/16/25 1305 05/16/25 1300          Subjective    Subjective Comments -- i'm getting better, my back is getting better. I think I found the source of my pain, it might be the bar my that is by my bed.  -GJ        Subjective Pain    Pre-Treatment Pain Level -- 0  -GJ        Total Minutes    46052 - PT Therapeutic Exercise Minutes 40  -GJ --        Exercise 1    Exercise Name 1 --  nustep  -GJ     Time 1 -- 5 min  -GJ        Exercise 2    Exercise Name 2 -- STS  -GJ     Cueing 2 -- Demo  -GJ     Sets 2 -- 2  -GJ     Reps 2 -- 10  -GJ     Time 2 -- no hands, 50 cm surface  -GJ     Additional Comments -- on airex  -GJ        Exercise 3    Exercise Name 3 -- standing HR  -GJ     Reps 3 -- 20  -GJ     Additional Comments -- on airex, B 1 finger support  -GJ        Exercise 4    Exercise Name 4 -- standing marches on airex  -GJ     Cueing 4 -- Verbal  -GJ     Sets 4 -- 2e  -GJ     Reps 4 -- 20  -GJ     Time 4 -- alt  -GJ        Exercise 5    Exercise Name 5 -- tandem walking  -GJ     Cueing 5 -- Verbal;Demo  -GJ     Reps 5 -- 4 laps  -GJ     Time 5 -- F/B  -GJ     Additional Comments -- intermittent use of UE's  -GJ        Exercise 10    Exercise Name 10 -- LTR  -GJ     Cueing 10 -- Verbal;Demo  -GJ     Reps 10 -- 10  -GJ     Time 10 -- 5s  -GJ        Exercise 11    Exercise Name 11 -- supine piriformis str/figure 4 str  -GJ     Cueing 11 -- Verbal;Demo  -GJ     Reps 11 -- 3e  -GJ     Time 11 -- 20s  -GJ        Exercise 12    Exercise Name 12 -- PPT + bridge  -GJ     Cueing 12 -- Verbal;Demo  -GJ     Reps 12 -- 2  -GJ     Time 12 -- 10  -GJ        Exercise 13    Exercise Name 13 -- NBOS on foam with alt shoulder ext  -GJ     Cueing 13 -- Verbal  -GJ     Sets 13 -- 2e  -GJ     Reps 13 -- 10  -GJ     Time 13 -- GTB  -GJ        Exercise 14    Exercise Name 14 -- monster walk  -GJ     Cueing 14 -- Verbal;Demo  -GJ     Reps 14 -- 3 laps  -GJ     Time 14 -- F/B, close supervision, GTB, in the open  -GJ        Exercise 15    Exercise Name 15 -- SL clam, B  -GJ     Cueing 15 -- Verbal;Demo  -GJ     Reps 15 -- 15  -GJ     Time 15 -- 3s  -GJ     Additional Comments -- GTB  -GJ               User Key  (r) = Recorded By, (t) = Taken By, (c) = Cosigned By      Initials Name Provider Type    GJ Earl Goodrich, PT Physical Therapist                                  PT OP Goals       Row Name 05/16/25 1300           PT Short Term Goals    STG Date to Achieve 05/23/25  -GJ     STG 1 Pt. will be independent with initial HEP to improve self-management of condition.  -GJ     STG 1 Progress Met  -GJ     STG 2 Patient will improve average TUG time from 11.5  seconds to </= 10 seconds with zero episodes of scissoring gait pattern and with mild deviation from straight patient to show improved gait speed and decrease risk for falls.  -GJ     STG 2 Progress Ongoing  -GJ     STG 3 Pt will improve walking tolerance from 5 min to at least 15 minutes with minimal gait deviations leading to improved quality of life.  -GJ     STG 3 Progress Ongoing  -GJ     STG 4 Patient able to tandem stance bilaterally with 1 finger hold > 15 seconds without LOB for improved core stabilization and decreased fall risk  -GJ     STG 4 Progress Ongoing  -GJ        Long Term Goals    LTG Date to Achieve 06/22/25  -GJ     LTG 1 Pt. will be independent with advanced HEP to improve long term independence with self management of condition.  -GJ     LTG 1 Progress Ongoing  -GJ     LTG 2 Pt. will score </= 20/50 on Modified Oswestry to indicate improved perception of disability.  -GJ     LTG 2 Progress Ongoing  -GJ     LTG 3 Patient will report zero episodes of falling in last 8 weeks to demonstrate improved balance and decrease risk of falling.  -GJ     LTG 3 Progress Ongoing  -GJ     LTG 4 Pt will perform 2 min walk test ambulating over 225ft with/without assistive device with SBA/Supervision without LOB with directional changes for improvements in safety with ambulatory tasks in house or community.  -GJ     LTG 4 Progress Ongoing  -GJ     LTG 5 Pt will improve B LE strength to 4/5 MMT.  -GJ     LTG 5 Progress Ongoing  -GJ               User Key  (r) = Recorded By, (t) = Taken By, (c) = Cosigned By      Initials Name Provider Type    Earl Pitt, PT Physical Therapist                    Therapy Education  Education Details: [id number], reviewed activity  modifications  Given: HEP, Symptoms/condition management, Pain management, Posture/body mechanics, Fall prevention and home safety, Mobility training  Program: Reinforced, New, Progressed, Modified  How Provided: Verbal, Demonstration, Written  Provided to: Patient  Level of Understanding: Teach back education performed, Verbalized, Demonstrated              Time Calculation:   Start Time: 1315  Stop Time: 1400  Time Calculation (min): 45 min  Timed Charges  44972 - PT Therapeutic Exercise Minutes: 40  Total Minutes  Timed Charges Total Minutes: 40   Total Minutes: 40  Therapy Charges for Today       Code Description Service Date Service Provider Modifiers Qty    78885232571 HC PT THER PROC EA 15 MIN 5/16/2025 Earl Goodrich, PT GP 3                      Earl Goodrich, PT  5/16/2025

## 2025-05-19 ENCOUNTER — HOSPITAL ENCOUNTER (OUTPATIENT)
Dept: PHYSICAL THERAPY | Facility: HOSPITAL | Age: 80
Setting detail: THERAPIES SERIES
Discharge: HOME | End: 2025-05-19
Payer: MEDICARE

## 2025-05-19 DIAGNOSIS — G89.29 CHRONIC RIGHT-SIDED LOW BACK PAIN WITH RIGHT-SIDED SCIATICA: ICD-10-CM

## 2025-05-19 DIAGNOSIS — M54.41 CHRONIC RIGHT-SIDED LOW BACK PAIN WITH RIGHT-SIDED SCIATICA: ICD-10-CM

## 2025-05-19 DIAGNOSIS — M54.16 LUMBAR RADICULOPATHY: Primary | ICD-10-CM

## 2025-05-19 DIAGNOSIS — R26.89 IMBALANCE: ICD-10-CM

## 2025-05-19 DIAGNOSIS — M54.17 LUMBOSACRAL RADICULOPATHY AT L5: ICD-10-CM

## 2025-05-19 PROCEDURE — 97110 THERAPEUTIC EXERCISES: CPT

## 2025-05-19 NOTE — THERAPY TREATMENT NOTE
Outpatient Physical Therapy Ortho Treatment Note  [facility]     Patient Name: Earl Marc  : 1945  MRN: [id number]  Today's Date: 2025      Visit Date: 2025    Visit Dx:    ICD-10-CM ICD-9-CM   1. Lumbar radiculopathy  M54.16 724.4   2. Lumbosacral radiculopathy at L5  M54.17 724.4   3. Chronic right-sided low back pain with right-sided sciatica  M54.41 724.2    G89.29 724.3     338.29   4. Imbalance  R26.89 781.2       Patient Active Problem List   Diagnosis    Bell's palsy    Persistent insomnia    Osteoarthritis of cervical spine    Diabetic peripheral neuropathy    Dyslipidemia    Steatosis of liver    Fibromyalgia    Gastroesophageal reflux disease without esophagitis    Hypertensive kidney disease with stage 3a chronic kidney disease    Hypogonadism in male    Renal insufficiency    Vitamin D deficiency    Benign non-nodular prostatic hyperplasia with lower urinary tract symptoms    S/P drug eluting coronary stent placement    Coronary artery disease involving native coronary artery of native heart    Malignant neoplasm of skin    Type 2 diabetes mellitus with hyperglycemia, with long-term current use of insulin    Diabetes mellitus    Chronic insomnia    Other specified glaucoma    Vertigo    Epigastric pain    Chest pain with high risk for cardiac etiology    Precordial pain    S/P arthroscopy of shoulder    Peripheral neuropathy    Stage 3b chronic kidney disease    Chest pain    Stroke-like symptoms    Acute back pain    Thoracic 12 compression fracture, closed, initial encounter    Hypertensive urgency    Sudden onset of severe headache    Visual hallucinations    RBD (REM behavioral disorder)    Lucho Bonnet syndrome    Memory deficit        Past Medical History:   Diagnosis Date    Abnormal cardiovascular stress test     Acid reflux     Arthritis     Asthma     Bell palsy     In [place]    Benign prostatic hyperplasia 2024    Appt with Dr Mendoza     Cancer      skin     Cataract     Chronic kidney disease     Colon polyp     Congenital heart disease     COPD (chronic obstructive pulmonary disease) 2021    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    Low back pain     Memory deficit 01/02/2025    Migraines 09/2023    Myocardial infarction 2018    Obesity     Testosterone deficiency 2001    Low testosterone    Tremor     Trigger point of left shoulder region 03/02/2023    Type 2 diabetes mellitus         Past Surgical History:   Procedure Laterality Date    CARDIAC CATHETERIZATION N/A 03/25/2016    Procedure: Left Heart Cath;  Surgeon: Maria E Gilbert MD;  Location:  [facility] CATH INVASIVE LOCATION;  Service:     CARDIAC CATHETERIZATION N/A 03/25/2016    Procedure: Coronary angiography;  Surgeon: Maria E Gilbert MD;  [facility];  Service:     CARDIAC CATHETERIZATION N/A 03/28/2016    Procedure: Coronary angiography;  Surgeon: Maria E Gilbert MD;  [facility]: [facility];  Service:     CARDIAC CATHETERIZATION N/A 03/28/2016    Procedure: Stent MOISE coronary;  Surgeon: Maria E Gilbert MD;  [facility];  Service:     CARDIAC CATHETERIZATION N/A 10/18/2018    Procedure: Coronary angiography  no LV gram d/t CKD;  Surgeon: Maria E Gilbert MD;  [facility]: [facility];  Service: Cardiology    CARDIAC CATHETERIZATION N/A 10/18/2018    Procedure: Left  Heart Cath;  Surgeon: Maria E Gilbert MD;  Location:  [facility] CATH INVASIVE LOCATION;  Service: Cardiology    CARDIAC CATHETERIZATION N/A 10/18/2018    Procedure: Stent MOISE coronary;  Surgeon: Maria E Gilbert MD;  Location:  [facility] CATH INVASIVE LOCATION;  Service: Cardiology    CARDIAC CATHETERIZATION N/A 05/28/2021    Procedure: Coronary angiography;  Surgeon: Maria E Gilbert MD;  Location:  [facility] CATH INVASIVE [facility]ATION;  Service: Cardiovascular;  Laterality: N/A;    CARDIAC CATHETERIZATION N/A 05/28/2021    Procedure: Left Heart Cath;  Surgeon: Maria E Gilbert MD;  Location:  [facility] CATH INVASIVE LOCATION;  Service: Cardiovascular;  Laterality: N/A;    CARDIAC CATHETERIZATION N/A 05/28/2021    Procedure: Left ventriculography;  Surgeon: Maria E Gilbert MD;  Location:  [facility] C[facility] INVASIVE LOC[facility];  Service: Cardiovascular;  Laterality: N/A;    CARDIAC CATHETERIZATION N/A 05/28/2021    Procedure: Stent MOISE coronary;  Surgeon: Maria E Gilbert MD;  Location:  [facility] CATH INVASIVE LOCATION;  Service: Cardiovascular;  Laterality: N/A;    CARDIAC CATHETERIZATION N/A 01/19/2024    Procedure: Left Heart Cath;  Surgeon: Maria E Gilbert MD;  Location:  [facility] C[facility] INVASIVE LOCATION;  Service: Cardiovascular;  Laterality: N/A;    CARDIAC CATHETERIZATION N/A 01/19/2024    Procedure: Coronary angiography;  Surgeon: Maria E Gilbert MD;  Location:  [facility] CATH INVASIVE LOCATION;  Service: Cardiovascular;  Laterality: N/A;    CARDIAC CATHETERIZATION N/A 01/19/2024    Procedure: Left ventriculography;  Surgeon: Maria E Gilbert MD;  [facility];  Service: Cardiovascular;  Laterality: N/A;    CAROTID STENT      COLONOSCOPY      CORONARY ANGIOPLASTY      CORONARY STENT PLACEMENT  3/28/2016    EYE SURGERY  9/2017    NECK EXPLORATION N/A     [place] RT/LT HEART CATHETERS N/A 10/18/2018    Procedure: Percutaneous Coronary Intervention;  Surgeon:  Maria E Gilbert MD;  Location: [facility] INVASIVE LOCATION;  Service: Cardiology    VASECTOMY                          PT Assessment/Plan       Row Name 05/19/25 1400          PT Assessment    Assessment Comments Earl returns today with reports of low levels of pain currently in the low back. He has a visit today about his neuropathy in his feet today and reports that it went well. He did not have any increase in pain or soreness after the last session and reports that his HEP has been going well but does state that he has not been getting to them as much as he would like. Continued with current exercise program as patient is bale to tolerate with progressions as appropriate aimed at continuing to improve low back stabilization and improving balance. Added walking marches with SLS with emphasis on challenging balance with patient being able to complete with intermittent UE support as needed for balance. Pt demonstrates more imbalance when completing SLS on the L LE and marching the R LE. Also added shoulder ext in SLS with other extremity in kickstand position on foam for further working on imbalance.  Pt remains appropriate for skilled PT services to address ongoing deficits.  -        PT Plan    PT Plan Comments NBOS with reaching on foam, step ups with foam or onto BOSU, ball toss  -               User Key  (r) = Recorded By, (t) = Taken By, (c) = Cosigned By      Initials Name Provider Type     Seb Macdonald, PT Physical Therapist                       OP Exercises       Row Name 05/19/25 1300             Subjective    Subjective Comments My back is hurting a little bit but it is not bad  -         Subjective Pain    Able to rate subjective pain? yes  -      Pre-Treatment Pain Level 2  -      Post-Treatment Pain Level 2  -         Total Minutes    01258 - PT Therapeutic Exercise Minutes 43  -         Exercise 1    Exercise Name 1 nustep  -      Time 1 5 min  -         Exercise 2     Exercise Name 2 STS  -MH      Cueing 2 Demo  -MH      Sets 2 2  -MH      Reps 2 10  -MH      Time 2 no hands, 50 cm surface  -MH      Additional Comments airex  -MH         Exercise 3    Exercise Name 3 standing HR  -MH      Reps 3 20  -MH      Additional Comments on airex  -MH         Exercise 4    Exercise Name 4 standing marches on airex  -MH      Cueing 4 Verbal  -MH      Sets 4 2e  -MH      Reps 4 20  -MH      Time 4 alt  -MH         Exercise 5    Exercise Name 5 tandem walking  -MH      Cueing 5 Verbal;Demo  -MH      Reps 5 4 laps  -MH      Time 5 F/B  -MH         Exercise 6    Exercise Name 6 Walking marches with SLS  -MH      Cueing 6 Verbal;Demo  -MH      Reps 6 3 laps  -MH         Exercise 10    Exercise Name 10 LTR  -MH      Cueing 10 Verbal;Demo  -MH      Reps 10 15  -MH      Time 10 5s  -MH         Exercise 11    Exercise Name 11 supine piriformis str/figure 4 str  -MH      Cueing 11 Verbal;Demo  -MH      Reps 11 3e  -MH      Time 11 20s  -MH         Exercise 12    Exercise Name 12 PPT + bridge  -MH      Cueing 12 Verbal;Demo  -MH      Reps 12 2x10  -MH      Time 12 3s  -MH      Additional Comments RTB  -MH         Exercise 13    Exercise Name 13 SLS with alt shoulder ext  -MH      Cueing 13 Verbal  -MH      Sets 13 2e  -MH      Reps 13 10  -MH      Time 13 GTB  -MH      Additional Comments On foam; other LE in kickstand  -MH         Exercise 15    Exercise Name 15 SL clam, B  -MH      Cueing 15 Verbal;Demo  -MH      Reps 15 15  -MH      Time 15 3s  -MH      Additional Comments GTB  -MH                User Key  (r) = Recorded By, (t) = Taken By, (c) = Cosigned By      Initials Name Provider Type     Seb Macdonald, PT Physical Therapist                                  PT OP Goals       Row Name 05/19/25 1300          PT Short Term Goals    STG Date to Achieve 05/23/25  -MH     STG 1 Pt. will be independent with initial HEP to improve self-management of condition.  -     STG 1 Progress Met  -      STG 2 Patient will improve average TUG time from 11.5  seconds to </= 10 seconds with zero episodes of scissoring gait pattern and with mild deviation from straight patient to show improved gait speed and decrease risk for falls.  -     STG 2 Progress Ongoing  -     STG 3 Pt will improve walking tolerance from 5 min to at least 15 minutes with minimal gait deviations leading to improved quality of life.  -     STG 3 Progress Ongoing  [facility]     STG 4 Patient able to tandem stance bilaterally with 1 finger hold > 15 seconds without LOB for improved core stabilization and decreased fall risk  -     STG 4 Progress Ongoing  [facility]        Long Term Goals    LTG Date to Achieve 06/22/25  -     LTG 1 Pt. will be independent with advanced HEP to improve long term independence with self management of condition.  -     LTG 1 Progress Ongoing  -     LTG 2 Pt. will score </= 20/50 on Modified Oswestry to indicate improved perception of disability.  -     LTG 2 Progress Ongoing  [facility]     LTG 3 Patient will report zero episodes of falling in last 8 weeks to demonstrate improved balance and decrease risk of falling.  -     LTG 3 Progress Ongoing  -     LTG 4 Pt will perform 2 min walk test ambulating over 225ft with/without assistive device with SBA/Supervision without LOB with directional changes for improvements in safety with ambulatory tasks in house or community.  -     LTG 4 Progress Ongoing  [facility]     LTG 5 Pt will improve B LE strength to 4/5 MMT.  -     LTG 5 Progress [facility]               User Key  (r) = Recorded By, (t) = Taken By, (c) = Cosigned By      Initials Name Provider Type    Seb Armendariz, PT Physical Therapist                    Therapy Education  Given: Symptoms/condition management, Pain management, Posture/body mechanics  Program: Reinforced  How Provided: Verbal, Demonstration  Provided to: Patient  Level of Understanding: Verbalized, Demonstrated              Time Calculation:    Start Time: 1317  Stop Time: 1400  Time Calculation (min): 43 min  Timed Charges  16800 - PT Therapeutic Exercise Minutes: 43  Total Minutes  Timed Charges Total Minutes: 43   Total Minutes: 43  Therapy Charges for Today       Code Description Service Date Service Provider Modifiers Qty    [id number]  PT THER PROC EA 15 MIN 5/19/2025 Seb Macdonald, PT GP 3                      Seb Macdonald, PT  5/19/2025

## 2025-05-21 ENCOUNTER — HOSPITAL ENCOUNTER (OUTPATIENT)
Dept: PHYSICAL THERAPY | Facility: HOSPITAL | Age: 80
Setting detail: THERAPIES SERIES
Discharge: HOME | End: 2025-05-21
Payer: MEDICARE

## 2025-05-21 DIAGNOSIS — M54.41 CHRONIC RIGHT-SIDED LOW BACK PAIN WITH RIGHT-SIDED SCIATICA: ICD-10-CM

## 2025-05-21 DIAGNOSIS — G89.29 CHRONIC RIGHT-SIDED LOW BACK PAIN WITH RIGHT-SIDED SCIATICA: ICD-10-CM

## 2025-05-21 DIAGNOSIS — M54.17 LUMBOSACRAL RADICULOPATHY AT L5: ICD-10-CM

## 2025-05-21 DIAGNOSIS — M54.16 LUMBAR RADICULOPATHY: Primary | ICD-10-CM

## 2025-05-21 PROCEDURE — 97110 THERAPEUTIC EXERCISES: CPT

## 2025-05-21 NOTE — THERAPY TREATMENT NOTE
Outpatient Physical Therapy Ortho Treatment Note  [facility]     Patient Name: Earl Marc  : 1945  MRN: [id number]  Today's Date: 2025      Visit Date: 2025    Visit Dx:    ICD-10-CM ICD-9-CM   1. Lumbar radiculopathy  M54.16 724.4   2. Lumbosacral radiculopathy at L5  M54.17 724.4   3. Chronic right-sided low back pain with right-sided sciatica  M54.41 724.2    G89.29 724.3     338.29       Patient Active Problem List   Diagnosis    Bell's palsy    Persistent insomnia    Osteoarthritis of cervical spine    Diabetic peripheral neuropathy    Dyslipidemia    Steatosis of liver    Fibromyalgia    Gastroesophageal reflux disease without esophagitis    Hypertensive kidney disease with stage 3a chronic kidney disease    Hypogonadism in male    Renal insufficiency    Vitamin D deficiency    Benign non-nodular prostatic hyperplasia with lower urinary tract symptoms    S/P drug eluting coronary stent placement    Coronary artery disease involving native coronary artery of native heart    Malignant neoplasm of skin    Type 2 diabetes mellitus with hyperglycemia, with long-term current use of insulin    Diabetes mellitus    Chronic insomnia    Other specified glaucoma    Vertigo    Epigastric pain    Chest pain with high risk for cardiac etiology    Precordial pain    S/P arthroscopy of shoulder    Peripheral neuropathy    Stage 3b chronic kidney disease    Chest pain    Stroke-like symptoms    Acute back pain    Thoracic 12 compression fracture, closed, initial encounter    Hypertensive urgency    Sudden onset of severe headache    Visual hallucinations    RBD (REM behavioral disorder)    Lucho Bonnet syndrome    Memory deficit        Past Medical History:   Diagnosis Date    Abnormal cardiovascular stress test     Acid reflux     Arthritis     Asthma     Bell palsy     In [place]    Benign prostatic hyperplasia 2024    Appt with Dr Mendoza     Cancer     skin     Cataract      Chronic kidney disease     Colon polyp     Congenital heart disease     COPD (chronic obstructive pulmonary disease) 2021    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    Low back pain     Memory deficit 01/02/2025    Migraines 09/2023    Myocardial infarction 2018    Obesity     Testosterone deficiency 2001    Low testosterone    Tremor     Trigger point of left shoulder region 03/02/2023    Type 2 diabetes mellitus         Past Surgical History:   Procedure Laterality Date    CARDIAC CATHETERIZATION N/A 03/25/2016    Procedure: Left Heart Cath;  Surgeon: Maria E Gilbert MD;  Location:  [facility] INVASIVE LOC[facility];  Service:     CARDIAC CATHETERIZATION N/A 03/25/2016    Procedure: Coronary angiography;  Surgeon: Maria E Gilbert MD;  [facility];  Service:     CARDIAC CATHETERIZATION N/A 03/28/2016    Procedure: Coronary angiography;  Surgeon: Maria E Gilbert MD;  Location:  [facility]ATH INVASIVE LOC[facility];  Service:     CARDIAC CATHETERIZATION N/A 03/28/2016    Procedure: Stent MOISE coronary;  Surgeon: Maria E Gilbert MD;  [facility];  Service:     CARDIAC CATHETERIZATION N/A 10/18/2018    Procedure: Coronary angiography  no LV gram d/t CKD;  Surgeon: Maria E Gilbert MD;  [facility];  Service: Cardiology    CARDIAC CATHETERIZATION N/A 10/18/2018    Procedure: Left Heart Cath;  Surgeon:  Maria E Gilbert MD;  Location:  [facility]ATH INVASIVE LOC[facility];  Service: Cardiology    CARDIAC CATHETERIZATION N/A 10/18/2018    Procedure: Stent MOISE coronary;  Surgeon: Maria E Gilbert MD;  Location:  [facility]ATH INVASIVE LOCATION;  Service: Cardiology    CARDIAC CATHETERIZATION N/A 05/28/2021    Procedure: Coronary angiography;  Surgeon: Maria E Gilbert MD;  Location:  [facility] CATH INVASIVE LOC[facility];  Service: Cardiovascular;  Laterality: N/A;    CARDIAC CATHETERIZATION N/A 05/28/2021    Procedure: Left Heart Cath;  Surgeon: Maria E Gilbert MD;  Location:  [facility]ATH INVASIVE LOC[facility];  Service: Cardiovascular;  Laterality: N/A;    CARDIAC CATHETERIZATION N/A 05/28/2021    Procedure: Left ventriculography;  Surgeon: Maria E Gilbert MD;  Location:  [facility]ATH INVASIVE LOC[facility];  Service: Cardiovascular;  Laterality: N/A;    CARDIAC CATHETERIZATION N/A 05/28/2021    Procedure: Stent MOISE coronary;  Surgeon: Maria E Gilbert MD;  [facility];  Service: Cardiovascular;  Laterality: N/A;    CARDIAC CATHETERIZATION N/A 01/19/2024    Procedure: Left Heart Cath;  Surgeon: Maria E Gilbert MD;  [facility];  Service: Cardiovascular;  Laterality: N/A;    CARDIAC CATHETERIZATION N/A 01/19/2024    Procedure: Coronary angiography;  Surgeon: Maria E Gilbert MD;  [facility];  Service: Cardiovascular;  Laterality: N/A;    CARDIAC CATHETERIZATION N/A 01/19/2024    Procedure: Left ventriculography;  Surgeon: Maria E Gilbert MD;  [facility];  Service: Cardiovascular;  Laterality: N/A;    CAROTID STENT      COLONOSCOPY      CORONARY ANGIOPLASTY      CORONARY STENT PLACEMENT  3/28/2016    EYE SURGERY  9/2017    NECK EXPLORATION N/A     [place] RT/LT HEART CATHETERS N/A 10/18/2018    Procedure: Percutaneous Coronary Intervention;  Surgeon: Maria E Gilbert MD;   Location: [facility] INVASIVE LOCATION;  Service: Cardiology    VASECTOMY                          PT Assessment/Plan       Row Name 05/21/25 1500          PT Assessment    Assessment Comments Mr. Marc returns to PT reporting great tolerance to last visit with no adverse response. He also did 30 min on his rowing machine yesterday. Today, pt did not require any stretching to reduce stiffness. Progressed with adding forward step ups to bosu, lateral steps holding KB, staggered stance with chest press, and AR press. Mild onset of LBP at end of visit when holding KB out in front, but reduced with cues to activate TrA. Will assess tolerance to progressions today and update HEP accordingly. Pt remains appropriate for skilled PT.  -DR        PT Plan    PT Plan Comments NBOS with reaching on foam, ball toss?  -               User Key  (r) = Recorded By, (t) = Taken By, (c) = Cosigned By      Initials Name Provider Type    Hector Freitas, PT Physical Therapist                       OP Exercises       Row Name 05/21/25 1400             Subjective    Subjective Comments Willy worked me like a dog. I was sore but felt good.  -DR         Subjective Pain    Able to rate subjective pain? yes  -DR         Total Minutes    93942 - PT Therapeutic Exercise Minutes 40  -DR         Exercise 1    Exercise Name 1 nustep  -DR      Time 1 5 min  -DR         Exercise 3    Exercise Name 3 standing HR  -DR      Cueing 3 Demo  -DR      Reps 3 20  -DR      Time 3 on airex  -DR         Exercise 4    Exercise Name 4 standing marches on airex  -DR      Cueing 4 Verbal  -DR      Sets 4 3e  -DR      Reps 4 10  -DR      Time 4 alt  -DR         Exercise 5    Exercise Name 5 tandem walking  -DR      Cueing 5 Verbal;Demo  -DR      Reps 5 4 laps  -DR      Time 5 F/B  -DR         Exercise 6    Exercise Name 6 Walking marches with SLS  -DR      Cueing 6 Verbal;Demo  -DR      Reps 6 3 laps  -DR         Exercise 7    Exercise Name 7 fwd step up to  bosu  -DR      Cueing 7 Verbal;Demo  -DR      Sets 7 2e  -DR      Reps 7 10  -DR      Time 7 // bars, B UE support  -DR         Exercise 8    Exercise Name 8 staggered stance with L2 chest press  -DR      Cueing 8 Verbal;Demo  -DR      Sets 8 2e  -DR      Reps 8 10  -DR      Time 8 front foot on bosu  -DR         Exercise 9    Exercise Name 9 side steps  -DR      Cueing 9 Verbal;Demo  -DR      Reps 9 4 laps  -DR      Time 9 RTB thighs  -DR      Additional Comments holding 10# KB out in front  -DR         Exercise 13    Exercise Name 13 SLS with alt shoulder ext  -DR      Cueing 13 Verbal  -DR      Sets 13 2e  -DR      Reps 13 10  -DR      Time 13 GTB  -DR      Additional Comments On foam; other LE in kickstand  -DR                User Key  (r) = Recorded By, (t) = Taken By, (c) = Cosigned By      Initials Name Provider Type    Hector Freitas, PT Physical Therapist                                  PT OP Goals       Row Name 05/21/25 1400          PT Short Term Goals    STG Date to Achieve 05/23/25  -DR     STG 1 Pt. will be independent with initial HEP to improve self-management of condition.  -DR     STG 1 Progress Met  -DR     STG 2 Patient will improve average TUG time from 11.5  seconds to </= 10 seconds with zero episodes of scissoring gait pattern and with mild deviation from straight patient to show improved gait speed and decrease risk for falls.  -DR     STG 2 Progress Ongoing  -DR     STG 3 Pt will improve walking tolerance from 5 min to at least 15 minutes with minimal gait deviations leading to improved quality of life.  -DR     STG 3 Progress Ongoing  -DR     STG 4 Patient able to tandem stance bilaterally with 1 finger hold > 15 seconds without LOB for improved core stabilization and decreased fall risk  -DR     STG 4 Progress Ongoing  -DR        Long Term Goals    LTG Date to Achieve 06/22/25  -DR     LTG 1 Pt. will be independent with advanced HEP to improve long term independence with self  management of condition.  -DR MYERSG 1 Progress Ongoing  -DR MYERSG 2 Pt. will score </= 20/50 on Modified Oswestry to indicate improved perception of disability.  -DR MYERSG 2 Progress Ongoing  -DR MYERSG 3 Patient will report zero episodes of falling in last 8 weeks to demonstrate improved balance and decrease risk of falling.  -DR MYERSG 3 Progress Ongoing  -DR MYERSG 4 Pt will perform 2 min walk test ambulating over 225ft with/without assistive device with SBA/Supervision without LOB with directional changes for improvements in safety with ambulatory tasks in house or community.  -DR MYERSG 4 Progress Ongoing  -DR MYERSG 5 Pt will improve B LE strength to 4/5 MMT.  -DR MYERSG 5 Progress Ongoing  -               User Key  (r) = Recorded By, (t) = Taken By, (c) = Cosigned By      Initials Name Provider Type    Hector Freitas, PT Physical Therapist                    Therapy Education  Given: Symptoms/condition management, Pain management, Posture/body mechanics  Program: Reinforced  How Provided: Verbal, Demonstration  Provided to: Patient  Level of Understanding: Verbalized, Demonstrated              Time Calculation:   Start Time: 1430  Stop Time: 1510  Time Calculation (min): 40 min  Timed Charges  57949 - PT Therapeutic Exercise Minutes: 40  Total Minutes  Timed Charges Total Minutes: 40   Total Minutes: 40  Therapy Charges for Today       Code Description Service Date Service Provider Modifiers Qty    [id number]  PT THER PROC EA 15 MIN 5/21/2025 Hector Moise, PT GP 3                      Hector Moise, PT  5/21/2025

## 2025-05-28 ENCOUNTER — APPOINTMENT (OUTPATIENT)
Dept: PHYSICAL THERAPY | Facility: HOSPITAL | Age: 80
End: 2025-05-28
Payer: MEDICARE

## 2025-05-28 RX ORDER — ASPIRIN 81 MG/1
162 TABLET ORAL DAILY
Qty: 180 TABLET | Refills: 3 | Status: SHIPPED | OUTPATIENT
Start: 2025-05-28

## 2025-05-28 RX ORDER — HYDROCORTISONE 25 MG/G
1 OINTMENT TOPICAL 2 TIMES DAILY
Qty: 40 G | Refills: 3 | Status: SHIPPED | OUTPATIENT
Start: 2025-05-28

## 2025-05-28 RX ORDER — FLUTICASONE PROPIONATE 50 MCG
2 SPRAY, SUSPENSION (ML) NASAL DAILY PRN
Qty: 48 G | Refills: 1 | Status: SHIPPED | OUTPATIENT
Start: 2025-05-28

## 2025-05-30 ENCOUNTER — APPOINTMENT (OUTPATIENT)
Dept: PHYSICAL THERAPY | Facility: HOSPITAL | Age: 80
End: 2025-05-30
Payer: MEDICARE

## 2025-06-10 RX ORDER — PROPRANOLOL HYDROCHLORIDE 60 MG/1
60 CAPSULE, EXTENDED RELEASE ORAL DAILY
Qty: 90 CAPSULE | Refills: 1 | Status: SHIPPED | OUTPATIENT
Start: 2025-06-10

## 2025-06-25 DIAGNOSIS — Z79.4 TYPE 2 DIABETES MELLITUS WITH HYPERGLYCEMIA, WITH LONG-TERM CURRENT USE OF INSULIN: ICD-10-CM

## 2025-06-25 DIAGNOSIS — E11.65 TYPE 2 DIABETES MELLITUS WITH HYPERGLYCEMIA, WITH LONG-TERM CURRENT USE OF INSULIN: ICD-10-CM

## 2025-06-25 RX ORDER — INSULIN ASPART 100 [IU]/ML
INJECTION, SOLUTION INTRAVENOUS; SUBCUTANEOUS
Qty: 27 ML | Refills: 1 | Status: SHIPPED | OUTPATIENT
Start: 2025-06-25

## 2025-06-25 NOTE — TELEPHONE ENCOUNTER
Rx Refill Note  Requested Prescriptions     Pending Prescriptions Disp Refills    insulin aspart (NovoLOG FlexPen) 100 UNIT/ML solution pen-injector sc pen 27 mL 1     Sig: Inject 8 Units under the skin into the appropriate area as directed Every Morning Before Breakfast AND 8 Units Daily Before Lunch AND 14 Units Daily Before Supper.      Last office visit with prescribing clinician: 4/14/2025   Last telemedicine visit with prescribing clinician: Visit date not found   Next office visit with prescribing clinician: 7/31/2025                         Would you like a call back once the refill request has been completed: [] Yes [] No    If the office needs to give you a call back, can they leave a voicemail: [] Yes [] No    Jenn Solano  06/25/25, 11:06 EDT  Jenn Solano  6/25/2025  11:06 EDT

## 2025-07-07 ENCOUNTER — OFFICE VISIT (OUTPATIENT)
Dept: CARDIOLOGY | Facility: CLINIC | Age: 80
End: 2025-07-07
Payer: MEDICARE

## 2025-07-07 VITALS
SYSTOLIC BLOOD PRESSURE: 161 MMHG | DIASTOLIC BLOOD PRESSURE: 78 MMHG | HEIGHT: 73 IN | WEIGHT: 231 LBS | BODY MASS INDEX: 30.62 KG/M2 | HEART RATE: 67 BPM

## 2025-07-07 DIAGNOSIS — I25.10 CORONARY ARTERY DISEASE INVOLVING NATIVE CORONARY ARTERY OF NATIVE HEART WITHOUT ANGINA PECTORIS: Primary | ICD-10-CM

## 2025-07-07 DIAGNOSIS — E78.2 MIXED HYPERLIPIDEMIA: ICD-10-CM

## 2025-07-07 DIAGNOSIS — I10 PRIMARY HYPERTENSION: ICD-10-CM

## 2025-07-07 PROCEDURE — 3077F SYST BP >= 140 MM HG: CPT | Performed by: INTERNAL MEDICINE

## 2025-07-07 PROCEDURE — 3078F DIAST BP <80 MM HG: CPT | Performed by: INTERNAL MEDICINE

## 2025-07-07 RX ORDER — SOY ISOFLAVONE 40 MG
TABLET ORAL
COMMUNITY

## 2025-07-07 RX ORDER — MOMETASONE FUROATE 1 MG/G
1 OINTMENT TOPICAL 2 TIMES DAILY
COMMUNITY

## 2025-07-07 RX ORDER — ZINC SULFATE 50(220)MG
220 CAPSULE ORAL DAILY
COMMUNITY

## 2025-07-07 RX ORDER — GARLIC EXTRACT 500 MG
2 CAPSULE ORAL EVERY MORNING
COMMUNITY

## 2025-07-07 NOTE — PROGRESS NOTES
Annual f/u   Subjective:        Earl Marc is a 80 y.o. male who here for follow up    CC  Follow-up CAD hypertension hyperlipidemia  HPI  80 years old male with coronary artery disease hypertension hyperlipidemia here for the follow-up denies any chest pains or tightness in the chest     Problems Addressed this Visit          Other    Coronary artery disease involving native coronary artery of native heart - Primary    Relevant Medications    Inositol Niacinate (No Flush Niacin) 500 MG tablet    YOHIMBINE HCL PO     Other Visit Diagnoses         Primary hypertension          Mixed hyperlipidemia              Diagnoses         Codes Comments      Coronary artery disease involving native coronary artery of native heart without angina pectoris    -  Primary ICD-10-CM: I25.10  ICD-9-CM: 414.01       Primary hypertension     ICD-10-CM: I10  ICD-9-CM: 401.9       Mixed hyperlipidemia     ICD-10-CM: E78.2  ICD-9-CM: 272.2           .    The following portions of the patient's history were reviewed and updated as appropriate: allergies, current medications, past family history, past medical history, past social history, past surgical history and problem list.    Past Medical History:   Diagnosis Date    Abnormal cardiovascular stress test     Acid reflux     Arthritis     Asthma     Bell palsy 1978    In [place]    Benign prostatic hyperplasia October 2024    Appt with Dr Mendoza Jan 25    Cancer     skin     Cataract     Chronic kidney disease     Colon polyp     Congenital heart disease     COPD (chronic obstructive pulmonary disease) 2021    Old age COPD/2D hand Smoke emphysema    Coronary artery disease     Diabetes mellitus     Diabetic neuropathy associated with type 2 diabetes mellitus     Diabetic peripheral neuropathy 03/10/2016    Difficulty walking     Dyslipidemia     Erectile dysfunction     Fatty liver disease, nonalcoholic     Fibromyalgia, primary     Gastritis     Glaucoma     both eyes    Gout     HL  (hearing loss)     Hyperlipidemia     Hypertension     Hypogonadism male     Infectious viral hepatitis Hep A 1959    Drank water from broken water line and Grandparents house.    Low back pain     Memory deficit 2025    Migraines 2023    Myocardial infarction 2018    Obesity     Testosterone deficiency     Low testosterone    Tremor     Trigger point of left shoulder region 2023    Type 2 diabetes mellitus      reports that he has never smoked. He has been exposed to tobacco smoke. He has never used smokeless tobacco. He reports that he does not drink alcohol and does not use drugs.   Family History   Problem Relation Age of Onset    Breast cancer Daughter     Heart attack Mother          10/31/1980    Hypertension Mother     Heart disease Mother     Thyroid disease Mother     Lupus Mother     Early death Mother     Miscarriages / Stillbirths Mother         Miscarriage     Heart failure Mother     Heart attack Brother     Heart disease Brother     Hyperlipidemia Brother     Cancer Brother         Due to Agent [place], [place]    Hypertension Brother     Depression Father     COPD Father     Anxiety disorder Father     Hypertension Father     Stroke Maternal Grandmother     Cancer Maternal Grandmother         Lung Cancer non-smoker    Diabetes Brother     Heart disease Brother     Hypertension Brother     Kidney disease Brother     Heart disease Brother     Hypertension Brother     Cancer Paternal Grandfather         lung cancer      Stroke Paternal Grandmother     Cancer Daughter         Passed away May 2 1013    Diabetes Brother     Hyperlipidemia Brother     Hearing loss Brother     Hypertension Brother     Hyperlipidemia Brother     Hypertension Sister     Mental illness Paternal Uncle     Diabetes Brother     Heart disease Brother     Hypertension Brother     Kidney disease Brother     Heart disease Brother     Hypertension Brother        Review of Systems  Constitutional:  "No wt loss, fever, fatigue  Gastrointestinal: No nausea, abdominal pain  Behavioral/Psych: No insomnia or anxiety   Cardiovascular no chest pains or tightness in the chest  Objective:       Physical Exam  /78 (BP Location: Right arm)   Pulse 67   Ht 185.4 cm (73\")   Wt 105 kg (231 lb)   BMI 30.48 kg/m²   General appearance: No acute changes   Neck: Trachea midline; NECK, supple, no thyromegaly or lymphadenopathy   Lungs: Normal size and shape, normal breath sounds, equal distribution of air, no rales and rhonchi   CV: S1-S2 regular, no murmurs, no rub, no gallop   Abdomen: Soft, nontender; no masses , no abnormal abdominal sounds   Extremities: No deformity , normal color , no peripheral edema   Skin: Normal temperature, turgor and texture; no rash, ulcers            ECG 12 Lead    Date/Time: 7/7/2025 10:32 AM  Performed by: Maria E Gilbert MD    Authorized by: Maria E Gilbert MD  Comparison: compared with previous ECG   Similar to previous ECG  Rhythm: sinus rhythm    Clinical impression: non-specific ECG            Echocardiogram:    Results for orders placed in visit on 10/27/23    Adult Transthoracic Echo Complete W/ Cont if Necessary Per Protocol    Interpretation Summary    Left ventricular ejection fraction appears to be 61 - 65%.    Left ventricular diastolic function was normal.    Estimated right ventricular systolic pressure from tricuspid regurgitation is normal (<35 mmHg).          Current Outpatient Medications:     Alcohol Swabs (Advocate Alcohol Prep Pads) 70 % pads, Use 1 each 4 (Four) Times a Day., Disp: 400 each, Rfl: 3    ASHWAGANDHA 35 PO, Take  by mouth., Disp: , Rfl:     aspirin 81 MG EC tablet, Take 2 tablets by mouth Daily., Disp: 180 tablet, Rfl: 3    atorvastatin (LIPITOR) 20 MG tablet, Take 1 tablet by mouth Every Night., Disp: 90 tablet, Rfl: 1    B Complex-C (B Complex-Vitamin C) capsule, Take 2 capsules by mouth Every Morning., Disp: , Rfl:     Blood Glucose " Monitoring Suppl (FreeStyle Freedom Lite) w/Device kit, , Disp: , Rfl:     Continuous Glucose Sensor (Dexcom G7 Sensor) misc, Use., Disp: , Rfl:     Cranberry-Vitamin C (Cranberry Concentrate/VitaminC) 83308-851 MG capsule, Take  by mouth., Disp: , Rfl:     Cyanocobalamin (Vitamin B-12) 1000 MCG sublingual tablet, Place 1 tablet under the tongue Daily., Disp: , Rfl:     D-Mannose 500 MG capsule, Take 1 capsule by mouth 3 times a day., Disp: , Rfl:     Dextrose, Diabetic Use, (GLUCOSE PO), Take 4 g by mouth., Disp: , Rfl:     fluticasone (FLONASE) 50 MCG/ACT nasal spray, Administer 2 sprays into the nostril(s) as directed by provider Daily As Needed for Allergies., Disp: 48 g, Rfl: 1    GINSENG-GINKGO-B RMJFM-A-F-CA PO, Take 3 capsules by mouth Every Afternoon. Ginseng+ginkgo vegan capsule per pt med list., Disp: , Rfl:     glucose blood test strip, Dispense based on insurance preference: Check 1-3 times a day Dx: E 11.65, Disp: 300 each, Rfl: 2    Glycerin-Hypromellose- (DRY EYE RELIEF DROPS OP), Apply 1-2 drops to eye(s) as directed by provider 5 (Five) Times a Day. Instill 1-2 drops bilateral eyes 5 times daily per pt med list., Disp: , Rfl:     hydrocortisone 2.5 % ointment, Apply 1 Application topically to the appropriate area as directed 2 (Two) Times a Day., Disp: 40 g, Rfl: 3    Inositol Niacinate (No Flush Niacin) 500 MG tablet, Take 500 mg by mouth Daily., Disp: , Rfl:     insulin aspart (NovoLOG FlexPen) 100 UNIT/ML solution pen-injector sc pen, Inject 8 Units under the skin into the appropriate area as directed Every Morning Before Breakfast AND 8 Units Daily Before Lunch AND 14 Units Daily Before Supper. (Patient taking differently: Inject 11 Units under the skin two times a day (breakfast and noon meals) and 14 Units with dinner meal. Per pt's med list), Disp: 27 mL, Rfl: 1    Insulin Glargine (Lantus SoloStar) 100 UNIT/ML injection pen, Inject 32 Units under the skin into the appropriate area as  directed Daily., Disp: 30 mL, Rfl: 0    Insulin Pen Needle (B-D UF III MINI PEN NEEDLES) 31G X 5 MM misc, For use with pen device up to 4 times a day. Can substitute based on availability and insurance., Disp: 500 each, Rfl: 2    L-Arginine 500 MG capsule, Take  by mouth., Disp: , Rfl:     Lancets (freestyle) lancets, , Disp: , Rfl:     latanoprost (XALATAN) 0.005 % ophthalmic solution, Administer 1 drop to both eyes Every Night., Disp: , Rfl:     lidocaine (LIDODERM) 5 %, Place 1 patch on the skin as directed by provider Daily. Remove & Discard patch within 12 hours or as directed by MD, Disp: 90 patch, Rfl: 1    linagliptin (Tradjenta) 5 MG tablet tablet, Take 1 tablet by mouth Daily., Disp: 90 tablet, Rfl: 1    Lutein 20 MG tablet, Take 1 tablet by mouth Daily., Disp: , Rfl:     Magnesium 250 MG tablet, Take 1 tablet by mouth Every Night., Disp: 90 tablet, Rfl: 3    Melatonin 5 MG chewable tablet, Chew 2 tablets every night at bedtime for 360 days., Disp: 180 tablet, Rfl: 3    mometasone (ELOCON) 0.1 % ointment, Apply 1 Application topically to the appropriate area as directed 2 (Two) Times a Day. Apply karen twice daily to the affected area of rash on forearms and hands per pt med list., Disp: , Rfl:     NON FORMULARY, Ketamin 4%/Gabapentin 6%/clonidine 0.2%/nifedipine 2% 1-2 PUMPS to affected area 3-4 times daily., Disp: , Rfl:     Omega 3-6-9 Fatty Acids (TRIPLE OMEGA-3-6-9 PO), Take  by mouth Every Morning., Disp: , Rfl:     pregabalin (LYRICA) 50 MG capsule, Take 1 capsule by mouth 3 (Three) Times a Day., Disp: , Rfl:     propranolol LA (INDERAL LA) 60 MG 24 hr capsule, Take 1 capsule by mouth Daily., Disp: 90 capsule, Rfl: 1    Riboflavin 400 MG tablet, Take 1 tablet by mouth Every Night., Disp: 90 tablet, Rfl: 3    rivastigmine (EXELON) 4.6 MG/24HR patch, PLACE ONE PATCH ON THE SKIN AS DIRECTED BY PROVIDER DAILY., Disp: 30 patch, Rfl: 6    silodosin (RAPAFLO) 4 MG capsule capsule, Take 1 capsule by  mouth Daily With Breakfast., Disp: , Rfl:     Testosterone Cypionate (DEPOTESTOTERONE CYPIONATE) 200 MG/ML injection, Inject 1 mL into the appropriate muscle as directed by prescriber Every 14 (Fourteen) Days., Disp: , Rfl:     VIAGRA 100 MG tablet, Take 1 tablet by mouth As Needed for erectile dysfunction (1 tablet prior to planned intercourse.)., Disp: 24 tablet, Rfl: 3    VITAMIN E 400 UNIT capsule, Take 1,000 Units by mouth Daily. Per pt med list 1000 units., Disp: , Rfl:     YOHIMBINE HCL PO, Take 5 capsules by mouth Daily., Disp: , Rfl:     zinc sulfate (ZINCATE) 220 (50 Zn) MG capsule, Take 1 capsule by mouth Daily., Disp: , Rfl:     ammonium lactate (AmLactin) 12 % lotion, Apply 1 Application topically to the appropriate area as directed As Needed for Dry Skin., Disp: , Rfl:     ascorbic acid (VITAMIN C) 1000 MG tablet, Take 2 tablets by mouth Daily., Disp: , Rfl:     benzonatate (Tessalon Perles) 100 MG capsule, Take 1 capsule by mouth 3 times a day., Disp: 30 capsule, Rfl: 0    Calcium Carbonate (CALCIUM 600 PO), Take 1 tablet by mouth Daily., Disp: , Rfl:     ciclopirox (LOPROX) 1 % shampoo, Apply 1 Application topically to the appropriate area as directed 3 (Three) Times a Week., Disp: , Rfl:     Cranberry (CRANBEREX PO), Take  by mouth., Disp: , Rfl:     Dextrose, Diabetic Use, (Glucose) 1 g chewable tablet, Chew 1 tablet As Needed., Disp: , Rfl:     FINASTERIDE PO, Take 4 mg by mouth Daily., Disp: , Rfl:     Lumigan 0.01 % ophthalmic drops, , Disp: , Rfl:     montelukast (SINGULAIR) 10 MG tablet, Take 1 tablet by mouth Every Night., Disp: 30 tablet, Rfl: 0    Multiple Vitamins-Minerals (ZINC PO), Take 50 mg by mouth Daily., Disp: , Rfl:     nitroglycerin (Nitrostat) 0.4 MG SL tablet, Place 1 tablet under the tongue Every 5 (Five) Minutes As Needed for Chest Pain. Indications: Acute Angina Pectoris (Patient taking differently: Place 1 tablet under the tongue Every 5 (Five) Minutes As Needed for Chest  Pain.), Disp: 25 tablet, Rfl: 3    NON FORMULARY, Fluconazole/terbinafine/Ibuprofen/DMSO 2/3/3/50% with vitamin D 55587 Iu/100ml  1 Application to toe nails 2 times daily, Disp: , Rfl:     pantoprazole (PROTONIX) 20 MG EC tablet, Take 1 tablet by mouth Daily., Disp: 90 tablet, Rfl: 1    Patiromer Sorbitex Calcium (Veltassa) 8.4 g pack, Take 1 packet by mouth Daily., Disp: , Rfl:     venlafaxine XR (EFFEXOR-XR) 75 MG 24 hr capsule, Take 1 capsule by mouth Daily., Disp: 90 capsule, Rfl: 3   Assessment:                Plan:          ICD-10-CM ICD-9-CM   1. Coronary artery disease involving native coronary artery of native heart without angina pectoris  I25.10 414.01   2. Primary hypertension  I10 401.9   3. Mixed hyperlipidemia  E78.2 272.2     1. Coronary artery disease involving native coronary artery of native heart without angina pectoris  No angina pectoris    2. Primary hypertension  Blood pressure controlled    3. Mixed hyperlipidemia  Continue current treatment      1 YR  COUNSELING:    Earl Hernández was given to patient for the following topics: diagnostic results, risk factor reductions, impressions, risks and benefits of treatment options and importance of treatment compliance .       SMOKING COUNSELING:        Dictated using Dragon dictation

## 2025-07-10 NOTE — ED PROVIDER NOTES
EMERGENCY DEPARTMENT ENCOUNTER    Room Number:  35/35  Date seen:  7/10/2024  PCP: Avery Velazquez MD  Historian: Patient and EMS      HPI:  Chief Complaint: Hallucination  Context: Earl Marc is a 79 y.o. male who presents to the ED c/o hallucinations began last night.  The patient was seen in the emergency room 2 days ago for an acute headache.  Patient had a head CT at that time that was negative acute.  The patient was admitted to the hospital 1 month ago after a fall with a T12 compression fracture.  The patient states he is no longer on narcotics but is still taking muscle relaxers.  He does have a history of diabetes mellitus.  He also has chronic kidney disease.  The patient states that over the past 6 weeks he has been having headaches which is not normal for him.  He was seen here 2 days ago with a negative head CT.  He states that he was treated here and felt better but the headache returned yesterday at 9 AM.  He states he went to bed approximate midnight last night and felt okay but he did take a baclofen before he went to bed.  The patient states he woke up at approximately 2 AM and had visual hallucinations.  He states when he was awake but had his eyes closed that he was seen cartoon characters and a person on his right side.  He states his back was uncomfortable so he got up to sit in a recliner and took some melatonin Gummies.  He then states at 5 AM he heard his house alarm going off and so he went up to turn it off but it was already off.  This morning the patient awoke and had a headache again at 9 AM and called his PCP.  He went to his PCPs office where he advised him of hallucinations and they called 911 to bring him to the emergency room.  Patient does report a frontal headache but denies trouble with speech, trouble with walking or focal neurodeficit.      PAST MEDICAL HISTORY  Active Ambulatory Problems     Diagnosis Date Noted    Bell's palsy 03/10/2016    Persistent  insomnia 03/10/2016    Osteoarthritis of cervical spine 03/10/2016    Diabetic peripheral neuropathy 03/10/2016    Dyslipidemia 03/10/2016    Steatosis of liver 03/10/2016    Fibromyalgia 03/10/2016    Gastroesophageal reflux disease without esophagitis 03/10/2016    Hypertensive kidney disease with stage 3a chronic kidney disease 03/10/2016    Hypogonadism in male 03/10/2016    Renal insufficiency 03/10/2016    Vitamin D deficiency 03/10/2016    Benign non-nodular prostatic hyperplasia with lower urinary tract symptoms 03/10/2016    S/P drug eluting coronary stent placement 04/13/2016    Coronary artery disease involving native coronary artery of native heart 06/21/2016    Malignant neoplasm of skin 10/03/2016    Type 2 diabetes mellitus with hyperglycemia, with long-term current use of insulin 04/04/2017    Diabetes mellitus 04/04/2017    Chronic insomnia 12/20/2017    Other specified glaucoma 04/01/2017    Vertigo 03/14/2018    Epigastric pain 06/11/2018    Chest pain with high risk for cardiac etiology 05/26/2021    Precordial pain 05/25/2021    S/P arthroscopy of shoulder 11/19/2019    Peripheral neuropathy 03/24/2022    Stage 3b chronic kidney disease 03/24/2022    Chest pain 01/19/2024    Stroke-like symptoms 02/20/2024    Acute back pain 06/09/2024    Thoracic 12 compression fracture, closed, initial encounter 06/10/2024    Hypertensive urgency 07/10/2024    Sudden onset of severe headache 07/10/2024     Resolved Ambulatory Problems     Diagnosis Date Noted    Hyperuricemia 03/10/2016    Erectile dysfunction of nonorganic origin 03/10/2016    Type 2 diabetes mellitus without complication 03/10/2016    Precordial pain 03/10/2016    Abnormal nuclear stress test 03/25/2016    Coronary artery disease involving native coronary artery with angina pectoris 03/27/2016    S/P coronary angioplasty 04/05/2016    Chest pain 06/14/2016    Dizziness 06/21/2016    Lateral epicondylitis 12/19/2012    Long-term insulin use  in type 2 diabetes 10/04/2016    Type II diabetes circulatory disorder causing erectile dysfunction 02/13/2017    Sialadenitis 06/28/2017    Acute bronchitis due to other specified organisms 11/14/2017    Chest pain 10/16/2018    Other forms of angina pectoris 10/16/2018    Trigger point of left shoulder region 03/02/2023     Past Medical History:   Diagnosis Date    Abnormal cardiovascular stress test     Acid reflux     Arthritis     Asthma     Cancer     Chronic kidney disease     Colon polyp     Congenital heart disease     COPD (chronic obstructive pulmonary disease) 2021    Coronary artery disease     Diabetic neuropathy associated with type 2 diabetes mellitus     Erectile dysfunction     Fatty liver disease, nonalcoholic     Gastritis     Glaucoma     Hyperlipidemia     Hypertension     Hypogonadism male     Infectious viral hepatitis Hep A July 1959    Migraines 09/2023    Myocardial infarction 2018    Type 2 diabetes mellitus          REVIEW OF SYSTEMS  All systems reviewed and negative except for those discussed in HPI.       PAST SURGICAL HISTORY  Past Surgical History:   Procedure Laterality Date    CARDIAC CATHETERIZATION N/A 03/25/2016    Procedure: Left Heart Cath;  Surgeon: Maria E Gilbert MD;  Location: [facility] CATH INVASIVE LOCATION;  Service:     CARDIAC CATHETERIZATION N/A 03/25/2016    Procedure: Coronary angiography;  Surgeon: Maria E Gilbert MD;  Location: [facility] CATH INVASIVE LOC[facility];  Service:     CARDIAC CATHETERIZATION N/A 03/28/2016    Procedure: Coronary angiography;  Surgeon: Maria E Gilbert MD;  [facility];  Service:     CARDIAC CATHETERIZATION N/A 03/28/2016    Procedure: Stent MOISE coronary;  Surgeon: Maria E Gilbert MD;  [facility];  Service:     CARDIAC CATHETERIZATION N/A 10/18/2018    Procedure: Coronary angiography  no LV gram d/t CKD;  Surgeon: Maria E Gilbert MD;  Location: [facility] CATH  INVASIVE LOCATION;  Service: Cardiology    CARDIAC CATHETERIZATION N/A 10/18/2018    Procedure: Left Heart Cath;  Surgeon: Maria E Gilbert MD;  Location:  [facility] CATH INVASIVE LOCATION;  Service: Cardiology    CARDIAC CATHETERIZATION N/A 10/18/2018    Procedure: Stent MOISE coronary;  Surgeon: Maria E Gilbert MD;  Location:  [facility] CATH INVASIVE LOCATION;  Service: Cardiology    CARDIAC CATHETERIZATION N/A 05/28/2021    Procedure: Coronary angiography;  Surgeon: Maria E Gilbert MD;  Location:  [facility] CATH INVASIVE LOCATION;  Service: Cardiovascular;  Laterality: N/A;    CARDIAC CATHETERIZATION N/A 05/28/2021    Procedure: Left Heart Cath;  Surgeon: Maria E Gilbert MD;  Location:  [facility] CATH INVASIVE LOCATION;  Service: Cardiovascular;  Laterality: N/A;    CARDIAC CATHETERIZATION N/A 05/28/2021    Procedure: Left ventriculography;  Surgeon: Maria E Gilbert MD;  Location:  [facility] CATH INVASIVE LOCATION;  Service: Cardiovascular;  Laterality: N/A;    CARDIAC CATHETERIZATION N/A 05/28/2021    Procedure: Stent MOISE coronary;  Surgeon: Maria E Gilbert MD;  Location:  [facility] CATH INVASIVE LOCATION;  Service: Cardiovascular;  Laterality: N/A;    CARDIAC CATHETERIZATION N/A 1/19/2024    Procedure: Left Heart Cath;  Surgeon: Maria E Gilbert MD;  [facility];  Service: Cardiovascular;  Laterality: N/A;    CARDIAC CATHETERIZATION N/A 1/19/2024    Procedure: Coronary angiography;  Surgeon: Maria E Gilbert MD;  [facility];  Service: Cardiovascular;  Laterality: N/A;    CARDIAC CATHETERIZATION N/A 1/19/2024    Procedure: Left ventriculography;  Surgeon: Maria E Gilbert MD;  [facility] C[facility];  Service: Cardiovascular;  Laterality: N/A;    CAROTID STENT      CORONARY ANGIOPLASTY      CORONARY STENT PLACEMENT  3/28/2016    EYE SURGERY  9/2017    NECK EXPLORATION N/A     AL RT/LT HEART CATHETERS N/A  10/18/2018    Procedure: Percutaneous Coronary Intervention;  Surgeon: Maria E Gilbert MD;  Location: [facility] INVASIVE LOCATION;  Service: Cardiology         FAMILY HISTORY  Family History   Problem Relation Age of Onset    Breast cancer Daughter     Heart attack Mother          10/31/1980    Hypertension Mother     Heart disease Mother     Thyroid disease Mother     Lupus Mother     Early death Mother     Miscarriages / Stillbirths Mother         Miscarriage     Heart failure Mother     Heart attack Brother     Heart disease Brother     Hyperlipidemia Brother     Cancer Brother         Due to [place]    Hypertension Brother     Depression Father     COPD Father     Stroke Maternal Grandmother     Cancer Maternal Grandmother         Lung Cancer non-smoker    Diabetes Brother     Heart disease Brother     Hypertension Brother     Kidney disease Brother     Heart disease Brother     Hypertension Brother          SOCIAL HISTORY  Social History     Socioeconomic History    Marital status:      Spouse name: Ivette    Number of children: 1   Tobacco Use    Smoking status: Never     Passive exposure: Past    Smokeless tobacco: Never    Tobacco comments:     Never   Vaping Use    Vaping status: Never Used   Substance and Sexual Activity    Alcohol use: No    Drug use: Never    Sexual activity: Defer         ALLERGIES  Ace inhibitors, Hydrogenated palm oil glycerides, Lanolin, Other, Prazosin, and Nsaids      PHYSICAL EXAM  ED Triage Vitals   Temp Heart Rate Resp BP SpO2   07/10/24 1207 07/10/24 1204 07/10/24 1204 07/10/24 1204 07/10/24 1204   98.2 °F (36.8 °C) 72 18 (!) 198/92 95 %      Temp src Heart Rate Source Patient Position BP Location FiO2 (%)   07/10/24 1207 -- -- -- --   Oral           Physical Exam      GENERAL: 79-year-old male in no acute distress  HENT: NCAT: nares patent: Neck supple  EYES: no scleral icterus  CV: regular rhythm, normal rate  RESPIRATORY: normal  effort  ABDOMEN: soft, NTND: Bowel sounds positive  MUSCULOSKELETAL: no deformity  NEURO: alert with nonfocal neuro exam: See NIHSS  PSYCH:  calm, cooperative  SKIN: warm, dry    Vital signs and nursing notes reviewed.      LAB RESULTS  Recent Results (from the past 24 hour(s))   POC Glucose Once    Collection Time: 07/10/24 12:05 PM    Specimen: Blood   Result Value Ref Range    Glucose 183 (H) 70 - 130 mg/dL   Green Top (Gel)    Collection Time: 07/10/24 12:15 PM   Result Value Ref Range    Extra Tube Hold for add-ons.    Lavender Top    Collection Time: 07/10/24 12:15 PM   Result Value Ref Range    Extra Tube hold for add-on    Gold Top - SST    Collection Time: 07/10/24 12:15 PM   Result Value Ref Range    Extra Tube Hold for add-ons.    Light Blue Top    Collection Time: 07/10/24 12:15 PM   Result Value Ref Range    Extra Tube Hold for add-ons.    Protime-INR    Collection Time: 07/10/24 12:15 PM    Specimen: Blood   Result Value Ref Range    Protime 12.8 11.7 - 14.2 Seconds    INR 0.94 0.90 - 1.10   CBC Auto Differential    Collection Time: 07/10/24 12:15 PM    Specimen: Blood   Result Value Ref Range    WBC 12.74 (H) 3.40 - 10.80 10*3/mm3    RBC 6.24 (H) 4.14 - 5.80 10*6/mm3    Hemoglobin 17.5 13.0 - 17.7 g/dL    Hematocrit 53.2 (H) 37.5 - 51.0 %    MCV 85.3 79.0 - 97.0 fL    MCH 28.0 26.6 - 33.0 pg    MCHC 32.9 31.5 - 35.7 g/dL    RDW 13.5 12.3 - 15.4 %    RDW-SD 41.6 37.0 - 54.0 fl    MPV 10.7 6.0 - 12.0 fL    Platelets 211 140 - 450 10*3/mm3    Neutrophil % 79.6 (H) 42.7 - 76.0 %    Lymphocyte % 13.3 (L) 19.6 - 45.3 %    Monocyte % 5.4 5.0 - 12.0 %    Eosinophil % 1.0 0.3 - 6.2 %    Basophil % 0.2 0.0 - 1.5 %    Immature Grans % 0.5 0.0 - 0.5 %    Neutrophils, Absolute 10.14 (H) 1.70 - 7.00 10*3/mm3    Lymphocytes, Absolute 1.69 0.70 - 3.10 10*3/mm3    Monocytes, Absolute 0.69 0.10 - 0.90 10*3/mm3    Eosinophils, Absolute 0.13 0.00 - 0.40 10*3/mm3    Basophils, Absolute 0.03 0.00 - 0.20 10*3/mm3     Immature Grans, Absolute 0.06 (H) 0.00 - 0.05 10*3/mm3    nRBC 0.0 0.0 - 0.2 /100 WBC   Urinalysis With Microscopic If Indicated (No Culture) - Urine, Clean Catch    Collection Time: 07/10/24 12:20 PM    Specimen: Urine, Clean Catch   Result Value Ref Range    Color, UA Yellow Yellow, Straw    Appearance, UA Clear Clear    pH, UA 7.5 5.0 - 8.0    Specific Gravity, UA 1.008 1.005 - 1.030    Glucose, UA Negative Negative    Ketones, UA Negative Negative    Bilirubin, UA Negative Negative    Blood, UA Negative Negative    Protein, UA Trace (A) Negative    Leuk Esterase, UA Negative Negative    Nitrite, UA Negative Negative    Urobilinogen, UA 0.2 E.U./dL 0.2 - 1.0 E.U./dL   Urine Drug Screen - Urine, Clean Catch    Collection Time: 07/10/24 12:20 PM    Specimen: Urine, Clean Catch   Result Value Ref Range    Amphet/Methamphet, Screen Negative Negative    Barbiturates Screen, Urine Negative Negative    Benzodiazepine Screen, Urine Negative Negative    Cocaine Screen, Urine Negative Negative    Opiate Screen Negative Negative    THC, Screen, Urine Negative Negative    Methadone Screen, Urine Negative Negative    Oxycodone Screen, Urine Negative Negative    Fentanyl, Urine Negative Negative   ECG 12 Lead Stroke Evaluation    Collection Time: 07/10/24 12:28 PM   Result Value Ref Range    QT Interval 368 ms    QTC Interval 392 ms   Comprehensive Metabolic Panel    Collection Time: 07/10/24  1:40 PM    Specimen: Blood   Result Value Ref Range    Glucose 228 (H) 65 - 99 mg/dL    BUN 13 8 - 23 mg/dL    Creatinine 1.17 0.76 - 1.27 mg/dL    Sodium 136 136 - 145 mmol/L    Potassium 3.9 3.5 - 5.2 mmol/L    Chloride 101 98 - 107 mmol/L    CO2 24.2 22.0 - 29.0 mmol/L    Calcium 9.4 8.6 - 10.5 mg/dL    Total Protein 6.9 6.0 - 8.5 g/dL    Albumin 4.1 3.5 - 5.2 g/dL    ALT (SGPT) 17 1 - 41 U/L    AST (SGOT) 19 1 - 40 U/L    Alkaline Phosphatase 95 39 - 117 U/L    Total Bilirubin 0.7 0.0 - 1.2 mg/dL    Globulin 2.8 gm/dL    A/G Ratio  1.5 g/dL    BUN/Creatinine Ratio 11.1 7.0 - 25.0    Anion Gap 10.8 5.0 - 15.0 mmol/L    eGFR 63.4 >60.0 mL/min/1.73   Ethanol    Collection Time: 07/10/24  1:40 PM    Specimen: Blood   Result Value Ref Range    Ethanol <10 0 - 10 mg/dL    Ethanol % <0.010 %   Magnesium    Collection Time: 07/10/24  1:40 PM    Specimen: Blood   Result Value Ref Range    Magnesium 2.8 (H) 1.6 - 2.4 mg/dL   TSH    Collection Time: 07/10/24  1:40 PM    Specimen: Blood   Result Value Ref Range    TSH 1.980 0.270 - 4.200 uIU/mL   T4, Free    Collection Time: 07/10/24  1:40 PM    Specimen: Blood   Result Value Ref Range    Free T4 1.21 0.92 - 1.68 ng/dL   High Sensitivity Troponin T    Collection Time: 07/10/24  1:40 PM    Specimen: Blood   Result Value Ref Range    HS Troponin T 14 <22 ng/L       Ordered the above labs and reviewed the results.        RADIOLOGY  CT Head Without Contrast    Result Date: 7/10/2024  CT HEAD WITHOUT CONTRAST  HISTORY: Hallucinations.  COMPARISON: CT head 7/8/2024.  FINDINGS: The brain and ventricles are symmetrical. Moderate small vessel ischemic disease is noted. There is no evidence of intracranial hemorrhage, or of hydrocephalus. No focal area of decreased attenuation to suggest acute infarction is identified. Further evaluation could be performed with a MRI examination of the brain as indicated    Radiation dose reduction techniques were utilized, including automated exposure control and exposure modulation based on body size.   This report was finalized on 7/10/2024 1:46 PM by Dr. Howard Flood M.D on Workstation: [id number]       Ordered the above noted radiological studies. Reviewed by me in PACS.            PROCEDURES  Critical Care    Performed by: Junito Espitia MD  Authorized by: Junito Espitia MD    Critical care provider statement:     Critical care time (minutes):  31    Critical care time was exclusive of:  Separately billable procedures and treating other patients    Critical care was  necessary to treat or prevent imminent or life-threatening deterioration of the following conditions:  CNS failure or compromise    Critical care was time spent personally by me on the following activities:  Development of treatment plan with patient or surrogate, discussions with consultants, discussions with primary provider, evaluation of patient's response to treatment, examination of patient, obtaining history from patient or surrogate, ordering and performing treatments and interventions, ordering and review of laboratory studies, ordering and review of radiographic studies, pulse oximetry, re-evaluation of patient's condition and review of old charts    I assumed direction of critical care for this patient from another provider in my specialty: no      Care discussed with: admitting provider              MEDICATIONS GIVEN IN ER  Medications   sodium chloride 0.9 % flush 10 mL (has no administration in time range)   prochlorperazine (COMPAZINE) injection 10 mg (10 mg Intravenous Given 7/10/24 1242)   diphenhydrAMINE (BENADRYL) injection 25 mg (25 mg Intravenous Given 7/10/24 1242)   magnesium sulfate in D5W 1g/100mL (PREMIX) (0 g Intravenous Stopped 7/10/24 1337)   labetalol (NORMODYNE,TRANDATE) injection 20 mg (20 mg Intravenous Given 7/10/24 1443)             MEDICAL DECISION MAKING, PROGRESS, and CONSULTS    All labs have been independently reviewed by me.  All radiology studies have been reviewed by me and I have also reviewed the radiology report.   EKG's independently viewed and interpreted by me.  Discussion below represents my analysis of pertinent findings related to patient's condition, differential diagnosis, treatment plan and final disposition.      Additional sources:  - Discussed/ obtained information from independent historians: Patient's wife states that he initially was on baclofen and then switch to Flexeril which made him confused so he stopped.  Now the patient is back to taking baclofen  and took some yesterday.    - External (non-ED) record review: I reviewed the patient's admission to the hospital on 6 9 through 610 where he was seen for compression fracture of his T12 and placed in a TLSO brace.  Also reviewed the patient's office visit with his nephrologist on 6/25 where he was diagnosed with stage IIIb chronic kidney disease.    - Chronic or social conditions impacting care: Patient lives at home with wife    - Shared decision making: After shared decision-making discussion between myself, the patient and his wife we agree he needs to admitted to the hospital for further evaluation and care      Orders placed during this visit:  Orders Placed This Encounter   Procedures    Critical Care    CT Head Without Contrast    [place] Draw    Comprehensive Metabolic Panel    Protime-INR    Urinalysis With Microscopic If Indicated (No Culture) - Urine, Clean Catch    Ethanol    Urine Drug Screen - Urine, Clean Catch    Magnesium    TSH    T4, Free    High Sensitivity Troponin T    CBC Auto Differential    High Sensitivity Troponin T 2Hr    Monitor Blood Pressure    Continuous Pulse Oximetry    [facility] (on-call MD unless specified) Details    POC Glucose Once    ECG 12 Lead Stroke Evaluation    Insert Peripheral IV    Initiate Observation Status    Green Top (Gel)    Lavender Top    Gold Top - SST    Light Blue Top    CBC & Differential         Differential diagnosis:  My differential diagnosis includes but is not limited to stroke, encephalopathy, toxic / metabolic, hypoglycemia, toxin-induced, psychogenic, medication-induced, or infectious.      Independent interpretation of labs, radiology studies, and discussions with consultants:  ED Course as of 07/10/24 1506   Wed Jul 10, 2024   1229 NIHSS:  0-->Alert: keenly responsive  0-->Answers both questions correctly  0-->Performs both tasks correctly  0=normal  0=No visual loss  0=Normal symmetric movement  0-->No drift: limb holds 90 (or 45) degrees for full 10  secs  0-->No drift: limb holds 90 (or 45) degrees for full 10 secs  0-->No drift: limb holds 90 (or 45) degrees for full 10 secs  0-->No drift: limb holds 90 (or 45) degrees for full 10 secs  0=Absent  0=Normal; no sensory loss  0=No aphasia, normal  0=Normal  0=No abnormality  Total score: 0 [GP]   1229 The patient presents for hallucinations and has an NIHSS of 0.  Thus he is not a team D or thrombolytic candidate.  The patient's main complaint is headache.  His blood pressure is 198/92.  He has a history of chronic kidney disease so obtain a CT of the head without contrast while treating the patient's headache and watching his blood pressure. [GP]   1230 EKG    EKG time: 1228  Rhythm/Rate: Normal sinus rhythm at 68  LVH  No Acute Ischemia  Non-Specific ST-T changes    Similar compared to prior on 2/20/2024    Interpreted Contemporaneously by me.  Independently viewed by me     [GP]   1407 My independent interpretation the patient's head CT is no intracranial hemorrhage or mass [GP]   1408 Patient's urinalysis and UDS are normal.  Currently his care has been delayed secondary to hemolyzed labs. [GP]   1429 On repeat evaluation the patient's blood pressure still 180s/80s.  He states his headache is markedly better.  The patient states he still has been having some visual hallucinations still while in the emergency room.  At this point I advised him and his wife that we will need to admit him to the hospital for further evaluation and care.  The patient understands and agrees with the plan. [GP]   3911 I discussed the case with Dr. Escobar from [facility].  She is aware of the patient's presentation with hallucinations, headache and hypertension.  She will admitting to a telemetry bed for further evaluation and care and likely neurology consultation. [GP]      ED Course User Index  [GP] Junito Espitia MD               DIAGNOSIS  Final diagnoses:   Visual hallucinations   Acute nonintractable headache, unspecified headache  type   Hypertensive urgency   Type 2 diabetes mellitus with hyperglycemia, with long-term current use of insulin   Hypertensive kidney disease with stage 3a chronic kidney disease   History of compression fracture of spine         DISPOSITION  ADMISSION    Discussed treatment plan and reason for admission with pt/family and admitting physician.  Pt/family voiced understanding of the plan for admission for further testing/treatment as needed.            Latest Documented Vital Signs:  As of 15:06 EDT  BP- 156/77 HR- 71 Temp- 98.2 °F (36.8 °C) (Oral) O2 sat- 95%--      --------------------  Please note that portions of this were completed with a voice recognition program.       Note Disclaimer: At [facility], we believe that sharing information builds trust and better relationships. You are receiving this note because you are receiving care at [facility] or recently visited. It is possible you will see health information before a provider has talked with you about it. This kind of information can be easy to misunderstand. To help you fully understand what it means for your health, we urge you to discuss this note with your provider.             Junito Espitia MD  07/10/24 2434     Male

## 2025-07-21 ENCOUNTER — OFFICE VISIT (OUTPATIENT)
Dept: SLEEP MEDICINE | Facility: HOSPITAL | Age: 80
End: 2025-07-21
Payer: MEDICARE

## 2025-07-21 VITALS — WEIGHT: 236 LBS | HEART RATE: 87 BPM | BODY MASS INDEX: 31.28 KG/M2 | OXYGEN SATURATION: 94 % | HEIGHT: 73 IN

## 2025-07-21 DIAGNOSIS — G47.52 RBD (REM BEHAVIORAL DISORDER): Primary | ICD-10-CM

## 2025-07-21 DIAGNOSIS — F51.04 CHRONIC INSOMNIA: ICD-10-CM

## 2025-07-21 PROCEDURE — G0463 HOSPITAL OUTPT CLINIC VISIT: HCPCS

## 2025-07-21 PROCEDURE — 99214 OFFICE O/P EST MOD 30 MIN: CPT | Performed by: PSYCHIATRY & NEUROLOGY

## 2025-07-21 PROCEDURE — 1160F RVW MEDS BY RX/DR IN RCRD: CPT | Performed by: PSYCHIATRY & NEUROLOGY

## 2025-07-21 PROCEDURE — 1159F MED LIST DOCD IN RCRD: CPT | Performed by: PSYCHIATRY & NEUROLOGY

## 2025-07-21 NOTE — PROGRESS NOTES
[facility] Note       Primary Care Physician: Avery Velazquez MD    Interval History:   The patient is a 80 y.o. male      History of Present Illness  The patient presents for dream enactment behavior.    He experiences episodes of dream enactment behavior approximately once every 2 to 3 weeks, which are not violent in nature. His wife has observed him performing actions in his sleep that he does not recall. He has been taking melatonin 10 mg for a significant period, but it is unclear if this has had any effect on his condition. He has not been prescribed Klonopin. He often stays up late and wakes up at 3:00 AM, regardless of his bedtime. If he wakes up later than this, he struggles to return to sleep. For instance, he woke up at 5:00 AM yesterday and did not fall back asleep until 11:00 AM. This pattern is quite common for him. He no longer wakes up to urinate at night following his prostate surgery. He occasionally experiences hallucinations at night, but these are not frequent. He also reports constant fatigue and a lack of energy, which he attributes to his inability to exercise following his prostate surgery.    He underwent prostate surgery 5 weeks ago, which has improved his urinary symptoms, although he still notices some blood in his urine. However, he now experiences sudden bowel movements without prior warning, which has led to social anxiety and avoidance of public places.    He also reports difficulty breathing through his nose, which requires the use of Flonase or saline to clear his sinuses before he can return to sleep.    MEDICATIONS  Melatonin, Flonase         Downloaded PAP Data Evaluated For Therapeutic Response and Compliance:      Review of Systems:    A complete review of systems was done and all were negative with the exception of having some fecal incontinence on occasion    Social History:    Social History     Socioeconomic History    Marital status:   "    Spouse name: Ivette    Number of children: 1   Tobacco Use    Smoking status: Never     Passive exposure: Past    Smokeless tobacco: Never    Tobacco comments:     Never   Vaping Use    Vaping status: Never Used   Substance and Sexual Activity    Alcohol use: No    Drug use: Never    Sexual activity: Not Currently     Partners: Female     Birth control/protection: Vasectomy, Surgical     Comment: Vasectomy 1974       Allergies:  Ace inhibitors, Hydrogenated palm oil glycerides, Lanolin, Other, Prazosin, and Nsaids     Medication Review:  Reviewed.      Vital Signs:    Vitals:    07/21/25 0947   Pulse: 87   SpO2: 94%   Weight: 107 kg (236 lb)   Height: 185.4 cm (73\")     Body mass index is 31.14 kg/m².  .BMIFOLLOWUP    Physical Exam:    Constitutional:  Well developed 80 y.o. male that appears in no apparent distress.  Awake & oriented times 3.  Normal mood with normal recent and remote memory and normal judgement.  Eyes:  Conjunctivae normal.      Self-administered [place] Sleepiness Scale test results: 10  0-5 Lower normal daytime sleepiness  6-10 Higher normal daytime sleepiness  11-12 Mild, 13-15 Moderate, & 16-24 Severe excessive daytime sleepiness       Impression:     Encounter Diagnoses   Name Primary?    RBD (REM behavioral disorder) Yes    Chronic insomnia          Assessment & Plan  1. Dream Enactment Behavior.  He reports experiencing dream enactment behavior approximately once every two to three weeks. He takes melatonin 10 mg nightly, which he has been using for a while. He is advised to bring his wife to the next appointment for further discussion. If episodes become more violent or frequent, Klonopin will be considered as an additional treatment.    2. Post-Prostate Surgery Recovery.  He had prostate surgery about five weeks ago and reports occasional blood in his urine. He is advised to avoid heavy lifting for another two weeks.    3. Nasal Congestion.  He uses Flonase or saline nasal spray to " help with nasal congestion, especially at night.    Follow-up  The patient will follow up in 6 months.         Good sleep hygiene measures should be maintained.  Weight loss would be beneficial in this patient who has obesity class I by Body mass index is 31.14 kg/m².      I answered all of the patient's questions.  The patient will call the [facility] for any problems and will follow up 6 months.    Patient or patient representative verbalized consent for the use of Ambient Listening during the visit with  Paul Hein MD for chart documentation. 7/21/2025  10:13 EDT           Paul Hein MD  Sleep Medicine  07/21/25  10:08 EDT

## 2025-08-04 ENCOUNTER — OFFICE VISIT (OUTPATIENT)
Dept: FAMILY MEDICINE CLINIC | Facility: CLINIC | Age: 80
End: 2025-08-04
Payer: MEDICARE

## 2025-08-04 VITALS
DIASTOLIC BLOOD PRESSURE: 80 MMHG | RESPIRATION RATE: 16 BRPM | HEART RATE: 71 BPM | SYSTOLIC BLOOD PRESSURE: 164 MMHG | OXYGEN SATURATION: 97 % | WEIGHT: 239 LBS | BODY MASS INDEX: 31.68 KG/M2 | HEIGHT: 73 IN

## 2025-08-04 DIAGNOSIS — E11.69: Primary | ICD-10-CM

## 2025-08-04 DIAGNOSIS — R10.32 LEFT LOWER QUADRANT PAIN: ICD-10-CM

## 2025-08-04 DIAGNOSIS — E11.65 TYPE 2 DIABETES MELLITUS WITH HYPERGLYCEMIA, WITH LONG-TERM CURRENT USE OF INSULIN: ICD-10-CM

## 2025-08-04 DIAGNOSIS — R19.7: Primary | ICD-10-CM

## 2025-08-04 DIAGNOSIS — Z79.4 TYPE 2 DIABETES MELLITUS WITH HYPERGLYCEMIA, WITH LONG-TERM CURRENT USE OF INSULIN: ICD-10-CM

## 2025-08-04 PROCEDURE — 99214 OFFICE O/P EST MOD 30 MIN: CPT | Performed by: INTERNAL MEDICINE

## 2025-08-04 PROCEDURE — 3079F DIAST BP 80-89 MM HG: CPT | Performed by: INTERNAL MEDICINE

## 2025-08-04 PROCEDURE — 1126F AMNT PAIN NOTED NONE PRSNT: CPT | Performed by: INTERNAL MEDICINE

## 2025-08-04 PROCEDURE — 3077F SYST BP >= 140 MM HG: CPT | Performed by: INTERNAL MEDICINE

## 2025-08-04 RX ORDER — AMLODIPINE BESYLATE 5 MG/1
5 TABLET ORAL DAILY
COMMUNITY

## 2025-08-04 RX ORDER — INSULIN GLARGINE 100 [IU]/ML
32 INJECTION, SOLUTION SUBCUTANEOUS DAILY
Qty: 30 ML | Refills: 0 | Status: SHIPPED | OUTPATIENT
Start: 2025-08-04

## 2025-08-25 ENCOUNTER — OFFICE VISIT (OUTPATIENT)
Dept: NEUROLOGY | Facility: CLINIC | Age: 80
End: 2025-08-25
Payer: MEDICARE

## 2025-08-25 VITALS
SYSTOLIC BLOOD PRESSURE: 110 MMHG | BODY MASS INDEX: 31.04 KG/M2 | WEIGHT: 234.2 LBS | OXYGEN SATURATION: 97 % | DIASTOLIC BLOOD PRESSURE: 70 MMHG | HEIGHT: 73 IN | HEART RATE: 60 BPM

## 2025-08-25 DIAGNOSIS — I95.1 ORTHOSTATIC HYPOTENSION: ICD-10-CM

## 2025-08-25 DIAGNOSIS — R27.8 SENSORY ATAXIA: ICD-10-CM

## 2025-08-25 DIAGNOSIS — G20.C ATYPICAL PARKINSONISM: Primary | ICD-10-CM

## 2025-08-25 DIAGNOSIS — G47.52 REM BEHAVIORAL DISORDER: ICD-10-CM

## 2025-08-25 RX ORDER — CLONAZEPAM 0.5 MG/1
0.5 TABLET ORAL NIGHTLY
Qty: 30 TABLET | Refills: 3 | Status: SHIPPED | OUTPATIENT
Start: 2025-08-25

## 2025-08-26 ENCOUNTER — OFFICE VISIT (OUTPATIENT)
Dept: ENDOCRINOLOGY | Age: 80
End: 2025-08-26
Payer: MEDICARE

## 2025-08-26 VITALS
WEIGHT: 235.4 LBS | BODY MASS INDEX: 31.2 KG/M2 | SYSTOLIC BLOOD PRESSURE: 136 MMHG | HEIGHT: 73 IN | OXYGEN SATURATION: 96 % | HEART RATE: 59 BPM | DIASTOLIC BLOOD PRESSURE: 84 MMHG

## 2025-08-26 DIAGNOSIS — E78.5 DYSLIPIDEMIA: ICD-10-CM

## 2025-08-26 DIAGNOSIS — Z79.4 TYPE 2 DIABETES MELLITUS WITH HYPOGLYCEMIA WITHOUT COMA, WITH LONG-TERM CURRENT USE OF INSULIN: Primary | ICD-10-CM

## 2025-08-26 DIAGNOSIS — N18.31 HYPERTENSIVE KIDNEY DISEASE WITH STAGE 3A CHRONIC KIDNEY DISEASE: ICD-10-CM

## 2025-08-26 DIAGNOSIS — E11.649 TYPE 2 DIABETES MELLITUS WITH HYPOGLYCEMIA WITHOUT COMA, WITH LONG-TERM CURRENT USE OF INSULIN: Primary | ICD-10-CM

## 2025-08-26 DIAGNOSIS — I12.9 HYPERTENSIVE KIDNEY DISEASE WITH STAGE 3A CHRONIC KIDNEY DISEASE: ICD-10-CM

## 2025-08-26 RX ORDER — INSULIN ASPART 100 [IU]/ML
INJECTION, SOLUTION INTRAVENOUS; SUBCUTANEOUS
Start: 2025-08-26

## 2025-08-26 RX ORDER — INSULIN GLARGINE 100 [IU]/ML
30 INJECTION, SOLUTION SUBCUTANEOUS DAILY
Start: 2025-08-26

## (undated) DEVICE — GW EMR FIX EXCHG J STD .035 3MM 260CM

## (undated) DEVICE — CATH DIAG IMPULSE FL3.5 5F 100CM

## (undated) DEVICE — CATH VENT MIV RADL PIG ST TIP 4F 110CM

## (undated) DEVICE — HI-TORQUE WHISPER MS GUIDE WIRE .014 STRAIGHT TIP 3.0 CM X 190 CM: Brand: HI-TORQUE WHISPER

## (undated) DEVICE — RUNTHROUGH NS EXTRA FLOPPY PTCA GUIDEWIRE: Brand: RUNTHROUGH

## (undated) DEVICE — PK CATH CARD 40

## (undated) DEVICE — THE VASC BAND HEMOSTAT IS A COMPRESSION DEVICE TO ASSIST HEMOSTASIS OF ARTERIAL, VENOUS AND HEMODIALYSIS PERCUTANEOUS ACCESS SITES.: Brand: VASC BAND™ HEMOSTAT

## (undated) DEVICE — GLIDESHEATH SLENDER STAINLESS STEEL KIT: Brand: GLIDESHEATH SLENDER

## (undated) DEVICE — DEV INDEFLATOR

## (undated) DEVICE — GUIDE CATHETER: Brand: MACH1™

## (undated) DEVICE — CATH DIAG IMPULSE FR4 5F 100CM

## (undated) DEVICE — KT MANIFLD CARDIAC

## (undated) DEVICE — CATH4F INF PIG 145Â° MOD 110CM: Brand: INFINITI

## (undated) DEVICE — TREK CORONARY DILATATION CATHETER 2.50 MM X 12 MM / RAPID-EXCHANGE: Brand: TREK

## (undated) DEVICE — GLIDESHEATH BASIC HYDROPHILIC COATED INTRODUCER SHEATH: Brand: GLIDESHEATH

## (undated) DEVICE — HI-TORQUE BALANCE MIDDLEWEIGHT GUIDE WIRE .014 STRAIGHT TIP 3.0 CM X 190 CM: Brand: HI-TORQUE BALANCE MIDDLEWEIGHT

## (undated) DEVICE — TR BAND RADIAL ARTERY COMPRESSION DEVICE: Brand: TR BAND